# Patient Record
Sex: FEMALE | Race: WHITE | NOT HISPANIC OR LATINO
[De-identification: names, ages, dates, MRNs, and addresses within clinical notes are randomized per-mention and may not be internally consistent; named-entity substitution may affect disease eponyms.]

---

## 2017-05-22 ENCOUNTER — LABORATORY RESULT (OUTPATIENT)
Age: 77
End: 2017-05-22

## 2017-05-22 ENCOUNTER — MEDICATION RENEWAL (OUTPATIENT)
Age: 77
End: 2017-05-22

## 2017-05-22 ENCOUNTER — APPOINTMENT (OUTPATIENT)
Dept: INTERNAL MEDICINE | Facility: CLINIC | Age: 77
End: 2017-05-22

## 2017-05-22 VITALS
OXYGEN SATURATION: 97 % | DIASTOLIC BLOOD PRESSURE: 83 MMHG | WEIGHT: 118 LBS | HEART RATE: 91 BPM | RESPIRATION RATE: 16 BRPM | HEIGHT: 61 IN | BODY MASS INDEX: 22.28 KG/M2 | SYSTOLIC BLOOD PRESSURE: 178 MMHG | TEMPERATURE: 98.3 F

## 2017-05-22 DIAGNOSIS — H35.30 UNSPECIFIED MACULAR DEGENERATION: ICD-10-CM

## 2017-05-22 DIAGNOSIS — Z00.00 ENCOUNTER FOR GENERAL ADULT MEDICAL EXAMINATION W/OUT ABNORMAL FINDINGS: ICD-10-CM

## 2017-05-22 DIAGNOSIS — Z82.49 FAMILY HISTORY OF ISCHEMIC HEART DISEASE AND OTHER DISEASES OF THE CIRCULATORY SYSTEM: ICD-10-CM

## 2017-05-22 RX ORDER — LORAZEPAM 1 MG/1
1 TABLET ORAL
Refills: 0 | Status: COMPLETED | COMMUNITY
End: 2017-05-22

## 2017-05-31 PROBLEM — Z82.49 FAMILY HISTORY OF MYOCARDIAL INFARCTION: Status: ACTIVE | Noted: 2017-05-22

## 2017-05-31 PROBLEM — H35.30 MACULAR DEGENERATION: Status: ACTIVE | Noted: 2017-05-22

## 2017-05-31 PROBLEM — Z00.00 ENCOUNTER FOR PREVENTIVE HEALTH EXAMINATION: Status: ACTIVE | Noted: 2017-05-17

## 2017-05-31 LAB
25(OH)D3 SERPL-MCNC: 21.2 NG/ML
ALBUMIN SERPL ELPH-MCNC: 3.7 G/DL
ALP BLD-CCNC: 69 U/L
ALT SERPL-CCNC: 10 U/L
ANA PAT FLD IF-IMP: ABNORMAL
ANA SER IF-ACNC: ABNORMAL
ANION GAP SERPL CALC-SCNC: 18 MMOL/L
APPEARANCE: CLEAR
AST SERPL-CCNC: 20 U/L
BACTERIA UR CULT: ABNORMAL
BASOPHILS # BLD AUTO: 0.03 K/UL
BASOPHILS NFR BLD AUTO: 0.5 %
BILIRUB SERPL-MCNC: 0.4 MG/DL
BILIRUBIN URINE: NEGATIVE
BLOOD URINE: ABNORMAL
BUN SERPL-MCNC: 28 MG/DL
CALCIUM SERPL-MCNC: 9.1 MG/DL
CARDIOLIPIN AB SER IA-ACNC: POSITIVE
CHLORIDE SERPL-SCNC: 100 MMOL/L
CHOLEST SERPL-MCNC: 179 MG/DL
CHOLEST/HDLC SERPL: 3.4 RATIO
CK BB SERPL ELPH-CCNC: 0 % (ref 0–?)
CK MB CFR SERPL ELPH: 0 %
CK MM SERPL ELPH-CCNC: 100 %
CO2 SERPL-SCNC: 22 MMOL/L
COLOR: YELLOW
CREAT SERPL-MCNC: 1.06 MG/DL
CREAT SPEC-SCNC: 103 MG/DL
CREATINE KINASE,TOTAL,SERUM: 51 U/L
CRP SERPL-MCNC: 1 MG/DL
EOSINOPHIL # BLD AUTO: 0.21 K/UL
EOSINOPHIL NFR BLD AUTO: 3.3 %
ERYTHROCYTE [SEDIMENTATION RATE] IN BLOOD BY WESTERGREN METHOD: 36 MM/HR
FOLATE SERPL-MCNC: 13.5 NG/ML
FRUCTOSAMINE SERPL-MCNC: 238 UMOL/L
GLUCOSE QUALITATIVE U: NORMAL MG/DL
GLUCOSE SERPL-MCNC: 89 MG/DL
HBA1C MFR BLD HPLC: 5.6 %
HCT VFR BLD CALC: 40.6 %
HDLC SERPL-MCNC: 52 MG/DL
HGB BLD-MCNC: 12.7 G/DL
IMM GRANULOCYTES NFR BLD AUTO: 0.2 %
IRON SERPL-MCNC: 21 UG/DL
KETONES URINE: NEGATIVE
LDLC SERPL CALC-MCNC: 105 MG/DL
LEUKOCYTE ESTERASE URINE: ABNORMAL
LYMPHOCYTES # BLD AUTO: 0.88 K/UL
LYMPHOCYTES NFR BLD AUTO: 13.9 %
MACRO TYPE 1: 0 %
MACRO TYPE 2: 0 %
MAN DIFF?: NORMAL
MCHC RBC-ENTMCNC: 29.5 PG
MCHC RBC-ENTMCNC: 31.3 GM/DL
MCV RBC AUTO: 94.2 FL
MICROALBUMIN 24H UR DL<=1MG/L-MCNC: 11.2 MG/DL
MICROALBUMIN/CREAT 24H UR-RTO: 109
MONOCYTES # BLD AUTO: 0.56 K/UL
MONOCYTES NFR BLD AUTO: 8.8 %
NEUTROPHILS # BLD AUTO: 4.64 K/UL
NEUTROPHILS NFR BLD AUTO: 73.3 %
NITRITE URINE: NEGATIVE
NT-PROBNP SERPL-MCNC: 7686 PG/ML
PH URINE: 5.5
PLATELET # BLD AUTO: 210 K/UL
POTASSIUM SERPL-SCNC: 4.1 MMOL/L
PROT SERPL-MCNC: 6.9 G/DL
PROTEIN URINE: 30 MG/DL
RBC # BLD: 4.31 M/UL
RBC # FLD: 15.3 %
RHEUMATOID FACT SER QL: 10 IU/ML
SODIUM SERPL-SCNC: 140 MMOL/L
SPECIFIC GRAVITY URINE: 1.02
TRIGL SERPL-MCNC: 110 MG/DL
TSH SERPL-ACNC: 0.5 UIU/ML
UROBILINOGEN URINE: NORMAL MG/DL
VIT B12 SERPL-MCNC: 412 PG/ML
WBC # FLD AUTO: 6.33 K/UL

## 2017-06-09 ENCOUNTER — APPOINTMENT (OUTPATIENT)
Dept: INTERNAL MEDICINE | Facility: CLINIC | Age: 77
End: 2017-06-09

## 2017-06-09 VITALS
DIASTOLIC BLOOD PRESSURE: 73 MMHG | TEMPERATURE: 98.2 F | SYSTOLIC BLOOD PRESSURE: 162 MMHG | HEIGHT: 61 IN | WEIGHT: 114 LBS | HEART RATE: 72 BPM | OXYGEN SATURATION: 98 % | BODY MASS INDEX: 21.52 KG/M2 | RESPIRATION RATE: 16 BRPM

## 2017-06-09 DIAGNOSIS — Z87.440 PERSONAL HISTORY OF URINARY (TRACT) INFECTIONS: ICD-10-CM

## 2017-06-09 DIAGNOSIS — R60.0 LOCALIZED EDEMA: ICD-10-CM

## 2017-06-09 DIAGNOSIS — F32.9 MAJOR DEPRESSIVE DISORDER, SINGLE EPISODE, UNSPECIFIED: ICD-10-CM

## 2017-06-10 LAB
APPEARANCE: CLEAR
BILIRUBIN URINE: NEGATIVE
BLOOD URINE: NEGATIVE
COLOR: YELLOW
GLUCOSE QUALITATIVE U: NORMAL MG/DL
KETONES URINE: NEGATIVE
LEUKOCYTE ESTERASE URINE: NEGATIVE
NITRITE URINE: NEGATIVE
PH URINE: 5.5
PROTEIN URINE: NEGATIVE MG/DL
SPECIFIC GRAVITY URINE: 1.01
UROBILINOGEN URINE: NORMAL MG/DL

## 2017-06-13 ENCOUNTER — MEDICATION RENEWAL (OUTPATIENT)
Age: 77
End: 2017-06-13

## 2017-06-13 LAB — BACTERIA UR CULT: ABNORMAL

## 2017-06-19 ENCOUNTER — MEDICATION RENEWAL (OUTPATIENT)
Age: 77
End: 2017-06-19

## 2017-06-25 PROBLEM — F32.9 DEPRESSION: Status: ACTIVE | Noted: 2017-05-22

## 2017-06-25 PROBLEM — R60.0 PEDAL EDEMA: Status: ACTIVE | Noted: 2017-05-22

## 2017-06-26 ENCOUNTER — NON-APPOINTMENT (OUTPATIENT)
Age: 77
End: 2017-06-26

## 2017-06-26 ENCOUNTER — APPOINTMENT (OUTPATIENT)
Dept: CARDIOLOGY | Facility: CLINIC | Age: 77
End: 2017-06-26

## 2017-06-26 VITALS — SYSTOLIC BLOOD PRESSURE: 138 MMHG | DIASTOLIC BLOOD PRESSURE: 74 MMHG

## 2017-06-26 VITALS
WEIGHT: 113 LBS | HEART RATE: 66 BPM | RESPIRATION RATE: 12 BRPM | HEIGHT: 61 IN | DIASTOLIC BLOOD PRESSURE: 76 MMHG | OXYGEN SATURATION: 98 % | BODY MASS INDEX: 21.34 KG/M2 | SYSTOLIC BLOOD PRESSURE: 162 MMHG

## 2017-06-26 DIAGNOSIS — R00.2 PALPITATIONS: ICD-10-CM

## 2017-06-26 RX ORDER — CIPROFLOXACIN HYDROCHLORIDE 500 MG/1
500 TABLET, FILM COATED ORAL
Qty: 14 | Refills: 0 | Status: COMPLETED | COMMUNITY
Start: 2017-06-12 | End: 2017-06-26

## 2017-06-26 RX ORDER — AMPICILLIN 500 MG/1
500 CAPSULE ORAL
Qty: 10 | Refills: 0 | Status: COMPLETED | COMMUNITY
Start: 2017-05-31 | End: 2017-06-26

## 2017-06-26 RX ORDER — HYDROCHLOROTHIAZIDE 12.5 MG/1
12.5 CAPSULE ORAL
Qty: 30 | Refills: 0 | Status: COMPLETED | COMMUNITY
Start: 2017-05-22 | End: 2017-06-26

## 2017-06-26 RX ORDER — HYDROCHLOROTHIAZIDE 12.5 MG/1
12.5 TABLET ORAL
Qty: 30 | Refills: 3 | Status: COMPLETED | COMMUNITY
Start: 2017-05-22 | End: 2017-06-26

## 2017-07-17 ENCOUNTER — MEDICATION RENEWAL (OUTPATIENT)
Age: 77
End: 2017-07-17

## 2017-07-17 RX ORDER — VALSARTAN AND HYDROCHLOROTHIAZIDE 320; 12.5 MG/1; MG/1
320-12.5 TABLET, FILM COATED ORAL
Qty: 90 | Refills: 3 | Status: DISCONTINUED | COMMUNITY
Start: 2017-06-26 | End: 2017-07-17

## 2017-07-19 ENCOUNTER — NON-APPOINTMENT (OUTPATIENT)
Age: 77
End: 2017-07-19

## 2017-07-19 ENCOUNTER — APPOINTMENT (OUTPATIENT)
Dept: CARDIOLOGY | Facility: CLINIC | Age: 77
End: 2017-07-19

## 2017-07-19 VITALS — SYSTOLIC BLOOD PRESSURE: 130 MMHG | DIASTOLIC BLOOD PRESSURE: 72 MMHG

## 2017-07-19 VITALS
HEART RATE: 64 BPM | WEIGHT: 110 LBS | RESPIRATION RATE: 12 BRPM | DIASTOLIC BLOOD PRESSURE: 76 MMHG | BODY MASS INDEX: 20.77 KG/M2 | SYSTOLIC BLOOD PRESSURE: 161 MMHG | TEMPERATURE: 97.4 F | OXYGEN SATURATION: 98 % | HEIGHT: 61 IN

## 2017-07-19 RX ORDER — LOSARTAN POTASSIUM 100 MG/1
100 TABLET, FILM COATED ORAL
Qty: 14 | Refills: 0 | Status: DISCONTINUED | COMMUNITY
Start: 2017-02-07

## 2017-07-19 RX ORDER — LOVASTATIN 20 MG/1
20 TABLET ORAL
Qty: 14 | Refills: 0 | Status: DISCONTINUED | COMMUNITY
Start: 2017-02-07

## 2017-07-19 RX ORDER — TRAMADOL HYDROCHLORIDE 50 MG/1
50 TABLET, COATED ORAL
Qty: 56 | Refills: 0 | Status: DISCONTINUED | COMMUNITY
Start: 2017-02-07

## 2017-07-19 RX ORDER — VALSARTAN 320 MG/1
320 TABLET, COATED ORAL DAILY
Qty: 30 | Refills: 3 | Status: DISCONTINUED | COMMUNITY
End: 2017-07-19

## 2017-07-19 RX ORDER — METOPROLOL TARTRATE 25 MG/1
25 TABLET, FILM COATED ORAL
Qty: 28 | Refills: 0 | Status: DISCONTINUED | COMMUNITY
Start: 2017-02-07

## 2017-07-19 RX ORDER — OXYCODONE AND ACETAMINOPHEN 5; 325 MG/1; MG/1
5-325 TABLET ORAL
Qty: 40 | Refills: 0 | Status: DISCONTINUED | COMMUNITY
Start: 2017-03-21

## 2017-07-19 RX ORDER — PRAVASTATIN SODIUM 20 MG/1
20 TABLET ORAL
Qty: 90 | Refills: 0 | Status: DISCONTINUED | COMMUNITY
Start: 2017-02-27

## 2017-07-19 RX ORDER — ESCITALOPRAM OXALATE 5 MG/1
5 TABLET ORAL
Qty: 90 | Refills: 0 | Status: DISCONTINUED | COMMUNITY
Start: 2017-05-22 | End: 2017-07-19

## 2017-07-19 RX ORDER — LORAZEPAM 0.5 MG/1
0.5 TABLET ORAL
Qty: 14 | Refills: 0 | Status: DISCONTINUED | COMMUNITY
Start: 2017-02-07

## 2017-07-19 RX ORDER — PAROXETINE HYDROCHLORIDE 40 MG/1
40 TABLET, FILM COATED ORAL
Qty: 14 | Refills: 0 | Status: DISCONTINUED | COMMUNITY
Start: 2017-02-07

## 2017-07-19 RX ORDER — OSELTAMIVIR PHOSPHATE 30 MG/1
30 CAPSULE ORAL
Qty: 10 | Refills: 0 | Status: DISCONTINUED | COMMUNITY
Start: 2017-02-07

## 2017-08-02 ENCOUNTER — APPOINTMENT (OUTPATIENT)
Dept: CARDIOLOGY | Facility: CLINIC | Age: 77
End: 2017-08-02
Payer: MEDICARE

## 2017-08-02 VITALS
HEIGHT: 61 IN | WEIGHT: 117 LBS | SYSTOLIC BLOOD PRESSURE: 172 MMHG | BODY MASS INDEX: 22.09 KG/M2 | HEART RATE: 61 BPM | OXYGEN SATURATION: 96 % | RESPIRATION RATE: 12 BRPM | TEMPERATURE: 97.6 F | DIASTOLIC BLOOD PRESSURE: 78 MMHG

## 2017-08-02 VITALS — SYSTOLIC BLOOD PRESSURE: 140 MMHG | DIASTOLIC BLOOD PRESSURE: 68 MMHG

## 2017-08-02 DIAGNOSIS — M32.9 SYSTEMIC LUPUS ERYTHEMATOSUS, UNSPECIFIED: ICD-10-CM

## 2017-08-02 DIAGNOSIS — I65.29 OCCLUSION AND STENOSIS OF UNSPECIFIED CAROTID ARTERY: ICD-10-CM

## 2017-08-02 PROCEDURE — 99214 OFFICE O/P EST MOD 30 MIN: CPT

## 2017-08-02 RX ORDER — VALSARTAN AND HYDROCHLOROTHIAZIDE 320; 12.5 MG/1; MG/1
320-12.5 TABLET, FILM COATED ORAL
Qty: 90 | Refills: 3 | Status: COMPLETED | COMMUNITY
Start: 2017-07-19 | End: 2017-08-02

## 2017-08-02 RX ORDER — AMLODIPINE BESYLATE 5 MG/1
5 TABLET ORAL DAILY
Qty: 14 | Refills: 0 | Status: COMPLETED | COMMUNITY
Start: 2017-07-15 | End: 2017-08-02

## 2017-08-25 ENCOUNTER — EMERGENCY (EMERGENCY)
Facility: HOSPITAL | Age: 77
LOS: 1 days | Discharge: HOME | End: 2017-08-25
Attending: EMERGENCY MEDICINE | Admitting: EMERGENCY MEDICINE
Payer: MEDICARE

## 2017-08-25 ENCOUNTER — MEDICATION RENEWAL (OUTPATIENT)
Age: 77
End: 2017-08-25

## 2017-08-25 VITALS
DIASTOLIC BLOOD PRESSURE: 93 MMHG | OXYGEN SATURATION: 99 % | SYSTOLIC BLOOD PRESSURE: 194 MMHG | HEART RATE: 65 BPM | TEMPERATURE: 98 F | RESPIRATION RATE: 16 BRPM

## 2017-08-25 PROCEDURE — 99284 EMERGENCY DEPT VISIT MOD MDM: CPT

## 2017-08-25 RX ORDER — DIPHENHYDRAMINE HCL 50 MG
25 CAPSULE ORAL ONCE
Qty: 0 | Refills: 0 | Status: COMPLETED | OUTPATIENT
Start: 2017-08-25 | End: 2017-08-25

## 2017-08-25 RX ADMIN — Medication 25 MILLIGRAM(S): at 23:01

## 2017-08-25 NOTE — ED PROVIDER NOTE - CARE PLAN
Principal Discharge DX:	Rash  Instructions for follow-up, activity and diet:	1. Take Benadryl, 25 mg every 4-6 hours as needed for itching  2. Apply hydrocortisone cream as directed to rash  3. Follow-up with dermatology in 5-7 days, please see referral sheet and call for appointment  4. Return immediately for any worsening or concerning symptoms

## 2017-08-25 NOTE — ED PROVIDER NOTE - PLAN OF CARE
1. Take Benadryl, 25 mg every 4-6 hours as needed for itching  2. Apply hydrocortisone cream as directed to rash  3. Follow-up with dermatology in 5-7 days, please see referral sheet and call for appointment  4. Return immediately for any worsening or concerning symptoms

## 2017-08-25 NOTE — ED PROVIDER NOTE - OBJECTIVE STATEMENT
77F with pmh HTN, HLD, presents with rash to L upper arm. Pt states that she quit smoking ~6 weeks ago, began using nicotine patch, alternating between L and R shoulder. over past couple of weeks has developed pruritic erythematous rash to L upper arm. no alleviating or exacerbating factors. has not taken any meds. is concerned could potentially be bed bugs b/c friend had them recently, although she has not been to her house. lives with daughter who is asymptomatic. denies any other symptoms including cp, sob, n/v/d, f/c, dizziness, weight loss. no known bug bites, no time spent outdoors or in woods.

## 2017-08-25 NOTE — ED ADULT NURSE NOTE - OBJECTIVE STATEMENT
Pt presented to ED with c/o rash and itching to both arms s/p using a nicotine patch 2 weeks ago. no acute distress noted.

## 2017-08-25 NOTE — ED ADULT TRIAGE NOTE - CHIEF COMPLAINT QUOTE
Pt c/o rash on L arm X2 weeks. States she started using a nicotine patch 5 weeks ago and believes it is related. Denies any other medical complaints at this time.

## 2017-08-25 NOTE — ED PROVIDER NOTE - MEDICAL DECISION MAKING DETAILS
77F with rash to L upper arm x 2 weeks, with no systemic symptoms. potentially insect bites vs contact dermatitis/allergic reaction. will give benadryl, recommend hydrocortisone cream, have f/u with dermatology. - Jensen Patel MD

## 2017-08-25 NOTE — ED PROVIDER NOTE - CARDIAC, MLM
Normal rate, regular rhythm.  Heart sounds S1, S2.  No murmurs, rubs or gallops. 2+ bilat rad pulses.

## 2017-08-25 NOTE — ED PROVIDER NOTE - CONSTITUTIONAL, MLM
normal... Elderly female, awake, alert, oriented to person, place, time/situation and in no apparent distress.

## 2017-08-30 ENCOUNTER — APPOINTMENT (OUTPATIENT)
Dept: INTERNAL MEDICINE | Facility: CLINIC | Age: 77
End: 2017-08-30
Payer: MEDICARE

## 2017-08-30 VITALS
DIASTOLIC BLOOD PRESSURE: 71 MMHG | BODY MASS INDEX: 21.52 KG/M2 | OXYGEN SATURATION: 96 % | TEMPERATURE: 98.1 F | HEIGHT: 61 IN | RESPIRATION RATE: 12 BRPM | SYSTOLIC BLOOD PRESSURE: 154 MMHG | HEART RATE: 66 BPM | WEIGHT: 114 LBS

## 2017-08-30 DIAGNOSIS — M54.9 DORSALGIA, UNSPECIFIED: ICD-10-CM

## 2017-08-30 PROCEDURE — 99214 OFFICE O/P EST MOD 30 MIN: CPT

## 2017-08-31 LAB
ALBUMIN SERPL ELPH-MCNC: 3.5 G/DL
ALP BLD-CCNC: 68 U/L
ALT SERPL-CCNC: 5 U/L
ANION GAP SERPL CALC-SCNC: 14 MMOL/L
AST SERPL-CCNC: 17 U/L
BASOPHILS # BLD AUTO: 0.04 K/UL
BASOPHILS NFR BLD AUTO: 0.7 %
BILIRUB SERPL-MCNC: 0.3 MG/DL
BUN SERPL-MCNC: 27 MG/DL
CALCIUM SERPL-MCNC: 9.1 MG/DL
CHLORIDE SERPL-SCNC: 100 MMOL/L
CO2 SERPL-SCNC: 23 MMOL/L
CREAT SERPL-MCNC: 1.27 MG/DL
EOSINOPHIL # BLD AUTO: 0.29 K/UL
EOSINOPHIL NFR BLD AUTO: 5.4 %
GLUCOSE SERPL-MCNC: 90 MG/DL
HCT VFR BLD CALC: 35.8 %
HGB BLD-MCNC: 11.6 G/DL
IMM GRANULOCYTES NFR BLD AUTO: 0.2 %
LYMPHOCYTES # BLD AUTO: 1 K/UL
LYMPHOCYTES NFR BLD AUTO: 18.6 %
MAN DIFF?: NORMAL
MCHC RBC-ENTMCNC: 29.1 PG
MCHC RBC-ENTMCNC: 32.4 GM/DL
MCV RBC AUTO: 89.9 FL
MONOCYTES # BLD AUTO: 0.48 K/UL
MONOCYTES NFR BLD AUTO: 8.9 %
NEUTROPHILS # BLD AUTO: 3.56 K/UL
NEUTROPHILS NFR BLD AUTO: 66.2 %
PLATELET # BLD AUTO: 239 K/UL
POTASSIUM SERPL-SCNC: 4.5 MMOL/L
PROT SERPL-MCNC: 7.7 G/DL
RBC # BLD: 3.98 M/UL
RBC # FLD: 15.5 %
SODIUM SERPL-SCNC: 137 MMOL/L
TSH SERPL-ACNC: 0.7 UIU/ML
WBC # FLD AUTO: 5.38 K/UL

## 2017-09-01 LAB — RPR SER QL: NORMAL

## 2017-09-07 ENCOUNTER — APPOINTMENT (OUTPATIENT)
Dept: INTERNAL MEDICINE | Facility: CLINIC | Age: 77
End: 2017-09-07

## 2017-09-13 ENCOUNTER — APPOINTMENT (OUTPATIENT)
Dept: INTERNAL MEDICINE | Facility: CLINIC | Age: 77
End: 2017-09-13

## 2017-09-25 ENCOUNTER — EMERGENCY (EMERGENCY)
Facility: HOSPITAL | Age: 77
LOS: 1 days | Discharge: HOME | End: 2017-09-25
Attending: EMERGENCY MEDICINE | Admitting: EMERGENCY MEDICINE
Payer: COMMERCIAL

## 2017-09-25 VITALS
OXYGEN SATURATION: 99 % | TEMPERATURE: 99 F | HEART RATE: 89 BPM | SYSTOLIC BLOOD PRESSURE: 166 MMHG | RESPIRATION RATE: 18 BRPM | DIASTOLIC BLOOD PRESSURE: 77 MMHG

## 2017-09-25 PROCEDURE — 99283 EMERGENCY DEPT VISIT LOW MDM: CPT

## 2017-09-25 PROCEDURE — 99284 EMERGENCY DEPT VISIT MOD MDM: CPT

## 2017-09-25 RX ORDER — HYDROXYZINE HCL 10 MG
1 TABLET ORAL
Qty: 28 | Refills: 0 | OUTPATIENT
Start: 2017-09-25 | End: 2017-10-02

## 2017-09-25 RX ADMIN — Medication 40 MILLIGRAM(S): at 15:03

## 2017-09-25 NOTE — ED PROVIDER NOTE - OBJECTIVE STATEMENT
77 y.o. female coming in with a rash on her left UE and chest for the past couple weeks.  Pt was been following up with a dermatologist , tried steroid creams with no help.  Started Permethrin 2 days ago without any help.   Had a biopsy performed but does not have the results yet.  Rash is very itchy, nothing makes it better or worse.  Dermatologist is concerned it might be mites.  Pt has not noticed any bugs in her house.  No fevers, no chills, no nausea, no vomiting, no wheezing, no difficulty swallowing. 77 y.o. female coming in with a rash on her left UE and chest for the past couple weeks.  Pt was been following up with a dermatologist , tried steroid creams with no help.  Started Permethrin 2 days ago without any help.   Had a biopsy performed but does not have the results yet.  Rash is very itchy, nothing makes it better or worse.  Dermatologist is concerned it might be mites.  Pt has not noticed any bugs in her house.  No fevers, no chills, no nausea, no vomiting, no wheezing, no difficulty swallowing.       Attending note. Patient was seen in fast track room #5. Agree with the above. Patient's complaint of severely pruritic rash over the entire upper half of her body from the trunk to scalp, and upper extremities, and groin. Patient has been using a steroid cream without improvement. Patient states she's unable to sleep due to the itching. Patient had no relief with Benadryl or Allegra.

## 2017-09-25 NOTE — ED PROVIDER NOTE - PHYSICAL EXAMINATION
Attending note. Patient is alert and in no acute distress. Patient has a maculopapular rash on her scalp, neck, thorax, abdomen. Patient has evidence of a skin biopsy on the left shoulder. Patient has confluence of the rash on the lower back.  There is no evidence of cellulitis.

## 2017-09-25 NOTE — ED PROVIDER NOTE - CHPI ED SYMPTOMS NEG
no scaly patches on skin/no decreased eating/drinking/no inflammation/no vomiting/no fever/no bruising/no petechia

## 2017-09-25 NOTE — ED ADULT NURSE NOTE - OBJECTIVE STATEMENT
Possible reaction to something being seen by derm and pt has creams for the upper body red raised areas Pt was bx on the left upper arm and pt c/o of itching.

## 2017-09-25 NOTE — ED PROVIDER NOTE - SKIN, MLM
patchy areas of erythema discreet lesions about 1cm each most prominent on LUE and chest that are not warm, + blanching

## 2017-09-25 NOTE — ED PROVIDER NOTE - MEDICAL DECISION MAKING DETAILS
Attending note-rash over entire body, with no relief with steroid cream. P.o. steroids and Vistaril and patient advised to call her dermatologist for further instructions.

## 2017-09-27 ENCOUNTER — APPOINTMENT (OUTPATIENT)
Dept: INTERNAL MEDICINE | Facility: CLINIC | Age: 77
End: 2017-09-27
Payer: MEDICARE

## 2017-09-27 ENCOUNTER — APPOINTMENT (OUTPATIENT)
Dept: CARDIOLOGY | Facility: CLINIC | Age: 77
End: 2017-09-27

## 2017-09-27 VITALS
HEIGHT: 61 IN | OXYGEN SATURATION: 98 % | SYSTOLIC BLOOD PRESSURE: 161 MMHG | TEMPERATURE: 98 F | BODY MASS INDEX: 20.77 KG/M2 | DIASTOLIC BLOOD PRESSURE: 80 MMHG | WEIGHT: 110 LBS | RESPIRATION RATE: 12 BRPM | HEART RATE: 80 BPM

## 2017-09-27 DIAGNOSIS — R21 RASH AND OTHER NONSPECIFIC SKIN ERUPTION: ICD-10-CM

## 2017-09-27 DIAGNOSIS — B86 SCABIES: ICD-10-CM

## 2017-09-27 PROCEDURE — 99214 OFFICE O/P EST MOD 30 MIN: CPT

## 2017-10-30 ENCOUNTER — APPOINTMENT (OUTPATIENT)
Dept: CARDIOLOGY | Facility: CLINIC | Age: 77
End: 2017-10-30

## 2017-11-09 ENCOUNTER — APPOINTMENT (OUTPATIENT)
Dept: CARDIOLOGY | Facility: CLINIC | Age: 77
End: 2017-11-09

## 2017-11-16 ENCOUNTER — NON-APPOINTMENT (OUTPATIENT)
Age: 77
End: 2017-11-16

## 2017-11-16 ENCOUNTER — APPOINTMENT (OUTPATIENT)
Dept: INTERNAL MEDICINE | Facility: CLINIC | Age: 77
End: 2017-11-16
Payer: MEDICARE

## 2017-11-16 VITALS
HEART RATE: 54 BPM | BODY MASS INDEX: 21.14 KG/M2 | OXYGEN SATURATION: 99 % | DIASTOLIC BLOOD PRESSURE: 73 MMHG | HEIGHT: 61 IN | WEIGHT: 112 LBS | TEMPERATURE: 98.5 F | RESPIRATION RATE: 12 BRPM | SYSTOLIC BLOOD PRESSURE: 150 MMHG

## 2017-11-16 PROCEDURE — 99214 OFFICE O/P EST MOD 30 MIN: CPT

## 2017-11-16 PROCEDURE — 93000 ELECTROCARDIOGRAM COMPLETE: CPT

## 2017-11-16 RX ORDER — METHYLPREDNISOLONE 4 MG/1
4 TABLET ORAL
Qty: 1 | Refills: 0 | Status: DISCONTINUED | COMMUNITY
Start: 2017-08-30 | End: 2017-11-16

## 2017-12-04 LAB
ALBUMIN SERPL ELPH-MCNC: 3.6 G/DL
ALP BLD-CCNC: 70 U/L
ALT SERPL-CCNC: 10 U/L
ANION GAP SERPL CALC-SCNC: 19 MMOL/L
AST SERPL-CCNC: 18 U/L
BASOPHILS # BLD AUTO: 0.05 K/UL
BASOPHILS NFR BLD AUTO: 0.7 %
BILIRUB SERPL-MCNC: 0.2 MG/DL
BUN SERPL-MCNC: 26 MG/DL
CALCIUM SERPL-MCNC: 9.8 MG/DL
CHLORIDE SERPL-SCNC: 99 MMOL/L
CO2 SERPL-SCNC: 22 MMOL/L
CREAT SERPL-MCNC: 1.04 MG/DL
EOSINOPHIL # BLD AUTO: 0.43 K/UL
EOSINOPHIL NFR BLD AUTO: 5.7 %
ERYTHROCYTE [SEDIMENTATION RATE] IN BLOOD BY WESTERGREN METHOD: 38 MM/HR
GLUCOSE SERPL-MCNC: 76 MG/DL
HAV IGM SER QL: NONREACTIVE
HBV CORE IGM SER QL: NONREACTIVE
HBV SURFACE AG SER QL: NONREACTIVE
HCT VFR BLD CALC: 39 %
HCV AB SER QL: NONREACTIVE
HCV S/CO RATIO: 0.09 S/CO
HGB BLD-MCNC: 13.1 G/DL
IMM GRANULOCYTES NFR BLD AUTO: 0.1 %
LYMPHOCYTES # BLD AUTO: 1.14 K/UL
LYMPHOCYTES NFR BLD AUTO: 15.1 %
MAN DIFF?: NORMAL
MCHC RBC-ENTMCNC: 31.6 PG
MCHC RBC-ENTMCNC: 33.6 GM/DL
MCV RBC AUTO: 94 FL
MONOCYTES # BLD AUTO: 0.73 K/UL
MONOCYTES NFR BLD AUTO: 9.6 %
NEUTROPHILS # BLD AUTO: 5.21 K/UL
NEUTROPHILS NFR BLD AUTO: 68.8 %
PLATELET # BLD AUTO: 257 K/UL
POTASSIUM SERPL-SCNC: 4 MMOL/L
PROT SERPL-MCNC: 7.7 G/DL
RBC # BLD: 4.15 M/UL
RBC # FLD: 15.2 %
SODIUM SERPL-SCNC: 140 MMOL/L
T4 FREE SERPL-MCNC: 1.3 NG/DL
TSH SERPL-ACNC: 0.42 UIU/ML
WBC # FLD AUTO: 7.57 K/UL

## 2017-12-28 ENCOUNTER — APPOINTMENT (OUTPATIENT)
Dept: INTERNAL MEDICINE | Facility: CLINIC | Age: 77
End: 2017-12-28
Payer: MEDICARE

## 2017-12-28 VITALS
SYSTOLIC BLOOD PRESSURE: 146 MMHG | WEIGHT: 109 LBS | TEMPERATURE: 97.8 F | BODY MASS INDEX: 20.58 KG/M2 | HEART RATE: 78 BPM | HEIGHT: 61 IN | DIASTOLIC BLOOD PRESSURE: 83 MMHG | OXYGEN SATURATION: 95 % | RESPIRATION RATE: 12 BRPM

## 2017-12-28 DIAGNOSIS — E78.5 HYPERLIPIDEMIA, UNSPECIFIED: ICD-10-CM

## 2017-12-28 DIAGNOSIS — L29.9 PRURITUS, UNSPECIFIED: ICD-10-CM

## 2017-12-28 DIAGNOSIS — L20.9 ATOPIC DERMATITIS, UNSPECIFIED: ICD-10-CM

## 2017-12-28 DIAGNOSIS — F41.0 PANIC DISORDER [EPISODIC PAROXYSMAL ANXIETY]: ICD-10-CM

## 2017-12-28 DIAGNOSIS — I10 ESSENTIAL (PRIMARY) HYPERTENSION: ICD-10-CM

## 2017-12-28 PROCEDURE — 99214 OFFICE O/P EST MOD 30 MIN: CPT

## 2018-01-02 PROBLEM — L20.9 ATOPIC DERMATITIS: Status: ACTIVE | Noted: 2018-01-02

## 2018-01-02 PROBLEM — E78.5 HLD (HYPERLIPIDEMIA): Status: ACTIVE | Noted: 2017-05-22

## 2018-01-02 PROBLEM — F41.0 PANIC DISORDER: Status: ACTIVE | Noted: 2017-05-22

## 2018-01-02 PROBLEM — I10 HTN (HYPERTENSION): Status: ACTIVE | Noted: 2017-05-22

## 2018-01-29 ENCOUNTER — INPATIENT (INPATIENT)
Facility: HOSPITAL | Age: 78
LOS: 2 days | Discharge: HOME HEALTH | End: 2018-02-01
Attending: HOSPITALIST | Admitting: HOSPITALIST
Payer: MEDICARE

## 2018-01-29 VITALS
HEART RATE: 145 BPM | SYSTOLIC BLOOD PRESSURE: 106 MMHG | OXYGEN SATURATION: 95 % | TEMPERATURE: 98 F | RESPIRATION RATE: 20 BRPM | DIASTOLIC BLOOD PRESSURE: 64 MMHG

## 2018-01-29 DIAGNOSIS — Z98.1 ARTHRODESIS STATUS: Chronic | ICD-10-CM

## 2018-01-29 DIAGNOSIS — I48.91 UNSPECIFIED ATRIAL FIBRILLATION: ICD-10-CM

## 2018-01-29 DIAGNOSIS — I10 ESSENTIAL (PRIMARY) HYPERTENSION: ICD-10-CM

## 2018-01-29 DIAGNOSIS — F41.9 ANXIETY DISORDER, UNSPECIFIED: ICD-10-CM

## 2018-01-29 DIAGNOSIS — Z98.49 CATARACT EXTRACTION STATUS, UNSPECIFIED EYE: Chronic | ICD-10-CM

## 2018-01-29 DIAGNOSIS — N39.0 URINARY TRACT INFECTION, SITE NOT SPECIFIED: ICD-10-CM

## 2018-01-29 DIAGNOSIS — Z98.890 OTHER SPECIFIED POSTPROCEDURAL STATES: Chronic | ICD-10-CM

## 2018-01-29 LAB
ALBUMIN SERPL ELPH-MCNC: 3 G/DL — LOW (ref 3.3–5)
ALP SERPL-CCNC: 52 U/L — SIGNIFICANT CHANGE UP (ref 40–120)
ALT FLD-CCNC: < 4 U/L — LOW (ref 4–33)
APPEARANCE UR: SIGNIFICANT CHANGE UP
APTT BLD: 35.4 SEC — SIGNIFICANT CHANGE UP (ref 27.5–37.4)
AST SERPL-CCNC: 14 U/L — SIGNIFICANT CHANGE UP (ref 4–32)
B PERT DNA SPEC QL NAA+PROBE: SIGNIFICANT CHANGE UP
BASE EXCESS BLDV CALC-SCNC: 1.5 MMOL/L — SIGNIFICANT CHANGE UP
BASOPHILS # BLD AUTO: 0.04 K/UL — SIGNIFICANT CHANGE UP (ref 0–0.2)
BASOPHILS NFR BLD AUTO: 0.7 % — SIGNIFICANT CHANGE UP (ref 0–2)
BILIRUB SERPL-MCNC: 0.7 MG/DL — SIGNIFICANT CHANGE UP (ref 0.2–1.2)
BILIRUB UR-MCNC: NEGATIVE — SIGNIFICANT CHANGE UP
BLOOD GAS VENOUS - CREATININE: 1.02 MG/DL — SIGNIFICANT CHANGE UP (ref 0.5–1.3)
BLOOD UR QL VISUAL: NEGATIVE — SIGNIFICANT CHANGE UP
BUN SERPL-MCNC: 24 MG/DL — HIGH (ref 7–23)
C PNEUM DNA SPEC QL NAA+PROBE: NOT DETECTED — SIGNIFICANT CHANGE UP
CALCIUM SERPL-MCNC: 8.3 MG/DL — LOW (ref 8.4–10.5)
CHLORIDE BLDV-SCNC: 100 MMOL/L — SIGNIFICANT CHANGE UP (ref 96–108)
CHLORIDE SERPL-SCNC: 98 MMOL/L — SIGNIFICANT CHANGE UP (ref 98–107)
CK MB BLD-MCNC: 1.35 NG/ML — SIGNIFICANT CHANGE UP (ref 1–4.7)
CK SERPL-CCNC: 29 U/L — SIGNIFICANT CHANGE UP (ref 25–170)
CK SERPL-CCNC: 46 U/L — SIGNIFICANT CHANGE UP (ref 25–170)
CO2 SERPL-SCNC: 23 MMOL/L — SIGNIFICANT CHANGE UP (ref 22–31)
COLOR SPEC: YELLOW — SIGNIFICANT CHANGE UP
CREAT SERPL-MCNC: 1.11 MG/DL — SIGNIFICANT CHANGE UP (ref 0.5–1.3)
EOSINOPHIL # BLD AUTO: 0.05 K/UL — SIGNIFICANT CHANGE UP (ref 0–0.5)
EOSINOPHIL NFR BLD AUTO: 0.9 % — SIGNIFICANT CHANGE UP (ref 0–6)
FLUAV H1 2009 PAND RNA SPEC QL NAA+PROBE: NOT DETECTED — SIGNIFICANT CHANGE UP
FLUAV H1 RNA SPEC QL NAA+PROBE: NOT DETECTED — SIGNIFICANT CHANGE UP
FLUAV H3 RNA SPEC QL NAA+PROBE: NOT DETECTED — SIGNIFICANT CHANGE UP
FLUAV SUBTYP SPEC NAA+PROBE: SIGNIFICANT CHANGE UP
FLUBV RNA SPEC QL NAA+PROBE: NOT DETECTED — SIGNIFICANT CHANGE UP
GAS PNL BLDV: 129 MMOL/L — LOW (ref 136–146)
GLUCOSE BLDV-MCNC: 102 — HIGH (ref 70–99)
GLUCOSE SERPL-MCNC: 102 MG/DL — HIGH (ref 70–99)
GLUCOSE UR-MCNC: NEGATIVE — SIGNIFICANT CHANGE UP
HADV DNA SPEC QL NAA+PROBE: NOT DETECTED — SIGNIFICANT CHANGE UP
HCO3 BLDV-SCNC: 23 MMOL/L — SIGNIFICANT CHANGE UP (ref 20–27)
HCOV 229E RNA SPEC QL NAA+PROBE: NOT DETECTED — SIGNIFICANT CHANGE UP
HCOV HKU1 RNA SPEC QL NAA+PROBE: NOT DETECTED — SIGNIFICANT CHANGE UP
HCOV NL63 RNA SPEC QL NAA+PROBE: NOT DETECTED — SIGNIFICANT CHANGE UP
HCOV OC43 RNA SPEC QL NAA+PROBE: NOT DETECTED — SIGNIFICANT CHANGE UP
HCT VFR BLD CALC: 39.9 % — SIGNIFICANT CHANGE UP (ref 34.5–45)
HCT VFR BLDV CALC: 41.9 % — SIGNIFICANT CHANGE UP (ref 34.5–45)
HGB BLD-MCNC: 13 G/DL — SIGNIFICANT CHANGE UP (ref 11.5–15.5)
HGB BLDV-MCNC: 13.6 G/DL — SIGNIFICANT CHANGE UP (ref 11.5–15.5)
HMPV RNA SPEC QL NAA+PROBE: NOT DETECTED — SIGNIFICANT CHANGE UP
HPIV1 RNA SPEC QL NAA+PROBE: NOT DETECTED — SIGNIFICANT CHANGE UP
HPIV2 RNA SPEC QL NAA+PROBE: NOT DETECTED — SIGNIFICANT CHANGE UP
HPIV3 RNA SPEC QL NAA+PROBE: NOT DETECTED — SIGNIFICANT CHANGE UP
HPIV4 RNA SPEC QL NAA+PROBE: NOT DETECTED — SIGNIFICANT CHANGE UP
IMM GRANULOCYTES # BLD AUTO: 0.02 # — SIGNIFICANT CHANGE UP
IMM GRANULOCYTES NFR BLD AUTO: 0.4 % — SIGNIFICANT CHANGE UP (ref 0–1.5)
INR BLD: 1.11 — SIGNIFICANT CHANGE UP (ref 0.88–1.17)
KETONES UR-MCNC: NEGATIVE — SIGNIFICANT CHANGE UP
LACTATE BLDV-MCNC: 1.6 MMOL/L — SIGNIFICANT CHANGE UP (ref 0.5–2)
LEUKOCYTE ESTERASE UR-ACNC: HIGH
LYMPHOCYTES # BLD AUTO: 0.7 K/UL — LOW (ref 1–3.3)
LYMPHOCYTES # BLD AUTO: 12.9 % — LOW (ref 13–44)
M PNEUMO DNA SPEC QL NAA+PROBE: NOT DETECTED — SIGNIFICANT CHANGE UP
MCHC RBC-ENTMCNC: 28.9 PG — SIGNIFICANT CHANGE UP (ref 27–34)
MCHC RBC-ENTMCNC: 32.6 % — SIGNIFICANT CHANGE UP (ref 32–36)
MCV RBC AUTO: 88.7 FL — SIGNIFICANT CHANGE UP (ref 80–100)
MONOCYTES # BLD AUTO: 0.54 K/UL — SIGNIFICANT CHANGE UP (ref 0–0.9)
MONOCYTES NFR BLD AUTO: 9.9 % — SIGNIFICANT CHANGE UP (ref 2–14)
MUCOUS THREADS # UR AUTO: SIGNIFICANT CHANGE UP
NEUTROPHILS # BLD AUTO: 4.08 K/UL — SIGNIFICANT CHANGE UP (ref 1.8–7.4)
NEUTROPHILS NFR BLD AUTO: 75.2 % — SIGNIFICANT CHANGE UP (ref 43–77)
NITRITE UR-MCNC: NEGATIVE — SIGNIFICANT CHANGE UP
NON-SQ EPI CELLS # UR AUTO: <1 — SIGNIFICANT CHANGE UP
NRBC # FLD: 0 — SIGNIFICANT CHANGE UP
PCO2 BLDV: 51 MMHG — SIGNIFICANT CHANGE UP (ref 41–51)
PH BLDV: 7.34 PH — SIGNIFICANT CHANGE UP (ref 7.32–7.43)
PH UR: 6.5 — SIGNIFICANT CHANGE UP (ref 4.6–8)
PLATELET # BLD AUTO: 178 K/UL — SIGNIFICANT CHANGE UP (ref 150–400)
PMV BLD: 11.2 FL — SIGNIFICANT CHANGE UP (ref 7–13)
PO2 BLDV: < 24 MMHG — LOW (ref 35–40)
POTASSIUM BLDV-SCNC: 5 MMOL/L — HIGH (ref 3.4–4.5)
POTASSIUM SERPL-MCNC: 4.7 MMOL/L — SIGNIFICANT CHANGE UP (ref 3.5–5.3)
POTASSIUM SERPL-SCNC: 4.7 MMOL/L — SIGNIFICANT CHANGE UP (ref 3.5–5.3)
PROT SERPL-MCNC: 6.7 G/DL — SIGNIFICANT CHANGE UP (ref 6–8.3)
PROT UR-MCNC: 30 MG/DL — HIGH
PROTHROM AB SERPL-ACNC: 12.3 SEC — SIGNIFICANT CHANGE UP (ref 9.8–13.1)
RBC # BLD: 4.5 M/UL — SIGNIFICANT CHANGE UP (ref 3.8–5.2)
RBC # FLD: 13.6 % — SIGNIFICANT CHANGE UP (ref 10.3–14.5)
RBC CASTS # UR COMP ASSIST: SIGNIFICANT CHANGE UP (ref 0–?)
RSV RNA SPEC QL NAA+PROBE: NOT DETECTED — SIGNIFICANT CHANGE UP
RV+EV RNA SPEC QL NAA+PROBE: NOT DETECTED — SIGNIFICANT CHANGE UP
SAO2 % BLDV: 15.5 % — LOW (ref 60–85)
SODIUM SERPL-SCNC: 134 MMOL/L — LOW (ref 135–145)
SP GR SPEC: 1.02 — SIGNIFICANT CHANGE UP (ref 1–1.04)
SQUAMOUS # UR AUTO: SIGNIFICANT CHANGE UP
TROPONIN T SERPL-MCNC: < 0.06 NG/ML — SIGNIFICANT CHANGE UP (ref 0–0.06)
TROPONIN T SERPL-MCNC: < 0.06 NG/ML — SIGNIFICANT CHANGE UP (ref 0–0.06)
UROBILINOGEN FLD QL: NORMAL MG/DL — SIGNIFICANT CHANGE UP
WBC # BLD: 5.43 K/UL — SIGNIFICANT CHANGE UP (ref 3.8–10.5)
WBC # FLD AUTO: 5.43 K/UL — SIGNIFICANT CHANGE UP (ref 3.8–10.5)
WBC UR QL: SIGNIFICANT CHANGE UP (ref 0–?)

## 2018-01-29 PROCEDURE — 71046 X-RAY EXAM CHEST 2 VIEWS: CPT | Mod: 26

## 2018-01-29 PROCEDURE — 99223 1ST HOSP IP/OBS HIGH 75: CPT

## 2018-01-29 RX ORDER — SODIUM CHLORIDE 9 MG/ML
1000 INJECTION INTRAMUSCULAR; INTRAVENOUS; SUBCUTANEOUS ONCE
Qty: 0 | Refills: 0 | Status: COMPLETED | OUTPATIENT
Start: 2018-01-29 | End: 2018-01-29

## 2018-01-29 RX ORDER — METOPROLOL TARTRATE 50 MG
25 TABLET ORAL DAILY
Qty: 0 | Refills: 0 | Status: DISCONTINUED | OUTPATIENT
Start: 2018-01-29 | End: 2018-02-01

## 2018-01-29 RX ORDER — ATORVASTATIN CALCIUM 80 MG/1
20 TABLET, FILM COATED ORAL AT BEDTIME
Qty: 0 | Refills: 0 | Status: DISCONTINUED | OUTPATIENT
Start: 2018-01-29 | End: 2018-02-01

## 2018-01-29 RX ORDER — ACETAMINOPHEN 500 MG
1000 TABLET ORAL ONCE
Qty: 0 | Refills: 0 | Status: COMPLETED | OUTPATIENT
Start: 2018-01-29 | End: 2018-01-29

## 2018-01-29 RX ORDER — AMLODIPINE BESYLATE 2.5 MG/1
5 TABLET ORAL DAILY
Qty: 0 | Refills: 0 | Status: DISCONTINUED | OUTPATIENT
Start: 2018-01-29 | End: 2018-02-01

## 2018-01-29 RX ORDER — ENOXAPARIN SODIUM 100 MG/ML
40 INJECTION SUBCUTANEOUS EVERY 24 HOURS
Qty: 0 | Refills: 0 | Status: DISCONTINUED | OUTPATIENT
Start: 2018-01-29 | End: 2018-01-30

## 2018-01-29 RX ORDER — ACETAMINOPHEN 500 MG
650 TABLET ORAL ONCE
Qty: 0 | Refills: 0 | Status: COMPLETED | OUTPATIENT
Start: 2018-01-29 | End: 2018-01-29

## 2018-01-29 RX ORDER — ASPIRIN/CALCIUM CARB/MAGNESIUM 324 MG
81 TABLET ORAL DAILY
Qty: 0 | Refills: 0 | Status: DISCONTINUED | OUTPATIENT
Start: 2018-01-29 | End: 2018-02-01

## 2018-01-29 RX ORDER — CEFTRIAXONE 500 MG/1
1 INJECTION, POWDER, FOR SOLUTION INTRAMUSCULAR; INTRAVENOUS ONCE
Qty: 0 | Refills: 0 | Status: COMPLETED | OUTPATIENT
Start: 2018-01-29 | End: 2018-01-29

## 2018-01-29 RX ORDER — CEFTRIAXONE 500 MG/1
1 INJECTION, POWDER, FOR SOLUTION INTRAMUSCULAR; INTRAVENOUS EVERY 24 HOURS
Qty: 0 | Refills: 0 | Status: DISCONTINUED | OUTPATIENT
Start: 2018-01-30 | End: 2018-01-30

## 2018-01-29 RX ORDER — VALSARTAN 80 MG/1
320 TABLET ORAL DAILY
Qty: 0 | Refills: 0 | Status: DISCONTINUED | OUTPATIENT
Start: 2018-01-29 | End: 2018-02-01

## 2018-01-29 RX ADMIN — SODIUM CHLORIDE 1000 MILLILITER(S): 9 INJECTION INTRAMUSCULAR; INTRAVENOUS; SUBCUTANEOUS at 23:02

## 2018-01-29 RX ADMIN — ATORVASTATIN CALCIUM 20 MILLIGRAM(S): 80 TABLET, FILM COATED ORAL at 22:18

## 2018-01-29 RX ADMIN — Medication 650 MILLIGRAM(S): at 23:18

## 2018-01-29 RX ADMIN — CEFTRIAXONE 100 GRAM(S): 500 INJECTION, POWDER, FOR SOLUTION INTRAMUSCULAR; INTRAVENOUS at 16:20

## 2018-01-29 RX ADMIN — Medication 1 MILLIGRAM(S): at 18:47

## 2018-01-29 RX ADMIN — Medication 81 MILLIGRAM(S): at 18:47

## 2018-01-29 RX ADMIN — Medication 400 MILLIGRAM(S): at 14:57

## 2018-01-29 RX ADMIN — Medication 650 MILLIGRAM(S): at 22:18

## 2018-01-29 RX ADMIN — SODIUM CHLORIDE 1000 MILLILITER(S): 9 INJECTION INTRAMUSCULAR; INTRAVENOUS; SUBCUTANEOUS at 11:46

## 2018-01-29 RX ADMIN — ENOXAPARIN SODIUM 40 MILLIGRAM(S): 100 INJECTION SUBCUTANEOUS at 22:37

## 2018-01-29 NOTE — H&P ADULT - PSH
H/O spinal fusion  c2-6 in 1/17  History of cataract surgery  b/l  History of repair of hip fracture  luisa placed in RLE

## 2018-01-29 NOTE — ED PROVIDER NOTE - PHYSICAL EXAMINATION
*GEN:   comfortable, in no acute distress, AOx3    ///    *EYES:   pupils equally round and reactive to light, extra-occular movements intact    ///    *HEENT:   airway patent, moist mucosal membranes    ///    *CV:   regular rate and rhythm    ///    *RESP:   clear to auscultation bilaterally, non-labored    ///    *ABD:   soft, non-tender    ///    *:   no cva/flank tenderness    ///    *MSK:   no MSK tenderness or limited ROM    ///    *SKIN:   dry, intact    ///    *NEURO:   AOx3, no focal weakness or loss of sensation, gait normal

## 2018-01-29 NOTE — ED ADULT NURSE NOTE - OBJECTIVE STATEMENT
Patient presented to ED with c/o weakness, denies any CP, SOB, no cough, no fever. PMH: afib on Eliquis, HTN. Patient on cardiac monitor, EKG performed.  Patient sent over from pain management for evaluation.  Will continue to monitor patient closely.  Chavo VALENTINE

## 2018-01-29 NOTE — ED PROVIDER NOTE - OBJECTIVE STATEMENT
79 yo F w/ pmh HTN, HLD afib 79 yo F w/ pmh HTN, HLD afib (rate controlled, not on AC), s/p spinal fusion 01/2017 p/w weakness x several days. Denies sob, chest pain, n/v/d/c, dysuria, cough. Reports decr PO last few days. Taking tylenol for chronic unchanged back pain. Pt currently denies any symptoms while laying in room.     PCP: __?  Cards: Nii Prado 77 yo F w/ pmh HTN, HLD afib (rate controlled, not on AC), s/p spinal fusion 01/2017 p/w weakness x several days. Denies sob, chest pain, n/v/d/c, dysuria, cough. Reports decr PO last few days. Taking tylenol for chronic unchanged back pain. Pt currently denies any symptoms while laying in room.     PCP: Rashmi Cobian   Cards: Nii Prado 77 yo F w/ pmh HTN, HLD afib (rate controlled, not on AC), s/p spinal fusion 01/2017 p/w weakness x several days. Denies sob, chest pain, n/v/d/c, dysuria, cough. Reports urinary frequency, foul smelling urine. Reports decr PO last few days. Taking tylenol for chronic unchanged back pain. Pt currently denies any symptoms while laying in room.     PCP: Rashmi Cobian [phone]  Cards: Nii Prado

## 2018-01-29 NOTE — ED PROVIDER NOTE - PROGRESS NOTE DETAILS
Dr. Kashmir Oviedo PGY1: pt still feels very weak, unable to stand on her own vs baseline able to walk w/out difficulty; reporting increased L mid back pain; ordered tylenol; HR90's-100's, likely will need admission after ua resulted Dr. Kashmir Oviedo PGY1: UA+ for WBC and leuk esterase, still too weak to stand; ordered ceftriaxone, will admit for treatment of UTI and inability to ambulate; called pcp's office spoke w/ nurse; will admit to hospitalist, paged hospitalist Dr. Kashmir Oviedo PGY1: d/w hospitalist, agreed to plan and admit on tele, text paged tele pa: Admit: Melanie Ervin [phone]  to Dr. Maicol MUNROE, unable to ambulate, afib rate controlled  Callback: [phone]

## 2018-01-29 NOTE — H&P ADULT - ASSESSMENT
77 yo F c/o generalized weakness, dizziness & SOB x 4 days  EKG- Afib @ 120 Q waves v1-4, TWI I, L, v6,

## 2018-01-29 NOTE — ED PROVIDER NOTE - MEDICAL DECISION MAKING DETAILS
Abdoulaye: feeling weak, no chest pain no sob, no focal weakness, Decreased PO intake. Has hx of afib is intermittently having RVR. No fever 77 yo F w/ afib p/w weakness, r/o focal infection, likely 2/2 decr po intake, pt stable; labs, cxr, reassess    Abdoulaye: feeling weak, no chest pain no sob, no focal weakness, Decreased PO intake. Has hx of afib is intermittently having RVR. No fever

## 2018-01-29 NOTE — H&P ADULT - PMH
Anxiety    HLD (hyperlipidemia)    HTN (hypertension)    MI, old  mild as per pt  PAF (paroxysmal atrial fibrillation)    TIA (transient ischemic attack)  many years ago

## 2018-01-29 NOTE — H&P ADULT - PROBLEM SELECTOR PLAN 1
Admit to Telemetry, serial CE's, EKG's, FLP, continue ASA, BB, F/U MD note. As d/w attending will hold off starting A/C for now, pt's Cardiologist will need to be called in the AM to verify why mariaelenashannenautumn was d/c'd as outpt.

## 2018-01-29 NOTE — ED PROVIDER NOTE - ATTENDING CONTRIBUTION TO CARE
I performed a history and physical exam of the patient and discussed their management with the resident and /or advanced care provider. I reviewed the resident and /or ACP's note and agree with the documented findings and plan of care. My medical decison making and observations are found above.  Lungs clear 0-1 edema. No abdominal pain.

## 2018-01-29 NOTE — H&P ADULT - CARDIOVASCULAR SYMPTOMS
h/o JACKSON after walking 1/2 block sine her surgery 1/17, admits to Rt ankle edema for a long time/dyspnea on exertion/peripheral edema

## 2018-01-29 NOTE — H&P ADULT - ATTENDING COMMENTS
77 y/o F with history of AF, SLE (not on medications,) HTN, and ?TIA p/w generalized weakness.  Pt reports feeling week for 3 days along with foul smelling urine.  No fever, chills, or dysuria.  Presented to ambulatory surgery center today for nerve block for pain related to facet joint arthritis, and was found to be in AF with RVR, and sent to ED.  Pt reports no palpitations or chest pain.      Was previously on Eliquis, but was d/c'd by her prior cardiologist, which she states was because she was told she does not have AF.  Documented history of TIA, but pt states she never had symptoms, and was told that she was found to have evidence of prior "mini stroke" on imaging.      On exam: pt in NAD.  Heart tachy, irregular.  Lungs with rales at both bases.  Abd s/nt/nd normal BS.  LE's with no edema.      Labs reviewed, notable for positive UA  EKG reviewed: AF with RVR and LVH  CXR film reviewed: no effusions or pulm edema    Outpatient notes from Primary (Dr Cobian) and cardiology (Dr Leal) reviewed in [employer]     1) acute cystitis without hematuria  - continue ceftriaxone and f/u culture    2) paroxysmal AF with RVR  - continue home metoprolol  - check echo  - Pt at elevated risk of CVA given CHADS score of 2-4 (unclear if pt had TIA in past)  Will need to discuss a/c with pt's cardiologist

## 2018-01-30 LAB
ALBUMIN SERPL ELPH-MCNC: 2.4 G/DL — LOW (ref 3.3–5)
ALP SERPL-CCNC: 46 U/L — SIGNIFICANT CHANGE UP (ref 40–120)
ALT FLD-CCNC: < 4 U/L — LOW (ref 4–33)
AST SERPL-CCNC: 10 U/L — SIGNIFICANT CHANGE UP (ref 4–32)
BASOPHILS # BLD AUTO: 0.05 K/UL — SIGNIFICANT CHANGE UP (ref 0–0.2)
BASOPHILS NFR BLD AUTO: 1.2 % — SIGNIFICANT CHANGE UP (ref 0–2)
BILIRUB SERPL-MCNC: 0.3 MG/DL — SIGNIFICANT CHANGE UP (ref 0.2–1.2)
BUN SERPL-MCNC: 23 MG/DL — SIGNIFICANT CHANGE UP (ref 7–23)
CALCIUM SERPL-MCNC: 7.6 MG/DL — LOW (ref 8.4–10.5)
CHLORIDE SERPL-SCNC: 102 MMOL/L — SIGNIFICANT CHANGE UP (ref 98–107)
CHOLEST SERPL-MCNC: 136 MG/DL — SIGNIFICANT CHANGE UP (ref 120–199)
CO2 SERPL-SCNC: 22 MMOL/L — SIGNIFICANT CHANGE UP (ref 22–31)
CREAT SERPL-MCNC: 1.01 MG/DL — SIGNIFICANT CHANGE UP (ref 0.5–1.3)
EOSINOPHIL # BLD AUTO: 0.19 K/UL — SIGNIFICANT CHANGE UP (ref 0–0.5)
EOSINOPHIL NFR BLD AUTO: 4.4 % — SIGNIFICANT CHANGE UP (ref 0–6)
GLUCOSE SERPL-MCNC: 93 MG/DL — SIGNIFICANT CHANGE UP (ref 70–99)
HBA1C BLD-MCNC: 5.7 % — HIGH (ref 4–5.6)
HCT VFR BLD CALC: 35.3 % — SIGNIFICANT CHANGE UP (ref 34.5–45)
HDLC SERPL-MCNC: 18 MG/DL — LOW (ref 45–65)
HGB BLD-MCNC: 11.9 G/DL — SIGNIFICANT CHANGE UP (ref 11.5–15.5)
IMM GRANULOCYTES # BLD AUTO: 0.02 # — SIGNIFICANT CHANGE UP
IMM GRANULOCYTES NFR BLD AUTO: 0.5 % — SIGNIFICANT CHANGE UP (ref 0–1.5)
LIPID PNL WITH DIRECT LDL SERPL: 93 MG/DL — SIGNIFICANT CHANGE UP
LYMPHOCYTES # BLD AUTO: 0.79 K/UL — LOW (ref 1–3.3)
LYMPHOCYTES # BLD AUTO: 18.5 % — SIGNIFICANT CHANGE UP (ref 13–44)
MAGNESIUM SERPL-MCNC: 1.8 MG/DL — SIGNIFICANT CHANGE UP (ref 1.6–2.6)
MCHC RBC-ENTMCNC: 29.4 PG — SIGNIFICANT CHANGE UP (ref 27–34)
MCHC RBC-ENTMCNC: 33.7 % — SIGNIFICANT CHANGE UP (ref 32–36)
MCV RBC AUTO: 87.2 FL — SIGNIFICANT CHANGE UP (ref 80–100)
MONOCYTES # BLD AUTO: 0.43 K/UL — SIGNIFICANT CHANGE UP (ref 0–0.9)
MONOCYTES NFR BLD AUTO: 10 % — SIGNIFICANT CHANGE UP (ref 2–14)
NEUTROPHILS # BLD AUTO: 2.8 K/UL — SIGNIFICANT CHANGE UP (ref 1.8–7.4)
NEUTROPHILS NFR BLD AUTO: 65.4 % — SIGNIFICANT CHANGE UP (ref 43–77)
NRBC # FLD: 0 — SIGNIFICANT CHANGE UP
NT-PROBNP SERPL-SCNC: SIGNIFICANT CHANGE UP PG/ML
PHOSPHATE SERPL-MCNC: 3.4 MG/DL — SIGNIFICANT CHANGE UP (ref 2.5–4.5)
PLATELET # BLD AUTO: 165 K/UL — SIGNIFICANT CHANGE UP (ref 150–400)
PMV BLD: 11.2 FL — SIGNIFICANT CHANGE UP (ref 7–13)
POTASSIUM SERPL-MCNC: 3.9 MMOL/L — SIGNIFICANT CHANGE UP (ref 3.5–5.3)
POTASSIUM SERPL-SCNC: 3.9 MMOL/L — SIGNIFICANT CHANGE UP (ref 3.5–5.3)
PROT SERPL-MCNC: 5.4 G/DL — LOW (ref 6–8.3)
RBC # BLD: 4.05 M/UL — SIGNIFICANT CHANGE UP (ref 3.8–5.2)
RBC # FLD: 13.6 % — SIGNIFICANT CHANGE UP (ref 10.3–14.5)
SODIUM SERPL-SCNC: 135 MMOL/L — SIGNIFICANT CHANGE UP (ref 135–145)
SPECIMEN SOURCE: SIGNIFICANT CHANGE UP
TRIGL SERPL-MCNC: 114 MG/DL — SIGNIFICANT CHANGE UP (ref 10–149)
TSH SERPL-MCNC: 0.88 UIU/ML — SIGNIFICANT CHANGE UP (ref 0.27–4.2)
WBC # BLD: 4.28 K/UL — SIGNIFICANT CHANGE UP (ref 3.8–10.5)
WBC # FLD AUTO: 4.28 K/UL — SIGNIFICANT CHANGE UP (ref 3.8–10.5)

## 2018-01-30 PROCEDURE — 99233 SBSQ HOSP IP/OBS HIGH 50: CPT

## 2018-01-30 RX ORDER — APIXABAN 2.5 MG/1
2.5 TABLET, FILM COATED ORAL EVERY 12 HOURS
Qty: 0 | Refills: 0 | Status: DISCONTINUED | OUTPATIENT
Start: 2018-01-30 | End: 2018-01-30

## 2018-01-30 RX ORDER — ACETAMINOPHEN 500 MG
650 TABLET ORAL EVERY 6 HOURS
Qty: 0 | Refills: 0 | Status: DISCONTINUED | OUTPATIENT
Start: 2018-01-30 | End: 2018-02-01

## 2018-01-30 RX ORDER — CEFTRIAXONE 500 MG/1
1000 INJECTION, POWDER, FOR SOLUTION INTRAMUSCULAR; INTRAVENOUS EVERY 24 HOURS
Qty: 0 | Refills: 0 | Status: DISCONTINUED | OUTPATIENT
Start: 2018-01-30 | End: 2018-01-31

## 2018-01-30 RX ORDER — APIXABAN 2.5 MG/1
2.5 TABLET, FILM COATED ORAL EVERY 12 HOURS
Qty: 0 | Refills: 0 | Status: DISCONTINUED | OUTPATIENT
Start: 2018-01-31 | End: 2018-01-31

## 2018-01-30 RX ORDER — FUROSEMIDE 40 MG
20 TABLET ORAL DAILY
Qty: 0 | Refills: 0 | Status: DISCONTINUED | OUTPATIENT
Start: 2018-01-30 | End: 2018-01-31

## 2018-01-30 RX ORDER — OXYCODONE HYDROCHLORIDE 5 MG/1
2.5 TABLET ORAL EVERY 4 HOURS
Qty: 0 | Refills: 0 | Status: DISCONTINUED | OUTPATIENT
Start: 2018-01-30 | End: 2018-02-01

## 2018-01-30 RX ORDER — INFLUENZA VIRUS VACCINE 15; 15; 15; 15 UG/.5ML; UG/.5ML; UG/.5ML; UG/.5ML
0.5 SUSPENSION INTRAMUSCULAR ONCE
Qty: 0 | Refills: 0 | Status: DISCONTINUED | OUTPATIENT
Start: 2018-01-30 | End: 2018-02-01

## 2018-01-30 RX ADMIN — OXYCODONE HYDROCHLORIDE 2.5 MILLIGRAM(S): 5 TABLET ORAL at 12:59

## 2018-01-30 RX ADMIN — ATORVASTATIN CALCIUM 20 MILLIGRAM(S): 80 TABLET, FILM COATED ORAL at 20:56

## 2018-01-30 RX ADMIN — Medication 1 MILLIGRAM(S): at 20:56

## 2018-01-30 RX ADMIN — OXYCODONE HYDROCHLORIDE 2.5 MILLIGRAM(S): 5 TABLET ORAL at 18:30

## 2018-01-30 RX ADMIN — Medication 81 MILLIGRAM(S): at 13:13

## 2018-01-30 RX ADMIN — OXYCODONE HYDROCHLORIDE 2.5 MILLIGRAM(S): 5 TABLET ORAL at 13:30

## 2018-01-30 RX ADMIN — Medication 1 MILLIGRAM(S): at 05:47

## 2018-01-30 RX ADMIN — APIXABAN 2.5 MILLIGRAM(S): 2.5 TABLET, FILM COATED ORAL at 20:56

## 2018-01-30 RX ADMIN — Medication 20 MILLIGRAM(S): at 13:00

## 2018-01-30 RX ADMIN — Medication 25 MILLIGRAM(S): at 13:13

## 2018-01-30 RX ADMIN — OXYCODONE HYDROCHLORIDE 2.5 MILLIGRAM(S): 5 TABLET ORAL at 18:07

## 2018-01-30 RX ADMIN — CEFTRIAXONE 1000 MILLIGRAM(S): 500 INJECTION, POWDER, FOR SOLUTION INTRAMUSCULAR; INTRAVENOUS at 20:56

## 2018-01-30 NOTE — PROGRESS NOTE ADULT - SUBJECTIVE AND OBJECTIVE BOX
CHIEF COMPLAINT: Patient is a 78y old  female who presents with a chief complaint of     SUBJECTIVE / OVERNIGHT EVENTS:    Patient reports severe pain in her left shoulder - non-radiating, no exacerbating factors, "internal." Has only been present for the past several days. Otherwise she feels weak, generalized. No other complaints.    Reports foul smelling urine - tipped her off that she may have a UTI.    Spoke to patient's cardiologist Dr. Nii Prado at [phone]. Per Dr. Prado, only episode of a fib was transient, many years ago. Patient has history of seeing many providers who have treated her with steroids, anxiolytics. Will fax over records. In agreement with anticoagulation.    MEDICATIONS  (STANDING):  amLODIPine   Tablet 5 milliGRAM(s) Oral daily  aspirin enteric coated 81 milliGRAM(s) Oral daily  atorvastatin 20 milliGRAM(s) Oral at bedtime  cefTRIAXone   IVPB 1 Gram(s) IV Intermittent every 24 hours  enoxaparin Injectable 40 milliGRAM(s) SubCutaneous every 24 hours  furosemide   Injectable 20 milliGRAM(s) IV Push daily  influenza   Vaccine 0.5 milliLiter(s) IntraMuscular once  LORazepam     Tablet 1 milliGRAM(s) Oral two times a day  metoprolol succinate ER 25 milliGRAM(s) Oral daily  valsartan 320 milliGRAM(s) Oral daily    MEDICATIONS  (PRN):  acetaminophen   Tablet. 650 milliGRAM(s) Oral every 6 hours PRN Mild Pain (1 - 3)  oxyCODONE    IR 2.5 milliGRAM(s) Oral every 4 hours PRN Severe Pain (7 - 10)      VITALS:  T(F): 98.2 (18 @ 10:00), Max: 98.2 (18 @ 10:00)  HR: 94 (18 @ 10:00) (89 - 120)  BP: 98/58 (18 @ 10:00) (96/59 - 116/70)  RR: 16 (18 @ 10:00) (16 - 17)  SpO2: 99% (18 @ 10:00)  Height (cm): 152.4 (18:40)  Weight (kg): 47.6 (18:40)  BMI (kg/m2): 20.5 (18:40)    CAPILLARY BLOOD GLUCOSE    Output   Height (cm): 152.4 (18:40)  Weight (kg): 47.6 (18:40)  BMI (kg/m2): 20.5 (18:40)  I&O's Summary  T(F): 98.2 (18 @ 10:00), Max: 98.2 (18 @ 10:00)  HR: 94 (18 @ 10:00) (89 - 120)  BP: 98/58 (18 @ 10:00) (96/59 - 116/70)  RR: 16 (18 @ 10:00) (16 - 17)  SpO2: 99% (18 @ 10:00)    PHYSICAL EXAM:  GENERAL: thin woman, appears anxious  HEAD:  Atraumatic, Normocephalic  EYES: EOMI  NECK: Supple, No JVD  CHEST/LUNG: bibasilar crackles  HEART: nl S1/S2  ABDOMEN: soft, nontender, nondistended  EXTREMITIES:  no LE edema  PSYCH: A&Ox3  NEUROLOGY: non-focal  SKIN: No rashes or lesions    LABS:              11.9                 135  | 22   | 23           4.28  >-----------< 165     ------------------------< 93                    35.3                 3.9  | 102  | 1.01                                         Ca 7.6   Mg 1.8   Ph 3.4         TPro  5.4  /  Alb  2.4      TBili  0.3  /  DBili  x         AST  10  /  ALT  < 4            AlkPhos  46      INR: 1.11 ;    PT: 12.3 SEC;    PTT: 35.4 SEC    CARDIAC MARKERS  < 0.06 ng/mL / 29 u/L / x      CARDIAC MARKERS  < 0.06 ng/mL / 46 u/L / 1.35 ng/mL      Urinalysis Basic - ( 2018 14:05 )    Color: YELLOW / Appearance: HAZY / S.017 / pH: 6.5  Gluc: NEGATIVE / Ketone: NEGATIVE  / Bili: NEGATIVE / Urobili: NORMAL mg/dL   Blood: NEGATIVE / Protein: 30 mg/dL / Nitrite: NEGATIVE   Leuk Esterase: LARGE / RBC: 2-5 / WBC 25-50   Sq Epi: OCC / Non Sq Epi: x / Bacteria: x        VB-29 @ 11:26  7.34/51/< 24/23/15.5/1.5        MICROBIOLOGY:        RADIOLOGY & ADDITIONAL TESTS:    Imaging Personally Reviewed:    < from: Xray Chest 2 Views PA/Lat (18 @ 12:08) >  IMPRESSION:  Slight left hemidiaphragm elevation.    Fine bibasilar strand-like opacities compatible with scarring or   atelectatic changes. Clear remaining visualized lungs. No pleural   effusions or pneumothorax.     Aortic calcifications. Cardiac and mediastinal silhouettes otherwise   grossly normal limits.    Trachea midline.    Generalized osteopenia. Mild spinal degenerative changes. Old healed   posterolateral left 7th and 8th rib fracture deformities. Partially   visualized inferior end of cervical anterior fusion hardware at superior   image margin.     < end of copied text >

## 2018-01-30 NOTE — CHART NOTE - NSCHARTNOTEFT_GEN_A_CORE
Called and spoke to pt's Cardiologist Dr. Nii Prado to discuss the reason for discontinuation of Eliquis in the past. As per Dr. Prado: pt was on Eliquis for Afib in the past, converted to NSR and Eliquis was discontinued.
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
## 2018-01-30 NOTE — PATIENT PROFILE ADULT. - ABILITY TO HEAR (WITH HEARING AID OR HEARING APPLIANCE IF NORMALLY USED):
Mildly to Moderately Impaired: difficulty hearing in some environments or speaker may need to increase volume or speak distinctly/as per patient

## 2018-01-31 ENCOUNTER — TRANSCRIPTION ENCOUNTER (OUTPATIENT)
Age: 78
End: 2018-01-31

## 2018-01-31 LAB
-  AMPICILLIN: SIGNIFICANT CHANGE UP
-  CIPROFLOXACIN: SIGNIFICANT CHANGE UP
-  NITROFURANTOIN: SIGNIFICANT CHANGE UP
-  TETRACYCLINE: SIGNIFICANT CHANGE UP
-  VANCOMYCIN: SIGNIFICANT CHANGE UP
BACTERIA UR CULT: SIGNIFICANT CHANGE UP
BUN SERPL-MCNC: 22 MG/DL — SIGNIFICANT CHANGE UP (ref 7–23)
CALCIUM SERPL-MCNC: 8.1 MG/DL — LOW (ref 8.4–10.5)
CHLORIDE SERPL-SCNC: 101 MMOL/L — SIGNIFICANT CHANGE UP (ref 98–107)
CO2 SERPL-SCNC: 23 MMOL/L — SIGNIFICANT CHANGE UP (ref 22–31)
CREAT SERPL-MCNC: 0.98 MG/DL — SIGNIFICANT CHANGE UP (ref 0.5–1.3)
GLUCOSE SERPL-MCNC: 90 MG/DL — SIGNIFICANT CHANGE UP (ref 70–99)
HCT VFR BLD CALC: 34.5 % — SIGNIFICANT CHANGE UP (ref 34.5–45)
HGB BLD-MCNC: 11.6 G/DL — SIGNIFICANT CHANGE UP (ref 11.5–15.5)
MCHC RBC-ENTMCNC: 31.2 PG — SIGNIFICANT CHANGE UP (ref 27–34)
MCHC RBC-ENTMCNC: 33.6 % — SIGNIFICANT CHANGE UP (ref 32–36)
MCV RBC AUTO: 92.7 FL — SIGNIFICANT CHANGE UP (ref 80–100)
METHOD TYPE: SIGNIFICANT CHANGE UP
NRBC # FLD: 0 — SIGNIFICANT CHANGE UP
ORGANISM # SPEC MICROSCOPIC CNT: SIGNIFICANT CHANGE UP
ORGANISM # SPEC MICROSCOPIC CNT: SIGNIFICANT CHANGE UP
PLATELET # BLD AUTO: 171 K/UL — SIGNIFICANT CHANGE UP (ref 150–400)
PMV BLD: 11.3 FL — SIGNIFICANT CHANGE UP (ref 7–13)
POTASSIUM SERPL-MCNC: 4.3 MMOL/L — SIGNIFICANT CHANGE UP (ref 3.5–5.3)
POTASSIUM SERPL-SCNC: 4.3 MMOL/L — SIGNIFICANT CHANGE UP (ref 3.5–5.3)
RBC # BLD: 3.72 M/UL — LOW (ref 3.8–5.2)
RBC # FLD: 15.3 % — HIGH (ref 10.3–14.5)
SODIUM SERPL-SCNC: 134 MMOL/L — LOW (ref 135–145)
WBC # BLD: 3.21 K/UL — LOW (ref 3.8–10.5)
WBC # FLD AUTO: 3.21 K/UL — LOW (ref 3.8–10.5)

## 2018-01-31 PROCEDURE — 99233 SBSQ HOSP IP/OBS HIGH 50: CPT

## 2018-01-31 PROCEDURE — 73030 X-RAY EXAM OF SHOULDER: CPT | Mod: 26,LT

## 2018-01-31 PROCEDURE — 93306 TTE W/DOPPLER COMPLETE: CPT | Mod: 26

## 2018-01-31 RX ORDER — APIXABAN 2.5 MG/1
5 TABLET, FILM COATED ORAL EVERY 12 HOURS
Qty: 0 | Refills: 0 | Status: DISCONTINUED | OUTPATIENT
Start: 2018-01-31 | End: 2018-02-01

## 2018-01-31 RX ORDER — APIXABAN 2.5 MG/1
1 TABLET, FILM COATED ORAL
Qty: 60 | Refills: 0 | OUTPATIENT
Start: 2018-01-31 | End: 2018-03-01

## 2018-01-31 RX ADMIN — APIXABAN 2.5 MILLIGRAM(S): 2.5 TABLET, FILM COATED ORAL at 05:59

## 2018-01-31 RX ADMIN — Medication 1 MILLIGRAM(S): at 13:43

## 2018-01-31 RX ADMIN — OXYCODONE HYDROCHLORIDE 2.5 MILLIGRAM(S): 5 TABLET ORAL at 23:16

## 2018-01-31 RX ADMIN — AMLODIPINE BESYLATE 5 MILLIGRAM(S): 2.5 TABLET ORAL at 05:59

## 2018-01-31 RX ADMIN — APIXABAN 5 MILLIGRAM(S): 2.5 TABLET, FILM COATED ORAL at 18:47

## 2018-01-31 RX ADMIN — Medication 25 MILLIGRAM(S): at 05:59

## 2018-01-31 RX ADMIN — OXYCODONE HYDROCHLORIDE 2.5 MILLIGRAM(S): 5 TABLET ORAL at 18:59

## 2018-01-31 RX ADMIN — OXYCODONE HYDROCHLORIDE 2.5 MILLIGRAM(S): 5 TABLET ORAL at 14:28

## 2018-01-31 RX ADMIN — Medication 20 MILLIGRAM(S): at 05:59

## 2018-01-31 RX ADMIN — OXYCODONE HYDROCHLORIDE 2.5 MILLIGRAM(S): 5 TABLET ORAL at 08:44

## 2018-01-31 RX ADMIN — OXYCODONE HYDROCHLORIDE 2.5 MILLIGRAM(S): 5 TABLET ORAL at 23:31

## 2018-01-31 RX ADMIN — ATORVASTATIN CALCIUM 20 MILLIGRAM(S): 80 TABLET, FILM COATED ORAL at 23:16

## 2018-01-31 RX ADMIN — OXYCODONE HYDROCHLORIDE 2.5 MILLIGRAM(S): 5 TABLET ORAL at 15:20

## 2018-01-31 RX ADMIN — Medication 81 MILLIGRAM(S): at 13:43

## 2018-01-31 RX ADMIN — Medication 1 TABLET(S): at 18:45

## 2018-01-31 RX ADMIN — VALSARTAN 320 MILLIGRAM(S): 80 TABLET ORAL at 05:59

## 2018-01-31 RX ADMIN — OXYCODONE HYDROCHLORIDE 2.5 MILLIGRAM(S): 5 TABLET ORAL at 08:30

## 2018-01-31 RX ADMIN — OXYCODONE HYDROCHLORIDE 2.5 MILLIGRAM(S): 5 TABLET ORAL at 22:41
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
## 2018-01-31 NOTE — DISCHARGE NOTE ADULT - SECONDARY DIAGNOSIS.
Paroxysmal atrial fibrillation with rapid ventricular response HTN (hypertension) HLD (hyperlipidemia)
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
## 2018-01-31 NOTE — DISCHARGE NOTE ADULT - CARE PLAN
Principal Discharge DX:	UTI (urinary tract infection)  Goal:	Complete course of antibiotics as directed.  Assessment and plan of treatment:	Follow up with your primary care physician for further monitoring in 1-2 weeks. Please call to arrange appointment.  Secondary Diagnosis:	Paroxysmal atrial fibrillation with rapid ventricular response  Assessment and plan of treatment:	Follow up with your Cardiologist for further monitoring in 1-2 weeks. Please call to arrange appointment.  Secondary Diagnosis:	HTN (hypertension)  Assessment and plan of treatment:	Follow up with your primary care physician for further monitoring in 1-2 weeks. Please call to arrange appointment.  Secondary Diagnosis:	HLD (hyperlipidemia)  Assessment and plan of treatment:	Follow up with your primary care physician for further monitoring in 1-2 weeks. Please call to arrange appointment. Principal Discharge DX:	UTI (urinary tract infection)  Goal:	Complete course of antibiotics as directed.  Assessment and plan of treatment:	Follow up with your primary care physician, Dr. Cobian for further monitoring in 1-2 weeks. Please call to arrange appointment.  Secondary Diagnosis:	Paroxysmal atrial fibrillation with rapid ventricular response  Goal:	Continue Eliquis for stroke prevention.  Assessment and plan of treatment:	Follow up with your Cardiologist for further monitoring in 1-2 weeks. Please call to arrange appointment.  Secondary Diagnosis:	HTN (hypertension)  Goal:	Ensure compliance with your medications to maintain blood pressure goal 120/80mmhg.  Assessment and plan of treatment:	Follow up with your primary care physician, Dr. Cobian for further monitoring in 1-2 weeks. Please call to arrange appointment. Low salt diet.  Secondary Diagnosis:	HLD (hyperlipidemia)  Goal:	Continue Pravastatin at bedtime. Goal LDL <100  Assessment and plan of treatment:	Follow up with your primary care physician, Dr. Cobian for further monitoring in 1-2 weeks. Please call to arrange appointment. Low cholesterol diet.

## 2018-01-31 NOTE — PHYSICAL THERAPY INITIAL EVALUATION ADULT - ADDITIONAL COMMENTS
As per patient, at baseline she is very limited with functional activities and ambulates only short distances.

## 2018-01-31 NOTE — DISCHARGE NOTE ADULT - HOME CARE AGENCY
[facility]  for Visiting Nurse and Physical Therapy.  Visiting nurse to see patient day after discharge. Nurse will call to set appointment time.

## 2018-01-31 NOTE — DISCHARGE NOTE ADULT - PLAN OF CARE
Complete course of antibiotics as directed. Follow up with your primary care physician for further monitoring in 1-2 weeks. Please call to arrange appointment. Follow up with your Cardiologist for further monitoring in 1-2 weeks. Please call to arrange appointment. Follow up with your primary care physician, Dr. Cobian for further monitoring in 1-2 weeks. Please call to arrange appointment. Continue Eliquis for stroke prevention. Ensure compliance with your medications to maintain blood pressure goal 120/80mmhg. Follow up with your primary care physician, Dr. Cobian for further monitoring in 1-2 weeks. Please call to arrange appointment. Low salt diet. Continue Pravastatin at bedtime. Goal LDL <100 Follow up with your primary care physician, Dr. Cobian for further monitoring in 1-2 weeks. Please call to arrange appointment. Low cholesterol diet.

## 2018-01-31 NOTE — DISCHARGE NOTE ADULT - PATIENT PORTAL LINK FT
“You can access the [facility] Patient Portal, offered by Health system, by registering with the following website: [email or web address]”

## 2018-01-31 NOTE — DISCHARGE NOTE ADULT - CARE PROVIDER_API CALL
PCP- Dr Cobian,   Phone: (   )    -  Fax: (   )    -    Cardiology Dr Nii Prado in [place],   Phone: (   )    -  Fax: (   )    -

## 2018-01-31 NOTE — DISCHARGE NOTE ADULT - MEDICATION SUMMARY - MEDICATIONS TO TAKE
I will START or STAY ON the medications listed below when I get home from the hospital:    aspirin 81 mg oral tablet  -- 1 tab(s) by mouth once a day  -- Indication: For Heart Health    valsartan 320 mg oral tablet  -- 1 tab(s) by mouth once a day  -- Indication: For HTN (hypertension)    apixaban 5 mg oral tablet  -- 1 tab(s) by mouth every 12 hours  -- Indication: For PAF (paroxysmal atrial fibrillation)- blood thinner    LORazepam 1 mg oral tablet  -- 1 tab(s) by mouth 2 times a day  -- Indication: For Anxiety    pravastatin 40 mg oral tablet  -- 1 tab(s) by mouth once a day (at bedtime)  -- Indication: For HLD (hyperlipidemia)    metoprolol succinate 25 mg oral tablet, extended release  -- 1 tab(s) by mouth once a day  -- Indication: For PAF (paroxysmal atrial fibrillation), High blood pressure    amLODIPine 5 mg oral tablet  -- 1 tab(s) by mouth once a day  -- Indication: For HTN (hypertension)    amoxicillin-clavulanate 875 mg-125 mg oral tablet  -- 1 tab(s) by mouth 2 times a day  -- Indication: For UTI (urinary tract infection)

## 2018-01-31 NOTE — DISCHARGE NOTE ADULT - PROVIDER TOKENS
FREE:[LAST:[PCP- Dr Cobian],PHONE:[(   )    -],FAX:[(   )    -]],FREE:[LAST:[Cardiology Dr Nii Prado in [place]],PHONE:[(   )    -],FAX:[(   )    -]]

## 2018-02-01 VITALS
DIASTOLIC BLOOD PRESSURE: 70 MMHG | HEART RATE: 85 BPM | OXYGEN SATURATION: 94 % | RESPIRATION RATE: 18 BRPM | SYSTOLIC BLOOD PRESSURE: 139 MMHG | TEMPERATURE: 98 F

## 2018-02-01 LAB
BUN SERPL-MCNC: 21 MG/DL — SIGNIFICANT CHANGE UP (ref 7–23)
CALCIUM SERPL-MCNC: 7.9 MG/DL — LOW (ref 8.4–10.5)
CHLORIDE SERPL-SCNC: 100 MMOL/L — SIGNIFICANT CHANGE UP (ref 98–107)
CO2 SERPL-SCNC: 24 MMOL/L — SIGNIFICANT CHANGE UP (ref 22–31)
CREAT SERPL-MCNC: 0.93 MG/DL — SIGNIFICANT CHANGE UP (ref 0.5–1.3)
GLUCOSE SERPL-MCNC: 90 MG/DL — SIGNIFICANT CHANGE UP (ref 70–99)
HCT VFR BLD CALC: 33.1 % — LOW (ref 34.5–45)
HGB BLD-MCNC: 10.7 G/DL — LOW (ref 11.5–15.5)
MCHC RBC-ENTMCNC: 29.2 PG — SIGNIFICANT CHANGE UP (ref 27–34)
MCHC RBC-ENTMCNC: 32.3 % — SIGNIFICANT CHANGE UP (ref 32–36)
MCV RBC AUTO: 90.2 FL — SIGNIFICANT CHANGE UP (ref 80–100)
NRBC # FLD: 0 — SIGNIFICANT CHANGE UP
PLATELET # BLD AUTO: 178 K/UL — SIGNIFICANT CHANGE UP (ref 150–400)
PMV BLD: 10.8 FL — SIGNIFICANT CHANGE UP (ref 7–13)
POTASSIUM SERPL-MCNC: 4.2 MMOL/L — SIGNIFICANT CHANGE UP (ref 3.5–5.3)
POTASSIUM SERPL-SCNC: 4.2 MMOL/L — SIGNIFICANT CHANGE UP (ref 3.5–5.3)
RBC # BLD: 3.67 M/UL — LOW (ref 3.8–5.2)
RBC # FLD: 13.7 % — SIGNIFICANT CHANGE UP (ref 10.3–14.5)
SODIUM SERPL-SCNC: 134 MMOL/L — LOW (ref 135–145)
WBC # BLD: 3.64 K/UL — LOW (ref 3.8–10.5)
WBC # FLD AUTO: 3.64 K/UL — LOW (ref 3.8–10.5)

## 2018-02-01 PROCEDURE — 99239 HOSP IP/OBS DSCHRG MGMT >30: CPT

## 2018-02-01 RX ADMIN — Medication 25 MILLIGRAM(S): at 06:22

## 2018-02-01 RX ADMIN — Medication 81 MILLIGRAM(S): at 12:22

## 2018-02-01 RX ADMIN — Medication 1 TABLET(S): at 06:22

## 2018-02-01 RX ADMIN — APIXABAN 5 MILLIGRAM(S): 2.5 TABLET, FILM COATED ORAL at 06:24

## 2018-02-01 RX ADMIN — AMLODIPINE BESYLATE 5 MILLIGRAM(S): 2.5 TABLET ORAL at 06:23

## 2018-02-01 RX ADMIN — VALSARTAN 320 MILLIGRAM(S): 80 TABLET ORAL at 06:23

## 2018-02-01 RX ADMIN — OXYCODONE HYDROCHLORIDE 2.5 MILLIGRAM(S): 5 TABLET ORAL at 11:02

## 2018-02-01 RX ADMIN — OXYCODONE HYDROCHLORIDE 2.5 MILLIGRAM(S): 5 TABLET ORAL at 10:19

## 2018-02-01 NOTE — PROGRESS NOTE ADULT - SUBJECTIVE AND OBJECTIVE BOX
Patient is a 78y old  Female who presents with a chief complaint of     SUBJECTIVE / OVERNIGHT EVENTS: pt without complaints, anticipating d/c, requesting not to be d/cd on lasix 2/2 urinary frequency.    MEDICATIONS  (STANDING):  amLODIPine   Tablet 5 milliGRAM(s) Oral daily  amoxicillin  875 milliGRAM(s)/clavulanate 1 Tablet(s) Oral two times a day  apixaban 5 milliGRAM(s) Oral every 12 hours  aspirin enteric coated 81 milliGRAM(s) Oral daily  atorvastatin 20 milliGRAM(s) Oral at bedtime  influenza   Vaccine 0.5 milliLiter(s) IntraMuscular once  LORazepam     Tablet 1 milliGRAM(s) Oral two times a day  metoprolol succinate ER 25 milliGRAM(s) Oral daily  valsartan 320 milliGRAM(s) Oral daily    MEDICATIONS  (PRN):  acetaminophen   Tablet. 650 milliGRAM(s) Oral every 6 hours PRN Mild Pain (1 - 3)  oxyCODONE    IR 2.5 milliGRAM(s) Oral every 4 hours PRN Severe Pain (7 - 10)      Vital Signs Last 24 Hrs  T(C): 37.2 (02-01-18 @ 06:20), Max: 37.2 (02-01-18 @ 06:20)  T(F): 98.9 (02-01-18 @ 06:20), Max: 98.9 (02-01-18 @ 06:20)  HR: 74 (02-01-18 @ 06:20) (72 - 84)  BP: 114/65 (02-01-18 @ 06:20) (114/65 - 145/73)  BP(mean): --  RR: 18 (02-01-18 @ 06:20) (18 - 20)  SpO2: 95% (02-01-18 @ 06:20) (92% - 98%)  CAPILLARY BLOOD GLUCOSE    Tele: hr in 70    I&O's Summary      PHYSICAL EXAM:  GENERAL: NAD, well-nourished, no orthopnea  HEENT: mmm, no JVD  CHEST/LUNG: mild crackles at bl bases  HEART: RRR, no m/r/g  ABDOMEN: soft, nt, nd  EXTREMITIES:  wwp, no edema  PSYCH: AAOx3  NEUROLOGY: non-focal  SKIN: No rashes or lesions  LABS:                        10.7   3.64  )-----------( 178      ( 01 Feb 2018 07:00 )             33.1     02-01    134<L>  |  100  |  21  ----------------------------<  90  4.2   |  24  |  0.93    Ca    7.9<L>      01 Feb 2018 07:00                RADIOLOGY & ADDITIONAL TESTS:    Imaging Personally Reviewed:    Consultant(s) Notes Reviewed:      Care Discussed with Consultants/Other Providers:
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
## 2018-02-13 ENCOUNTER — INPATIENT (INPATIENT)
Facility: HOSPITAL | Age: 78
LOS: 7 days | Discharge: NURSING, REHAB OR LONG-TERM CARE | End: 2018-02-21
Attending: INTERNAL MEDICINE | Admitting: INTERNAL MEDICINE
Payer: MEDICARE

## 2018-02-13 VITALS
HEART RATE: 113 BPM | OXYGEN SATURATION: 98 % | SYSTOLIC BLOOD PRESSURE: 105 MMHG | RESPIRATION RATE: 16 BRPM | TEMPERATURE: 98 F | DIASTOLIC BLOOD PRESSURE: 54 MMHG

## 2018-02-13 DIAGNOSIS — Z98.1 ARTHRODESIS STATUS: Chronic | ICD-10-CM

## 2018-02-13 DIAGNOSIS — Z98.890 OTHER SPECIFIED POSTPROCEDURAL STATES: Chronic | ICD-10-CM

## 2018-02-13 DIAGNOSIS — R07.9 CHEST PAIN, UNSPECIFIED: ICD-10-CM

## 2018-02-13 DIAGNOSIS — Z98.49 CATARACT EXTRACTION STATUS, UNSPECIFIED EYE: Chronic | ICD-10-CM

## 2018-02-13 LAB
ALBUMIN SERPL ELPH-MCNC: 2.9 G/DL — LOW (ref 3.3–5)
ALP SERPL-CCNC: 56 U/L — SIGNIFICANT CHANGE UP (ref 40–120)
ALT FLD-CCNC: 6 U/L — SIGNIFICANT CHANGE UP (ref 4–33)
AST SERPL-CCNC: 12 U/L — SIGNIFICANT CHANGE UP (ref 4–32)
BASOPHILS # BLD AUTO: 0.04 K/UL — SIGNIFICANT CHANGE UP (ref 0–0.2)
BASOPHILS NFR BLD AUTO: 0.5 % — SIGNIFICANT CHANGE UP (ref 0–2)
BILIRUB SERPL-MCNC: 0.6 MG/DL — SIGNIFICANT CHANGE UP (ref 0.2–1.2)
BUN SERPL-MCNC: 25 MG/DL — HIGH (ref 7–23)
CALCIUM SERPL-MCNC: 8.2 MG/DL — LOW (ref 8.4–10.5)
CHLORIDE SERPL-SCNC: 95 MMOL/L — LOW (ref 98–107)
CK MB BLD-MCNC: 1 NG/ML — SIGNIFICANT CHANGE UP (ref 1–4.7)
CK MB BLD-MCNC: SIGNIFICANT CHANGE UP (ref 0–2.5)
CK SERPL-CCNC: 19 U/L — LOW (ref 25–170)
CO2 SERPL-SCNC: 24 MMOL/L — SIGNIFICANT CHANGE UP (ref 22–31)
CREAT SERPL-MCNC: 0.9 MG/DL — SIGNIFICANT CHANGE UP (ref 0.5–1.3)
EOSINOPHIL # BLD AUTO: 0.2 K/UL — SIGNIFICANT CHANGE UP (ref 0–0.5)
EOSINOPHIL NFR BLD AUTO: 2.4 % — SIGNIFICANT CHANGE UP (ref 0–6)
GLUCOSE SERPL-MCNC: 95 MG/DL — SIGNIFICANT CHANGE UP (ref 70–99)
HCT VFR BLD CALC: 35.6 % — SIGNIFICANT CHANGE UP (ref 34.5–45)
HGB BLD-MCNC: 12.2 G/DL — SIGNIFICANT CHANGE UP (ref 11.5–15.5)
IMM GRANULOCYTES # BLD AUTO: 0.04 # — SIGNIFICANT CHANGE UP
IMM GRANULOCYTES NFR BLD AUTO: 0.5 % — SIGNIFICANT CHANGE UP (ref 0–1.5)
LYMPHOCYTES # BLD AUTO: 0.76 K/UL — LOW (ref 1–3.3)
LYMPHOCYTES # BLD AUTO: 9.1 % — LOW (ref 13–44)
MCHC RBC-ENTMCNC: 30.8 PG — SIGNIFICANT CHANGE UP (ref 27–34)
MCHC RBC-ENTMCNC: 34.3 % — SIGNIFICANT CHANGE UP (ref 32–36)
MCV RBC AUTO: 89.9 FL — SIGNIFICANT CHANGE UP (ref 80–100)
MONOCYTES # BLD AUTO: 0.69 K/UL — SIGNIFICANT CHANGE UP (ref 0–0.9)
MONOCYTES NFR BLD AUTO: 8.3 % — SIGNIFICANT CHANGE UP (ref 2–14)
NEUTROPHILS # BLD AUTO: 6.61 K/UL — SIGNIFICANT CHANGE UP (ref 1.8–7.4)
NEUTROPHILS NFR BLD AUTO: 79.2 % — HIGH (ref 43–77)
NRBC # FLD: 0 — SIGNIFICANT CHANGE UP
PLATELET # BLD AUTO: 200 K/UL — SIGNIFICANT CHANGE UP (ref 150–400)
PMV BLD: 10.4 FL — SIGNIFICANT CHANGE UP (ref 7–13)
POTASSIUM SERPL-MCNC: 4.4 MMOL/L — SIGNIFICANT CHANGE UP (ref 3.5–5.3)
POTASSIUM SERPL-SCNC: 4.4 MMOL/L — SIGNIFICANT CHANGE UP (ref 3.5–5.3)
PROT SERPL-MCNC: 6.6 G/DL — SIGNIFICANT CHANGE UP (ref 6–8.3)
RBC # BLD: 3.96 M/UL — SIGNIFICANT CHANGE UP (ref 3.8–5.2)
RBC # FLD: 13.9 % — SIGNIFICANT CHANGE UP (ref 10.3–14.5)
SODIUM SERPL-SCNC: 131 MMOL/L — LOW (ref 135–145)
TROPONIN T SERPL-MCNC: < 0.06 NG/ML — SIGNIFICANT CHANGE UP (ref 0–0.06)
TSH SERPL-MCNC: 0.72 UIU/ML — SIGNIFICANT CHANGE UP (ref 0.27–4.2)
WBC # BLD: 8.34 K/UL — SIGNIFICANT CHANGE UP (ref 3.8–10.5)
WBC # FLD AUTO: 8.34 K/UL — SIGNIFICANT CHANGE UP (ref 3.8–10.5)

## 2018-02-13 PROCEDURE — 71045 X-RAY EXAM CHEST 1 VIEW: CPT | Mod: 26

## 2018-02-13 RX ORDER — SODIUM CHLORIDE 9 MG/ML
1000 INJECTION INTRAMUSCULAR; INTRAVENOUS; SUBCUTANEOUS ONCE
Qty: 0 | Refills: 0 | Status: COMPLETED | OUTPATIENT
Start: 2018-02-13 | End: 2018-02-13

## 2018-02-13 RX ADMIN — SODIUM CHLORIDE 1000 MILLILITER(S): 9 INJECTION INTRAMUSCULAR; INTRAVENOUS; SUBCUTANEOUS at 20:57

## 2018-02-13 RX ADMIN — SODIUM CHLORIDE 1000 MILLILITER(S): 9 INJECTION INTRAMUSCULAR; INTRAVENOUS; SUBCUTANEOUS at 19:15

## 2018-02-13 NOTE — ED PROVIDER NOTE - ATTENDING CONTRIBUTION TO CARE
Pt was seen and evaluated by me. Pt states over the past 3-4 wks having left sided chest discomfort with SOB. Pt has a history of A-fib and has been taking her medication but states she has not been eating much over the past few days. Pt denies any fever, chills, SOB, nausea, vomiting, or abd pain. Coarse breath sounds. Tachy, irregularly irregular. Abd soft, non-tender.

## 2018-02-13 NOTE — ED PROVIDER NOTE - OBJECTIVE STATEMENT
78F h/o HTN, HLD, MI, Afib (no AC) p/w CP and SOB. 78F h/o HTN, HLD, MI, Afib on Eliquis p/w CP and SOB. The CP is L-sided, radiates the back, constant, onset 3-4 weeks ago, worse with lying on left side and deep inspiration. Pt with decreased PO intake over past few days with watery diarrhea. No fever, cough, or dysuria. No thyroid problems. No hx DVT/PE, calf pain, or hemoptysis.  Meds: metoprolol, amlodipine, valsartan, flexeril, pravastatin  Cardiologist: Dr Nii Prado

## 2018-02-13 NOTE — ED PROVIDER NOTE - MEDICAL DECISION MAKING DETAILS
77 y/o female with history of A-fib presenting with chest discomfort X 3-4 wks with rapid HR, concern for rapid A-fib, ACS. Labs, CXR, EKG, Antiarrhythmics.

## 2018-02-14 DIAGNOSIS — I10 ESSENTIAL (PRIMARY) HYPERTENSION: ICD-10-CM

## 2018-02-14 DIAGNOSIS — E78.4 OTHER HYPERLIPIDEMIA: ICD-10-CM

## 2018-02-14 DIAGNOSIS — G45.9 TRANSIENT CEREBRAL ISCHEMIC ATTACK, UNSPECIFIED: ICD-10-CM

## 2018-02-14 DIAGNOSIS — Z29.9 ENCOUNTER FOR PROPHYLACTIC MEASURES, UNSPECIFIED: ICD-10-CM

## 2018-02-14 DIAGNOSIS — F17.200 NICOTINE DEPENDENCE, UNSPECIFIED, UNCOMPLICATED: ICD-10-CM

## 2018-02-14 DIAGNOSIS — F41.9 ANXIETY DISORDER, UNSPECIFIED: ICD-10-CM

## 2018-02-14 DIAGNOSIS — I48.91 UNSPECIFIED ATRIAL FIBRILLATION: ICD-10-CM

## 2018-02-14 LAB
APPEARANCE UR: SIGNIFICANT CHANGE UP
BACTERIA # UR AUTO: SIGNIFICANT CHANGE UP
BILIRUB UR-MCNC: NEGATIVE — SIGNIFICANT CHANGE UP
BLOOD UR QL VISUAL: NEGATIVE — SIGNIFICANT CHANGE UP
BUN SERPL-MCNC: 23 MG/DL — SIGNIFICANT CHANGE UP (ref 7–23)
CALCIUM SERPL-MCNC: 8 MG/DL — LOW (ref 8.4–10.5)
CHLORIDE SERPL-SCNC: 98 MMOL/L — SIGNIFICANT CHANGE UP (ref 98–107)
CHOLEST SERPL-MCNC: 117 MG/DL — LOW (ref 120–199)
CK SERPL-CCNC: 13 U/L — LOW (ref 25–170)
CO2 SERPL-SCNC: 24 MMOL/L — SIGNIFICANT CHANGE UP (ref 22–31)
COLOR SPEC: YELLOW — SIGNIFICANT CHANGE UP
CREAT SERPL-MCNC: 0.82 MG/DL — SIGNIFICANT CHANGE UP (ref 0.5–1.3)
GLUCOSE SERPL-MCNC: 98 MG/DL — SIGNIFICANT CHANGE UP (ref 70–99)
GLUCOSE UR-MCNC: NEGATIVE — SIGNIFICANT CHANGE UP
HCT VFR BLD CALC: 32.4 % — LOW (ref 34.5–45)
HDLC SERPL-MCNC: 27 MG/DL — LOW (ref 45–65)
HGB BLD-MCNC: 10.9 G/DL — LOW (ref 11.5–15.5)
KETONES UR-MCNC: NEGATIVE — SIGNIFICANT CHANGE UP
LEUKOCYTE ESTERASE UR-ACNC: HIGH
LIPID PNL WITH DIRECT LDL SERPL: 78 MG/DL — SIGNIFICANT CHANGE UP
MAGNESIUM SERPL-MCNC: 2.2 MG/DL — SIGNIFICANT CHANGE UP (ref 1.6–2.6)
MCHC RBC-ENTMCNC: 30 PG — SIGNIFICANT CHANGE UP (ref 27–34)
MCHC RBC-ENTMCNC: 33.6 % — SIGNIFICANT CHANGE UP (ref 32–36)
MCV RBC AUTO: 89.3 FL — SIGNIFICANT CHANGE UP (ref 80–100)
MUCOUS THREADS # UR AUTO: SIGNIFICANT CHANGE UP
NITRITE UR-MCNC: NEGATIVE — SIGNIFICANT CHANGE UP
NRBC # FLD: 0 — SIGNIFICANT CHANGE UP
PH UR: 6 — SIGNIFICANT CHANGE UP (ref 4.6–8)
PHOSPHATE SERPL-MCNC: 2.9 MG/DL — SIGNIFICANT CHANGE UP (ref 2.5–4.5)
PLATELET # BLD AUTO: 196 K/UL — SIGNIFICANT CHANGE UP (ref 150–400)
PMV BLD: 10.3 FL — SIGNIFICANT CHANGE UP (ref 7–13)
POTASSIUM SERPL-MCNC: 4 MMOL/L — SIGNIFICANT CHANGE UP (ref 3.5–5.3)
POTASSIUM SERPL-SCNC: 4 MMOL/L — SIGNIFICANT CHANGE UP (ref 3.5–5.3)
PROT UR-MCNC: 20 MG/DL — SIGNIFICANT CHANGE UP
RBC # BLD: 3.63 M/UL — LOW (ref 3.8–5.2)
RBC # FLD: 14 % — SIGNIFICANT CHANGE UP (ref 10.3–14.5)
RBC CASTS # UR COMP ASSIST: HIGH (ref 0–?)
SODIUM SERPL-SCNC: 134 MMOL/L — LOW (ref 135–145)
SP GR SPEC: 1.02 — SIGNIFICANT CHANGE UP (ref 1–1.04)
SQUAMOUS # UR AUTO: SIGNIFICANT CHANGE UP
TRIGL SERPL-MCNC: 69 MG/DL — SIGNIFICANT CHANGE UP (ref 10–149)
TROPONIN T SERPL-MCNC: < 0.06 NG/ML — SIGNIFICANT CHANGE UP (ref 0–0.06)
UROBILINOGEN FLD QL: NORMAL MG/DL — SIGNIFICANT CHANGE UP
WBC # BLD: 7.16 K/UL — SIGNIFICANT CHANGE UP (ref 3.8–10.5)
WBC # FLD AUTO: 7.16 K/UL — SIGNIFICANT CHANGE UP (ref 3.8–10.5)
WBC UR QL: SIGNIFICANT CHANGE UP (ref 0–?)

## 2018-02-14 RX ORDER — LIDOCAINE 4 G/100G
1 CREAM TOPICAL ONCE
Qty: 0 | Refills: 0 | Status: COMPLETED | OUTPATIENT
Start: 2018-02-14 | End: 2018-02-14

## 2018-02-14 RX ORDER — AMLODIPINE BESYLATE 2.5 MG/1
5 TABLET ORAL DAILY
Qty: 0 | Refills: 0 | Status: DISCONTINUED | OUTPATIENT
Start: 2018-02-14 | End: 2018-02-14

## 2018-02-14 RX ORDER — APIXABAN 2.5 MG/1
5 TABLET, FILM COATED ORAL EVERY 12 HOURS
Qty: 0 | Refills: 0 | Status: DISCONTINUED | OUTPATIENT
Start: 2018-02-14 | End: 2018-02-14

## 2018-02-14 RX ORDER — NICOTINE POLACRILEX 2 MG
1 GUM BUCCAL DAILY
Qty: 0 | Refills: 0 | Status: DISCONTINUED | OUTPATIENT
Start: 2018-02-14 | End: 2018-02-21

## 2018-02-14 RX ORDER — METOPROLOL TARTRATE 50 MG
25 TABLET ORAL ONCE
Qty: 0 | Refills: 0 | Status: COMPLETED | OUTPATIENT
Start: 2018-02-14 | End: 2018-02-14

## 2018-02-14 RX ORDER — ASPIRIN/CALCIUM CARB/MAGNESIUM 324 MG
81 TABLET ORAL DAILY
Qty: 0 | Refills: 0 | Status: DISCONTINUED | OUTPATIENT
Start: 2018-02-14 | End: 2018-02-14

## 2018-02-14 RX ORDER — OXYCODONE HYDROCHLORIDE 5 MG/1
5 TABLET ORAL ONCE
Qty: 0 | Refills: 0 | Status: DISCONTINUED | OUTPATIENT
Start: 2018-02-14 | End: 2018-02-14

## 2018-02-14 RX ORDER — METOPROLOL TARTRATE 50 MG
50 TABLET ORAL
Qty: 0 | Refills: 0 | Status: DISCONTINUED | OUTPATIENT
Start: 2018-02-14 | End: 2018-02-14

## 2018-02-14 RX ORDER — SODIUM CHLORIDE 9 MG/ML
3 INJECTION INTRAMUSCULAR; INTRAVENOUS; SUBCUTANEOUS EVERY 8 HOURS
Qty: 0 | Refills: 0 | Status: DISCONTINUED | OUTPATIENT
Start: 2018-02-14 | End: 2018-02-21

## 2018-02-14 RX ORDER — METOPROLOL TARTRATE 50 MG
12.5 TABLET ORAL
Qty: 0 | Refills: 0 | Status: DISCONTINUED | OUTPATIENT
Start: 2018-02-14 | End: 2018-02-14

## 2018-02-14 RX ORDER — ACETAMINOPHEN 500 MG
650 TABLET ORAL EVERY 6 HOURS
Qty: 0 | Refills: 0 | Status: DISCONTINUED | OUTPATIENT
Start: 2018-02-14 | End: 2018-02-21

## 2018-02-14 RX ORDER — METOPROLOL TARTRATE 50 MG
25 TABLET ORAL
Qty: 0 | Refills: 0 | Status: DISCONTINUED | OUTPATIENT
Start: 2018-02-14 | End: 2018-02-14

## 2018-02-14 RX ORDER — VALSARTAN 80 MG/1
40 TABLET ORAL DAILY
Qty: 0 | Refills: 0 | Status: DISCONTINUED | OUTPATIENT
Start: 2018-02-14 | End: 2018-02-14

## 2018-02-14 RX ORDER — VALSARTAN 80 MG/1
40 TABLET ORAL DAILY
Qty: 0 | Refills: 0 | Status: DISCONTINUED | OUTPATIENT
Start: 2018-02-14 | End: 2018-02-21

## 2018-02-14 RX ORDER — ATORVASTATIN CALCIUM 80 MG/1
10 TABLET, FILM COATED ORAL AT BEDTIME
Qty: 0 | Refills: 0 | Status: DISCONTINUED | OUTPATIENT
Start: 2018-02-14 | End: 2018-02-21

## 2018-02-14 RX ORDER — METOPROLOL TARTRATE 50 MG
50 TABLET ORAL
Qty: 0 | Refills: 0 | Status: DISCONTINUED | OUTPATIENT
Start: 2018-02-14 | End: 2018-02-21

## 2018-02-14 RX ORDER — VALSARTAN 80 MG/1
320 TABLET ORAL DAILY
Qty: 0 | Refills: 0 | Status: DISCONTINUED | OUTPATIENT
Start: 2018-02-14 | End: 2018-02-14

## 2018-02-14 RX ORDER — INFLUENZA VIRUS VACCINE 15; 15; 15; 15 UG/.5ML; UG/.5ML; UG/.5ML; UG/.5ML
0.5 SUSPENSION INTRAMUSCULAR ONCE
Qty: 0 | Refills: 0 | Status: DISCONTINUED | OUTPATIENT
Start: 2018-02-14 | End: 2018-02-21

## 2018-02-14 RX ORDER — APIXABAN 2.5 MG/1
5 TABLET, FILM COATED ORAL EVERY 24 HOURS
Qty: 0 | Refills: 0 | Status: DISCONTINUED | OUTPATIENT
Start: 2018-02-14 | End: 2018-02-21

## 2018-02-14 RX ADMIN — APIXABAN 5 MILLIGRAM(S): 2.5 TABLET, FILM COATED ORAL at 05:38

## 2018-02-14 RX ADMIN — Medication 650 MILLIGRAM(S): at 19:00

## 2018-02-14 RX ADMIN — VALSARTAN 320 MILLIGRAM(S): 80 TABLET ORAL at 05:38

## 2018-02-14 RX ADMIN — LIDOCAINE 1 PATCH: 4 CREAM TOPICAL at 18:32

## 2018-02-14 RX ADMIN — OXYCODONE HYDROCHLORIDE 5 MILLIGRAM(S): 5 TABLET ORAL at 03:52

## 2018-02-14 RX ADMIN — Medication 650 MILLIGRAM(S): at 18:31

## 2018-02-14 RX ADMIN — OXYCODONE HYDROCHLORIDE 5 MILLIGRAM(S): 5 TABLET ORAL at 02:01

## 2018-02-14 RX ADMIN — Medication 1 MILLIGRAM(S): at 13:46

## 2018-02-14 RX ADMIN — Medication 25 MILLIGRAM(S): at 13:46

## 2018-02-14 RX ADMIN — Medication 50 MILLIGRAM(S): at 22:58

## 2018-02-14 RX ADMIN — SODIUM CHLORIDE 3 MILLILITER(S): 9 INJECTION INTRAMUSCULAR; INTRAVENOUS; SUBCUTANEOUS at 13:46

## 2018-02-14 RX ADMIN — AMLODIPINE BESYLATE 5 MILLIGRAM(S): 2.5 TABLET ORAL at 05:38

## 2018-02-14 RX ADMIN — Medication 12.5 MILLIGRAM(S): at 05:39

## 2018-02-14 RX ADMIN — SODIUM CHLORIDE 3 MILLILITER(S): 9 INJECTION INTRAMUSCULAR; INTRAVENOUS; SUBCUTANEOUS at 23:25

## 2018-02-14 RX ADMIN — ATORVASTATIN CALCIUM 10 MILLIGRAM(S): 80 TABLET, FILM COATED ORAL at 23:29

## 2018-02-14 RX ADMIN — Medication 1 MILLIGRAM(S): at 01:45

## 2018-02-14 RX ADMIN — SODIUM CHLORIDE 3 MILLILITER(S): 9 INJECTION INTRAMUSCULAR; INTRAVENOUS; SUBCUTANEOUS at 05:34

## 2018-02-14 NOTE — H&P ADULT - NEGATIVE ENMT SYMPTOMS
no hearing difficulty/no nasal congestion/no tinnitus/no sinus symptoms/no nasal discharge/no vertigo/no ear pain

## 2018-02-14 NOTE — H&P ADULT - NEUROLOGICAL DETAILS
sensation intact/normal strength/alert and oriented x 3/responds to verbal commands/no spontaneous movement

## 2018-02-14 NOTE — H&P ADULT - NEGATIVE NEUROLOGICAL SYMPTOMS
no weakness/no generalized seizures/no tremors/no transient paralysis/no paresthesias/no syncope/no focal seizures/no vertigo

## 2018-02-14 NOTE — CONSULT NOTE ADULT - SUBJECTIVE AND OBJECTIVE BOX
HPI: 78F, ambulates with a walker, history of A fib on Eliquis daily, Nicotine dependance, HTN, Dyslipidemia, Anxiety. Experiencing intermittent, exertional, L sided chest pain, described as a 10/10, sharp sensation that radiates to the L shoulder and under left breast, with positive dyspnea. Episodes last for a few seconds and subsided on their own, Not associated with food. Denies nausea, vomit, chills, diaphoresis, dysuria. Currently chest pain free.  In the ED, found to be in MYRIAM and was given [id number] mg IV x 1      Allergies:  No Known Allergies      PAST MEDICAL & SURGICAL HISTORY:  MI, old: mild as per pt  Anxiety  TIA (transient ischemic attack): many years ago  PAF (paroxysmal atrial fibrillation)  HLD (hyperlipidemia)  HTN (hypertension)  History of cataract surgery: b/l  History of repair of hip fracture: luisa placed in RLE  H/O spinal fusion: c2-6 in 1/17      FAMILY HISTORY:  No pertinent family history in first degree relatives      REVIEW OF SYSTEMS:  CONSTITUTIONAL: No fever, weight loss, or fatigue  EYES: No eye pain, visual disturbances, or discharge  NECK: No pain or stiffness  RESPIRATORY: No cough or wheezing, no shortness of breath  CARDIOVASCULAR: No chest pain, palpitations, dizziness, or leg swelling  GASTROINTESTINAL: No abdominal or epigastric pain. No nausea, vomiting, diarrhea or constipation  GENITOURINARY: No dysuria, urinary frequency or urgency, no hematuria  NEUROLOGICAL: No headaches, memory loss, loss of strength, numbness, or tremors  SKIN: No itching, burning, rashes, or lesions   MUSCULOSKELETAL: No joint pain or swelling; No muscle, back, or extremity pain    Medications:  MEDICATIONS  (STANDING):  apixaban 5 milliGRAM(s) Oral every 24 hours  atorvastatin 10 milliGRAM(s) Oral at bedtime  influenza   Vaccine 0.5 milliLiter(s) IntraMuscular once  metoprolol     tartrate 50 milliGRAM(s) Oral two times a day  nicotine - 21 mG/24Hr(s) Patch 1 patch Transdermal daily  sodium chloride 0.9% lock flush 3 milliLiter(s) IV Push every 8 hours  valsartan 40 milliGRAM(s) Oral daily    MEDICATIONS  (PRN):  LORazepam     Tablet 1 milliGRAM(s) Oral two times a day PRN Anxiety    	    PHYSICAL EXAM:  T(C): 36.8 (02-14-18 @ 05:35), Max: 36.9 (02-13-18 @ 21:27)  HR: 72 (02-14-18 @ 05:35) (51 - 113)  BP: 111/75 (02-14-18 @ 05:35) (86/50 - 112/74)  RR: 18 (02-14-18 @ 05:35) (16 - 18)  SpO2: 100% (02-14-18 @ 05:35) (95% - 100%)  Wt(kg): --  I&O's Summary      Appearance: Normal	  HEENT:   NCAT, PERRL, EOMI	  Lymphatic: No lymphadenopathy  Cardiovascular: Normal S1 S2, RRR  Respiratory: Lungs clear to auscultation BL  Psychiatry: A & O x 3, Mood & affect appropriate  Gastrointestinal:  Soft, Non-tender, + BS  Skin: No rashes, No ecchymoses, No cyanosis	  Neurologic: Non-focal  Extremities: Normal range of motion, No clubbing, cyanosis or edema    	  LABS:	 	    CARDIAC MARKERS:  CARDIAC MARKERS ( 14 Feb 2018 03:50 )  x     / < 0.06 ng/mL / 13 u/L / x     / x      CARDIAC MARKERS ( 13 Feb 2018 19:00 )  x     / < 0.06 ng/mL / 19 u/L / 1.00 ng/mL / x                                    10.9   7.16  )-----------( 196      ( 14 Feb 2018 03:50 )             32.4     02-14    134<L>  |  98  |  23  ----------------------------<  98  4.0   |  24  |  0.82    Ca    8.0<L>      14 Feb 2018 03:50  Phos  2.9     02-14  Mg     2.2     02-14    TPro  6.6  /  Alb  2.9<L>  /  TBili  0.6  /  DBili  x   /  AST  12  /  ALT  6   /  AlkPhos  56  02-13    proBNP:   Lipid Profile:   HgA1c:   TSH: Thyroid Stimulating Hormone, Serum: 0.72 uIU/mL (02-13 @ 19:00)

## 2018-02-14 NOTE — H&P ADULT - HISTORY OF PRESENT ILLNESS
78F, ambulates with a walker, history of A fib on Eliquis daily, Nicotine dependance, HTN, Dyslipidemia, Anxiety. experiencing intermittent, exertional, L sided chest pain, described as a 10/10, sharp sensation that radiates to the L shoulder, with positive dyspnea. Episodes last for a few seconds and subsided on their own, Not associated with food. Denies nausea, vomit, chills, diaphoresis, dysuria. Currently chest pain free.  In the ED, found to be in MYRIAM and was given [id number] mg IV x 1

## 2018-02-14 NOTE — H&P ADULT - ATTENDING COMMENTS
Patient seen and examined, agree with the above assessment and plan by PA.  Pt with known history of PAF, mitral regurgitation presenting w atypical CP in the setting or afib w RVR  Chest Pain has resolved, apparently chronic, described as a pulled muscle across her chest, shoulder and back  HR currently mildly elevated  Will titrate meds accordingly  Cont AC  PT eval  Low suspicion for angina given chronicity, nature and character of her symptoms  Recent ECHO w normal LVEF and mild-moderate MR Patient seen and examined, agree with the above assessment and plan by PA.  Pt with known history of PAF, mitral regurgitation presenting w atypical CP in the setting or afib w RVR  Chest Pain has resolved, apparently chronic, described as a pulled muscle across her chest, shoulder and back  HR currently mildly elevated  Will titrate meds accordingly  Cont AC  PT eval  Low suspicion for angina given chronicity, nature and character of her symptoms  Recent ECHO w normal LVEF and mild-moderate MR  NPO p MN if pt remains in afib will consider ANABELL/DCCV

## 2018-02-14 NOTE — H&P ADULT - GASTROINTESTINAL DETAILS
no rebound tenderness/no rigidity/no distention/bowel sounds normal/no bruit/no guarding/soft/nontender/no masses palpable

## 2018-02-14 NOTE — H&P ADULT - RS GEN PE MLT RESP DETAILS PC
airway patent/breath sounds equal/no rhonchi/no wheezes/respirations non-labored/no intercostal retractions/no chest wall tenderness/no rales/no subcutaneous emphysema

## 2018-02-14 NOTE — H&P ADULT - NSHPLABSRESULTS_GEN_ALL_CORE
CXR preliminary = :  Bibasilar opacities likely represents atelectasis  H &H = 12.2/ 35.6  Na/ Cl = 131/95  BUN/ Creatinine=- 131/ 95  Glucose = 95  CE negative x 1  TSH = 0.72  EKG A FIB RVR @ 131b/ min, LVH, TWI 1, AVL, V6, QT/ QTC= 284/ 422

## 2018-02-14 NOTE — CONSULT NOTE ADULT - ASSESSMENT
79 yo F w/A fib w/RVR, HTN, HLD, anxiety, chest pain:  1. Chest pain - atypical, currently resolved, plan per Cardio, ASA  2. A fib w/RVR - currently rate controlled, c/w AC, Lopressor, titrate to HR  3. HTN - BP at goal  4. HLD - c/w statin  5. DVT prophylaxis 77 yo F w/A fib w/RVR, HTN, HLD, anxiety, chest pain:  1. Chest pain - atypical, currently resolved, plan per Cardio, ASA  2. A fib w/RVR - currently rate controlled, c/w AC, Lopressor, titrate to HR  3. HTN - BP at goal  4. HLD - c/w statin  5. DVT prophylaxis  6. Anemia - likely dilutional, check FOBT  7. Hyponatremia - improved w/IVF

## 2018-02-15 ENCOUNTER — APPOINTMENT (OUTPATIENT)
Dept: INTERNAL MEDICINE | Facility: CLINIC | Age: 78
End: 2018-02-15

## 2018-02-15 LAB
BUN SERPL-MCNC: 21 MG/DL — SIGNIFICANT CHANGE UP (ref 7–23)
CALCIUM SERPL-MCNC: 8.2 MG/DL — LOW (ref 8.4–10.5)
CHLORIDE SERPL-SCNC: 101 MMOL/L — SIGNIFICANT CHANGE UP (ref 98–107)
CO2 SERPL-SCNC: 25 MMOL/L — SIGNIFICANT CHANGE UP (ref 22–31)
CREAT SERPL-MCNC: 0.8 MG/DL — SIGNIFICANT CHANGE UP (ref 0.5–1.3)
GLUCOSE SERPL-MCNC: 85 MG/DL — SIGNIFICANT CHANGE UP (ref 70–99)
HCT VFR BLD CALC: 33.9 % — LOW (ref 34.5–45)
HGB BLD-MCNC: 11.2 G/DL — LOW (ref 11.5–15.5)
MAGNESIUM SERPL-MCNC: 1.8 MG/DL — SIGNIFICANT CHANGE UP (ref 1.6–2.6)
MCHC RBC-ENTMCNC: 29.6 PG — SIGNIFICANT CHANGE UP (ref 27–34)
MCHC RBC-ENTMCNC: 33 % — SIGNIFICANT CHANGE UP (ref 32–36)
MCV RBC AUTO: 89.7 FL — SIGNIFICANT CHANGE UP (ref 80–100)
NRBC # FLD: 0 — SIGNIFICANT CHANGE UP
PLATELET # BLD AUTO: 190 K/UL — SIGNIFICANT CHANGE UP (ref 150–400)
PMV BLD: 10.4 FL — SIGNIFICANT CHANGE UP (ref 7–13)
POTASSIUM SERPL-MCNC: 4 MMOL/L — SIGNIFICANT CHANGE UP (ref 3.5–5.3)
POTASSIUM SERPL-SCNC: 4 MMOL/L — SIGNIFICANT CHANGE UP (ref 3.5–5.3)
RBC # BLD: 3.78 M/UL — LOW (ref 3.8–5.2)
RBC # FLD: 14 % — SIGNIFICANT CHANGE UP (ref 10.3–14.5)
SODIUM SERPL-SCNC: 135 MMOL/L — SIGNIFICANT CHANGE UP (ref 135–145)
WBC # BLD: 5.45 K/UL — SIGNIFICANT CHANGE UP (ref 3.8–10.5)
WBC # FLD AUTO: 5.45 K/UL — SIGNIFICANT CHANGE UP (ref 3.8–10.5)

## 2018-02-15 RX ORDER — LIDOCAINE 4 G/100G
1 CREAM TOPICAL DAILY
Qty: 0 | Refills: 0 | Status: DISCONTINUED | OUTPATIENT
Start: 2018-02-15 | End: 2018-02-21

## 2018-02-15 RX ADMIN — LIDOCAINE 1 PATCH: 4 CREAM TOPICAL at 10:23

## 2018-02-15 RX ADMIN — APIXABAN 5 MILLIGRAM(S): 2.5 TABLET, FILM COATED ORAL at 07:04

## 2018-02-15 RX ADMIN — VALSARTAN 40 MILLIGRAM(S): 80 TABLET ORAL at 07:03

## 2018-02-15 RX ADMIN — Medication 1 MILLIGRAM(S): at 01:46

## 2018-02-15 RX ADMIN — ATORVASTATIN CALCIUM 10 MILLIGRAM(S): 80 TABLET, FILM COATED ORAL at 21:06

## 2018-02-15 RX ADMIN — Medication 650 MILLIGRAM(S): at 23:24

## 2018-02-15 RX ADMIN — Medication 650 MILLIGRAM(S): at 11:12

## 2018-02-15 RX ADMIN — Medication 650 MILLIGRAM(S): at 22:24

## 2018-02-15 RX ADMIN — LIDOCAINE 1 PATCH: 4 CREAM TOPICAL at 07:23

## 2018-02-15 RX ADMIN — Medication 50 MILLIGRAM(S): at 17:35

## 2018-02-15 RX ADMIN — SODIUM CHLORIDE 3 MILLILITER(S): 9 INJECTION INTRAMUSCULAR; INTRAVENOUS; SUBCUTANEOUS at 13:49

## 2018-02-15 RX ADMIN — LIDOCAINE 1 PATCH: 4 CREAM TOPICAL at 22:30

## 2018-02-15 RX ADMIN — Medication 650 MILLIGRAM(S): at 12:00

## 2018-02-15 RX ADMIN — SODIUM CHLORIDE 3 MILLILITER(S): 9 INJECTION INTRAMUSCULAR; INTRAVENOUS; SUBCUTANEOUS at 23:10

## 2018-02-15 RX ADMIN — Medication 1 MILLIGRAM(S): at 23:09

## 2018-02-15 RX ADMIN — Medication 50 MILLIGRAM(S): at 07:02

## 2018-02-15 RX ADMIN — SODIUM CHLORIDE 3 MILLILITER(S): 9 INJECTION INTRAMUSCULAR; INTRAVENOUS; SUBCUTANEOUS at 07:03

## 2018-02-16 LAB
BUN SERPL-MCNC: 23 MG/DL — SIGNIFICANT CHANGE UP (ref 7–23)
CALCIUM SERPL-MCNC: 8 MG/DL — LOW (ref 8.4–10.5)
CHLORIDE SERPL-SCNC: 99 MMOL/L — SIGNIFICANT CHANGE UP (ref 98–107)
CO2 SERPL-SCNC: 24 MMOL/L — SIGNIFICANT CHANGE UP (ref 22–31)
CREAT SERPL-MCNC: 0.87 MG/DL — SIGNIFICANT CHANGE UP (ref 0.5–1.3)
GLUCOSE SERPL-MCNC: 97 MG/DL — SIGNIFICANT CHANGE UP (ref 70–99)
HCT VFR BLD CALC: 31.3 % — LOW (ref 34.5–45)
HGB BLD-MCNC: 10.5 G/DL — LOW (ref 11.5–15.5)
MAGNESIUM SERPL-MCNC: 1.8 MG/DL — SIGNIFICANT CHANGE UP (ref 1.6–2.6)
MCHC RBC-ENTMCNC: 29.8 PG — SIGNIFICANT CHANGE UP (ref 27–34)
MCHC RBC-ENTMCNC: 33.5 % — SIGNIFICANT CHANGE UP (ref 32–36)
MCV RBC AUTO: 88.9 FL — SIGNIFICANT CHANGE UP (ref 80–100)
NRBC # FLD: 0 — SIGNIFICANT CHANGE UP
PLATELET # BLD AUTO: 214 K/UL — SIGNIFICANT CHANGE UP (ref 150–400)
PMV BLD: 10.1 FL — SIGNIFICANT CHANGE UP (ref 7–13)
POTASSIUM SERPL-MCNC: 4 MMOL/L — SIGNIFICANT CHANGE UP (ref 3.5–5.3)
POTASSIUM SERPL-SCNC: 4 MMOL/L — SIGNIFICANT CHANGE UP (ref 3.5–5.3)
RBC # BLD: 3.52 M/UL — LOW (ref 3.8–5.2)
RBC # FLD: 13.6 % — SIGNIFICANT CHANGE UP (ref 10.3–14.5)
SODIUM SERPL-SCNC: 133 MMOL/L — LOW (ref 135–145)
WBC # BLD: 4.62 K/UL — SIGNIFICANT CHANGE UP (ref 3.8–10.5)
WBC # FLD AUTO: 4.62 K/UL — SIGNIFICANT CHANGE UP (ref 3.8–10.5)

## 2018-02-16 RX ORDER — MAGNESIUM OXIDE 400 MG ORAL TABLET 241.3 MG
400 TABLET ORAL ONCE
Qty: 0 | Refills: 0 | Status: COMPLETED | OUTPATIENT
Start: 2018-02-16 | End: 2018-02-16

## 2018-02-16 RX ORDER — OXYCODONE HYDROCHLORIDE 5 MG/1
5 TABLET ORAL ONCE
Qty: 0 | Refills: 0 | Status: DISCONTINUED | OUTPATIENT
Start: 2018-02-16 | End: 2018-02-16

## 2018-02-16 RX ADMIN — MAGNESIUM OXIDE 400 MG ORAL TABLET 400 MILLIGRAM(S): 241.3 TABLET ORAL at 11:13

## 2018-02-16 RX ADMIN — ATORVASTATIN CALCIUM 10 MILLIGRAM(S): 80 TABLET, FILM COATED ORAL at 21:08

## 2018-02-16 RX ADMIN — Medication 650 MILLIGRAM(S): at 18:35

## 2018-02-16 RX ADMIN — Medication 1 MILLIGRAM(S): at 22:17

## 2018-02-16 RX ADMIN — LIDOCAINE 1 PATCH: 4 CREAM TOPICAL at 11:13

## 2018-02-16 RX ADMIN — Medication 50 MILLIGRAM(S): at 17:37

## 2018-02-16 RX ADMIN — SODIUM CHLORIDE 3 MILLILITER(S): 9 INJECTION INTRAMUSCULAR; INTRAVENOUS; SUBCUTANEOUS at 05:49

## 2018-02-16 RX ADMIN — Medication 50 MILLIGRAM(S): at 05:52

## 2018-02-16 RX ADMIN — Medication 650 MILLIGRAM(S): at 17:37

## 2018-02-16 RX ADMIN — OXYCODONE HYDROCHLORIDE 5 MILLIGRAM(S): 5 TABLET ORAL at 23:08

## 2018-02-16 RX ADMIN — SODIUM CHLORIDE 3 MILLILITER(S): 9 INJECTION INTRAMUSCULAR; INTRAVENOUS; SUBCUTANEOUS at 21:07

## 2018-02-16 RX ADMIN — LIDOCAINE 1 PATCH: 4 CREAM TOPICAL at 23:08

## 2018-02-16 RX ADMIN — VALSARTAN 40 MILLIGRAM(S): 80 TABLET ORAL at 05:51

## 2018-02-16 RX ADMIN — OXYCODONE HYDROCHLORIDE 5 MILLIGRAM(S): 5 TABLET ORAL at 22:16

## 2018-02-16 RX ADMIN — SODIUM CHLORIDE 3 MILLILITER(S): 9 INJECTION INTRAMUSCULAR; INTRAVENOUS; SUBCUTANEOUS at 13:34

## 2018-02-16 RX ADMIN — APIXABAN 5 MILLIGRAM(S): 2.5 TABLET, FILM COATED ORAL at 05:51

## 2018-02-16 RX ADMIN — OXYCODONE HYDROCHLORIDE 5 MILLIGRAM(S): 5 TABLET ORAL at 01:00

## 2018-02-16 RX ADMIN — OXYCODONE HYDROCHLORIDE 5 MILLIGRAM(S): 5 TABLET ORAL at 02:00

## 2018-02-17 LAB
BUN SERPL-MCNC: 19 MG/DL — SIGNIFICANT CHANGE UP (ref 7–23)
CALCIUM SERPL-MCNC: 8.4 MG/DL — SIGNIFICANT CHANGE UP (ref 8.4–10.5)
CHLORIDE SERPL-SCNC: 100 MMOL/L — SIGNIFICANT CHANGE UP (ref 98–107)
CO2 SERPL-SCNC: 26 MMOL/L — SIGNIFICANT CHANGE UP (ref 22–31)
CREAT SERPL-MCNC: 0.8 MG/DL — SIGNIFICANT CHANGE UP (ref 0.5–1.3)
GLUCOSE SERPL-MCNC: 86 MG/DL — SIGNIFICANT CHANGE UP (ref 70–99)
HCT VFR BLD CALC: 33.9 % — LOW (ref 34.5–45)
HGB BLD-MCNC: 11.5 G/DL — SIGNIFICANT CHANGE UP (ref 11.5–15.5)
MCHC RBC-ENTMCNC: 29.9 PG — SIGNIFICANT CHANGE UP (ref 27–34)
MCHC RBC-ENTMCNC: 33.9 % — SIGNIFICANT CHANGE UP (ref 32–36)
MCV RBC AUTO: 88.1 FL — SIGNIFICANT CHANGE UP (ref 80–100)
NRBC # FLD: 0 — SIGNIFICANT CHANGE UP
PLATELET # BLD AUTO: 208 K/UL — SIGNIFICANT CHANGE UP (ref 150–400)
PMV BLD: 10.2 FL — SIGNIFICANT CHANGE UP (ref 7–13)
POTASSIUM SERPL-MCNC: 4.5 MMOL/L — SIGNIFICANT CHANGE UP (ref 3.5–5.3)
POTASSIUM SERPL-SCNC: 4.5 MMOL/L — SIGNIFICANT CHANGE UP (ref 3.5–5.3)
RBC # BLD: 3.85 M/UL — SIGNIFICANT CHANGE UP (ref 3.8–5.2)
RBC # FLD: 13.7 % — SIGNIFICANT CHANGE UP (ref 10.3–14.5)
SODIUM SERPL-SCNC: 135 MMOL/L — SIGNIFICANT CHANGE UP (ref 135–145)
WBC # BLD: 3.93 K/UL — SIGNIFICANT CHANGE UP (ref 3.8–10.5)
WBC # FLD AUTO: 3.93 K/UL — SIGNIFICANT CHANGE UP (ref 3.8–10.5)

## 2018-02-17 RX ORDER — SENNA PLUS 8.6 MG/1
2 TABLET ORAL AT BEDTIME
Qty: 0 | Refills: 0 | Status: DISCONTINUED | OUTPATIENT
Start: 2018-02-17 | End: 2018-02-21

## 2018-02-17 RX ORDER — OXYCODONE HYDROCHLORIDE 5 MG/1
5 TABLET ORAL ONCE
Qty: 0 | Refills: 0 | Status: DISCONTINUED | OUTPATIENT
Start: 2018-02-17 | End: 2018-02-17

## 2018-02-17 RX ORDER — DOCUSATE SODIUM 100 MG
100 CAPSULE ORAL THREE TIMES A DAY
Qty: 0 | Refills: 0 | Status: DISCONTINUED | OUTPATIENT
Start: 2018-02-17 | End: 2018-02-21

## 2018-02-17 RX ADMIN — SENNA PLUS 2 TABLET(S): 8.6 TABLET ORAL at 22:27

## 2018-02-17 RX ADMIN — LIDOCAINE 1 PATCH: 4 CREAM TOPICAL at 22:23

## 2018-02-17 RX ADMIN — APIXABAN 5 MILLIGRAM(S): 2.5 TABLET, FILM COATED ORAL at 06:40

## 2018-02-17 RX ADMIN — OXYCODONE HYDROCHLORIDE 5 MILLIGRAM(S): 5 TABLET ORAL at 19:18

## 2018-02-17 RX ADMIN — OXYCODONE HYDROCHLORIDE 5 MILLIGRAM(S): 5 TABLET ORAL at 18:44

## 2018-02-17 RX ADMIN — SODIUM CHLORIDE 3 MILLILITER(S): 9 INJECTION INTRAMUSCULAR; INTRAVENOUS; SUBCUTANEOUS at 06:40

## 2018-02-17 RX ADMIN — ATORVASTATIN CALCIUM 10 MILLIGRAM(S): 80 TABLET, FILM COATED ORAL at 22:23

## 2018-02-17 RX ADMIN — SODIUM CHLORIDE 3 MILLILITER(S): 9 INJECTION INTRAMUSCULAR; INTRAVENOUS; SUBCUTANEOUS at 13:11

## 2018-02-17 RX ADMIN — LIDOCAINE 1 PATCH: 4 CREAM TOPICAL at 11:07

## 2018-02-17 RX ADMIN — Medication 100 MILLIGRAM(S): at 22:27

## 2018-02-17 RX ADMIN — VALSARTAN 40 MILLIGRAM(S): 80 TABLET ORAL at 06:40

## 2018-02-17 RX ADMIN — Medication 1 MILLIGRAM(S): at 18:44

## 2018-02-17 RX ADMIN — Medication 50 MILLIGRAM(S): at 17:30

## 2018-02-17 RX ADMIN — Medication 50 MILLIGRAM(S): at 06:40

## 2018-02-17 RX ADMIN — SODIUM CHLORIDE 3 MILLILITER(S): 9 INJECTION INTRAMUSCULAR; INTRAVENOUS; SUBCUTANEOUS at 22:23

## 2018-02-18 LAB
BUN SERPL-MCNC: 23 MG/DL — SIGNIFICANT CHANGE UP (ref 7–23)
CALCIUM SERPL-MCNC: 8.6 MG/DL — SIGNIFICANT CHANGE UP (ref 8.4–10.5)
CHLORIDE SERPL-SCNC: 100 MMOL/L — SIGNIFICANT CHANGE UP (ref 98–107)
CO2 SERPL-SCNC: 26 MMOL/L — SIGNIFICANT CHANGE UP (ref 22–31)
CREAT SERPL-MCNC: 0.85 MG/DL — SIGNIFICANT CHANGE UP (ref 0.5–1.3)
GLUCOSE SERPL-MCNC: 91 MG/DL — SIGNIFICANT CHANGE UP (ref 70–99)
HCT VFR BLD CALC: 33.7 % — LOW (ref 34.5–45)
HGB BLD-MCNC: 11.1 G/DL — LOW (ref 11.5–15.5)
MCHC RBC-ENTMCNC: 29.1 PG — SIGNIFICANT CHANGE UP (ref 27–34)
MCHC RBC-ENTMCNC: 32.9 % — SIGNIFICANT CHANGE UP (ref 32–36)
MCV RBC AUTO: 88.2 FL — SIGNIFICANT CHANGE UP (ref 80–100)
NRBC # FLD: 0 — SIGNIFICANT CHANGE UP
PLATELET # BLD AUTO: 239 K/UL — SIGNIFICANT CHANGE UP (ref 150–400)
PMV BLD: 10 FL — SIGNIFICANT CHANGE UP (ref 7–13)
POTASSIUM SERPL-MCNC: 4.3 MMOL/L — SIGNIFICANT CHANGE UP (ref 3.5–5.3)
POTASSIUM SERPL-SCNC: 4.3 MMOL/L — SIGNIFICANT CHANGE UP (ref 3.5–5.3)
RBC # BLD: 3.82 M/UL — SIGNIFICANT CHANGE UP (ref 3.8–5.2)
RBC # FLD: 13.8 % — SIGNIFICANT CHANGE UP (ref 10.3–14.5)
SODIUM SERPL-SCNC: 136 MMOL/L — SIGNIFICANT CHANGE UP (ref 135–145)
WBC # BLD: 5.82 K/UL — SIGNIFICANT CHANGE UP (ref 3.8–10.5)
WBC # FLD AUTO: 5.82 K/UL — SIGNIFICANT CHANGE UP (ref 3.8–10.5)

## 2018-02-18 RX ADMIN — LIDOCAINE 1 PATCH: 4 CREAM TOPICAL at 23:18

## 2018-02-18 RX ADMIN — SODIUM CHLORIDE 3 MILLILITER(S): 9 INJECTION INTRAMUSCULAR; INTRAVENOUS; SUBCUTANEOUS at 11:51

## 2018-02-18 RX ADMIN — Medication 1 MILLIGRAM(S): at 16:24

## 2018-02-18 RX ADMIN — Medication 100 MILLIGRAM(S): at 11:51

## 2018-02-18 RX ADMIN — APIXABAN 5 MILLIGRAM(S): 2.5 TABLET, FILM COATED ORAL at 06:12

## 2018-02-18 RX ADMIN — Medication 650 MILLIGRAM(S): at 17:25

## 2018-02-18 RX ADMIN — SODIUM CHLORIDE 3 MILLILITER(S): 9 INJECTION INTRAMUSCULAR; INTRAVENOUS; SUBCUTANEOUS at 22:13

## 2018-02-18 RX ADMIN — Medication 50 MILLIGRAM(S): at 16:57

## 2018-02-18 RX ADMIN — SODIUM CHLORIDE 3 MILLILITER(S): 9 INJECTION INTRAMUSCULAR; INTRAVENOUS; SUBCUTANEOUS at 06:12

## 2018-02-18 RX ADMIN — VALSARTAN 40 MILLIGRAM(S): 80 TABLET ORAL at 06:12

## 2018-02-18 RX ADMIN — Medication 100 MILLIGRAM(S): at 06:12

## 2018-02-18 RX ADMIN — Medication 650 MILLIGRAM(S): at 16:25

## 2018-02-18 RX ADMIN — LIDOCAINE 1 PATCH: 4 CREAM TOPICAL at 12:13

## 2018-02-18 RX ADMIN — Medication 50 MILLIGRAM(S): at 06:12

## 2018-02-18 RX ADMIN — ATORVASTATIN CALCIUM 10 MILLIGRAM(S): 80 TABLET, FILM COATED ORAL at 22:13

## 2018-02-19 LAB
BUN SERPL-MCNC: 24 MG/DL — HIGH (ref 7–23)
CALCIUM SERPL-MCNC: 8.5 MG/DL — SIGNIFICANT CHANGE UP (ref 8.4–10.5)
CHLORIDE SERPL-SCNC: 101 MMOL/L — SIGNIFICANT CHANGE UP (ref 98–107)
CO2 SERPL-SCNC: 25 MMOL/L — SIGNIFICANT CHANGE UP (ref 22–31)
CREAT SERPL-MCNC: 0.91 MG/DL — SIGNIFICANT CHANGE UP (ref 0.5–1.3)
GLUCOSE SERPL-MCNC: 92 MG/DL — SIGNIFICANT CHANGE UP (ref 70–99)
HCT VFR BLD CALC: 31.4 % — LOW (ref 34.5–45)
HGB BLD-MCNC: 10.8 G/DL — LOW (ref 11.5–15.5)
MCHC RBC-ENTMCNC: 30.9 PG — SIGNIFICANT CHANGE UP (ref 27–34)
MCHC RBC-ENTMCNC: 34.4 % — SIGNIFICANT CHANGE UP (ref 32–36)
MCV RBC AUTO: 90 FL — SIGNIFICANT CHANGE UP (ref 80–100)
NRBC # FLD: 0 — SIGNIFICANT CHANGE UP
PLATELET # BLD AUTO: 215 K/UL — SIGNIFICANT CHANGE UP (ref 150–400)
PMV BLD: 10.2 FL — SIGNIFICANT CHANGE UP (ref 7–13)
POTASSIUM SERPL-MCNC: 4.5 MMOL/L — SIGNIFICANT CHANGE UP (ref 3.5–5.3)
POTASSIUM SERPL-SCNC: 4.5 MMOL/L — SIGNIFICANT CHANGE UP (ref 3.5–5.3)
RBC # BLD: 3.49 M/UL — LOW (ref 3.8–5.2)
RBC # FLD: 14.8 % — HIGH (ref 10.3–14.5)
SODIUM SERPL-SCNC: 135 MMOL/L — SIGNIFICANT CHANGE UP (ref 135–145)
WBC # BLD: 4.52 K/UL — SIGNIFICANT CHANGE UP (ref 3.8–10.5)
WBC # FLD AUTO: 4.52 K/UL — SIGNIFICANT CHANGE UP (ref 3.8–10.5)

## 2018-02-19 RX ORDER — OXYCODONE HYDROCHLORIDE 5 MG/1
5 TABLET ORAL ONCE
Qty: 0 | Refills: 0 | Status: DISCONTINUED | OUTPATIENT
Start: 2018-02-19 | End: 2018-02-19

## 2018-02-19 RX ADMIN — SODIUM CHLORIDE 3 MILLILITER(S): 9 INJECTION INTRAMUSCULAR; INTRAVENOUS; SUBCUTANEOUS at 05:19

## 2018-02-19 RX ADMIN — Medication 100 MILLIGRAM(S): at 11:37

## 2018-02-19 RX ADMIN — Medication 650 MILLIGRAM(S): at 00:09

## 2018-02-19 RX ADMIN — Medication 50 MILLIGRAM(S): at 05:18

## 2018-02-19 RX ADMIN — Medication 650 MILLIGRAM(S): at 01:09

## 2018-02-19 RX ADMIN — Medication 1 MILLIGRAM(S): at 17:06

## 2018-02-19 RX ADMIN — SODIUM CHLORIDE 3 MILLILITER(S): 9 INJECTION INTRAMUSCULAR; INTRAVENOUS; SUBCUTANEOUS at 21:56

## 2018-02-19 RX ADMIN — OXYCODONE HYDROCHLORIDE 5 MILLIGRAM(S): 5 TABLET ORAL at 20:40

## 2018-02-19 RX ADMIN — VALSARTAN 40 MILLIGRAM(S): 80 TABLET ORAL at 05:19

## 2018-02-19 RX ADMIN — Medication 100 MILLIGRAM(S): at 21:56

## 2018-02-19 RX ADMIN — OXYCODONE HYDROCHLORIDE 5 MILLIGRAM(S): 5 TABLET ORAL at 20:10

## 2018-02-19 RX ADMIN — SENNA PLUS 2 TABLET(S): 8.6 TABLET ORAL at 21:56

## 2018-02-19 RX ADMIN — Medication 50 MILLIGRAM(S): at 17:06

## 2018-02-19 RX ADMIN — SODIUM CHLORIDE 3 MILLILITER(S): 9 INJECTION INTRAMUSCULAR; INTRAVENOUS; SUBCUTANEOUS at 11:19

## 2018-02-19 RX ADMIN — APIXABAN 5 MILLIGRAM(S): 2.5 TABLET, FILM COATED ORAL at 05:18

## 2018-02-19 RX ADMIN — ATORVASTATIN CALCIUM 10 MILLIGRAM(S): 80 TABLET, FILM COATED ORAL at 21:56

## 2018-02-20 ENCOUNTER — TRANSCRIPTION ENCOUNTER (OUTPATIENT)
Age: 78
End: 2018-02-20

## 2018-02-20 LAB
BUN SERPL-MCNC: 30 MG/DL — HIGH (ref 7–23)
CALCIUM SERPL-MCNC: 8.5 MG/DL — SIGNIFICANT CHANGE UP (ref 8.4–10.5)
CHLORIDE SERPL-SCNC: 101 MMOL/L — SIGNIFICANT CHANGE UP (ref 98–107)
CO2 SERPL-SCNC: 23 MMOL/L — SIGNIFICANT CHANGE UP (ref 22–31)
CREAT SERPL-MCNC: 0.81 MG/DL — SIGNIFICANT CHANGE UP (ref 0.5–1.3)
GLUCOSE SERPL-MCNC: 93 MG/DL — SIGNIFICANT CHANGE UP (ref 70–99)
HCT VFR BLD CALC: 29.9 % — LOW (ref 34.5–45)
HGB BLD-MCNC: 10.6 G/DL — LOW (ref 11.5–15.5)
MCHC RBC-ENTMCNC: 32.4 PG — SIGNIFICANT CHANGE UP (ref 27–34)
MCHC RBC-ENTMCNC: 35.5 % — SIGNIFICANT CHANGE UP (ref 32–36)
MCV RBC AUTO: 91.4 FL — SIGNIFICANT CHANGE UP (ref 80–100)
NRBC # FLD: 0 — SIGNIFICANT CHANGE UP
PLATELET # BLD AUTO: 222 K/UL — SIGNIFICANT CHANGE UP (ref 150–400)
PMV BLD: 10.1 FL — SIGNIFICANT CHANGE UP (ref 7–13)
POTASSIUM SERPL-MCNC: 4.3 MMOL/L — SIGNIFICANT CHANGE UP (ref 3.5–5.3)
POTASSIUM SERPL-SCNC: 4.3 MMOL/L — SIGNIFICANT CHANGE UP (ref 3.5–5.3)
RBC # BLD: 3.27 M/UL — LOW (ref 3.8–5.2)
RBC # FLD: 14.6 % — HIGH (ref 10.3–14.5)
SODIUM SERPL-SCNC: 136 MMOL/L — SIGNIFICANT CHANGE UP (ref 135–145)
WBC # BLD: 5.14 K/UL — SIGNIFICANT CHANGE UP (ref 3.8–10.5)
WBC # FLD AUTO: 5.14 K/UL — SIGNIFICANT CHANGE UP (ref 3.8–10.5)

## 2018-02-20 RX ADMIN — Medication 1 MILLIGRAM(S): at 16:17

## 2018-02-20 RX ADMIN — Medication 100 MILLIGRAM(S): at 21:04

## 2018-02-20 RX ADMIN — Medication 100 MILLIGRAM(S): at 11:26

## 2018-02-20 RX ADMIN — SODIUM CHLORIDE 3 MILLILITER(S): 9 INJECTION INTRAMUSCULAR; INTRAVENOUS; SUBCUTANEOUS at 05:42

## 2018-02-20 RX ADMIN — Medication 50 MILLIGRAM(S): at 17:11

## 2018-02-20 RX ADMIN — SODIUM CHLORIDE 3 MILLILITER(S): 9 INJECTION INTRAMUSCULAR; INTRAVENOUS; SUBCUTANEOUS at 21:05

## 2018-02-20 RX ADMIN — APIXABAN 5 MILLIGRAM(S): 2.5 TABLET, FILM COATED ORAL at 05:43

## 2018-02-20 RX ADMIN — Medication 100 MILLIGRAM(S): at 05:43

## 2018-02-20 RX ADMIN — VALSARTAN 40 MILLIGRAM(S): 80 TABLET ORAL at 05:43

## 2018-02-20 RX ADMIN — LIDOCAINE 1 PATCH: 4 CREAM TOPICAL at 21:05

## 2018-02-20 RX ADMIN — SENNA PLUS 2 TABLET(S): 8.6 TABLET ORAL at 21:04

## 2018-02-20 RX ADMIN — Medication 50 MILLIGRAM(S): at 05:43

## 2018-02-20 RX ADMIN — ATORVASTATIN CALCIUM 10 MILLIGRAM(S): 80 TABLET, FILM COATED ORAL at 21:04

## 2018-02-20 RX ADMIN — SODIUM CHLORIDE 3 MILLILITER(S): 9 INJECTION INTRAMUSCULAR; INTRAVENOUS; SUBCUTANEOUS at 11:26

## 2018-02-20 NOTE — DISCHARGE NOTE ADULT - CARE PLAN
Principal Discharge DX:	Atrial fibrillation with RVR  Goal:	Ensure compliance with Metoprolol for heart rate control and Eliquis (blood thinner) for stroke prevention.  Assessment and plan of treatment:	Follow up with your primary care physician for further monitoring in 1-2 weeks. Please call to arrange appointment.  Secondary Diagnosis:	Essential hypertension  Goal:	Continue your medications as directed to maintain goal /80mmhg. Low salt diet.  Assessment and plan of treatment:	Follow up with your primary care physician for further monitoring in 1-2 weeks. Please call to arrange appointment.  Secondary Diagnosis:	HLD (hyperlipidemia)  Goal:	Continue Lipitor at bedtime. Low cholesterol diet.  Assessment and plan of treatment:	Follow up with your primary care physician for further monitoring in 1-2 weeks. Please call to arrange appointment. Principal Discharge DX:	Atrial fibrillation with RVR  Goal:	Ensure compliance with Metoprolol for heart rate control and Eliquis (blood thinner) for stroke prevention.  Assessment and plan of treatment:	Follow up with your Cardiologist for further monitoring in 1-2 weeks. Please call to arrange appointment.  Secondary Diagnosis:	Essential hypertension  Goal:	Continue your medications as directed to maintain goal /80mmhg. Low salt diet.  Assessment and plan of treatment:	Follow up with your primary care physician for further monitoring in 1-2 weeks. Please call to arrange appointment.  Secondary Diagnosis:	HLD (hyperlipidemia)  Goal:	Continue Lipitor at bedtime. Low cholesterol diet.  Assessment and plan of treatment:	Follow up with your primary care physician for further monitoring in 1-2 weeks. Please call to arrange appointment.

## 2018-02-20 NOTE — PROGRESS NOTE ADULT - ATTENDING COMMENTS
Patient seen and examined, agree with the above assessment and plan by SUMMER Carmona.  CV stable  Cont current medications  Pt cleared for DC today

## 2018-02-20 NOTE — DISCHARGE NOTE ADULT - CARE PROVIDER_API CALL
Baltazar Vieyra (MD), Cardiovascular Disease; Internal Medicine; Interventional Cardiology; Nuclear Cardiology  [address] [facility] [address]  [place] [address]  Phone: [phone]  Fax: [phone] Baltazar Vieyra (MD), Cardiovascular Disease; Internal Medicine; Interventional Cardiology; Nuclear Cardiology  [address] [facility] [address] [address]  [place] [address]  Phone: [phone]  Fax: [phone]

## 2018-02-20 NOTE — DISCHARGE NOTE ADULT - PATIENT PORTAL LINK FT
You can access the [facility] Patient Portal, offered by [facility], by registering with the following website: [email or web address]

## 2018-02-20 NOTE — DISCHARGE NOTE ADULT - PLAN OF CARE
Ensure compliance with Metoprolol for heart rate control and Eliquis (blood thinner) for stroke prevention. Follow up with your primary care physician for further monitoring in 1-2 weeks. Please call to arrange appointment. Continue your medications as directed to maintain goal /80mmhg. Low salt diet. Continue Lipitor at bedtime. Low cholesterol diet. Follow up with your Cardiologist for further monitoring in 1-2 weeks. Please call to arrange appointment.

## 2018-02-20 NOTE — DISCHARGE NOTE ADULT - MEDICATION SUMMARY - MEDICATIONS TO CHANGE
I will SWITCH the dose or number of times a day I take the medications listed below when I get home from the hospital:    valsartan 320 mg oral tablet  -- 1 tab(s) by mouth once a day    metoprolol succinate 25 mg oral tablet, extended release  -- 1 tab(s) by mouth once a day

## 2018-02-20 NOTE — DISCHARGE NOTE ADULT - MEDICATION SUMMARY - MEDICATIONS TO TAKE
I will START or STAY ON the medications listed below when I get home from the hospital:    aspirin 81 mg oral tablet  -- 1 tab(s) by mouth once a day  -- Indication: For Transient cerebral ischemia, unspecified type    valsartan 40 mg oral tablet  -- 1 tab(s) by mouth once a day  -- Indication: For High blood pressure    apixaban 5 mg oral tablet  -- 1 tab(s) by mouth every 12 hours  -- Indication: For Atrial fibrillation with RVR    LORazepam 1 mg oral tablet  -- 1 tab(s) by mouth 2 times a day  -- Indication: For Anxiety    pravastatin 40 mg oral tablet  -- 1 tab(s) by mouth once a day (at bedtime)  -- Indication: For High cholesterol    metoprolol tartrate 50 mg oral tablet  -- 1 tab(s) by mouth 2 times a day  -- Indication: For High blood pressure, Afib (heart rate control)    lidocaine 5% topical film  -- Apply on skin to affected area once a day as needed for back pain  -- Indication: For Pain control    senna oral tablet  -- 2 tab(s) by mouth once a day (at bedtime)  -- Indication: For Constipation    docusate sodium 100 mg oral capsule  -- 1 cap(s) by mouth 3 times a day  -- Indication: For Constipation    nicotine 21 mg/24 hr transdermal film, extended release  -- 1 patch by transdermal patch once a day  -- Indication: For Tobacco cessation

## 2018-02-20 NOTE — DISCHARGE NOTE ADULT - HOSPITAL COURSE
78F, ambulates with a walker, history of Afib on Eliquis, Nicotine dependance, HTN, Dyslipidemia, Anxiety p/w intermittent, exertional, L sided chest pain, described as a 10/10, sharp sensation that radiates to the L shoulder, with positive dyspnea. Episodes last for a few seconds and subsided on their own, Not associated with food.    On admission: Pt was found to be in Afib w/ RVR, given IV Cardizem in ED. CXR: Bibasilar opacities likely atelectasis. H &H: 12.2/35.6. Na/Cl = 131/95  BUN/Creatinine: 131/95. ACS ruled out by negative cardiac enzymes. EKG AFIB RVR @ 131b/min, LVH, TWI 1, AVL, V6, QT/ QTC= 284/422.    Medicine consult was called. Cardiology: Pt with known history of PAF, mitral regurgitation presenting w atypical CP in the setting of Afib w RVR. Chest Pain has resolved, apparently chronic, described as a pulled muscle across her chest, shoulder and back. HR currently mildly elevated. Will titrate meds accordingly. Cont AC. PT eval. Low suspicion for angina given chronicity, nature and character of her symptoms. Recent ECHO w normal LVEF and mild-moderate MR.    Afib rate control improved with Metoprolol 50mg BID. PT recommended *************incomplete 78F, ambulates with a walker, history of Afib on Eliquis, Nicotine dependance, HTN, Dyslipidemia, Anxiety p/w intermittent, exertional, L sided chest pain, described as a 10/10, sharp sensation that radiates to the L shoulder, with positive dyspnea. Episodes last for a few seconds and subsided on their own, Not associated with food.    On admission: Pt was found to be in Afib w/ RVR, given IV Cardizem in ED. CXR: Bibasilar opacities likely atelectasis. H &H: 12.2/35.6. Na/Cl = 131/95  BUN/Creatinine: 131/95. ACS ruled out by negative cardiac enzymes. EKG AFIB RVR @ 131b/min, LVH, TWI 1, AVL, V6, QT/QTC= 284/422.    Medicine consult was called. Cardiology: Pt with known history of PAF, mitral regurgitation presenting w atypical CP in the setting of Afib w RVR. Chest Pain has resolved, apparently chronic, described as a pulled muscle across her chest, shoulder and back. HR currently mildly elevated. Will titrate meds accordingly. Cont AC. PT eval. Low suspicion for angina given chronicity, nature and character of her symptoms. Recent ECHO w normal LVEF and mild-moderate MR.    Afib rate control improved with Metoprolol 50mg BID. PT recommended rehab. Case discussed with Dr. Vieyra, labs/vitals reviewed, Pt medically cleared for discharge to rehab with follow up noted above. 78F, ambulates with a walker with PMHx Afib on Eliquis, Nicotine dependance, HTN, Dyslipidemia, Anxiety p/w intermittent, exertional, L sided chest pain, described as a 10/10, sharp sensation that radiates to the L shoulder, with positive dyspnea. Episodes last for a few seconds and subsided on their own, Not associated with food.    On admission: Pt was found to be in Afib w/ RVR, given IV Cardizem in ED. CXR: Bibasilar opacities likely atelectasis. H &H: 12.2/35.6. Na/Cl = 131/95  BUN/Creatinine: 131/95. ACS ruled out by negative cardiac enzymes. EKG AFIB RVR @ 131b/min, LVH, TWI 1, AVL, V6, QT/QTC= 284/422.    Medicine consult was called. Cardiology: Pt with known history of PAF, mitral regurgitation presenting w atypical CP in the setting of Afib w RVR. Chest Pain has resolved, apparently chronic, described as a pulled muscle across her chest, shoulder and back. HR currently mildly elevated. Will titrate meds accordingly. Cont AC. PT eval. Low suspicion for angina given chronicity, nature and character of her symptoms. Recent ECHO w normal LVEF and mild-moderate MR.    Afib rate control improved with Metoprolol 50mg BID. PT recommended rehab. Case discussed with Dr. Vieyra, labs/vitals reviewed, Pt medically cleared for discharge to rehab with follow up noted above. 78F, ambulates with a walker with PMHx Afib on Eliquis, Nicotine dependance, HTN, Dyslipidemia, Anxiety p/w intermittent, exertional, L sided chest pain, described as a 10/10, sharp sensation that radiates to the L shoulder, with positive dyspnea. Episodes last for a few seconds and subsided on their own, Not associated with food.    On admission: Pt was found to be in Afib w/ RVR, given IV Cardizem in ED. CXR: Bibasilar opacities likely atelectasis. H &H: 12.2/35.6. Na/Cl = 131/95  BUN/Creatinine: 131/95. ACS ruled out by negative cardiac enzymes. EKG AFIB RVR @ 131b/min, LVH, TWI 1, AVL, V6, QT/QTC= 284/422.    Medicine consult was called. Cardiology: Pt with known history of PAF, mitral regurgitation presenting w atypical CP in the setting of Afib w RVR. Chest Pain has resolved, apparently chronic, described as a pulled muscle across her chest, shoulder and back. HR currently mildly elevated. Will titrate meds accordingly. Cont AC. PT eval. Low suspicion for angina given chronicity, nature and character of her symptoms. Recent ECHO w normal LVEF and mild-moderate MR.    Afib rate control improved with Metoprolol 50mg BID. Pt was taking Eliquis 5mg QD at home and she is unclear why (denies h/o bleeding or complication), d/w Dr. Vieyra, will resume therapeutic standard dose 5mg BID for stroke prevention. PT recommended rehab. Case discussed with Dr. Vieyra, labs/vitals reviewed, Pt medically cleared for discharge to rehab with follow up noted above.

## 2018-02-20 NOTE — DISCHARGE NOTE ADULT - OTHER SIGNIFICANT FINDINGS
Echo Jan 2018 EF 62%  1. Mitral annular calcification, otherwise normal mitral valve. Mild-moderate mitral regurgitation.  2. Calcified trileaflet aortic valve with normal opening. Mild aortic regurgitation.  3. Moderately dilated left atrium.  LA volume index = 42cc/m2.  4. Normal left ventricular internal dimensions and wallthicknesses.  5. Normal left ventricular systolic function. No segmental wall motion abnormalities.  6. Normal right ventricular size and function.

## 2018-02-21 ENCOUNTER — MEDICATION RENEWAL (OUTPATIENT)
Age: 78
End: 2018-02-21

## 2018-02-21 VITALS — DIASTOLIC BLOOD PRESSURE: 76 MMHG | HEART RATE: 66 BPM | SYSTOLIC BLOOD PRESSURE: 144 MMHG

## 2018-02-21 LAB
BUN SERPL-MCNC: 25 MG/DL — HIGH (ref 7–23)
CALCIUM SERPL-MCNC: 8.6 MG/DL — SIGNIFICANT CHANGE UP (ref 8.4–10.5)
CHLORIDE SERPL-SCNC: 100 MMOL/L — SIGNIFICANT CHANGE UP (ref 98–107)
CO2 SERPL-SCNC: 24 MMOL/L — SIGNIFICANT CHANGE UP (ref 22–31)
CREAT SERPL-MCNC: 0.71 MG/DL — SIGNIFICANT CHANGE UP (ref 0.5–1.3)
GLUCOSE SERPL-MCNC: 91 MG/DL — SIGNIFICANT CHANGE UP (ref 70–99)
HCT VFR BLD CALC: 30.3 % — LOW (ref 34.5–45)
HGB BLD-MCNC: 10.3 G/DL — LOW (ref 11.5–15.5)
MAGNESIUM SERPL-MCNC: 1.7 MG/DL — SIGNIFICANT CHANGE UP (ref 1.6–2.6)
MCHC RBC-ENTMCNC: 30.7 PG — SIGNIFICANT CHANGE UP (ref 27–34)
MCHC RBC-ENTMCNC: 34 % — SIGNIFICANT CHANGE UP (ref 32–36)
MCV RBC AUTO: 90.4 FL — SIGNIFICANT CHANGE UP (ref 80–100)
NRBC # FLD: 0 — SIGNIFICANT CHANGE UP
PHOSPHATE SERPL-MCNC: 3.4 MG/DL — SIGNIFICANT CHANGE UP (ref 2.5–4.5)
PLATELET # BLD AUTO: 230 K/UL — SIGNIFICANT CHANGE UP (ref 150–400)
PMV BLD: 10.3 FL — SIGNIFICANT CHANGE UP (ref 7–13)
POTASSIUM SERPL-MCNC: 4.2 MMOL/L — SIGNIFICANT CHANGE UP (ref 3.5–5.3)
POTASSIUM SERPL-SCNC: 4.2 MMOL/L — SIGNIFICANT CHANGE UP (ref 3.5–5.3)
RBC # BLD: 3.35 M/UL — LOW (ref 3.8–5.2)
RBC # FLD: 14.5 % — SIGNIFICANT CHANGE UP (ref 10.3–14.5)
SODIUM SERPL-SCNC: 135 MMOL/L — SIGNIFICANT CHANGE UP (ref 135–145)
WBC # BLD: 4.71 K/UL — SIGNIFICANT CHANGE UP (ref 3.8–10.5)
WBC # FLD AUTO: 4.71 K/UL — SIGNIFICANT CHANGE UP (ref 3.8–10.5)

## 2018-02-21 RX ORDER — METOPROLOL TARTRATE 50 MG
3 TABLET ORAL
Qty: 0 | Refills: 0 | COMMUNITY
Start: 2018-02-21

## 2018-02-21 RX ORDER — VALSARTAN 80 MG/1
1 TABLET ORAL
Qty: 0 | Refills: 0 | COMMUNITY
Start: 2018-02-21

## 2018-02-21 RX ORDER — METOPROLOL TARTRATE 50 MG
1 TABLET ORAL
Qty: 0 | Refills: 0 | COMMUNITY
Start: 2018-02-21

## 2018-02-21 RX ORDER — METOPROLOL TARTRATE 50 MG
1 TABLET ORAL
Qty: 0 | Refills: 0 | COMMUNITY

## 2018-02-21 RX ORDER — NICOTINE POLACRILEX 2 MG
1 GUM BUCCAL
Qty: 0 | Refills: 0 | COMMUNITY
Start: 2018-02-21

## 2018-02-21 RX ORDER — APIXABAN 2.5 MG/1
1 TABLET, FILM COATED ORAL
Qty: 0 | Refills: 0 | COMMUNITY
Start: 2018-02-21

## 2018-02-21 RX ORDER — AMLODIPINE BESYLATE 2.5 MG/1
1 TABLET ORAL
Qty: 0 | Refills: 0 | COMMUNITY

## 2018-02-21 RX ORDER — VALSARTAN 80 MG/1
1 TABLET ORAL
Qty: 0 | Refills: 0 | COMMUNITY

## 2018-02-21 RX ORDER — SENNA PLUS 8.6 MG/1
2 TABLET ORAL
Qty: 0 | Refills: 0 | COMMUNITY
Start: 2018-02-21

## 2018-02-21 RX ORDER — APIXABAN 2.5 MG/1
5 TABLET, FILM COATED ORAL EVERY 12 HOURS
Qty: 0 | Refills: 0 | Status: DISCONTINUED | OUTPATIENT
Start: 2018-02-21 | End: 2018-02-21

## 2018-02-21 RX ORDER — OXYCODONE HYDROCHLORIDE 5 MG/1
5 TABLET ORAL ONCE
Qty: 0 | Refills: 0 | Status: DISCONTINUED | OUTPATIENT
Start: 2018-02-21 | End: 2018-02-21

## 2018-02-21 RX ORDER — LIDOCAINE 4 G/100G
1 CREAM TOPICAL
Qty: 0 | Refills: 0 | COMMUNITY
Start: 2018-02-21

## 2018-02-21 RX ORDER — DOCUSATE SODIUM 100 MG
1 CAPSULE ORAL
Qty: 0 | Refills: 0 | COMMUNITY
Start: 2018-02-21

## 2018-02-21 RX ADMIN — APIXABAN 5 MILLIGRAM(S): 2.5 TABLET, FILM COATED ORAL at 05:41

## 2018-02-21 RX ADMIN — VALSARTAN 40 MILLIGRAM(S): 80 TABLET ORAL at 05:40

## 2018-02-21 RX ADMIN — Medication 100 MILLIGRAM(S): at 05:41

## 2018-02-21 RX ADMIN — SODIUM CHLORIDE 3 MILLILITER(S): 9 INJECTION INTRAMUSCULAR; INTRAVENOUS; SUBCUTANEOUS at 05:40

## 2018-02-21 RX ADMIN — OXYCODONE HYDROCHLORIDE 5 MILLIGRAM(S): 5 TABLET ORAL at 03:30

## 2018-02-21 RX ADMIN — OXYCODONE HYDROCHLORIDE 5 MILLIGRAM(S): 5 TABLET ORAL at 02:53

## 2018-02-21 RX ADMIN — Medication 50 MILLIGRAM(S): at 17:47

## 2018-02-21 RX ADMIN — LIDOCAINE 1 PATCH: 4 CREAM TOPICAL at 09:55

## 2018-02-21 RX ADMIN — Medication 50 MILLIGRAM(S): at 05:40

## 2018-02-21 RX ADMIN — SODIUM CHLORIDE 3 MILLILITER(S): 9 INJECTION INTRAMUSCULAR; INTRAVENOUS; SUBCUTANEOUS at 13:01

## 2018-02-21 RX ADMIN — APIXABAN 5 MILLIGRAM(S): 2.5 TABLET, FILM COATED ORAL at 17:47

## 2018-02-21 NOTE — PROGRESS NOTE ADULT - ATTENDING COMMENTS
Patient seen and examined.  Agree with above NP note.  cv stable  await d/c to rehab  cont current tx

## 2018-02-21 NOTE — PROGRESS NOTE ADULT - PROVIDER SPECIALTY LIST ADULT
Cardiology
Internal Medicine

## 2018-02-21 NOTE — PROGRESS NOTE ADULT - ASSESSMENT
77 yo F w/A fib w/RVR, HTN, HLD, anxiety, chest pain:  1. Chest pain - atypical, overall improved, no evidence of ischemia, musculoskeletal pain, ASA, d/c planning per Cardio after PT eval  2. A fib w/RVR - c/w AC, BB  3. HTN - BP at goal  4. HLD - c/w statin  5. DVT prophylaxis  6. Anemia - Hb stable  7. Hyponatremia - resolved
77 yo F w/A fib w/RVR, HTN, HLD, anxiety, chest pain:  1. Chest pain - atypical, currently resolved, no evidence of ischemia, ASA, d/c planning per Cardio  2. A fib w/RVR - c/w AC, BB  3. HTN - BP at goal  4. HLD - c/w statin  5. DVT prophylaxis  6. Anemia - Hb stable  7. Hyponatremia - Na stable
77 yo F w/A fib w/RVR, HTN, HLD, anxiety, chest pain:  1. Chest pain - atypical, overall improved, no evidence of ischemia, musculoskeletal pain, ASA, d/c planning per Cardio after PT eval  2. A fib w/RVR - c/w AC, BB  3. HTN - BP at goal  4. HLD - c/w statin  5. DVT prophylaxis  6. Anemia - Hb stable  7. Hyponatremia - resolved
77 yo F w/A fib w/RVR, HTN, HLD, anxiety, chest pain:  1. Chest pain - atypical, waxing and waning, no evidence of ischemia, musculosceletal pain, ASA, d/c planning per Cardio after PT eval  2. A fib w/RVR - c/w AC, BB  3. HTN - BP at goal  4. HLD - c/w statin  5. DVT prophylaxis  6. Anemia - Hb stable  7. Hyponatremia - Na improved
77 yo F w/A fib w/RVR, HTN, HLD, anxiety, chest pain:  1. Chest pain - atypical, waxing and waning, no evidence of ischemia, musculosceletal pain, ASA, d/c planning per Cardio after PT eval  2. A fib w/RVR - c/w AC, BB  3. HTN - BP at goal  4. HLD - c/w statin  5. DVT prophylaxis  6. Anemia - Hb stable  7. Hyponatremia - Na improved
78 year old female with anxiety, HLD, PAFIB, MI, TIA admitted with Afib with RVR and chest pain     1. Atypical chest pain   in the setting of afib w RVR  chronic, described as a pulled muscle across her chest, shoulder and back  low suspicion for angina given described symptoms and chronic pain   cv stable   no evidence of acute ischemia/ACS  pain control    recent echo revealing normal LVEF and mild-moderate MR    2. Afib with RVR   now currently in NSR   continue with BB   CHADS  4 a/c apixaban     Pending PT note for dc planning ,please premedicate with pain meds prior to evaluation
78 year old female with anxiety, HLD, PAFIB, MI, TIA admitted with Afib with RVR and chest pain     1. Atypical chest pain   in the setting of afib w RVR  chronic, described as a pulled muscle across her chest, shoulder and back  low suspicion for angina given described symptoms and chronic pain   cv stable   no evidence of acute ischemia/ACS  pain control    recent echo revealing normal LVEF and mild-moderate MR    2. Afib with RVR   now currently in NSR   continue with BB   CHADS  4 a/c apixaban     Pending PT note for dc planning ,please premedicate with pain meds prior to evaluation     dvt ppx
78 year old female with anxiety, HLD, PAFIB, MI, TIA admitted with Afib with RVR and chest pain     1. Atypical chest pain   in the setting of afib w RVR  chronic, described as a pulled muscle across her chest, shoulder and back  low suspicion for angina given described symptoms and chronic pain   cv stable   no evidence of acute ischemia/ACS  pain control    recent echo revealing normal LVEF and mild-moderate MR    2. Afib with RVR   now currently in NSR   continue with BB   CHADS  4 a/c apixaban     dc planning  today
78 year old female with anxiety, HLD, PAFIB, MI, TIA admitted with Afib with RVR and chest pain     1. Atypical chest pain   in the setting of afib w RVR  chronic, described as a pulled muscle across her chest, shoulder and back  low suspicion for angina given described symptoms and chronic pain   cv stable   no evidence of acute ischemia/ACS  pain control    recent echo revealing normal LVEF and mild-moderate MR    2. Afib with RVR   now currently in NSR   continue with BB   CHADS  4 a/c apixaban     pending PT c/s dc planning
78 year old female with anxiety, HLD, PAFIB, MI, TIA admitted with Afib with RVR and chest pain     1. Atypical chest pain   in the setting of afib w RVR  resolved   low suspicion for angina given described symptoms and chronic pain   cv stable   no evidence of acute ischemia/ACS  recent echo revealing normal LVEF and mild-moderate MR    2. Afib with RVR   now currently in NSR   continue with BB   CHADS  4 a/c apixaban     Pending PT note for dc planning  will likely need rehab     dvt ppx
78 year old female with anxiety, HLD, PAFIB, MI, TIA admitted with Afib with RVR and chest pain     1. Atypical chest pain   in the setting of afib w RVR  resolved   low suspicion for angina given described symptoms and chronic pain   cv stable   no evidence of acute ischemia/ACS  recent echo revealing normal LVEF and mild-moderate MR    2. Afib with RVR   now currently in NSR   continue with BB   CHADS  4 a/c apixaban     Pending PT note for dc planning  will likely need rehab     dvt ppx   d/w son at bedside
78 year old female with anxiety, HLD, PAFIB, MI, TIA admitted with Afib with RVR and chest pain     1. Atypical chest pain   in the setting of afib w RVR  resolved   low suspicion for angina given described symptoms and chronic pain   cv stable   no evidence of acute ischemia/ACS  recent echo revealing normal LVEF and mild-moderate MR    2. Afib with RVR   remains in  NSR   continue with BB   CHADS  4 a/c apixaban     Pending PT note for dc planning  will likely need rehab     dvt ppx
79 yo F w/A fib w/RVR, HTN, HLD, anxiety, chest pain:  1. Chest pain - atypical, currently resolved, plan per Cardio, ASA  2. A fib w/RVR - currently rate controlled, c/w AC, Lopressor, titrate to HR  3. HTN - BP at goal  4. HLD - c/w statin  5. DVT prophylaxis  6. Anemia - Hb stable  7. Hyponatremia - improved w/IVF
79 yo F w/A fib w/RVR, HTN, HLD, anxiety, chest pain:  1. Chest pain - atypical, overall improved, no evidence of ischemia, musculoskeletal pain, ASA, d/c planning per Cardio after PT eval  2. A fib w/RVR - c/w AC, BB  3. HTN - BP at goal  4. HLD - c/w statin  5. DVT prophylaxis  6. Anemia - Hb stable  7. Hyponatremia - Na improved

## 2018-02-21 NOTE — PROGRESS NOTE ADULT - SUBJECTIVE AND OBJECTIVE BOX
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 per pt  Anxiety  TIA (transient ischemic attack): many years ago  PAF (paroxysmal atrial fibrillation)  HLD (hyperlipidemia)  HTN (hypertension)  History of cataract surgery: b/l  History of repair of hip fracture: luisa placed in RLE  H/O spinal fusion: c2-6 in 1/17      FAMILY HISTORY:  No pertinent family history in first degree relatives      REVIEW OF SYSTEMS:  CONSTITUTIONAL: No fever, weight loss, or fatigue  EYES: No eye pain, visual disturbances, or discharge  NECK: No pain or stiffness  RESPIRATORY: No cough or wheezing, no shortness of breath  CARDIOVASCULAR: transient chest pain, no palpitations, dizziness, or leg swelling  GASTROINTESTINAL: No abdominal or epigastric pain. No nausea, vomiting, diarrhea or constipation  GENITOURINARY: No dysuria, urinary frequency or urgency, no hematuria  NEUROLOGICAL: No headaches, memory loss, loss of strength, numbness, or tremors  SKIN: No itching, burning, rashes, or lesions   MUSCULOSKELETAL: No joint pain or swelling; No muscle, back, or extremity pain      Medications:  MEDICATIONS  (STANDING):  apixaban 5 milliGRAM(s) Oral every 24 hours  atorvastatin 10 milliGRAM(s) Oral at bedtime  docusate sodium 100 milliGRAM(s) Oral three times a day  influenza   Vaccine 0.5 milliLiter(s) IntraMuscular once  metoprolol     tartrate 50 milliGRAM(s) Oral two times a day  nicotine - 21 mG/24Hr(s) Patch 1 patch Transdermal daily  senna 2 Tablet(s) Oral at bedtime  sodium chloride 0.9% lock flush 3 milliLiter(s) IV Push every 8 hours  valsartan 40 milliGRAM(s) Oral daily    MEDICATIONS  (PRN):  acetaminophen   Tablet. 650 milliGRAM(s) Oral every 6 hours PRN Mild, moderate and severe pain  lidocaine   Patch 1 Patch Transdermal daily PRN Back pain  LORazepam     Tablet 1 milliGRAM(s) Oral two times a day PRN Anxiety    	    PHYSICAL EXAM:  T(C): 36.6 (02-21-18 @ 05:38), Max: 36.6 (02-21-18 @ 05:38)  HR: 63 (02-21-18 @ 05:38) (61 - 66)  BP: 129/62 (02-21-18 @ 05:38) (129/62 - 138/61)  RR: 17 (02-21-18 @ 05:38) (17 - 18)  SpO2: 97% (02-21-18 @ 05:38) (97% - 98%)  Wt(kg): --  I&O's Summary    Appearance: Normal	  HEENT:   NCAT, PERRL, EOMI	  Lymphatic: No lymphadenopathy  Cardiovascular: Normal S1 S2, RRR  Respiratory: Lungs clear to auscultation BL  Psychiatry: A & O x 3, Mood & affect appropriate  Gastrointestinal:  Soft, Non-tender, + BS  Skin: No rashes, No ecchymoses, No cyanosis	  Neurologic: Non-focal  Extremities: Normal range of motion, No clubbing, cyanosis or edema    LABS:	 	    CARDIAC MARKERS:                                10.3   4.71  )-----------( 230      ( 21 Feb 2018 07:00 )             30.3     02-21    135  |  100  |  25<H>  ----------------------------<  91  4.2   |  24  |  0.71    Ca    8.6      21 Feb 2018 07:00  Phos  3.4     02-21  Mg     1.7     02-21      proBNP:   Lipid Profile:   HgA1c:   TSH:
CARDIOLOGY FOLLOW UP - Dr. Vieyra    CC no chest pain or sob         PHYSICAL EXAM:  T(C): 36.6 (02-16-18 @ 05:49), Max: 36.7 (02-15-18 @ 17:32)  HR: 69 (02-16-18 @ 05:49) (69 - 75)  BP: 130/76 (02-16-18 @ 05:49) (93/564 - 130/76)  RR: 16 (02-16-18 @ 05:49) (16 - 18)  SpO2: 94% (02-16-18 @ 05:49) (94% - 98%)  Wt(kg): --  I&O's Summary      Appearance: Normal	  Cardiovascular: Normal S1 S2,RRR, No JVD, No murmurs  Respiratory: Lungs clear to auscultation	  Gastrointestinal:  Soft, Non-tender, + BS	  Extremities: Normal range of motion, No clubbing, cyanosis or edema        MEDICATIONS  (STANDING):  apixaban 5 milliGRAM(s) Oral every 24 hours  atorvastatin 10 milliGRAM(s) Oral at bedtime  influenza   Vaccine 0.5 milliLiter(s) IntraMuscular once  metoprolol     tartrate 50 milliGRAM(s) Oral two times a day  nicotine - 21 mG/24Hr(s) Patch 1 patch Transdermal daily  sodium chloride 0.9% lock flush 3 milliLiter(s) IV Push every 8 hours  valsartan 40 milliGRAM(s) Oral daily      TELEMETRY: NSR/ pac 	    ECG:  	  RADIOLOGY:   DIAGNOSTIC TESTING:  [ ] Echocardiogram:  [ ]  Catheterization:  [ ] Stress Test:    OTHER: 	    LABS:	 	                                10.5   4.62  )-----------( 214      ( 16 Feb 2018 06:30 )             31.3     02-16    133<L>  |  99  |  23  ----------------------------<  97  4.0   |  24  |  0.87    Ca    8.0<L>      16 Feb 2018 06:30  Mg     1.8     02-16
CARDIOLOGY FOLLOW UP - Dr. Vieyra    CC no chest pain or sob        PHYSICAL EXAM:  T(C): 36.6 (02-21-18 @ 05:38), Max: 36.6 (02-21-18 @ 05:38)  HR: 63 (02-21-18 @ 05:38) (61 - 66)  BP: 129/62 (02-21-18 @ 05:38) (129/62 - 138/61)  RR: 17 (02-21-18 @ 05:38) (17 - 18)  SpO2: 97% (02-21-18 @ 05:38) (97% - 98%)  Wt(kg): --  I&O's Summary      Appearance: Normal	  Cardiovascular: Normal S1 S2,RRR, + sys murmurs  Respiratory: Lungs clear to auscultation	  Gastrointestinal:  Soft, Non-tender, + BS	  Extremities: Normal range of motion, No clubbing, cyanosis or edema        MEDICATIONS  (STANDING):  apixaban 5 milliGRAM(s) Oral every 24 hours  atorvastatin 10 milliGRAM(s) Oral at bedtime  docusate sodium 100 milliGRAM(s) Oral three times a day  influenza   Vaccine 0.5 milliLiter(s) IntraMuscular once  metoprolol     tartrate 50 milliGRAM(s) Oral two times a day  nicotine - 21 mG/24Hr(s) Patch 1 patch Transdermal daily  senna 2 Tablet(s) Oral at bedtime  sodium chloride 0.9% lock flush 3 milliLiter(s) IV Push every 8 hours  valsartan 40 milliGRAM(s) Oral daily      TELEMETRY: NSR 	    ECG:  	  RADIOLOGY:   DIAGNOSTIC TESTING:  [ ] Echocardiogram:  [ ]  Catheterization:  [ ] Stress Test:    OTHER: 	    LABS:	 	                                10.3   4.71  )-----------( 230      ( 21 Feb 2018 07:00 )             30.3     02-21    135  |  100  |  25<H>  ----------------------------<  91  4.2   |  24  |  0.71    Ca    8.6      21 Feb 2018 07:00  Phos  3.4     02-21  Mg     1.7     02-21
CARDIOLOGY FOLLOW UP - Dr. Vieyra    CC no chest pain or sob        PHYSICAL EXAM:  T(C): 36.8 (02-17-18 @ 06:38), Max: 36.8 (02-17-18 @ 06:38)  HR: 64 (02-17-18 @ 06:38) (64 - 71)  BP: 128/64 (02-17-18 @ 06:38) (128/64 - 148/79)  RR: 18 (02-17-18 @ 06:38) (17 - 18)  SpO2: 100% (02-17-18 @ 06:38) (95% - 100%)  Wt(kg): --  I&O's Summary      Appearance: Normal	  Cardiovascular: Normal S1 S2,RRR, sys murmur    Respiratory: Lungs clear to auscultation	  Gastrointestinal:  Soft, Non-tender, + BS	  Extremities: Normal range of motion, No clubbing, cyanosis or edema        MEDICATIONS  (STANDING):  apixaban 5 milliGRAM(s) Oral every 24 hours  atorvastatin 10 milliGRAM(s) Oral at bedtime  influenza   Vaccine 0.5 milliLiter(s) IntraMuscular once  metoprolol     tartrate 50 milliGRAM(s) Oral two times a day  nicotine - 21 mG/24Hr(s) Patch 1 patch Transdermal daily  sodium chloride 0.9% lock flush 3 milliLiter(s) IV Push every 8 hours  valsartan 40 milliGRAM(s) Oral daily      TELEMETRY: NSR/ PAC 	    ECG:  	  RADIOLOGY:   DIAGNOSTIC TESTING:  [ ] Echocardiogram:  [ ]  Catheterization:  [ ] Stress Test:    OTHER: 	    LABS:	 	                                11.5   3.93  )-----------( 208      ( 17 Feb 2018 06:30 )             33.9     02-17    135  |  100  |  19  ----------------------------<  86  4.5   |  26  |  0.80    Ca    8.4      17 Feb 2018 06:30  Mg     1.8     02-16
CARDIOLOGY FOLLOW UP - Dr. Vieyra    CC no chest pain or sob       PHYSICAL EXAM:  T(C): 36.9 (02-20-18 @ 05:41), Max: 36.9 (02-20-18 @ 05:41)  HR: 66 (02-20-18 @ 05:41) (61 - 66)  BP: 121/67 (02-20-18 @ 05:41) (121/67 - 131/74)  RR: 16 (02-20-18 @ 05:41) (16 - 18)  SpO2: 97% (02-20-18 @ 05:41) (97% - 97%)  Wt(kg): --  I&O's Summary      Appearance: Normal	  Cardiovascular: Normal S1 S2,RRR  Respiratory: Lungs clear to auscultation	  Gastrointestinal:  Soft, Non-tender, + BS	  Extremities: Normal range of motion, No clubbing, cyanosis or edema        MEDICATIONS  (STANDING):  apixaban 5 milliGRAM(s) Oral every 24 hours  atorvastatin 10 milliGRAM(s) Oral at bedtime  docusate sodium 100 milliGRAM(s) Oral three times a day  influenza   Vaccine 0.5 milliLiter(s) IntraMuscular once  metoprolol     tartrate 50 milliGRAM(s) Oral two times a day  nicotine - 21 mG/24Hr(s) Patch 1 patch Transdermal daily  senna 2 Tablet(s) Oral at bedtime  sodium chloride 0.9% lock flush 3 milliLiter(s) IV Push every 8 hours  valsartan 40 milliGRAM(s) Oral daily      TELEMETRY: 	NSR     ECG:  	  RADIOLOGY:   DIAGNOSTIC TESTING:  [ ] Echocardiogram:  [ ]  Catheterization:  [ ] Stress Test:    OTHER: 	    LABS:	 	                                10.6   5.14  )-----------( 222      ( 20 Feb 2018 07:40 )             29.9     02-20    136  |  101  |  30<H>  ----------------------------<  93  4.3   |  23  |  0.81    Ca    8.5      20 Feb 2018 07:40
CARDIOLOGY FOLLOW UP - Dr. Vieyra    CC no chest pain or sob   c.o back pain       PHYSICAL EXAM:  T(C): 36.7 (02-18-18 @ 06:11), Max: 37.1 (02-17-18 @ 14:10)  HR: 73 (02-18-18 @ 06:11) (57 - 73)  BP: 157/71 (02-18-18 @ 06:11) (119/65 - 157/71)  RR: 18 (02-18-18 @ 06:11) (18 - 19)  SpO2: 98% (02-18-18 @ 06:11) (98% - 100%)  Wt(kg): --  I&O's Summary      Appearance: Normal	  Cardiovascular: Normal S1 S2,RRR, sys murmur    Respiratory: Lungs clear to auscultation	  Gastrointestinal:  Soft, Non-tender, + BS	  Extremities: Normal range of motion, No clubbing, cyanosis or edema          MEDICATIONS  (STANDING):  apixaban 5 milliGRAM(s) Oral every 24 hours  atorvastatin 10 milliGRAM(s) Oral at bedtime  docusate sodium 100 milliGRAM(s) Oral three times a day  influenza   Vaccine 0.5 milliLiter(s) IntraMuscular once  metoprolol     tartrate 50 milliGRAM(s) Oral two times a day  nicotine - 21 mG/24Hr(s) Patch 1 patch Transdermal daily  senna 2 Tablet(s) Oral at bedtime  sodium chloride 0.9% lock flush 3 milliLiter(s) IV Push every 8 hours  valsartan 40 milliGRAM(s) Oral daily      TELEMETRY: NSR   brief PAT up to 130s 	    ECG:  	  RADIOLOGY:   DIAGNOSTIC TESTING:  [ ] Echocardiogram:  [ ]  Catheterization:  [ ] Stress Test:    OTHER: 	    LABS:	 	                                11.1   5.82  )-----------( 239      ( 18 Feb 2018 07:15 )             33.7     02-18    136  |  100  |  23  ----------------------------<  91  4.3   |  26  |  0.85    Ca    8.6      18 Feb 2018 07:15
CARDIOLOGY FOLLOW UP - Dr. Vieyra    CC no sob   c.o back pain, mild chest pain       PHYSICAL EXAM:  T(C): 36.7 (02-15-18 @ 06:30), Max: 36.7 (02-14-18 @ 13:44)  HR: 75 (02-15-18 @ 07:00) (75 - 152)  BP: 134/73 (02-15-18 @ 07:00) (90/60 - 134/73)  RR: 17 (02-15-18 @ 06:30) (17 - 18)  SpO2: 95% (02-15-18 @ 06:30) (95% - 99%)  Wt(kg): --  I&O's Summary      Appearance: Normal	  Cardiovascular: Normal S1 S2,RRR sys murmur   Respiratory: Lungs clear to auscultation	  Gastrointestinal:  Soft, Non-tender, + BS	  Extremities: Normal range of motion, No clubbing, cyanosis or edema        MEDICATIONS  (STANDING):  apixaban 5 milliGRAM(s) Oral every 24 hours  atorvastatin 10 milliGRAM(s) Oral at bedtime  influenza   Vaccine 0.5 milliLiter(s) IntraMuscular once  metoprolol     tartrate 50 milliGRAM(s) Oral two times a day  nicotine - 21 mG/24Hr(s) Patch 1 patch Transdermal daily  sodium chloride 0.9% lock flush 3 milliLiter(s) IV Push every 8 hours  valsartan 40 milliGRAM(s) Oral daily      TELEMETRY: NSR  	    ECG:  	  RADIOLOGY:   DIAGNOSTIC TESTING:  [ ] Echocardiogram:  [ ]  Catheterization:  [ ] Stress Test:    OTHER: 	    LABS:	 	                                11.2   5.45  )-----------( 190      ( 15 Feb 2018 06:40 )             33.9     02-15    135  |  101  |  21  ----------------------------<  85  4.0   |  25  |  0.80    Ca    8.2<L>      15 Feb 2018 06:40  Phos  2.9     02-14  Mg     1.8     02-15    TPro  6.6  /  Alb  2.9<L>  /  TBili  0.6  /  DBili  x   /  AST  12  /  ALT  6   /  AlkPhos  56  02-13
CARDIOLOGY FOLLOW UP NOTE - DR. MARTINEZ    Subjective:    no cp, sob    PHYSICAL EXAM:  T(C): 36.4 (02-19-18 @ 05:17), Max: 36.8 (02-18-18 @ 14:15)  HR: 68 (02-19-18 @ 05:17) (59 - 69)  BP: 124/72 (02-19-18 @ 05:17) (111/55 - 124/72)  RR: 18 (02-19-18 @ 05:17) (18 - 19)  SpO2: 98% (02-19-18 @ 05:17) (98% - 99%)  Wt(kg): --  I&O's Summary      Appearance: Normal	  Cardiovascular: Normal S1 S2,RRR, No JVD, No murmurs  Respiratory: Lungs clear to auscultation	  Gastrointestinal:  Soft, Non-tender, + BS	  Extremities: Normal range of motion, No clubbing, cyanosis or edema  Vascular: Peripheral pulses palpable 2+ bilaterally    MEDICATIONS  (STANDING):  apixaban 5 milliGRAM(s) Oral every 24 hours  atorvastatin 10 milliGRAM(s) Oral at bedtime  docusate sodium 100 milliGRAM(s) Oral three times a day  influenza   Vaccine 0.5 milliLiter(s) IntraMuscular once  metoprolol     tartrate 50 milliGRAM(s) Oral two times a day  nicotine - 21 mG/24Hr(s) Patch 1 patch Transdermal daily  senna 2 Tablet(s) Oral at bedtime  sodium chloride 0.9% lock flush 3 milliLiter(s) IV Push every 8 hours  valsartan 40 milliGRAM(s) Oral daily      TELEMETRY: 	    ECG:  	  RADIOLOGY:   DIAGNOSTIC TESTING:  [ ] Echocardiogram:  [ ] Catheterization:  [ ] Stress Test:    OTHER: 	    LABS:	 	    CARDIAC MARKERS:                                10.8   4.52  )-----------( 215      ( 19 Feb 2018 07:06 )             31.4     02-19    135  |  101  |  24<H>  ----------------------------<  92  4.5   |  25  |  0.91    Ca    8.5      19 Feb 2018 07:06      proBNP:     Lipid Profile:   HgA1c:
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 per pt  Anxiety  TIA (transient ischemic attack): many years ago  PAF (paroxysmal atrial fibrillation)  HLD (hyperlipidemia)  HTN (hypertension)  History of cataract surgery: b/l  History of repair of hip fracture: luisa placed in RLE  H/O spinal fusion: c2-6 in 1/17      FAMILY HISTORY:  No pertinent family history in first degree relatives      REVIEW OF SYSTEMS:  CONSTITUTIONAL: No fever, weight loss, or fatigue  EYES: No eye pain, visual disturbances, or discharge  NECK: No pain or stiffness  RESPIRATORY: No cough or wheezing, no shortness of breath  CARDIOVASCULAR: no chest pain, no palpitations, dizziness, or leg swelling  GASTROINTESTINAL: No abdominal or epigastric pain. No nausea, vomiting, diarrhea or constipation  GENITOURINARY: No dysuria, urinary frequency or urgency, no hematuria  NEUROLOGICAL: No headaches, memory loss, loss of strength, numbness, or tremors  SKIN: No itching, burning, rashes, or lesions   MUSCULOSKELETAL: No joint pain or swelling; No muscle, back, or extremity pain      Medications:  MEDICATIONS  (STANDING):  apixaban 5 milliGRAM(s) Oral every 24 hours  atorvastatin 10 milliGRAM(s) Oral at bedtime  influenza   Vaccine 0.5 milliLiter(s) IntraMuscular once  metoprolol     tartrate 50 milliGRAM(s) Oral two times a day  nicotine - 21 mG/24Hr(s) Patch 1 patch Transdermal daily  sodium chloride 0.9% lock flush 3 milliLiter(s) IV Push every 8 hours  valsartan 40 milliGRAM(s) Oral daily    MEDICATIONS  (PRN):  acetaminophen   Tablet. 650 milliGRAM(s) Oral every 6 hours PRN Mild, moderate and severe pain  lidocaine   Patch 1 Patch Transdermal daily PRN Back pain  LORazepam     Tablet 1 milliGRAM(s) Oral two times a day PRN Anxiety    	    PHYSICAL EXAM:  T(C): 36.6 (02-16-18 @ 05:49), Max: 36.7 (02-15-18 @ 17:32)  HR: 69 (02-16-18 @ 05:49) (69 - 75)  BP: 130/76 (02-16-18 @ 05:49) (93/564 - 130/76)  RR: 16 (02-16-18 @ 05:49) (16 - 18)  SpO2: 94% (02-16-18 @ 05:49) (94% - 98%)  Wt(kg): --  I&O's Summary    Appearance: Normal	  HEENT:   NCAT, PERRL, EOMI	  Lymphatic: No lymphadenopathy  Cardiovascular: Normal S1 S2, RRR  Respiratory: Lungs clear to auscultation BL  Psychiatry: A & O x 3, Mood & affect appropriate  Gastrointestinal:  Soft, Non-tender, + BS  Skin: No rashes, No ecchymoses, No cyanosis	  Neurologic: Non-focal  Extremities: Normal range of motion, No clubbing, cyanosis or edema    LABS:	 	    CARDIAC MARKERS:                                10.5   4.62  )-----------( 214      ( 16 Feb 2018 06:30 )             31.3     02-16    133<L>  |  99  |  23  ----------------------------<  97  4.0   |  24  |  0.87    Ca    8.0<L>      16 Feb 2018 06:30  Mg     1.8     02-16      proBNP:   Lipid Profile:   HgA1c:   TSH:
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 per pt  Anxiety  TIA (transient ischemic attack): many years ago  PAF (paroxysmal atrial fibrillation)  HLD (hyperlipidemia)  HTN (hypertension)  History of cataract surgery: b/l  History of repair of hip fracture: luisa placed in RLE  H/O spinal fusion: c2-6 in 1/17      FAMILY HISTORY:  No pertinent family history in first degree relatives      REVIEW OF SYSTEMS:  CONSTITUTIONAL: No fever, weight loss, or fatigue  EYES: No eye pain, visual disturbances, or discharge  NECK: No pain or stiffness  RESPIRATORY: No cough or wheezing, no shortness of breath  CARDIOVASCULAR: transient chest pain, no palpitations, dizziness, or leg swelling  GASTROINTESTINAL: No abdominal or epigastric pain. No nausea, vomiting, diarrhea or constipation  GENITOURINARY: No dysuria, urinary frequency or urgency, no hematuria  NEUROLOGICAL: No headaches, memory loss, loss of strength, numbness, or tremors  SKIN: No itching, burning, rashes, or lesions   MUSCULOSKELETAL: No joint pain or swelling; No muscle, back, or extremity pain      Medications:  MEDICATIONS  (STANDING):  apixaban 5 milliGRAM(s) Oral every 24 hours  atorvastatin 10 milliGRAM(s) Oral at bedtime  docusate sodium 100 milliGRAM(s) Oral three times a day  influenza   Vaccine 0.5 milliLiter(s) IntraMuscular once  metoprolol     tartrate 50 milliGRAM(s) Oral two times a day  nicotine - 21 mG/24Hr(s) Patch 1 patch Transdermal daily  senna 2 Tablet(s) Oral at bedtime  sodium chloride 0.9% lock flush 3 milliLiter(s) IV Push every 8 hours  valsartan 40 milliGRAM(s) Oral daily    MEDICATIONS  (PRN):  acetaminophen   Tablet. 650 milliGRAM(s) Oral every 6 hours PRN Mild, moderate and severe pain  lidocaine   Patch 1 Patch Transdermal daily PRN Back pain  LORazepam     Tablet 1 milliGRAM(s) Oral two times a day PRN Anxiety    	    PHYSICAL EXAM:  T(C): 36.4 (02-18-18 @ 19:39), Max: 36.8 (02-18-18 @ 14:15)  HR: 59 (02-18-18 @ 19:39) (59 - 73)  BP: 111/55 (02-18-18 @ 19:39) (111/55 - 157/71)  RR: 18 (02-18-18 @ 19:39) (18 - 19)  SpO2: 99% (02-18-18 @ 19:39) (98% - 99%)  Wt(kg): --  I&O's Summary    Appearance: Normal	  HEENT:   NCAT, PERRL, EOMI	  Lymphatic: No lymphadenopathy  Cardiovascular: Normal S1 S2, RRR  Respiratory: Lungs clear to auscultation BL  Psychiatry: A & O x 3, Mood & affect appropriate  Gastrointestinal:  Soft, Non-tender, + BS  Skin: No rashes, No ecchymoses, No cyanosis	  Neurologic: Non-focal  Extremities: Normal range of motion, No clubbing, cyanosis or edema    LABS:	 	    CARDIAC MARKERS:                                11.1   5.82  )-----------( 239      ( 18 Feb 2018 07:15 )             33.7     02-18    136  |  100  |  23  ----------------------------<  91  4.3   |  26  |  0.85    Ca    8.6      18 Feb 2018 07:15      proBNP:   Lipid Profile:   HgA1c:   TSH:
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 per pt  Anxiety  TIA (transient ischemic attack): many years ago  PAF (paroxysmal atrial fibrillation)  HLD (hyperlipidemia)  HTN (hypertension)  History of cataract surgery: b/l  History of repair of hip fracture: luisa placed in RLE  H/O spinal fusion: c2-6 in 1/17      FAMILY HISTORY:  No pertinent family history in first degree relatives      REVIEW OF SYSTEMS:  CONSTITUTIONAL: No fever, weight loss, or fatigue  EYES: No eye pain, visual disturbances, or discharge  NECK: No pain or stiffness  RESPIRATORY: No cough or wheezing, no shortness of breath  CARDIOVASCULAR: transient chest pain, no palpitations, dizziness, or leg swelling  GASTROINTESTINAL: No abdominal or epigastric pain. No nausea, vomiting, diarrhea or constipation  GENITOURINARY: No dysuria, urinary frequency or urgency, no hematuria  NEUROLOGICAL: No headaches, memory loss, loss of strength, numbness, or tremors  SKIN: No itching, burning, rashes, or lesions   MUSCULOSKELETAL: No joint pain or swelling; No muscle, back, or extremity pain      Medications:  MEDICATIONS  (STANDING):  apixaban 5 milliGRAM(s) Oral every 24 hours  atorvastatin 10 milliGRAM(s) Oral at bedtime  docusate sodium 100 milliGRAM(s) Oral three times a day  influenza   Vaccine 0.5 milliLiter(s) IntraMuscular once  metoprolol     tartrate 50 milliGRAM(s) Oral two times a day  nicotine - 21 mG/24Hr(s) Patch 1 patch Transdermal daily  senna 2 Tablet(s) Oral at bedtime  sodium chloride 0.9% lock flush 3 milliLiter(s) IV Push every 8 hours  valsartan 40 milliGRAM(s) Oral daily    MEDICATIONS  (PRN):  acetaminophen   Tablet. 650 milliGRAM(s) Oral every 6 hours PRN Mild, moderate and severe pain  lidocaine   Patch 1 Patch Transdermal daily PRN Back pain  LORazepam     Tablet 1 milliGRAM(s) Oral two times a day PRN Anxiety    	    PHYSICAL EXAM:  T(C): 36.4 (02-19-18 @ 05:17), Max: 36.8 (02-18-18 @ 14:15)  HR: 68 (02-19-18 @ 05:17) (59 - 69)  BP: 124/72 (02-19-18 @ 05:17) (111/55 - 124/72)  RR: 18 (02-19-18 @ 05:17) (18 - 19)  SpO2: 98% (02-19-18 @ 05:17) (98% - 99%)  Wt(kg): --  I&O's Summary    Appearance: Normal	  HEENT:   NCAT, PERRL, EOMI	  Lymphatic: No lymphadenopathy  Cardiovascular: Normal S1 S2, RRR  Respiratory: Lungs clear to auscultation BL  Psychiatry: A & O x 3, Mood & affect appropriate  Gastrointestinal:  Soft, Non-tender, + BS  Skin: No rashes, No ecchymoses, No cyanosis	  Neurologic: Non-focal  Extremities: Normal range of motion, No clubbing, cyanosis or edema    LABS:	 	    CARDIAC MARKERS:                                10.8   4.52  )-----------( 215      ( 19 Feb 2018 07:06 )             31.4     02-19    135  |  101  |  24<H>  ----------------------------<  92  4.5   |  25  |  0.91    Ca    8.5      19 Feb 2018 07:06      proBNP:   Lipid Profile:   HgA1c:   TSH:
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 per pt  Anxiety  TIA (transient ischemic attack): many years ago  PAF (paroxysmal atrial fibrillation)  HLD (hyperlipidemia)  HTN (hypertension)  History of cataract surgery: b/l  History of repair of hip fracture: luisa placed in RLE  H/O spinal fusion: c2-6 in 1/17      FAMILY HISTORY:  No pertinent family history in first degree relatives      REVIEW OF SYSTEMS:  CONSTITUTIONAL: No fever, weight loss, or fatigue  EYES: No eye pain, visual disturbances, or discharge  NECK: No pain or stiffness  RESPIRATORY: No cough or wheezing, no shortness of breath  CARDIOVASCULAR: transient chest pain, no palpitations, dizziness, or leg swelling  GASTROINTESTINAL: No abdominal or epigastric pain. No nausea, vomiting, diarrhea or constipation  GENITOURINARY: No dysuria, urinary frequency or urgency, no hematuria  NEUROLOGICAL: No headaches, memory loss, loss of strength, numbness, or tremors  SKIN: No itching, burning, rashes, or lesions   MUSCULOSKELETAL: No joint pain or swelling; No muscle, back, or extremity pain      Medications:  MEDICATIONS  (STANDING):  apixaban 5 milliGRAM(s) Oral every 24 hours  atorvastatin 10 milliGRAM(s) Oral at bedtime  docusate sodium 100 milliGRAM(s) Oral three times a day  influenza   Vaccine 0.5 milliLiter(s) IntraMuscular once  metoprolol     tartrate 50 milliGRAM(s) Oral two times a day  nicotine - 21 mG/24Hr(s) Patch 1 patch Transdermal daily  senna 2 Tablet(s) Oral at bedtime  sodium chloride 0.9% lock flush 3 milliLiter(s) IV Push every 8 hours  valsartan 40 milliGRAM(s) Oral daily    MEDICATIONS  (PRN):  acetaminophen   Tablet. 650 milliGRAM(s) Oral every 6 hours PRN Mild, moderate and severe pain  lidocaine   Patch 1 Patch Transdermal daily PRN Back pain  LORazepam     Tablet 1 milliGRAM(s) Oral two times a day PRN Anxiety    	    PHYSICAL EXAM:  T(C): 36.9 (02-20-18 @ 05:41), Max: 36.9 (02-20-18 @ 05:41)  HR: 66 (02-20-18 @ 05:41) (61 - 66)  BP: 121/67 (02-20-18 @ 05:41) (121/67 - 131/74)  RR: 16 (02-20-18 @ 05:41) (16 - 18)  SpO2: 97% (02-20-18 @ 05:41) (97% - 97%)  Wt(kg): --  I&O's Summary  Appearance: Normal	  HEENT:   NCAT, PERRL, EOMI	  Lymphatic: No lymphadenopathy  Cardiovascular: Normal S1 S2, RRR  Respiratory: Lungs clear to auscultation BL  Psychiatry: A & O x 3, Mood & affect appropriate  Gastrointestinal:  Soft, Non-tender, + BS  Skin: No rashes, No ecchymoses, No cyanosis	  Neurologic: Non-focal  Extremities: Normal range of motion, No clubbing, cyanosis or edema    LABS:	 	    CARDIAC MARKERS:                                10.6   5.14  )-----------( 222      ( 20 Feb 2018 07:40 )             29.9     02-20    136  |  101  |  30<H>  ----------------------------<  93  4.3   |  23  |  0.81    Ca    8.5      20 Feb 2018 07:40      proBNP:   Lipid Profile:   HgA1c:   TSH:
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 per pt  Anxiety  TIA (transient ischemic attack): many years ago  PAF (paroxysmal atrial fibrillation)  HLD (hyperlipidemia)  HTN (hypertension)  History of cataract surgery: b/l  History of repair of hip fracture: luisa placed in RLE  H/O spinal fusion: c2-6 in 1/17      FAMILY HISTORY:  No pertinent family history in first degree relatives      REVIEW OF SYSTEMS:  CONSTITUTIONAL: No fever, weight loss, or fatigue  EYES: No eye pain, visual disturbances, or discharge  NECK: No pain or stiffness  RESPIRATORY: No cough or wheezing, no shortness of breath  CARDIOVASCULAR: transient chest pain, no palpitations, dizziness, or leg swelling  GASTROINTESTINAL: No abdominal or epigastric pain. No nausea, vomiting, diarrhea or constipation  GENITOURINARY: No dysuria, urinary frequency or urgency, no hematuria  NEUROLOGICAL: No headaches, memory loss, loss of strength, numbness, or tremors  SKIN: No itching, burning, rashes, or lesions   MUSCULOSKELETAL: No joint pain or swelling; No muscle, back, or extremity pain      Medications:  MEDICATIONS  (STANDING):  apixaban 5 milliGRAM(s) Oral every 24 hours  atorvastatin 10 milliGRAM(s) Oral at bedtime  influenza   Vaccine 0.5 milliLiter(s) IntraMuscular once  metoprolol     tartrate 50 milliGRAM(s) Oral two times a day  nicotine - 21 mG/24Hr(s) Patch 1 patch Transdermal daily  sodium chloride 0.9% lock flush 3 milliLiter(s) IV Push every 8 hours  valsartan 40 milliGRAM(s) Oral daily    MEDICATIONS  (PRN):  acetaminophen   Tablet. 650 milliGRAM(s) Oral every 6 hours PRN Mild, moderate and severe pain  lidocaine   Patch 1 Patch Transdermal daily PRN Back pain  LORazepam     Tablet 1 milliGRAM(s) Oral two times a day PRN Anxiety    	    PHYSICAL EXAM:  T(C): 36.8 (02-17-18 @ 06:38), Max: 36.8 (02-17-18 @ 06:38)  HR: 64 (02-17-18 @ 06:38) (64 - 71)  BP: 128/64 (02-17-18 @ 06:38) (128/64 - 148/79)  RR: 18 (02-17-18 @ 06:38) (17 - 18)  SpO2: 100% (02-17-18 @ 06:38) (95% - 100%)  Wt(kg): --  I&O's Summary    Appearance: Normal	  HEENT:   NCAT, PERRL, EOMI	  Lymphatic: No lymphadenopathy  Cardiovascular: Normal S1 S2, RRR  Respiratory: Lungs clear to auscultation BL  Psychiatry: A & O x 3, Mood & affect appropriate  Gastrointestinal:  Soft, Non-tender, + BS  Skin: No rashes, No ecchymoses, No cyanosis	  Neurologic: Non-focal  Extremities: Normal range of motion, No clubbing, cyanosis or edema    LABS:	 	    CARDIAC MARKERS:                                11.5   3.93  )-----------( 208      ( 17 Feb 2018 06:30 )             33.9     02-17    135  |  100  |  19  ----------------------------<  86  4.5   |  26  |  0.80    Ca    8.4      17 Feb 2018 06:30  Mg     1.8     02-16      proBNP:   Lipid Profile:   HgA1c:   TSH:
Chief complaint: chest pain in setting of A fib w/RVR    Interval hx: mild chest pain    Allergies:  No Known Allergies      PAST MEDICAL & SURGICAL HISTORY:  MI, old: mild as per pt  Anxiety  TIA (transient ischemic attack): many years ago  PAF (paroxysmal atrial fibrillation)  HLD (hyperlipidemia)  HTN (hypertension)  History of cataract surgery: b/l  History of repair of hip fracture: luisa placed in RLE  H/O spinal fusion: c2-6 in 1/17      FAMILY HISTORY:  No pertinent family history in first degree relatives      REVIEW OF SYSTEMS:  CONSTITUTIONAL: No fever, weight loss, or fatigue  EYES: No eye pain, visual disturbances, or discharge  NECK: No pain or stiffness  RESPIRATORY: No cough or wheezing, no shortness of breath  CARDIOVASCULAR: + chest pain, no palpitations, dizziness, or leg swelling  GASTROINTESTINAL: No abdominal or epigastric pain. No nausea, vomiting, diarrhea or constipation  GENITOURINARY: No dysuria, urinary frequency or urgency, no hematuria  NEUROLOGICAL: No headaches, memory loss, loss of strength, numbness, or tremors  SKIN: No itching, burning, rashes, or lesions   MUSCULOSKELETAL: No joint pain or swelling; No muscle, back, or extremity pain      Medications:  MEDICATIONS  (STANDING):  apixaban 5 milliGRAM(s) Oral every 24 hours  atorvastatin 10 milliGRAM(s) Oral at bedtime  influenza   Vaccine 0.5 milliLiter(s) IntraMuscular once  metoprolol     tartrate 50 milliGRAM(s) Oral two times a day  nicotine - 21 mG/24Hr(s) Patch 1 patch Transdermal daily  sodium chloride 0.9% lock flush 3 milliLiter(s) IV Push every 8 hours  valsartan 40 milliGRAM(s) Oral daily    MEDICATIONS  (PRN):  acetaminophen   Tablet. 650 milliGRAM(s) Oral every 6 hours PRN Mild, moderate and severe pain  lidocaine   Patch 1 Patch Transdermal daily PRN Back pain  LORazepam     Tablet 1 milliGRAM(s) Oral two times a day PRN Anxiety    	    PHYSICAL EXAM:  T(C): 36.7 (02-15-18 @ 06:30), Max: 36.7 (02-14-18 @ 13:44)  HR: 75 (02-15-18 @ 07:00) (75 - 152)  BP: 134/73 (02-15-18 @ 07:00) (90/60 - 134/73)  RR: 17 (02-15-18 @ 06:30) (17 - 18)  SpO2: 95% (02-15-18 @ 06:30) (95% - 99%)  Wt(kg): --  I&O's Summary    Appearance: Normal	  HEENT:   NCAT, PERRL, EOMI	  Lymphatic: No lymphadenopathy  Cardiovascular: Normal S1 S2, RRR  Respiratory: Lungs clear to auscultation BL  Psychiatry: A & O x 3, Mood & affect appropriate  Gastrointestinal:  Soft, Non-tender, + BS  Skin: No rashes, No ecchymoses, No cyanosis	  Neurologic: Non-focal  Extremities: Normal range of motion, No clubbing, cyanosis or edema    LABS:	 	    CARDIAC MARKERS:  CARDIAC MARKERS ( 14 Feb 2018 03:50 )  x     / < 0.06 ng/mL / 13 u/L / x     / x      CARDIAC MARKERS ( 13 Feb 2018 19:00 )  x     / < 0.06 ng/mL / 19 u/L / 1.00 ng/mL / x                                    11.2   5.45  )-----------( 190      ( 15 Feb 2018 06:40 )             33.9     02-15    135  |  101  |  21  ----------------------------<  85  4.0   |  25  |  0.80    Ca    8.2<L>      15 Feb 2018 06:40  Phos  2.9     02-14  Mg     1.8     02-15    TPro  6.6  /  Alb  2.9<L>  /  TBili  0.6  /  DBili  x   /  AST  12  /  ALT  6   /  AlkPhos  56  02-13    proBNP:   Lipid Profile:   HgA1c:   TSH:

## 2018-02-22 ENCOUNTER — RX RENEWAL (OUTPATIENT)
Age: 78
End: 2018-02-22

## 2018-03-29 ENCOUNTER — APPOINTMENT (OUTPATIENT)
Dept: INTERNAL MEDICINE | Facility: CLINIC | Age: 78
End: 2018-03-29

## 2018-08-16 NOTE — DISCHARGE NOTE ADULT - MEDICATION SUMMARY - MEDICATIONS TO CHANGE
Hemostasis: Electrocautery and Aluminum Chloride I will SWITCH the dose or number of times a day I take the medications listed below when I get home from the hospital:  None

## 2018-11-13 PROBLEM — G45.9 TRANSIENT CEREBRAL ISCHEMIC ATTACK, UNSPECIFIED: Chronic | Status: ACTIVE | Noted: 2018-01-29

## 2018-11-13 PROBLEM — I48.0 PAROXYSMAL ATRIAL FIBRILLATION: Chronic | Status: ACTIVE | Noted: 2018-01-29

## 2018-11-13 PROBLEM — F41.9 ANXIETY DISORDER, UNSPECIFIED: Chronic | Status: ACTIVE | Noted: 2018-01-29

## 2018-11-16 ENCOUNTER — APPOINTMENT (OUTPATIENT)
Dept: INTERNAL MEDICINE | Facility: CLINIC | Age: 78
End: 2018-11-16

## 2018-12-01 ENCOUNTER — OUTPATIENT (OUTPATIENT)
Dept: OUTPATIENT SERVICES | Facility: HOSPITAL | Age: 78
LOS: 1 days | End: 2018-12-01
Payer: MEDICARE

## 2018-12-01 DIAGNOSIS — Z98.49 CATARACT EXTRACTION STATUS, UNSPECIFIED EYE: Chronic | ICD-10-CM

## 2018-12-01 DIAGNOSIS — Z98.1 ARTHRODESIS STATUS: Chronic | ICD-10-CM

## 2018-12-01 DIAGNOSIS — Z98.890 OTHER SPECIFIED POSTPROCEDURAL STATES: Chronic | ICD-10-CM

## 2018-12-01 PROCEDURE — G9001: CPT

## 2018-12-13 ENCOUNTER — APPOINTMENT (OUTPATIENT)
Dept: INTERNAL MEDICINE | Facility: CLINIC | Age: 78
End: 2018-12-13
Payer: MEDICARE

## 2018-12-13 VITALS
BODY MASS INDEX: 23.41 KG/M2 | HEART RATE: 106 BPM | TEMPERATURE: 97.8 F | OXYGEN SATURATION: 90 % | RESPIRATION RATE: 12 BRPM | HEIGHT: 61 IN | WEIGHT: 124 LBS | DIASTOLIC BLOOD PRESSURE: 69 MMHG | SYSTOLIC BLOOD PRESSURE: 133 MMHG

## 2018-12-13 DIAGNOSIS — I25.10 ATHEROSCLEROTIC HEART DISEASE OF NATIVE CORONARY ARTERY W/OUT ANGINA PECTORIS: ICD-10-CM

## 2018-12-13 DIAGNOSIS — F17.200 NICOTINE DEPENDENCE, UNSPECIFIED, UNCOMPLICATED: ICD-10-CM

## 2018-12-13 DIAGNOSIS — Z87.891 PERSONAL HISTORY OF NICOTINE DEPENDENCE: ICD-10-CM

## 2018-12-13 DIAGNOSIS — I48.91 UNSPECIFIED ATRIAL FIBRILLATION: ICD-10-CM

## 2018-12-13 DIAGNOSIS — I50.9 HEART FAILURE, UNSPECIFIED: ICD-10-CM

## 2018-12-13 PROCEDURE — 99215 OFFICE O/P EST HI 40 MIN: CPT

## 2018-12-13 RX ORDER — DABIGATRAN ETEXILATE MESYLATE 75 MG/1
75 CAPSULE ORAL
Refills: 0 | Status: ACTIVE | COMMUNITY
Start: 2018-12-13

## 2018-12-13 RX ORDER — APIXABAN 5 MG/1
5 TABLET, FILM COATED ORAL TWICE DAILY
Refills: 0 | Status: DISCONTINUED | COMMUNITY
End: 2018-12-13

## 2018-12-13 RX ORDER — METOPROLOL SUCCINATE 25 MG/1
25 TABLET, EXTENDED RELEASE ORAL DAILY
Qty: 90 | Refills: 0 | Status: DISCONTINUED | COMMUNITY
End: 2018-12-13

## 2018-12-13 RX ORDER — ATORVASTATIN CALCIUM 40 MG/1
40 TABLET, FILM COATED ORAL DAILY
Refills: 0 | Status: ACTIVE | COMMUNITY
Start: 2018-12-13

## 2018-12-13 RX ORDER — GABAPENTIN 100 MG/1
100 CAPSULE ORAL
Qty: 30 | Refills: 0 | Status: DISCONTINUED | COMMUNITY
Start: 2017-08-30 | End: 2018-12-13

## 2018-12-13 RX ORDER — FUROSEMIDE 40 MG/1
40 TABLET ORAL DAILY
Refills: 0 | Status: ACTIVE | COMMUNITY
Start: 2018-12-13

## 2018-12-13 RX ORDER — MECLIZINE HYDROCHLORIDE 25 MG/1
25 TABLET ORAL
Refills: 0 | Status: ACTIVE | COMMUNITY
Start: 2018-12-13

## 2018-12-13 RX ORDER — CLOPIDOGREL 75 MG/1
75 TABLET, FILM COATED ORAL
Refills: 0 | Status: ACTIVE | COMMUNITY
Start: 2018-12-13

## 2018-12-13 RX ORDER — PRAVASTATIN SODIUM 40 MG/1
40 TABLET ORAL
Qty: 90 | Refills: 0 | Status: DISCONTINUED | COMMUNITY
End: 2018-12-13

## 2018-12-13 RX ORDER — DEXAMETHASONE 0.75 MG/.75MG
0.75 TABLET ORAL
Qty: 20 | Refills: 0 | Status: DISCONTINUED | COMMUNITY
Start: 2017-04-25 | End: 2018-12-13

## 2018-12-13 RX ORDER — UBIDECARENONE 60 MG
60 WAFER ORAL
Refills: 0 | Status: DISCONTINUED | COMMUNITY
End: 2018-12-13

## 2018-12-13 RX ORDER — LORAZEPAM 0.5 MG/1
0.5 TABLET ORAL
Qty: 60 | Refills: 0 | Status: ACTIVE | COMMUNITY
Start: 2018-12-13 | End: 1900-01-01

## 2018-12-13 RX ORDER — LORAZEPAM 1 MG/1
1 TABLET ORAL
Qty: 90 | Refills: 0 | Status: DISCONTINUED | COMMUNITY
Start: 2017-05-22 | End: 2018-12-13

## 2018-12-13 RX ORDER — HYDROXYZINE HYDROCHLORIDE 25 MG/1
25 TABLET ORAL
Qty: 30 | Refills: 0 | Status: DISCONTINUED | COMMUNITY
Start: 2017-12-28 | End: 2018-12-13

## 2018-12-13 RX ORDER — AMLODIPINE BESYLATE 5 MG/1
5 TABLET ORAL
Refills: 0 | Status: DISCONTINUED | COMMUNITY
End: 2018-12-13

## 2018-12-13 RX ORDER — VALSARTAN 320 MG/1
320 TABLET, COATED ORAL DAILY
Qty: 90 | Refills: 3 | Status: DISCONTINUED | COMMUNITY
Start: 2017-08-02 | End: 2018-12-13

## 2018-12-13 RX ORDER — BUDESONIDE AND FORMOTEROL FUMARATE DIHYDRATE 80; 4.5 UG/1; UG/1
80-4.5 AEROSOL RESPIRATORY (INHALATION)
Refills: 0 | Status: ACTIVE | COMMUNITY
Start: 2018-12-13

## 2018-12-13 RX ORDER — METOPROLOL TARTRATE 25 MG/1
25 TABLET, FILM COATED ORAL TWICE DAILY
Refills: 0 | Status: ACTIVE | COMMUNITY

## 2018-12-13 NOTE — REVIEW OF SYSTEMS
[Lower Ext Edema] : lower extremity edema [Shortness Of Breath] : shortness of breath [Anxiety] : anxiety [Negative] : Heme/Lymph [Dyspnea on Exertion] : dyspnea on exertion [Wheezing] : no wheezing [Cough] : no cough

## 2018-12-13 NOTE — COUNSELING
[Weight management counseling provided] : Weight management [Healthy eating counseling provided] : healthy eating [Low Salt Diet] : Low salt diet

## 2018-12-13 NOTE — HISTORY OF PRESENT ILLNESS
[de-identified] : 78 year old female  former tobacco use with h/o COPD on home oxygen , CHF, Hypertension / Hyperlipidemia / Anxiety / A.fib / Dermatitis presents for follow up after recent  hospitalization. She presents with her HHA. She was admitted to [facility] in March for SOB and was told it was an atrial fibrillation exacerbation. She was then in rehabs and nursing homes. Last month she went to [facility] for lower extremity edema, and at that time, she had a stent placed ROSEMARIE to  RCA on 11/9. She was also considered for a DCCV for her atrial fibrillation, however a ANABELL found a blood clot in her aorta. She uses home oxygen as needed. Currently she reports feeling better, she denies any chest pain, dizziness or headaches. She has chronic shortness of breath which she attributes to her COPD. She also has lower extremity edema which began in March and has been stable since then. She takes Lasix 40 mg QD .

## 2018-12-13 NOTE — PLAN
[FreeTextEntry1] : \par 1. Congestive Heart Failure/ Leg edema \par Patient advised to increase Lasix to 60mg daily, will f/u creatinine checked today\par BNP checked today\par Discussed with patient importance of low sodium diet, daily weights\par fluid restriction < 1000 ml / day \par F/U with cardiology\par \par 2. CAD s/p stent placement\par Plavix 75mg daily\par Atorvastatin 40mg daily\par F/U with cardiology\par \par 3. Afib\par Pradaxa 75mg BID for anticoagulation\par Metoprolol 75mg BID for rate control\par \par 4. COPD\par Continue home O2 as needed\par Spiriva BID\par F/U with pulmonology\par \par 5. Anxiety\par Rx Ativan 0.5mg BID as needed\par \par 6. Health Maintenance\par Complete blood work checked today\par

## 2018-12-13 NOTE — PHYSICAL EXAM
[No Acute Distress] : no acute distress [Well Nourished] : well nourished [Well Developed] : well developed [Well-Appearing] : well-appearing [Normal Sclera/Conjunctiva] : normal sclera/conjunctiva [PERRL] : pupils equal round and reactive to light [EOMI] : extraocular movements intact [Normal Outer Ear/Nose] : the outer ears and nose were normal in appearance [Normal Oropharynx] : the oropharynx was normal [No JVD] : no jugular venous distention [Supple] : supple [No Lymphadenopathy] : no lymphadenopathy [Thyroid Normal, No Nodules] : the thyroid was normal and there were no nodules present [No Respiratory Distress] : no respiratory distress  [No Accessory Muscle Use] : no accessory muscle use [No Carotid Bruits] : no carotid bruits [No Varicosities] : no varicosities [Soft] : abdomen soft [Non Tender] : non-tender [Non-distended] : non-distended [Normal Bowel Sounds] : normal bowel sounds [Normal Posterior Cervical Nodes] : no posterior cervical lymphadenopathy [Normal Anterior Cervical Nodes] : no anterior cervical lymphadenopathy [No CVA Tenderness] : no CVA  tenderness [No Spinal Tenderness] : no spinal tenderness [No Joint Swelling] : no joint swelling [Grossly Normal Strength/Tone] : grossly normal strength/tone [No Rash] : no rash [No Focal Deficits] : no focal deficits [Normal Affect] : the affect was normal [Normal Insight/Judgement] : insight and judgment were intact [de-identified] : +scattered rhonchi / decrease B.S at bases  [de-identified] :  tachycardic. [de-identified] : +3 lower extremity pitting edema, R > L [de-identified] : wheelchair bound

## 2018-12-18 ENCOUNTER — CLINICAL ADVICE (OUTPATIENT)
Age: 78
End: 2018-12-18

## 2018-12-18 ENCOUNTER — INPATIENT (INPATIENT)
Facility: HOSPITAL | Age: 78
LOS: 4 days | Discharge: HOME HEALTH | DRG: 602 | End: 2018-12-23
Attending: HOSPITALIST | Admitting: HOSPITALIST
Payer: COMMERCIAL

## 2018-12-18 VITALS
DIASTOLIC BLOOD PRESSURE: 75 MMHG | OXYGEN SATURATION: 97 % | SYSTOLIC BLOOD PRESSURE: 111 MMHG | HEART RATE: 80 BPM | TEMPERATURE: 97 F | WEIGHT: 130.07 LBS | RESPIRATION RATE: 20 BRPM

## 2018-12-18 DIAGNOSIS — Z98.1 ARTHRODESIS STATUS: Chronic | ICD-10-CM

## 2018-12-18 DIAGNOSIS — I10 ESSENTIAL (PRIMARY) HYPERTENSION: ICD-10-CM

## 2018-12-18 DIAGNOSIS — Z29.9 ENCOUNTER FOR PROPHYLACTIC MEASURES, UNSPECIFIED: ICD-10-CM

## 2018-12-18 DIAGNOSIS — I25.10 ATHEROSCLEROTIC HEART DISEASE OF NATIVE CORONARY ARTERY WITHOUT ANGINA PECTORIS: ICD-10-CM

## 2018-12-18 DIAGNOSIS — L03.90 CELLULITIS, UNSPECIFIED: ICD-10-CM

## 2018-12-18 DIAGNOSIS — Z98.49 CATARACT EXTRACTION STATUS, UNSPECIFIED EYE: Chronic | ICD-10-CM

## 2018-12-18 DIAGNOSIS — I48.91 UNSPECIFIED ATRIAL FIBRILLATION: ICD-10-CM

## 2018-12-18 DIAGNOSIS — E78.5 HYPERLIPIDEMIA, UNSPECIFIED: ICD-10-CM

## 2018-12-18 DIAGNOSIS — R06.02 SHORTNESS OF BREATH: ICD-10-CM

## 2018-12-18 DIAGNOSIS — Z98.890 OTHER SPECIFIED POSTPROCEDURAL STATES: Chronic | ICD-10-CM

## 2018-12-18 DIAGNOSIS — N17.9 ACUTE KIDNEY FAILURE, UNSPECIFIED: ICD-10-CM

## 2018-12-18 LAB
25(OH)D3 SERPL-MCNC: 45.6 NG/ML
ALBUMIN SERPL ELPH-MCNC: 3.2 G/DL
ALBUMIN SERPL ELPH-MCNC: 3.2 G/DL — LOW (ref 3.3–5)
ALP BLD-CCNC: 130 U/L
ALP SERPL-CCNC: 129 U/L — HIGH (ref 40–120)
ALT FLD-CCNC: 12 U/L — SIGNIFICANT CHANGE UP (ref 10–45)
ALT SERPL-CCNC: 13 U/L
ANION GAP SERPL CALC-SCNC: 12 MMOL/L
ANION GAP SERPL CALC-SCNC: 15 MMOL/L — SIGNIFICANT CHANGE UP (ref 5–17)
APTT BLD: 89.6 SEC — HIGH (ref 27.5–36.3)
AST SERPL-CCNC: 21 U/L
AST SERPL-CCNC: 22 U/L — SIGNIFICANT CHANGE UP (ref 10–40)
BASOPHILS # BLD AUTO: 0.03 K/UL
BASOPHILS # BLD AUTO: 0.1 K/UL — SIGNIFICANT CHANGE UP (ref 0–0.2)
BASOPHILS NFR BLD AUTO: 0.4 %
BASOPHILS NFR BLD AUTO: 0.8 % — SIGNIFICANT CHANGE UP (ref 0–2)
BILIRUB SERPL-MCNC: 0.5 MG/DL
BILIRUB SERPL-MCNC: 0.5 MG/DL — SIGNIFICANT CHANGE UP (ref 0.2–1.2)
BUN SERPL-MCNC: 30 MG/DL
BUN SERPL-MCNC: 31 MG/DL — HIGH (ref 7–23)
CALCIUM SERPL-MCNC: 8.5 MG/DL — SIGNIFICANT CHANGE UP (ref 8.4–10.5)
CALCIUM SERPL-MCNC: 9 MG/DL
CHLORIDE SERPL-SCNC: 99 MMOL/L
CHLORIDE SERPL-SCNC: 99 MMOL/L — SIGNIFICANT CHANGE UP (ref 96–108)
CHOLEST SERPL-MCNC: 104 MG/DL
CHOLEST/HDLC SERPL: 2.7 RATIO
CO2 SERPL-SCNC: 25 MMOL/L — SIGNIFICANT CHANGE UP (ref 22–31)
CO2 SERPL-SCNC: 27 MMOL/L
CREAT SERPL-MCNC: 1.29 MG/DL — SIGNIFICANT CHANGE UP (ref 0.5–1.3)
CREAT SERPL-MCNC: 1.35 MG/DL
EOSINOPHIL # BLD AUTO: 0.1 K/UL
EOSINOPHIL # BLD AUTO: 0.2 K/UL — SIGNIFICANT CHANGE UP (ref 0–0.5)
EOSINOPHIL NFR BLD AUTO: 1.4 %
EOSINOPHIL NFR BLD AUTO: 3 % — SIGNIFICANT CHANGE UP (ref 0–6)
GAS PNL BLDV: SIGNIFICANT CHANGE UP
GLUCOSE SERPL-MCNC: 101 MG/DL — HIGH (ref 70–99)
GLUCOSE SERPL-MCNC: 98 MG/DL
HBA1C MFR BLD HPLC: 5.6 %
HCT VFR BLD CALC: 31.6 % — LOW (ref 34.5–45)
HCT VFR BLD CALC: 32.5 %
HDLC SERPL-MCNC: 38 MG/DL
HGB BLD-MCNC: 10.4 G/DL
HGB BLD-MCNC: 11.3 G/DL — LOW (ref 11.5–15.5)
IMM GRANULOCYTES NFR BLD AUTO: 0.1 %
INR BLD: 1.83 RATIO — HIGH (ref 0.88–1.16)
LDLC SERPL CALC-MCNC: 50 MG/DL
LYMPHOCYTES # BLD AUTO: 0.48 K/UL
LYMPHOCYTES # BLD AUTO: 0.7 K/UL — LOW (ref 1–3.3)
LYMPHOCYTES # BLD AUTO: 10.5 % — LOW (ref 13–44)
LYMPHOCYTES NFR BLD AUTO: 6.8 %
MAN DIFF?: NORMAL
MCHC RBC-ENTMCNC: 29.5 PG
MCHC RBC-ENTMCNC: 31.9 PG — SIGNIFICANT CHANGE UP (ref 27–34)
MCHC RBC-ENTMCNC: 32 GM/DL
MCHC RBC-ENTMCNC: 35.7 GM/DL — SIGNIFICANT CHANGE UP (ref 32–36)
MCV RBC AUTO: 89.4 FL — SIGNIFICANT CHANGE UP (ref 80–100)
MCV RBC AUTO: 92.3 FL
MONOCYTES # BLD AUTO: 0.5 K/UL — SIGNIFICANT CHANGE UP (ref 0–0.9)
MONOCYTES # BLD AUTO: 0.64 K/UL
MONOCYTES NFR BLD AUTO: 7.5 % — SIGNIFICANT CHANGE UP (ref 2–14)
MONOCYTES NFR BLD AUTO: 9 %
NEUTROPHILS # BLD AUTO: 5.5 K/UL — SIGNIFICANT CHANGE UP (ref 1.8–7.4)
NEUTROPHILS # BLD AUTO: 5.82 K/UL
NEUTROPHILS NFR BLD AUTO: 78.3 % — HIGH (ref 43–77)
NEUTROPHILS NFR BLD AUTO: 82.3 %
NT-PROBNP SERPL-MCNC: ABNORMAL PG/ML
PLATELET # BLD AUTO: 208 K/UL — SIGNIFICANT CHANGE UP (ref 150–400)
PLATELET # BLD AUTO: 218 K/UL
POTASSIUM SERPL-MCNC: 3.9 MMOL/L — SIGNIFICANT CHANGE UP (ref 3.5–5.3)
POTASSIUM SERPL-SCNC: 3.2 MMOL/L
POTASSIUM SERPL-SCNC: 3.9 MMOL/L — SIGNIFICANT CHANGE UP (ref 3.5–5.3)
PROT SERPL-MCNC: 7.6 G/DL
PROT SERPL-MCNC: 8 G/DL — SIGNIFICANT CHANGE UP (ref 6–8.3)
PROTHROM AB SERPL-ACNC: 21.4 SEC — HIGH (ref 10–12.9)
RBC # BLD: 3.52 M/UL
RBC # BLD: 3.53 M/UL — LOW (ref 3.8–5.2)
RBC # FLD: 15.8 % — HIGH (ref 10.3–14.5)
RBC # FLD: 18.1 %
SODIUM SERPL-SCNC: 138 MMOL/L
SODIUM SERPL-SCNC: 139 MMOL/L — SIGNIFICANT CHANGE UP (ref 135–145)
TRIGL SERPL-MCNC: 78 MG/DL
TSH SERPL-ACNC: 0.22 UIU/ML
VIT B12 SERPL-MCNC: 503 PG/ML
WBC # BLD: 7 K/UL — SIGNIFICANT CHANGE UP (ref 3.8–10.5)
WBC # FLD AUTO: 7 K/UL — SIGNIFICANT CHANGE UP (ref 3.8–10.5)
WBC # FLD AUTO: 7.08 K/UL

## 2018-12-18 PROCEDURE — 73590 X-RAY EXAM OF LOWER LEG: CPT | Mod: 26,RT

## 2018-12-18 PROCEDURE — 99223 1ST HOSP IP/OBS HIGH 75: CPT

## 2018-12-18 PROCEDURE — 93971 EXTREMITY STUDY: CPT | Mod: 26

## 2018-12-18 PROCEDURE — 99285 EMERGENCY DEPT VISIT HI MDM: CPT

## 2018-12-18 RX ORDER — CLOPIDOGREL BISULFATE 75 MG/1
75 TABLET, FILM COATED ORAL DAILY
Qty: 0 | Refills: 0 | Status: DISCONTINUED | OUTPATIENT
Start: 2018-12-18 | End: 2018-12-23

## 2018-12-18 RX ORDER — METOPROLOL TARTRATE 50 MG
75 TABLET ORAL
Qty: 0 | Refills: 0 | Status: DISCONTINUED | OUTPATIENT
Start: 2018-12-18 | End: 2018-12-21

## 2018-12-18 RX ORDER — DABIGATRAN ETEXILATE MESYLATE 150 MG/1
75 CAPSULE ORAL EVERY 12 HOURS
Qty: 0 | Refills: 0 | Status: DISCONTINUED | OUTPATIENT
Start: 2018-12-18 | End: 2018-12-23

## 2018-12-18 RX ORDER — CEFAZOLIN SODIUM 1 G
1000 VIAL (EA) INJECTION EVERY 8 HOURS
Qty: 0 | Refills: 0 | Status: DISCONTINUED | OUTPATIENT
Start: 2018-12-18 | End: 2018-12-19

## 2018-12-18 RX ORDER — ATORVASTATIN CALCIUM 80 MG/1
40 TABLET, FILM COATED ORAL AT BEDTIME
Qty: 0 | Refills: 0 | Status: DISCONTINUED | OUTPATIENT
Start: 2018-12-18 | End: 2018-12-23

## 2018-12-18 RX ORDER — BUDESONIDE AND FORMOTEROL FUMARATE DIHYDRATE 160; 4.5 UG/1; UG/1
2 AEROSOL RESPIRATORY (INHALATION)
Qty: 0 | Refills: 0 | Status: DISCONTINUED | OUTPATIENT
Start: 2018-12-18 | End: 2018-12-23

## 2018-12-18 RX ADMIN — Medication 600 MILLIGRAM(S): at 18:50

## 2018-12-18 RX ADMIN — Medication 100 MILLIGRAM(S): at 23:58

## 2018-12-18 RX ADMIN — DABIGATRAN ETEXILATE MESYLATE 75 MILLIGRAM(S): 150 CAPSULE ORAL at 23:59

## 2018-12-18 RX ADMIN — Medication 75 MILLIGRAM(S): at 23:59

## 2018-12-18 RX ADMIN — ATORVASTATIN CALCIUM 40 MILLIGRAM(S): 80 TABLET, FILM COATED ORAL at 23:57

## 2018-12-18 RX ADMIN — Medication 100 MILLIGRAM(S): at 17:50

## 2018-12-18 NOTE — ED PROVIDER NOTE - MEDICAL DECISION MAKING DETAILS
78F with worsening LE edema. Likely cellulitis, will also evaluate for underlying DVT. Labs, abx, xrays, admit

## 2018-12-18 NOTE — ED ADULT NURSE NOTE - NSIMPLEMENTINTERV_GEN_ALL_ED
Implemented All Fall Risk Interventions:  [place] to call system. Call bell, personal items and telephone within reach. Instruct patient to call for assistance. Room bathroom lighting operational. Non-slip footwear when patient is off stretcher. Physically safe environment: no spills, clutter or unnecessary equipment. Stretcher in lowest position, wheels locked, appropriate side rails in place. Provide visual cue, wrist band, yellow gown, etc. Monitor gait and stability. Monitor for mental status changes and reorient to person, place, and time. Review medications for side effects contributing to fall risk. Reinforce activity limits and safety measures with patient and family.

## 2018-12-18 NOTE — ED PROVIDER NOTE - ATTENDING CONTRIBUTION TO CARE
78F PMH CHF, CAD w/ stents, Afib on pradaxa, COPD on home O2 presents with leg swelling. pt with chronic leg swelling, r leg redness with no f/c for the past few days, likely cellulitis, us ordered r/o dvt, denies sob, lungs cta b/l, likely admission for iv abx.

## 2018-12-18 NOTE — H&P ADULT - PROBLEM SELECTOR PLAN 3
Patient prior labs with baseline of 0.7 now 1.2 BUN at baseline  Check urine lytes  Monitor I/O Patient prior labs with baseline of 0.7 now 1.2 BUN at baseline  Check urine lytes  Monitor I/O  Renally dose medications

## 2018-12-18 NOTE — ED PROVIDER NOTE - NS ED ROS FT
Constitution: No Fever or chills  Eyes: No visual changes  HEENT: No URI symptoms  Cardio: No Chest pain  Resp: No SOB  GI: No abdominal pain  : No dysuria  MSK: No Back pain  Neuro: No Headache  Skin: +Leg swelling   All other ROS as per HPI  Amilcar Zuniga, PGY-1

## 2018-12-18 NOTE — H&P ADULT - HISTORY OF PRESENT ILLNESS
77 yo woman with PMH of CAD, Afib on Pradaxa, CHF, HLD, COPD on 2L O2 intermittently at home presents from home in setting of increased lower extremity swelling. Patient states that she has chronic bilateral leg swelling. Lasix dose was increased from 40 to 60 mg. Patient states that she noticed the right leg was becoming larger, more red and painful. It has been increasingly difficult for patient to ambulate with walker. Denies trauma, fevers, chills, sweats. Denies history of skin infections. Per patient, states SOB is at baseline. Does endorse SOB at exertion. Endorses dry cough, denies rhinorrhea, sore throat. Denies CP, palpitations.     Family Hx: patient denies related family hx

## 2018-12-18 NOTE — ED PROVIDER NOTE - NS ED MD EM SELECTION
37909 Comprehensive (1) Mild-to-moderate aphasia; some obvious loss of fluency or facility of comprehension, w/o significant limitation on ideas expressed or form of expression. Reduction of speech and/or comprehension, however, makes conversation about provided material difficult or impossible.  For example, in conversation about provided materials, examiner can identify picture or naming card content from patient's response.

## 2018-12-18 NOTE — ED ADULT TRIAGE NOTE - CHIEF COMPLAINT QUOTE
as per EMS "coming from home, sent by PMD for retaining fluid - leg swelling and shortness of breath on exertion"

## 2018-12-18 NOTE — ED ADULT NURSE NOTE - OBJECTIVE STATEMENT
78 y.o female brought in by ambulance for increased fluid retention, sent in by PCP Kim. +4 pitting edema noted in b/l lower extremities, redness noted to both lower extremities. sensation and circulation intact b/l. hx of COPD, quit smoking 10 months ago, hx htn. patient takes lasix daily, 40mg recently increased to 60mg. patient is A&Ox4, states she feels a little short of breath. Patient's pcp called the ambulance, states she would of called since her shortness of breath has been worsening. denies recent travel /sick contacts. denies chest pain, ha, n/v/d, abdominal pain, f/c, urinary symptoms, hematuria. Oxygen placed by ems due to tachypnea when ems arrived, room air saturation of 88% improved to 94% on 3 L NC. lung sounds diminished b/l.   Pt sitting up in stretcher with aide at bedside.     assisting primary rn rubén damon

## 2018-12-18 NOTE — H&P ADULT - PROBLEM SELECTOR PLAN 1
Patient's Right LE with redness, warmth and swelling. Suggestive of cellulitis. Cellulitis appears nonpurulent  S/P clindamycin in ED  Will continue with Cefazolin to reduce risk of c. diff infection

## 2018-12-18 NOTE — H&P ADULT - NSHPPHYSICALEXAM_GEN_ALL_CORE
Vital Signs Last 24 Hrs  T(C): 36.1 (18 Dec 2018 17:00), Max: 36.6 (18 Dec 2018 16:01)  T(F): 97 (18 Dec 2018 17:00), Max: 97.8 (18 Dec 2018 16:01)  HR: 89 (18 Dec 2018 18:00) (77 - 93)  BP: 119/69 (18 Dec 2018 18:00) (111/75 - 119/69)  BP(mean): --  RR: 20 (18 Dec 2018 18:00) (20 - 24)  SpO2: 95% (18 Dec 2018 18:00) (88% - 97%)

## 2018-12-18 NOTE — ED PROVIDER NOTE - OBJECTIVE STATEMENT
78F PMH CHF, CAD w/ stents, Afib on pradaxa, COPD on home O2 presents with leg swelling. Pt reports she has chronic 78F PMH CHF, CAD w/ stents, Afib on pradaxa, COPD on home O2 presents with leg swelling. Pt reports she has chronic BL leg swelling, and was evaluated for a DVT a few weeks ago. Went to see her PMD as her leg swelling was getting worse and was told to resume taking her furosemide. Yesterday evening pt noted her R leg was increasing larger compared to L and also became red and painful. Denies F/c, CP/SOB, n/v/d. Reports feeling at her baseline other than R leg pain. 78F PMH CHF, CAD w/ stents, Afib on pradaxa, COPD on home O2 presents with leg swelling. Pt reports she has chronic BL leg swelling, and was evaluated for a DVT a few weeks ago. Went to see her PMD as her leg swelling was getting worse and was told to increase furosemide dose to 60mg. Yesterday evening pt noted her R leg was increasing larger compared to L and also became red and painful. Denies F/c, CP/SOB, n/v/d. Reports feeling at her baseline other than R leg pain.

## 2018-12-18 NOTE — ED PROVIDER NOTE - PHYSICAL EXAMINATION
General: WDWN  HEENT: Normocephalic and atraumatic, EOMI, Trachea midline.   Cardiac: Normal S1 and S2 w/ RRR. No MRG.  Pulmonary: Rales at the BL bases, breathing comfortably on 2L NC  Abdominal: Soft, NTND  Neurologic: No focal sensory or motor deficits.  Musculoskeletal: No limited ROM.  Vascular: 3+ pitting edema bilaterally R>L  Skin: rubor + calor on R lower leg, TTP   Psychiatric: Appropriate mood and affect. No apparent risk to self or others.  Amilcar Zuniga, PGY-1

## 2018-12-18 NOTE — H&P ADULT - NSHPSOCIALHISTORY_GEN_ALL_CORE
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

## 2018-12-18 NOTE — H&P ADULT - PROBLEM SELECTOR PLAN 2
Patient hypoxic to 88% on RA. Responding well on supplemental O2.  Daily weights  Strict I/O Patient hypoxic to 88% on RA. Responding well on supplemental O2. Not in respiratory distress  CXR pending. F/U review to eval for PNA vs pulm edema  Daily weights  Strict I/O  Will hold Lasix overnight in setting of potential IGOR.  No acute wheezing on exam  Continue with Symbicort (should be 2 puff BID rather than 1 puff)  Nebs prn  Supplemental O2, prn

## 2018-12-18 NOTE — H&P ADULT - ASSESSMENT
77 yo woman with PMH of CAD, Afib on Pradaxa, CHF, HLD, COPD on 2L O2 intermittently at home presents from home in setting of increased lower extremity swelling.

## 2018-12-18 NOTE — H&P ADULT - PROBLEM SELECTOR PLAN 8
DVT ppx: On Pradaxa DVT ppx: On Pradaxa  Medications: Patient did not endorse use of Lorazepam: confirm with outpatient PMD

## 2018-12-18 NOTE — H&P ADULT - ATTENDING COMMENTS
. Patient was previously unknown to me. Patient was assigned to me by hospitalist in charge. My involvement in this case consisted of initial history, physical and management plan. Day hospitalist team to assume care in AM and therafter. Case discussed in detail with overnight medicine NP/PA Patient was previously unknown to me. Patient was assigned to me by hospitalist in charge. My involvement in this case consisted of initial history, physical and management plan. Day hospitalist team to assume care in AM and therafter. Case discussed in detail with overnight medicine NP/PA, Kameesha [id number].

## 2018-12-19 LAB
ANION GAP SERPL CALC-SCNC: 11 MMOL/L — SIGNIFICANT CHANGE UP (ref 5–17)
BASOPHILS # BLD AUTO: 0 K/UL — SIGNIFICANT CHANGE UP (ref 0–0.2)
BASOPHILS NFR BLD AUTO: 0.5 % — SIGNIFICANT CHANGE UP (ref 0–2)
BUN SERPL-MCNC: 32 MG/DL — HIGH (ref 7–23)
CALCIUM SERPL-MCNC: 8.7 MG/DL — SIGNIFICANT CHANGE UP (ref 8.4–10.5)
CHLORIDE SERPL-SCNC: 102 MMOL/L — SIGNIFICANT CHANGE UP (ref 96–108)
CO2 SERPL-SCNC: 26 MMOL/L — SIGNIFICANT CHANGE UP (ref 22–31)
CREAT SERPL-MCNC: 1.54 MG/DL — HIGH (ref 0.5–1.3)
EOSINOPHIL # BLD AUTO: 0.3 K/UL — SIGNIFICANT CHANGE UP (ref 0–0.5)
EOSINOPHIL NFR BLD AUTO: 3.3 % — SIGNIFICANT CHANGE UP (ref 0–6)
GLUCOSE SERPL-MCNC: 101 MG/DL — HIGH (ref 70–99)
HCT VFR BLD CALC: 33.9 % — LOW (ref 34.5–45)
HGB BLD-MCNC: 10.8 G/DL — LOW (ref 11.5–15.5)
LYMPHOCYTES # BLD AUTO: 0.8 K/UL — LOW (ref 1–3.3)
LYMPHOCYTES # BLD AUTO: 10.5 % — LOW (ref 13–44)
MAGNESIUM SERPL-MCNC: 2 MG/DL — SIGNIFICANT CHANGE UP (ref 1.6–2.6)
MCHC RBC-ENTMCNC: 28.7 PG — SIGNIFICANT CHANGE UP (ref 27–34)
MCHC RBC-ENTMCNC: 31.9 GM/DL — LOW (ref 32–36)
MCV RBC AUTO: 89.7 FL — SIGNIFICANT CHANGE UP (ref 80–100)
MONOCYTES # BLD AUTO: 0.8 K/UL — SIGNIFICANT CHANGE UP (ref 0–0.9)
MONOCYTES NFR BLD AUTO: 10.1 % — SIGNIFICANT CHANGE UP (ref 2–14)
NEUTROPHILS # BLD AUTO: 5.8 K/UL — SIGNIFICANT CHANGE UP (ref 1.8–7.4)
NEUTROPHILS NFR BLD AUTO: 75.6 % — SIGNIFICANT CHANGE UP (ref 43–77)
PHOSPHATE SERPL-MCNC: 4.2 MG/DL — SIGNIFICANT CHANGE UP (ref 2.5–4.5)
PLATELET # BLD AUTO: 195 K/UL — SIGNIFICANT CHANGE UP (ref 150–400)
POTASSIUM SERPL-MCNC: 3.5 MMOL/L — SIGNIFICANT CHANGE UP (ref 3.5–5.3)
POTASSIUM SERPL-SCNC: 3.5 MMOL/L — SIGNIFICANT CHANGE UP (ref 3.5–5.3)
RBC # BLD: 3.78 M/UL — LOW (ref 3.8–5.2)
RBC # FLD: 15.3 % — HIGH (ref 10.3–14.5)
SODIUM SERPL-SCNC: 139 MMOL/L — SIGNIFICANT CHANGE UP (ref 135–145)
WBC # BLD: 7.7 K/UL — SIGNIFICANT CHANGE UP (ref 3.8–10.5)
WBC # FLD AUTO: 7.7 K/UL — SIGNIFICANT CHANGE UP (ref 3.8–10.5)

## 2018-12-19 PROCEDURE — 99233 SBSQ HOSP IP/OBS HIGH 50: CPT | Mod: GC

## 2018-12-19 PROCEDURE — 71045 X-RAY EXAM CHEST 1 VIEW: CPT | Mod: 26

## 2018-12-19 RX ORDER — AMPICILLIN SODIUM AND SULBACTAM SODIUM 250; 125 MG/ML; MG/ML
INJECTION, POWDER, FOR SUSPENSION INTRAMUSCULAR; INTRAVENOUS
Qty: 0 | Refills: 0 | Status: DISCONTINUED | OUTPATIENT
Start: 2018-12-19 | End: 2018-12-22

## 2018-12-19 RX ORDER — AMPICILLIN SODIUM AND SULBACTAM SODIUM 250; 125 MG/ML; MG/ML
3 INJECTION, POWDER, FOR SUSPENSION INTRAMUSCULAR; INTRAVENOUS EVERY 12 HOURS
Qty: 0 | Refills: 0 | Status: DISCONTINUED | OUTPATIENT
Start: 2018-12-19 | End: 2018-12-22

## 2018-12-19 RX ORDER — ACETAMINOPHEN 500 MG
650 TABLET ORAL EVERY 6 HOURS
Qty: 0 | Refills: 0 | Status: DISCONTINUED | OUTPATIENT
Start: 2018-12-19 | End: 2018-12-20

## 2018-12-19 RX ORDER — BACITRACIN ZINC 500 UNIT/G
1 OINTMENT IN PACKET (EA) TOPICAL
Qty: 0 | Refills: 0 | Status: DISCONTINUED | OUTPATIENT
Start: 2018-12-19 | End: 2018-12-23

## 2018-12-19 RX ORDER — LACTOBACILLUS ACIDOPHILUS 100MM CELL
1 CAPSULE ORAL DAILY
Qty: 0 | Refills: 0 | Status: DISCONTINUED | OUTPATIENT
Start: 2018-12-19 | End: 2018-12-23

## 2018-12-19 RX ORDER — FUROSEMIDE 40 MG
40 TABLET ORAL DAILY
Qty: 0 | Refills: 0 | Status: DISCONTINUED | OUTPATIENT
Start: 2018-12-19 | End: 2018-12-19

## 2018-12-19 RX ORDER — FUROSEMIDE 40 MG
40 TABLET ORAL EVERY 12 HOURS
Qty: 0 | Refills: 0 | Status: DISCONTINUED | OUTPATIENT
Start: 2018-12-19 | End: 2018-12-21

## 2018-12-19 RX ORDER — AMPICILLIN SODIUM AND SULBACTAM SODIUM 250; 125 MG/ML; MG/ML
3 INJECTION, POWDER, FOR SUSPENSION INTRAMUSCULAR; INTRAVENOUS ONCE
Qty: 0 | Refills: 0 | Status: COMPLETED | OUTPATIENT
Start: 2018-12-19 | End: 2018-12-19

## 2018-12-19 RX ADMIN — BUDESONIDE AND FORMOTEROL FUMARATE DIHYDRATE 2 PUFF(S): 160; 4.5 AEROSOL RESPIRATORY (INHALATION) at 18:08

## 2018-12-19 RX ADMIN — Medication 75 MILLIGRAM(S): at 18:23

## 2018-12-19 RX ADMIN — Medication 40 MILLIGRAM(S): at 18:23

## 2018-12-19 RX ADMIN — Medication 1 APPLICATION(S): at 18:53

## 2018-12-19 RX ADMIN — ATORVASTATIN CALCIUM 40 MILLIGRAM(S): 80 TABLET, FILM COATED ORAL at 22:18

## 2018-12-19 RX ADMIN — DABIGATRAN ETEXILATE MESYLATE 75 MILLIGRAM(S): 150 CAPSULE ORAL at 06:02

## 2018-12-19 RX ADMIN — AMPICILLIN SODIUM AND SULBACTAM SODIUM 200 GRAM(S): 250; 125 INJECTION, POWDER, FOR SUSPENSION INTRAMUSCULAR; INTRAVENOUS at 14:31

## 2018-12-19 RX ADMIN — Medication 1 TABLET(S): at 14:34

## 2018-12-19 RX ADMIN — BUDESONIDE AND FORMOTEROL FUMARATE DIHYDRATE 2 PUFF(S): 160; 4.5 AEROSOL RESPIRATORY (INHALATION) at 07:02

## 2018-12-19 RX ADMIN — DABIGATRAN ETEXILATE MESYLATE 75 MILLIGRAM(S): 150 CAPSULE ORAL at 18:23

## 2018-12-19 RX ADMIN — CLOPIDOGREL BISULFATE 75 MILLIGRAM(S): 75 TABLET, FILM COATED ORAL at 14:32

## 2018-12-19 RX ADMIN — Medication 100 MILLIGRAM(S): at 06:01

## 2018-12-19 RX ADMIN — Medication 75 MILLIGRAM(S): at 06:02

## 2018-12-19 RX ADMIN — AMPICILLIN SODIUM AND SULBACTAM SODIUM 200 GRAM(S): 250; 125 INJECTION, POWDER, FOR SUSPENSION INTRAMUSCULAR; INTRAVENOUS at 22:18

## 2018-12-19 NOTE — PROGRESS NOTE ADULT - PROBLEM SELECTOR PLAN 3
Patient prior labs with baseline of 0.7 now 1.2 BUN at baseline  Check urine lytes  Monitor I/O  Renally dose medications Patient prior labs with baseline of 0.7 now 1.2 BUN at baseline. Possibly secondary to cardiorenal   Penign  Monitor I/O  Renally dose medications Patient prior labs with baseline of 0.7 now 1.2 BUN at baseline. Possibly secondary to cardiorenal   Monitor I/O  Renally dose medications

## 2018-12-19 NOTE — PROGRESS NOTE ADULT - PROBLEM SELECTOR PLAN 1
Patient's Right LE with redness, warmth and swelling. Suggestive of cellulitis. Cellulitis appears nonpurulent  S/P clindamycin in ED  Will continue with Cefazolin to reduce risk of c. diff infection - Patient's Right LE with redness, warmth and swelling. Suggestive of nonpurulent cellulitis  - s/p clindamycin in ED  - started IV Unasyn and wound care with bacitracin  - pain control with Tylenol 650 mg Q6H PRN - Patient's Right LE with redness, warmth and swelling. Suggestive of nonpurulent cellulitis  - s/p clindamycin in ED  - given that cellulitis is below the waist will change Unasyn to add anaerobic coverage as well. wound care with bacitracin  - pain control with Tylenol 650 mg Q6H PRN

## 2018-12-19 NOTE — PROGRESS NOTE ADULT - PROBLEM SELECTOR PLAN 2
Patient hypoxic to 88% on RA. Responding well on supplemental O2. Not in respiratory distress  CXR pending. F/U review to eval for PNA vs pulm edema  Daily weights  Strict I/O  Will hold Lasix overnight in setting of potential IGOR.  No acute wheezing on exam  Continue with Symbicort (should be 2 puff BID rather than 1 puff)  Nebs prn  Supplemental O2, prn Patient hypoxic to 88% on RA. Responding well on supplemental O2. Not in respiratory distress  CXR (12/19)- Mild pulmonary vascular congestion.  Daily weights  Strict I/O  Continue with Symbicort, Nebs PRN  Supplemental O2  - IV lasix 40 mg Q12H

## 2018-12-19 NOTE — PROGRESS NOTE ADULT - SUBJECTIVE AND OBJECTIVE BOX
Dr. Gokul Acevedo  Internal Medicine PGY-1   Pager# [phone]    Patient is a 78y old  Female who presents with a chief complaint of increased leg swelling Right >Left (18 Dec 2018 21:22)      SUBJECTIVE / OVERNIGHT EVENTS: Reports RT leg pain that is 7/10 that is improving. Denies f/c/h/d. Pt is on 4L NC.    MEDICATIONS  (STANDING):  ampicillin/sulbactam  IVPB 3 Gram(s) IV Intermittent once  ampicillin/sulbactam  IVPB 3 Gram(s) IV Intermittent every 12 hours  ampicillin/sulbactam  IVPB      atorvastatin 40 milliGRAM(s) Oral at bedtime  BACItracin   Ointment 1 Application(s) Topical two times a day  buDESOnide  80 MICROgram(s)/formoterol 4.5 MICROgram(s) Inhaler 2 Puff(s) Inhalation two times a day  clopidogrel Tablet 75 milliGRAM(s) Oral daily  dabigatran 75 milliGRAM(s) Oral every 12 hours  furosemide   Injectable 40 milliGRAM(s) IV Push every 12 hours  lactobacillus acidophilus 1 Tablet(s) Oral daily  metoprolol tartrate 75 milliGRAM(s) Oral two times a day    MEDICATIONS  (PRN):  acetaminophen   Tablet .. 650 milliGRAM(s) Oral every 6 hours PRN Mild Pain (1 - 3)      Vital Signs Last 24 Hrs  T(C): 36.6 (19 Dec 2018 12:13), Max: 36.9 (18 Dec 2018 22:45)  T(F): 97.9 (19 Dec 2018 12:13), Max: 98.4 (18 Dec 2018 22:45)  HR: 90 (19 Dec 2018 12:13) (74 - 98)  BP: 107/71 (19 Dec 2018 12:13) (107/71 - 142/92)  BP(mean): --  RR: 18 (19 Dec 2018 12:13) (18 - 24)  SpO2: 94% (19 Dec 2018 12:13) (88% - 98%)  CAPILLARY BLOOD GLUCOSE        I&O's Summary    18 Dec 2018 07:01  -  19 Dec 2018 07:00  --------------------------------------------------------  IN: 100 mL / OUT: 0 mL / NET: 100 mL        PHYSICAL EXAM:  GENERAL: NAD, well-developed  HEAD:  Atraumatic, Normocephalic  EYES: EOMI, PERRLA, conjunctiva and sclera clear  NECK: Supple, No JVD  CHEST/LUNG: Clear to auscultation bilaterally; No wheeze  HEART: Regular rate and rhythm; No murmurs, rubs, or gallops  ABDOMEN: Soft, Nontender, Nondistended; Bowel sounds present  EXTREMITIES:  2+ Peripheral Pulses, No clubbing, cyanosis, or edema  PSYCH: AAOx3  NEUROLOGY: non-focal  SKIN: No rashes or lesions    LABS:                        10.8   7.7   )-----------( 195      ( 19 Dec 2018 06:27 )             33.9     12-19    139  |  102  |  32<H>  ----------------------------<  101<H>  3.5   |  26  |  1.54<H>    Ca    8.7      19 Dec 2018 06:26  Phos  4.2     12-19  Mg     2.0     12-19    TPro  8.0  /  Alb  3.2<L>  /  TBili  0.5  /  DBili  x   /  AST  22  /  ALT  12  /  AlkPhos  129<H>  12-18    PT/INR - ( 18 Dec 2018 18:24 )   PT: 21.4 sec;   INR: 1.83 ratio         PTT - ( 18 Dec 2018 18:24 )  PTT:89.6 sec          RADIOLOGY & ADDITIONAL TESTS:    Imaging Personally Reviewed:    Consultant(s) Notes Reviewed:      Care Discussed with Consultants/Other Providers: Dr. Gokul Acevedo  Internal Medicine PGY-1   Pager# [phone]    Patient is a 78y old  Female who presents with a chief complaint of increased leg swelling Right >Left (18 Dec 2018 21:22)      SUBJECTIVE / OVERNIGHT EVENTS: Reports RT leg pain that is 7/10 that is improving. Denies f/c/h/d. Pt is on 4L NC.    MEDICATIONS  (STANDING):  ampicillin/sulbactam  IVPB 3 Gram(s) IV Intermittent once  ampicillin/sulbactam  IVPB 3 Gram(s) IV Intermittent every 12 hours  ampicillin/sulbactam  IVPB      atorvastatin 40 milliGRAM(s) Oral at bedtime  BACItracin   Ointment 1 Application(s) Topical two times a day  buDESOnide  80 MICROgram(s)/formoterol 4.5 MICROgram(s) Inhaler 2 Puff(s) Inhalation two times a day  clopidogrel Tablet 75 milliGRAM(s) Oral daily  dabigatran 75 milliGRAM(s) Oral every 12 hours  furosemide   Injectable 40 milliGRAM(s) IV Push every 12 hours  lactobacillus acidophilus 1 Tablet(s) Oral daily  metoprolol tartrate 75 milliGRAM(s) Oral two times a day    MEDICATIONS  (PRN):  acetaminophen   Tablet .. 650 milliGRAM(s) Oral every 6 hours PRN Mild Pain (1 - 3)      Vital Signs Last 24 Hrs  T(C): 36.6 (19 Dec 2018 12:13), Max: 36.9 (18 Dec 2018 22:45)  T(F): 97.9 (19 Dec 2018 12:13), Max: 98.4 (18 Dec 2018 22:45)  HR: 90 (19 Dec 2018 12:13) (74 - 98)  BP: 107/71 (19 Dec 2018 12:13) (107/71 - 142/92)  BP(mean): --  RR: 18 (19 Dec 2018 12:13) (18 - 24)  SpO2: 94% (19 Dec 2018 12:13) (88% - 98%)  CAPILLARY BLOOD GLUCOSE        I&O's Summary    18 Dec 2018 07:01  -  19 Dec 2018 07:00  --------------------------------------------------------  IN: 100 mL / OUT: 0 mL / NET: 100 mL        PHYSICAL EXAM:  GENERAL: NAD, well-developed  CHEST/LUNG: Crackles in anterior lung field  HEART: Regular rate and rhythm; No murmurs, rubs, or gallops  ABDOMEN: Soft, Nontender, Nondistended; Bowel sounds present  EXTREMITIES:  2+ Peripheral Pulses. RT leg pain is 7/10 and improving  PSYCH: AAOx3  NEUROLOGY: non-focal  SKIN: No rashes or lesions    LABS:                        10.8   7.7   )-----------( 195      ( 19 Dec 2018 06:27 )             33.9     12-19    139  |  102  |  32<H>  ----------------------------<  101<H>  3.5   |  26  |  1.54<H>    Ca    8.7      19 Dec 2018 06:26  Phos  4.2     12-19  Mg     2.0     12-19    TPro  8.0  /  Alb  3.2<L>  /  TBili  0.5  /  DBili  x   /  AST  22  /  ALT  12  /  AlkPhos  129<H>  12-18    PT/INR - ( 18 Dec 2018 18:24 )   PT: 21.4 sec;   INR: 1.83 ratio         PTT - ( 18 Dec 2018 18:24 )  PTT:89.6 sec          RADIOLOGY & ADDITIONAL TESTS:    Imaging Personally Reviewed:    Consultant(s) Notes Reviewed:      Care Discussed with Consultants/Other Providers: Dr. Gokul Acevedo  Internal Medicine PGY-1   Pager# [phone]    Patient is a 78y old  Female who presents with a chief complaint of increased leg swelling Right >Left (18 Dec 2018 21:22)      SUBJECTIVE / OVERNIGHT EVENTS: Reports RT leg pain that is 7/10 that is improving. Denies f/c/h/d. Pt is on 4L NC.     MEDICATIONS  (STANDING):  ampicillin/sulbactam  IVPB 3 Gram(s) IV Intermittent once  ampicillin/sulbactam  IVPB 3 Gram(s) IV Intermittent every 12 hours  ampicillin/sulbactam  IVPB      atorvastatin 40 milliGRAM(s) Oral at bedtime  BACItracin   Ointment 1 Application(s) Topical two times a day  buDESOnide  80 MICROgram(s)/formoterol 4.5 MICROgram(s) Inhaler 2 Puff(s) Inhalation two times a day  clopidogrel Tablet 75 milliGRAM(s) Oral daily  dabigatran 75 milliGRAM(s) Oral every 12 hours  furosemide   Injectable 40 milliGRAM(s) IV Push every 12 hours  lactobacillus acidophilus 1 Tablet(s) Oral daily  metoprolol tartrate 75 milliGRAM(s) Oral two times a day    MEDICATIONS  (PRN):  acetaminophen   Tablet .. 650 milliGRAM(s) Oral every 6 hours PRN Mild Pain (1 - 3)      Vital Signs Last 24 Hrs  T(C): 36.6 (19 Dec 2018 12:13), Max: 36.9 (18 Dec 2018 22:45)  T(F): 97.9 (19 Dec 2018 12:13), Max: 98.4 (18 Dec 2018 22:45)  HR: 90 (19 Dec 2018 12:13) (74 - 98)  BP: 107/71 (19 Dec 2018 12:13) (107/71 - 142/92)  BP(mean): --  RR: 18 (19 Dec 2018 12:13) (18 - 24)  SpO2: 94% (19 Dec 2018 12:13) (88% - 98%)  CAPILLARY BLOOD GLUCOSE        I&O's Summary    18 Dec 2018 07:01  -  19 Dec 2018 07:00  --------------------------------------------------------  IN: 100 mL / OUT: 0 mL / NET: 100 mL        PHYSICAL EXAM:  GENERAL: NAD, well-developed  CHEST/LUNG: Crackles in anterior lung field  HEART: Regular rate and rhythm; No murmurs, rubs, or gallops  ABDOMEN: Soft, Nontender, Nondistended; Bowel sounds present  EXTREMITIES:  2+ Peripheral Pulses. RT leg pain is 7/10 and improving  PSYCH: AAOx3  NEUROLOGY: non-focal  SKIN: No rashes or lesions    LABS:                        10.8   7.7   )-----------( 195      ( 19 Dec 2018 06:27 )             33.9     12-19    139  |  102  |  32<H>  ----------------------------<  101<H>  3.5   |  26  |  1.54<H>    Ca    8.7      19 Dec 2018 06:26  Phos  4.2     12-19  Mg     2.0     12-19    TPro  8.0  /  Alb  3.2<L>  /  TBili  0.5  /  DBili  x   /  AST  22  /  ALT  12  /  AlkPhos  129<H>  12-18    PT/INR - ( 18 Dec 2018 18:24 )   PT: 21.4 sec;   INR: 1.83 ratio         PTT - ( 18 Dec 2018 18:24 )  PTT:89.6 sec          RADIOLOGY & ADDITIONAL TESTS:    Imaging Personally Reviewed:    Consultant(s) Notes Reviewed:      Care Discussed with Consultants/Other Providers:

## 2018-12-19 NOTE — PROGRESS NOTE ADULT - PROBLEM SELECTOR PLAN 8
DVT ppx: On Pradaxa  Medications: Patient did not endorse use of Lorazepam: confirm with outpatient PMD DVT ppx: On Pradaxa  PT recs pending

## 2018-12-20 DIAGNOSIS — E87.6 HYPOKALEMIA: ICD-10-CM

## 2018-12-20 DIAGNOSIS — Z71.89 OTHER SPECIFIED COUNSELING: ICD-10-CM

## 2018-12-20 LAB
ANION GAP SERPL CALC-SCNC: 11 MMOL/L — SIGNIFICANT CHANGE UP (ref 5–17)
BASOPHILS # BLD AUTO: 0 K/UL — SIGNIFICANT CHANGE UP (ref 0–0.2)
BASOPHILS NFR BLD AUTO: 0.3 % — SIGNIFICANT CHANGE UP (ref 0–2)
BUN SERPL-MCNC: 29 MG/DL — HIGH (ref 7–23)
CALCIUM SERPL-MCNC: 8.7 MG/DL — SIGNIFICANT CHANGE UP (ref 8.4–10.5)
CHLORIDE SERPL-SCNC: 100 MMOL/L — SIGNIFICANT CHANGE UP (ref 96–108)
CO2 SERPL-SCNC: 27 MMOL/L — SIGNIFICANT CHANGE UP (ref 22–31)
CREAT SERPL-MCNC: 1.33 MG/DL — HIGH (ref 0.5–1.3)
EOSINOPHIL # BLD AUTO: 0.3 K/UL — SIGNIFICANT CHANGE UP (ref 0–0.5)
EOSINOPHIL NFR BLD AUTO: 3.7 % — SIGNIFICANT CHANGE UP (ref 0–6)
GLUCOSE SERPL-MCNC: 95 MG/DL — SIGNIFICANT CHANGE UP (ref 70–99)
HCT VFR BLD CALC: 31.3 % — LOW (ref 34.5–45)
HGB BLD-MCNC: 11.3 G/DL — LOW (ref 11.5–15.5)
LYMPHOCYTES # BLD AUTO: 0.6 K/UL — LOW (ref 1–3.3)
LYMPHOCYTES # BLD AUTO: 7.9 % — LOW (ref 13–44)
MCHC RBC-ENTMCNC: 32.2 PG — SIGNIFICANT CHANGE UP (ref 27–34)
MCHC RBC-ENTMCNC: 36 GM/DL — SIGNIFICANT CHANGE UP (ref 32–36)
MCV RBC AUTO: 89.4 FL — SIGNIFICANT CHANGE UP (ref 80–100)
MONOCYTES # BLD AUTO: 0.7 K/UL — SIGNIFICANT CHANGE UP (ref 0–0.9)
MONOCYTES NFR BLD AUTO: 8.5 % — SIGNIFICANT CHANGE UP (ref 2–14)
NEUTROPHILS # BLD AUTO: 6.3 K/UL — SIGNIFICANT CHANGE UP (ref 1.8–7.4)
NEUTROPHILS NFR BLD AUTO: 79.5 % — HIGH (ref 43–77)
PLATELET # BLD AUTO: 194 K/UL — SIGNIFICANT CHANGE UP (ref 150–400)
POTASSIUM SERPL-MCNC: 3.4 MMOL/L — LOW (ref 3.5–5.3)
POTASSIUM SERPL-SCNC: 3.4 MMOL/L — LOW (ref 3.5–5.3)
RBC # BLD: 3.5 M/UL — LOW (ref 3.8–5.2)
RBC # FLD: 15.7 % — HIGH (ref 10.3–14.5)
SODIUM SERPL-SCNC: 138 MMOL/L — SIGNIFICANT CHANGE UP (ref 135–145)
WBC # BLD: 7.9 K/UL — SIGNIFICANT CHANGE UP (ref 3.8–10.5)
WBC # FLD AUTO: 7.9 K/UL — SIGNIFICANT CHANGE UP (ref 3.8–10.5)

## 2018-12-20 PROCEDURE — 99222 1ST HOSP IP/OBS MODERATE 55: CPT

## 2018-12-20 PROCEDURE — 99233 SBSQ HOSP IP/OBS HIGH 50: CPT | Mod: GC

## 2018-12-20 RX ORDER — POTASSIUM CHLORIDE 20 MEQ
20 PACKET (EA) ORAL
Qty: 0 | Refills: 0 | Status: COMPLETED | OUTPATIENT
Start: 2018-12-20 | End: 2018-12-20

## 2018-12-20 RX ORDER — OXYCODONE HYDROCHLORIDE 5 MG/1
5 TABLET ORAL EVERY 4 HOURS
Qty: 0 | Refills: 0 | Status: DISCONTINUED | OUTPATIENT
Start: 2018-12-20 | End: 2018-12-23

## 2018-12-20 RX ORDER — OXYCODONE HYDROCHLORIDE 5 MG/1
5 TABLET ORAL ONCE
Qty: 0 | Refills: 0 | Status: DISCONTINUED | OUTPATIENT
Start: 2018-12-20 | End: 2018-12-20

## 2018-12-20 RX ORDER — ACETAMINOPHEN 500 MG
650 TABLET ORAL EVERY 6 HOURS
Qty: 0 | Refills: 0 | Status: DISCONTINUED | OUTPATIENT
Start: 2018-12-20 | End: 2018-12-23

## 2018-12-20 RX ADMIN — OXYCODONE HYDROCHLORIDE 5 MILLIGRAM(S): 5 TABLET ORAL at 20:55

## 2018-12-20 RX ADMIN — OXYCODONE HYDROCHLORIDE 5 MILLIGRAM(S): 5 TABLET ORAL at 21:30

## 2018-12-20 RX ADMIN — Medication 20 MILLIEQUIVALENT(S): at 20:55

## 2018-12-20 RX ADMIN — Medication 40 MILLIGRAM(S): at 17:24

## 2018-12-20 RX ADMIN — Medication 650 MILLIGRAM(S): at 14:46

## 2018-12-20 RX ADMIN — BUDESONIDE AND FORMOTEROL FUMARATE DIHYDRATE 2 PUFF(S): 160; 4.5 AEROSOL RESPIRATORY (INHALATION) at 17:33

## 2018-12-20 RX ADMIN — Medication 1 APPLICATION(S): at 06:01

## 2018-12-20 RX ADMIN — AMPICILLIN SODIUM AND SULBACTAM SODIUM 200 GRAM(S): 250; 125 INJECTION, POWDER, FOR SUSPENSION INTRAMUSCULAR; INTRAVENOUS at 17:21

## 2018-12-20 RX ADMIN — DABIGATRAN ETEXILATE MESYLATE 75 MILLIGRAM(S): 150 CAPSULE ORAL at 17:48

## 2018-12-20 RX ADMIN — AMPICILLIN SODIUM AND SULBACTAM SODIUM 200 GRAM(S): 250; 125 INJECTION, POWDER, FOR SUSPENSION INTRAMUSCULAR; INTRAVENOUS at 06:00

## 2018-12-20 RX ADMIN — Medication 650 MILLIGRAM(S): at 22:00

## 2018-12-20 RX ADMIN — OXYCODONE HYDROCHLORIDE 5 MILLIGRAM(S): 5 TABLET ORAL at 14:59

## 2018-12-20 RX ADMIN — Medication 650 MILLIGRAM(S): at 20:48

## 2018-12-20 RX ADMIN — Medication 40 MILLIGRAM(S): at 06:03

## 2018-12-20 RX ADMIN — Medication 650 MILLIGRAM(S): at 23:43

## 2018-12-20 RX ADMIN — CLOPIDOGREL BISULFATE 75 MILLIGRAM(S): 75 TABLET, FILM COATED ORAL at 11:26

## 2018-12-20 RX ADMIN — Medication 20 MILLIEQUIVALENT(S): at 17:48

## 2018-12-20 RX ADMIN — ATORVASTATIN CALCIUM 40 MILLIGRAM(S): 80 TABLET, FILM COATED ORAL at 23:43

## 2018-12-20 RX ADMIN — Medication 1 TABLET(S): at 11:26

## 2018-12-20 RX ADMIN — Medication 75 MILLIGRAM(S): at 06:01

## 2018-12-20 RX ADMIN — BUDESONIDE AND FORMOTEROL FUMARATE DIHYDRATE 2 PUFF(S): 160; 4.5 AEROSOL RESPIRATORY (INHALATION) at 05:15

## 2018-12-20 RX ADMIN — DABIGATRAN ETEXILATE MESYLATE 75 MILLIGRAM(S): 150 CAPSULE ORAL at 06:01

## 2018-12-20 RX ADMIN — OXYCODONE HYDROCHLORIDE 5 MILLIGRAM(S): 5 TABLET ORAL at 15:27

## 2018-12-20 RX ADMIN — Medication 650 MILLIGRAM(S): at 15:28

## 2018-12-20 NOTE — PROGRESS NOTE ADULT - PROBLEM SELECTOR PLAN 3
Patient prior labs with baseline of 0.7 now 1.2 BUN at baseline. Possibly secondary to cardiorenal   Monitor I/O  Renally dose medications presumed to be secondary to cardiorenal. Creatinine is improving with Diuresis, c.w lasix as above.  Monitor I/O  Renally dose medications

## 2018-12-20 NOTE — PHYSICAL THERAPY INITIAL EVALUATION ADULT - PERTINENT HX OF CURRENT PROBLEM, REHAB EVAL
79 yo F with PMH of CAD, Afib on Pradaxa, CHF, HLD, COPD on 2L O2 intermittently at home pw increased lower extremity swelling. Patient states that she has chronic bilateral leg swelling. Lasix dose was increased from 40 to 60 mg. Pt reports that she noticed the right leg was becoming larger, more red and painful.

## 2018-12-20 NOTE — PROGRESS NOTE ADULT - PROBLEM SELECTOR PLAN 2
- Patient hypoxic to 88% on RA. Responding well on supplemental O2. Not in respiratory distress  - CXR (12/19)- Mild pulmonary vascular congestion.  - Daily weights  - Strict I/O  - Continue with Symbicort, Nebs PRN  Supplemental O2  - IV lasix 40 mg Q12H pt with acute on chronic respiratory failure presumed to be from acute on chronic heart failure.  - Patient hypoxic to 88% on RA. Responding well on supplemental O2. Not in respiratory distress  - CXR (12/19)- Mild pulmonary vascular congestion.  - Daily weights  - Strict I/O  - Continue with Symbicort, Nebs PRN  Supplemental O2  - Respiratory symptoms improved with diuresis, c/w IV lasix 40 mg Q12H  - check a TTE to eval cardiac function, last TTE was in january which showed Mild-moderate mitral regurgitation, normal EF, Normal LV fxn and thickness.

## 2018-12-20 NOTE — PROGRESS NOTE ADULT - PROBLEM SELECTOR PLAN 1
- Patient's Right LE with redness, warmth and swelling. Suggestive of nonpurulent cellulitis  - s/p clindamycin in ED  - given that cellulitis is below the waist will change Unasyn to add anaerobic coverage as well. wound care with bacitracin  - c/w IV Unasyn (D2)  - pain control with Tylenol 650 mg Q6H PRN - Patient's Right LE with redness, warmth and swelling. Suggestive of nonpurulent cellulitis  - s/p clindamycin in ED  - given that cellulitis is below the waist will change Unasyn to add anaerobic coverage as well. wound care with bacitracin  - c/w IV Unasyn (D2)  - pain control with Tylenol 650 mg Q6H PRN  - f/u MRI RT ankle - Patient's Right LE with redness, warmth and swelling. Suggestive of nonpurulent cellulitis  - s/p clindamycin in ED  - given that cellulitis is below the waist will change Unasyn to add anaerobic coverage as well. wound care with bacitracin  - c/w IV Unasyn (D2)  - pain control with Tylenol 650 mg Q6H PRN  - f/u MRI RT ankle because patient is having significant tenderness to palpation we would like to rule out tendon involvement. there was no evidence of osteo on the foot xray.

## 2018-12-20 NOTE — PHYSICAL THERAPY INITIAL EVALUATION ADULT - ADDITIONAL COMMENTS
Pt lives alone in an apartment building +20 steps to enter with HR. Pt reports she was ambulating short distances with R/W and assist from HHA. Has HHA 6 hours a day. States she required assist with all ADLs Pt lives alone in an apartment building +20 steps to enter with HR. Pt reports she was ambulating short distances with R/W and assist from HHA. Has HHA 6 hours a day. States she required assist with all ADLs, was not ambulating outside of the home often. Pt states she will be staying with her daughter upon D/C who recently had a knee replacement and is unable to assist her.

## 2018-12-20 NOTE — PHYSICAL THERAPY INITIAL EVALUATION ADULT - GAIT DEVIATIONS NOTED, PT EVAL
increased time in double stance/decreased stride length/decreased weight-shifting ability/decreased step length

## 2018-12-20 NOTE — PROGRESS NOTE ADULT - ASSESSMENT
77 yo woman with PMH of CAD, Afib on Pradaxa, CHF, HLD, COPD on 2L O2 intermittently at home presents from home in setting of increased b/l lower extremity swelling and RT LE erythema and tenderness secondary to cellulitis.

## 2018-12-20 NOTE — CONSULT NOTE ADULT - ASSESSMENT
A/P:  79 yo woman with PMH of CAD, Afib on Pradaxa, CHF, HLD, COPD on 2L O2 intermittently at home presents from home in setting of increased lower extremity swelling.    BLE PVD RLE cellulitis w/ achilles wound- medihoney  Consider MRI if concern for ACHILLES involvement d/w medical team  BLE elevation  Abx per Medicine/ ID  Moisturize intact skin w/ SWEEN cream BID  con't Nutrition (as tolerated), Nutrition Consult  con't Offloading   con't Pericare  Care as per medicine will follow w/ you  Upon discharge f/[facility] as outpatient at [facility] [address] [place] [address] [phone]  Seen w/ attng and D/w team  Thank you for this consult  Linh Pimentel PA-C [id number]

## 2018-12-20 NOTE — CONSULT NOTE ADULT - SUBJECTIVE AND OBJECTIVE BOX
Wound SURGERY CONSULT NOTE    HPI:  77 yo woman with PMH of CAD, Afib on Pradaxa, CHF, HLD, COPD on 2L O2 intermittently at home presents from home in setting of increased lower extremity swelling. Patient states that she has chronic bilateral leg swelling. Lasix dose was increased from 40 to 60 mg. Patient states that she noticed the right leg was becoming larger, more red and painful. It has been increasingly difficult for patient to ambulate with walker. Denies trauma, fevers, chills, sweats. Denies history of skin infections. Per patient, states SOB is at baseline. Does endorse SOB at exertion. Endorses dry cough, denies rhinorrhea, sore throat. Denies CP, palpitations.     Wound consult requested to assist w/ management of RLE wound & cellulitis. Pt doesn't remember banging leg or hasn't had toenails clipped in long time- months.  no bug bite.  No drainage, odor, warmth, f/c/s noted. RLE  redness, warmth, pain improved since admission/ abx.  All questions asked and answered to pt's satisfaction.         PAST MEDICAL & SURGICAL HISTORY:  MI, old: mild as per pt  Anxiety  TIA (transient ischemic attack): many years ago  PAF (paroxysmal atrial fibrillation)  HLD (hyperlipidemia)  HTN (hypertension)  s/p cataract surgery: b/l  s/p  repair of hip fracture: luisa placed in RLE  s/p spinal fusion: c2-6 in 1/17      REVIEW OF SYSTEMS  Skin/ MSK: see HPI  All other systems negative    MEDICATIONS  (STANDING):  acetaminophen   Tablet .. 650 milliGRAM(s) Oral every 6 hours  ampicillin/sulbactam  IVPB 3 Gram(s) IV Intermittent every 12 hours  ampicillin/sulbactam  IVPB      atorvastatin 40 milliGRAM(s) Oral at bedtime  BACItracin   Ointment 1 Application(s) Topical two times a day  buDESOnide  80 MICROgram(s)/formoterol 4.5 MICROgram(s) Inhaler 2 Puff(s) Inhalation two times a day  clopidogrel Tablet 75 milliGRAM(s) Oral daily  dabigatran 75 milliGRAM(s) Oral every 12 hours  furosemide   Injectable 40 milliGRAM(s) IV Push every 12 hours  lactobacillus acidophilus 1 Tablet(s) Oral daily  metoprolol tartrate 75 milliGRAM(s) Oral two times a day    MEDICATIONS  (PRN):  oxyCODONE    IR 5 milliGRAM(s) Oral every 4 hours PRN Severe Pain (7 - 10)    No Known Allergies    SOCIAL HISTORY:   ; (+)HHA; Former smoker, Denies smoking, ETOH, drugs    FAMILY HISTORY:  No pertinent family history in first degree relatives      Vital Signs Last 24 Hrs  T(C): 36.2 (20 Dec 2018 14:57), Max: 36.9 (20 Dec 2018 04:30)  T(F): 97.1 (20 Dec 2018 14:57), Max: 98.4 (20 Dec 2018 04:30)  HR: 101 (20 Dec 2018 14:57) (69 - 101)  BP: 96/65 (20 Dec 2018 14:57) (96/65 - 118/69)  BP(mean): --  RR: 16 (20 Dec 2018 14:57) (16 - 18)  SpO2: 94% (20 Dec 2018 14:57) (94% - 98%)    NAD / A&Ox3/  frail  WD/ WN/ Disheveled   [facility] P500 bed    Cardiovascular: RRR     Respiratory: CTA    Gastrointestinal soft NT/ND (+)BS      Neurology  weakened strength & sensation grossly intact    Musculoskeletal/Vascular:  FROMx4  R>LE edema   (+) DP/PT pulses palpable  BLE equally warm  no acute ischemia noted  hemosiderin staining  Rt (posterior) achilles- 1cm round sunken dried eschar w/o drainage  RLE w/ pale blanchable erythema  No odor,  increased warmth, tenderness, induration, fluctuance    Skin:  moist w/ good turgor        LABS:  12-20    138  |  100  |  29<H>  ----------------------------<  95  3.4<L>   |  27  |  1.33<H>    Ca    8.7      20 Dec 2018 06:13  Phos  4.2     12-19  Mg     2.0     12-19    TPro  8.0  /  Alb  3.2<L>  /  TBili  0.5  /  DBili  x   /  AST  22  /  ALT  12  /  AlkPhos  129<H>  12-18                          11.3   7.9   )-----------( 194      ( 20 Dec 2018 06:13 )             31.3     PT/INR - ( 18 Dec 2018 18:24 )   PT: 21.4 sec;   INR: 1.83 ratio    PTT - ( 18 Dec 2018 18:24 )  PTT:89.6 sec      RADIOLOGY & ADDITIONAL STUDIES:      < from: Xray Chest 1 View AP/PA (12.19.18 @ 09:40) >  Findings: The cardiac silhouette is normal in size. There are no focal   consolidations or pleural effusions. There is mild pulmonary vascular   congestion.    Impression: Mild pulmonary vascular congestion.    < from: VA Duplex Lower Ext Vein Scan, Right (12.18.18 @ 19:54) >  FINDINGS:    There is normal compressibility of the right common femoral, femoral and   popliteal veins. No calf vein thrombosis is detected.    The contralateral common femoral vein is patent.    Doppler examination shows normal spontaneous and phasic flow.    Mild subcutaneous edema.    IMPRESSION:     No evidence of right lower extremity deep venous thrombosis.    Mild subcutaneous edema.    < from: Xray Tibia + Fibula 2 Views, Right (12.18.18 @ 17:42) >  FINDINGS:  No acute fracture or dislocation.  Preserved joint spaces.  Vascular calcifications are noted. Soft tissue edema.    IMPRESSION:  No radiographic evidence of advanced osteomyelitis.

## 2018-12-20 NOTE — PROGRESS NOTE ADULT - SUBJECTIVE AND OBJECTIVE BOX
Dr. Gokul Acevedo  Internal Medicine PGY-1   Pager# [phone]    Patient is a 78y old  Female who presents with a chief complaint of increased leg swelling Right >Left (19 Dec 2018 12:51)      SUBJECTIVE / OVERNIGHT EVENTS: Overnight, no acute events and pt was HD stable.     MEDICATIONS  (STANDING):  ampicillin/sulbactam  IVPB 3 Gram(s) IV Intermittent every 12 hours  ampicillin/sulbactam  IVPB      atorvastatin 40 milliGRAM(s) Oral at bedtime  BACItracin   Ointment 1 Application(s) Topical two times a day  buDESOnide  80 MICROgram(s)/formoterol 4.5 MICROgram(s) Inhaler 2 Puff(s) Inhalation two times a day  clopidogrel Tablet 75 milliGRAM(s) Oral daily  dabigatran 75 milliGRAM(s) Oral every 12 hours  furosemide   Injectable 40 milliGRAM(s) IV Push every 12 hours  lactobacillus acidophilus 1 Tablet(s) Oral daily  metoprolol tartrate 75 milliGRAM(s) Oral two times a day    MEDICATIONS  (PRN):  acetaminophen   Tablet .. 650 milliGRAM(s) Oral every 6 hours PRN Mild Pain (1 - 3)      Vital Signs Last 24 Hrs  T(C): 36.9 (20 Dec 2018 04:30), Max: 36.9 (20 Dec 2018 04:30)  T(F): 98.4 (20 Dec 2018 04:30), Max: 98.4 (20 Dec 2018 04:30)  HR: 69 (20 Dec 2018 05:58) (69 - 90)  BP: 117/68 (20 Dec 2018 05:58) (105/73 - 118/69)  BP(mean): --  RR: 18 (20 Dec 2018 04:30) (18 - 18)  SpO2: 97% (20 Dec 2018 05:16) (94% - 98%)  CAPILLARY BLOOD GLUCOSE        I&O's Summary    19 Dec 2018 07:01  -  20 Dec 2018 07:00  --------------------------------------------------------  IN: 200 mL / OUT: 900 mL / NET: -700 mL        PHYSICAL EXAM:  GENERAL: NAD, well-developed  HEAD:  Atraumatic, Normocephalic  EYES: EOMI, PERRLA, conjunctiva and sclera clear  NECK: Supple, No JVD  CHEST/LUNG: Clear to auscultation bilaterally; No wheeze  HEART: Regular rate and rhythm; No murmurs, rubs, or gallops  ABDOMEN: Soft, Nontender, Nondistended; Bowel sounds present  EXTREMITIES:  2+ Peripheral Pulses, No clubbing, cyanosis, or edema  PSYCH: AAOx3  NEUROLOGY: non-focal  SKIN: No rashes or lesions    LABS:                        11.3   7.9   )-----------( 194      ( 20 Dec 2018 06:13 )             31.3     12-20    138  |  100  |  29<H>  ----------------------------<  95  3.4<L>   |  27  |  1.33<H>    Ca    8.7      20 Dec 2018 06:13  Phos  4.2     12-19  Mg     2.0     12-19    TPro  8.0  /  Alb  3.2<L>  /  TBili  0.5  /  DBili  x   /  AST  22  /  ALT  12  /  AlkPhos  129<H>  12-18    PT/INR - ( 18 Dec 2018 18:24 )   PT: 21.4 sec;   INR: 1.83 ratio         PTT - ( 18 Dec 2018 18:24 )  PTT:89.6 sec          RADIOLOGY & ADDITIONAL TESTS:    Imaging Personally Reviewed:    Consultant(s) Notes Reviewed:      Care Discussed with Consultants/Other Providers: Dr. Gokul Acevedo  Internal Medicine PGY-1   Pager# [phone]    Patient is a 78y old  Female who presents with a chief complaint of increased leg swelling Right >Left (19 Dec 2018 12:51)      SUBJECTIVE / OVERNIGHT EVENTS: Overnight, no acute events and pt was HD stable. RT LE pain was 5/10. Denies urinary sx. Denies n/v/f/c/h/d.    MEDICATIONS  (STANDING):  ampicillin/sulbactam  IVPB 3 Gram(s) IV Intermittent every 12 hours  ampicillin/sulbactam  IVPB      atorvastatin 40 milliGRAM(s) Oral at bedtime  BACItracin   Ointment 1 Application(s) Topical two times a day  buDESOnide  80 MICROgram(s)/formoterol 4.5 MICROgram(s) Inhaler 2 Puff(s) Inhalation two times a day  clopidogrel Tablet 75 milliGRAM(s) Oral daily  dabigatran 75 milliGRAM(s) Oral every 12 hours  furosemide   Injectable 40 milliGRAM(s) IV Push every 12 hours  lactobacillus acidophilus 1 Tablet(s) Oral daily  metoprolol tartrate 75 milliGRAM(s) Oral two times a day    MEDICATIONS  (PRN):  acetaminophen   Tablet .. 650 milliGRAM(s) Oral every 6 hours PRN Mild Pain (1 - 3)      Vital Signs Last 24 Hrs  T(C): 36.9 (20 Dec 2018 04:30), Max: 36.9 (20 Dec 2018 04:30)  T(F): 98.4 (20 Dec 2018 04:30), Max: 98.4 (20 Dec 2018 04:30)  HR: 69 (20 Dec 2018 05:58) (69 - 90)  BP: 117/68 (20 Dec 2018 05:58) (105/73 - 118/69)  BP(mean): --  RR: 18 (20 Dec 2018 04:30) (18 - 18)  SpO2: 97% (20 Dec 2018 05:16) (94% - 98%)  CAPILLARY BLOOD GLUCOSE        I&O's Summary    19 Dec 2018 07:01  -  20 Dec 2018 07:00  --------------------------------------------------------  IN: 200 mL / OUT: 900 mL / NET: -700 mL        PHYSICAL EXAM:  GENERAL: NAD, well-developed  CHEST/LUNG: Clear to auscultation bilaterally; No wheeze  HEART: Regular rate and rhythm; No murmurs, rubs, or gallops  ABDOMEN: Soft, Nontender, Nondistended; Bowel sounds present  EXTREMITIES:  2+ Peripheral Pulses. RT LE achilles eschar tender to touch  PSYCH: AAOx3  NEUROLOGY: non-focal  SKIN: No rashes or lesions    LABS:                        11.3   7.9   )-----------( 194      ( 20 Dec 2018 06:13 )             31.3     12-20    138  |  100  |  29<H>  ----------------------------<  95  3.4<L>   |  27  |  1.33<H>    Ca    8.7      20 Dec 2018 06:13  Phos  4.2     12-19  Mg     2.0     12-19    TPro  8.0  /  Alb  3.2<L>  /  TBili  0.5  /  DBili  x   /  AST  22  /  ALT  12  /  AlkPhos  129<H>  12-18    PT/INR - ( 18 Dec 2018 18:24 )   PT: 21.4 sec;   INR: 1.83 ratio         PTT - ( 18 Dec 2018 18:24 )  PTT:89.6 sec          RADIOLOGY & ADDITIONAL TESTS:    Imaging Personally Reviewed:    Consultant(s) Notes Reviewed:      Care Discussed with Consultants/Other Providers:

## 2018-12-20 NOTE — PHYSICAL THERAPY INITIAL EVALUATION ADULT - PRECAUTIONS/LIMITATIONS, REHAB EVAL
It has been increasingly difficult for patient to ambulate with walker. X-ray R Tib/Fib on 12/18: (-) OM. VA Duplex RLE (-) DVT. Pt a/w cellulitis of RLE and SOB. CXR (12/19)- Mild pulmonary vascular congestion./fall precautions

## 2018-12-20 NOTE — PHYSICAL THERAPY INITIAL EVALUATION ADULT - PLANNED THERAPY INTERVENTIONS, PT EVAL
gait training/stair negotiation GOAL: pt will ascend/descend 20 steps (I) with U HR and step to pattern in 4 weeks/strengthening/balance training/bed mobility training/transfer training

## 2018-12-21 LAB
ANION GAP SERPL CALC-SCNC: 11 MMOL/L — SIGNIFICANT CHANGE UP (ref 5–17)
BUN SERPL-MCNC: 36 MG/DL — HIGH (ref 7–23)
CALCIUM SERPL-MCNC: 8.6 MG/DL — SIGNIFICANT CHANGE UP (ref 8.4–10.5)
CHLORIDE SERPL-SCNC: 98 MMOL/L — SIGNIFICANT CHANGE UP (ref 96–108)
CO2 SERPL-SCNC: 27 MMOL/L — SIGNIFICANT CHANGE UP (ref 22–31)
CREAT SERPL-MCNC: 1.53 MG/DL — HIGH (ref 0.5–1.3)
GLUCOSE SERPL-MCNC: 93 MG/DL — SIGNIFICANT CHANGE UP (ref 70–99)
HCT VFR BLD CALC: 31.1 % — LOW (ref 34.5–45)
HGB BLD-MCNC: 10.9 G/DL — LOW (ref 11.5–15.5)
MCHC RBC-ENTMCNC: 31.8 PG — SIGNIFICANT CHANGE UP (ref 27–34)
MCHC RBC-ENTMCNC: 35.2 GM/DL — SIGNIFICANT CHANGE UP (ref 32–36)
MCV RBC AUTO: 90.3 FL — SIGNIFICANT CHANGE UP (ref 80–100)
PLATELET # BLD AUTO: 180 K/UL — SIGNIFICANT CHANGE UP (ref 150–400)
POTASSIUM SERPL-MCNC: 4.4 MMOL/L — SIGNIFICANT CHANGE UP (ref 3.5–5.3)
POTASSIUM SERPL-SCNC: 4.4 MMOL/L — SIGNIFICANT CHANGE UP (ref 3.5–5.3)
RBC # BLD: 3.44 M/UL — LOW (ref 3.8–5.2)
RBC # FLD: 15.8 % — HIGH (ref 10.3–14.5)
SODIUM SERPL-SCNC: 136 MMOL/L — SIGNIFICANT CHANGE UP (ref 135–145)
WBC # BLD: 5.2 K/UL — SIGNIFICANT CHANGE UP (ref 3.8–10.5)
WBC # FLD AUTO: 5.2 K/UL — SIGNIFICANT CHANGE UP (ref 3.8–10.5)

## 2018-12-21 PROCEDURE — 99233 SBSQ HOSP IP/OBS HIGH 50: CPT | Mod: GC

## 2018-12-21 PROCEDURE — 73723 MRI JOINT LWR EXTR W/O&W/DYE: CPT | Mod: 26,RT

## 2018-12-21 PROCEDURE — 93306 TTE W/DOPPLER COMPLETE: CPT | Mod: 26

## 2018-12-21 RX ORDER — METOPROLOL TARTRATE 50 MG
75 TABLET ORAL
Qty: 0 | Refills: 0 | Status: DISCONTINUED | OUTPATIENT
Start: 2018-12-21 | End: 2018-12-23

## 2018-12-21 RX ORDER — TRAMADOL HYDROCHLORIDE 50 MG/1
25 TABLET ORAL EVERY 6 HOURS
Qty: 0 | Refills: 0 | Status: DISCONTINUED | OUTPATIENT
Start: 2018-12-21 | End: 2018-12-23

## 2018-12-21 RX ORDER — FUROSEMIDE 40 MG
40 TABLET ORAL
Qty: 0 | Refills: 0 | Status: DISCONTINUED | OUTPATIENT
Start: 2018-12-21 | End: 2018-12-23

## 2018-12-21 RX ADMIN — TRAMADOL HYDROCHLORIDE 25 MILLIGRAM(S): 50 TABLET ORAL at 13:58

## 2018-12-21 RX ADMIN — Medication 650 MILLIGRAM(S): at 13:25

## 2018-12-21 RX ADMIN — TRAMADOL HYDROCHLORIDE 25 MILLIGRAM(S): 50 TABLET ORAL at 14:58

## 2018-12-21 RX ADMIN — OXYCODONE HYDROCHLORIDE 5 MILLIGRAM(S): 5 TABLET ORAL at 20:06

## 2018-12-21 RX ADMIN — ATORVASTATIN CALCIUM 40 MILLIGRAM(S): 80 TABLET, FILM COATED ORAL at 21:41

## 2018-12-21 RX ADMIN — Medication 1 APPLICATION(S): at 19:59

## 2018-12-21 RX ADMIN — Medication 1 APPLICATION(S): at 06:49

## 2018-12-21 RX ADMIN — DABIGATRAN ETEXILATE MESYLATE 75 MILLIGRAM(S): 150 CAPSULE ORAL at 06:49

## 2018-12-21 RX ADMIN — CLOPIDOGREL BISULFATE 75 MILLIGRAM(S): 75 TABLET, FILM COATED ORAL at 11:45

## 2018-12-21 RX ADMIN — Medication 650 MILLIGRAM(S): at 11:45

## 2018-12-21 RX ADMIN — DABIGATRAN ETEXILATE MESYLATE 75 MILLIGRAM(S): 150 CAPSULE ORAL at 19:59

## 2018-12-21 RX ADMIN — OXYCODONE HYDROCHLORIDE 5 MILLIGRAM(S): 5 TABLET ORAL at 20:30

## 2018-12-21 RX ADMIN — AMPICILLIN SODIUM AND SULBACTAM SODIUM 200 GRAM(S): 250; 125 INJECTION, POWDER, FOR SUSPENSION INTRAMUSCULAR; INTRAVENOUS at 20:06

## 2018-12-21 RX ADMIN — AMPICILLIN SODIUM AND SULBACTAM SODIUM 200 GRAM(S): 250; 125 INJECTION, POWDER, FOR SUSPENSION INTRAMUSCULAR; INTRAVENOUS at 06:48

## 2018-12-21 RX ADMIN — Medication 650 MILLIGRAM(S): at 06:49

## 2018-12-21 RX ADMIN — Medication 75 MILLIGRAM(S): at 19:58

## 2018-12-21 RX ADMIN — Medication 1 TABLET(S): at 11:45

## 2018-12-21 RX ADMIN — Medication 40 MILLIGRAM(S): at 20:07

## 2018-12-21 RX ADMIN — BUDESONIDE AND FORMOTEROL FUMARATE DIHYDRATE 2 PUFF(S): 160; 4.5 AEROSOL RESPIRATORY (INHALATION) at 17:15

## 2018-12-21 RX ADMIN — BUDESONIDE AND FORMOTEROL FUMARATE DIHYDRATE 2 PUFF(S): 160; 4.5 AEROSOL RESPIRATORY (INHALATION) at 06:01

## 2018-12-21 RX ADMIN — Medication 40 MILLIGRAM(S): at 06:50

## 2018-12-21 RX ADMIN — Medication 650 MILLIGRAM(S): at 20:01

## 2018-12-21 NOTE — PROGRESS NOTE ADULT - SUBJECTIVE AND OBJECTIVE BOX
Dr. Gokul Acevedo  Internal Medicine PGY-1   Pager# [phone]    Patient is a 78y old  Female who presents with a chief complaint of increased leg swelling Right >Left (20 Dec 2018 15:44)      SUBJECTIVE / OVERNIGHT EVENTS:    MEDICATIONS  (STANDING):  acetaminophen   Tablet .. 650 milliGRAM(s) Oral every 6 hours  ampicillin/sulbactam  IVPB 3 Gram(s) IV Intermittent every 12 hours  ampicillin/sulbactam  IVPB      atorvastatin 40 milliGRAM(s) Oral at bedtime  BACItracin   Ointment 1 Application(s) Topical two times a day  buDESOnide  80 MICROgram(s)/formoterol 4.5 MICROgram(s) Inhaler 2 Puff(s) Inhalation two times a day  clopidogrel Tablet 75 milliGRAM(s) Oral daily  dabigatran 75 milliGRAM(s) Oral every 12 hours  furosemide   Injectable 40 milliGRAM(s) IV Push every 12 hours  lactobacillus acidophilus 1 Tablet(s) Oral daily  metoprolol tartrate 75 milliGRAM(s) Oral two times a day    MEDICATIONS  (PRN):  oxyCODONE    IR 5 milliGRAM(s) Oral every 4 hours PRN Severe Pain (7 - 10)      Vital Signs Last 24 Hrs  T(C): 35.7 (21 Dec 2018 04:37), Max: 36.3 (20 Dec 2018 21:58)  T(F): 96.3 (21 Dec 2018 04:37), Max: 97.4 (20 Dec 2018 21:58)  HR: 81 (21 Dec 2018 06:02) (81 - 101)  BP: 104/72 (21 Dec 2018 04:37) (96/65 - 105/71)  BP(mean): --  RR: 18 (21 Dec 2018 04:37) (16 - 18)  SpO2: 95% (21 Dec 2018 06:02) (92% - 97%)  CAPILLARY BLOOD GLUCOSE        I&O's Summary    20 Dec 2018 07:01  -  21 Dec 2018 07:00  --------------------------------------------------------  IN: 580 mL / OUT: 700 mL / NET: -120 mL        PHYSICAL EXAM:  GENERAL: NAD, well-developed  HEAD:  Atraumatic, Normocephalic  EYES: EOMI, PERRLA, conjunctiva and sclera clear  NECK: Supple, No JVD  CHEST/LUNG: Clear to auscultation bilaterally; No wheeze  HEART: Regular rate and rhythm; No murmurs, rubs, or gallops  ABDOMEN: Soft, Nontender, Nondistended; Bowel sounds present  EXTREMITIES:  2+ Peripheral Pulses, No clubbing, cyanosis, or edema  PSYCH: AAOx3  NEUROLOGY: non-focal  SKIN: No rashes or lesions    LABS:                        10.9   5.2   )-----------( 180      ( 21 Dec 2018 07:02 )             31.1     12-21    136  |  98  |  36<H>  ----------------------------<  93  4.4   |  27  |  1.53<H>    Ca    8.6      21 Dec 2018 07:02                RADIOLOGY & ADDITIONAL TESTS:    Imaging Personally Reviewed:    Consultant(s) Notes Reviewed:      Care Discussed with Consultants/Other Providers: Dr. Gokul Acevedo  Internal Medicine PGY-1   Pager# [phone]    Patient is a 78y old  Female who presents with a chief complaint of increased leg swelling Right >Left (20 Dec 2018 15:44)      SUBJECTIVE / OVERNIGHT EVENTS: Overnight, no acute events. Pt was HD stable.     MEDICATIONS  (STANDING):  acetaminophen   Tablet .. 650 milliGRAM(s) Oral every 6 hours  ampicillin/sulbactam  IVPB 3 Gram(s) IV Intermittent every 12 hours  ampicillin/sulbactam  IVPB      atorvastatin 40 milliGRAM(s) Oral at bedtime  BACItracin   Ointment 1 Application(s) Topical two times a day  buDESOnide  80 MICROgram(s)/formoterol 4.5 MICROgram(s) Inhaler 2 Puff(s) Inhalation two times a day  clopidogrel Tablet 75 milliGRAM(s) Oral daily  dabigatran 75 milliGRAM(s) Oral every 12 hours  furosemide   Injectable 40 milliGRAM(s) IV Push every 12 hours  lactobacillus acidophilus 1 Tablet(s) Oral daily  metoprolol tartrate 75 milliGRAM(s) Oral two times a day    MEDICATIONS  (PRN):  oxyCODONE    IR 5 milliGRAM(s) Oral every 4 hours PRN Severe Pain (7 - 10)      Vital Signs Last 24 Hrs  T(C): 35.7 (21 Dec 2018 04:37), Max: 36.3 (20 Dec 2018 21:58)  T(F): 96.3 (21 Dec 2018 04:37), Max: 97.4 (20 Dec 2018 21:58)  HR: 81 (21 Dec 2018 06:02) (81 - 101)  BP: 104/72 (21 Dec 2018 04:37) (96/65 - 105/71)  BP(mean): --  RR: 18 (21 Dec 2018 04:37) (16 - 18)  SpO2: 95% (21 Dec 2018 06:02) (92% - 97%)  CAPILLARY BLOOD GLUCOSE        I&O's Summary    20 Dec 2018 07:01  -  21 Dec 2018 07:00  --------------------------------------------------------  IN: 580 mL / OUT: 700 mL / NET: -120 mL        PHYSICAL EXAM:  GENERAL: NAD, well-developed  HEAD:  Atraumatic, Normocephalic  EYES: EOMI, PERRLA, conjunctiva and sclera clear  NECK: Supple, No JVD  CHEST/LUNG: Clear to auscultation bilaterally; No wheeze  HEART: Regular rate and rhythm; No murmurs, rubs, or gallops  ABDOMEN: Soft, Nontender, Nondistended; Bowel sounds present  EXTREMITIES:  2+ Peripheral Pulses, No clubbing, cyanosis, or edema  PSYCH: AAOx3  NEUROLOGY: non-focal  SKIN: No rashes or lesions    LABS:                        10.9   5.2   )-----------( 180      ( 21 Dec 2018 07:02 )             31.1     12-21    136  |  98  |  36<H>  ----------------------------<  93  4.4   |  27  |  1.53<H>    Ca    8.6      21 Dec 2018 07:02                RADIOLOGY & ADDITIONAL TESTS:    Imaging Personally Reviewed:    Consultant(s) Notes Reviewed:      Care Discussed with Consultants/Other Providers: Dr. Gokul Acevedo  Internal Medicine PGY-1   Pager# [phone]    Patient is a 78y old  Female who presents with a chief complaint of increased leg swelling Right >Left (20 Dec 2018 15:44)      SUBJECTIVE / OVERNIGHT EVENTS: Overnight, no acute events. Pt was HD stable. Reports 5/10 RT LE pain.    MEDICATIONS  (STANDING):  acetaminophen   Tablet .. 650 milliGRAM(s) Oral every 6 hours  ampicillin/sulbactam  IVPB 3 Gram(s) IV Intermittent every 12 hours  ampicillin/sulbactam  IVPB      atorvastatin 40 milliGRAM(s) Oral at bedtime  BACItracin   Ointment 1 Application(s) Topical two times a day  buDESOnide  80 MICROgram(s)/formoterol 4.5 MICROgram(s) Inhaler 2 Puff(s) Inhalation two times a day  clopidogrel Tablet 75 milliGRAM(s) Oral daily  dabigatran 75 milliGRAM(s) Oral every 12 hours  furosemide   Injectable 40 milliGRAM(s) IV Push every 12 hours  lactobacillus acidophilus 1 Tablet(s) Oral daily  metoprolol tartrate 75 milliGRAM(s) Oral two times a day    MEDICATIONS  (PRN):  oxyCODONE    IR 5 milliGRAM(s) Oral every 4 hours PRN Severe Pain (7 - 10)      Vital Signs Last 24 Hrs  T(C): 35.7 (21 Dec 2018 04:37), Max: 36.3 (20 Dec 2018 21:58)  T(F): 96.3 (21 Dec 2018 04:37), Max: 97.4 (20 Dec 2018 21:58)  HR: 81 (21 Dec 2018 06:02) (81 - 101)  BP: 104/72 (21 Dec 2018 04:37) (96/65 - 105/71)  BP(mean): --  RR: 18 (21 Dec 2018 04:37) (16 - 18)  SpO2: 95% (21 Dec 2018 06:02) (92% - 97%)  CAPILLARY BLOOD GLUCOSE        I&O's Summary    20 Dec 2018 07:01  -  21 Dec 2018 07:00  --------------------------------------------------------  IN: 580 mL / OUT: 700 mL / NET: -120 mL        PHYSICAL EXAM:  GENERAL: NAD, well-developed  CHEST/LUNG: Clear to auscultation bilaterally; No wheeze  HEART: Regular rate and rhythm; No murmurs, rubs, or gallops  ABDOMEN: Soft, Nontender, Nondistended; Bowel sounds present  EXTREMITIES:  2+ Peripheral Pulses. RT LE tender and erythematous to touch, non-erythematous  PSYCH: AAOx3  NEUROLOGY: non-focal  SKIN: No rashes or lesions    LABS:                        10.9   5.2   )-----------( 180      ( 21 Dec 2018 07:02 )             31.1     12-21    136  |  98  |  36<H>  ----------------------------<  93  4.4   |  27  |  1.53<H>    Ca    8.6      21 Dec 2018 07:02                RADIOLOGY & ADDITIONAL TESTS:    Imaging Personally Reviewed:    Consultant(s) Notes Reviewed:      Care Discussed with Consultants/Other Providers:

## 2018-12-21 NOTE — CDI QUERY NOTE - [id number]_GEN_ALL_CORE_HH
Clinical documentation indicates that this patient has CHF.  Please include more specific documentation, either known or suspected, of the acuity, type and underlying cause of Heart Failure in your Progress Note and/or Discharge Summary.      Specify TYPE:  Systolic (HFrEF)  Diastolic (HFpEF)  Combined Systolic (HFrEF)  & Diastolic (HFpEF)  Right Heart Failure  Left Heart Failure  Biventricular Heart Failure (include the systolic and diastolic also)  Other (specify)      SUPPORTING DOCUMENTATION AND/OR CLINICAL EVIDENCE:    Medications Cardiovascular (MAR):   furosemide   Injectable 40 milliGRAM(s) IV Push every 12 hours  metoprolol tartrate 75 milliGRAM(s) Oral two times a day        1/31/18 ECHOCARDIOGRAM:   PROCEDURE: Transthoracic echocardiogram with 2-D, M-Mode  and complete spectral and color flow Doppler.  INDICATION: Unspecified atrial fibrillation (I48.91),  Unspecified atrial flutter (I48.92), Essential (primary)  hypertension (I10)  ------------------------------------------------------------------------  DIMENSIONS:  Dimensions:     Normal Values:  LA:     4.6 cm    2.0 - 4.0 cm  Ao:     3.2 cm    2.0 - 3.8 cm  SEPTUM: 1.0 cm    0.6 - 1.2 cm  PWT:    0.8 cm    0.6 - 1.1 cm  LVIDd:  4.2 cm    3.0 - 5.6 cm  LVIDs:  2.8 cm    1.8 - 4.0 cm  Derived Variables:  LVMI: 84 g/m2  RWT: 0.38  Fractional short: 33 %  Ejection Fraction (Teicholtz): 62 %  ------------------------------------------------------------------------  OBSERVATIONS:  Mitral Valve: Mitral annular calcification, otherwise  normal mitral valve. Mild-moderate mitral regurgitation.  Aortic Root: Normal aortic root.  Aortic Valve: Calcified trileaflet aortic valve with normal  opening. Mild aortic regurgitation.  Left Atrium: Moderately dilated left atrium.  LA volume  index = 42 cc/m2.  Left Ventricle: Normal left ventricular systolic function.  No segmental wall motion abnormalities. Normal left  ventricular internal dimensions and wall thicknesses.  Right Heart: Mild right atrial enlargement. Normal right  ventricular size and function. Normal tricuspid valve.  Mild tricuspid regurgitation. Normal pulmonic valve.  Minimal pulmonic regurgitation.  Pericardium/PleuraNormal pericardium with no pericardial  effusion.  ------------------------------------------------------------------------  CONCLUSIONS:  1. Mitral annular calcification, otherwise normal mitral  valve. Mild-moderate mitral regurgitation.  2. Calcified trileaflet aortic valve with normal opening.  Mild aortic regurgitation.  3. Moderately dilated left atrium.  LA volume index = 42  cc/m2.  4. Normal left ventricular internal dimensions and wall  thicknesses.  5. Normal left ventricular systolic function. No segmental  wall motion abnormalities.  6. Normal right ventricular size and function.  ------------------------------------------------------------------------  Confirmed on  1/31/2018 - 14:41:20 by Baltazar Orosco M.D.

## 2018-12-21 NOTE — PROGRESS NOTE ADULT - ATTENDING COMMENTS
Acute on Chronic HFpEF - breathing improved with diuresis, todays repeat TTE is showing worsening Right systolic function. c/w lasix 40mg BID Acute on Chronic HFpEF - breathing improved with diuresis, todays repeat TTE is showing worsening Right systolic function. c/w lasix 40mg BID  Cellulitis improving, awaiting MRI of foot.

## 2018-12-21 NOTE — PROGRESS NOTE ADULT - PROBLEM SELECTOR PLAN 2
pt with acute on chronic respiratory failure presumed to be from acute on chronic heart failure.  - Patient hypoxic to 88% on RA. Responding well on supplemental O2. Not in respiratory distress  - CXR (12/19)- Mild pulmonary vascular congestion.  - Daily weights  - Strict I/O  - Continue with Symbicort, Nebs PRN  Supplemental O2  - Respiratory symptoms improved with diuresis, c/w IV lasix 40 mg Q12H  - f/u TTE to eval cardiac function, last TTE was in january which showed Mild-moderate mitral regurgitation, normal EF, Normal LV fxn and thickness.

## 2018-12-21 NOTE — PROGRESS NOTE ADULT - PROBLEM SELECTOR PLAN 3
presumed to be secondary to cardiorenal. Creatinine is improving with Diuresis, c.w lasix as above.  Monitor I/O  Renally dose medications presumed to be secondary to cardiorenal. Creatinine trending up today. will continue to monitor, will transition to PO lasix and monitor for improvement.   Monitor I/O  Renally dose medications

## 2018-12-21 NOTE — PROGRESS NOTE ADULT - PROBLEM SELECTOR PLAN 1
- Patient's Right LE with redness, warmth and swelling. Suggestive of nonpurulent cellulitis  - s/p clindamycin in ED  - given that cellulitis is below the waist will change Unasyn to add anaerobic coverage as well. wound care with bacitracin  - c/w IV Unasyn (D3)  - pain control with Tylenol 650 mg Q6H PRN  - f/u MRI RT ankle because patient is having significant tenderness to palpation we would like to rule out tendon involvement. there was no evidence of osteo on the foot xray. - Patient's Right LE with redness, warmth and swelling. Suggestive of nonpurulent cellulitis  - s/p clindamycin in ED  - given that cellulitis is below the waist will change Unasyn to add anaerobic coverage as well. wound care with bacitracin  - c/w IV Unasyn (D3)  - pain control with Tylenol 650 mg Q6H PRN  - f/u MRI RT ankle because patient is having significant tenderness to palpation we would like to rule out tendon involvement. there was no evidence of osteo on the foot xray.  - f[facility] [phone] - Patient's Right LE with redness, warmth and swelling. Suggestive of nonpurulent cellulitis  - s/p clindamycin in ED  - given that cellulitis is below the waist will change Unasyn to add anaerobic coverage as well. wound care with bacitracin  - c/w IV Unasyn (D3)  - pain control with Tylenol 650 mg Q6H PRN and tramadol 25 mg Q6H PRN  - f/u MRI RT ankle because patient is having significant tenderness to palpation we would like to rule out tendon involvement. there was no evidence of osteo on the foot xray.  - f/u as outpatient at [facility] [phone]

## 2018-12-22 ENCOUNTER — TRANSCRIPTION ENCOUNTER (OUTPATIENT)
Age: 78
End: 2018-12-22

## 2018-12-22 LAB
ANION GAP SERPL CALC-SCNC: 11 MMOL/L — SIGNIFICANT CHANGE UP (ref 5–17)
BUN SERPL-MCNC: 35 MG/DL — HIGH (ref 7–23)
CALCIUM SERPL-MCNC: 9 MG/DL — SIGNIFICANT CHANGE UP (ref 8.4–10.5)
CHLORIDE SERPL-SCNC: 97 MMOL/L — SIGNIFICANT CHANGE UP (ref 96–108)
CO2 SERPL-SCNC: 26 MMOL/L — SIGNIFICANT CHANGE UP (ref 22–31)
CREAT SERPL-MCNC: 1.53 MG/DL — HIGH (ref 0.5–1.3)
GLUCOSE SERPL-MCNC: 92 MG/DL — SIGNIFICANT CHANGE UP (ref 70–99)
HCT VFR BLD CALC: 30.3 % — LOW (ref 34.5–45)
HGB BLD-MCNC: 11.1 G/DL — LOW (ref 11.5–15.5)
MCHC RBC-ENTMCNC: 32.7 PG — SIGNIFICANT CHANGE UP (ref 27–34)
MCHC RBC-ENTMCNC: 36.6 GM/DL — HIGH (ref 32–36)
MCV RBC AUTO: 89.6 FL — SIGNIFICANT CHANGE UP (ref 80–100)
PLATELET # BLD AUTO: 192 K/UL — SIGNIFICANT CHANGE UP (ref 150–400)
POTASSIUM SERPL-MCNC: 4.2 MMOL/L — SIGNIFICANT CHANGE UP (ref 3.5–5.3)
POTASSIUM SERPL-SCNC: 4.2 MMOL/L — SIGNIFICANT CHANGE UP (ref 3.5–5.3)
RBC # BLD: 3.38 M/UL — LOW (ref 3.8–5.2)
RBC # FLD: 15.8 % — HIGH (ref 10.3–14.5)
SODIUM SERPL-SCNC: 134 MMOL/L — LOW (ref 135–145)
WBC # BLD: 5.7 K/UL — SIGNIFICANT CHANGE UP (ref 3.8–10.5)
WBC # FLD AUTO: 5.7 K/UL — SIGNIFICANT CHANGE UP (ref 3.8–10.5)

## 2018-12-22 PROCEDURE — 99233 SBSQ HOSP IP/OBS HIGH 50: CPT | Mod: GC

## 2018-12-22 RX ORDER — ATORVASTATIN CALCIUM 80 MG/1
1 TABLET, FILM COATED ORAL
Qty: 0 | Refills: 0 | COMMUNITY

## 2018-12-22 RX ORDER — CLOPIDOGREL BISULFATE 75 MG/1
1 TABLET, FILM COATED ORAL
Qty: 30 | Refills: 0 | OUTPATIENT
Start: 2018-12-22 | End: 2019-01-20

## 2018-12-22 RX ORDER — BUDESONIDE AND FORMOTEROL FUMARATE DIHYDRATE 160; 4.5 UG/1; UG/1
1 AEROSOL RESPIRATORY (INHALATION)
Qty: 0 | Refills: 0 | COMMUNITY

## 2018-12-22 RX ORDER — DABIGATRAN ETEXILATE MESYLATE 150 MG/1
1 CAPSULE ORAL
Qty: 60 | Refills: 0 | OUTPATIENT
Start: 2018-12-22 | End: 2019-01-20

## 2018-12-22 RX ORDER — CLOPIDOGREL BISULFATE 75 MG/1
1 TABLET, FILM COATED ORAL
Qty: 0 | Refills: 0 | COMMUNITY

## 2018-12-22 RX ORDER — ATORVASTATIN CALCIUM 80 MG/1
1 TABLET, FILM COATED ORAL
Qty: 30 | Refills: 0 | OUTPATIENT
Start: 2018-12-22 | End: 2019-01-20

## 2018-12-22 RX ORDER — BUDESONIDE AND FORMOTEROL FUMARATE DIHYDRATE 160; 4.5 UG/1; UG/1
1 AEROSOL RESPIRATORY (INHALATION)
Qty: 1 | Refills: 0 | OUTPATIENT
Start: 2018-12-22

## 2018-12-22 RX ORDER — FUROSEMIDE 40 MG
1.5 TABLET ORAL
Qty: 0 | Refills: 0 | COMMUNITY

## 2018-12-22 RX ORDER — BACITRACIN ZINC 500 UNIT/G
1 OINTMENT IN PACKET (EA) TOPICAL
Qty: 1 | Refills: 0 | OUTPATIENT
Start: 2018-12-22 | End: 2019-01-20

## 2018-12-22 RX ORDER — TRAMADOL HYDROCHLORIDE 50 MG/1
0.5 TABLET ORAL
Qty: 28 | Refills: 0 | OUTPATIENT
Start: 2018-12-22 | End: 2019-01-04

## 2018-12-22 RX ORDER — METOPROLOL TARTRATE 50 MG
3 TABLET ORAL
Qty: 180 | Refills: 0 | OUTPATIENT
Start: 2018-12-22 | End: 2019-01-20

## 2018-12-22 RX ORDER — DABIGATRAN ETEXILATE MESYLATE 150 MG/1
1 CAPSULE ORAL
Qty: 0 | Refills: 0 | COMMUNITY

## 2018-12-22 RX ORDER — FUROSEMIDE 40 MG
1 TABLET ORAL
Qty: 60 | Refills: 0 | OUTPATIENT
Start: 2018-12-22 | End: 2019-01-20

## 2018-12-22 RX ADMIN — Medication 1 TABLET(S): at 21:01

## 2018-12-22 RX ADMIN — DABIGATRAN ETEXILATE MESYLATE 75 MILLIGRAM(S): 150 CAPSULE ORAL at 08:53

## 2018-12-22 RX ADMIN — BUDESONIDE AND FORMOTEROL FUMARATE DIHYDRATE 2 PUFF(S): 160; 4.5 AEROSOL RESPIRATORY (INHALATION) at 05:58

## 2018-12-22 RX ADMIN — Medication 75 MILLIGRAM(S): at 08:52

## 2018-12-22 RX ADMIN — Medication 40 MILLIGRAM(S): at 21:01

## 2018-12-22 RX ADMIN — Medication 0.5 MILLIGRAM(S): at 02:59

## 2018-12-22 RX ADMIN — DABIGATRAN ETEXILATE MESYLATE 75 MILLIGRAM(S): 150 CAPSULE ORAL at 21:01

## 2018-12-22 RX ADMIN — Medication 1 TABLET(S): at 17:48

## 2018-12-22 RX ADMIN — Medication 650 MILLIGRAM(S): at 08:51

## 2018-12-22 RX ADMIN — Medication 650 MILLIGRAM(S): at 02:03

## 2018-12-22 RX ADMIN — BUDESONIDE AND FORMOTEROL FUMARATE DIHYDRATE 2 PUFF(S): 160; 4.5 AEROSOL RESPIRATORY (INHALATION) at 17:20

## 2018-12-22 RX ADMIN — AMPICILLIN SODIUM AND SULBACTAM SODIUM 200 GRAM(S): 250; 125 INJECTION, POWDER, FOR SUSPENSION INTRAMUSCULAR; INTRAVENOUS at 09:00

## 2018-12-22 RX ADMIN — Medication 75 MILLIGRAM(S): at 21:01

## 2018-12-22 RX ADMIN — Medication 650 MILLIGRAM(S): at 21:00

## 2018-12-22 RX ADMIN — Medication 1 APPLICATION(S): at 17:50

## 2018-12-22 RX ADMIN — Medication 40 MILLIGRAM(S): at 08:59

## 2018-12-22 RX ADMIN — Medication 1 APPLICATION(S): at 05:04

## 2018-12-22 RX ADMIN — ATORVASTATIN CALCIUM 40 MILLIGRAM(S): 80 TABLET, FILM COATED ORAL at 21:01

## 2018-12-22 RX ADMIN — CLOPIDOGREL BISULFATE 75 MILLIGRAM(S): 75 TABLET, FILM COATED ORAL at 17:50

## 2018-12-22 RX ADMIN — Medication 0.25 MILLIGRAM(S): at 08:50

## 2018-12-22 RX ADMIN — Medication 650 MILLIGRAM(S): at 14:49

## 2018-12-22 NOTE — DISCHARGE NOTE ADULT - PROVIDER TOKENS
TOKEN:'[id number]',TOKEN:'[id number]' TOKEN:'[id number]:'[id number]:'[id number]'

## 2018-12-22 NOTE — PROGRESS NOTE ADULT - PROBLEM SELECTOR PLAN 2
pt with acute on chronic respiratory failure presumed to be from acute on chronic heart failure.  - Patient hypoxic to 88% on RA. Responding well on supplemental O2. Not in respiratory distress  - CXR (12/19)- Mild pulmonary vascular congestion.  - Daily weights  - Strict I/O  - Continue with Symbicort, Nebs PRN. Supplemental O2  - Respiratory symptoms improved with diuresis, c/w IV lasix 40 mg Q12H  - Last TTE was in january which showed Mild-moderate mitral regurgitation, normal EF, Normal LV fxn and thickness.  - Repeat TTE (12/21)- Showed EF- 55%, showed severely dilated LA w/ mild concentric left ventricular hypertrophy. pt with acute on chronic respiratory failure presumed to be from acute on chronic heart failure.  - Patient hypoxic to 88% on RA. Responding well on supplemental O2. Not in respiratory distress  - CXR (12/19)- Mild pulmonary vascular congestion.  - Daily weights  - Strict I/O  - Continue with Symbicort, Nebs PRN. Supplemental O2  - Respiratory symptoms improved with diuresis, c/w IV lasix 40 mg Q12H  - Last TTE was in january which showed Mild-moderate mitral regurgitation, normal EF, Normal LV fxn and thickness.  - Repeat TTE (12/21)- Showed EF- 55%, showed severely dilated LA w/ mild concentric left ventricular hypertrophy.  - Cardiology consulted. However, pt wished to go home and will f/u outpatient with her cardiologist.

## 2018-12-22 NOTE — DISCHARGE NOTE ADULT - PATIENT PORTAL LINK FT
You can access the [facility] Patient [facility], offered by [facility], by registering with the following website: [email or web address]

## 2018-12-22 NOTE — PROGRESS NOTE ADULT - PROBLEM SELECTOR PLAN 3
presumed to be secondary to cardiorenal. Creatinine trending up today. will continue to monitor, will transition to PO lasix and monitor for improvement.   Monitor I/O  Renally dose medications

## 2018-12-22 NOTE — PROGRESS NOTE ADULT - PROBLEM SELECTOR PLAN 1
- Patient's Right LE with redness, warmth and swelling. Suggestive of nonpurulent cellulitis. No evidence of osteo on the foot xray.  - s/p clindamycin in ED  - given that cellulitis is below the waist will change Unasyn to add anaerobic coverage as well. wound care with bacitracin  - c/w IV Unasyn (D4)  - pain control with Tylenol 650 mg Q6H PRN and tramadol 25 mg Q6H PRN  - Pending MRI RT ankle read  - f/u as outpatient at [facility] [phone] - Patient's Right LE with redness, warmth and swelling. Suggestive of nonpurulent cellulitis. No evidence of osteo on the foot xray.  - s/p clindamycin in ED  - given that cellulitis is below the waist will change Unasyn to add anaerobic coverage as well. wound care with bacitracin  - c/w IV Unasyn (D4). Switched to Augmentin for 3 days to complete seven days  - pain control with Tylenol 650 mg Q6H PRN and tramadol 25 mg Q6H PRN  - MRI RT ankle- no evidence of abscess or osteomyelitis  - f/u as outpatient at [facility] [phone]

## 2018-12-22 NOTE — DISCHARGE NOTE ADULT - MEDICATION SUMMARY - MEDICATIONS TO CHANGE
I will SWITCH the dose or number of times a day I take the medications listed below when I get home from the hospital:    Lasix 40 mg oral tablet  -- 1.5 tab(s) by mouth once a day I will SWITCH the dose or number of times a day I take the medications listed below when I get home from the hospital:    Lasix 40 mg oral tablet  -- 1.5 tab(s) by mouth once a day    metoprolol tartrate 25 mg oral tablet  -- 3 tab(s) by mouth 2 times a day

## 2018-12-22 NOTE — DISCHARGE NOTE ADULT - SECONDARY DIAGNOSIS.
Shortness of breath IGOR (acute kidney injury) CAD (coronary artery disease) Atrial fibrillation HTN (hypertension) HLD (hyperlipidemia)

## 2018-12-22 NOTE — DISCHARGE NOTE ADULT - CARE PROVIDER_API CALL
Miguelina Leal), Cardiovascular Disease; Interventional Cardiology  [address] [place] [address]  Phone: [phone]  Fax: [phone]    Rashmi Cobian), Cardio [facility]  [address] [place] [address] [address] [place] [address]  Phone: [phone]  Fax: [phone] Miguelina Leal), Cardiovascular Disease; Interventional Cardiology  [address] [place] [address]  Phone: [phone]  Fax: [phone]    Rashmi Cobian), Cardio [facility]  [facility]  [address] [address] [place] [address] [address] [place] [address]  Phone: [phone]  Fax: [phone]    Bj Camilo), Cardiovascular Disease; Nuclear Cardiology  [address] [address] [place] [address]  [address]  [place] [address]  Phone: [phone]  Fax: [phone]

## 2018-12-22 NOTE — PROGRESS NOTE ADULT - ASSESSMENT
77 yo woman with PMH of CAD, Afib on Pradaxa, CHF, HLD, COPD on 2L O2 intermittently at home presents from home in setting of increased b/l lower extremity swelling and RT LE erythema and tenderness secondary to cellulitis. 79 yo woman with PMH of CAD, Afib on Pradaxa, CHF, HLD, COPD on 2L O2 intermittently at home presents from home in setting of increased b/l lower extremity swelling and RT LE erythema and tenderness secondary to cellulitis. Pt refused discharge to rehab and wanted to follow up outpatient with her cardiology. The risks of going home without nurse to manage her wound were explained, but pt was adamant on going home.

## 2018-12-22 NOTE — DISCHARGE NOTE ADULT - MEDICATION SUMMARY - MEDICATIONS TO TAKE
I will START or STAY ON the medications listed below when I get home from the hospital:    traMADol 50 mg oral tablet  -- 0.5 tab(s) by mouth every 6 hours, As needed, Moderate Pain (4 - 6) MDD:mdd- 100 mg  -- Indication: For Cellulitis    Pradaxa 75 mg oral capsule  -- 1 cap(s) by mouth 2 times a day  -- Indication: For Atrial fibrillation    LORazepam 0.5 mg oral tablet  -- Indication: For Prophylactic measure    atorvastatin 40 mg oral tablet  -- 1 tab(s) by mouth once a day  -- Indication: For HLD (hyperlipidemia)    Plavix 75 mg oral tablet  -- 1 tab(s) by mouth once a day  -- Indication: For CAD (coronary artery disease)    metoprolol tartrate 25 mg oral tablet  -- 3 tab(s) by mouth 2 times a day  -- Indication: For HTN (hypertension)    Symbicort 80 mcg-4.5 mcg/inh inhalation aerosol  -- 1 puff(s) inhaled 2 times a day  -- Indication: For Shortness of breath    Baciguent 500 units/g topical ointment  -- 1 application on skin 2 times a day  -- Indication: For Cellulitis    furosemide 40 mg oral tablet  -- 1 tab(s) by mouth 2 times a day  -- Indication: For Shortness of breath    amoxicillin-clavulanate 875 mg-125 mg oral tablet  -- 1 tab(s) by mouth 2 times a day   -- Finish all this medication unless otherwise directed by prescriber.  Take with food or milk.    -- Indication: For Cellulitis I will START or STAY ON the medications listed below when I get home from the hospital:    traMADol 50 mg oral tablet  -- 0.5 tab(s) by mouth every 6 hours, As needed, Moderate Pain (4 - 6) MDD:mdd- 100 mg  -- Indication: For Cellulitis    Pradaxa 75 mg oral capsule  -- 1 cap(s) by mouth 2 times a day  -- Indication: For Atrial fibrillation    LORazepam 0.5 mg oral tablet  -- Indication: For Prophylactic measure    atorvastatin 40 mg oral tablet  -- 1 tab(s) by mouth once a day  -- Indication: For HLD (hyperlipidemia)    Plavix 75 mg oral tablet  -- 1 tab(s) by mouth once a day  -- Indication: For CAD (coronary artery disease)    metoprolol tartrate 100 mg oral tablet  -- 1 tab(s) by mouth 2 times a day  -- Indication: For CHF    Symbicort 80 mcg-4.5 mcg/inh inhalation aerosol  -- 1 puff(s) inhaled 2 times a day  -- Indication: For Shortness of breath    Baciguent 500 units/g topical ointment  -- 1 application on skin 2 times a day  -- Indication: For Cellulitis    Lasix 20 mg oral tablet  -- 3 tab(s) by mouth 2 times a day   -- Avoid prolonged or excessive exposure to direct and/or artificial sunlight while taking this medication.  It is very important that you take or use this exactly as directed.  Do not skip doses or discontinue unless directed by your doctor.  It may be advisable to drink a full glass orange juice or eat a banana daily while taking this medication.    -- Indication: For CHF    amoxicillin-clavulanate 875 mg-125 mg oral tablet  -- 1 tab(s) by mouth 2 times a day   -- Finish all this medication unless otherwise directed by prescriber.  Take with food or milk.    -- Indication: For Cellulitis I will START or STAY ON the medications listed below when I get home from the hospital:    traMADol 50 mg oral tablet  -- 0.5 tab(s) by mouth every 6 hours, As needed, Moderate Pain (4 - 6) MDD:mdd- 100 mg  -- Indication: For Cellulitis    Pradaxa 75 mg oral capsule  -- 1 cap(s) by mouth 2 times a day  -- Indication: For Atrial fibrillation    LORazepam 0.5 mg oral tablet  -- 1 tab(s) by mouth every 6 hours, As Needed  -- Indication: For Anxiety    atorvastatin 40 mg oral tablet  -- 1 tab(s) by mouth once a day  -- Indication: For HLD (hyperlipidemia)    Plavix 75 mg oral tablet  -- 1 tab(s) by mouth once a day  -- Indication: For CAD (coronary artery disease)    metoprolol tartrate 100 mg oral tablet  -- 1 tab(s) by mouth 2 times a day  -- Indication: For CHF    Symbicort 80 mcg-4.5 mcg/inh inhalation aerosol  -- 1 puff(s) inhaled 2 times a day  -- Indication: For Shortness of breath    Baciguent 500 units/g topical ointment  -- 1 application on skin 2 times a day  -- Indication: For Cellulitis    Lasix 20 mg oral tablet  -- 3 tab(s) by mouth 2 times a day   -- Avoid prolonged or excessive exposure to direct and/or artificial sunlight while taking this medication.  It is very important that you take or use this exactly as directed.  Do not skip doses or discontinue unless directed by your doctor.  It may be advisable to drink a full glass orange juice or eat a banana daily while taking this medication.    -- Indication: For CHF    amoxicillin-clavulanate 875 mg-125 mg oral tablet  -- 1 tab(s) by mouth 2 times a day   -- Finish all this medication unless otherwise directed by prescriber.  Take with food or milk.    -- Indication: For Cellulitis

## 2018-12-22 NOTE — DISCHARGE NOTE ADULT - CARE PROVIDERS DIRECT ADDRESSES
,[email or web address]

## 2018-12-22 NOTE — DISCHARGE NOTE ADULT - PLAN OF CARE
Improving You presented with Right lower extremity redness and warmth that was concerning for cellulitis. We did a MRI RT ankle which did not show evidence of osteomyelitis or abscess. We started you on IV Unasyn and switched to Augmentin for three days to complete a seven day course. Please follow up with Dr. Grant within 1-2 weeks to ensure resolution of your symptoms. Also, please call [phone] to scheduled f[facility] [address] [place] [address].    You will not have a nurse for the next 48 - 72 hours to manager the wound. We explained the risks of infections and the likelihood of readmission. However, you refused our recommendation and  insisted on going home. Please continue with wound care. You presented with shortness of breath and needed nasal cannula. Your Chest X-Ray showed mild pulmonary vascular congestion. Please continue using symbicort as needed. Please continue taking lasix 40 mg Q12H. You have a known disease of coronary artery disease. You recently had stent placed at [facility]. Your most recent echo showed EF- 55% with severely dilated Left Atrium, left ventricular hypertrophy and more importantly finding of right ventricular enlargement with decreased right ventricular systolic function, We consulted our cardiologist. However, you suggested that you will follow up outpatient with cardiologist with the understanding that this might be suggestive of RT-sided heart faliure. Please continue taking plavix and statin. It is very important that you follow up outpatient with Dr. Miguelina Leal within 3-5 days of discharge. You have a known history of atrial fibrillation. Please continue taking pradaxa. Please follow up with your cardiologist for further management. Please continue taking metoprolol. Please continue taking statin. You presented with Right lower extremity redness and warmth that was concerning for cellulitis. We did a MRI RT ankle which did not show evidence of osteomyelitis or abscess. We started you on IV Unasyn and switched to Augmentin for three days to complete a seven day course. Please follow up with Dr. Grant within 1-2 weeks to ensure resolution of your symptoms. Also, please call [phone] to scheduled f/u as outpatient at [facility] [address] [place] [address].    You will not have a nurse for the next 48 - 72 hours to manager the wound. We explained the risks of infections and the likelihood of readmission. Please continue with wound care. You presented with shortness of breath and needed nasal cannula. Your Chest X-Ray showed mild pulmonary vascular congestion. Please continue using symbicort as needed. Please continue taking lasix 40 mg Q12H. Continue lasix 60mg twice daily. Please continue taking metoprolol and lasix as prescribed. Follow up with your cardiologist. You presented with shortness of breath and needed nasal cannula. Your Chest X-Ray showed mild pulmonary vascular congestion. Please continue using symbicort as needed. Please continue lasix 60mg twice daily.

## 2018-12-22 NOTE — PROGRESS NOTE ADULT - PROBLEM SELECTOR PLAN 9
DVT ppx: On Pradaxa  PT recs- MARGIE DVT ppx: On Pradaxa  PT recs- MARGIE. However, pt refused MARGIE and elected to go home with the understanding that it will take 48-72 hours for [facility] to provide her with nurse and home PT. She reported to have a aide who will take care of her.

## 2018-12-22 NOTE — DISCHARGE NOTE ADULT - ADDITIONAL INSTRUCTIONS
1) It is very important that you follow up outpatient with Dr. Miguelina Leal (cardiology) within 3-5 days of discharge.   2) Please continue following up with Dr. Mango villaseñor 1-2 weeks. 1) It is very important that you follow up outpatient cardiologist on January 7th 2018.   2) Please continue following up with Dr. Mango villaseñor one week from discharge. 1) It is very important that you follow up outpatient cardiologist on January 7th 2018. They will call your to get early appointment.   2) Please continue following up with Dr. Mango villaseñor one week from discharge. 1) It is very important that you follow up outpatient cardiologist on January 7th 2018. They will call your to get early appointment.   2) Please continue following up with Dr. Cobian within one week from discharge. At that time you will need repeat blood work (BMP) to monitor your kidney function because we increased your water pill.

## 2018-12-22 NOTE — DISCHARGE NOTE ADULT - CARE PLAN
Principal Discharge DX:	Cellulitis  Goal:	Improving  Secondary Diagnosis:	Shortness of breath  Secondary Diagnosis:	IGOR (acute kidney injury)  Secondary Diagnosis:	CAD (coronary artery disease)  Secondary Diagnosis:	Atrial fibrillation  Secondary Diagnosis:	HTN (hypertension)  Secondary Diagnosis:	HLD (hyperlipidemia) Principal Discharge DX:	Cellulitis  Goal:	Improving  Assessment and plan of treatment:	You presented with Right lower extremity redness and warmth that was concerning for cellulitis. We did a MRI RT ankle which did not show evidence of osteomyelitis or abscess. We started you on IV Unasyn and switched to Augmentin for three days to complete a seven day course. Please follow up with Dr. Grant within 1-2 weeks to ensure resolution of your symptoms. Also, please call [phone] to scheduled f/[facility] [address] [place] [address].    You will not have a nurse for the next 48 - 72 hours to manager the wound. We explained the risks of infections and the likelihood of readmission. However, you refused our recommendation and  insisted on going home. Please continue with wound care.  Secondary Diagnosis:	Shortness of breath  Secondary Diagnosis:	IGOR (acute kidney injury)  Secondary Diagnosis:	CAD (coronary artery disease)  Secondary Diagnosis:	Atrial fibrillation  Secondary Diagnosis:	HTN (hypertension)  Secondary Diagnosis:	HLD (hyperlipidemia) Principal Discharge DX:	Cellulitis  Goal:	Improving  Assessment and plan of treatment:	You presented with Right lower extremity redness and warmth that was concerning for cellulitis. We did a MRI RT ankle which did not show evidence of osteomyelitis or abscess. We started you on IV Unasyn and switched to Augmentin for three days to complete a seven day course. Please follow up with Dr. Grant within 1-2 weeks to ensure resolution of your symptoms. Also, please call [phone] to scheduled f/u as outpatient at [facility] [address] [place] [address].    You will not have a nurse for the next 48 - 72 hours to manager the wound. We explained the risks of infections and the likelihood of readmission. However, you refused our recommendation and  insisted on going home. Please continue with wound care.  Secondary Diagnosis:	Shortness of breath  Assessment and plan of treatment:	You presented with shortness of breath and needed nasal cannula. Your Chest X-Ray showed mild pulmonary vascular congestion. Please continue using symbicort as needed. Please continue taking lasix 40 mg Q12H.  Secondary Diagnosis:	CAD (coronary artery disease)  Assessment and plan of treatment:	You have a known disease of coronary artery disease. You recently had stent placed at [facility]. Your most recent echo showed EF- 55% with severely dilated Left Atrium, left ventricular hypertrophy and more importantly finding of right ventricular enlargement with decreased right ventricular systolic function, We consulted our cardiologist. However, you suggested that you will follow up outpatient with cardiologist with the understanding that this might be suggestive of RT-sided heart faliure. Please continue taking plavix and statin. It is very important that you follow up outpatient with Dr. Miguelina Leal within 3-5 days of discharge.  Secondary Diagnosis:	Atrial fibrillation  Assessment and plan of treatment:	You have a known history of atrial fibrillation. Please continue taking pradaxa. Please follow up with your cardiologist for further management.  Secondary Diagnosis:	HTN (hypertension)  Assessment and plan of treatment:	Please continue taking metoprolol.  Secondary Diagnosis:	HLD (hyperlipidemia)  Assessment and plan of treatment:	Please continue taking statin. Principal Discharge DX:	Cellulitis  Goal:	Improving  Assessment and plan of treatment:	You presented with Right lower extremity redness and warmth that was concerning for cellulitis. We did a MRI RT ankle which did not show evidence of osteomyelitis or abscess. We started you on IV Unasyn and switched to Augmentin for three days to complete a seven day course. Please follow up with Dr. Grant within 1-2 weeks to ensure resolution of your symptoms. Also, please call [phone] to scheduled f/u as outpatient at [facility] [address] [place] [address].    You will not have a nurse for the next 48 - 72 hours to manager the wound. We explained the risks of infections and the likelihood of readmission. Please continue with wound care.  Secondary Diagnosis:	Shortness of breath  Assessment and plan of treatment:	You presented with shortness of breath and needed nasal cannula. Your Chest X-Ray showed mild pulmonary vascular congestion. Please continue using symbicort as needed. Please continue taking lasix 40 mg Q12H.  Secondary Diagnosis:	CAD (coronary artery disease)  Assessment and plan of treatment:	You have a known disease of coronary artery disease. You recently had stent placed at [facility]. Your most recent echo showed EF- 55% with severely dilated Left Atrium, left ventricular hypertrophy and more importantly finding of right ventricular enlargement with decreased right ventricular systolic function, We consulted our cardiologist. However, you suggested that you will follow up outpatient with cardiologist with the understanding that this might be suggestive of RT-sided heart faliure. Please continue taking plavix and statin. It is very important that you follow up outpatient with Dr. Miguelina Leal within 3-5 days of discharge.  Secondary Diagnosis:	Atrial fibrillation  Assessment and plan of treatment:	You have a known history of atrial fibrillation. Please continue taking pradaxa. Please follow up with your cardiologist for further management.  Secondary Diagnosis:	HTN (hypertension)  Assessment and plan of treatment:	Please continue taking metoprolol.  Secondary Diagnosis:	HLD (hyperlipidemia)  Assessment and plan of treatment:	Please continue taking statin. Principal Discharge DX:	Cellulitis  Goal:	Improving  Assessment and plan of treatment:	You presented with Right lower extremity redness and warmth that was concerning for cellulitis. We did a MRI RT ankle which did not show evidence of osteomyelitis or abscess. We started you on IV Unasyn and switched to Augmentin for three days to complete a seven day course. Please follow up with Dr. Grant within 1-2 weeks to ensure resolution of your symptoms. Also, please call [phone] to scheduled f/u as outpatient at [facility] [address] [place] [address].    You will not have a nurse for the next 48 - 72 hours to manager the wound. We explained the risks of infections and the likelihood of readmission. Please continue with wound care.  Secondary Diagnosis:	Shortness of breath  Assessment and plan of treatment:	You presented with shortness of breath and needed nasal cannula. Your Chest X-Ray showed mild pulmonary vascular congestion. Please continue using symbicort as needed. Please continue taking lasix 40 mg Q12H. Continue lasix 60mg twice daily.  Secondary Diagnosis:	CAD (coronary artery disease)  Assessment and plan of treatment:	You have a known disease of coronary artery disease. You recently had stent placed at [facility]. Your most recent echo showed EF- 55% with severely dilated Left Atrium, left ventricular hypertrophy and more importantly finding of right ventricular enlargement with decreased right ventricular systolic function, We consulted our cardiologist. However, you suggested that you will follow up outpatient with cardiologist with the understanding that this might be suggestive of RT-sided heart faliure. Please continue taking plavix and statin. It is very important that you follow up outpatient with Dr. Miguelina Leal within 3-5 days of discharge.  Secondary Diagnosis:	Atrial fibrillation  Assessment and plan of treatment:	You have a known history of atrial fibrillation. Please continue taking pradaxa. Please follow up with your cardiologist for further management.  Secondary Diagnosis:	HTN (hypertension)  Assessment and plan of treatment:	Please continue taking metoprolol and lasix as prescribed. Follow up with your cardiologist.  Secondary Diagnosis:	HLD (hyperlipidemia)  Assessment and plan of treatment:	Please continue taking statin. Principal Discharge DX:	Cellulitis  Goal:	Improving  Assessment and plan of treatment:	You presented with Right lower extremity redness and warmth that was concerning for cellulitis. We did a MRI RT ankle which did not show evidence of osteomyelitis or abscess. We started you on IV Unasyn and switched to Augmentin for three days to complete a seven day course. Please follow up with Dr. Grant within 1-2 weeks to ensure resolution of your symptoms. Also, please call [phone] to scheduled f/u as outpatient at [facility] [address] [place] [address].    You will not have a nurse for the next 48 - 72 hours to manager the wound. We explained the risks of infections and the likelihood of readmission. Please continue with wound care.  Secondary Diagnosis:	Shortness of breath  Assessment and plan of treatment:	You presented with shortness of breath and needed nasal cannula. Your Chest X-Ray showed mild pulmonary vascular congestion. Please continue using symbicort as needed. Please continue lasix 60mg twice daily.  Secondary Diagnosis:	CAD (coronary artery disease)  Assessment and plan of treatment:	You have a known disease of coronary artery disease. You recently had stent placed at [facility]. Your most recent echo showed EF- 55% with severely dilated Left Atrium, left ventricular hypertrophy and more importantly finding of right ventricular enlargement with decreased right ventricular systolic function, We consulted our cardiologist. However, you suggested that you will follow up outpatient with cardiologist with the understanding that this might be suggestive of RT-sided heart faliure. Please continue taking plavix and statin. It is very important that you follow up outpatient with Dr. Miguelina Leal within 3-5 days of discharge.  Secondary Diagnosis:	Atrial fibrillation  Assessment and plan of treatment:	You have a known history of atrial fibrillation. Please continue taking pradaxa. Please follow up with your cardiologist for further management.  Secondary Diagnosis:	HTN (hypertension)  Assessment and plan of treatment:	Please continue taking metoprolol and lasix as prescribed. Follow up with your cardiologist.  Secondary Diagnosis:	HLD (hyperlipidemia)  Assessment and plan of treatment:	Please continue taking statin.

## 2018-12-22 NOTE — DISCHARGE NOTE ADULT - NSFTFSERV1RD_GEN_ALL_CORE
Tolerated exercise well, no complaints voiced.  
observation and assessment/teaching and training/wound care and assessment

## 2018-12-22 NOTE — DISCHARGE NOTE ADULT - HOSPITAL COURSE
Patient is a 78 yr old woman with PMH of CAD, Afib on Pradaxa, CHF, HLD, COPD on 2L O2 intermittently at home presents from home in setting of increased lower extremity swelling. Patient states that she has chronic bilateral leg swelling. Lasix dose was increased from 40 to 60 mg. Patient states that she noticed the right leg was becoming larger, more red and painful. It has been increasingly difficult for patient to ambulate with walker. Patient was admitted to the medicine floor for further management of cellulitis. She was started on IV Unasyn and transitioned to Augmentin (will need 3 more days of abx). Her MRI RT ankle did not show evidence of osteomyelitis or abscess. Her pain was controlled with Tylenol and tramadol Her hospital course was complicated by shortness of breath, Her ECHO showed severely dilated left atrium with new onset right ventricular enlargement with decreased right ventricular systolic function. Cardiology was consulted. However, pt was adamant about leaving today and suggested that she will follow up outpatient. She was encouraged to go to [facility] and the risks of going home without nurse and PT were explained. Pt expressed understanding and chose to go home. She has a home aide who will be present. However, her nurse and PT services will take at least 48-72 hours to be reinstated. Patient will complete her course of oral antibiotics and will follow up outpatient with her cardiologist. Patient is a 78 yr old woman with PMH of CAD, Afib on Pradaxa, CHF, HLD, COPD on 2L O2 intermittently at home presents from home in setting of increased lower extremity swelling. Patient states that she has chronic bilateral leg swelling. Lasix dose was increased from 60 mg daily to 60mg BID. Patient states that she noticed the right leg was becoming larger, more red and painful. It has been increasingly difficult for patient to ambulate with walker. Patient was admitted to the medicine floor for further management of cellulitis. She was started on IV Unasyn and transitioned to Augmentin (will need 3 more days of abx). Her MRI RT ankle did not show evidence of osteomyelitis or abscess. Her pain was controlled with Tylenol and tramadol Her hospital course was complicated by shortness of breath, Her ECHO showed severely dilated left atrium with new onset right ventricular enlargement with decreased right ventricular systolic function. Cardiology was consulted. However, pt was adamant about leaving today and suggested that she will follow up outpatient. She was encouraged to go to [facility] and the risks of going home without nurse and PT were explained. Pt expressed understanding and chose to go home. She has a home aide who will be present. However, her nurse and PT services will take at least 48-72 hours to be reinstated. Patient will complete her course of oral antibiotics and will follow up outpatient with her cardiologist.     Regarding her heart failure - I spoke with the patient and discussed the Cardiologist recommendation who suggested that she have further diuresis however, pt states that her breathing is back to baseline and her lower ext edema has resolved  and refused to stay in the hospital for further IV diuresis. She wished to go home and will f/u outpatient with her cardiologist.  Will be discharging patient on an increased dose of lasix,  she was on 60mg daily, will increase to 60mg BID. will also increase metoprolol per cardiology recommendations.

## 2018-12-22 NOTE — PROGRESS NOTE ADULT - SUBJECTIVE AND OBJECTIVE BOX
Dr. Gokul Acevedo  Internal Medicine PGY-1   Pager# [phone]    Patient is a 78y old  Female who presents with a chief complaint of increased leg swelling Right >Left (21 Dec 2018 08:15)      SUBJECTIVE / OVERNIGHT EVENTS: No acute events overnight. Pt was HD stable.     MEDICATIONS  (STANDING):  acetaminophen   Tablet .. 650 milliGRAM(s) Oral every 6 hours  ampicillin/sulbactam  IVPB 3 Gram(s) IV Intermittent every 12 hours  ampicillin/sulbactam  IVPB      atorvastatin 40 milliGRAM(s) Oral at bedtime  BACItracin   Ointment 1 Application(s) Topical two times a day  buDESOnide  80 MICROgram(s)/formoterol 4.5 MICROgram(s) Inhaler 2 Puff(s) Inhalation two times a day  clopidogrel Tablet 75 milliGRAM(s) Oral daily  dabigatran 75 milliGRAM(s) Oral every 12 hours  furosemide    Tablet 40 milliGRAM(s) Oral two times a day  lactobacillus acidophilus 1 Tablet(s) Oral daily  metoprolol tartrate 75 milliGRAM(s) Oral two times a day    MEDICATIONS  (PRN):  oxyCODONE    IR 5 milliGRAM(s) Oral every 4 hours PRN Severe Pain (7 - 10)  traMADol 25 milliGRAM(s) Oral every 6 hours PRN Moderate Pain (4 - 6)      Vital Signs Last 24 Hrs  T(C): 36.3 (22 Dec 2018 02:30), Max: 36.3 (21 Dec 2018 12:44)  T(F): 97.3 (22 Dec 2018 02:30), Max: 97.3 (21 Dec 2018 12:44)  HR: 80 (22 Dec 2018 05:58) (64 - 92)  BP: 108/84 (22 Dec 2018 05:18) (100/62 - 108/84)  BP(mean): --  RR: 18 (22 Dec 2018 05:18) (18 - 18)  SpO2: 95% (22 Dec 2018 05:58) (92% - 96%)  CAPILLARY BLOOD GLUCOSE        I&O's Summary      PHYSICAL EXAM:  GENERAL: NAD, well-developed  CHEST/LUNG: Clear to auscultation bilaterally; No wheeze  HEART: Regular rate and rhythm; No murmurs, rubs, or gallops  ABDOMEN: Soft, Nontender, Nondistended; Bowel sounds present  EXTREMITIES:  2+ Peripheral Pulses, No clubbing, cyanosis, or edema  PSYCH: AAOx3  NEUROLOGY: non-focal  SKIN: No rashes or lesions    LABS:                        11.1   5.7   )-----------( 192      ( 22 Dec 2018 06:48 )             30.3     12-22    134<L>  |  97  |  35<H>  ----------------------------<  92  4.2   |  26  |  1.53<H>    Ca    9.0      22 Dec 2018 06:48                RADIOLOGY & ADDITIONAL TESTS:    Imaging Personally Reviewed:    Consultant(s) Notes Reviewed:      Care Discussed with Consultants/Other Providers: Dr. Gokul Acevedo  Internal Medicine PGY-1   Pager# [phone]    Patient is a 78y old  Female who presents with a chief complaint of increased leg swelling Right >Left (21 Dec 2018 08:15)      SUBJECTIVE / OVERNIGHT EVENTS: No acute events overnight. Pt was HD stable. Reports feeling better. Her pain is 6/10. Denies n/v/f/c/h/d.     MEDICATIONS  (STANDING):  acetaminophen   Tablet .. 650 milliGRAM(s) Oral every 6 hours  ampicillin/sulbactam  IVPB 3 Gram(s) IV Intermittent every 12 hours  ampicillin/sulbactam  IVPB      atorvastatin 40 milliGRAM(s) Oral at bedtime  BACItracin   Ointment 1 Application(s) Topical two times a day  buDESOnide  80 MICROgram(s)/formoterol 4.5 MICROgram(s) Inhaler 2 Puff(s) Inhalation two times a day  clopidogrel Tablet 75 milliGRAM(s) Oral daily  dabigatran 75 milliGRAM(s) Oral every 12 hours  furosemide    Tablet 40 milliGRAM(s) Oral two times a day  lactobacillus acidophilus 1 Tablet(s) Oral daily  metoprolol tartrate 75 milliGRAM(s) Oral two times a day    MEDICATIONS  (PRN):  oxyCODONE    IR 5 milliGRAM(s) Oral every 4 hours PRN Severe Pain (7 - 10)  traMADol 25 milliGRAM(s) Oral every 6 hours PRN Moderate Pain (4 - 6)      Vital Signs Last 24 Hrs  T(C): 36.3 (22 Dec 2018 02:30), Max: 36.3 (21 Dec 2018 12:44)  T(F): 97.3 (22 Dec 2018 02:30), Max: 97.3 (21 Dec 2018 12:44)  HR: 80 (22 Dec 2018 05:58) (64 - 92)  BP: 108/84 (22 Dec 2018 05:18) (100/62 - 108/84)  BP(mean): --  RR: 18 (22 Dec 2018 05:18) (18 - 18)  SpO2: 95% (22 Dec 2018 05:58) (92% - 96%)  CAPILLARY BLOOD GLUCOSE        I&O's Summary      PHYSICAL EXAM:  GENERAL: NAD, well-developed  CHEST/LUNG: Clear to auscultation bilaterally; No wheeze  HEART: Regular rate and rhythm; No murmurs, rubs, or gallops  ABDOMEN: Soft, Nontender, Nondistended; Bowel sounds present  EXTREMITIES:  2+ Peripheral Pulses. RT LE mildly tender, b/l swelling improved  PSYCH: AAOx3  NEUROLOGY: non-focal  SKIN: No rashes or lesions    LABS:                        11.1   5.7   )-----------( 192      ( 22 Dec 2018 06:48 )             30.3     12-22    134<L>  |  97  |  35<H>  ----------------------------<  92  4.2   |  26  |  1.53<H>    Ca    9.0      22 Dec 2018 06:48                RADIOLOGY & ADDITIONAL TESTS:    Imaging Personally Reviewed:    Consultant(s) Notes Reviewed:      Care Discussed with Consultants/Other Providers:

## 2018-12-23 VITALS
SYSTOLIC BLOOD PRESSURE: 97 MMHG | OXYGEN SATURATION: 98 % | RESPIRATION RATE: 18 BRPM | DIASTOLIC BLOOD PRESSURE: 70 MMHG | HEART RATE: 78 BPM | TEMPERATURE: 98 F

## 2018-12-23 LAB
ANION GAP SERPL CALC-SCNC: 11 MMOL/L — SIGNIFICANT CHANGE UP (ref 5–17)
BUN SERPL-MCNC: 40 MG/DL — HIGH (ref 7–23)
CALCIUM SERPL-MCNC: 8.7 MG/DL — SIGNIFICANT CHANGE UP (ref 8.4–10.5)
CHLORIDE SERPL-SCNC: 96 MMOL/L — SIGNIFICANT CHANGE UP (ref 96–108)
CO2 SERPL-SCNC: 27 MMOL/L — SIGNIFICANT CHANGE UP (ref 22–31)
CREAT SERPL-MCNC: 1.51 MG/DL — HIGH (ref 0.5–1.3)
GLUCOSE SERPL-MCNC: 89 MG/DL — SIGNIFICANT CHANGE UP (ref 70–99)
HCT VFR BLD CALC: 32.3 % — LOW (ref 34.5–45)
HGB BLD-MCNC: 11.2 G/DL — LOW (ref 11.5–15.5)
MCHC RBC-ENTMCNC: 31.2 PG — SIGNIFICANT CHANGE UP (ref 27–34)
MCHC RBC-ENTMCNC: 34.8 GM/DL — SIGNIFICANT CHANGE UP (ref 32–36)
MCV RBC AUTO: 89.5 FL — SIGNIFICANT CHANGE UP (ref 80–100)
PLATELET # BLD AUTO: 191 K/UL — SIGNIFICANT CHANGE UP (ref 150–400)
POTASSIUM SERPL-MCNC: 4.4 MMOL/L — SIGNIFICANT CHANGE UP (ref 3.5–5.3)
POTASSIUM SERPL-SCNC: 4.4 MMOL/L — SIGNIFICANT CHANGE UP (ref 3.5–5.3)
RBC # BLD: 3.61 M/UL — LOW (ref 3.8–5.2)
RBC # FLD: 15.4 % — HIGH (ref 10.3–14.5)
SODIUM SERPL-SCNC: 134 MMOL/L — LOW (ref 135–145)
WBC # BLD: 5.2 K/UL — SIGNIFICANT CHANGE UP (ref 3.8–10.5)
WBC # FLD AUTO: 5.2 K/UL — SIGNIFICANT CHANGE UP (ref 3.8–10.5)

## 2018-12-23 PROCEDURE — 80048 BASIC METABOLIC PNL TOTAL CA: CPT

## 2018-12-23 PROCEDURE — 85014 HEMATOCRIT: CPT

## 2018-12-23 PROCEDURE — 99285 EMERGENCY DEPT VISIT HI MDM: CPT | Mod: 25

## 2018-12-23 PROCEDURE — 84132 ASSAY OF SERUM POTASSIUM: CPT

## 2018-12-23 PROCEDURE — 73723 MRI JOINT LWR EXTR W/O&W/DYE: CPT

## 2018-12-23 PROCEDURE — 99222 1ST HOSP IP/OBS MODERATE 55: CPT

## 2018-12-23 PROCEDURE — 96365 THER/PROPH/DIAG IV INF INIT: CPT

## 2018-12-23 PROCEDURE — 85730 THROMBOPLASTIN TIME PARTIAL: CPT

## 2018-12-23 PROCEDURE — 99239 HOSP IP/OBS DSCHRG MGMT >30: CPT

## 2018-12-23 PROCEDURE — 97161 PT EVAL LOW COMPLEX 20 MIN: CPT

## 2018-12-23 PROCEDURE — 84295 ASSAY OF SERUM SODIUM: CPT

## 2018-12-23 PROCEDURE — 73590 X-RAY EXAM OF LOWER LEG: CPT

## 2018-12-23 PROCEDURE — 82947 ASSAY GLUCOSE BLOOD QUANT: CPT

## 2018-12-23 PROCEDURE — 85027 COMPLETE CBC AUTOMATED: CPT

## 2018-12-23 PROCEDURE — 83735 ASSAY OF MAGNESIUM: CPT

## 2018-12-23 PROCEDURE — 71045 X-RAY EXAM CHEST 1 VIEW: CPT

## 2018-12-23 PROCEDURE — 82565 ASSAY OF CREATININE: CPT

## 2018-12-23 PROCEDURE — 82435 ASSAY OF BLOOD CHLORIDE: CPT

## 2018-12-23 PROCEDURE — 94640 AIRWAY INHALATION TREATMENT: CPT

## 2018-12-23 PROCEDURE — A9585: CPT

## 2018-12-23 PROCEDURE — 82330 ASSAY OF CALCIUM: CPT

## 2018-12-23 PROCEDURE — 93306 TTE W/DOPPLER COMPLETE: CPT

## 2018-12-23 PROCEDURE — 82803 BLOOD GASES ANY COMBINATION: CPT

## 2018-12-23 PROCEDURE — 85610 PROTHROMBIN TIME: CPT

## 2018-12-23 PROCEDURE — 93971 EXTREMITY STUDY: CPT

## 2018-12-23 PROCEDURE — 84100 ASSAY OF PHOSPHORUS: CPT

## 2018-12-23 PROCEDURE — 80053 COMPREHEN METABOLIC PANEL: CPT

## 2018-12-23 PROCEDURE — 83605 ASSAY OF LACTIC ACID: CPT

## 2018-12-23 RX ORDER — METOPROLOL TARTRATE 50 MG
1 TABLET ORAL
Qty: 60 | Refills: 0 | OUTPATIENT
Start: 2018-12-23 | End: 2019-01-21

## 2018-12-23 RX ORDER — METOPROLOL TARTRATE 50 MG
100 TABLET ORAL
Qty: 0 | Refills: 0 | Status: DISCONTINUED | OUTPATIENT
Start: 2018-12-23 | End: 2018-12-23

## 2018-12-23 RX ORDER — FUROSEMIDE 40 MG
60 TABLET ORAL
Qty: 0 | Refills: 0 | Status: DISCONTINUED | OUTPATIENT
Start: 2018-12-23 | End: 2018-12-23

## 2018-12-23 RX ORDER — FUROSEMIDE 40 MG
3 TABLET ORAL
Qty: 180 | Refills: 0 | OUTPATIENT
Start: 2018-12-23 | End: 2019-01-21

## 2018-12-23 RX ORDER — BUMETANIDE 0.25 MG/ML
2 INJECTION INTRAMUSCULAR; INTRAVENOUS EVERY 12 HOURS
Qty: 0 | Refills: 0 | Status: DISCONTINUED | OUTPATIENT
Start: 2018-12-23 | End: 2018-12-23

## 2018-12-23 RX ADMIN — BUMETANIDE 2 MILLIGRAM(S): 0.25 INJECTION INTRAMUSCULAR; INTRAVENOUS at 10:26

## 2018-12-23 RX ADMIN — Medication 1 APPLICATION(S): at 18:11

## 2018-12-23 RX ADMIN — Medication 0.5 MILLIGRAM(S): at 09:00

## 2018-12-23 RX ADMIN — Medication 100 MILLIGRAM(S): at 09:00

## 2018-12-23 RX ADMIN — CLOPIDOGREL BISULFATE 75 MILLIGRAM(S): 75 TABLET, FILM COATED ORAL at 10:29

## 2018-12-23 RX ADMIN — DABIGATRAN ETEXILATE MESYLATE 75 MILLIGRAM(S): 150 CAPSULE ORAL at 09:01

## 2018-12-23 RX ADMIN — Medication 650 MILLIGRAM(S): at 09:01

## 2018-12-23 RX ADMIN — Medication 650 MILLIGRAM(S): at 09:31

## 2018-12-23 RX ADMIN — Medication 1 TABLET(S): at 18:09

## 2018-12-23 RX ADMIN — Medication 650 MILLIGRAM(S): at 05:05

## 2018-12-23 RX ADMIN — BUDESONIDE AND FORMOTEROL FUMARATE DIHYDRATE 2 PUFF(S): 160; 4.5 AEROSOL RESPIRATORY (INHALATION) at 17:26

## 2018-12-23 RX ADMIN — Medication 1 TABLET(S): at 10:29

## 2018-12-23 RX ADMIN — BUDESONIDE AND FORMOTEROL FUMARATE DIHYDRATE 2 PUFF(S): 160; 4.5 AEROSOL RESPIRATORY (INHALATION) at 06:59

## 2018-12-23 RX ADMIN — Medication 1 APPLICATION(S): at 05:05

## 2018-12-23 RX ADMIN — Medication 1 TABLET(S): at 05:05

## 2018-12-23 NOTE — PROGRESS NOTE ADULT - ASSESSMENT
79 yo woman with PMH of CAD, Afib on Pradaxa, CHF, HLD, COPD on 2L O2 intermittently at home presents from home in setting of increased b/l lower extremity swelling and RT LE erythema and tenderness secondary to cellulitis. Pt refused discharge to rehab and wanted to follow up outpatient with her cardiology. The risks of going home without nurse to manage her wound were explained, but pt was adamant on going home.

## 2018-12-23 NOTE — PROGRESS NOTE ADULT - PROBLEM SELECTOR PLAN 3
presumed to be secondary to cardiorenal. Creatinine trending up today. will continue to monitor, will transition to PO lasix and monitor for improvement.   Monitor I/O  Renally dose medications presumed to be secondary to cardiorenal. Creatinine stable today.   Monitor I/O  Renally dose medications

## 2018-12-23 NOTE — PROGRESS NOTE ADULT - PROBLEM SELECTOR PLAN 4
Per patient had recent stent placed at [facility]  C/W Plavix  C/W statin
Per patient had recent stent placed at [facility]  C/W Plavix  C/W statin
Per patient had recent stent placed at [facility]  C/W Plavix  C/W statin
Per patient had recent stent placed at [facility]  C/W Plavix  C/W statin
Per patient had recent stent placed at [facility]  C/W Plavix  C/W statin

## 2018-12-23 NOTE — PROGRESS NOTE ADULT - PROBLEM SELECTOR PLAN 9
DVT ppx: On Pradaxa  PT recs- MARGIE. However, pt refused MARGIE and elected to go home with the understanding that it will take 48-72 hours for [facility] to provide her with nurse and home PT. She reported to have a aide who will take care of her. Patient requesting to leave today.

## 2018-12-23 NOTE — PROGRESS NOTE ADULT - ATTENDING COMMENTS
Cellulitis has resolved.  Regarding her heart failure - I spoke with the patient and discussed the Cardiologist recommendation who suggested that she have further diuresis however, pt states that her breathing is back to baseline and her lower ext edema has resolved.  and does not want to stay in the hospital for further IV diuresis. She wished to go home and will f/u outpatient with her cardiologist.  Will be discharging patient on an increased dose of lasix,  she was on 60mg daily, will increase to 60mg BID. will also increase metoprolol per cardiology recommendations.     Discharge time spent: 38 min

## 2018-12-23 NOTE — PROGRESS NOTE ADULT - PROBLEM SELECTOR PLAN 1
- Patient's Right LE with redness, warmth and swelling. Suggestive of nonpurulent cellulitis. No evidence of osteo on the foot xray.  - s/p clindamycin in ED  - given that cellulitis is below the waist will change Unasyn to add anaerobic coverage as well. wound care with bacitracin  - c/w IV Unasyn (D4). Switched to Augmentin for 3 days to complete seven days  - pain control with Tylenol 650 mg Q6H PRN and tramadol 25 mg Q6H PRN  - MRI RT ankle- no evidence of abscess or osteomyelitis  - f/u as outpatient at [facility] [address] [facility] [phone]

## 2018-12-23 NOTE — CONSULT NOTE ADULT - SUBJECTIVE AND OBJECTIVE BOX
Initial Cardiology Attending Consult    CHIEF COMPLAINT: SOB      HISTORY OF PRESENT ILLNESS:  BEVERLEY DEL RIO is a 78y Female patient PMH  CAD s/p recent PCI to RCA 11/9/2018, complicated by Afib w RVR unable to be cardioverted to to thrombus on Pradaxa HTN, SLE prior tobacco use, COPD presenting with increasing LE edema.  As per Ms Del Rio she did not have any cardiac complaints until this november when she presented with dyspnea to OSH and underwent PCI and treatment for her newly diagnosed afib.  During that hospitalization she was also initiated on home O2 for COPD and lasix for edema.  She states she has had Le edema since the hospitalization in 11/2018.  She followed up with her PCP (has not followed up with her cardiologist since PCI) who increased her lasix fro 40mg BID to 60mg BID.     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  Anxiety  TIA (transient ischemic attack): many years ago  PAF (paroxysmal atrial fibrillation)  HLD (hyperlipidemia)  HTN (hypertension)  History of cataract surgery: b/l  History of repair of hip fracture: luisa placed in RLE  H/O spinal fusion: c2-6 in 1/17      FAMILY HISTORY:  No pertinent family history in first degree relatives      SOCIAL HISTORY:    [ ] Non-smoker  [ x] prior Smoker  [ ] Alcohol      REVIEW OF SYSTEMS:  CONSTITUTIONAL: No fever, weight loss, +fatigue  EYES: No eye pain, visual disturbances, or discharge  ENMT:  No difficulty hearing, tinnitus, vertigo; No sinus or throat pain  NECK: No pain or stiffness  RESPIRATORY: +cough, +wheezing, no chills or hemoptysis; +Shortness of Breath  CARDIOVASCULAR: No chest pain, +palpitations, no passing out, dizziness, +leg swelling  GASTROINTESTINAL: No abdominal or epigastric pain. No nausea, vomiting, or hematemesis; No diarrhea or constipation. No melena or hematochezia.  GENITOURINARY: No dysuria, frequency, hematuria, or incontinence  NEUROLOGICAL: No headaches, memory loss, loss of strength, numbness, or tremors  SKIN: No itching, burning, rashes, or lesions   LYMPH Nodes: No enlarged glands  ENDOCRINE: No heat or cold intolerance; No hair loss  MUSCULOSKELETAL: No joint pain or swelling; No muscle, back, or extremity pain  PSYCHIATRIC: No depression, anxiety, mood swings, or difficulty sleeping  HEME/LYMPH: No easy bruising, or bleeding gums  ALLERY AND IMMUNOLOGIC: No hives or eczema	    [ ] All others negative	  [ ] Unable to obtain    PHYSICAL EXAM:  I&O's Summary    22 Dec 2018 07:01  -  23 Dec 2018 01:28  --------------------------------------------------------  IN: 840 mL / OUT: 500 mL / NET: 340 mL    Vital Signs Last 24 Hrs  T(C): 36.7 (22 Dec 2018 21:07), Max: 36.7 (22 Dec 2018 21:07)  T(F): 98 (22 Dec 2018 21:07), Max: 98 (22 Dec 2018 21:07)  HR: 88 (22 Dec 2018 21:07) (72 - 88)  BP: 121/70 (22 Dec 2018 21:07) (102/72 - 121/70)  BP(mean): --  RR: 18 (22 Dec 2018 21:07) (18 - 18)  SpO2: 96% (22 Dec 2018 21:07) (95% - 96%)    Appearance: thin frail F lying in bed 	  HEENT:   Normal oral mucosa, PERRL, EOMI	  Lymphatic: No lymphadenopathy  Cardiovascular: irregular 2/6 murmur , + JVD, + distended neck veins, +2Le edema rt, +1LE edema Lt  Respiratory: reduced br sounds and wheeze  Psychiatry: A & O x 3, Mood & affect appropriate  Gastrointestinal:  + distended, Soft, Non-tender, + BS	  Skin: No rashes, No ecchymoses, No cyanosis	  Neurologic: Non-focal  Extremities: Normal range of motion, No clubbing, cyanosis, +2Le edema rt, +1LE edema Lt  Vascular: Peripheral pulses palpable 2+ bilaterally    MEDICATIONS:  Home Medications:  [id number]  plavix  lipitor 40mg  metoprolol 75mg BID  pradaxa   lasox 40mg bid to 60 mg bid      LORazepam 0.5 mg oral tablet:  (18 Dec 2018 21:44)    MEDICATIONS  (STANDING):  acetaminophen   Tablet .. 650 milliGRAM(s) Oral every 6 hours  amoxicillin  875 milliGRAM(s)/clavulanate 1 Tablet(s) Oral two times a day  atorvastatin 40 milliGRAM(s) Oral at bedtime  BACItracin   Ointment 1 Application(s) Topical two times a day  buDESOnide  80 MICROgram(s)/formoterol 4.5 MICROgram(s) Inhaler 2 Puff(s) Inhalation two times a day  clopidogrel Tablet 75 milliGRAM(s) Oral daily  dabigatran 75 milliGRAM(s) Oral every 12 hours  furosemide    Tablet 40 milliGRAM(s) Oral two times a day  lactobacillus acidophilus 1 Tablet(s) Oral daily  LORazepam     Tablet 0.25 milliGRAM(s) Oral once  metoprolol tartrate 75 milliGRAM(s) Oral two times a day    MEDICATIONS  (PRN):  oxyCODONE    IR 5 milliGRAM(s) Oral every 4 hours PRN Severe Pain (7 - 10)  traMADol 25 milliGRAM(s) Oral every 6 hours PRN Moderate Pain (4 - 6)      LABS:	 	    CBC Full  -  ( 22 Dec 2018 06:48 )  WBC Count : 5.7 K/uL  Hemoglobin : 11.1 g/dL  Hematocrit : 30.3 %  Platelet Count - Automated : 192 K/uL  Mean Cell Volume : 89.6 fl  Mean Cell Hemoglobin : 32.7 pg  Mean Cell Hemoglobin Concentration : 36.6 gm/dL  Auto Neutrophil # : x  Auto Lymphocyte # : x  Auto Monocyte # : x  Auto Eosinophil # : x  Auto Basophil # : x  Auto Neutrophil % : x  Auto Lymphocyte % : x  Auto Monocyte % : x  Auto Eosinophil % : x  Auto Basophil % : x    12-22    134<L>  |  97  |  35<H>  ----------------------------<  92  4.2   |  26  |  1.53<H>  12-21    136  |  98  |  36<H>  ----------------------------<  93  4.4   |  27  |  1.53<H>    Ca    9.0      22 Dec 2018 06:48  Ca    8.6      21 Dec 2018 07:02        proBNP:   Lipid Profile:   HgA1c:   TSH:     TROPONIN:        TELEMETRY: 	none    ECG:  	none in chart  RADIOLOGY:  OTHER: 	    CARDIAC TESTING/STUDIES:    [ ] Echocardiogram: images personally reviewed  EF (Lauren): 55 %  Doppler Peak Velocity (m/sec): AoV=1.1  ------------------------------------------------------------------------  Observations:  Mitral Valve: Mitral annular calcification, with thickened  leaflets. Mild mitral regurgitation.  Aortic Valve/Aorta: Calcified trileaflet aortic valve with  normal opening. Minimal aortic regurgitation.  Aortic Root: 3.4 cm.  Left Atrium: Severely dilated left atrium.  LA volume index  = 57 cc/m2.  Left Ventricle: Normal left ventricular systolic function.  Mild concentric left ventricular hypertrophy.  Right Heart: Right atrial enlargement. Right ventricular  enlargement with decreased right ventricular systolic  function. Normal tricuspid valve. Moderate tricuspid  regurgitation. Normal pulmonic valve. Minimal pulmonic  regurgitation.  Pericardium/Pleura: Normal pericardium with no pericardial  effusion.  Hemodynamic: Estimated right atrial pressure is 8 mm Hg.  Estimated right ventricular systolic pressure equals 52 mm  Hg, assuming right atrial pressure equals 8 mm Hg,  consistent with moderate pulmonary hypertension.  ------------------------------------------------------------------------  Conclusions:  1. Mitral annular calcification, with thickened leaflets.  Mild mitral regurgitation.  2. Severely dilated left atrium.  LA volume index = 57  cc/m2.  3. Mild concentric left ventricular hypertrophy.  4. Normal left ventricular systolic function.  5. Right ventricular enlargement with decreased right  ventricular systolic function.  6. Normal tricuspid valve. Moderate tricuspid  regurgitation.  7. Estimated pulmonary artery systolic pressure equals 52  mm Hg, assuming right atrial pressure equals 8 mm Hg,  consistent with moderate pulmonary pressures.    IVC 3.2cm with less than 50% compression, PA pressures should be 10-15, making PASP 54-59     --  ------------------------------------------------------------------------  CONCLUSIONS:  1. Mitral annular calcification, otherwise normal mitral  valve. Mild-moderate mitral regurgitation.  2. Calcified trileaflet aortic valve with normal opening.  Mild aortic regurgitation.  3. Moderately dilated left atrium.  LA volume index = 42  cc/m2.  4. Normal left ventricular internal dimensions and wall  thicknesses.  5. Normal left ventricular systolic function. No segmental  wall motion abnormalities.  6. Normal right ventricular size and function.  ------------------------------------------------------------------------  Confirmed on  1/31/2018 - 14:41:20 by Baltazar Orosco M.D.  ------------------------------------------------------------------------    [ ]  Catheterization:  [ ] Stress Test:  	  	  ASSESSMENT/PLAN: 	  BEVERLEY DEL RIO is a 78y Female patient PMH  CAD s/p recent PCI to RCA 11/9/2018, complicated by Afib w RVR unable to be cardioverted to to thrombus on Pradaxa HTN, SLE prior tobacco use, COPD presenting with increasing LE edema.  As per Ms Del Rio she did not have any cardiac complaints until this november when she presented with dyspnea to OSH and underwent PCI and treatment for her newly diagnosed afib.  During that hospitalization she was also initiated on home O2 for COPD and lasix for edema.  She states she has had Le edema since the hospitalization in 11/2018.  She followed up with her PCP (has not followed up with her cardiologist since PCI) who increased her lasix fro 40mg BID to 60mg BID.   Her prior echo demonstrated nml LV and RV fx and size, however since her PCI she continues to have symptoms of dyspnea.  ECHO this admission demonstrates significant volume overload with a distended poorly collapsing IVC and a dialted RV.  This is likely due to a combination of her ischemia in the RCA territory, afib with uncontrolled HR (HR up to 130 at the time of ECHO), and under diuresis.    Recommend increasing her diuresis with the aim of returning her to her dry weight.  I suspect the recent need for home oxygen is not completely due to copd but an aspect of chf as well.    chf  CHF, acute decompensated.   discontinue po lasix  -begin diuresis with 2mg IV bumex bid, for net negative 1-2Ldaily  -monitor ins/outs  -check daily standing weights  -check BNP  -increase metoprolol to 100mg BID  -wean off oxygen as tolerated    Afib, HR elevated at time of exam and echo  increase metoprolol to 100mg bid, check HR with ambulation  continue AC with pradaxa  consider tele monitoring vs outpatient holter monitoring    CAD  continue asa 81mg  continue plavix  continue lipitor      IGOR agree with cardiorenal  diuresis as above    please arrange outpatient cardiology followup, she has not seen her cardiologist since before her PCI.    Berhane Gaona MD, PhD  Cardiology Attending  [facility]/  Faculty Practice  [phone]    (Cardiology Nocturnist cell number available 7 pm - 7 am every night; available daytime week days for follow-up only; daytime weekends covered by general cardiology consult service)

## 2018-12-23 NOTE — PROGRESS NOTE ADULT - PROBLEM SELECTOR PROBLEM 3
IGOR (acute kidney injury)

## 2018-12-23 NOTE — PROGRESS NOTE ADULT - PROBLEM SELECTOR PLAN 2
pt with acute on chronic respiratory failure presumed to be from acute on chronic heart failure.  - Patient hypoxic to 88% on RA. Responding well on supplemental O2. Not in respiratory distress  - CXR (12/19)- Mild pulmonary vascular congestion.  - Daily weights  - Strict I/O  - Continue with Symbicort, Nebs PRN. Supplemental O2  - Respiratory symptoms improved with diuresis, c/w Lasix 60mg BID.   - Last TTE was in january which showed Mild-moderate mitral regurgitation, normal EF, Normal LV fxn and thickness.  - Repeat TTE (12/21)- Showed EF- 55%, showed severely dilated LA w/ mild concentric left ventricular hypertrophy.  - Cardiology consulted. However, pt wished to go home and will f/u outpatient with her cardiologist. pt with acute on chronic respiratory failure presumed to be from acute on chronic heart failure.  - Patient hypoxic to 88% on RA. Responding well on supplemental O2. Not in respiratory distress  - CXR (12/19)- Mild pulmonary vascular congestion.  - Daily weights  - Strict I/O  - Continue with Symbicort, Nebs PRN. Supplemental O2  - Respiratory symptoms improved with diuresis, c/w Lasix 60mg BID.   - Last TTE was in january which showed Mild-moderate mitral regurgitation, normal EF, Normal LV fxn and thickness.  - Repeat TTE (12/21)- Showed EF- 55%, showed severely dilated LA w/ mild concentric left ventricular hypertrophy.  - Cardiology consulted who recommended further diuresis however, pt states that her breathing is back to baseline and does not want to stay in the hospital for further IV diuresis. She wished to go home and will f/u outpatient with her cardiologist.

## 2018-12-23 NOTE — PROGRESS NOTE ADULT - REASON FOR ADMISSION
increased leg swelling Right >Left

## 2018-12-23 NOTE — PROVIDER CONTACT NOTE (OTHER) - ASSESSMENT
Pt is resting in bed. no s/s of distress noted. Vitals are stable.
No s/s of distress, consistent with patient's baseline

## 2018-12-23 NOTE — PROGRESS NOTE ADULT - SUBJECTIVE AND OBJECTIVE BOX
Edgard Crabtree  PGY-2 Resident Physician   Pager [phone] or [phone] from 7am to 7pm, after 7pm    Patient is a 78y old  Female who presents with a chief complaint of increased leg swelling Right >Left (23 Dec 2018 01:18)      SUBJECTIVE / OVERNIGHT EVENTS:  No acute overnight events. Patient endorsed feeling improved, wanting to be discharge.     MEDICATIONS  (STANDING):  acetaminophen   Tablet .. 650 milliGRAM(s) Oral every 6 hours  amoxicillin  875 milliGRAM(s)/clavulanate 1 Tablet(s) Oral two times a day  atorvastatin 40 milliGRAM(s) Oral at bedtime  BACItracin   Ointment 1 Application(s) Topical two times a day  buDESOnide  80 MICROgram(s)/formoterol 4.5 MICROgram(s) Inhaler 2 Puff(s) Inhalation two times a day  clopidogrel Tablet 75 milliGRAM(s) Oral daily  dabigatran 75 milliGRAM(s) Oral every 12 hours  furosemide    Tablet 60 milliGRAM(s) Oral two times a day  lactobacillus acidophilus 1 Tablet(s) Oral daily  LORazepam     Tablet 0.25 milliGRAM(s) Oral once  metoprolol tartrate 100 milliGRAM(s) Oral two times a day    MEDICATIONS  (PRN):  oxyCODONE    IR 5 milliGRAM(s) Oral every 4 hours PRN Severe Pain (7 - 10)  traMADol 25 milliGRAM(s) Oral every 6 hours PRN Moderate Pain (4 - 6)    Allergies:  No Known Allergies  Intolerances    Vital Signs Last 24 Hrs  T(C): 36.4 (23 Dec 2018 14:22), Max: 36.7 (22 Dec 2018 21:07)  T(F): 97.5 (23 Dec 2018 14:22), Max: 98 (22 Dec 2018 21:07)  HR: 65 (23 Dec 2018 14:22) (65 - 97)  BP: 110/72 (23 Dec 2018 14:22) (103/70 - 121/70)  RR: 18 (23 Dec 2018 14:22) (18 - 18)  SpO2: 98% (23 Dec 2018 14:22) (96% - 98%)  Daily       I&O's Summary    22 Dec 2018 07:01  -  23 Dec 2018 07:00  --------------------------------------------------------  IN: 840 mL / OUT: 1100 mL / NET: -260 mL    23 Dec 2018 07:01  -  23 Dec 2018 15:21  --------------------------------------------------------  IN: 520 mL / OUT: 0 mL / NET: 520 mL    PHYSICAL EXAM:  GENERAL: NAD, well-developed  HEAD:  Atraumatic, Normocephalic  EYES: EOMI, PERRLA, conjunctiva and sclera clear  NECK: Supple, No JVD  CHEST/LUNG: Clear to auscultation bilaterally; No wheeze  HEART: Regular rate and rhythm; Normal S1 S2, Systolic murmur   ABDOMEN: Soft, Nontender, Nondistended; Bowel sounds present  EXTREMITIES:  2+ Peripheral Pulses, Trace edema bilaterally   PSYCH: AAOx3  NEUROLOGY: non-focal  SKIN: No rashes or lesions    LABS:                        11.2   5.2   )-----------( 191      ( 23 Dec 2018 06:46 )             32.3     Hgb Trend: 11.2<--, 11.1<--, 10.9<--, 11.3<--, 10.8<--  12-23    134<L>  |  96  |  40<H>  ----------------------------<  89  4.4   |  27  |  1.51<H>    Ca    8.7      23 Dec 2018 06:46      Creatinine Trend: 1.51<--, 1.53<--, 1.53<--, 1.33<--, 1.54<--, 1.29<--              RADIOLOGY & ADDITIONAL TESTS:    Imaging Personally Reviewed:    Consultant(s) Notes Reviewed:      Care Discussed with Consultants/Other Providers: Edgard Crabtree  PGY-2 Resident Physician   Pager [phone] or [phone] from 7am to 7pm, after 7pm    Patient is a 78y old  Female who presents with a chief complaint of increased leg swelling Right >Left (23 Dec 2018 01:18)      SUBJECTIVE / OVERNIGHT EVENTS:  No acute overnight events. Patient endorsed feeling improved, wanting to be discharge.     MEDICATIONS  (STANDING):  acetaminophen   Tablet .. 650 milliGRAM(s) Oral every 6 hours  amoxicillin  875 milliGRAM(s)/clavulanate 1 Tablet(s) Oral two times a day  atorvastatin 40 milliGRAM(s) Oral at bedtime  BACItracin   Ointment 1 Application(s) Topical two times a day  buDESOnide  80 MICROgram(s)/formoterol 4.5 MICROgram(s) Inhaler 2 Puff(s) Inhalation two times a day  clopidogrel Tablet 75 milliGRAM(s) Oral daily  dabigatran 75 milliGRAM(s) Oral every 12 hours  furosemide    Tablet 60 milliGRAM(s) Oral two times a day  lactobacillus acidophilus 1 Tablet(s) Oral daily  LORazepam     Tablet 0.25 milliGRAM(s) Oral once  metoprolol tartrate 100 milliGRAM(s) Oral two times a day    MEDICATIONS  (PRN):  oxyCODONE    IR 5 milliGRAM(s) Oral every 4 hours PRN Severe Pain (7 - 10)  traMADol 25 milliGRAM(s) Oral every 6 hours PRN Moderate Pain (4 - 6)    Allergies:  No Known Allergies  Intolerances    Vital Signs Last 24 Hrs  T(C): 36.4 (23 Dec 2018 14:22), Max: 36.7 (22 Dec 2018 21:07)  T(F): 97.5 (23 Dec 2018 14:22), Max: 98 (22 Dec 2018 21:07)  HR: 65 (23 Dec 2018 14:22) (65 - 97)  BP: 110/72 (23 Dec 2018 14:22) (103/70 - 121/70)  RR: 18 (23 Dec 2018 14:22) (18 - 18)  SpO2: 98% (23 Dec 2018 14:22) (96% - 98%)  Daily       I&O's Summary    22 Dec 2018 07:01  -  23 Dec 2018 07:00  --------------------------------------------------------  IN: 840 mL / OUT: 1100 mL / NET: -260 mL    23 Dec 2018 07:01  -  23 Dec 2018 15:21  --------------------------------------------------------  IN: 520 mL / OUT: 0 mL / NET: 520 mL    PHYSICAL EXAM:  GENERAL: NAD, well-developed  HEAD:  Atraumatic, Normocephalic  EYES: EOMI, PERRLA, conjunctiva and sclera clear  NECK: Supple, No JVD  CHEST/LUNG: bibasilar rales; No wheeze  HEART: Regular rate and rhythm; Normal S1 S2, Systolic murmur   ABDOMEN: Soft, Nontender, Nondistended; Bowel sounds present  EXTREMITIES:  2+ Peripheral Pulses, Trace edema bilaterally   PSYCH: AAOx3  NEUROLOGY: non-focal  SKIN: No rashes or lesions    LABS:                        11.2   5.2   )-----------( 191      ( 23 Dec 2018 06:46 )             32.3     Hgb Trend: 11.2<--, 11.1<--, 10.9<--, 11.3<--, 10.8<--  12-23    134<L>  |  96  |  40<H>  ----------------------------<  89  4.4   |  27  |  1.51<H>    Ca    8.7      23 Dec 2018 06:46      Creatinine Trend: 1.51<--, 1.53<--, 1.53<--, 1.33<--, 1.54<--, 1.29<--              RADIOLOGY & ADDITIONAL TESTS:    Imaging Personally Reviewed:    Consultant(s) Notes Reviewed:      Care Discussed with Consultants/Other Providers:

## 2018-12-27 ENCOUNTER — INBOUND DOCUMENT (OUTPATIENT)
Age: 78
End: 2018-12-27

## 2019-01-04 ENCOUNTER — INPATIENT (INPATIENT)
Facility: HOSPITAL | Age: 79
LOS: 9 days | Discharge: HOME | DRG: 871 | End: 2019-01-14
Attending: HOSPITALIST | Admitting: HOSPITALIST
Payer: COMMERCIAL

## 2019-01-04 VITALS
WEIGHT: 119.93 LBS | HEART RATE: 100 BPM | HEIGHT: 61 IN | DIASTOLIC BLOOD PRESSURE: 64 MMHG | SYSTOLIC BLOOD PRESSURE: 116 MMHG | RESPIRATION RATE: 20 BRPM

## 2019-01-04 DIAGNOSIS — Z98.49 CATARACT EXTRACTION STATUS, UNSPECIFIED EYE: Chronic | ICD-10-CM

## 2019-01-04 DIAGNOSIS — Z98.890 OTHER SPECIFIED POSTPROCEDURAL STATES: Chronic | ICD-10-CM

## 2019-01-04 DIAGNOSIS — Z98.1 ARTHRODESIS STATUS: Chronic | ICD-10-CM

## 2019-01-04 LAB
ALBUMIN SERPL ELPH-MCNC: 2.9 G/DL — LOW (ref 3.3–5)
ALP SERPL-CCNC: 105 U/L — SIGNIFICANT CHANGE UP (ref 40–120)
ALT FLD-CCNC: 10 U/L — SIGNIFICANT CHANGE UP (ref 10–45)
ANION GAP SERPL CALC-SCNC: 12 MMOL/L — SIGNIFICANT CHANGE UP (ref 5–17)
APTT BLD: 75.9 SEC — HIGH (ref 27.5–36.3)
AST SERPL-CCNC: 12 U/L — SIGNIFICANT CHANGE UP (ref 10–40)
BASOPHILS # BLD AUTO: 0.1 K/UL — SIGNIFICANT CHANGE UP (ref 0–0.2)
BASOPHILS NFR BLD AUTO: 0.7 % — SIGNIFICANT CHANGE UP (ref 0–2)
BILIRUB SERPL-MCNC: 0.5 MG/DL — SIGNIFICANT CHANGE UP (ref 0.2–1.2)
BUN SERPL-MCNC: 40 MG/DL — HIGH (ref 7–23)
CALCIUM SERPL-MCNC: 8.3 MG/DL — LOW (ref 8.4–10.5)
CHLORIDE SERPL-SCNC: 90 MMOL/L — LOW (ref 96–108)
CO2 SERPL-SCNC: 29 MMOL/L — SIGNIFICANT CHANGE UP (ref 22–31)
CREAT SERPL-MCNC: 1.59 MG/DL — HIGH (ref 0.5–1.3)
EOSINOPHIL # BLD AUTO: 0.2 K/UL — SIGNIFICANT CHANGE UP (ref 0–0.5)
EOSINOPHIL NFR BLD AUTO: 2.9 % — SIGNIFICANT CHANGE UP (ref 0–6)
GAS PNL BLDV: SIGNIFICANT CHANGE UP
GLUCOSE SERPL-MCNC: 88 MG/DL — SIGNIFICANT CHANGE UP (ref 70–99)
HCT VFR BLD CALC: 37.3 % — SIGNIFICANT CHANGE UP (ref 34.5–45)
HGB BLD-MCNC: 12.1 G/DL — SIGNIFICANT CHANGE UP (ref 11.5–15.5)
INR BLD: 1.81 RATIO — HIGH (ref 0.88–1.16)
LIDOCAIN IGE QN: 24 U/L — SIGNIFICANT CHANGE UP (ref 7–60)
LYMPHOCYTES # BLD AUTO: 0.7 K/UL — LOW (ref 1–3.3)
LYMPHOCYTES # BLD AUTO: 8.9 % — LOW (ref 13–44)
MCHC RBC-ENTMCNC: 28 PG — SIGNIFICANT CHANGE UP (ref 27–34)
MCHC RBC-ENTMCNC: 32.3 GM/DL — SIGNIFICANT CHANGE UP (ref 32–36)
MCV RBC AUTO: 86.5 FL — SIGNIFICANT CHANGE UP (ref 80–100)
MONOCYTES # BLD AUTO: 0.8 K/UL — SIGNIFICANT CHANGE UP (ref 0–0.9)
MONOCYTES NFR BLD AUTO: 10.2 % — SIGNIFICANT CHANGE UP (ref 2–14)
NEUTROPHILS # BLD AUTO: 6.2 K/UL — SIGNIFICANT CHANGE UP (ref 1.8–7.4)
NEUTROPHILS NFR BLD AUTO: 77.3 % — HIGH (ref 43–77)
PLATELET # BLD AUTO: 268 K/UL — SIGNIFICANT CHANGE UP (ref 150–400)
POTASSIUM SERPL-MCNC: 2.7 MMOL/L — CRITICAL LOW (ref 3.5–5.3)
POTASSIUM SERPL-SCNC: 2.7 MMOL/L — CRITICAL LOW (ref 3.5–5.3)
PROT SERPL-MCNC: 6.6 G/DL — SIGNIFICANT CHANGE UP (ref 6–8.3)
PROTHROM AB SERPL-ACNC: 21.2 SEC — HIGH (ref 10–12.9)
RBC # BLD: 4.32 M/UL — SIGNIFICANT CHANGE UP (ref 3.8–5.2)
RBC # FLD: 14.7 % — HIGH (ref 10.3–14.5)
SODIUM SERPL-SCNC: 131 MMOL/L — LOW (ref 135–145)
WBC # BLD: 8 K/UL — SIGNIFICANT CHANGE UP (ref 3.8–10.5)
WBC # FLD AUTO: 8 K/UL — SIGNIFICANT CHANGE UP (ref 3.8–10.5)

## 2019-01-04 PROCEDURE — 74177 CT ABD & PELVIS W/CONTRAST: CPT | Mod: 26

## 2019-01-04 PROCEDURE — 93010 ELECTROCARDIOGRAM REPORT: CPT

## 2019-01-04 PROCEDURE — 71045 X-RAY EXAM CHEST 1 VIEW: CPT | Mod: 26

## 2019-01-04 PROCEDURE — 99285 EMERGENCY DEPT VISIT HI MDM: CPT | Mod: 25

## 2019-01-04 RX ORDER — POTASSIUM CHLORIDE 20 MEQ
40 PACKET (EA) ORAL ONCE
Qty: 0 | Refills: 0 | Status: COMPLETED | OUTPATIENT
Start: 2019-01-04 | End: 2019-01-04

## 2019-01-04 RX ORDER — SODIUM CHLORIDE 9 MG/ML
500 INJECTION INTRAMUSCULAR; INTRAVENOUS; SUBCUTANEOUS ONCE
Qty: 0 | Refills: 0 | Status: COMPLETED | OUTPATIENT
Start: 2019-01-04 | End: 2019-01-04

## 2019-01-04 RX ADMIN — Medication 40 MILLIEQUIVALENT(S): at 21:20

## 2019-01-04 RX ADMIN — SODIUM CHLORIDE 1000 MILLILITER(S): 9 INJECTION INTRAMUSCULAR; INTRAVENOUS; SUBCUTANEOUS at 17:51

## 2019-01-04 RX ADMIN — Medication 40 MILLIEQUIVALENT(S): at 19:34

## 2019-01-04 NOTE — ED PROVIDER NOTE - PLAN OF CARE
**ATTENDING ADDENDUM (Dr. Benjamin Gómez): Goals of care include resolution of emergent/urgent symptoms and concerns, and restoration to baseline level of homeostasis.

## 2019-01-04 NOTE — ED ADULT NURSE NOTE - NSIMPLEMENTINTERV_GEN_ALL_ED
Implemented All Fall Risk Interventions:  [place] to call system. Call bell, personal items and telephone within reach. Instruct patient to call for assistance. Room bathroom lighting operational. Non-slip footwear when patient is off stretcher. Physically safe environment: no spills, clutter or unnecessary equipment. Stretcher in lowest position, wheels locked, appropriate side rails in place. Provide visual cue, wrist band, yellow gown, etc. Monitor gait and stability. Monitor for mental status changes and reorient to person, place, and time. Review medications for side effects contributing to fall risk. Reinforce activity limits and safety measures with patient and family.

## 2019-01-04 NOTE — ED PROVIDER NOTE - NS ED ROS FT
Constitutional: no fever, no chills.  Eyes: no visual changes.  ENMT: no sore throat.  CV: no chest pain.  Resp: no cough, no shortness of breath.  GI: no abdominal pain, no nausea, no vomiting, +diarrhea.  : no dysuria, no hematuria.  MSK: no back pain, no neck pain.  Skin: no rashes.  Neuro: no headache, no loss of consciousness, no weakness, no numbness, no tingling.

## 2019-01-04 NOTE — ED ADULT NURSE NOTE - OBJECTIVE STATEMENT
78 year old female patient BIBA c/o diarrhea x 5 days with "foul smelling odor," and LE edema x weeks. Patient denies recent abx use, sick contacts, fevers, chills. Patient last admitted in Dec 18 for cellulitis of RLE and is receiving wound care at home. Patient denies CP, SOB, dizziness or lightheadedness, n/v, urinarys symptos. BP found to be 89/60 upon arrival to [facility] and states "my blood pressure usually runs low." Patient reporting pain to buttocks and states "I have an open wound on my bottom". 78 year old female patient BIBA c/o diarrhea x 5 days with "foul smelling odor," and LE edema x weeks. Patient denies abx use at home, sick contacts, fevers, chills. Patient last admitted in Dec 18 for cellulitis of RLE and is receiving wound care at home. Patient denies CP, SOB, dizziness or lightheadedness, n/v, urinarys symptos. BP found to be 89/60 upon arrival to [facility] and states "my blood pressure usually runs low." Patient reporting pain to buttocks and states "I have an open wound on my bottom". Unable to assess at this time due to space. Patient comfortable with no acute distress noted. Awaiting MD evaluation

## 2019-01-04 NOTE — ED PROVIDER NOTE - RECENT EXPOSURE TO
**ATTENDING ADDENDUM (Dr. Benjamin Gómez): DENIES recent travel. NO obvious sick contacts./none known

## 2019-01-04 NOTE — ED PROVIDER NOTE - MUSCULOSKELETAL [+], MLM
**ATTENDING ADDENDUM (Dr. Benjamin Gómez): history of right lower extremity cellulitis (healing), history of edema in the bilateral lower extremities (2+, symmetrically equal)

## 2019-01-04 NOTE — ED PROVIDER NOTE - ATTENDING CONTRIBUTION TO CARE
**ATTENDING ADDENDUM (Dr. Benjamin Gómez): I attest that I have directly examined this patient and reviewed and formulated the diagnostic and therapeutic management plan in collaboration with the EM resident. Please see MDM note and remainder of EMR for findings from CC, HPI, ROS, and PE. (Naun) **ATTENDING ADDENDUM (Dr. Benjamin Gómez): I attest that I have directly examined this patient and reviewed and formulated the diagnostic and therapeutic management plan in collaboration with the EM resident. Please see MDM note and remainder of EMR for findings from CC, HPI, ROS, and PE. (Naun/Alena)

## 2019-01-04 NOTE — ED PROVIDER NOTE - MEDICAL DECISION MAKING DETAILS
**ATTENDING MEDICAL DECISION MAKING/SYNTHESIS (Dr. Benjamin Gómez): I have reviewed the Chief Complaint, the HPI, the ROS, and have directly performed and confirmed the findings on the Physical Examination. I have reviewed the medical decision making with all providers, as applicable. The PROBLEM REPRESENTATION at this time is: 78-year-old woman with history of edema in the bilateral lower extremities, s/p antibiotics previously and s/p stay at skilled nursing facility/subacute rehabilitation facility, now with six days of diarrhea and abdominal cramps without fevers, chills, nausea, vomiting, hematemesis, melena, hematochezia, or coffee-ground emesis. The MOST LIKELY DIAGNOSIS, and the LIST OF DIFFERENTIAL DIAGNOSES, includes (but is not limited to) the following: acute biliary disease (e.g. cholelithiasis, cholecystitis, or cholangitis), acute appendicitis, other surgical abdominal pathology e.g. abscess, diverticulitis/colitis, serious bacterial infection or sepsis/severe sepsis e.g. urinary tract infection, pyelonephritis, or equivalent, perforation, peritonitis, dehydration, electrolyte-metabolic-endocrine derangements, urologic cause e.g. obstructing ureteral stone, vascular cause e.g. AAA, dissection or equivalent, gastritis, gastroenteritis, musculoskeletal pain. The likelihood of each of these diagnoses has been appropriately considered in the context of this patient's presentation and my evaluation. PLAN: as described in EMR, including diagnostics, therapeutics and consultation as clinically warranted. I will continue to reevaluate the patient, including the results of all testing, and monitor response to therapy throughout the patient's course in the ED. **ATTENDING MEDICAL DECISION MAKING/SYNTHESIS (Dr. Benjamin Gómez): I have reviewed the Chief Complaint, the HPI, the ROS, and have directly performed and confirmed the findings on the Physical Examination. I have reviewed the medical decision making with all providers, as applicable. The PROBLEM REPRESENTATION at this time is: 78-year-old woman with history of edema in the bilateral lower extremities, s/p antibiotics previously and s/p stay at skilled nursing facility/subacute rehabilitation facility, now with six days of diarrhea and abdominal cramps without fevers, chills, nausea, vomiting, hematemesis, melena, hematochezia, or coffee-ground emesis. The MOST LIKELY DIAGNOSIS, and the LIST OF DIFFERENTIAL DIAGNOSES, includes (but is not limited to) the following: diverticulitis/colitis, mesenteric ischemia, ischemic colitis or equivalent, serious bacterial infection or sepsis/severe sepsis e.g. urinary tract infection, pyelonephritis, or equivalent, perforation, peritonitis, dehydration, electrolyte-metabolic-endocrine derangements, acute biliary disease (e.g. cholelithiasis, cholecystitis, or cholangitis), acute appendicitis, other surgical abdominal pathology e.g. abscess, urologic cause e.g. obstructing ureteral stone, vascular cause e.g. AAA, dissection or equivalent, gastritis, gastroenteritis, musculoskeletal pain. The likelihood of each of these diagnoses has been appropriately considered in the context of this patient's presentation and my evaluation. PLAN: as described in EMR, including diagnostics, therapeutics and consultation as clinically warranted. I will continue to reevaluate the patient, including the results of all testing, and monitor response to therapy throughout the patient's course in the ED.

## 2019-01-04 NOTE — ED PROVIDER NOTE - CHPI ED SYMPTOMS NEG
no abdominal distension/no fever/no burning urination/no chills/no nausea/no dysuria/no vomiting/no hematuria

## 2019-01-04 NOTE — ED PROVIDER NOTE - CARDIOVASCULAR [+], MLM
**ATTENDING ADDENDUM (Dr. Benjamin Gómez): history of Hypertension, hyperlipidemia, atrial fibrillation, MI

## 2019-01-04 NOTE — ED PROVIDER NOTE - AGGRAVATING FACTORS
**ATTENDING ADDENDUM (Dr. Benjamin Gómez): NO reported recent antibiotic use. NO movement-associated, pleuritic, reproducible, positional, or exertional component to the symptoms.

## 2019-01-04 NOTE — ED PROVIDER NOTE - GASTROINTESTINAL, MLM
Abdomen soft, non-tender **ATTENDING ADDENDUM (Dr. Benjamin Gómez): NO guarding, rebound, or rigidity. NO pulsatile or non-pulsatile masses. NO hernias. NO obvious hepatosplenomegaly.

## 2019-01-04 NOTE — ED PROVIDER NOTE - PHYSICAL EXAMINATION
Resident:   Gen: elderly, thin, no acute distress  Head: NC, AT  ENT: PERRL, MMM  Neck: supple with full ROM   Chest: bibasilar crackles, no retractions, rate normal, appears to breathe comfortably  Heart: irregularly irregular S1S2,bl LE pitting edema 3+, bilateral pulses in arms and legs  Abd: Soft diffuse tenderness to palpation, no rebound or guarding  Back: No spinal deformity, no CVAT  Ext: Moving all 4 extremities without obvious impairment to ROM, no obvious weakness  Neuro: fluid speech  Psych: No anxiety, depression or pressured speech noted  Skin: no urticaria, no diffuse rash Resident:   Gen: elderly, thin, no acute distress  Head: NC, AT  ENT: PERRL, MMM  Neck: supple with full ROM   Chest: bibasilar crackles, no retractions, rate normal, appears to breathe comfortably  Heart: irregularly irregular S1S2,bl LE pitting edema 3+, bilateral pulses in arms and legs  Abd: Soft diffuse tenderness to palpation, no rebound or guarding  Back: No spinal deformity, no CVAT  Ext: Moving all 4 extremities without obvious impairment to ROM, no obvious weakness  Neuro: fluid speech  Psych: No anxiety, depression or pressured speech noted  Skin: no urticaria, no diffuse rash  **ATTENDING ADDENDUM (Dr. Benjamin Gómez): I have reviewed and substantially contributed to the elements of the PE as documented above. I have directly performed an examination of this patient in conjunction with the other members (EM resident/PA/NP) of the patient care team.

## 2019-01-04 NOTE — ED PROVIDER NOTE - NEURO NEGATIVE STATEMENT, MLM
no loss of consciousness, no gait abnormality, no headache, no sensory deficits, and no weakness. **ATTENDING ADDENDUM (Dr. Benjamin Gómez): history of transient ischemic attack

## 2019-01-04 NOTE — ED ADULT NURSE REASSESSMENT NOTE - NS ED NURSE REASSESS COMMENT FT1
Small dime sized ulcer noted to L heal with minimal drainage noted- bandaged wound. Blanchable redness noted to sacrum with small open area noted. Patient cleaned and repositioned, barrier cream applied.

## 2019-01-04 NOTE — ED PROVIDER NOTE - PROGRESS NOTE DETAILS
**ATTENDING ADDENDUM (Dr. Benjamin Gómez): patient serially evaluated throughout ED course. NO acute deterioration up to this time in the ED. NO evidence of acute peritonitis or perforation. NO nausea or vomiting. Agree with goals/plan of ED care as described in EMR, including diagnostics, therapeutics and consultation as clinically warranted. Awaiting CT. ED diagnostics up to this time acknowledged, reviewed and noted; potassium supplementation ordered. Will continue to observe and monitor closely. AK: Admit for pan colitis. Accepting attending aware of reactive changes of appendix. Mild tender RLQ and LLQ. Will continue to reassess. **ATTENDING ADDENDUM (Dr. Benjamin Gómez): patient serially evaluated throughout ED course. NO acute deterioration up to this time in the ED. NO evidence of acute peritonitis or perforation. NO nausea or vomiting. Agree with goals/plan of ED care as described in EMR, including diagnostics, therapeutics and consultation as clinically warranted. Awaiting CT (suspect diverticulitis, colitis or equivalent; do not suspect small bowel obstruction, large bowel obstruction, mesenteric ischemia, or equivalent). ED diagnostics up to this time acknowledged, reviewed and noted; potassium supplementation ordered. Will continue to observe and monitor closely. AK: Admit for pan colitis. Accepting attending aware of reactive changes of appendix. Mild tender RLQ and LLQ. Will continue to reassess.  **ATTENDING ADDENDUM (Dr. Benjamin Gómez): patient serially evaluated throughout ED course. NO acute deterioration up to this time in the ED. ED diagnostics up to this time acknowledged, reviewed and noted. CT noted. NO evidence of acute appendicitis based on physical examination findings. More likely pan-colitis. Agree with antibiotics as ordered. Agree with inpatient admission for therapeutic intensity. Anticipatory guidance provided by ED team. Will continue to observe and monitor closely until definitive placement is made.

## 2019-01-04 NOTE — ED PROVIDER NOTE - RESPIRATORY, MLM
Breath sounds clear and equal bilaterally. **ATTENDING ADDENDUM (Dr. Benjamin Gómez): NO wheezing, rales, rhonchi, crackles, stridor, drooling, retractions, nasal flaring, or tripoding.

## 2019-01-04 NOTE — ED PROVIDER NOTE - EYES, MLM
**ATTENDING ADDENDUM (Dr. Benjamin Gómez): Extraocular muscle movements intact. Clear corneas bilaterally, pupils equal and round.

## 2019-01-04 NOTE — ED PROVIDER NOTE - SKIN, MLM
Skin normal color for race, warm, dry and intact. **ATTENDING ADDENDUM (Dr. Benjamin Gómez): NO rashes, lesions, vesicles, cellulitis, petechiae, purpurae, track marks or ecchymoses. POSITIVE edema noted in the bilateral lower extremities.

## 2019-01-04 NOTE — ED PROVIDER NOTE - CONDUCTED A DETAILED DISCUSSION WITH PATIENT AND/OR GUARDIAN REGARDING, MDM
need to admit/lab results/**ATTENDING ADDENDUM (Dr. Benjamin Gómez): Anticipatory guidance provided./radiology results

## 2019-01-04 NOTE — ED PROVIDER NOTE - ENMT, MLM
Airway patent, Nasal mucosa clear. Throat has no vesicles, no oropharyngeal exudates and uvula is midline. **ATTENDING ADDENDUM (Dr. Benjamin Gómez): dry mucous membranes, lips, and tongue

## 2019-01-04 NOTE — ED ADULT NURSE NOTE - CHPI ED NUR SYMPTOMS NEG
no abdominal distension/no blood in stool/no fever/no burning urination/no vomiting/no hematuria/no chills/no dysuria/no nausea

## 2019-01-04 NOTE — ED PROVIDER NOTE - CHIEF COMPLAINT
The patient is a 78y Female complaining of The patient is a 78y Female complaining of five days of diarrhea.

## 2019-01-04 NOTE — ED PROVIDER NOTE - CHPI ED SYMPTOMS POS
**ATTENDING ADDENDUM (Dr. Benjamin Gómez): reported some blood (some on tissue, one clot noted)/DIARRHEA

## 2019-01-04 NOTE — ED PROVIDER NOTE - CARE PLAN
Principal Discharge DX:	Colitis Principal Discharge DX:	Colitis  Goal:	**ATTENDING ADDENDUM (Dr. Benjamin Gómez): Goals of care include resolution of emergent/urgent symptoms and concerns, and restoration to baseline level of homeostasis.  Secondary Diagnosis:	Hypokalemia

## 2019-01-04 NOTE — ED PROVIDER NOTE - OBJECTIVE STATEMENT
Resident: 78y F PMH HTN, HLD, CHF, Afib on pradaxa presents with diarrhea x 5 days. Reports watery foul smelling non-bloody diarrhea throughout day and night. Does report occasion blood when wiping, also one clot came out. Otherwise stools are light colored. Denies recent antibiotic use, sick contacts, or recent travel. Denies lightheadedness, dizziness, chest pain, shortness of breath. Resident: 78y F PMH HTN, HLD, CHF, Afib on pradaxa presents with diarrhea x 5 days. Reports watery foul smelling non-bloody diarrhea throughout day and night. Does report occasion blood when wiping, also one clot came out. Otherwise stools are light colored. Denies recent antibiotic use, sick contacts, or recent travel. Denies lightheadedness, dizziness, chest pain, shortness of breath.  **ATTENDING ADDENDUM (Dr. Benjamin Gómez): I attest that I have directly and personally interviewed and examined this patient and elicited a comparable history of present illness and review of systems as documented, along with my EM resident. I attest that I have made significant contributions to the documentation where necessary and as noted in the EMR. Of note, and in addition to the above, the patient reports she is on clopidogrel.

## 2019-01-05 DIAGNOSIS — I50.9 HEART FAILURE, UNSPECIFIED: ICD-10-CM

## 2019-01-05 DIAGNOSIS — K51.00 ULCERATIVE (CHRONIC) PANCOLITIS WITHOUT COMPLICATIONS: ICD-10-CM

## 2019-01-05 DIAGNOSIS — R60.0 LOCALIZED EDEMA: ICD-10-CM

## 2019-01-05 DIAGNOSIS — R19.7 DIARRHEA, UNSPECIFIED: ICD-10-CM

## 2019-01-05 DIAGNOSIS — I10 ESSENTIAL (PRIMARY) HYPERTENSION: ICD-10-CM

## 2019-01-05 DIAGNOSIS — I48.91 UNSPECIFIED ATRIAL FIBRILLATION: ICD-10-CM

## 2019-01-05 DIAGNOSIS — E87.6 HYPOKALEMIA: ICD-10-CM

## 2019-01-05 DIAGNOSIS — E78.5 HYPERLIPIDEMIA, UNSPECIFIED: ICD-10-CM

## 2019-01-05 DIAGNOSIS — K52.9 NONINFECTIVE GASTROENTERITIS AND COLITIS, UNSPECIFIED: ICD-10-CM

## 2019-01-05 DIAGNOSIS — E87.1 HYPO-OSMOLALITY AND HYPONATREMIA: ICD-10-CM

## 2019-01-05 DIAGNOSIS — Z29.9 ENCOUNTER FOR PROPHYLACTIC MEASURES, UNSPECIFIED: ICD-10-CM

## 2019-01-05 LAB
ALBUMIN SERPL ELPH-MCNC: 2.6 G/DL — LOW (ref 3.3–5)
ALP SERPL-CCNC: 90 U/L — SIGNIFICANT CHANGE UP (ref 40–120)
ALT FLD-CCNC: 8 U/L — LOW (ref 10–45)
ANION GAP SERPL CALC-SCNC: 12 MMOL/L — SIGNIFICANT CHANGE UP (ref 5–17)
ANION GAP SERPL CALC-SCNC: 9 MMOL/L — SIGNIFICANT CHANGE UP (ref 5–17)
APTT BLD: 83.2 SEC — HIGH (ref 27.5–36.3)
AST SERPL-CCNC: 12 U/L — SIGNIFICANT CHANGE UP (ref 10–40)
BASOPHILS # BLD AUTO: 0 K/UL — SIGNIFICANT CHANGE UP (ref 0–0.2)
BASOPHILS NFR BLD AUTO: 0.3 % — SIGNIFICANT CHANGE UP (ref 0–2)
BILIRUB SERPL-MCNC: 0.6 MG/DL — SIGNIFICANT CHANGE UP (ref 0.2–1.2)
BUN SERPL-MCNC: 40 MG/DL — HIGH (ref 7–23)
BUN SERPL-MCNC: 44 MG/DL — HIGH (ref 7–23)
CALCIUM SERPL-MCNC: 8.3 MG/DL — LOW (ref 8.4–10.5)
CALCIUM SERPL-MCNC: 8.6 MG/DL — SIGNIFICANT CHANGE UP (ref 8.4–10.5)
CHLORIDE SERPL-SCNC: 92 MMOL/L — LOW (ref 96–108)
CHLORIDE SERPL-SCNC: 94 MMOL/L — LOW (ref 96–108)
CO2 SERPL-SCNC: 25 MMOL/L — SIGNIFICANT CHANGE UP (ref 22–31)
CO2 SERPL-SCNC: 26 MMOL/L — SIGNIFICANT CHANGE UP (ref 22–31)
CREAT SERPL-MCNC: 1.61 MG/DL — HIGH (ref 0.5–1.3)
CREAT SERPL-MCNC: 1.77 MG/DL — HIGH (ref 0.5–1.3)
EOSINOPHIL # BLD AUTO: 0.2 K/UL — SIGNIFICANT CHANGE UP (ref 0–0.5)
EOSINOPHIL NFR BLD AUTO: 2.7 % — SIGNIFICANT CHANGE UP (ref 0–6)
GLUCOSE SERPL-MCNC: 106 MG/DL — HIGH (ref 70–99)
GLUCOSE SERPL-MCNC: 109 MG/DL — HIGH (ref 70–99)
HCT VFR BLD CALC: 33.1 % — LOW (ref 34.5–45)
HGB BLD-MCNC: 10.9 G/DL — LOW (ref 11.5–15.5)
INR BLD: 1.98 RATIO — HIGH (ref 0.88–1.16)
LYMPHOCYTES # BLD AUTO: 0.5 K/UL — LOW (ref 1–3.3)
LYMPHOCYTES # BLD AUTO: 8.4 % — LOW (ref 13–44)
MAGNESIUM SERPL-MCNC: 1.5 MG/DL — LOW (ref 1.6–2.6)
MCHC RBC-ENTMCNC: 28.3 PG — SIGNIFICANT CHANGE UP (ref 27–34)
MCHC RBC-ENTMCNC: 32.9 GM/DL — SIGNIFICANT CHANGE UP (ref 32–36)
MCV RBC AUTO: 86.2 FL — SIGNIFICANT CHANGE UP (ref 80–100)
MONOCYTES # BLD AUTO: 0.8 K/UL — SIGNIFICANT CHANGE UP (ref 0–0.9)
MONOCYTES NFR BLD AUTO: 13.4 % — SIGNIFICANT CHANGE UP (ref 2–14)
NEUTROPHILS # BLD AUTO: 4.7 K/UL — SIGNIFICANT CHANGE UP (ref 1.8–7.4)
NEUTROPHILS NFR BLD AUTO: 75.2 % — SIGNIFICANT CHANGE UP (ref 43–77)
OSMOLALITY SERPL: 292 MOS/KG — SIGNIFICANT CHANGE UP (ref 275–300)
PHOSPHATE SERPL-MCNC: 4 MG/DL — SIGNIFICANT CHANGE UP (ref 2.5–4.5)
PLATELET # BLD AUTO: 228 K/UL — SIGNIFICANT CHANGE UP (ref 150–400)
POTASSIUM SERPL-MCNC: 3.7 MMOL/L — SIGNIFICANT CHANGE UP (ref 3.5–5.3)
POTASSIUM SERPL-MCNC: 4.3 MMOL/L — SIGNIFICANT CHANGE UP (ref 3.5–5.3)
POTASSIUM SERPL-SCNC: 3.7 MMOL/L — SIGNIFICANT CHANGE UP (ref 3.5–5.3)
POTASSIUM SERPL-SCNC: 4.3 MMOL/L — SIGNIFICANT CHANGE UP (ref 3.5–5.3)
PROT SERPL-MCNC: 6.1 G/DL — SIGNIFICANT CHANGE UP (ref 6–8.3)
PROTHROM AB SERPL-ACNC: 23.3 SEC — HIGH (ref 10–12.9)
RBC # BLD: 3.84 M/UL — SIGNIFICANT CHANGE UP (ref 3.8–5.2)
RBC # FLD: 14.4 % — SIGNIFICANT CHANGE UP (ref 10.3–14.5)
SODIUM SERPL-SCNC: 128 MMOL/L — LOW (ref 135–145)
SODIUM SERPL-SCNC: 130 MMOL/L — LOW (ref 135–145)
WBC # BLD: 6.2 K/UL — SIGNIFICANT CHANGE UP (ref 3.8–10.5)
WBC # FLD AUTO: 6.2 K/UL — SIGNIFICANT CHANGE UP (ref 3.8–10.5)

## 2019-01-05 PROCEDURE — 12345: CPT | Mod: NC

## 2019-01-05 PROCEDURE — 99223 1ST HOSP IP/OBS HIGH 75: CPT | Mod: AI,GC

## 2019-01-05 RX ORDER — TRAMADOL HYDROCHLORIDE 50 MG/1
25 TABLET ORAL EVERY 8 HOURS
Qty: 0 | Refills: 0 | Status: DISCONTINUED | OUTPATIENT
Start: 2019-01-05 | End: 2019-01-05

## 2019-01-05 RX ORDER — CLOPIDOGREL BISULFATE 75 MG/1
75 TABLET, FILM COATED ORAL DAILY
Qty: 0 | Refills: 0 | Status: DISCONTINUED | OUTPATIENT
Start: 2019-01-05 | End: 2019-01-14

## 2019-01-05 RX ORDER — CIPROFLOXACIN LACTATE 400MG/40ML
200 VIAL (ML) INTRAVENOUS EVERY 12 HOURS
Qty: 0 | Refills: 0 | Status: DISCONTINUED | OUTPATIENT
Start: 2019-01-05 | End: 2019-01-06

## 2019-01-05 RX ORDER — ACETAMINOPHEN 500 MG
1000 TABLET ORAL ONCE
Qty: 0 | Refills: 0 | Status: COMPLETED | OUTPATIENT
Start: 2019-01-05 | End: 2019-01-05

## 2019-01-05 RX ORDER — VANCOMYCIN HCL 1 G
125 VIAL (EA) INTRAVENOUS EVERY 6 HOURS
Qty: 0 | Refills: 0 | Status: DISCONTINUED | OUTPATIENT
Start: 2019-01-05 | End: 2019-01-14

## 2019-01-05 RX ORDER — DABIGATRAN ETEXILATE MESYLATE 150 MG/1
75 CAPSULE ORAL EVERY 12 HOURS
Qty: 0 | Refills: 0 | Status: DISCONTINUED | OUTPATIENT
Start: 2019-01-05 | End: 2019-01-14

## 2019-01-05 RX ORDER — METRONIDAZOLE 500 MG
500 TABLET ORAL EVERY 8 HOURS
Qty: 0 | Refills: 0 | Status: DISCONTINUED | OUTPATIENT
Start: 2019-01-05 | End: 2019-01-06

## 2019-01-05 RX ORDER — CIPROFLOXACIN LACTATE 400MG/40ML
400 VIAL (ML) INTRAVENOUS ONCE
Qty: 0 | Refills: 0 | Status: COMPLETED | OUTPATIENT
Start: 2019-01-05 | End: 2019-01-05

## 2019-01-05 RX ORDER — MAGNESIUM SULFATE 500 MG/ML
2 VIAL (ML) INJECTION ONCE
Qty: 0 | Refills: 0 | Status: COMPLETED | OUTPATIENT
Start: 2019-01-05 | End: 2019-01-05

## 2019-01-05 RX ORDER — SACCHAROMYCES BOULARDII 250 MG
250 POWDER IN PACKET (EA) ORAL
Qty: 0 | Refills: 0 | Status: DISCONTINUED | OUTPATIENT
Start: 2019-01-05 | End: 2019-01-14

## 2019-01-05 RX ORDER — SODIUM CHLORIDE 9 MG/ML
1000 INJECTION INTRAMUSCULAR; INTRAVENOUS; SUBCUTANEOUS
Qty: 0 | Refills: 0 | Status: DISCONTINUED | OUTPATIENT
Start: 2019-01-05 | End: 2019-01-05

## 2019-01-05 RX ORDER — TRAMADOL HYDROCHLORIDE 50 MG/1
25 TABLET ORAL
Qty: 0 | Refills: 0 | Status: DISCONTINUED | OUTPATIENT
Start: 2019-01-05 | End: 2019-01-09

## 2019-01-05 RX ORDER — TRAMADOL HYDROCHLORIDE 50 MG/1
25 TABLET ORAL ONCE
Qty: 0 | Refills: 0 | Status: DISCONTINUED | OUTPATIENT
Start: 2019-01-05 | End: 2019-01-05

## 2019-01-05 RX ORDER — DABIGATRAN ETEXILATE MESYLATE 150 MG/1
150 CAPSULE ORAL EVERY 12 HOURS
Qty: 0 | Refills: 0 | Status: DISCONTINUED | OUTPATIENT
Start: 2019-01-05 | End: 2019-01-05

## 2019-01-05 RX ORDER — DABIGATRAN ETEXILATE MESYLATE 150 MG/1
75 CAPSULE ORAL ONCE
Qty: 0 | Refills: 0 | Status: COMPLETED | OUTPATIENT
Start: 2019-01-05 | End: 2019-01-05

## 2019-01-05 RX ORDER — HEPARIN SODIUM 5000 [USP'U]/ML
5000 INJECTION INTRAVENOUS; SUBCUTANEOUS EVERY 8 HOURS
Qty: 0 | Refills: 0 | Status: DISCONTINUED | OUTPATIENT
Start: 2019-01-05 | End: 2019-01-05

## 2019-01-05 RX ORDER — TRAMADOL HYDROCHLORIDE 50 MG/1
50 TABLET ORAL EVERY 6 HOURS
Qty: 0 | Refills: 0 | Status: DISCONTINUED | OUTPATIENT
Start: 2019-01-05 | End: 2019-01-05

## 2019-01-05 RX ORDER — METRONIDAZOLE 500 MG
500 TABLET ORAL ONCE
Qty: 0 | Refills: 0 | Status: COMPLETED | OUTPATIENT
Start: 2019-01-05 | End: 2019-01-05

## 2019-01-05 RX ORDER — ATORVASTATIN CALCIUM 80 MG/1
40 TABLET, FILM COATED ORAL AT BEDTIME
Qty: 0 | Refills: 0 | Status: DISCONTINUED | OUTPATIENT
Start: 2019-01-05 | End: 2019-01-14

## 2019-01-05 RX ORDER — KETOROLAC TROMETHAMINE 30 MG/ML
15 SYRINGE (ML) INJECTION ONCE
Qty: 0 | Refills: 0 | Status: DISCONTINUED | OUTPATIENT
Start: 2019-01-05 | End: 2019-01-05

## 2019-01-05 RX ORDER — ACETAMINOPHEN 500 MG
650 TABLET ORAL EVERY 6 HOURS
Qty: 0 | Refills: 0 | Status: DISCONTINUED | OUTPATIENT
Start: 2019-01-05 | End: 2019-01-14

## 2019-01-05 RX ORDER — METOPROLOL TARTRATE 50 MG
25 TABLET ORAL
Qty: 0 | Refills: 0 | Status: DISCONTINUED | OUTPATIENT
Start: 2019-01-05 | End: 2019-01-07

## 2019-01-05 RX ORDER — ONDANSETRON 8 MG/1
4 TABLET, FILM COATED ORAL EVERY 6 HOURS
Qty: 0 | Refills: 0 | Status: DISCONTINUED | OUTPATIENT
Start: 2019-01-05 | End: 2019-01-05

## 2019-01-05 RX ORDER — ONDANSETRON 8 MG/1
4 TABLET, FILM COATED ORAL ONCE
Qty: 0 | Refills: 0 | Status: COMPLETED | OUTPATIENT
Start: 2019-01-05 | End: 2019-01-05

## 2019-01-05 RX ADMIN — Medication 125 MILLIGRAM(S): at 18:30

## 2019-01-05 RX ADMIN — Medication 100 MILLIGRAM(S): at 10:50

## 2019-01-05 RX ADMIN — TRAMADOL HYDROCHLORIDE 25 MILLIGRAM(S): 50 TABLET ORAL at 15:05

## 2019-01-05 RX ADMIN — CLOPIDOGREL BISULFATE 75 MILLIGRAM(S): 75 TABLET, FILM COATED ORAL at 21:06

## 2019-01-05 RX ADMIN — Medication 15 MILLIGRAM(S): at 09:19

## 2019-01-05 RX ADMIN — Medication 100 MILLIGRAM(S): at 21:05

## 2019-01-05 RX ADMIN — Medication 50 GRAM(S): at 23:30

## 2019-01-05 RX ADMIN — TRAMADOL HYDROCHLORIDE 25 MILLIGRAM(S): 50 TABLET ORAL at 06:29

## 2019-01-05 RX ADMIN — Medication 125 MILLIGRAM(S): at 23:43

## 2019-01-05 RX ADMIN — DABIGATRAN ETEXILATE MESYLATE 75 MILLIGRAM(S): 150 CAPSULE ORAL at 18:31

## 2019-01-05 RX ADMIN — TRAMADOL HYDROCHLORIDE 25 MILLIGRAM(S): 50 TABLET ORAL at 05:27

## 2019-01-05 RX ADMIN — SODIUM CHLORIDE 100 MILLILITER(S): 9 INJECTION INTRAMUSCULAR; INTRAVENOUS; SUBCUTANEOUS at 06:29

## 2019-01-05 RX ADMIN — Medication 1000 MILLIGRAM(S): at 08:20

## 2019-01-05 RX ADMIN — TRAMADOL HYDROCHLORIDE 25 MILLIGRAM(S): 50 TABLET ORAL at 22:56

## 2019-01-05 RX ADMIN — Medication 1000 MILLIGRAM(S): at 03:31

## 2019-01-05 RX ADMIN — ATORVASTATIN CALCIUM 40 MILLIGRAM(S): 80 TABLET, FILM COATED ORAL at 21:06

## 2019-01-05 RX ADMIN — Medication 200 MILLIGRAM(S): at 02:12

## 2019-01-05 RX ADMIN — Medication 100 MILLIGRAM(S): at 15:05

## 2019-01-05 RX ADMIN — Medication 125 MILLIGRAM(S): at 12:38

## 2019-01-05 RX ADMIN — Medication 15 MILLIGRAM(S): at 15:50

## 2019-01-05 RX ADMIN — Medication 100 MILLIGRAM(S): at 01:40

## 2019-01-05 RX ADMIN — Medication 15 MILLIGRAM(S): at 09:38

## 2019-01-05 RX ADMIN — DABIGATRAN ETEXILATE MESYLATE 75 MILLIGRAM(S): 150 CAPSULE ORAL at 03:08

## 2019-01-05 RX ADMIN — TRAMADOL HYDROCHLORIDE 25 MILLIGRAM(S): 50 TABLET ORAL at 15:27

## 2019-01-05 RX ADMIN — Medication 250 MILLIGRAM(S): at 18:31

## 2019-01-05 RX ADMIN — Medication 400 MILLIGRAM(S): at 03:11

## 2019-01-05 RX ADMIN — Medication 400 MILLIGRAM(S): at 07:55

## 2019-01-05 RX ADMIN — Medication 400 MILLIGRAM(S): at 23:30

## 2019-01-05 NOTE — H&P ADULT - ASSESSMENT
78 year old woman with PMH HLD, HTN, CHFpEF, Afib on pradaxa presenting with diarrhea for 5 days in the setting of recent abx use, found to have pancolitis 78 year old woman with PMH HLD, HTN, CHFpEF (likely right sided failure), Afib on pradaxa, CKD presenting with diarrhea for 5 days in the setting of recent abx use, a/w pancolitis with relative hypotension SBP 90's, hyponatremia, hypokalemia due to volume depletion from diarrhea.

## 2019-01-05 NOTE — CHART NOTE - NSCHARTNOTEFT_GEN_A_CORE
Pt examined at bedside. This is a 79yo F w/ Afib on pradaxa, COPD on home 02 c/b right-sided HF, p/w abd pain and diarrhea x 5d in the setting of recent antibiotic use for cellulitis. Pt states that she was previously having 3-4 yellow, liquidly BMs per day; however, has not had a BM since initiation of abx yesterday. Currently endorses mild diffuse abd pain but denies N/V/D or dysuria. Feels that her legs are more swollen than usual and endorses inc pain in her RLE. Per staff, Cdiff/GI PCR studies have not been sent 2/2 lack of sample.     Pt's BPs noted to be on lower end (107/73); otherwise, she has been afebrile and satting at 97% on 2L 02. Currently completing IVF. On exam pt is comfortable, AOX4, and not in any distress.   Heart: nlS1 S2 w/ irregular rhythm. HR 90. EKG reviewed: Afib (not in RVR). +hepatojugular reflex   Lungs: CTAB   Abdomen: distended but soft abdomen w/o rigidity or rebound tenderness. There is v mild diffuse abdominal tenderness w/ hyperactive bowel sounds. Presentation is most c/w colitis (as seen on CT abd/pelvis).  LE: 2+ pitting edema 2/3 way up to shins, R>L. 2x2 tender ulcer noted above R heel.      Plan of care as below:  [] will monitor o/n for BM's. if diarrhea -> send GI PCR/Cdiff  [] will continue abx for now. consider d/c'ing if diarrhea has completely resolved tmrw  [] repeat BMP to assess IGOR  [] will hold off on further IVF given increased LE edema. If BPs low (SBP<95), will consider small IVF bolus  [] encouraged PO intake, brought preferred foods to bedside  [] c/w metoprolol and pradaxa for Afib    Enoch Freedman, PGY1  CMA Pt examined at bedside. This is a 77yo F w/ Afib on pradaxa, COPD on home 02 c/b right-sided HF, p/w abd pain and diarrhea x 5d in the setting of recent antibiotic use for cellulitis. Pt states that she was previously having 3-4 yellow, liquidly BMs per day; however, has not had a BM since initiation of abx yesterday. Currently endorses mild diffuse abd pain but denies N/V/D or dysuria. Feels that her legs are more swollen than usual and endorses inc pain in her RLE. Per staff, Cdiff/GI PCR studies have not been sent 2/2 lack of sample.     Pt's BPs noted to be on lower end (107/73); otherwise, she has been afebrile and satting at 97% on 2L 02. Currently completing IVF. On exam pt is comfortable, AOX4, and not in any distress.   Heart: nlS1 S2 w/ irregular rhythm. HR 90. EKG reviewed: Afib (not in RVR). +hepatojugular reflex   Lungs: CTAB   Abdomen: distended but soft abdomen w/o rigidity or rebound tenderness. There is v mild diffuse abdominal tenderness w/ hyperactive bowel sounds. Presentation is most c/w colitis (as seen on CT abd/pelvis).  LE: 2+ pitting edema 2/3 way up to shins, R>L. 2x2 tender ulcer noted above R heel.      Plan of care as below:  [] will monitor o/n for BM's. if diarrhea -> send GI PCR/Cdiff  [] will continue abx for now. consider d/c'ing if diarrhea has completely resolved tmrw  [] repeat BMP to assess IGOR  [] will hold off on further IVF given increased LE edema. If BPs low (SBP<95), will consider small IVF bolus  [] encouraged PO intake, brought preferred foods to bedside  [] c/w metoprolol and pradaxa for Afib  [] f/u w/ wound care for R heel ulcer    Enoch Freedman, PGY1  CMA Pt examined at bedside. This is a 77yo F w/ Afib on pradaxa, COPD on home 02 c/b right-sided HF, p/w abd pain and diarrhea x 5d in the setting of recent antibiotic use for cellulitis. Pt states that she was previously having 3-4 yellow, liquidly BMs per day; however, has not had a BM since initiation of abx yesterday. Currently endorses mild diffuse abd pain but denies N/V/D or dysuria. Feels that her legs are more swollen than usual and endorses inc pain in her RLE. Per staff, Cdiff/GI PCR studies have not been sent 2/2 lack of sample.     Pt's BPs noted to be on lower end (107/73); otherwise, she has been afebrile and satting at 97% on 2L 02. Currently completing IVF. On exam pt is comfortable, AOX4, and not in any distress.   Heart: nlS1 S2 w/ irregular rhythm. HR 90. EKG reviewed: Afib (not in RVR). +hepatojugular reflex   Lungs: CTAB   Abdomen: distended but soft abdomen w/o rigidity or rebound tenderness. There is v mild diffuse abdominal tenderness w/ hyperactive bowel sounds.  LE: 2+ pitting edema 2/3 way up to shins, R>L. 2x2 tender ulcer noted above R heel.        Presentation is most c/w colitis (as seen on CT abd/pelvis) possibly 2/2 C.diff recent antibiotic hx; however, would expect leukocytosis +/- fevers. May also be 2/2 other bacterial etiology. Unlikely autoimmune/inflammatory given age/lack of RF's (no prior CT abd/pelvis for comparison).     Plan of care as below:  [] will monitor o/n for BM's. if diarrhea -> send GI PCR/Cdiff  [] will continue abx for now. consider d/c'ing if diarrhea has completely resolved tmrw  [] repeat BMP to assess IGOR, hyponatremia  [] will hold off on further IVF given increased LE edema. If BPs low (SBP<95), will consider small IVF bolus  [] encouraged PO intake, brought preferred foods to bedside  [] c/w metoprolol and pradaxa for Afib  [] f/u w/ wound care for R heel ulcer    Enoch Freedman, PGY1  CMA Pt examined at bedside. This is a 77yo F w/ Afib on pradaxa, COPD on home 02 c/b right-sided HF, p/w abd pain and diarrhea x 5d in the setting of recent antibiotic use for cellulitis. Pt states that she was previously having 3-4 yellow, liquidly BMs per day; however, has not had a BM since initiation of abx yesterday. Currently endorses mild diffuse abd pain but denies N/V/D or dysuria. Feels that her legs are more swollen than usual and endorses inc pain in her RLE. Per staff, Cdiff/GI PCR studies have not been sent 2/2 lack of sample.     Pt's BPs noted to be on lower end (107/73); otherwise, she has been afebrile and satting at 97% on 2L 02. Currently completing IVF. On exam pt is comfortable, AOX4, and not in any distress.   Heart: nlS1 S2 w/ irregular rhythm. HR 90. EKG reviewed: Afib (not in RVR). +hepatojugular reflex   Lungs: CTAB   Abdomen: distended but soft abdomen w/o rigidity or rebound tenderness. There is v mild diffuse abdominal tenderness w/ hyperactive bowel sounds.  LE: 2+ pitting edema 2/3 way up to shins, R>L. 2x2 tender ulcer noted above R heel.        Presentation is most c/w colitis (as seen on CT abd/pelvis) possibly 2/2 C.diff recent antibiotic hx; however, would expect leukocytosis +/- fevers. May also be 2/2 other bacterial etiology. Unlikely autoimmune/inflammatory given age/lack of RF's (no prior CT abd/pelvis for comparison). Low BPs raise concern for ischemic colitis; however, pt's clinical status does not correlate (mild abd pain/nontoxic appearing/absent leukocytosis).    Plan of care as below:  [] will monitor o/n for BM's. if diarrhea -> send GI PCR/Cdiff  [] will continue abx for now. consider d/c'ing if diarrhea has completely resolved tmrw  [] repeat BMP to assess IGOR, hyponatremia  [] will hold off on further IVF given increased LE edema. If BPs low (SBP<95), will consider small IVF bolus  [] encouraged PO intake, brought preferred foods to bedside  [] c/w metoprolol and pradaxa for Afib  [] f/u w/ wound care for R heel ulcer    Enoch Freedman, PGY1  CMA Pt examined at bedside. This is a 77yo F w/ Afib on pradaxa, COPD on home 02 c/b right-sided HF, p/w abd pain and diarrhea x 5d in the setting of recent antibiotic use for cellulitis. Pt states that she was previously having 3-4 yellow, liquidly BMs per day; however, has not had a BM since initiation of abx yesterday. Currently endorses mild diffuse abd pain but denies N/V/D or dysuria. Feels that her legs are more swollen than usual and endorses inc pain in her RLE. Per staff, Cdiff/GI PCR studies have not been sent 2/2 lack of sample.     Pt's BPs noted to be on lower end (107/73); otherwise, she has been afebrile and satting at 97% on 2L 02. Currently completing IVF. On exam pt is comfortable, AOX4, and not in any distress.   Heart: nlS1 S2 w/ irregular rhythm. HR 90. EKG reviewed: Afib (not in RVR). +hepatojugular reflex   Lungs: CTAB   Abdomen: distended but soft abdomen w/o rigidity or rebound tenderness. There is v mild diffuse abdominal tenderness w/ hyperactive bowel sounds.  LE: 2+ pitting edema 2/3 way up to shins, R>L. 2x2 tender ulcer noted above R heel.        Presentation is most c/w colitis (as seen on CT abd/pelvis) possibly 2/2 C.diff recent antibiotic hx; however, would expect leukocytosis +/- fevers. May also be 2/2 other bacterial etiology. Unlikely autoimmune/inflammatory given age/lack of RF's (no prior CT abd/pelvis for comparison). Low BPs raise concern for ischemic colitis; however, pt's clinical status does not correlate (mild abd pain/nontoxic appearing/absent leukocytosis and BRBPR/hematochezia).    Plan of care as below:  [] will monitor o/n for BM's. if diarrhea -> send GI PCR/Cdiff  [] will continue abx for now. consider d/c'ing if diarrhea has completely resolved tmrw  [] repeat BMP to assess IGOR, hyponatremia  [] will hold off on further IVF given increased LE edema. If BPs low (SBP<95), will consider small IVF bolus  [] encouraged PO intake, brought preferred foods to bedside  [] c/w metoprolol and pradaxa for Afib  [] f/u w/ wound care for R heel ulcer    Enoch Freedman, PGY1  CMA

## 2019-01-05 NOTE — PROGRESS NOTE ADULT - PROBLEM SELECTOR PLAN 5
-suspect R sided CHF possibly due to lung dz/COPD  -holding lasix in the setting of diarrhea and hypotension  -monitor I/Os  -recent TTE with EF >50%, severely dilated LA,  -monitor fluid status; diurese PRN

## 2019-01-05 NOTE — H&P ADULT - PROBLEM SELECTOR PLAN 7
-Currently in RVR in the setting of diarrheal illness and pancolitis  -Holding metoprolol in the setting of hypotension  -c/w pradaxa -Currently in RVR in the setting of diarrheal illness and pancolitis  -c/w small dose of metoprolol, can increase as needed  -c/w pradaxa renally dosed -Currently in RVR in the setting of diarrheal illness and pancolitis  -will lower dose of metoprolol for rate control, and will increase as needed and BP tolerated  -will AC with pradaxa renally dosed

## 2019-01-05 NOTE — H&P ADULT - NSHPSOCIALHISTORY_GEN_ALL_CORE
Social History:    Marital Status:  (   )    (   ) Single    (   )    (  )   Occupation:   Lives with: (  ) alone  (  ) children   (  ) spouse   (  ) parents  (  ) other    Substance Use (street drugs): (  ) never used  (  ) other:  Tobacco Usage:  (   ) never smoked   (   ) former smoker   (   ) current smoker  (     ) pack year  (        ) last cigarette date  Alcohol Usage:  Sexual History: Social History:    Marital Status:  (   )    ( X  ) Single    (   )    (  )   Occupation: Retired  Lives with: ( X ) alone  (  ) children   (  ) spouse   (  ) parents  (  ) other    Substance Use (street drugs): ( X ) never used  (  ) other:  Tobacco Usage:  (   ) never smoked   ( X  ) former smoker   (   ) current smoker  (     ) pack year  (    11 months ago    ) last cigarette date  Alcohol Usage: Denies Social History:    Marital Status:  (   )    ( X  ) Single    (   )    (  )   Occupation: Retired  Lives with: ( X ) alone  (  ) children   (  ) spouse   (  ) parents  (  ) other  with HHA    Substance Use (street drugs): ( X ) never used  (  ) other:  Tobacco Usage:  (   ) never smoked   ( X  ) former smoker, quit 1 yr ago   (   ) current smoker  (     ) pack year  (    11 months ago    ) last cigarette date  Alcohol Usage: Denies

## 2019-01-05 NOTE — H&P ADULT - PROBLEM SELECTOR PLAN 2
-c/w metrodizaole and cipro  -soft diet  -gentle hydration  -Avoid senna/ colace  -f/u GI PCR  -If no improvement, consider GI consult -c/w metrodizaole and cipro and Po vanc, can dc Po vanc if c diff negative  -soft diet  -gentle hydration  -Avoid senna/ colace  -f/u GI PCR  -If no improvement, consider GI consult -will treat with metrodizaole and cipro and Po vanc, as above, will d/c Po vanc if c diff negative  -soft diet  -gentle hydration  -Avoid senna/ colace  -f/u GI PCR  -If no improvement, consider GI consult, currently no signs of toxic lili

## 2019-01-05 NOTE — PROGRESS NOTE ADULT - SUBJECTIVE AND OBJECTIVE BOX
Patient is a 78y old  Female who presents with a chief complaint of Diarrhea (05 Jan 2019 05:49)    SUBJECTIVE / OVERNIGHT EVENTS: Patient seen and examined. No acute overnight events, no subjective complaints.    OBJECTIVE:  Vital Signs Last 24 Hrs  T(F): 97.6 (05 Jan 2019 07:35), Max: 98 (05 Jan 2019 04:24)  HR: 99 (05 Jan 2019 07:35) (71 - 117)  BP: 101/65 (05 Jan 2019 07:35) (89/60 - 116/64)  RR: 17 (05 Jan 2019 07:35) (16 - 20)  SpO2: 98% (05 Jan 2019 07:35) (94% - 100%)    Physical Examination:  GEN: elderly woman, laying in stretcher in NAD  PSYCH: A&Ox3, mood and affect appear appropriate   RESPI: no accessory muscle use, B/L air entry, CTAB   CARDIO: regular rate/rhythm, +LE edema B/L  ABD: soft, NT, ND, +BS  EXT: patient able to move all extremities spontaneously  VASC: peripheral pulses palpated    Labs:                      10.9   6.2   )-----------( 228      ( 05 Jan 2019 06:05 )             33.1     01-05  130<L>  |  92<L>  |  40<H>  ----------------------------<  106<H>  3.7   |  26  |  1.61<H>    Ca    8.3<L>      05 Jan 2019 06:05    TPro  6.1  /  Alb  2.6<L>  /  TBili  0.6  /  DBili  x   /  AST  12  /  ALT  8<L>  /  AlkPhos  90  01-05    PT/INR - ( 05 Jan 2019 06:05 )   PT: 23.3 sec;   INR: 1.98 ratio    PTT - ( 05 Jan 2019 06:05 )  PTT:83.2 sec    Care Discussed with Consultants/Other Providers: NP-Dory    MEDICATIONS  (STANDING):  atorvastatin 40 milliGRAM(s) Oral at bedtime  ciprofloxacin   IVPB 200 milliGRAM(s) IV Intermittent every 12 hours  dabigatran 75 milliGRAM(s) Oral every 12 hours  metoprolol tartrate 25 milliGRAM(s) Oral two times a day  metroNIDAZOLE  IVPB 500 milliGRAM(s) IV Intermittent every 8 hours  saccharomyces boulardii 250 milliGRAM(s) Oral two times a day  sodium chloride 0.9%. 1000 milliLiter(s) (100 mL/Hr) IV Continuous <Continuous>  vancomycin    Solution 125 milliGRAM(s) Oral every 6 hours    MEDICATIONS  (PRN):  acetaminophen   Tablet .. 650 milliGRAM(s) Oral every 6 hours PRN Temp greater or equal to 38C (100.4F), Mild Pain (1 - 3)  traMADol 25 milliGRAM(s) Oral two times a day PRN Moderate Pain (4 - 6)

## 2019-01-05 NOTE — H&P ADULT - PROBLEM SELECTOR PLAN 8
-Holding anti-hypertensives in the setting of hypotension  -For hypotension will give gentle fluids, s/p 500cc bolus in ED

## 2019-01-05 NOTE — H&P ADULT - PROBLEM SELECTOR PLAN 3
-In the setting of diarrhea with decreased PO intake  -Will give gentle fluids  -f/u serum osm and urine osm  -nephrology consult if not improving -In the setting of diarrhea with decreased PO intake  -Will give gentle fluids  -f/u serum osm and urine osm, urine lytes  -nephrology consult if not improving -In the setting of diarrhea with decreased PO intake and Lasix use, suspect hypovolemic hyponatremia  -Will give gentle fluids  -f/u serum osm and urine osm, urine lytes  -nephrology consult if not improving

## 2019-01-05 NOTE — H&P ADULT - HISTORY OF PRESENT ILLNESS
78 year old woman with PMH HLD, HTN, CHFpEF, Afib on pradaxa presenting with diarrhea. Pt mentions recently being discharged from the 78 year old woman with PMH HLD, HTN, CHFpEF, Afib on pradaxa presenting with diarrhea. Pt mentions recently being discharged from the hospital found to have cellulitis and treated with Augmentin. Pt mentions cellulitis improving but pain continuing, improving with tramadol. Pt mentions developing diarrhea for the past 5 days, with multiple episodes in on day, constant, with no recent travel, and no hematochezia. Pt mentions no fevers, chills, sick contacts, N/V, abdominal pain, dysuria, and cough.    In the ED received: pradaxa, Cipro, metro, 500cc bolus of IVF, 1g acetaminophen, tramadol 25mg, KCl 40mEq x2 78 year old woman with PMH HLD, HTN, CHFpEF, Afib on pradaxa presenting with diarrhea. Pt was recently admitted for cellulitis and treated with Augmentin upon discharge which patient completed. Her cellulitis is improving but pain continuing, thus being treated with tramadol. Patient developed watery nonbloody diarrhea for the past 5 days, with multiple episodes in on day, constant.  Denies recent travel, or sick contatct.  Denies hematochezia, fevers, chills, N/V, abdominal pain, dysuria, and cough.  No FHx of autoimmune d/o or IBD  In the ED received: pradaxa, Cipro, metro, 500cc bolus of IVF, 1g acetaminophen, tramadol 25mg, KCl 40mEq x2

## 2019-01-05 NOTE — H&P ADULT - PROBLEM SELECTOR PLAN 5
-Not volume overloaded  -holding lasix in the setting of diarrhea and hypotension  -Monitor I/O  -recent tte with EF >50%, severely dilated LA suspect R sided CHF possibly due to lung dz/COPD  -holding lasix in the setting of diarrhea and hypotension  -Monitor I/O  -recent tte with EF >50%, severely dilated LA,  - IVF for another 1L, and monitor clinically. diurese prn

## 2019-01-05 NOTE — H&P ADULT - NSHPLABSRESULTS_GEN_ALL_CORE
12.1   8.0   )-----------( 268      ( 04 Jan 2019 17:58 )             37.3   01-04    131<L>  |  90<L>  |  40<H>  ----------------------------<  88  2.7<LL>   |  29  |  1.59<H>    Ca    8.3<L>      04 Jan 2019 17:58    TPro  6.6  /  Alb  2.9<L>  /  TBili  0.5  /  DBili  x   /  AST  12  /  ALT  10  /  AlkPhos  105  01-04    Blood Gas Venous - Lactate (01.04.19 @ 17:58)    Blood Gas Venous - Lactate: 1.9 mmoL/L    Blood Gas Profile - Venous (01.04.19 @ 17:58)    pH, Venous: 7.35    pCO2, Venous: 62 mmHg    pO2, Venous: <20 mmHg    HCO3, Venous: 33 mmol/L    Base Excess, Venous: 6.4 mmol/L    Oxygen Saturation, Venous: 16 %    Total CO2, Venous: 35 mmol/L    Blood Gas Source Venous: Venous    Lipase, Serum: 24 U/L (01.04.19 @ 17:58)  Prothrombin Time and INR, Plasma in AM (01.04.19 @ 17:58)    Prothrombin Time, Plasma: 21.2: Effective October 30th, 2018 the reference range for PT has changed. sec    INR: 1.81    < from: CT Abdomen and Pelvis w/ Oral Cont and w/ IV Cont (01.04.19 @ 20:56) >  IMPRESSION:   Pan colitis, most severe in the ascending colon.  Possible reactive inflammation of the appendiceal tip.      < from: Xray Chest 1 View AP/PA (01.04.19 @ 19:03) >  ******PRELIMINARY REPORT******        INTERPRETATION:  Bibasilar atelectasis 12.1   8.0   )-----------( 268      ( 04 Jan 2019 17:58 )             37.3   01-04    131<L>  |  90<L>  |  40<H>  ----------------------------<  88  2.7<LL>   |  29  |  1.59<H>    Ca    8.3<L>      04 Jan 2019 17:58    TPro  6.6  /  Alb  2.9<L>  /  TBili  0.5  /  DBili  x   /  AST  12  /  ALT  10  /  AlkPhos  105  01-04    Blood Gas Venous - Lactate (01.04.19 @ 17:58)    Blood Gas Venous - Lactate: 1.9 mmoL/L    Blood Gas Profile - Venous (01.04.19 @ 17:58)    pH, Venous: 7.35    pCO2, Venous: 62 mmHg    pO2, Venous: <20 mmHg    HCO3, Venous: 33 mmol/L    Base Excess, Venous: 6.4 mmol/L    Oxygen Saturation, Venous: 16 %    Total CO2, Venous: 35 mmol/L    Blood Gas Source Venous: Venous    Lipase, Serum: 24 U/L (01.04.19 @ 17:58)  Prothrombin Time and INR, Plasma in AM (01.04.19 @ 17:58)    Prothrombin Time, Plasma: 21.2: Effective October 30th, 2018 the reference range for PT has changed. sec    INR: 1.81    < from: CT Abdomen and Pelvis w/ Oral Cont and w/ IV Cont (01.04.19 @ 20:56) >  IMPRESSION:   Pan colitis, most severe in the ascending colon.  Possible reactive inflammation of the appendiceal tip.      < from: Xray Chest 1 View AP/PA (01.04.19 @ 19:03) >  ******PRELIMINARY REPORT******        INTERPRETATION:  Bibasilar atelectasis    ECG with afib with RVR and inferolateral ST changes, unchanged from ECG 02/2018 Labs, imaging and EKG tracing personally reviewed    12.1   8.0   )-----------( 268      ( 04 Jan 2019 17:58 )             37.3   01-04    131<L>  |  90<L>  |  40<H>  ----------------------------<  88  2.7<LL>   |  29  |  1.59<H>    Ca    8.3<L>      04 Jan 2019 17:58    TPro  6.6  /  Alb  2.9<L>  /  TBili  0.5  /  DBili  x   /  AST  12  /  ALT  10  /  AlkPhos  105  01-04    Blood Gas Venous - Lactate (01.04.19 @ 17:58)    Blood Gas Venous - Lactate: 1.9 mmoL/L    Blood Gas Profile - Venous (01.04.19 @ 17:58)    pH, Venous: 7.35    pCO2, Venous: 62 mmHg    pO2, Venous: <20 mmHg    HCO3, Venous: 33 mmol/L    Base Excess, Venous: 6.4 mmol/L    Oxygen Saturation, Venous: 16 %    Total CO2, Venous: 35 mmol/L    Blood Gas Source Venous: Venous    Lipase, Serum: 24 U/L (01.04.19 @ 17:58)  Prothrombin Time and INR, Plasma in AM (01.04.19 @ 17:58)    Prothrombin Time, Plasma: 21.2: Effective October 30th, 2018 the reference range for PT has changed. sec    INR: 1.81    < from: CT Abdomen and Pelvis w/ Oral Cont and w/ IV Cont (01.04.19 @ 20:56) >  IMPRESSION:   Pan colitis, most severe in the ascending colon.  Possible reactive inflammation of the appendiceal tip.      < from: Xray Chest 1 View AP/PA (01.04.19 @ 19:03) >  ******PRELIMINARY REPORT******        INTERPRETATION:  Bibasilar atelectasis    ECG with afib with RVR and inferolateral ST changes, unchanged from ECG 02/2018

## 2019-01-05 NOTE — H&P ADULT - NSHPREVIEWOFSYSTEMS_GEN_ALL_CORE
REVIEW OF SYSTEMS:    CONSTITUTIONAL: No weakness, fevers or chills  EYES/ENT: No visual changes;  No vertigo or throat pain   NECK: No pain or stiffness  RESPIRATORY: No cough, wheezing, hemoptysis; No shortness of breath  CARDIOVASCULAR: No chest pain or palpitations  GASTROINTESTINAL: No abdominal or epigastric pain. No nausea, vomiting, or hematemesis; No diarrhea or constipation. No melena or hematochezia.  GENITOURINARY: No dysuria, frequency or hematuria  NEUROLOGICAL: No numbness, No HA  SKIN: No itching, rashes  MSK: no joint pain, no muscle pain REVIEW OF SYSTEMS:    CONSTITUTIONAL: No weakness, fevers or chills  EYES/ENT: No visual changes;  No vertigo or throat pain   NECK: No pain or stiffness  RESPIRATORY: No cough, wheezing, hemoptysis; No shortness of breath  CARDIOVASCULAR: No chest pain or palpitations  GASTROINTESTINAL: No abdominal or epigastric pain. No nausea, vomiting, or hematemesis; +diarrhea. No melena or hematochezia.  GENITOURINARY: No dysuria, frequency or hematuria  NEUROLOGICAL: No numbness, No HA  SKIN: No itching, rashes  MSK: no joint pain, no muscle pain REVIEW OF SYSTEMS:    CONSTITUTIONAL: No fevers or chills  EYES/ENT: No visual changes;  No vertigo or throat pain   NECK: supple  RESPIRATORY: No cough, wheezing, hemoptysis; No shortness of breath  CARDIOVASCULAR: No chest pain or palpitations  GASTROINTESTINAL: No abdominal or epigastric pain. No nausea, vomiting, or hematemesis; +diarrhea. No melena or hematochezia.  GENITOURINARY: No dysuria, frequency or hematuria  NEUROLOGICAL: No numbness, No HA  SKIN: No itching, rashes  MSK: no joint pain, no muscle pain

## 2019-01-05 NOTE — PROGRESS NOTE ADULT - ASSESSMENT
78yoF w/ PMHx significant for HLD, HTN, HFpEF (likely right sided failure), Afib on Pradaxa, CKD presenting with diarrhea for 5 days in the setting of recent Abx use, a/w pancolitis with relative hypotension SBP 90's, hyponatremia, hypokalemia due to volume depletion from diarrhea.

## 2019-01-05 NOTE — H&P ADULT - PROBLEM SELECTOR PLAN 1
-In the setting of recent Abx use found to have pancolitis  -gentle hydration  -r/o C diff and Gi PCR  -c/w cipro and flagyl in the setting of pancolitis  -If no improvement and C diff negative consider anti-diarrheal. -In the setting of recent Abx use found to have pancolitis  -gentle hydration  -will check C diff and Gi PCR  -will treat wtih cipro and flagyl for presumed infectious colitis, and PO Vanco for now with recent Abx use, in the setting of pancolitis while pending C.Diff PCR  -will d/c PO Vanco once C. Diff negative  - will treat with probioticx

## 2019-01-05 NOTE — PROGRESS NOTE ADULT - PROBLEM SELECTOR PLAN 2
-will treat with metrodizaole and cipro and PO vanc, as above, will d/c PO vanc if c diff negative  -soft diet  -gentle hydration  -avoid senna/ colace  -f/u GI PCR  -if no improvement, consider GI consult, currently no signs of toxic megacolon

## 2019-01-05 NOTE — PROGRESS NOTE ADULT - PROBLEM SELECTOR PLAN 1
-in the setting of recent Abx use found to have pancolitis  -gentle hydration  -will check C diff and Gi PCR  -will treat with Cipro and Flagyl for presumed infectious colitis, and PO Vanco for now with recent Abx use, in the setting of pancolitis while pending Cdiff PCR  -will d/c PO Vanco if C. Diff negative  -will treat with probiotics

## 2019-01-05 NOTE — H&P ADULT - PROBLEM SELECTOR PLAN 6
-2/2 to CHF  -continue with leg elevation -likely due to to R sided CHF  -continue with leg elevation

## 2019-01-05 NOTE — H&P ADULT - NSHPPHYSICALEXAM_GEN_ALL_CORE
Vital Signs Last 24 Hrs  T(C): 36.7 (05 Jan 2019 04:24), Max: 36.7 (05 Jan 2019 04:24)  T(F): 98 (05 Jan 2019 04:24), Max: 98 (05 Jan 2019 04:24)  HR: 117 (05 Jan 2019 04:24) (71 - 117)  BP: 92/53 (05 Jan 2019 04:24) (89/60 - 116/64)  BP(mean): 65 (04 Jan 2019 16:39) (65 - 65)  RR: 16 (05 Jan 2019 04:24) (16 - 20)  SpO2: 97% (05 Jan 2019 04:24) (94% - 100%)    PHYSICAL EXAM:  GENERAL: NAD, well-groomed, well-developed  HEAD:  Atraumatic, Normocephalic  EYES: EOMI, PERRLA, conjunctiva and sclera clear  ENMT: No tonsillar erythema, exudates, or enlargement; Moist mucous membranes, Good dentition, No lesions  NECK: Supple, No JVD, Normal thyroid  CHEST/LUNG: Clear to ausculation bilaterally; No rales, rhonchi, wheezing, or rubs  HEART: Regular rate and rhythm; No murmurs, rubs, or gallops  ABDOMEN: Soft, Nontender, Nondistended; Bowel sounds present  EXTREMITIES:  2+ Peripheral Pulses, No clubbing, cyanosis, or edema  LYMPH: No lymphadenopathy noted  SKIN: No rashes or lesions  NERVOUS SYSTEM:  Alert & Oriented X3 Vital Signs Last 24 Hrs  T(C): 36.7 (05 Jan 2019 04:24), Max: 36.7 (05 Jan 2019 04:24)  T(F): 98 (05 Jan 2019 04:24), Max: 98 (05 Jan 2019 04:24)  HR: 117 (05 Jan 2019 04:24) (71 - 117)  BP: 92/53 (05 Jan 2019 04:24) (89/60 - 116/64)  BP(mean): 65 (04 Jan 2019 16:39) (65 - 65)  RR: 16 (05 Jan 2019 04:24) (16 - 20)  SpO2: 97% (05 Jan 2019 04:24) (94% - 100%)    PHYSICAL EXAM:  GENERAL: NAD  HEAD:  Atraumatic, Normocephalic  EYES: EOMI, PERRLA, conjunctiva and sclera clear  ENMT: No tonsillar erythema, exudates, or enlargement; Moist mucous membranes, Good dentition, No lesions  NECK: Supple, No JVD, Normal thyroid  CHEST/LUNG: Clear to ausculation bilaterally; No rales, rhonchi, wheezing, or rubs  HEART: Regular rate and rhythm; No murmurs, rubs, or gallops  ABDOMEN: Soft, tender to the lower quadrants bilaterally, Nondistended; Bowel sounds +  EXTREMITIES:  peripheral Pulses unable to assess in the setting of +3 pitting edema with no clubbing no cyanosis  LYMPH: No lymphadenopathy noted  SKIN: No rashes or lesions  NERVOUS SYSTEM:  Alert & Oriented X3 with no neuro deficit noted Vital Signs Last 24 Hrs  T(C): 36.7 (05 Jan 2019 04:24), Max: 36.7 (05 Jan 2019 04:24)  T(F): 98 (05 Jan 2019 04:24), Max: 98 (05 Jan 2019 04:24)  HR: 117 (05 Jan 2019 04:24) (71 - 117)  BP: 92/53 (05 Jan 2019 04:24) (89/60 - 116/64)  BP(mean): 65 (04 Jan 2019 16:39) (65 - 65)  RR: 16 (05 Jan 2019 04:24) (16 - 20)  SpO2: 97% (05 Jan 2019 04:24) (94% - 100%)    PHYSICAL EXAM:  GENERAL: NAD  HEAD:  Atraumatic, Normocephalic  EYES: EOMI, conjunctiva and sclera clear  ENMT: No tonsillar erythema, exudates, or enlargement; Moist mucous membranes, Good dentition, No lesions  NECK: Supple  CHEST/LUNG: Clear to ausculation bilaterally; No rales, rhonchi, wheezing, or rubs  HEART: Regular rate and rhythm; No murmurs, rubs, or gallops  ABDOMEN: Soft, tender to the lower quadrants bilaterally, Nondistended; Bowel sounds +  EXTREMITIES:  peripheral Pulses unable to assess in the setting of +3 pitting edema with no clubbing no cyanosis  NERVOUS SYSTEM:  Alert & Oriented X3, moves all extremities

## 2019-01-05 NOTE — PROGRESS NOTE ADULT - PROBLEM SELECTOR PLAN 3
-in the setting of diarrhea with decreased PO intake and Lasix use, suspect hypovolemic hyponatremia  -will give gentle fluids  -f/u serum osm and urine osm, urine lytes  -nephrology consult if not improving

## 2019-01-06 DIAGNOSIS — A04.72 ENTEROCOLITIS DUE TO CLOSTRIDIUM DIFFICILE, NOT SPECIFIED AS RECURRENT: ICD-10-CM

## 2019-01-06 LAB
ALBUMIN SERPL ELPH-MCNC: 2.6 G/DL — LOW (ref 3.3–5)
ALP SERPL-CCNC: 92 U/L — SIGNIFICANT CHANGE UP (ref 40–120)
ALT FLD-CCNC: 9 U/L — LOW (ref 10–45)
ANION GAP SERPL CALC-SCNC: 12 MMOL/L — SIGNIFICANT CHANGE UP (ref 5–17)
APTT BLD: 86.7 SEC — HIGH (ref 27.5–36.3)
AST SERPL-CCNC: 9 U/L — LOW (ref 10–40)
BILIRUB SERPL-MCNC: 0.5 MG/DL — SIGNIFICANT CHANGE UP (ref 0.2–1.2)
BUN SERPL-MCNC: 45 MG/DL — HIGH (ref 7–23)
C DIFF GDH STL QL: POSITIVE — SIGNIFICANT CHANGE UP
C DIFF GDH STL QL: SIGNIFICANT CHANGE UP
CALCIUM SERPL-MCNC: 8.4 MG/DL — SIGNIFICANT CHANGE UP (ref 8.4–10.5)
CHLORIDE SERPL-SCNC: 94 MMOL/L — LOW (ref 96–108)
CO2 SERPL-SCNC: 23 MMOL/L — SIGNIFICANT CHANGE UP (ref 22–31)
CREAT SERPL-MCNC: 1.77 MG/DL — HIGH (ref 0.5–1.3)
CULTURE RESULTS: SIGNIFICANT CHANGE UP
GLUCOSE SERPL-MCNC: 85 MG/DL — SIGNIFICANT CHANGE UP (ref 70–99)
HCT VFR BLD CALC: 32.7 % — LOW (ref 34.5–45)
HGB BLD-MCNC: 10.8 G/DL — LOW (ref 11.5–15.5)
INR BLD: 2.33 RATIO — HIGH (ref 0.88–1.16)
MAGNESIUM SERPL-MCNC: 2.1 MG/DL — SIGNIFICANT CHANGE UP (ref 1.6–2.6)
MCHC RBC-ENTMCNC: 28.6 PG — SIGNIFICANT CHANGE UP (ref 27–34)
MCHC RBC-ENTMCNC: 33 GM/DL — SIGNIFICANT CHANGE UP (ref 32–36)
MCV RBC AUTO: 86.6 FL — SIGNIFICANT CHANGE UP (ref 80–100)
PHOSPHATE SERPL-MCNC: 4 MG/DL — SIGNIFICANT CHANGE UP (ref 2.5–4.5)
PLATELET # BLD AUTO: 214 K/UL — SIGNIFICANT CHANGE UP (ref 150–400)
POTASSIUM SERPL-MCNC: 4.4 MMOL/L — SIGNIFICANT CHANGE UP (ref 3.5–5.3)
POTASSIUM SERPL-SCNC: 4.4 MMOL/L — SIGNIFICANT CHANGE UP (ref 3.5–5.3)
PROT SERPL-MCNC: 6.1 G/DL — SIGNIFICANT CHANGE UP (ref 6–8.3)
PROTHROM AB SERPL-ACNC: 27.3 SEC — HIGH (ref 10–12.9)
RBC # BLD: 3.77 M/UL — LOW (ref 3.8–5.2)
RBC # FLD: 14.6 % — HIGH (ref 10.3–14.5)
SODIUM SERPL-SCNC: 129 MMOL/L — LOW (ref 135–145)
SPECIMEN SOURCE: SIGNIFICANT CHANGE UP
WBC # BLD: 6.4 K/UL — SIGNIFICANT CHANGE UP (ref 3.8–10.5)
WBC # FLD AUTO: 6.4 K/UL — SIGNIFICANT CHANGE UP (ref 3.8–10.5)

## 2019-01-06 PROCEDURE — 99233 SBSQ HOSP IP/OBS HIGH 50: CPT | Mod: GC

## 2019-01-06 RX ORDER — ONDANSETRON 8 MG/1
4 TABLET, FILM COATED ORAL ONCE
Qty: 0 | Refills: 0 | Status: COMPLETED | OUTPATIENT
Start: 2019-01-06 | End: 2019-01-06

## 2019-01-06 RX ADMIN — Medication 250 MILLIGRAM(S): at 18:41

## 2019-01-06 RX ADMIN — Medication 100 MILLIGRAM(S): at 04:31

## 2019-01-06 RX ADMIN — Medication 125 MILLIGRAM(S): at 06:05

## 2019-01-06 RX ADMIN — ONDANSETRON 4 MILLIGRAM(S): 8 TABLET, FILM COATED ORAL at 11:09

## 2019-01-06 RX ADMIN — Medication 100 MILLIGRAM(S): at 06:04

## 2019-01-06 RX ADMIN — Medication 0.5 MILLIGRAM(S): at 23:30

## 2019-01-06 RX ADMIN — CLOPIDOGREL BISULFATE 75 MILLIGRAM(S): 75 TABLET, FILM COATED ORAL at 13:30

## 2019-01-06 RX ADMIN — Medication 125 MILLIGRAM(S): at 23:32

## 2019-01-06 RX ADMIN — Medication 125 MILLIGRAM(S): at 13:30

## 2019-01-06 RX ADMIN — DABIGATRAN ETEXILATE MESYLATE 75 MILLIGRAM(S): 150 CAPSULE ORAL at 06:05

## 2019-01-06 RX ADMIN — ATORVASTATIN CALCIUM 40 MILLIGRAM(S): 80 TABLET, FILM COATED ORAL at 22:27

## 2019-01-06 RX ADMIN — Medication 1000 MILLIGRAM(S): at 02:08

## 2019-01-06 RX ADMIN — DABIGATRAN ETEXILATE MESYLATE 75 MILLIGRAM(S): 150 CAPSULE ORAL at 18:41

## 2019-01-06 RX ADMIN — Medication 250 MILLIGRAM(S): at 06:05

## 2019-01-06 RX ADMIN — Medication 125 MILLIGRAM(S): at 18:41

## 2019-01-06 NOTE — PHYSICAL THERAPY INITIAL EVALUATION ADULT - BALANCE TRAINING, PT EVAL
pt will be able to ambulate 150ft with rolling walker & contact guard assist without loss of balance within 4 weeks

## 2019-01-06 NOTE — PHYSICAL THERAPY INITIAL EVALUATION ADULT - STRENGTHENING, PT EVAL
pt will be able to perform all functional mobility with contact guard assist x 1 or less within 4 weeks

## 2019-01-06 NOTE — PROGRESS NOTE ADULT - PROBLEM SELECTOR PLAN 1
- in the setting of recent Abx use found to have pancolitis, 2/2 cdiff  - c/w PO Issac, contact precautions  - d/c cipro/flagyl  -will treat with probiotics

## 2019-01-06 NOTE — PHYSICAL THERAPY INITIAL EVALUATION ADULT - GENERAL OBSERVATIONS, REHAB EVAL
Pt encountered supine in bed + O2 via nasal cannula at 1L/min, IV lock RUE, dressing to R ankle (clean, dry, & intact)

## 2019-01-06 NOTE — PHYSICAL THERAPY INITIAL EVALUATION ADULT - PATIENT/FAMILY AGREES WITH PLAN
to be determined pending completion of functional evaluation; pt in agreement with wound care dressing recommendations

## 2019-01-06 NOTE — PROGRESS NOTE ADULT - ASSESSMENT
78yoF w/ PMHx significant for HLD, HTN, HFpEF (likely right sided failure), Afib on Pradaxa, CKD presenting with diarrhea for 5 days in the setting of recent Abx use, a/w pancolitis found to be positive for cdiff.

## 2019-01-06 NOTE — PHYSICAL THERAPY INITIAL EVALUATION ADULT - DIAGNOSIS, PT EVAL
Decreased strength, decreased endurance, impaired balance, impaired functional mobility, impaired integumentary integrity

## 2019-01-06 NOTE — PHYSICAL THERAPY INITIAL EVALUATION ADULT - PLANNED THERAPY INTERVENTIONS, PT EVAL
gait training/stair negotiation: pt will be able to ascend/descend 20 steps with use of one rail & contact guard assist via step to step pattern within 4 weeks/strengthening/transfer training/balance training/bed mobility training

## 2019-01-06 NOTE — PHYSICAL THERAPY INITIAL EVALUATION ADULT - IMPAIRMENTS FOUND, PT EVAL
bed mobility, transfers, stair negotiation/integumentary integrity/muscle strength/aerobic capacity/endurance/gait, locomotion, and balance

## 2019-01-06 NOTE — PHYSICAL THERAPY INITIAL EVALUATION ADULT - TRANSFER TRAINING, PT EVAL
pt will be able to perform all transfers with use of rolling walker with contact guard assist within 4 weeks

## 2019-01-06 NOTE — PHYSICAL THERAPY INITIAL EVALUATION ADULT - ADDITIONAL COMMENTS
Pt states she lives alone in a 3-family home, 20 entry steps (+ rail), no steps to negotiate once inside. Pt states she typically requires assistance for all functional mobility including [id number] ambulation with a rolling walker & ADL's. Pt states she has a home health aide 6 hours/day x 7 days/week & daughter provides assist after work when she can. Pt also owns wheelchair, commode, & shower chair. Pt had visiting WC RN services "a few times during the week" for RLE cellulitis & endorses medihoney dressing. Pt reports she refused rehab placement last admission & was sent home with [facility]. Pt is right hand dominant. Goal of therapy: manage pain, feel better, & return home.

## 2019-01-06 NOTE — PHYSICAL THERAPY INITIAL EVALUATION ADULT - REFERRING PHYSICIAN, REHAB EVAL
Filippo Stone. PT Evaluate & Treat. PT Wound Care Consult. Ambulate as Tolerated. Ambulate with Assistance.

## 2019-01-06 NOTE — PHYSICAL THERAPY INITIAL EVALUATION ADULT - PERTINENT HX OF CURRENT PROBLEM, REHAB EVAL
Pt is a 79yo F admitted with diarrhea x 5 days. Pt r/o c-diff (pending collection). Imaging/work-up revealed + pan colitis. Pt with recent admission for CHF exacerbation & RLE cellulitis.

## 2019-01-06 NOTE — PROGRESS NOTE ADULT - SUBJECTIVE AND OBJECTIVE BOX
Patient is a 78y old  Female who presents with a chief complaint of Diarrhea (05 Jan 2019 13:34)       INTERVAL HPI/OVERNIGHT EVENTS:        MEDICATIONS  (STANDING):  atorvastatin 40 milliGRAM(s) Oral at bedtime  clopidogrel Tablet 75 milliGRAM(s) Oral daily  dabigatran 75 milliGRAM(s) Oral every 12 hours  metoprolol tartrate 25 milliGRAM(s) Oral two times a day  saccharomyces boulardii 250 milliGRAM(s) Oral two times a day  vancomycin    Solution 125 milliGRAM(s) Oral every 6 hours    MEDICATIONS  (PRN):  acetaminophen   Tablet .. 650 milliGRAM(s) Oral every 6 hours PRN Temp greater or equal to 38C (100.4F), Mild Pain (1 - 3)  traMADol 25 milliGRAM(s) Oral two times a day PRN Moderate Pain (4 - 6)      Allergies    No Known Allergies    Intolerances        REVIEW OF SYSTEMS:  CONSTITUTIONAL: No fever, weight loss, or fatigue  EYES: No eye pain, visual disturbances, or discharge  ENMT:  No difficulty hearing, tinnitus, vertigo; No sinus or throat pain  RESPIRATORY: No cough, wheezing, chills or hemoptysis; No shortness of breath  CARDIOVASCULAR: No chest pain, palpitations, dizziness, or leg swelling  GASTROINTESTINAL: No abdominal or epigastric pain. No nausea, vomiting, or hematemesis; No diarrhea or constipation. No melena or hematochezia.  GENITOURINARY: No dysuria, frequency, hematuria, or incontinence  NEUROLOGICAL: No headaches, loss of strength, numbness, or tremors  SKIN: No itching, burning, rashes, or lesions   MUSCULOSKELETAL: No joint pain or swelling;   PSYCHIATRIC: Denies depression, anxiety  HEME/LYMPH: No easy bruising, or bleeding gums    Vital Signs Last 24 Hrs  T(C): 36.3 (06 Jan 2019 05:01), Max: 36.7 (05 Jan 2019 23:08)  T(F): 97.3 (06 Jan 2019 05:01), Max: 98.1 (05 Jan 2019 23:08)  HR: 98 (06 Jan 2019 09:26) (78 - 108)  BP: 90/60 (06 Jan 2019 09:26) (89/57 - 107/73)  BP(mean): --  RR: 16 (06 Jan 2019 09:26) (16 - 18)  SpO2: 95% (06 Jan 2019 09:26) (95% - 97%)    PHYSICAL EXAM:  GENERAL: NAD, well-groomed, well-developed  HEAD:  Atraumatic, Normocephalic  EYES: EOMI, PERRLA, conjunctiva and sclera clear  ENMT: No tonsillar erythema, exudates, or enlargement; Moist mucous membranes, Good dentition  NECK: Supple, No JVD  NERVOUS SYSTEM: AOX3, motor and sensation grossly intact in b/l UE and b/l LE  PSYCHIATRIC: Appropriate affect and mood  CHEST/LUNG: Clear to auscultation bilaterally; No rales, rhonchi, wheezing, or rubs  HEART: Regular rate and rhythm; No murmurs, rubs, or gallops. No LE edema  ABDOMEN: Soft, Nontender, Nondistended; Bowel sounds present  EXTREMITIES:  2+ Peripheral Pulses, No clubbing, cyanosis  SKIN: No rashes or lesions    LABS:                        10.8   6.4   )-----------( 214      ( 06 Jan 2019 07:05 )             32.7     06 Jan 2019 07:02    129    |  94     |  45     ----------------------------<  85     4.4     |  23     |  1.77     Ca    8.4        06 Jan 2019 07:02  Phos  4.0       06 Jan 2019 07:02  Mg     2.1       06 Jan 2019 07:02    TPro  6.1    /  Alb  2.6    /  TBili  0.5    /  DBili  x      /  AST  9      /  ALT  9      /  AlkPhos  92     06 Jan 2019 07:02    PT/INR - ( 06 Jan 2019 07:05 )   PT: 27.3 sec;   INR: 2.33 ratio         PTT - ( 06 Jan 2019 07:05 )  PTT:86.7 sec  CAPILLARY BLOOD GLUCOSE        BLOOD CULTURE    RADIOLOGY & ADDITIONAL TESTS:    Imaging Personally Reviewed:  [ ] YES     Consultant(s) Notes Reviewed:      Care Discussed with Consultants/Other Providers: Patient is a 78y old  Female who presents with a chief complaint of Diarrhea (05 Jan 2019 13:34)       INTERVAL HPI/OVERNIGHT EVENTS:    Overnight patient had 2 more liquid bowel movements. Reports nausea this a.m. Otherwise no vomiting, tolerating PO, denies fevers, chills, malaise.    MEDICATIONS  (STANDING):  atorvastatin 40 milliGRAM(s) Oral at bedtime  clopidogrel Tablet 75 milliGRAM(s) Oral daily  dabigatran 75 milliGRAM(s) Oral every 12 hours  metoprolol tartrate 25 milliGRAM(s) Oral two times a day  saccharomyces boulardii 250 milliGRAM(s) Oral two times a day  vancomycin    Solution 125 milliGRAM(s) Oral every 6 hours    MEDICATIONS  (PRN):  acetaminophen   Tablet .. 650 milliGRAM(s) Oral every 6 hours PRN Temp greater or equal to 38C (100.4F), Mild Pain (1 - 3)  traMADol 25 milliGRAM(s) Oral two times a day PRN Moderate Pain (4 - 6)      Allergies    No Known Allergies    Intolerances        REVIEW OF SYSTEMS:  CONSTITUTIONAL: No fever, weight loss  EYES: No eye pain, or discharge  ENMT: No sinus or throat pain  RESPIRATORY: No cough, wheezing, chills or hemoptysis; No shortness of breath  CARDIOVASCULAR: No chest pain, palpitations, dizziness, +leg swelling  GASTROINTESTINAL: No abdominal or epigastric pain. +nausea, no vomiting, or hematemesis; +diarrhea . No melena or hematochezia.  GENITOURINARY: No dysuria, frequency  NEUROLOGICAL: No headaches, loss of strength, numbness, or tremors  SKIN: No itching, burning, rashes, or lesions   MUSCULOSKELETAL: No joint pain or swelling;   PSYCHIATRIC: Denies depression, anxiety    Vital Signs Last 24 Hrs  T(C): 36.3 (06 Jan 2019 05:01), Max: 36.7 (05 Jan 2019 23:08)  T(F): 97.3 (06 Jan 2019 05:01), Max: 98.1 (05 Jan 2019 23:08)  HR: 98 (06 Jan 2019 09:26) (78 - 108)  BP: 90/60 (06 Jan 2019 09:26) (89/57 - 107/73)  BP(mean): --  RR: 16 (06 Jan 2019 09:26) (16 - 18)  SpO2: 95% (06 Jan 2019 09:26) (95% - 97%)    PHYSICAL EXAM:  GENERAL: NAD, chronically ill appearing  HEAD:  Atraumatic, Normocephalic  EYES: EOMI, PERRLA  ENMT: No tonsillar erythema, exudates, or enlargement; Moist mucous membranes, Good dentition  NECK: Supple, No JVD  NERVOUS SYSTEM: AOX3, motor and sensation grossly intact in b/l UE and b/l LE  PSYCHIATRIC: Appropriate affect and mood  CHEST/LUNG: Clear to auscultation bilaterally; No rales, rhonchi, wheezing, or rubs  HEART: Regular rate and rhythm; No murmurs, rubs, or gallops. 2+ b/l LE pitting edema to knees  ABDOMEN: Soft, Nontender, Nondistended; Bowel sounds present  EXTREMITIES:  2+ Peripheral Pulses, No clubbing, cyanosis  SKIN: No rashes or lesions    LABS:                        10.8   6.4   )-----------( 214      ( 06 Jan 2019 07:05 )             32.7     06 Jan 2019 07:02    129    |  94     |  45     ----------------------------<  85     4.4     |  23     |  1.77     Ca    8.4        06 Jan 2019 07:02  Phos  4.0       06 Jan 2019 07:02  Mg     2.1       06 Jan 2019 07:02    TPro  6.1    /  Alb  2.6    /  TBili  0.5    /  DBili  x      /  AST  9      /  ALT  9      /  AlkPhos  92     06 Jan 2019 07:02    PT/INR - ( 06 Jan 2019 07:05 )   PT: 27.3 sec;   INR: 2.33 ratio         PTT - ( 06 Jan 2019 07:05 )  PTT:86.7 sec  CAPILLARY BLOOD GLUCOSE        BLOOD CULTURE    RADIOLOGY & ADDITIONAL TESTS:    Imaging Personally Reviewed:  [ ] YES     Consultant(s) Notes Reviewed:      Care Discussed with Consultants/Other Providers:

## 2019-01-06 NOTE — PHYSICAL THERAPY INITIAL EVALUATION ADULT - MODALITIES TREATMENT COMMENTS
Pt is on a [employer] P500 low air loss surface. Pt presents with necrotic wound over posterior aspect of R achilles tendon. Pt with recent admission to [facility] for CHF exacerbation & cellulitis. + RLE edema & blanchable erythema noted. No increased warmth appreciated. + palpable DP/PT pulses B/L. Pt presents with wound over posterior aspect of R achilles which measures [id number] depth. Wound is 100% black eschar. No drainage appreciated. Periwound boggy. + tenderness to palpation per pt report.

## 2019-01-06 NOTE — PROGRESS NOTE ADULT - PROBLEM SELECTOR PLAN 2
-hypovolemic hyponatremia in the setting of diarrhea with decreased PO intake and Lasix use  - Patient with LE edema, sCHF, will hold off further IVF at this time and encourage PO intake, which she is tolerating, holding Lasix

## 2019-01-07 ENCOUNTER — TRANSCRIPTION ENCOUNTER (OUTPATIENT)
Age: 79
End: 2019-01-07

## 2019-01-07 ENCOUNTER — APPOINTMENT (OUTPATIENT)
Dept: CARDIOLOGY | Facility: CLINIC | Age: 79
End: 2019-01-07

## 2019-01-07 DIAGNOSIS — I95.9 HYPOTENSION, UNSPECIFIED: ICD-10-CM

## 2019-01-07 DIAGNOSIS — I25.10 ATHEROSCLEROTIC HEART DISEASE OF NATIVE CORONARY ARTERY WITHOUT ANGINA PECTORIS: ICD-10-CM

## 2019-01-07 DIAGNOSIS — N17.9 ACUTE KIDNEY FAILURE, UNSPECIFIED: ICD-10-CM

## 2019-01-07 DIAGNOSIS — F41.9 ANXIETY DISORDER, UNSPECIFIED: ICD-10-CM

## 2019-01-07 LAB
ANION GAP SERPL CALC-SCNC: 11 MMOL/L — SIGNIFICANT CHANGE UP (ref 5–17)
ANION GAP SERPL CALC-SCNC: 12 MMOL/L — SIGNIFICANT CHANGE UP (ref 5–17)
BUN SERPL-MCNC: 52 MG/DL — HIGH (ref 7–23)
BUN SERPL-MCNC: 52 MG/DL — HIGH (ref 7–23)
CALCIUM SERPL-MCNC: 8.4 MG/DL — SIGNIFICANT CHANGE UP (ref 8.4–10.5)
CALCIUM SERPL-MCNC: 8.6 MG/DL — SIGNIFICANT CHANGE UP (ref 8.4–10.5)
CHLORIDE SERPL-SCNC: 94 MMOL/L — LOW (ref 96–108)
CHLORIDE SERPL-SCNC: 95 MMOL/L — LOW (ref 96–108)
CO2 SERPL-SCNC: 23 MMOL/L — SIGNIFICANT CHANGE UP (ref 22–31)
CO2 SERPL-SCNC: 25 MMOL/L — SIGNIFICANT CHANGE UP (ref 22–31)
CREAT ?TM UR-MCNC: 78 MG/DL — SIGNIFICANT CHANGE UP
CREAT SERPL-MCNC: 2.15 MG/DL — HIGH (ref 0.5–1.3)
CREAT SERPL-MCNC: 2.3 MG/DL — HIGH (ref 0.5–1.3)
GLUCOSE SERPL-MCNC: 129 MG/DL — HIGH (ref 70–99)
GLUCOSE SERPL-MCNC: 85 MG/DL — SIGNIFICANT CHANGE UP (ref 70–99)
HCT VFR BLD CALC: 31.9 % — LOW (ref 34.5–45)
HGB BLD-MCNC: 10.8 G/DL — LOW (ref 11.5–15.5)
MAGNESIUM SERPL-MCNC: 2 MG/DL — SIGNIFICANT CHANGE UP (ref 1.6–2.6)
MAGNESIUM SERPL-MCNC: 2.1 MG/DL — SIGNIFICANT CHANGE UP (ref 1.6–2.6)
MCHC RBC-ENTMCNC: 29.1 PG — SIGNIFICANT CHANGE UP (ref 27–34)
MCHC RBC-ENTMCNC: 33.8 GM/DL — SIGNIFICANT CHANGE UP (ref 32–36)
MCV RBC AUTO: 86.2 FL — SIGNIFICANT CHANGE UP (ref 80–100)
PHOSPHATE SERPL-MCNC: 3.9 MG/DL — SIGNIFICANT CHANGE UP (ref 2.5–4.5)
PHOSPHATE SERPL-MCNC: 4 MG/DL — SIGNIFICANT CHANGE UP (ref 2.5–4.5)
PLATELET # BLD AUTO: 194 K/UL — SIGNIFICANT CHANGE UP (ref 150–400)
POTASSIUM SERPL-MCNC: 4.1 MMOL/L — SIGNIFICANT CHANGE UP (ref 3.5–5.3)
POTASSIUM SERPL-MCNC: 4.3 MMOL/L — SIGNIFICANT CHANGE UP (ref 3.5–5.3)
POTASSIUM SERPL-SCNC: 4.1 MMOL/L — SIGNIFICANT CHANGE UP (ref 3.5–5.3)
POTASSIUM SERPL-SCNC: 4.3 MMOL/L — SIGNIFICANT CHANGE UP (ref 3.5–5.3)
RBC # BLD: 3.71 M/UL — LOW (ref 3.8–5.2)
RBC # FLD: 15 % — HIGH (ref 10.3–14.5)
SODIUM SERPL-SCNC: 130 MMOL/L — LOW (ref 135–145)
SODIUM SERPL-SCNC: 130 MMOL/L — LOW (ref 135–145)
SODIUM UR-SCNC: <20 MMOL/L — SIGNIFICANT CHANGE UP
WBC # BLD: 6.9 K/UL — SIGNIFICANT CHANGE UP (ref 3.8–10.5)
WBC # FLD AUTO: 6.9 K/UL — SIGNIFICANT CHANGE UP (ref 3.8–10.5)

## 2019-01-07 PROCEDURE — 99233 SBSQ HOSP IP/OBS HIGH 50: CPT | Mod: GC

## 2019-01-07 PROCEDURE — 93010 ELECTROCARDIOGRAM REPORT: CPT

## 2019-01-07 RX ORDER — SODIUM CHLORIDE 9 MG/ML
1000 INJECTION, SOLUTION INTRAVENOUS
Qty: 0 | Refills: 0 | Status: DISCONTINUED | OUTPATIENT
Start: 2019-01-07 | End: 2019-01-08

## 2019-01-07 RX ORDER — SODIUM CHLORIDE 9 MG/ML
750 INJECTION, SOLUTION INTRAVENOUS
Qty: 0 | Refills: 0 | Status: DISCONTINUED | OUTPATIENT
Start: 2019-01-07 | End: 2019-01-07

## 2019-01-07 RX ORDER — METOPROLOL TARTRATE 50 MG
25 TABLET ORAL
Qty: 0 | Refills: 0 | Status: DISCONTINUED | OUTPATIENT
Start: 2019-01-07 | End: 2019-01-08

## 2019-01-07 RX ADMIN — Medication 0.5 MILLIGRAM(S): at 23:26

## 2019-01-07 RX ADMIN — DABIGATRAN ETEXILATE MESYLATE 75 MILLIGRAM(S): 150 CAPSULE ORAL at 17:36

## 2019-01-07 RX ADMIN — Medication 125 MILLIGRAM(S): at 23:27

## 2019-01-07 RX ADMIN — SODIUM CHLORIDE 100 MILLILITER(S): 9 INJECTION, SOLUTION INTRAVENOUS at 17:37

## 2019-01-07 RX ADMIN — Medication 250 MILLIGRAM(S): at 17:36

## 2019-01-07 RX ADMIN — SODIUM CHLORIDE 75 MILLILITER(S): 9 INJECTION, SOLUTION INTRAVENOUS at 12:29

## 2019-01-07 RX ADMIN — Medication 125 MILLIGRAM(S): at 17:36

## 2019-01-07 RX ADMIN — Medication 125 MILLIGRAM(S): at 06:16

## 2019-01-07 RX ADMIN — Medication 0.5 MILLIGRAM(S): at 17:36

## 2019-01-07 RX ADMIN — Medication 250 MILLIGRAM(S): at 06:16

## 2019-01-07 RX ADMIN — Medication 25 MILLIGRAM(S): at 06:16

## 2019-01-07 RX ADMIN — SODIUM CHLORIDE 100 MILLILITER(S): 9 INJECTION, SOLUTION INTRAVENOUS at 23:26

## 2019-01-07 RX ADMIN — CLOPIDOGREL BISULFATE 75 MILLIGRAM(S): 75 TABLET, FILM COATED ORAL at 12:29

## 2019-01-07 RX ADMIN — ATORVASTATIN CALCIUM 40 MILLIGRAM(S): 80 TABLET, FILM COATED ORAL at 23:27

## 2019-01-07 RX ADMIN — DABIGATRAN ETEXILATE MESYLATE 75 MILLIGRAM(S): 150 CAPSULE ORAL at 06:16

## 2019-01-07 RX ADMIN — Medication 125 MILLIGRAM(S): at 12:29

## 2019-01-07 RX ADMIN — Medication 0.5 MILLIGRAM(S): at 12:29

## 2019-01-07 NOTE — DISCHARGE NOTE ADULT - CARE PLAN
Principal Discharge DX:	Clostridium difficile colitis  Goal:	treatment  Assessment and plan of treatment:	You presented with watery diarrhea and were found to have a bacterial infection called Clostridium difficile colitis. You were treated with antibiotics. Your symptoms improved slowly over time. Please continue to take antibiotics for the prescribed period. If you experience increased pain, diarrhea or abdominal distension, please return to the hospital.  Secondary Diagnosis:	Acute kidney injury superimposed on CKD  Goal:	treatment  Assessment and plan of treatment:	You were found to have an abnormal creatinine, which is a marker of your creatinine. This is mostly likely because you were dehydrated from diarrhea. You were given fluid and your kidney function improved.  Secondary Diagnosis:	Atrial fibrillation, unspecified type  Assessment and plan of treatment:	You were found to have increased heart rate due to your atrial fibrillation. The dose of your metoprolol has been changed for better control of your atrial fibrillation. Please follow this new regimen. Principal Discharge DX:	Clostridium difficile colitis  Goal:	treatment  Assessment and plan of treatment:	You presented with watery diarrhea and were found to have a bacterial infection called Clostridium difficile colitis. You were treated with antibiotics. Your symptoms improved slowly over time. Please continue to take antibiotics for the prescribed period (4 more days starting 1/15/19). If you experience increased pain, diarrhea or abdominal distension, please return to the hospital.  Secondary Diagnosis:	Acute kidney injury superimposed on CKD  Goal:	treatment  Assessment and plan of treatment:	You were found to have an abnormal creatinine, which is a marker of your creatinine. This is mostly likely because you were dehydrated from diarrhea. You were given fluid and your kidney function improved.  Secondary Diagnosis:	Atrial fibrillation, unspecified type  Assessment and plan of treatment:	You were found to have increased heart rate due to your atrial fibrillation. The dose of your metoprolol has been changed for better control of your atrial fibrillation. Please follow this new regimen of mtoprolol every 6hours.

## 2019-01-07 NOTE — PROGRESS NOTE ADULT - ASSESSMENT
78yoF w/ PMHx significant for HLD, HTN, HFpEF (likely right sided failure), Afib on Pradaxa, CKD presenting with diarrhea for 5 days in the setting of recent Abx use, a/w pancolitis found to be positive for cdiff. 78yoF w/ PMHx significant for HLD, HTN, HFpEF (likely right sided failure), Afib on Pradaxa, CKD 3 presenting with diarrhea for 5 days in the setting of recent Abx use, a/w pancolitis found to be positive for cdiff.

## 2019-01-07 NOTE — PROGRESS NOTE ADULT - PROBLEM SELECTOR PLAN 1
- in the setting of recent abx use; found to have pancolitis, 2/2 c. diff  - c/w PO Issac (day 4), contact precautions  - c/w probiotics

## 2019-01-07 NOTE — DISCHARGE NOTE ADULT - ADDITIONAL INSTRUCTIONS
1) Primary care doctor/Cardiologist (Dr Cobian) - Please make an appointment within 1-2 weeks.   2) Infectious disease (Dr Dolan) - Please make an appointment within 1-2 weeks. 1) Primary care doctor/Cardiologist (Dr Cobian) - Please make an appointment within 1-2 weeks.   2) Infectious disease (Dr Dolan) - Please make an appointment within 1-2 weeks.  3) Please follow up with your cardiologist Dr. Camilo for management of your Chronic Heart Failure.

## 2019-01-07 NOTE — DISCHARGE NOTE ADULT - CARE PROVIDERS DIRECT ADDRESSES
[email or web address]

## 2019-01-07 NOTE — PROGRESS NOTE ADULT - PROBLEM SELECTOR PLAN 8
-DVT: pradaxa  -Diet: soft diet  -dispo: PT pending -c/w lipitor PCI in 11/2018  -c/w plavix and statin

## 2019-01-07 NOTE — DISCHARGE NOTE ADULT - PROVIDER TOKENS
TOKEN:'[id number]',TOKEN:'[id number]' TOKEN:'[id number]',TOKEN:'[id number]',TOKEN:'[id number]'

## 2019-01-07 NOTE — PROGRESS NOTE ADULT - PROBLEM SELECTOR PLAN 2
-hypovolemic hyponatremia in the setting of diarrhea with decreased PO intake and Lasix use  -will restart gentle IVF given pt is relatively dry on exam w/ rel low BPs -Cr worsening (baseline 1.1-1.5), likely 2/2 continued volume depletion from diarrhea  -will restart gentle IVF and monitor BMP in afternoon  -f/u bladder scan to r/o obstructive path -IGOR on CKD 3, likely prerenal from volume depletion from diarrhea and ATN associated hypotension  -will restart gentle IVF  -f/u bladder scan to r/o obstructive path however no hydro seen on recent CT  -monitor bmp, send FeNA  -if further uptrend by mary, consider renal eval -IGOR on CKD 3, likely prerenal from volume depletion from diarrhea, recent lasix use, and ATN associated hypotension,  -creatinine 2.2 (baseline of 1.3-1.5)   -will restart gentle IVF  -f/u bladder scan to r/o obstructive path however no hydro seen on recent CT  -monitor bmp, send FeNA  -if further uptrend by mary, consider renal eval

## 2019-01-07 NOTE — DISCHARGE NOTE ADULT - OTHER SIGNIFICANT FINDINGS
CT Abdomen and Pelvis w/ Oral Cont and w/ IV Cont (01.04.19 @ 20:56)   IMPRESSION:   Pan colitis, most severe in the ascending colon.  Possible reactive inflammation of the appendiceal tip.    Xray Abdomen 1 View PORTABLE -Urgent (01.09.19 @ 17:38)   IMPRESSION:   Nonobstructive bowel gas pattern.  No gaseous dilation of the colon. This radiograph cannot exclude fluid   filled dilated colon.

## 2019-01-07 NOTE — DISCHARGE NOTE ADULT - MEDICATION SUMMARY - MEDICATIONS TO STOP TAKING
I will STOP taking the medications listed below when I get home from the hospital:    amoxicillin-clavulanate 875 mg-125 mg oral tablet  -- 1 tab(s) by mouth 2 times a day   -- Finish all this medication unless otherwise directed by prescriber.  Take with food or milk.    LORazepam 0.5 mg oral tablet  -- 1 tab(s) by mouth every 6 hours, As Needed    LORazepam 0.5 mg oral tablet  -- 1 tab(s) by mouth 2 times a day    LORazepam 0.5 mg oral tablet  -- 1 tab(s) by mouth 2 times a day

## 2019-01-07 NOTE — PROGRESS NOTE ADULT - PROBLEM SELECTOR PLAN 4
Rate controlled on lopressor 25 mg PO BID  -[id number] 4 c/w pradaxa renally dosed -suspect R sided CHF possibly due to lung dz/COPD  -holding lasix in the setting of diarrhea and hypotension  -monitor I/Os  -recent TTE with EF >50%, severely dilated LA,  -monitor fluid status; diurese PRN likely due to diarrhea with poor po intake. Hgb stable  -start IVF  -monitor BP

## 2019-01-07 NOTE — DISCHARGE NOTE ADULT - MEDICATION SUMMARY - MEDICATIONS TO TAKE
I will START or STAY ON the medications listed below when I get home from the hospital:    traMADol 50 mg oral tablet  -- 0.5 tab(s) by mouth every 8 hours, As Needed -Moderate Pain (4 - 6) or severe pain 7-10 - for severe pain MDD:1.5mg  -- Indication: For Leg pain    Pradaxa 75 mg oral capsule  -- 1 cap(s) by mouth 2 times a day  -- Indication: For Atrial fibrillation, unspecified type    Ativan 0.5 mg oral tablet  -- 1 tab(s) by mouth 3 times a day, As Needed -for anxiety MDD:1.5mg  -- Indication: For Anxiety    atorvastatin 40 mg oral tablet  -- 1 tab(s) by mouth once a day  -- Indication: For Hyperlipidemia, unspecified hyperlipidemia type    Plavix 75 mg oral tablet  -- 1 tab(s) by mouth once a day  -- Indication: For CAD (coronary artery disease) S/P stent    metoprolol tartrate 25 mg oral tablet  -- 0.5 tab(s) by mouth every 6 hours   -- It is very important that you take or use this exactly as directed.  Do not skip doses or discontinue unless directed by your doctor.  May cause drowsiness.  Alcohol may intensify this effect.  Use care when operating dangerous machinery.  Some non-prescription drugs may aggravate your condition.  Read all labels carefully.  If a warning appears, check with your doctor before taking.  Take with food or milk.  This drug may impair the ability to drive or operate machinery.  Use care until you become familiar with its effects.    -- Indication: For Atrial fibrillation, unspecified type    Symbicort 80 mcg-4.5 mcg/inh inhalation aerosol  -- 1 puff(s) inhaled 2 times a day  -- Indication: For COPD    Baciguent 500 units/g topical ointment  -- 1 application on skin 2 times a day  -- Indication: For Preventive measure    Lasix 20 mg oral tablet  -- 3 tab(s) by mouth 2 times a day   -- Avoid prolonged or excessive exposure to direct and/or artificial sunlight while taking this medication.  It is very important that you take or use this exactly as directed.  Do not skip doses or discontinue unless directed by your doctor.  It may be advisable to drink a full glass orange juice or eat a banana daily while taking this medication.    -- Indication: For Heart Failure    vancomycin 125 mg oral capsule  -- 1 cap(s) by mouth every 6 hours   -- Finish all this medication unless otherwise directed by prescriber.    -- Indication: For Clostridium difficile colitis

## 2019-01-07 NOTE — PROGRESS NOTE ADULT - PROBLEM SELECTOR PLAN 7
-DVT: pradaxa  -Diet: soft diet  -dispo: PT pending -c/w lipitor -holding anti-hypertensives in the setting of hypotension, diarrhea -holding lasix in the setting of hypotension, diarrhea

## 2019-01-07 NOTE — DISCHARGE NOTE ADULT - PLAN OF CARE
treatment You presented with watery diarrhea and were found to have a bacterial infection called Clostridium difficile colitis. You were treated with antibiotics. Your symptoms improved slowly over time. Please continue to take antibiotics for the prescribed period. If you experience increased pain, diarrhea or abdominal distension, please return to the hospital. You were found to have an abnormal creatinine, which is a marker of your creatinine. This is mostly likely because you were dehydrated from diarrhea. You were given fluid and your kidney function improved. You were found to have increased heart rate due to your atrial fibrillation. The dose of your metoprolol has been changed for better control of your atrial fibrillation. Please follow this new regimen. You presented with watery diarrhea and were found to have a bacterial infection called Clostridium difficile colitis. You were treated with antibiotics. Your symptoms improved slowly over time. Please continue to take antibiotics for the prescribed period (4 more days starting 1/15/19). If you experience increased pain, diarrhea or abdominal distension, please return to the hospital. You were found to have increased heart rate due to your atrial fibrillation. The dose of your metoprolol has been changed for better control of your atrial fibrillation. Please follow this new regimen of mtoprolol every 6hours.

## 2019-01-07 NOTE — PROGRESS NOTE ADULT - PROBLEM SELECTOR PLAN 6
-c/w lipitor -holding anti-hypertensives in the setting of hypotension, diarrhea Rate controlled on lopressor 25 mg PO BID  -[id number] 4 c/w pradaxa renally dosed -c/w lopressor 25 mg but with holding parameters  -[id number] 4 c/w pradaxa renally dosed

## 2019-01-07 NOTE — PROGRESS NOTE ADULT - PROBLEM SELECTOR PLAN 9
-DVT: pradaxa  -Diet: soft diet  -dispo: PT pending ISTOP: [id number]   -c/w ativan 0.5mg bid home dose

## 2019-01-07 NOTE — DISCHARGE NOTE ADULT - PATIENT PORTAL LINK FT
You can access the [email or web address], offered by [facility], by registering with the following website: [email or web address]

## 2019-01-07 NOTE — PROVIDER CONTACT NOTE (OTHER) - ACTION/TREATMENT ORDERED:
Dr Crabtree is coming to assess patient now  EKG ordered.
MD, ordered zofran, and iv tylenol
Md ordered to hold bp meds
Tramadol ordered.

## 2019-01-07 NOTE — DISCHARGE NOTE ADULT - HOSPITAL COURSE
78 F with HLD, HTN, CHFpEF, Afib on pradaxa who p/w watery diarrhea x 5d in the setting of completing recent augmentin course for RLE cellulitis. In the ED, pt found to be afebrile w/ stable VS. Found to have IGOR, presumed 2/2 dehydration. She received pradaxa, Cipro, metro, 500cc bolus of IVF, 1g acetaminophen, tramadol 25mg, KCl 40mEq x2. CT abd/pelvis showed pancolitis. Pt admitted to medicine, found to have severe C. difficile colitis (given hypoalbuminemia, IGOR), so she was continued on PO vancomycin only. Pt's sx's gradually improved. No concern for fulminant colitis as Abd Xray was neg for colonic dilatation. Given slow improvement, she has been instructed to completed a prolongued course of ______ per ID recs.  Of note, pt found to be in Afib w/ RVR w/ hypotension on occasion. Seen by cards, no role for dig given IGOR. Metoprolol was switched to q6h for better rate control, which was achieved. Home ativan was increased to TID given significant anxiety.   Pt seen by PT, who rec'd MARGIE.   On day of discharge patient is medically and hemodynamically stable for discharge to [facility]. 78 F with HLD, HTN, CHFpEF, Afib on pradaxa who p/w watery diarrhea x 5d in the setting of completing recent augmentin course for RLE cellulitis. In the ED, pt found to be afebrile w/ stable VS. Found to have IGOR, presumed 2/2 dehydration. She received pradaxa, Cipro, metro, 500cc bolus of IVF, 1g acetaminophen, tramadol 25mg, KCl 40mEq x2. CT abd/pelvis showed pancolitis. Pt admitted to medicine, found to have severe C. difficile colitis (given hypoalbuminemia, IGOR), so she was continued on PO vancomycin only. Pt's sx's gradually improved. No concern for fulminant colitis as Abd Xray was neg for colonic dilatation. Given slow improvement, she has been instructed to completed a prolongued course of 14 more days per ID recs (she will require 4 more days of PO vancomycin at home).   Of note, pt found to be in Afib w/ RVR w/ hypotension on occasion. Seen by cards, no role for dig given IGOR. Metoprolol was switched to q6h for better rate control, which was achieved. Home ativan was increased to TID given significant anxiety.   Pt seen by PT, who rec'd MARGIE. Pt refused, stating that she would prefer going home w/ PT given that she has a HHA every day and her daughter is staying with her.   On day of discharge patient is medically and hemodynamically stable for discharge to [facility].

## 2019-01-07 NOTE — PROGRESS NOTE ADULT - PROBLEM SELECTOR PROBLEM 6
Hyperlipidemia, unspecified hyperlipidemia type Hypertension, unspecified type Atrial fibrillation, unspecified type

## 2019-01-07 NOTE — PROGRESS NOTE ADULT - PROBLEM SELECTOR PLAN 5
-holding anti-hypertensives in the setting of hypotension, diarrhea Rate controlled on lopressor 25 mg PO BID  -[id number] 4 c/w pradaxa renally dosed -suspect R sided CHF possibly due to lung dz/COPD  -holding lasix in the setting of diarrhea and hypotension  -monitor I/Os  -recent TTE with EF >50%, severely dilated LA,  -monitor fluid status; diurese PRN

## 2019-01-07 NOTE — PROGRESS NOTE ADULT - PROBLEM SELECTOR PLAN 3
-suspect R sided CHF possibly due to lung dz/COPD  -holding lasix in the setting of diarrhea and hypotension  -monitor I/Os  -recent TTE with EF >50%, severely dilated LA,  -monitor fluid status; diurese PRN -hypovolemic hyponatremia in the setting of diarrhea with decreased PO intake and Lasix use  -will restart gentle IVF given pt is relatively dry on exam w/ rel low BPs -hypovolemic hyponatremia in the setting of diarrhea with decreased PO intake and Lasix use  -will restart gentle IVF given pt is relatively dry on exam w/ rel low BPs  -monitor BPs

## 2019-01-07 NOTE — DISCHARGE NOTE ADULT - CARE PROVIDER_API CALL
Rashmi Cobian (), Cardio [place] [facility]  [id number] [place] [address] [address] [place] [address]  Phone: [phone]  Fax: [phone]    Benjamin Dolan), Infectious Disease; Internal Medicine  [address] [place] [address]  Phone: [phone]  Fax: [phone] Rashmi Cobian (), Cardio [place] [place] [facility]  [facility]  [address] [place] [address]  Phone: [phone]  Fax: [phone]    Benjamin Dolan), Infectious Disease; Internal Medicine  [address] [place] [address]  Phone: [phone]  Fax: [phone]    Bj Camilo), Cardiovascular Disease; Nuclear Cardiology  [address]  [address]  [place] [address]  Phone: [phone]  Fax: [phone]

## 2019-01-08 LAB
ANION GAP SERPL CALC-SCNC: 11 MMOL/L — SIGNIFICANT CHANGE UP (ref 5–17)
BUN SERPL-MCNC: 56 MG/DL — HIGH (ref 7–23)
CALCIUM SERPL-MCNC: 8.3 MG/DL — LOW (ref 8.4–10.5)
CHLORIDE SERPL-SCNC: 98 MMOL/L — SIGNIFICANT CHANGE UP (ref 96–108)
CO2 SERPL-SCNC: 22 MMOL/L — SIGNIFICANT CHANGE UP (ref 22–31)
CREAT SERPL-MCNC: 1.99 MG/DL — HIGH (ref 0.5–1.3)
GLUCOSE SERPL-MCNC: 126 MG/DL — HIGH (ref 70–99)
HCT VFR BLD CALC: 30.3 % — LOW (ref 34.5–45)
HGB BLD-MCNC: 10.2 G/DL — LOW (ref 11.5–15.5)
MAGNESIUM SERPL-MCNC: 2.1 MG/DL — SIGNIFICANT CHANGE UP (ref 1.6–2.6)
MCHC RBC-ENTMCNC: 29.3 PG — SIGNIFICANT CHANGE UP (ref 27–34)
MCHC RBC-ENTMCNC: 33.7 GM/DL — SIGNIFICANT CHANGE UP (ref 32–36)
MCV RBC AUTO: 86.9 FL — SIGNIFICANT CHANGE UP (ref 80–100)
PHOSPHATE SERPL-MCNC: 3.6 MG/DL — SIGNIFICANT CHANGE UP (ref 2.5–4.5)
PLATELET # BLD AUTO: 192 K/UL — SIGNIFICANT CHANGE UP (ref 150–400)
POTASSIUM SERPL-MCNC: 4.1 MMOL/L — SIGNIFICANT CHANGE UP (ref 3.5–5.3)
POTASSIUM SERPL-SCNC: 4.1 MMOL/L — SIGNIFICANT CHANGE UP (ref 3.5–5.3)
RBC # BLD: 3.49 M/UL — LOW (ref 3.8–5.2)
RBC # FLD: 14.6 % — HIGH (ref 10.3–14.5)
SODIUM SERPL-SCNC: 131 MMOL/L — LOW (ref 135–145)
UUN UR-MCNC: 532 MG/DL — SIGNIFICANT CHANGE UP
WBC # BLD: 6.4 K/UL — SIGNIFICANT CHANGE UP (ref 3.8–10.5)
WBC # FLD AUTO: 6.4 K/UL — SIGNIFICANT CHANGE UP (ref 3.8–10.5)

## 2019-01-08 PROCEDURE — 99233 SBSQ HOSP IP/OBS HIGH 50: CPT | Mod: GC

## 2019-01-08 PROCEDURE — 99223 1ST HOSP IP/OBS HIGH 75: CPT | Mod: GC

## 2019-01-08 RX ORDER — SODIUM CHLORIDE 9 MG/ML
1000 INJECTION, SOLUTION INTRAVENOUS
Qty: 0 | Refills: 0 | Status: DISCONTINUED | OUTPATIENT
Start: 2019-01-08 | End: 2019-01-08

## 2019-01-08 RX ORDER — METOPROLOL TARTRATE 50 MG
12.5 TABLET ORAL EVERY 6 HOURS
Qty: 0 | Refills: 0 | Status: DISCONTINUED | OUTPATIENT
Start: 2019-01-08 | End: 2019-01-14

## 2019-01-08 RX ORDER — SODIUM CHLORIDE 9 MG/ML
1000 INJECTION, SOLUTION INTRAVENOUS
Qty: 0 | Refills: 0 | Status: DISCONTINUED | OUTPATIENT
Start: 2019-01-08 | End: 2019-01-09

## 2019-01-08 RX ADMIN — Medication 12.5 MILLIGRAM(S): at 23:37

## 2019-01-08 RX ADMIN — Medication 25 MILLIGRAM(S): at 05:10

## 2019-01-08 RX ADMIN — Medication 0.5 MILLIGRAM(S): at 18:04

## 2019-01-08 RX ADMIN — Medication 12.5 MILLIGRAM(S): at 18:04

## 2019-01-08 RX ADMIN — Medication 125 MILLIGRAM(S): at 05:10

## 2019-01-08 RX ADMIN — Medication 0.5 MILLIGRAM(S): at 06:57

## 2019-01-08 RX ADMIN — DABIGATRAN ETEXILATE MESYLATE 75 MILLIGRAM(S): 150 CAPSULE ORAL at 05:09

## 2019-01-08 RX ADMIN — Medication 125 MILLIGRAM(S): at 18:04

## 2019-01-08 RX ADMIN — Medication 250 MILLIGRAM(S): at 05:10

## 2019-01-08 RX ADMIN — Medication 125 MILLIGRAM(S): at 11:22

## 2019-01-08 RX ADMIN — Medication 250 MILLIGRAM(S): at 18:05

## 2019-01-08 RX ADMIN — SODIUM CHLORIDE 75 MILLILITER(S): 9 INJECTION, SOLUTION INTRAVENOUS at 11:25

## 2019-01-08 RX ADMIN — CLOPIDOGREL BISULFATE 75 MILLIGRAM(S): 75 TABLET, FILM COATED ORAL at 11:22

## 2019-01-08 RX ADMIN — ATORVASTATIN CALCIUM 40 MILLIGRAM(S): 80 TABLET, FILM COATED ORAL at 21:47

## 2019-01-08 RX ADMIN — DABIGATRAN ETEXILATE MESYLATE 75 MILLIGRAM(S): 150 CAPSULE ORAL at 18:04

## 2019-01-08 RX ADMIN — Medication 125 MILLIGRAM(S): at 23:37

## 2019-01-08 RX ADMIN — Medication 0.5 MILLIGRAM(S): at 23:36

## 2019-01-08 NOTE — CONSULT NOTE ADULT - SUBJECTIVE AND OBJECTIVE BOX
Patient seen and evaluated at bedside    Chief Complaint:    HPI:  78 year old woman with PMH HLD, HTN, CHFpEF, Afib on pradaxa presenting with diarrhea. Pt was recently admitted for cellulitis and treated with Augmentin upon discharge which patient completed. Her cellulitis is improving but pain continuing, thus being treated with tramadol. Patient developed watery nonbloody diarrhea for the past 5 days, with multiple episodes in on day, constant.  Denies recent travel, or sick contatct.  Denies hematochezia, fevers, chills, N/V, abdominal pain, dysuria, and cough.  No FHx of autoimmune d/o or IBD  In the ED received: pradaxa, Cipro, metro, 500cc bolus of IVF, 1g acetaminophen, tramadol 25mg, KCl 40mEq x2 (05 Jan 2019 05:49)    While admitted, patient diagnosed with C.Diff  Patient's BP has been soft and metoprolol has been held due to this.  Cardiology consulted for soft BPs and elevated HRs to 130s on vital checks. patient not on tele.  On bedside, patient denies any chest pain or SOB (is laying flat during interview). Denies palpitations.      PMHx:   MI, old  Anxiety  TIA (transient ischemic attack)  PAF (paroxysmal atrial fibrillation)  HLD (hyperlipidemia)  HTN (hypertension)      PSHx:   History of cataract surgery  History of repair of hip fracture  H/O spinal fusion      Allergies:  No Known Allergies    Home Meds:    Current Medications:   acetaminophen   Tablet .. 650 milliGRAM(s) Oral every 6 hours PRN  atorvastatin 40 milliGRAM(s) Oral at bedtime  clopidogrel Tablet 75 milliGRAM(s) Oral daily  dabigatran 75 milliGRAM(s) Oral every 12 hours  lactated ringers. 1000 milliLiter(s) IV Continuous <Continuous>  LORazepam     Tablet 0.5 milliGRAM(s) Oral two times a day  metoprolol tartrate 25 milliGRAM(s) Oral two times a day  saccharomyces boulardii 250 milliGRAM(s) Oral two times a day  traMADol 25 milliGRAM(s) Oral two times a day PRN  vancomycin    Solution 125 milliGRAM(s) Oral every 6 hours    FAMILY HISTORY:  No pertinent family history in first degree relatives    Social History:  Smoking History: Former smoker    REVIEW OF SYSTEMS:  CONSTITUTIONAL: No weakness, fevers or chills  EYES/ENT: No visual changes;  No dysphagia  NECK: No pain or stiffness  RESPIRATORY: No cough, wheezing, hemoptysis; No shortness of breath  CARDIOVASCULAR: No chest pain or palpitations; No lower extremity edema  GASTROINTESTINAL: No abdominal or epigastric pain. No nausea, vomiting, or hematemesis; No diarrhea or constipation. No melena or hematochezia.  BACK: No back pain  GENITOURINARY: No dysuria, frequency or hematuria  NEUROLOGICAL: No numbness or weakness  SKIN: No itching, burning, rashes, or lesions   All other review of systems is negative unless indicated above.    Physical Exam:  T(F): 98.1 (01-08), Max: 98.1 (01-08)  HR: 99 (01-08) (85 - 130)  BP: 100/65 (01-08) (85/51 - 105/72)  RR: 18 (01-08)  SpO2: 95% (01-08)  GENERAL: No acute distress, well-developed  HEAD:  Atraumatic, Normocephalic  ENT: EOMI, PERRLA, conjunctiva and sclera clear, Neck supple, Elevated JVD, moist mucosa  CHEST/LUNG: Bibasilar coarse rales; No wheeze, equal breath sounds bilaterally   BACK: No spinal tenderness  HEART: Irregular rate and rhythm; No murmurs, rubs, or gallops  ABDOMEN: Soft, Nontender, Nondistended; Bowel sounds present  EXTREMITIES:  No clubbing, cyanosis. Trace edema  PSYCH: Nl behavior, nl affect  NEUROLOGY: AAOx3, non-focal, cranial nerves intact  SKIN: Normal color, No rashes or lesions  LINES:    Cardiovascular Diagnostic Testing:    ECG: Personally reviewed:  AFIB rates 110s, lateral ST inversions    Echo: Personally reviewed:  Conclusions:  1. Mitral annular calcification, with thickened leaflets.  Mild mitral regurgitation.  2. Severely dilated left atrium.  LA volume index = 57  cc/m2.  3. Mild concentric left ventricular hypertrophy.  4. Normal left ventricular systolic function.  5. Right ventricular enlargement with decreased right  ventricular systolic function.  6. Normal tricuspid valve. Moderate tricuspid  regurgitation.  7. Estimated pulmonary artery systolic pressure equals 52  mm Hg, assuming right atrial pressure equals 8 mm Hg,  consistent with moderate pulmonary pressures.  *** No previous Echo exam.  ------------------------------------------------------------------------  Confirmed on  12/21/2018 - 17:17:07 by Dora Castillo M.D.  ------------------------------------------------------------------------      Imaging:    CXR: Personally reviewed    Labs: Personally reviewed                        10.2   6.4   )-----------( 192      ( 08 Jan 2019 06:58 )             30.3     01-08    131<L>  |  98  |  56<H>  ----------------------------<  126<H>  4.1   |  22  |  1.99<H>    Ca    8.3<L>      08 Jan 2019 06:57  Phos  3.6     01-08  Mg     2.1     01-08 Patient seen and evaluated at bedside    Chief Complaint: diarrhea/weakness    HPI:  78 year old woman with PMH HLD, HTN, CHFpEF, Afib on pradaxa presenting with diarrhea. Pt was recently admitted for cellulitis and treated with Augmentin upon discharge which patient completed. Her cellulitis is improving but pain continuing, thus being treated with tramadol. Patient developed watery nonbloody diarrhea for the past 5 days, with multiple episodes in on day, constant.  Denies recent travel, or sick contatct.  Denies hematochezia, fevers, chills, N/V, abdominal pain, dysuria, and cough.  No FHx of autoimmune d/o or IBD  In the ED received: pradaxa, Cipro, metro, 500cc bolus of IVF, 1g acetaminophen, tramadol 25mg, KCl 40mEq x2 (05 Jan 2019 05:49)    While admitted, patient diagnosed with C.Diff  Patient's BP has been soft and metoprolol has been held due to this.  Cardiology consulted for soft BPs and elevated HRs to 130s on vital checks. patient not on tele.  On bedside, patient denies any chest pain or SOB (is laying flat during interview). Denies palpitations.      PMHx:   MI, old  Anxiety  TIA (transient ischemic attack)  PAF (paroxysmal atrial fibrillation)  HLD (hyperlipidemia)  HTN (hypertension)      PSHx:   History of cataract surgery  History of repair of hip fracture  H/O spinal fusion      Allergies:  No Known Allergies    Home Meds:    Current Medications:   acetaminophen   Tablet .. 650 milliGRAM(s) Oral every 6 hours PRN  atorvastatin 40 milliGRAM(s) Oral at bedtime  clopidogrel Tablet 75 milliGRAM(s) Oral daily  dabigatran 75 milliGRAM(s) Oral every 12 hours  lactated ringers. 1000 milliLiter(s) IV Continuous <Continuous>  LORazepam     Tablet 0.5 milliGRAM(s) Oral two times a day  metoprolol tartrate 25 milliGRAM(s) Oral two times a day  saccharomyces boulardii 250 milliGRAM(s) Oral two times a day  traMADol 25 milliGRAM(s) Oral two times a day PRN  vancomycin    Solution 125 milliGRAM(s) Oral every 6 hours    FAMILY HISTORY:  No pertinent family history in first degree relatives    Social History:  Smoking History: Former smoker    REVIEW OF SYSTEMS:  CONSTITUTIONAL: No weakness, fevers or chills  EYES/ENT: No visual changes;  No dysphagia  NECK: No pain or stiffness  RESPIRATORY: No cough, wheezing, hemoptysis; No shortness of breath  CARDIOVASCULAR: No chest pain or palpitations; No lower extremity edema  GASTROINTESTINAL: No abdominal or epigastric pain. No nausea, vomiting, or hematemesis; No diarrhea or constipation. No melena or hematochezia.  BACK: No back pain  GENITOURINARY: No dysuria, frequency or hematuria  NEUROLOGICAL: No numbness or weakness  SKIN: No itching, burning, rashes, or lesions   All other review of systems is negative unless indicated above.    Physical Exam:  T(F): 98.1 (01-08), Max: 98.1 (01-08)  HR: 99 (01-08) (85 - 130)  BP: 100/65 (01-08) (85/51 - 105/72)  RR: 18 (01-08)  SpO2: 95% (01-08)  GENERAL: No acute distress, well-developed  HEAD:  Atraumatic, Normocephalic  ENT: EOMI, PERRLA, conjunctiva and sclera clear, Neck supple, Elevated JVD, moist mucosa  CHEST/LUNG: Bibasilar coarse rales; No wheeze, equal breath sounds bilaterally   BACK: No spinal tenderness  HEART: Irregular rate and rhythm; No murmurs, rubs, or gallops  ABDOMEN: Soft, Nontender, Nondistended; Bowel sounds present  EXTREMITIES:  No clubbing, cyanosis. Trace edema  PSYCH: Nl behavior, nl affect  NEUROLOGY: AAOx3, non-focal, cranial nerves intact  SKIN: Normal color, No rashes or lesions  LINES:    Cardiovascular Diagnostic Testing:    ECG: Personally reviewed:  AFIB rates 110s, lateral ST inversions    Echo: Personally reviewed:  Conclusions:  1. Mitral annular calcification, with thickened leaflets.  Mild mitral regurgitation.  2. Severely dilated left atrium.  LA volume index = 57  cc/m2.  3. Mild concentric left ventricular hypertrophy.  4. Normal left ventricular systolic function.  5. Right ventricular enlargement with decreased right  ventricular systolic function.  6. Normal tricuspid valve. Moderate tricuspid  regurgitation.  7. Estimated pulmonary artery systolic pressure equals 52  mm Hg, assuming right atrial pressure equals 8 mm Hg,  consistent with moderate pulmonary pressures.  *** No previous Echo exam.  ------------------------------------------------------------------------  Confirmed on  12/21/2018 - 17:17:07 by Dora Castillo M.D.  ------------------------------------------------------------------------      Imaging:    CXR: Personally reviewed    Labs: Personally reviewed                        10.2   6.4   )-----------( 192      ( 08 Jan 2019 06:58 )             30.3     01-08    131<L>  |  98  |  56<H>  ----------------------------<  126<H>  4.1   |  22  |  1.99<H>    Ca    8.3<L>      08 Jan 2019 06:57  Phos  3.6     01-08  Mg     2.1     01-08

## 2019-01-08 NOTE — PROGRESS NOTE ADULT - PROBLEM SELECTOR PLAN 2
-IGOR on CKD 3, likely prerenal from volume depletion from diarrhea, recent lasix use, and ATN associated hypotension,  -creatinine 2.2 (baseline of 1.3-1.5)   -will restart gentle IVF  -f/u bladder scan to r/o obstructive path however no hydro seen on recent CT  -monitor bmp, send FeNA  -if further uptrend by mary, consider renal eval - in the setting of recent abx use; found to have pancolitis, 2/2 c. diff  -diarrhea improving, pt tolerating PO  - c/w PO Vanco (day 5/10), contact precautions  - c/w probiotics - in the setting of recent abx use; found to have pancolitis, 2/2 c. diff  -diarrhea improving, pt tolerating PO  - c/w PO Vanco (day 5/10-14), contact precautions  - c/w probiotics

## 2019-01-08 NOTE — PROGRESS NOTE ADULT - PROBLEM SELECTOR PLAN 4
likely due to diarrhea with poor po intake. Hgb stable  -start IVF  -monitor BP -hypovolemic hyponatremia in the setting of diarrhea with decreased PO intake and Lasix use  -will restart gentle IVF given pt is relatively dry on exam w/ rel low BPs  -monitor BPs

## 2019-01-08 NOTE — PROGRESS NOTE ADULT - PROBLEM SELECTOR PLAN 3
-hypovolemic hyponatremia in the setting of diarrhea with decreased PO intake and Lasix use  -will restart gentle IVF given pt is relatively dry on exam w/ rel low BPs  -monitor BPs -IGOR on CKD 3, likely prerenal from volume depletion from diarrhea, recent lasix use, and ATN associated hypotension.   -FeUrea 30.2%, Gage<20, c/w prerenal etiology  -creatinine improved w/ IVF this AM (baseline of 1.3-1.5)   -will c/w gentle IVF given hx HF  -f/u bladder scan to r/o obstructive path however no hydro seen on recent CT  -monitor bmp, send FeNA  -if further uptrend by mary, consider renal eval -IGOR on CKD 3. prerenal from dehydration from diarrhea, recent lasix use, and ATN associated hypotension.   -FeUrea 30.2%, Gage<20, c/w prerenal etiology, negative bladder scan  -creatinine improved w/ IVF this AM (baseline of 1.3-1.5)   -decrease IVF rate given hx of HF, encourage po intake  -monitor bmp

## 2019-01-08 NOTE — PROGRESS NOTE ADULT - PROBLEM SELECTOR PLAN 10
-DVT: pradaxa  -Diet: soft diet  -dispo: PT pending -DVT: pradaxa  -Diet: soft diet  -dispo: PT pending, consideration of tele -DVT: pradaxa  -Diet: soft diet  -dispo: PT pending, pending medical improvement

## 2019-01-08 NOTE — PROGRESS NOTE ADULT - PROBLEM SELECTOR PLAN 6
-c/w lopressor 25 mg but with holding parameters  -[id number] 4 c/w pradaxa renally dosed -suspect R sided CHF possibly due to lung dz/COPD  -holding lasix in the setting of diarrhea and hypotension  -monitor I/Os  -recent TTE with EF >50%, severely dilated LA,  -monitor fluid status; diurese PRN

## 2019-01-08 NOTE — PROGRESS NOTE ADULT - ASSESSMENT
78yoF w/ PMHx significant for HLD, HTN, HFpEF (likely right sided failure), Afib on Pradaxa, CKD 3 presenting with diarrhea for 5 days in the setting of recent Abx use, a/w pancolitis found to be positive for cdiff. 78yoF w/ PMHx significant for HLD, HTN, HFpEF (likely right sided failure), Afib on Pradaxa, CKD 3 presenting with diarrhea for 5 days in the setting of recent Abx use, a/w pancolitis found to be positive for cdif now with IGOR on CKD and Afib with RVR, hypotension

## 2019-01-08 NOTE — CONSULT NOTE ADULT - ASSESSMENT
78 year old woman with PMH HLD, HTN, HFpEF, Afib on pradaxa, CAD s/p RCA ROSEMARIE at [facility] 11/9/18 (per medicine outpatient note) presenting with diarrhea.  Flowsheet review reveals BPs 80s-100s systolics (primarily in 90s). HRs reaches 130s primarily in late hours of night. patient with IGOR.    #Afib: RVR is usually a symptom of underlying stress (in this case diarrhea and C.Diff)  -Treating underlying infection  -Switch to metoprolol 12.5 q6h for better rate control  -Would not treat rates of 130 with increased     #HFpEF: right sided enlargement  -mildly hypervolemic on exam  - 78 year old woman with PMH HLD, HTN, HFpEF, Afib on pradaxa, CAD s/p RCA ROSEMARIE at [facility] 11/9/18 (per medicine outpatient note) presenting with diarrhea.  Flowsheet review reveals BPs 80s-100s systolics (primarily in 90s). HRs reaches 130s primarily in late hours of night. patient with IGOR.    #Afib: RVR is usually a symptom of underlying stress (in this case diarrhea and C.Diff). Patient asymptomatic  -Treating underlying infection  -Switch to metoprolol 12.5 q6h for better rate control  -Would not treat rates of 130 with increased metoprolol doses, would consider small 250-500 cc boluses  -No need for telemetry at current moment      #HFpEF: right sided enlargement  -mildly hypervolemic on exam  -Other than above (if RVR), would hold on further fluids or maintenance fluids  -Please obtain daily weights. (I/Os also preferred, but understand may be difficult due to diarrhea) 78 year old woman with PMH HLD, HTN, HFpEF, Afib on pradaxa, CAD s/p RCA ROSEMARIE at [facility] 11/9/18 (per medicine outpatient note) presenting with diarrhea.  Flowsheet review reveals BPs 80s-100s systolics (primarily in 90s). HRs reaches 130s primarily in late hours of night. patient with IGOR.    #Afib: RVR is usually a symptom of underlying stress (in this case diarrhea and C.Diff). Patient asymptomatic otherwise. RVR usually occurs in late hours. Notably, BP drops do not seem to correlate with HR increases per vitals. Not clear RVR actually causing hypotension.  -Treating underlying infection  -Switch to metoprolol 12.5 q6h for better rate control (Always dose tartrate q6h while inpatient, better pharmacokinetic rate control over the day - note 130s always occur near midnight)  -Would not treat rates of 130 with increased metoprolol doses, would consider small 250-500 cc boluses  -No need for telemetry at current moment  -Also would treat anxiety that may be occurring at night (patient asking for ativan during visit for anxiety)      #HFpEF w/ RV systolic dysfunction: right sided enlargement, likely combination of COPD and prior RCA-territory MI  -Such patients are often preload dependent and dehydration causes profound hypotension improved with boluses.  -mildly hypervolemic on exam  -Would hold on further fluids or maintenance fluids. Although may give boluses in setting of RVR given able to lay flat currently.  -Please obtain daily weights. (I/Os also preferred, but understand may be difficult due to diarrhea)

## 2019-01-08 NOTE — PROGRESS NOTE ADULT - PROBLEM SELECTOR PLAN 1
- in the setting of recent abx use; found to have pancolitis, 2/2 c. diff  - c/w PO Issac (day 4), contact precautions  - c/w probiotics -w/ intermittent RVR (HR 130s) and a/w hypotension only mildly responsive to IVF  -c/w lopressor 25 mg but with holding parameters  -[id number] 4 c/w pradaxa renally dosed  -will consider starting digoxin and transferring to tele given hypotension - cards consulted, recs appreciated -w/ intermittent RVR (HR 130s) and a/w hypotension only mildly responsive to IVF  -discussed with cards rec switch metoprolol to 12.5mg q6 prn with holding parameters, leaning against dig load given IGOR, no need for tele at this moment, f/u final recs  -[id number] 4 c/w pradaxa renally dosed

## 2019-01-08 NOTE — CONSULT NOTE ADULT - ATTENDING COMMENTS
Patient interviewed and examined. I was physically present for the essential portions of the E/M service provided.  Chart reviewed and note edited where appropriate.  Case discussed with fellow.  Agree w/ Assessment and Plan as outlined. I suspect (and she agrees) that VR acceleration at hs is secondary to anxiety.  PH is likely secondary to COPD(she is O2 dependent) and diastolic dysfxn.    Colin Isaac MD [facility]  Spectra:  [phone]  Office: [phone]

## 2019-01-08 NOTE — PROGRESS NOTE ADULT - SUBJECTIVE AND OBJECTIVE BOX
***************************************************************  Enoch Freedman PGY1  Internal Medicine   Pager: [phone]  [facility] in house pager: [phone]  ***************************************************************    BEVERLEY DEL RIO  78y  MRN: [id number]    Patient is a 78y old  Female who presents with a chief complaint of Diarrhea (07 Jan 2019 22:24)      Subjective: no events ON. Denies fever, CP, SOB, abn pain, N/V, dysuria. Tolerating diet.      MEDICATIONS  (STANDING):  atorvastatin 40 milliGRAM(s) Oral at bedtime  clopidogrel Tablet 75 milliGRAM(s) Oral daily  dabigatran 75 milliGRAM(s) Oral every 12 hours  lactated ringers. 1000 milliLiter(s) (75 mL/Hr) IV Continuous <Continuous>  LORazepam     Tablet 0.5 milliGRAM(s) Oral two times a day  metoprolol tartrate 25 milliGRAM(s) Oral two times a day  saccharomyces boulardii 250 milliGRAM(s) Oral two times a day  vancomycin    Solution 125 milliGRAM(s) Oral every 6 hours    MEDICATIONS  (PRN):  acetaminophen   Tablet .. 650 milliGRAM(s) Oral every 6 hours PRN Temp greater or equal to 38C (100.4F), Mild Pain (1 - 3)  traMADol 25 milliGRAM(s) Oral two times a day PRN Moderate Pain (4 - 6)      Objective:    Vitals: Vital Signs Last 24 Hrs  T(C): 36.6 (01-08-19 @ 05:23), Max: 36.7 (01-07-19 @ 22:35)  T(F): 97.9 (01-08-19 @ 05:23), Max: 98 (01-07-19 @ 22:35)  HR: 109 (01-08-19 @ 05:23) (85 - 130)  BP: 105/72 (01-08-19 @ 05:23) (85/51 - 105/72)  BP(mean): --  RR: 18 (01-08-19 @ 05:23) (18 - 20)  SpO2: 99% (01-08-19 @ 05:23) (94% - 99%)              I&O's Summary    07 Jan 2019 07:01  -  08 Jan 2019 07:00  --------------------------------------------------------  IN: 1450 mL / OUT: 100 mL / NET: 1350 mL        PHYSICAL EXAM:  GENERAL: NAD  HEAD:  Atraumatic, Normocephalic  EYES: EOMI, conjunctiva and sclera clear  CHEST/LUNG: Clear to percussion bilaterally; No rales, rhonchi, wheezing, or rubs  HEART: Regular rate and rhythm; No murmurs, rubs, or gallops  ABDOMEN: Soft, Nontender, Nondistended;   SKIN: No rashes or lesions  NERVOUS SYSTEM:  Alert & Oriented X3, no focal deficit    LABS:                        10.2   6.4   )-----------( 192      ( 08 Jan 2019 06:58 )             30.3                         10.8   6.9   )-----------( 194      ( 07 Jan 2019 07:10 )             31.9                         10.8   6.4   )-----------( 214      ( 06 Jan 2019 07:05 )             32.7     01-08    131<L>  |  98  |  56<H>  ----------------------------<  126<H>  4.1   |  22  |  1.99<H>  01-07    130<L>  |  94<L>  |  52<H>  ----------------------------<  129<H>  4.1   |  25  |  2.30<H>  01-07    130<L>  |  95<L>  |  52<H>  ----------------------------<  85  4.3   |  23  |  2.15<H>    Ca    8.3<L>      08 Jan 2019 06:57  Ca    8.6      07 Jan 2019 15:50  Ca    8.4      07 Jan 2019 07:10  Phos  3.6     01-08  Mg     2.1     01-08    TPro  6.1  /  Alb  2.6<L>  /  TBili  0.5  /  DBili  x   /  AST  9<L>  /  ALT  9<L>  /  AlkPhos  92  01-06                      CAPILLARY BLOOD GLUCOSE        RADIOLOGY & ADDITIONAL TESTS:  Imaging Personally Reviewed:  [x ] YES  [ ] NO  Consultants involved in case:   Consultant(s) Notes Reviewed:  [ x] YES  [ ] NO:   Care Discussed with Consultants/Other Providers [x ] YES  [ ] NO ***************************************************************  Enoch Freedman PGY1  Internal Medicine   Pager: [phone]  [facility] in house pager: [phone]  ***************************************************************    BEVERLEY DEL RIO  78y  MRN: [id number]    Patient is a 78y old  Female who presents with a chief complaint of Diarrhea (2019 22:24)      Subjective: Overnight, pt found to be anxious, hypotensive to 84/62, repeat 90/55 w/ 130. EKG showing Afib w/ RVR. 02 Sat 92% on 2L NC (pt on O2 at home). Pt was given home dose of ativan and HR dec'd to 101, repeat BP 90/61. This AM, pt's BP improved sl to 105/72 w/ .   Pt states that she has been v anxious 2/2 her brother's recent death and not being able to attend his . Denies SOB, palpitations or CP at this time.  Has had 1 loose stool this AM. Remained afebrile O/N, tolerating PO.      MEDICATIONS  (STANDING):  atorvastatin 40 milliGRAM(s) Oral at bedtime  clopidogrel Tablet 75 milliGRAM(s) Oral daily  dabigatran 75 milliGRAM(s) Oral every 12 hours  lactated ringers. 1000 milliLiter(s) (75 mL/Hr) IV Continuous <Continuous>  LORazepam     Tablet 0.5 milliGRAM(s) Oral two times a day  metoprolol tartrate 25 milliGRAM(s) Oral two times a day  saccharomyces boulardii 250 milliGRAM(s) Oral two times a day  vancomycin    Solution 125 milliGRAM(s) Oral every 6 hours    MEDICATIONS  (PRN):  acetaminophen   Tablet .. 650 milliGRAM(s) Oral every 6 hours PRN Temp greater or equal to 38C (100.4F), Mild Pain (1 - 3)  traMADol 25 milliGRAM(s) Oral two times a day PRN Moderate Pain (4 - 6)      Objective:    Vitals: Vital Signs Last 24 Hrs  T(C): 36.6 (19 @ 05:23), Max: 36.7 (19 @ 22:35)  T(F): 97.9 (19 @ 05:23), Max: 98 (19 @ 22:35)  HR: 109 (19 @ 05:23) (85 - 130)  BP: 105/72 (19 @ 05:23) (85/51 - 105/72)  BP(mean): --  RR: 18 (19 @ 05:23) (18 - 20)  SpO2: 99% (19 @ 05:23) (94% - 99%)              I&O's Summary    2019 07:01  -  2019 07:00  --------------------------------------------------------  IN: 1450 mL / OUT: 100 mL / NET: 1350 mL        PHYSICAL EXAM:  GENERAL: NAD, chronically ill appearing  HEAD:  Atraumatic, Normocephalic  NECK: Supple, No JVD  NERVOUS SYSTEM: AOX3,   PSYCHIATRIC: Appropriate affect and mood  CHEST/LUNG: Clear to auscultation bilaterally. On 2L NC  HEART: irregular rate and rhythm; nl S1S2  ABDOMEN: Soft, Nontender, Nondistended; Bowel sounds present  EXTREMITIES:  2+ Peripheral Pulses, No clubbing, cyanosis 2+ b/l LE pitting edema to knees     LABS:                        10.2   6.4   )-----------( 192      ( 2019 06:58 )             30.3                         10.8   6.9   )-----------( 194      ( 2019 07:10 )             31.9                         10.8   6.4   )-----------( 214      ( 2019 07:05 )             32.7     01-08    131<L>  |  98  |  56<H>  ----------------------------<  126<H>  4.1   |  22  |  1.99<H>  -07    130<L>  |  94<L>  |  52<H>  ----------------------------<  129<H>  4.1   |  25  |  2.30<H>  -    130<L>  |  95<L>  |  52<H>  ----------------------------<  85  4.3   |  23  |  2.15<H>    Ca    8.3<L>      2019 06:57  Ca    8.6      2019 15:50  Ca    8.4      2019 07:10  Phos  3.6       Mg     2.1         TPro  6.1  /  Alb  2.6<L>  /  TBili  0.5  /  DBili  x   /  AST  9<L>  /  ALT  9<L>  /  AlkPhos  92                        CAPILLARY BLOOD GLUCOSE        RADIOLOGY & ADDITIONAL TESTS:  Imaging Personally Reviewed:  [x ] YES  [ ] NO  Consultants involved in case:   Consultant(s) Notes Reviewed:  [ x] YES  [ ] NO:   Care Discussed with Consultants/Other Providers [x ] YES  [ ] NO ***************************************************************  Enoch Freedman PGY1  Internal Medicine   Pager: [phone]  [facility] in house pager: [phone]  ***************************************************************    BEVERLEY DEL RIO  78y  MRN: [id number]    Patient is a 78y old  Female who presents with a chief complaint of Diarrhea (2019 22:24)      Subjective: Overnight, pt found to be anxious, hypotensive to 84/62, repeat 90/55 w/ 130. EKG showing Afib w/ RVR. 02 Sat 92% on 2L NC (pt on O2 at home). Pt was given home dose of ativan and HR dec'd to 101, repeat BP 90/61. This AM, pt's BP improved sl to 105/72 w/ .   Pt states that she has been v anxious 2/2 her brother's recent death and not being able to attend his . Denies SOB, palpitations or CP at this time.  Has had about 4-5 loose BM since yesterday. Remained afebrile O/N, no abd pain tolerating PO.      MEDICATIONS  (STANDING):  atorvastatin 40 milliGRAM(s) Oral at bedtime  clopidogrel Tablet 75 milliGRAM(s) Oral daily  dabigatran 75 milliGRAM(s) Oral every 12 hours  lactated ringers. 1000 milliLiter(s) (75 mL/Hr) IV Continuous <Continuous>  LORazepam     Tablet 0.5 milliGRAM(s) Oral two times a day  metoprolol tartrate 25 milliGRAM(s) Oral two times a day  saccharomyces boulardii 250 milliGRAM(s) Oral two times a day  vancomycin    Solution 125 milliGRAM(s) Oral every 6 hours    MEDICATIONS  (PRN):  acetaminophen   Tablet .. 650 milliGRAM(s) Oral every 6 hours PRN Temp greater or equal to 38C (100.4F), Mild Pain (1 - 3)  traMADol 25 milliGRAM(s) Oral two times a day PRN Moderate Pain (4 - 6)      Objective:    Vitals: Vital Signs Last 24 Hrs  T(C): 36.6 (19 @ 05:23), Max: 36.7 (19 @ 22:35)  T(F): 97.9 (19 @ 05:23), Max: 98 (19 @ 22:35)  HR: 109 (19 @ 05:23) (85 - 130)  BP: 105/72 (19 @ 05:23) (85/51 - 105/72)  BP(mean): --  RR: 18 (19 @ 05:23) (18 - 20)  SpO2: 99% (19 @ 05:23) (94% - 99%)              I&O's Summary    2019 07:01  -  2019 07:00  --------------------------------------------------------  IN: 1450 mL / OUT: 100 mL / NET: 1350 mL        PHYSICAL EXAM:  GENERAL: NAD, chronically ill appearing  HEAD:  Atraumatic, Normocephalic  NECK: Supple, No JVD  NERVOUS SYSTEM: AOX3,   PSYCHIATRIC: Appropriate affect and mood  CHEST/LUNG: Clear to auscultation bilaterally. On 2L NC  HEART: irregular rate and rhythm; nl S1S2  ABDOMEN: Soft, Nontender, Nondistended; Bowel sounds present  EXTREMITIES:  2+ Peripheral Pulses, No clubbing, cyanosis 2+ b/l LE pitting edema to knees     LABS:                        10.2   6.4   )-----------( 192      ( 2019 06:58 )             30.3                         10.8   6.9   )-----------( 194      ( 2019 07:10 )             31.9                         10.8   6.4   )-----------( 214      ( 2019 07:05 )             32.7         131<L>  |  98  |  56<H>  ----------------------------<  126<H>  4.1   |  22  |  1.99<H>      130<L>  |  94<L>  |  52<H>  ----------------------------<  129<H>  4.1   |  25  |  2.30<H>      130<L>  |  95<L>  |  52<H>  ----------------------------<  85  4.3   |  23  |  2.15<H>    Ca    8.3<L>      2019 06:57  Ca    8.6      2019 15:50  Ca    8.4      2019 07:10  Phos  3.6       Mg     2.1         TPro  6.1  /  Alb  2.6<L>  /  TBili  0.5  /  DBili  x   /  AST  9<L>  /  ALT  9<L>  /  AlkPhos  92                        CAPILLARY BLOOD GLUCOSE        RADIOLOGY & ADDITIONAL TESTS:  Imaging Personally Reviewed:  [x ] YES  [ ] NO  Consultants involved in case: cards  Consultant(s) Notes Reviewed:  [ x] YES  [ ] NO:   Care Discussed with Consultants/Other Providers [x ] YES  [ ] NO    personally reviewed EKG: afib with RVR ovenright

## 2019-01-08 NOTE — PROGRESS NOTE ADULT - PROBLEM SELECTOR PLAN 5
-suspect R sided CHF possibly due to lung dz/COPD  -holding lasix in the setting of diarrhea and hypotension  -monitor I/Os  -recent TTE with EF >50%, severely dilated LA,  -monitor fluid status; diurese PRN likely due to diarrhea with poor po intake. Hgb stable  -start IVF  -monitor BP likely due to diarrhea with poor po intake and episodes of afib with RVR  -c/w IVF   -control Afib with RVR as above

## 2019-01-09 DIAGNOSIS — L97.409 NON-PRESSURE CHRONIC ULCER OF UNSPECIFIED HEEL AND MIDFOOT WITH UNSPECIFIED SEVERITY: ICD-10-CM

## 2019-01-09 LAB
ANION GAP SERPL CALC-SCNC: 8 MMOL/L — SIGNIFICANT CHANGE UP (ref 5–17)
BUN SERPL-MCNC: 49 MG/DL — HIGH (ref 7–23)
CALCIUM SERPL-MCNC: 8.5 MG/DL — SIGNIFICANT CHANGE UP (ref 8.4–10.5)
CHLORIDE SERPL-SCNC: 100 MMOL/L — SIGNIFICANT CHANGE UP (ref 96–108)
CO2 SERPL-SCNC: 24 MMOL/L — SIGNIFICANT CHANGE UP (ref 22–31)
CREAT SERPL-MCNC: 1.76 MG/DL — HIGH (ref 0.5–1.3)
GLUCOSE SERPL-MCNC: 96 MG/DL — SIGNIFICANT CHANGE UP (ref 70–99)
MAGNESIUM SERPL-MCNC: 2 MG/DL — SIGNIFICANT CHANGE UP (ref 1.6–2.6)
PHOSPHATE SERPL-MCNC: 3.5 MG/DL — SIGNIFICANT CHANGE UP (ref 2.5–4.5)
POTASSIUM SERPL-MCNC: 4.9 MMOL/L — SIGNIFICANT CHANGE UP (ref 3.5–5.3)
POTASSIUM SERPL-SCNC: 4.9 MMOL/L — SIGNIFICANT CHANGE UP (ref 3.5–5.3)
SODIUM SERPL-SCNC: 132 MMOL/L — LOW (ref 135–145)

## 2019-01-09 PROCEDURE — 74018 RADEX ABDOMEN 1 VIEW: CPT | Mod: 26

## 2019-01-09 PROCEDURE — 99232 SBSQ HOSP IP/OBS MODERATE 35: CPT | Mod: GC

## 2019-01-09 PROCEDURE — 99233 SBSQ HOSP IP/OBS HIGH 50: CPT | Mod: GC

## 2019-01-09 RX ORDER — TRAMADOL HYDROCHLORIDE 50 MG/1
25 TABLET ORAL EVERY 6 HOURS
Qty: 0 | Refills: 0 | Status: DISCONTINUED | OUTPATIENT
Start: 2019-01-09 | End: 2019-01-14

## 2019-01-09 RX ADMIN — Medication 250 MILLIGRAM(S): at 06:54

## 2019-01-09 RX ADMIN — ATORVASTATIN CALCIUM 40 MILLIGRAM(S): 80 TABLET, FILM COATED ORAL at 22:54

## 2019-01-09 RX ADMIN — Medication 125 MILLIGRAM(S): at 06:54

## 2019-01-09 RX ADMIN — DABIGATRAN ETEXILATE MESYLATE 75 MILLIGRAM(S): 150 CAPSULE ORAL at 06:53

## 2019-01-09 RX ADMIN — Medication 125 MILLIGRAM(S): at 18:11

## 2019-01-09 RX ADMIN — TRAMADOL HYDROCHLORIDE 25 MILLIGRAM(S): 50 TABLET ORAL at 11:37

## 2019-01-09 RX ADMIN — TRAMADOL HYDROCHLORIDE 25 MILLIGRAM(S): 50 TABLET ORAL at 12:15

## 2019-01-09 RX ADMIN — Medication 250 MILLIGRAM(S): at 18:12

## 2019-01-09 RX ADMIN — Medication 125 MILLIGRAM(S): at 11:38

## 2019-01-09 RX ADMIN — Medication 0.5 MILLIGRAM(S): at 07:04

## 2019-01-09 RX ADMIN — CLOPIDOGREL BISULFATE 75 MILLIGRAM(S): 75 TABLET, FILM COATED ORAL at 11:38

## 2019-01-09 RX ADMIN — Medication 12.5 MILLIGRAM(S): at 06:53

## 2019-01-09 RX ADMIN — Medication 12.5 MILLIGRAM(S): at 18:12

## 2019-01-09 RX ADMIN — DABIGATRAN ETEXILATE MESYLATE 75 MILLIGRAM(S): 150 CAPSULE ORAL at 18:12

## 2019-01-09 RX ADMIN — Medication 0.5 MILLIGRAM(S): at 22:54

## 2019-01-09 RX ADMIN — Medication 0.5 MILLIGRAM(S): at 16:46

## 2019-01-09 NOTE — PROGRESS NOTE ADULT - PROBLEM SELECTOR PLAN 6
-suspect R sided CHF possibly due to lung dz/COPD  -holding lasix in the setting of diarrhea and hypotension  -monitor I/Os  -recent TTE with EF >50%, severely dilated LA,  -monitor fluid status; diurese PRN -suspect R sided CHF possibly due to recent RCA MI requiring stent placement in 11/18 and lung dz/COPD  -holding lasix in the setting of diarrhea and hypotension  -monitor I/Os  -recent TTE with EF >50%, severely dilated LA,  -monitor fluid status; diurese PRN -suspect R sided CHF possibly due to recent RCA MI requiring stent placement in 11/18 and lung dz/COPD  -holding lasix in the setting of diarrhea and hypotension  -c/w IVF boluses for hypotension prn  -monitor I/Os  -recent TTE with EF >50%, severely dilated LA,

## 2019-01-09 NOTE — PROGRESS NOTE ADULT - PROBLEM SELECTOR PLAN 10
-DVT: pradaxa  -Diet: soft diet  -dispo: PT pending, pending medical improvement -DVT: pradaxa  -Diet: soft diet  -dispo: PT rec MARGIE when improved

## 2019-01-09 NOTE — PROGRESS NOTE ADULT - SUBJECTIVE AND OBJECTIVE BOX
Patient seen and examined at bedside.    Overnight Events:   States diarrhea improved, Denied SOB    REVIEW OF SYSTEMS:  CONSTITUTIONAL: No weakness, fevers or chills  EYES/ENT: No visual changes;  No dysphagia  NECK: No pain or stiffness  RESPIRATORY: No cough, wheezing, hemoptysis; No shortness of breath  CARDIOVASCULAR: No chest pain or palpitations; No lower extremity edema  GASTROINTESTINAL: No abdominal or epigastric pain. No nausea, vomiting, or hematemesis; No diarrhea or constipation. No melena or hematochezia.  BACK: No back pain  GENITOURINARY: No dysuria, frequency or hematuria  NEUROLOGICAL: No numbness or weakness  SKIN: No itching, burning, rashes, or lesions   All other review of systems is negative unless indicated above.            Current Meds:  acetaminophen   Tablet .. 650 milliGRAM(s) Oral every 6 hours PRN  atorvastatin 40 milliGRAM(s) Oral at bedtime  clopidogrel Tablet 75 milliGRAM(s) Oral daily  dabigatran 75 milliGRAM(s) Oral every 12 hours  LORazepam     Tablet 0.5 milliGRAM(s) Oral two times a day  metoprolol tartrate 12.5 milliGRAM(s) Oral every 6 hours  saccharomyces boulardii 250 milliGRAM(s) Oral two times a day  traMADol 25 milliGRAM(s) Oral two times a day PRN  vancomycin    Solution 125 milliGRAM(s) Oral every 6 hours      Vitals:  T(F): 97.3 (01-09), Max: 98.4 (01-08)  HR: 99 (01-09) (86 - 99)  BP: 96/66 (01-09) (91/60 - 100/65)  RR: 18 (01-09)  SpO2: 93% (01-09)  I&O's Summary    08 Jan 2019 07:01  -  09 Jan 2019 07:00  --------------------------------------------------------  IN: 1465 mL / OUT: 0 mL / NET: 1465 mL        Physical Exam:  GENERAL: No acute distress, well-developed  HEAD:  Atraumatic, Normocephalic  ENT: EOMI, PERRLA, conjunctiva and sclera clear, Neck supple, Elevated JVD, moist mucosa  CHEST/LUNG: Clear lungs, distant lung sounds; No wheeze, equal breath sounds bilaterally   BACK: No spinal tenderness  HEART: Irregular rate and rhythm; No murmurs, rubs, or gallops  ABDOMEN: Soft, Nontender, Nondistended; Bowel sounds present  EXTREMITIES:  No clubbing, cyanosis. Trace edema  PSYCH: Nl behavior, nl affect  NEUROLOGY: AAOx3, non-focal, cranial nerves intact  SKIN: Normal color, No rashes or lesions                            10.2   6.4   )-----------( 192      ( 08 Jan 2019 06:58 )             30.3     01-09    132<L>  |  100  |  49<H>  ----------------------------<  96  4.9   |  24  |  1.76<H>    Ca    8.5      09 Jan 2019 07:07  Phos  3.5     01-09  Mg     2.0     01-09 Patient seen and examined at bedside.    Overnight Events:   States diarrhea improved, Denied SOB.  Received Lorazepam @ hs  No chest, throat, jaw. arm or upper back pain.  No dyspnea, orthopnea, PND.  No edema. No palpitations, dizziness or syncope.     REVIEW OF SYSTEMS:  CONSTITUTIONAL: No weakness, fevers or chills  EYES/ENT: No visual changes;  No dysphagia  NECK: No pain or stiffness  RESPIRATORY: No cough, wheezing, hemoptysis; No shortness of breath  CARDIOVASCULAR: see HPI  GASTROINTESTINAL: No abdominal or epigastric pain. No nausea, vomiting, or hematemesis; No diarrhea or constipation. No melena or hematochezia.  BACK: No back pain  GENITOURINARY: No dysuria, frequency or hematuria  NEUROLOGICAL: No numbness or weakness  SKIN: No itching, burning, rashes, or lesions   All other review of systems is negative unless indicated above.            Current Meds:  acetaminophen   Tablet .. 650 milliGRAM(s) Oral every 6 hours PRN  atorvastatin 40 milliGRAM(s) Oral at bedtime  clopidogrel Tablet 75 milliGRAM(s) Oral daily  dabigatran 75 milliGRAM(s) Oral every 12 hours  LORazepam     Tablet 0.5 milliGRAM(s) Oral two times a day  metoprolol tartrate 12.5 milliGRAM(s) Oral every 6 hours  saccharomyces boulardii 250 milliGRAM(s) Oral two times a day  traMADol 25 milliGRAM(s) Oral two times a day PRN  vancomycin    Solution 125 milliGRAM(s) Oral every 6 hours      Vitals:  T(F): 97.3 (01-09), Max: 98.4 (01-08)  HR: 99 (01-09) (86 - 99)  BP: 96/66 (01-09) (91/60 - 100/65)  RR: 18 (01-09)  SpO2: 93% (01-09)  I&O's Summary    08 Jan 2019 07:01  -  09 Jan 2019 07:00  --------------------------------------------------------  IN: 1465 mL / OUT: 0 mL / NET: 1465 mL        Physical Exam:  GENERAL: Drail, pale. No acute distress, well-developed  HEAD:  Atraumatic, Normocephalic  ENT: EOMI, PERRLA, conjunctiva and sclera clear, Neck supple, Elevated JVD, moist mucosa  CHEST/LUNG: Clear lungs, distant lung sounds; No wheeze, equal breath sounds bilaterally   BACK: No spinal tenderness  HEART: Irregular rate and rhythm; No murmurs, rubs, or gallops  ABDOMEN: Soft, Nontender, Nondistended; Bowel sounds present  EXTREMITIES:  No clubbing, cyanosis. Trace edema  PSYCH: Nl behavior, nl affect  NEUROLOGY: AAOx3, non-focal, cranial nerves intact  SKIN: Normal color, No rashes or lesions                            10.2   6.4   )-----------( 192      ( 08 Jan 2019 06:58 )             30.3     01-09    132<L>  |  100  |  49<H>  ----------------------------<  96  4.9   |  24  |  1.76<H>    Ca    8.5      09 Jan 2019 07:07  Phos  3.5     01-09  Mg     2.0     01-09

## 2019-01-09 NOTE — PROGRESS NOTE ADULT - PROBLEM SELECTOR PLAN 1
-w/ intermittent RVR (HR 130s) and a/w hypotension only mildly responsive to IVF  -discussed with cards rec switch metoprolol to 12.5mg q6 prn with holding parameters, leaning against dig load given IGOR, no need for tele at this moment, f/u final recs  -[id number] 4 c/w pradaxa renally dosed -w/ intermittent RVR (HR 130s) and a/w hypotension only mildly responsive to IVF  -HRs better controlled w/ metoprolol 12.5mg q6h w/ holding romaine  -leaning against dig load given IGOR, no need for tele at this moment  -[id number] 4 c/w pradaxa renally dosed -Afib with RVR improving  -HRs better controlled w/ metoprolol 12.5mg q6h w/ holding romaine  -cards leaning against dig load given IGOR, no need for tele at this moment  -[id number] 4 c/w pradaxa renally dosed

## 2019-01-09 NOTE — PROGRESS NOTE ADULT - PROBLEM SELECTOR PLAN 3
-IGOR on CKD 3. prerenal from dehydration from diarrhea, recent lasix use, and ATN associated hypotension.   -FeUrea 30.2%, aGge<20, c/w prerenal etiology, negative bladder scan  -creatinine improved w/ IVF this AM (baseline of 1.3-1.5)   -decrease IVF rate given hx of HF, encourage po intake  -monitor bmp -IGOR on CKD 3. prerenal from dehydration from diarrhea, recent lasix use, and ATN associated hypotension.   -FeUrea 30.2%, Gage<20, c/w prerenal etiology, negative bladder scan  -creatinine improved w/ IVF this AM (baseline of 1.3-1.5)   -will hold maintenance IVF, small boluses as needed for HoTN  -monitor bmp -IGOR on CKD 3. prerenal from dehydration from diarrhea, recent lasix use, and ATN associated hypotension, FeUrea 30.2%, Gage<20, c/w prerenal etiology, negative bladder scan  -creatinine improving with IVF  -will hold maintenance IVF, small boluses as needed for hypotension  -monitor bmp

## 2019-01-09 NOTE — PROGRESS NOTE ADULT - PROBLEM SELECTOR PLAN 5
likely due to diarrhea with poor po intake and episodes of afib with RVR  -c/w IVF   -control Afib with RVR as above BP stable in 90s, likely due to diarrhea with poor po intake and episodes of afib with RVR  -control Afib with RVR as above  -IVF boluses as needed

## 2019-01-09 NOTE — CDI QUERY NOTE - [id number]_GEN_ALL_CORE_HH
ED Provider Note 04-Jan-2019   Medical Decision Making Details: **ATTENDING MEDICAL DECISION MAKING/SYNTHESIS (Dr. Benjamin Gómez): I have reviewed the Chief Complaint, the HPI, the ROS, and have directly performed and confirmed the findings on the Physical Examination. I have reviewed the medical decision making with all providers, as applicable. The PROBLEM REPRESENTATION at this time is: 78-year-old woman with history of edema in the bilateral lower extremities, s/p antibiotics previously and s/p stay at skilled nursing facility/subacute rehabilitation facility, now with six days of diarrhea and abdominal cramps without fevers, chills, nausea, vomiting, hematemesis, melena, hematochezia, or coffee-ground emesis. The MOST LIKELY DIAGNOSIS, and the LIST OF DIFFERENTIAL DIAGNOSES, includes (but is not limited to) the following: diverticulitis/colitis, mesenteric ischemia, ischemic colitis or equivalent, serious bacterial infection or sepsis/severe sepsis e.g. urinary tract infection, pyelonephritis, or equivalent, perforation, peritonitis, dehydration, electrolyte-metabolic-endocrine derangements, acute biliary disease (e.g. cholelithiasis, cholecystitis, or cholangitis), acute appendicitis, other surgical abdominal pathology e.g. abscess, urologic cause e.g. obstructing ureteral stone, vascular cause e.g. AAA, dissection or equivalent, gastritis, gastroenteritis, musculoskeletal pain. The likelihood of each of these diagnoses has been appropriately considered in the context of this patient's presentation and my evaluation. PLAN: as described in EMR, including diagnostics, therapeutics and consultation as clinically warranted. I will continue to reevaluate the patient, including the results of all testing, and monitor response to therapy throughout the patient's course in the ED.    Please clarify the status of sepsis for this patient. She is currently being treated for C. Diff colitis with Vancomycin PO. 1/4/19  ED vitals ,  BP 89/60  Cr level 1.59    NS 500ml IV bolus x1 given. ED provider list of includes sepsis/ severe sepsis. Please document the status as;         Sepsis ruled out       Sepsis resolved/ resolving         Thanks ED Provider Note 04-Jan-2019   Medical Decision Making Details: **ATTENDING MEDICAL DECISION MAKING/SYNTHESIS (Dr. Benjamin Gómez): I have reviewed the Chief Complaint, the HPI, the ROS, and have directly performed and confirmed the findings on the Physical Examination. I have reviewed the medical decision making with all providers, as applicable. The PROBLEM REPRESENTATION at this time is: 78-year-old woman with history of edema in the bilateral lower extremities, s/p antibiotics previously and s/p stay at skilled nursing facility/subacute rehabilitation facility, now with six days of diarrhea and abdominal cramps without fevers, chills, nausea, vomiting, hematemesis, melena, hematochezia, or coffee-ground emesis. The MOST LIKELY DIAGNOSIS, and the LIST OF DIFFERENTIAL DIAGNOSES, includes (but is not limited to) the following: diverticulitis/colitis, mesenteric ischemia, ischemic colitis or equivalent, serious bacterial infection or sepsis/severe sepsis e.g. urinary tract infection, pyelonephritis, or equivalent, perforation, peritonitis, dehydration, electrolyte-metabolic-endocrine derangements, acute biliary disease (e.g. cholelithiasis, cholecystitis, or cholangitis), acute appendicitis, other surgical abdominal pathology e.g. abscess, urologic cause e.g. obstructing ureteral stone, vascular cause e.g. AAA, dissection or equivalent, gastritis, gastroenteritis, musculoskeletal pain. The likelihood of each of these diagnoses has been appropriately considered in the context of this patient's presentation and my evaluation. PLAN: as described in EMR, including diagnostics, therapeutics and consultation as clinically warranted. I will continue to reevaluate the patient, including the results of all testing, and monitor response to therapy throughout the patient's course in the ED.    Please clarify the status of sepsis for this patient. She is currently being treated for C. Diff colitis with Vancomycin PO. 1/4/19  ED vitals ,  BP 89/60  Cr level 1.59    NS 500ml IV bolus x1 given. ED provider DDx list of includes sepsis/ severe sepsis. Please document the status as;         Sepsis ruled out       Sepsis resolved/ resolving              Thanks

## 2019-01-09 NOTE — PROGRESS NOTE ADULT - SUBJECTIVE AND OBJECTIVE BOX
***************************************************************  Enoch Freedman PGY1  Internal Medicine   Pager: [phone]  [facility] in house pager: [phone]  ***************************************************************    BEVERLEY DEL RIO  78y  MRN: [id number]    Patient is a 78y old  Female who presents with a chief complaint of Diarrhea (09 Jan 2019 10:49)      Subjective: no events ON. Denies fever, CP, SOB, abn pain, N/V, dysuria. Tolerating diet.      MEDICATIONS  (STANDING):  atorvastatin 40 milliGRAM(s) Oral at bedtime  clopidogrel Tablet 75 milliGRAM(s) Oral daily  dabigatran 75 milliGRAM(s) Oral every 12 hours  LORazepam     Tablet 0.5 milliGRAM(s) Oral three times a day  metoprolol tartrate 12.5 milliGRAM(s) Oral every 6 hours  saccharomyces boulardii 250 milliGRAM(s) Oral two times a day  vancomycin    Solution 125 milliGRAM(s) Oral every 6 hours    MEDICATIONS  (PRN):  acetaminophen   Tablet .. 650 milliGRAM(s) Oral every 6 hours PRN Temp greater or equal to 38C (100.4F), Mild Pain (1 - 3)  traMADol 25 milliGRAM(s) Oral two times a day PRN Moderate Pain (4 - 6)      Objective:    Vitals: Vital Signs Last 24 Hrs  T(C): 36.3 (01-09-19 @ 06:32), Max: 36.9 (01-08-19 @ 21:57)  T(F): 97.3 (01-09-19 @ 06:32), Max: 98.4 (01-08-19 @ 21:57)  HR: 99 (01-09-19 @ 06:32) (86 - 99)  BP: 96/66 (01-09-19 @ 06:32) (91/60 - 100/65)  BP(mean): --  RR: 18 (01-09-19 @ 06:32) (18 - 18)  SpO2: 93% (01-09-19 @ 06:32) (93% - 95%)              I&O's Summary    08 Jan 2019 07:01  -  09 Jan 2019 07:00  --------------------------------------------------------  IN: 1465 mL / OUT: 0 mL / NET: 1465 mL        PHYSICAL EXAM:  GENERAL: NAD  HEAD:  Atraumatic, Normocephalic  EYES: EOMI, conjunctiva and sclera clear  CHEST/LUNG: Clear to percussion bilaterally; No rales, rhonchi, wheezing, or rubs  HEART: Regular rate and rhythm; No murmurs, rubs, or gallops  ABDOMEN: Soft, Nontender, Nondistended;   SKIN: No rashes or lesions  NERVOUS SYSTEM:  Alert & Oriented X3, no focal deficit    LABS:                        10.2   6.4   )-----------( 192      ( 08 Jan 2019 06:58 )             30.3                         10.8   6.9   )-----------( 194      ( 07 Jan 2019 07:10 )             31.9     01-09    132<L>  |  100  |  49<H>  ----------------------------<  96  4.9   |  24  |  1.76<H>  01-08    131<L>  |  98  |  56<H>  ----------------------------<  126<H>  4.1   |  22  |  1.99<H>  01-07    130<L>  |  94<L>  |  52<H>  ----------------------------<  129<H>  4.1   |  25  |  2.30<H>    Ca    8.5      09 Jan 2019 07:07  Ca    8.3<L>      08 Jan 2019 06:57  Ca    8.6      07 Jan 2019 15:50  Phos  3.5     01-09  Mg     2.0     01-09                        CAPILLARY BLOOD GLUCOSE        RADIOLOGY & ADDITIONAL TESTS:  Imaging Personally Reviewed:  [x ] YES  [ ] NO  Consultants involved in case:   Consultant(s) Notes Reviewed:  [ x] YES  [ ] NO:   Care Discussed with Consultants/Other Providers [x ] YES  [ ] NO ***************************************************************  Enoch Freedman PGY1  Internal Medicine   Pager: [phone]  [facility] in house pager: [phone]  ***************************************************************    BEVERLEY DEL RIO  78y  MRN: [id number]    Patient is a 78y old  Female who presents with a chief complaint of Diarrhea (09 Jan 2019 10:49)      Subjective: pt had several more episodes of diarrhea o/n. HRs well controlled o/n. Continues to be anxious, particularly at night (pt w/ bloody L lip 2/2 skin picking). This AM, denies fever, CP, SOB, abn pain, N/V, dysuria. Tolerating diet.      MEDICATIONS  (STANDING):  atorvastatin 40 milliGRAM(s) Oral at bedtime  clopidogrel Tablet 75 milliGRAM(s) Oral daily  dabigatran 75 milliGRAM(s) Oral every 12 hours  LORazepam     Tablet 0.5 milliGRAM(s) Oral three times a day  metoprolol tartrate 12.5 milliGRAM(s) Oral every 6 hours  saccharomyces boulardii 250 milliGRAM(s) Oral two times a day  vancomycin    Solution 125 milliGRAM(s) Oral every 6 hours    MEDICATIONS  (PRN):  acetaminophen   Tablet .. 650 milliGRAM(s) Oral every 6 hours PRN Temp greater or equal to 38C (100.4F), Mild Pain (1 - 3)  traMADol 25 milliGRAM(s) Oral two times a day PRN Moderate Pain (4 - 6)      Objective:    Vitals: Vital Signs Last 24 Hrs  T(C): 36.3 (01-09-19 @ 06:32), Max: 36.9 (01-08-19 @ 21:57)  T(F): 97.3 (01-09-19 @ 06:32), Max: 98.4 (01-08-19 @ 21:57)  HR: 99 (01-09-19 @ 06:32) (86 - 99)  BP: 96/66 (01-09-19 @ 06:32) (91/60 - 100/65)  BP(mean): --  RR: 18 (01-09-19 @ 06:32) (18 - 18)  SpO2: 93% (01-09-19 @ 06:32) (93% - 95%)              I&O's Summary    08 Jan 2019 07:01  -  09 Jan 2019 07:00  --------------------------------------------------------  IN: 1465 mL / OUT: 0 mL / NET: 1465 mL        PHYSICAL EXAM:  GENERAL: NAD, chronically ill appearing  HEAD:  Atraumatic, Normocephalic  NECK: Supple, No JVD  NERVOUS SYSTEM: AOX3,   PSYCHIATRIC: Appropriate affect and mood  CHEST/LUNG: Clear to auscultation bilaterally. On 2L NC  HEART: irregular rate and rhythm; nl S1S2  ABDOMEN: Soft, Nontender, Nondistended; Bowel sounds present  EXTREMITIES:  2+ Peripheral Pulses, No clubbing, cyanosis 2+ b/l LE pitting edema to knees   LABS:                        10.2   6.4   )-----------( 192      ( 08 Jan 2019 06:58 )             30.3                         10.8   6.9   )-----------( 194      ( 07 Jan 2019 07:10 )             31.9     01-09    132<L>  |  100  |  49<H>  ----------------------------<  96  4.9   |  24  |  1.76<H>  01-08    131<L>  |  98  |  56<H>  ----------------------------<  126<H>  4.1   |  22  |  1.99<H>  01-07    130<L>  |  94<L>  |  52<H>  ----------------------------<  129<H>  4.1   |  25  |  2.30<H>    Ca    8.5      09 Jan 2019 07:07  Ca    8.3<L>      08 Jan 2019 06:57  Ca    8.6      07 Jan 2019 15:50  Phos  3.5     01-09  Mg     2.0     01-09                        CAPILLARY BLOOD GLUCOSE        RADIOLOGY & ADDITIONAL TESTS:  Imaging Personally Reviewed:  [x ] YES  [ ] NO  Consultants involved in case:   Consultant(s) Notes Reviewed:  [ x] YES  [ ] NO:   Care Discussed with Consultants/Other Providers [x ] YES  [ ] NO ***************************************************************  Enoch Freedman PGY1  Internal Medicine   Pager: [phone]  [facility] in house pager: [phone]  ***************************************************************    BEVERLEY DEL RIO  78y  MRN: [id number]    Patient is a 78y old  Female who presents with a chief complaint of Diarrhea (09 Jan 2019 10:49)      Subjective: Had 5-7 BMS since yesterday. HRs well controlled o/n. Continues to be anxious, particularly at night (pt w/ bloody L lip 2/2 skin picking). This AM, denies fever, CP, SOB, abn pain, N/V, dysuria. Tolerating diet.      MEDICATIONS  (STANDING):  atorvastatin 40 milliGRAM(s) Oral at bedtime  clopidogrel Tablet 75 milliGRAM(s) Oral daily  dabigatran 75 milliGRAM(s) Oral every 12 hours  LORazepam     Tablet 0.5 milliGRAM(s) Oral three times a day  metoprolol tartrate 12.5 milliGRAM(s) Oral every 6 hours  saccharomyces boulardii 250 milliGRAM(s) Oral two times a day  vancomycin    Solution 125 milliGRAM(s) Oral every 6 hours    MEDICATIONS  (PRN):  acetaminophen   Tablet .. 650 milliGRAM(s) Oral every 6 hours PRN Temp greater or equal to 38C (100.4F), Mild Pain (1 - 3)  traMADol 25 milliGRAM(s) Oral two times a day PRN Moderate Pain (4 - 6)      Objective:    Vitals: Vital Signs Last 24 Hrs  T(C): 36.3 (01-09-19 @ 06:32), Max: 36.9 (01-08-19 @ 21:57)  T(F): 97.3 (01-09-19 @ 06:32), Max: 98.4 (01-08-19 @ 21:57)  HR: 99 (01-09-19 @ 06:32) (86 - 99)  BP: 96/66 (01-09-19 @ 06:32) (91/60 - 100/65)  BP(mean): --  RR: 18 (01-09-19 @ 06:32) (18 - 18)  SpO2: 93% (01-09-19 @ 06:32) (93% - 95%)              I&O's Summary    08 Jan 2019 07:01  -  09 Jan 2019 07:00  --------------------------------------------------------  IN: 1465 mL / OUT: 0 mL / NET: 1465 mL        PHYSICAL EXAM:  GENERAL: NAD, chronically ill appearing very anxious  HEAD:  Atraumatic, Normocephalic  NECK: Supple, No JVD  NERVOUS SYSTEM: AOX3,   PSYCHIATRIC: Appropriate affect and mood  CHEST/LUNG: Clear to auscultation bilaterally. On 2L NC  HEART: irregular rate and rhythm; nl S1S2  ABDOMEN: Soft, Nontender, mild distention; Bowel sounds present  EXTREMITIES:  2+ Peripheral Pulses, No clubbing, cyanosis 2+ b/l LE pitting edema to knees   LABS:                        10.2   6.4   )-----------( 192      ( 08 Jan 2019 06:58 )             30.3                         10.8   6.9   )-----------( 194      ( 07 Jan 2019 07:10 )             31.9     01-09    132<L>  |  100  |  49<H>  ----------------------------<  96  4.9   |  24  |  1.76<H>  01-08    131<L>  |  98  |  56<H>  ----------------------------<  126<H>  4.1   |  22  |  1.99<H>  01-07    130<L>  |  94<L>  |  52<H>  ----------------------------<  129<H>  4.1   |  25  |  2.30<H>    Ca    8.5      09 Jan 2019 07:07  Ca    8.3<L>      08 Jan 2019 06:57  Ca    8.6      07 Jan 2019 15:50  Phos  3.5     01-09  Mg     2.0     01-09                        CAPILLARY BLOOD GLUCOSE        RADIOLOGY & ADDITIONAL TESTS:  Imaging Personally Reviewed:  [x ] YES  [ ] NO  Consultants involved in case:   Consultant(s) Notes Reviewed:  [ x] YES  [ ] NO:   Care Discussed with Consultants/Other Providers [x ] YES  [ ] NO

## 2019-01-09 NOTE — PROGRESS NOTE ADULT - PROBLEM SELECTOR PLAN 8
PCI in 11/2018  -c/w plavix and statin ISTOP: [id number]   -increased to ativan 0.5mg TID given severe anxiety

## 2019-01-09 NOTE — CONSULT NOTE ADULT - SUBJECTIVE AND OBJECTIVE BOX
pt seen at bedside in NAD. left heel dressing c/d/i. Zfloats in place  pedal pulses nonpalpable TG WNL. CFT 3 sec x 10  epicritic sensation intact  left posterior achilles ulceration noted granular no pus no erythema no edema mild tenderness with palpation, no signs of acute infection at this time  applied aquacel with alleyvn  recommend continue local care  recommend zfloats as well to offload and prevent further breakdown  thank you for consult  will follow 1-2x a week

## 2019-01-09 NOTE — PROGRESS NOTE ADULT - ASSESSMENT
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     - in the setting of recent abx use; found to have pancolitis, 2/2 c. diff  -diarrhea improving, pt tolerating PO  - c/w PO Vanco (day 5/10-14), contact precautions  - c/w probiotics 78yoF w/ PMHx significant for HLD, HTN, HFpEF (likely right sided failure), Afib on Pradaxa, CKD 3 presenting with diarrhea for 5 days in the setting of recent Abx use, a/w pancolitis found to be positive for severe cdif now with IGOR on CKD, Afib with RVR/hypotension, improving.

## 2019-01-09 NOTE — PROGRESS NOTE ADULT - PROBLEM SELECTOR PLAN 2
- in the setting of recent abx use; found to have pancolitis, 2/2 c. diff  -diarrhea improving, pt tolerating PO  - c/w PO Vanco (day 5/10-14), contact precautions  - c/w probiotics - in the setting of recent abx use; found to have pancolitis, 2/2 severe c. diff infection (given low albumin, renal dysfxn)  -diarrhea continues, pt tolerating PO  - c/w PO Vanco (day 6/10-14), contact precautions  - c/w probiotics - in the setting of recent abx use; found to have pancolitis, 2/2 severe c. diff infection (given low albumin, IGOR)   -diarrhea continues, pt tolerating PO but increased distention, afebrile, no leukocytosis  -will get ABX  r/o colonic distention  -if no improvement by mary consider GI consult  -c/w PO Vanco (day 6/10-14), contact precautions  -c/w probiotics

## 2019-01-09 NOTE — PROGRESS NOTE ADULT - ASSESSMENT
78 year old woman with PMH HLD, HTN, HFpEF, Afib on pradaxa, CAD s/p RCA ROSEMARIE at [facility] 11/9/18 (per medicine outpatient note) presenting with diarrhea.  Flowsheet review reveals BPs 80s-100s systolics (primarily in 90s). HRs reaches 130s primarily in late hours of night. patient with IGOR.    #Afib: RVR is usually a symptom of underlying stress (in this case diarrhea and C.Diff). Patient asymptomatic otherwise. RVR usually occurs in late hours. Notably, BP drops do not seem to correlate with HR increases per vitals. Not clear RVR actually causing hypotension.  -Treating underlying infection  -Cont metoprolol 12.5 q6h for better rate control (Always dose tartrate q6h while inpatient, better pharmacokinetic rate control over the day - note 130s always occur near midnight)  -Would not treat rates of 130 with increased metoprolol doses, would consider small 250-500 cc boluses  -No need for telemetry at current moment  -Also would treat anxiety that may be occurring at night (patient asking for ativan during visit for anxiety)  -Cont Pradaxa (hold ASA given also on plavix)      #HFpEF w/ RV systolic dysfunction: right sided enlargement, likely combination of COPD and prior RCA-territory MI  -Such patients are often preload dependent and dehydration causes profound hypotension improved with boluses.  -mildly hypervolemic on exam  -Would hold on further fluids or maintenance fluids. Although may give boluses in setting of RVR given able to lay flat currently.  -Please obtain daily weights. (I/Os also preferred, but understand may be difficult due to diarrhea)    Over last 24 hours, rates better controlled, sBPs are averaging at 100s, maintains euvolemia. Seems to be improving with treatment of infection    Please do not hesitate to call cardiology for any further concerns.    -GIOVANNI Matos  Cardiology  [phone]

## 2019-01-09 NOTE — PROGRESS NOTE ADULT - PROBLEM SELECTOR PLAN 9
ISTOP: [id number]   -c/w ativan 0.5mg bid home dose ISTOP: [id number]   -increased to ativan 0.5mg TID given tachycardia assoc w/ anxiety b/l heel ulcers, apprecaited podiatry and wound care recs for dressing changes and offloading boots  -c/w tylenol and tramadol prn pain

## 2019-01-09 NOTE — PROGRESS NOTE ADULT - PROBLEM SELECTOR PLAN 4
-hypovolemic hyponatremia in the setting of diarrhea with decreased PO intake and Lasix use  -will restart gentle IVF given pt is relatively dry on exam w/ rel low BPs  -monitor BPs -hypovolemic hyponatremia in the setting of diarrhea with decreased PO intake and Lasix use  -improving  -will hold maintenance IVF, small boluses as needed for HoTN  -encourage Po  -monitor BPs -hypovolemic hyponatremia in the setting of diarrhea with decreased PO intake and Lasix use  -improving, monitor vmp  -encourage Po,

## 2019-01-10 LAB
ALBUMIN SERPL ELPH-MCNC: 2.6 G/DL — LOW (ref 3.3–5)
ALP SERPL-CCNC: 114 U/L — SIGNIFICANT CHANGE UP (ref 40–120)
ALT FLD-CCNC: 11 U/L — SIGNIFICANT CHANGE UP (ref 10–45)
ANION GAP SERPL CALC-SCNC: 12 MMOL/L — SIGNIFICANT CHANGE UP (ref 5–17)
AST SERPL-CCNC: 13 U/L — SIGNIFICANT CHANGE UP (ref 10–40)
BILIRUB SERPL-MCNC: 0.2 MG/DL — SIGNIFICANT CHANGE UP (ref 0.2–1.2)
BUN SERPL-MCNC: 46 MG/DL — HIGH (ref 7–23)
CALCIUM SERPL-MCNC: 8.3 MG/DL — LOW (ref 8.4–10.5)
CHLORIDE SERPL-SCNC: 97 MMOL/L — SIGNIFICANT CHANGE UP (ref 96–108)
CO2 SERPL-SCNC: 24 MMOL/L — SIGNIFICANT CHANGE UP (ref 22–31)
CREAT SERPL-MCNC: 1.66 MG/DL — HIGH (ref 0.5–1.3)
GLUCOSE SERPL-MCNC: 96 MG/DL — SIGNIFICANT CHANGE UP (ref 70–99)
HCT VFR BLD CALC: 28.2 % — LOW (ref 34.5–45)
HGB BLD-MCNC: 9.6 G/DL — LOW (ref 11.5–15.5)
MAGNESIUM SERPL-MCNC: 2 MG/DL — SIGNIFICANT CHANGE UP (ref 1.6–2.6)
MCHC RBC-ENTMCNC: 29.7 PG — SIGNIFICANT CHANGE UP (ref 27–34)
MCHC RBC-ENTMCNC: 34 GM/DL — SIGNIFICANT CHANGE UP (ref 32–36)
MCV RBC AUTO: 87.5 FL — SIGNIFICANT CHANGE UP (ref 80–100)
PHOSPHATE SERPL-MCNC: 4.2 MG/DL — SIGNIFICANT CHANGE UP (ref 2.5–4.5)
PLATELET # BLD AUTO: 201 K/UL — SIGNIFICANT CHANGE UP (ref 150–400)
POTASSIUM SERPL-MCNC: 4.7 MMOL/L — SIGNIFICANT CHANGE UP (ref 3.5–5.3)
POTASSIUM SERPL-SCNC: 4.7 MMOL/L — SIGNIFICANT CHANGE UP (ref 3.5–5.3)
PROT SERPL-MCNC: 5.9 G/DL — LOW (ref 6–8.3)
RBC # BLD: 3.23 M/UL — LOW (ref 3.8–5.2)
RBC # FLD: 15.1 % — HIGH (ref 10.3–14.5)
SODIUM SERPL-SCNC: 133 MMOL/L — LOW (ref 135–145)
WBC # BLD: 5.9 K/UL — SIGNIFICANT CHANGE UP (ref 3.8–10.5)
WBC # FLD AUTO: 5.9 K/UL — SIGNIFICANT CHANGE UP (ref 3.8–10.5)

## 2019-01-10 PROCEDURE — 99233 SBSQ HOSP IP/OBS HIGH 50: CPT | Mod: GC

## 2019-01-10 RX ADMIN — Medication 125 MILLIGRAM(S): at 00:23

## 2019-01-10 RX ADMIN — Medication 250 MILLIGRAM(S): at 06:12

## 2019-01-10 RX ADMIN — DABIGATRAN ETEXILATE MESYLATE 75 MILLIGRAM(S): 150 CAPSULE ORAL at 06:12

## 2019-01-10 RX ADMIN — DABIGATRAN ETEXILATE MESYLATE 75 MILLIGRAM(S): 150 CAPSULE ORAL at 17:50

## 2019-01-10 RX ADMIN — Medication 0.5 MILLIGRAM(S): at 13:43

## 2019-01-10 RX ADMIN — Medication 12.5 MILLIGRAM(S): at 23:23

## 2019-01-10 RX ADMIN — Medication 12.5 MILLIGRAM(S): at 17:50

## 2019-01-10 RX ADMIN — Medication 125 MILLIGRAM(S): at 23:23

## 2019-01-10 RX ADMIN — Medication 0.5 MILLIGRAM(S): at 22:03

## 2019-01-10 RX ADMIN — Medication 125 MILLIGRAM(S): at 06:12

## 2019-01-10 RX ADMIN — Medication 12.5 MILLIGRAM(S): at 12:37

## 2019-01-10 RX ADMIN — ATORVASTATIN CALCIUM 40 MILLIGRAM(S): 80 TABLET, FILM COATED ORAL at 22:03

## 2019-01-10 RX ADMIN — CLOPIDOGREL BISULFATE 75 MILLIGRAM(S): 75 TABLET, FILM COATED ORAL at 12:36

## 2019-01-10 RX ADMIN — Medication 0.5 MILLIGRAM(S): at 06:12

## 2019-01-10 RX ADMIN — Medication 12.5 MILLIGRAM(S): at 00:23

## 2019-01-10 RX ADMIN — Medication 12.5 MILLIGRAM(S): at 06:12

## 2019-01-10 RX ADMIN — Medication 125 MILLIGRAM(S): at 12:37

## 2019-01-10 RX ADMIN — Medication 125 MILLIGRAM(S): at 17:50

## 2019-01-10 RX ADMIN — Medication 250 MILLIGRAM(S): at 17:50

## 2019-01-10 NOTE — PROGRESS NOTE ADULT - PROBLEM SELECTOR PLAN 9
b/l heel ulcers, apprecaited podiatry and wound care recs for dressing changes and offloading boots  -c/w tylenol and tramadol prn pain

## 2019-01-10 NOTE — PROGRESS NOTE ADULT - PROBLEM SELECTOR PLAN 5
BP stable in 90s, likely due to diarrhea with poor po intake and episodes of afib with RVR  -control Afib with RVR as above  -IVF boluses as needed

## 2019-01-10 NOTE — PROGRESS NOTE ADULT - PROBLEM SELECTOR PLAN 10
-DVT: pradaxa  -Diet: soft diet  -dispo: PT rec MARGIE when improved -DVT: pradaxa  -Diet: soft diet  -dispo: continue to have loose BMS, ID consult, eventual MARGIE

## 2019-01-10 NOTE — PROGRESS NOTE ADULT - PROBLEM SELECTOR PLAN 6
-suspect R sided CHF possibly due to recent RCA MI requiring stent placement in 11/18 and lung dz/COPD  -holding lasix in the setting of diarrhea and hypotension  -c/w IVF boluses for hypotension prn  -monitor I/Os  -recent TTE with EF >50%, severely dilated LA,

## 2019-01-10 NOTE — PROGRESS NOTE ADULT - ASSESSMENT
78yoF w/ PMHx significant for HLD, HTN, HFpEF (likely right sided failure), Afib on Pradaxa, CKD 3 presenting with diarrhea for 5 days in the setting of recent Abx use, a/w pancolitis found to be positive for severe cdif now with IGOR on CKD, Afib with RVR/hypotension, improving.

## 2019-01-10 NOTE — PROGRESS NOTE ADULT - PROBLEM SELECTOR PLAN 1
-Afib with RVR improving  -HRs better controlled w/ metoprolol 12.5mg q6h w/ holding romaine  -cards leaning against dig load given IGOR, no need for tele at this moment  -[id number] 4 c/w pradaxa renally dosed - in the setting of recent abx use; found to have pancolitis, 2/2 severe c. diff infection (given low albumin, IGOR)   -6-7 loose BM in 24 hours,  will consult ID for further assistance as on Day 6 of po vanc  -Xray ABD - neg for colonic distention  -c/w contact precautions  -c/w probiotics

## 2019-01-10 NOTE — PROGRESS NOTE ADULT - PROBLEM SELECTOR PLAN 8
ISTOP: [id number]   -increased to ativan 0.5mg TID given severe anxiety ISTOP: [id number]   -c/w ativan 0.5mg TID given severe anxiety

## 2019-01-10 NOTE — PROGRESS NOTE ADULT - SUBJECTIVE AND OBJECTIVE BOX
***************************************************************  Enoch Freedman PGY1  Internal Medicine   Pager: [phone]  [facility] in house pager: [phone]  ***************************************************************    BEVERLEY DEL RIO  78y  MRN: [id number]    Patient is a 78y old  Female who presents with a chief complaint of Diarrhea (09 Jan 2019 12:12)      Subjective: no events ON. Denies fever, CP, SOB, abn pain, N/V, dysuria. Tolerating diet.      MEDICATIONS  (STANDING):  atorvastatin 40 milliGRAM(s) Oral at bedtime  clopidogrel Tablet 75 milliGRAM(s) Oral daily  dabigatran 75 milliGRAM(s) Oral every 12 hours  LORazepam     Tablet 0.5 milliGRAM(s) Oral three times a day  metoprolol tartrate 12.5 milliGRAM(s) Oral every 6 hours  saccharomyces boulardii 250 milliGRAM(s) Oral two times a day  vancomycin    Solution 125 milliGRAM(s) Oral every 6 hours    MEDICATIONS  (PRN):  acetaminophen   Tablet .. 650 milliGRAM(s) Oral every 6 hours PRN Temp greater or equal to 38C (100.4F), Mild Pain (1 - 3)  traMADol 25 milliGRAM(s) Oral every 6 hours PRN Moderate Pain (4 - 6) or severe pain 7-10      Objective:    Vitals: Vital Signs Last 24 Hrs  T(C): 36.4 (01-10-19 @ 09:33), Max: 36.7 (01-09-19 @ 13:30)  T(F): 97.5 (01-10-19 @ 09:33), Max: 98 (01-09-19 @ 13:30)  HR: 92 (01-10-19 @ 09:33) (86 - 94)  BP: 106/74 (01-10-19 @ 09:33) (96/64 - 107/89)  BP(mean): --  RR: 20 (01-10-19 @ 09:33) (18 - 20)  SpO2: 96% (01-10-19 @ 09:33) (95% - 97%)              I&O's Summary    09 Jan 2019 07:01  -  10 Nino 2019 07:00  --------------------------------------------------------  IN: 480 mL / OUT: 0 mL / NET: 480 mL    10 Nino 2019 07:01  -  10 Nino 2019 11:01  --------------------------------------------------------  IN: 240 mL / OUT: 0 mL / NET: 240 mL        PHYSICAL EXAM:  GENERAL: NAD  HEAD:  Atraumatic, Normocephalic  EYES: EOMI, conjunctiva and sclera clear  CHEST/LUNG: Clear to percussion bilaterally; No rales, rhonchi, wheezing, or rubs  HEART: Regular rate and rhythm; No murmurs, rubs, or gallops  ABDOMEN: Soft, Nontender, Nondistended;   SKIN: No rashes or lesions  NERVOUS SYSTEM:  Alert & Oriented X3, no focal deficit    LABS:                        9.6    5.9   )-----------( 201      ( 10 Nino 2019 06:48 )             28.2                         10.2   6.4   )-----------( 192      ( 08 Jan 2019 06:58 )             30.3     01-10    133<L>  |  97  |  46<H>  ----------------------------<  96  4.7   |  24  |  1.66<H>  01-09    132<L>  |  100  |  49<H>  ----------------------------<  96  4.9   |  24  |  1.76<H>  01-08    131<L>  |  98  |  56<H>  ----------------------------<  126<H>  4.1   |  22  |  1.99<H>    Ca    8.3<L>      10 Nino 2019 06:45  Ca    8.5      09 Jan 2019 07:07  Ca    8.3<L>      08 Jan 2019 06:57  Phos  4.2     01-10  Mg     2.0     01-10    TPro  5.9<L>  /  Alb  2.6<L>  /  TBili  0.2  /  DBili  x   /  AST  13  /  ALT  11  /  AlkPhos  114  01-10                      CAPILLARY BLOOD GLUCOSE        RADIOLOGY & ADDITIONAL TESTS:  Imaging Personally Reviewed:  [x ] YES  [ ] NO  Consultants involved in case:   Consultant(s) Notes Reviewed:  [ x] YES  [ ] NO:   Care Discussed with Consultants/Other Providers [x ] YES  [ ] NO ***************************************************************  Enoch Freedman PGY1  Internal Medicine   Pager: [phone]  [facility] in house pager: [phone]  ***************************************************************    BEVERLEY DEL RIO  78y  MRN: [id number]    Patient is a 78y old  Female who presents with a chief complaint of Diarrhea (09 Jan 2019 12:12)      Subjective: 3-4 loose BMs last night. 3 loose BMs this AM. Otherwise, denies fever, CP, SOB, abn pain, N/V, dysuria. Tolerating diet.  Would like to go home soon.     MEDICATIONS  (STANDING):  atorvastatin 40 milliGRAM(s) Oral at bedtime  clopidogrel Tablet 75 milliGRAM(s) Oral daily  dabigatran 75 milliGRAM(s) Oral every 12 hours  LORazepam     Tablet 0.5 milliGRAM(s) Oral three times a day  metoprolol tartrate 12.5 milliGRAM(s) Oral every 6 hours  saccharomyces boulardii 250 milliGRAM(s) Oral two times a day  vancomycin    Solution 125 milliGRAM(s) Oral every 6 hours    MEDICATIONS  (PRN):  acetaminophen   Tablet .. 650 milliGRAM(s) Oral every 6 hours PRN Temp greater or equal to 38C (100.4F), Mild Pain (1 - 3)  traMADol 25 milliGRAM(s) Oral every 6 hours PRN Moderate Pain (4 - 6) or severe pain 7-10      Objective:    Vitals: Vital Signs Last 24 Hrs  T(C): 36.4 (01-10-19 @ 09:33), Max: 36.7 (01-09-19 @ 13:30)  T(F): 97.5 (01-10-19 @ 09:33), Max: 98 (01-09-19 @ 13:30)  HR: 92 (01-10-19 @ 09:33) (86 - 94)  BP: 106/74 (01-10-19 @ 09:33) (96/64 - 107/89)  BP(mean): --  RR: 20 (01-10-19 @ 09:33) (18 - 20)  SpO2: 96% (01-10-19 @ 09:33) (95% - 97%)              I&O's Summary    09 Jan 2019 07:01  -  10 Nino 2019 07:00  --------------------------------------------------------  IN: 480 mL / OUT: 0 mL / NET: 480 mL    10 Nino 2019 07:01  -  10 Nino 2019 11:01  --------------------------------------------------------  IN: 240 mL / OUT: 0 mL / NET: 240 mL        PHYSICAL EXAM:  GENERAL: NAD, chronically ill appearing very anxious  HEAD:  Atraumatic, Normocephalic  NECK: Supple, No JVD  NERVOUS SYSTEM: AOX3,   PSYCHIATRIC: Appropriate affect and mood  CHEST/LUNG: Clear to auscultation bilaterally. On 2L NC  HEART: irregular rate and rhythm; nl S1S2  ABDOMEN: Soft, Nontender, mild distention; Bowel sounds present  EXTREMITIES:  2+ Peripheral Pulses, No clubbing, cyanosis 2+ b/l LE pitting edema to knees     LABS:                        9.6    5.9   )-----------( 201      ( 10 Nino 2019 06:48 )             28.2                         10.2   6.4   )-----------( 192      ( 08 Jan 2019 06:58 )             30.3     01-10    133<L>  |  97  |  46<H>  ----------------------------<  96  4.7   |  24  |  1.66<H>  01-09    132<L>  |  100  |  49<H>  ----------------------------<  96  4.9   |  24  |  1.76<H>  01-08    131<L>  |  98  |  56<H>  ----------------------------<  126<H>  4.1   |  22  |  1.99<H>    Ca    8.3<L>      10 Nino 2019 06:45  Ca    8.5      09 Jan 2019 07:07  Ca    8.3<L>      08 Jan 2019 06:57  Phos  4.2     01-10  Mg     2.0     01-10    TPro  5.9<L>  /  Alb  2.6<L>  /  TBili  0.2  /  DBili  x   /  AST  13  /  ALT  11  /  AlkPhos  114  01-10                      CAPILLARY BLOOD GLUCOSE        RADIOLOGY & ADDITIONAL TESTS:  Imaging Personally Reviewed:  [x ] YES  [ ] NO  Consultants involved in case:   Consultant(s) Notes Reviewed:  [ x] YES  [ ] NO:   Care Discussed with Consultants/Other Providers [x ] YES  [ ] NO ***************************************************************  Enoch Freedman PGY1  Internal Medicine   Pager: [phone]  [facility] in house pager: [phone]  ***************************************************************    BEVERLEY DEL RIO  78y  MRN: [id number]    Patient is a 78y old  Female who presents with a chief complaint of Diarrhea (09 Jan 2019 12:12)      Subjective: Had 3-4 loose BMs since yesterday morning. Had 3 loose BMs this AM. Otherwise, denies fever, CP, SOB, abn pain, N/V, dysuria. Tolerating diet.  Would like to go home soon.     MEDICATIONS  (STANDING):  atorvastatin 40 milliGRAM(s) Oral at bedtime  clopidogrel Tablet 75 milliGRAM(s) Oral daily  dabigatran 75 milliGRAM(s) Oral every 12 hours  LORazepam     Tablet 0.5 milliGRAM(s) Oral three times a day  metoprolol tartrate 12.5 milliGRAM(s) Oral every 6 hours  saccharomyces boulardii 250 milliGRAM(s) Oral two times a day  vancomycin    Solution 125 milliGRAM(s) Oral every 6 hours    MEDICATIONS  (PRN):  acetaminophen   Tablet .. 650 milliGRAM(s) Oral every 6 hours PRN Temp greater or equal to 38C (100.4F), Mild Pain (1 - 3)  traMADol 25 milliGRAM(s) Oral every 6 hours PRN Moderate Pain (4 - 6) or severe pain 7-10      Objective:    Vitals: Vital Signs Last 24 Hrs  T(C): 36.4 (01-10-19 @ 09:33), Max: 36.7 (01-09-19 @ 13:30)  T(F): 97.5 (01-10-19 @ 09:33), Max: 98 (01-09-19 @ 13:30)  HR: 92 (01-10-19 @ 09:33) (86 - 94)  BP: 106/74 (01-10-19 @ 09:33) (96/64 - 107/89)  BP(mean): --  RR: 20 (01-10-19 @ 09:33) (18 - 20)  SpO2: 96% (01-10-19 @ 09:33) (95% - 97%)              I&O's Summary    09 Jan 2019 07:01  -  10 Nino 2019 07:00  --------------------------------------------------------  IN: 480 mL / OUT: 0 mL / NET: 480 mL    10 Nino 2019 07:01  -  10 Nino 2019 11:01  --------------------------------------------------------  IN: 240 mL / OUT: 0 mL / NET: 240 mL        PHYSICAL EXAM:  GENERAL: NAD, chronically ill appearings  HEAD:  Atraumatic, Normocephalic  NECK: Supple, No JVD  NERVOUS SYSTEM: AOX3,   PSYCHIATRIC: Appropriate affect and mood  CHEST/LUNG: Clear to auscultation bilaterally. On 2L NC  HEART: irregular rate and rhythm; nl S1S2  ABDOMEN: Soft, Nontender, mild distention; Bowel sounds present  EXTREMITIES:  2+ Peripheral Pulses, No clubbing, cyanosis 2+ b/l LE pitting edema to knees     LABS:                        9.6    5.9   )-----------( 201      ( 10 Nino 2019 06:48 )             28.2                         10.2   6.4   )-----------( 192      ( 08 Jan 2019 06:58 )             30.3     01-10    133<L>  |  97  |  46<H>  ----------------------------<  96  4.7   |  24  |  1.66<H>  01-09    132<L>  |  100  |  49<H>  ----------------------------<  96  4.9   |  24  |  1.76<H>  01-08    131<L>  |  98  |  56<H>  ----------------------------<  126<H>  4.1   |  22  |  1.99<H>    Ca    8.3<L>      10 Nino 2019 06:45  Ca    8.5      09 Jan 2019 07:07  Ca    8.3<L>      08 Jan 2019 06:57  Phos  4.2     01-10  Mg     2.0     01-10    TPro  5.9<L>  /  Alb  2.6<L>  /  TBili  0.2  /  DBili  x   /  AST  13  /  ALT  11  /  AlkPhos  114  01-10                      CAPILLARY BLOOD GLUCOSE        RADIOLOGY & ADDITIONAL TESTS:  Imaging Personally Reviewed:  [x ] YES  [ ] NO  ABX: IMPRESSION:   Nonobstructive bowel gas pattern.  No gaseous dilation of the colon. This radiograph cannot exclude fluid   filled dilated colon.    Consultants involved in case:   Consultant(s) Notes Reviewed:  [ x] YES  [ ] NO:   Care Discussed with Consultants/Other Providers [x ] YES  [ ] NO

## 2019-01-10 NOTE — PROGRESS NOTE ADULT - PROBLEM SELECTOR PLAN 4
-hypovolemic hyponatremia in the setting of diarrhea with decreased PO intake and Lasix use  -improving, monitor vmp  -encourage Po, -hypovolemic hyponatremia in the setting of diarrhea with decreased PO intake and Lasix use  -improving, monitor bmp  -encourage Po

## 2019-01-10 NOTE — PROGRESS NOTE ADULT - PROBLEM SELECTOR PLAN 3
-IGOR on CKD 3. prerenal from dehydration from diarrhea, recent lasix use, and ATN associated hypotension, FeUrea 30.2%, Gage<20, c/w prerenal etiology, negative bladder scan  -creatinine improving with IVF  -will hold maintenance IVF, small boluses as needed for hypotension  -monitor bmp -IGOR on CKD 3. prerenal from dehydration from diarrhea, recent lasix use, and ATN associated hypotension, FeUrea 30.2%, Gage<20, c/w prerenal etiology, negative bladder scan  -creatinine improving while off IVF  -small boluses as needed for hypotension per cards  -monitor bmp

## 2019-01-10 NOTE — PROGRESS NOTE ADULT - PROBLEM SELECTOR PLAN 2
- in the setting of recent abx use; found to have pancolitis, 2/2 severe c. diff infection (given low albumin, IGOR)   -diarrhea continues, pt tolerating PO but increased distention, afebrile, no leukocytosis  -will get ABX  r/o colonic distention  -if no improvement by mary consider GI consult  -c/w PO Vanco (day 6/10-14), contact precautions  -c/w probiotics - in the setting of recent abx use; found to have pancolitis, 2/2 severe c. diff infection (given low albumin, IGOR)   -diarrhea continues, pt tolerating PO  -Xray ABD - neg for colonic distention  -c/w PO Vanco (day 7/14), contact precautions  -c/w probiotics -Afib with RVR now resolved  -HRs better controlled w/ metoprolol 12.5mg q6h w/ holding romaine  -[id number] 4 c/w pradaxa renally dosed  -appreciate cards eval

## 2019-01-11 LAB
ANION GAP SERPL CALC-SCNC: 10 MMOL/L — SIGNIFICANT CHANGE UP (ref 5–17)
BUN SERPL-MCNC: 43 MG/DL — HIGH (ref 7–23)
CALCIUM SERPL-MCNC: 8.5 MG/DL — SIGNIFICANT CHANGE UP (ref 8.4–10.5)
CHLORIDE SERPL-SCNC: 98 MMOL/L — SIGNIFICANT CHANGE UP (ref 96–108)
CO2 SERPL-SCNC: 25 MMOL/L — SIGNIFICANT CHANGE UP (ref 22–31)
CREAT SERPL-MCNC: 1.75 MG/DL — HIGH (ref 0.5–1.3)
GLUCOSE SERPL-MCNC: 104 MG/DL — HIGH (ref 70–99)
HCT VFR BLD CALC: 28.4 % — LOW (ref 34.5–45)
HGB BLD-MCNC: 9.4 G/DL — LOW (ref 11.5–15.5)
MAGNESIUM SERPL-MCNC: 2.2 MG/DL — SIGNIFICANT CHANGE UP (ref 1.6–2.6)
MCHC RBC-ENTMCNC: 28.9 PG — SIGNIFICANT CHANGE UP (ref 27–34)
MCHC RBC-ENTMCNC: 33.1 GM/DL — SIGNIFICANT CHANGE UP (ref 32–36)
MCV RBC AUTO: 87.5 FL — SIGNIFICANT CHANGE UP (ref 80–100)
PHOSPHATE SERPL-MCNC: 3.8 MG/DL — SIGNIFICANT CHANGE UP (ref 2.5–4.5)
PLATELET # BLD AUTO: 188 K/UL — SIGNIFICANT CHANGE UP (ref 150–400)
POTASSIUM SERPL-MCNC: 5.2 MMOL/L — SIGNIFICANT CHANGE UP (ref 3.5–5.3)
POTASSIUM SERPL-SCNC: 5.2 MMOL/L — SIGNIFICANT CHANGE UP (ref 3.5–5.3)
RBC # BLD: 3.25 M/UL — LOW (ref 3.8–5.2)
RBC # FLD: 14.8 % — HIGH (ref 10.3–14.5)
SODIUM SERPL-SCNC: 133 MMOL/L — LOW (ref 135–145)
WBC # BLD: 7.1 K/UL — SIGNIFICANT CHANGE UP (ref 3.8–10.5)
WBC # FLD AUTO: 7.1 K/UL — SIGNIFICANT CHANGE UP (ref 3.8–10.5)

## 2019-01-11 PROCEDURE — 99233 SBSQ HOSP IP/OBS HIGH 50: CPT | Mod: GC

## 2019-01-11 PROCEDURE — 99222 1ST HOSP IP/OBS MODERATE 55: CPT

## 2019-01-11 RX ADMIN — Medication 0.5 MILLIGRAM(S): at 14:22

## 2019-01-11 RX ADMIN — Medication 125 MILLIGRAM(S): at 12:46

## 2019-01-11 RX ADMIN — CLOPIDOGREL BISULFATE 75 MILLIGRAM(S): 75 TABLET, FILM COATED ORAL at 12:45

## 2019-01-11 RX ADMIN — TRAMADOL HYDROCHLORIDE 25 MILLIGRAM(S): 50 TABLET ORAL at 21:25

## 2019-01-11 RX ADMIN — Medication 650 MILLIGRAM(S): at 10:32

## 2019-01-11 RX ADMIN — TRAMADOL HYDROCHLORIDE 25 MILLIGRAM(S): 50 TABLET ORAL at 11:02

## 2019-01-11 RX ADMIN — Medication 0.25 MILLIGRAM(S): at 01:35

## 2019-01-11 RX ADMIN — DABIGATRAN ETEXILATE MESYLATE 75 MILLIGRAM(S): 150 CAPSULE ORAL at 17:41

## 2019-01-11 RX ADMIN — Medication 250 MILLIGRAM(S): at 06:23

## 2019-01-11 RX ADMIN — Medication 125 MILLIGRAM(S): at 17:41

## 2019-01-11 RX ADMIN — Medication 250 MILLIGRAM(S): at 17:42

## 2019-01-11 RX ADMIN — ATORVASTATIN CALCIUM 40 MILLIGRAM(S): 80 TABLET, FILM COATED ORAL at 21:27

## 2019-01-11 RX ADMIN — Medication 12.5 MILLIGRAM(S): at 06:23

## 2019-01-11 RX ADMIN — Medication 650 MILLIGRAM(S): at 21:57

## 2019-01-11 RX ADMIN — Medication 650 MILLIGRAM(S): at 21:27

## 2019-01-11 RX ADMIN — Medication 12.5 MILLIGRAM(S): at 17:42

## 2019-01-11 RX ADMIN — Medication 0.5 MILLIGRAM(S): at 21:25

## 2019-01-11 RX ADMIN — TRAMADOL HYDROCHLORIDE 25 MILLIGRAM(S): 50 TABLET ORAL at 10:31

## 2019-01-11 RX ADMIN — Medication 0.5 MILLIGRAM(S): at 06:24

## 2019-01-11 RX ADMIN — DABIGATRAN ETEXILATE MESYLATE 75 MILLIGRAM(S): 150 CAPSULE ORAL at 06:23

## 2019-01-11 RX ADMIN — Medication 12.5 MILLIGRAM(S): at 12:45

## 2019-01-11 RX ADMIN — Medication 125 MILLIGRAM(S): at 06:23

## 2019-01-11 NOTE — PROGRESS NOTE ADULT - PROBLEM SELECTOR PLAN 3
-IGOR on CKD 3. prerenal from dehydration from diarrhea, recent lasix use, and ATN associated hypotension, FeUrea 30.2%, Gage<20, c/w prerenal etiology, negative bladder scan  -creatinine improving while off IVF  -small boluses as needed for hypotension per cards  -monitor bmp -IGOR on CKD 3. prerenal from dehydration from diarrhea, recent lasix use, and ATN associated hypotension, FeUrea 30.2%, Gage<20, c/w prerenal etiology, negative bladder scan  -creatinine improving while off IVF. small bump today, will conitnue to monitor off fluids  -small boluses as needed for hypotension per cards  -monitor bmp -Afib with RVR now resolved  -HRs better controlled w/ metoprolol 12.5mg q6h w/ holding romaine  -[id number] 4 c/w pradaxa renally dosed  -appreciate cards eval

## 2019-01-11 NOTE — PROGRESS NOTE ADULT - PROBLEM SELECTOR PLAN 2
-Afib with RVR now resolved  -HRs better controlled w/ metoprolol 12.5mg q6h w/ holding romaine  -[id number] 4 c/w pradaxa renally dosed  -appreciate cards eval -IGOR on CKD 3. prerenal from dehydration from diarrhea, recent lasix use, and ATN associated hypotension, FeUrea 30.2%, Gage<20, c/w prerenal etiology,  -creatinine was improving while off IVF. small bump today, will continue to monitor off fluids and encourage po intake  -small boluses as needed for hypotension per cards  -monitor bmp

## 2019-01-11 NOTE — PROGRESS NOTE ADULT - PROBLEM SELECTOR PLAN 8
ISTOP: [id number]   -c/w ativan 0.5mg TID given severe anxiety ISTOP: [id number]   -c/w ativan 0.5mg TID given severe anxiety  -consider QID if pt is persistently anxious

## 2019-01-11 NOTE — CONSULT NOTE ADULT - ASSESSMENT
78 year old woman with PMH HLD, HTN, CHFpEF, Afib on pradaxa presenting with diarrhea.  Recent Augmentin course  Positive diarrhea, abd pain, no leukocytosis, no fever  Positive C diff  CT with pancolitis; recent AXR no megacolon  Slowly resolving episode--? improved overnight  Overall, persistent C diff, diarrhea, pancolitis  - Vanco 125mg po q 6  - Monitor stool frequency/quality  - Given persistence of symptoms, anticipate will give prolonged non-tapering course (likely 7-10 days beyond discharge)  - Low threshold to repeat AXR if change in abd exam  - Discussed with medicine team  - Over weekend, please call [phone] with questions or change in status.     Benjamin Dolan MD  Pager [phone]  After 5pm and on weekends call [phone]

## 2019-01-11 NOTE — PROGRESS NOTE ADULT - SUBJECTIVE AND OBJECTIVE BOX
***************************************************************  Enoch Freedman PGY1  Internal Medicine   Pager: [phone]  [facility] in house pager: [phone]  ***************************************************************    BEVERLEY DEL RIO  78y  MRN: [id number]    Patient is a 78y old  Female who presents with a chief complaint of Diarrhea (10 Nino 2019 11:01)      Subjective: ON pt was observed trying to take extra 0.5mg lorazepam tablets from a pill bottle in her bag; RN explained to pt the risks of taking outside meds while in hospital; pt was understanding and agreed to stop. Was given extra 0.25mg dose at 1:30am. Pt had 2 BMs overnight, the first watery/yellow, the second more formed.  Pt denies fever, CP, SOB, abn pain, N/V, dysuria. Tolerating diet.     MEDICATIONS  (STANDING):  atorvastatin 40 milliGRAM(s) Oral at bedtime  clopidogrel Tablet 75 milliGRAM(s) Oral daily  dabigatran 75 milliGRAM(s) Oral every 12 hours  LORazepam     Tablet 0.5 milliGRAM(s) Oral three times a day  metoprolol tartrate 12.5 milliGRAM(s) Oral every 6 hours  saccharomyces boulardii 250 milliGRAM(s) Oral two times a day  vancomycin    Solution 125 milliGRAM(s) Oral every 6 hours    MEDICATIONS  (PRN):  acetaminophen   Tablet .. 650 milliGRAM(s) Oral every 6 hours PRN Temp greater or equal to 38C (100.4F), Mild Pain (1 - 3)  traMADol 25 milliGRAM(s) Oral every 6 hours PRN Moderate Pain (4 - 6) or severe pain 7-10      Objective:    Vitals: Vital Signs Last 24 Hrs  T(C): 36.6 (01-11-19 @ 04:55), Max: 36.8 (01-10-19 @ 12:38)  T(F): 97.9 (01-11-19 @ 04:55), Max: 98.3 (01-10-19 @ 16:43)  HR: 74 (01-11-19 @ 04:55) (60 - 97)  BP: 117/79 (01-11-19 @ 04:55) (96/64 - 117/79)  BP(mean): --  RR: 18 (01-11-19 @ 04:55) (18 - 20)  SpO2: 96% (01-11-19 @ 04:55) (94% - 97%)              I&O's Summary    10 Nino 2019 07:01  -  11 Jan 2019 07:00  --------------------------------------------------------  IN: 1080 mL / OUT: 0 mL / NET: 1080 mL        PHYSICAL EXAM:  GENERAL: NAD, chronically ill appearings  HEAD:  Atraumatic, Normocephalic  NECK: Supple, No JVD  NERVOUS SYSTEM: AOX3,   PSYCHIATRIC: Appropriate affect and mood  CHEST/LUNG: Clear to auscultation bilaterally. On 2L NC  HEART: irregular rate and rhythm; nl S1S2  ABDOMEN: Soft, Nontender, mild distention; Bowel sounds present  EXTREMITIES:  2+ Peripheral Pulses, No clubbing, cyanosis 2+ b/l LE pitting edema to knees   LABS:                        9.4    7.1   )-----------( 188      ( 11 Jan 2019 06:30 )             28.4                         9.6    5.9   )-----------( 201      ( 10 Nino 2019 06:48 )             28.2     01-11    133<L>  |  98  |  43<H>  ----------------------------<  104<H>  5.2   |  25  |  1.75<H>  01-10    133<L>  |  97  |  46<H>  ----------------------------<  96  4.7   |  24  |  1.66<H>  01-09    132<L>  |  100  |  49<H>  ----------------------------<  96  4.9   |  24  |  1.76<H>    Ca    8.5      11 Jan 2019 06:37  Ca    8.3<L>      10 Nino 2019 06:45  Ca    8.5      09 Jan 2019 07:07  Phos  3.8     01-11  Mg     2.2     01-11    TPro  5.9<L>  /  Alb  2.6<L>  /  TBili  0.2  /  DBili  x   /  AST  13  /  ALT  11  /  AlkPhos  114  01-10                      CAPILLARY BLOOD GLUCOSE        RADIOLOGY & ADDITIONAL TESTS:  Imaging Personally Reviewed:  [x ] YES  [ ] NO  Consultants involved in case:   Consultant(s) Notes Reviewed:  [ x] YES  [ ] NO:   Care Discussed with Consultants/Other Providers [x ] YES  [ ] NO ***************************************************************  Enoch Freedman PGY1  Internal Medicine   Pager: [phone]  [facility] in house pager: [phone]  ***************************************************************    BEVERLEY DEL RIO  78y  MRN: [id number]    Patient is a 78y old  Female who presents with a chief complaint of Diarrhea (10 Nino 2019 11:01)      Subjective: ON pt was observed trying to take extra 0.5mg lorazepam tablets from a pill bottle in her bag; RN explained to pt the risks of taking outside meds while in hospital; pt was understanding and agreed to stop. Was given extra 0.25mg dose at 1:30am.     Pt had 2 BMs overnight, the first watery/yellow, the second more formed, 1 BM yesterday afternoon.   Pt denies fever, CP, SOB, abn pain, N/V, dysuria. Tolerating diet.     MEDICATIONS  (STANDING):  atorvastatin 40 milliGRAM(s) Oral at bedtime  clopidogrel Tablet 75 milliGRAM(s) Oral daily  dabigatran 75 milliGRAM(s) Oral every 12 hours  LORazepam     Tablet 0.5 milliGRAM(s) Oral three times a day  metoprolol tartrate 12.5 milliGRAM(s) Oral every 6 hours  saccharomyces boulardii 250 milliGRAM(s) Oral two times a day  vancomycin    Solution 125 milliGRAM(s) Oral every 6 hours    MEDICATIONS  (PRN):  acetaminophen   Tablet .. 650 milliGRAM(s) Oral every 6 hours PRN Temp greater or equal to 38C (100.4F), Mild Pain (1 - 3)  traMADol 25 milliGRAM(s) Oral every 6 hours PRN Moderate Pain (4 - 6) or severe pain 7-10      Objective:    Vitals: Vital Signs Last 24 Hrs  T(C): 36.6 (01-11-19 @ 04:55), Max: 36.8 (01-10-19 @ 12:38)  T(F): 97.9 (01-11-19 @ 04:55), Max: 98.3 (01-10-19 @ 16:43)  HR: 74 (01-11-19 @ 04:55) (60 - 97)  BP: 117/79 (01-11-19 @ 04:55) (96/64 - 117/79)  BP(mean): --  RR: 18 (01-11-19 @ 04:55) (18 - 20)  SpO2: 96% (01-11-19 @ 04:55) (94% - 97%)              I&O's Summary    10 Nino 2019 07:01  -  11 Jan 2019 07:00  --------------------------------------------------------  IN: 1080 mL / OUT: 0 mL / NET: 1080 mL        PHYSICAL EXAM:  GENERAL: NAD, chronically ill appearing  HEAD:  Atraumatic, Normocephalic  NECK: Supple, No JVD  NERVOUS SYSTEM: AOX3,   PSYCHIATRIC: Appropriate affect and mood  CHEST/LUNG: Clear to auscultation bilaterally. On 2L NC  HEART: irregular rate and rhythm; nl S1S2  ABDOMEN: Soft, Nontender, mild distention; Bowel sounds present  EXTREMITIES:  2+ Peripheral Pulses, No clubbing, cyanosis 2+ b/l LE pitting edema to knees   LABS:                        9.4    7.1   )-----------( 188      ( 11 Jan 2019 06:30 )             28.4                         9.6    5.9   )-----------( 201      ( 10 Nino 2019 06:48 )             28.2     01-11    133<L>  |  98  |  43<H>  ----------------------------<  104<H>  5.2   |  25  |  1.75<H>  01-10    133<L>  |  97  |  46<H>  ----------------------------<  96  4.7   |  24  |  1.66<H>  01-09    132<L>  |  100  |  49<H>  ----------------------------<  96  4.9   |  24  |  1.76<H>    Ca    8.5      11 Jan 2019 06:37  Ca    8.3<L>      10 Nino 2019 06:45  Ca    8.5      09 Jan 2019 07:07  Phos  3.8     01-11  Mg     2.2     01-11    TPro  5.9<L>  /  Alb  2.6<L>  /  TBili  0.2  /  DBili  x   /  AST  13  /  ALT  11  /  AlkPhos  114  01-10                      CAPILLARY BLOOD GLUCOSE        RADIOLOGY & ADDITIONAL TESTS:  Imaging Personally Reviewed:  [x ] YES  [ ] NO  Consultants involved in case:   Consultant(s) Notes Reviewed:  [ x] YES  [ ] NO:   Care Discussed with Consultants/Other Providers [x ] YES  [ ] NO

## 2019-01-11 NOTE — PROGRESS NOTE ADULT - PROBLEM SELECTOR PLAN 4
-hypovolemic hyponatremia in the setting of diarrhea with decreased PO intake and Lasix use  -improving, monitor bmp  -encourage Po

## 2019-01-11 NOTE — PROGRESS NOTE ADULT - PROBLEM SELECTOR PLAN 1
- in the setting of recent abx use; found to have pancolitis, 2/2 severe c. diff infection (given low albumin, IGOR)   -6-7 loose BM in 24 hours,  will consult ID for further assistance as on Day 6 of po vanc  -Xray ABD - neg for colonic distention  -c/w contact precautions  -c/w probiotics - in the setting of recent abx use; found to have pancolitis, 2/2 severe c. diff infection (given low albumin, IGOR)   -6-7 loose BM in 24 hours,  will consult ID for further assistance as on Day 7 of po vanc  -per id, given persistence of symptoms, anticipate will give prolonged non-tapering course (likely 7-10 days beyond discharge)  -Xray ABD - neg for colonic distention  -c/w contact precautions  -c/w probiotics - in the setting of recent abx use; found to have pancolitis, 2/2 severe c. diff infection (given low albumin, IGOR)   -Had 3 BMs in past 24 hours which is an improvement,  - per ID vanc and likely will require extended course vanc course  -Xray ABD - neg for colonic distention/megacolon  -consider repeat xray if worsening exam/status  -monitor BMs  -c/w contact precautions  -c/w probiotics

## 2019-01-11 NOTE — PROVIDER CONTACT NOTE (MEDICATION) - SITUATION
Pt found to have 2 small white pills taken from  her bag.  Search made with pt approval and participation  and manager witness show 1 bottle labeled lorazepam 0.5mg  containing 35pills.

## 2019-01-11 NOTE — PROGRESS NOTE ADULT - PROBLEM SELECTOR PLAN 9
b/l heel ulcers, apprecaited podiatry and wound care recs for dressing changes and offloading boots  -c/w tylenol and tramadol prn pain b/l heel ulcers, appreciated podiatry and wound care recs for dressing changes and offloading boots  -c/w tylenol and tramadol prn pain

## 2019-01-11 NOTE — CONSULT NOTE ADULT - SUBJECTIVE AND OBJECTIVE BOX
"HPI: 78 year old woman with PMH HLD, HTN, CHFpEF, Afib on pradaxa presenting with diarrhea. Pt was recently admitted for cellulitis and treated with Augmentin upon discharge which patient completed. Her cellulitis is improving but pain continuing, thus being treated with tramadol. Patient developed watery nonbloody diarrhea for the past 5 days, with multiple episodes in on day, constant.  Denies recent travel, or sick contatct.  Denies hematochezia, fevers, chills, N/V, abdominal pain, dysuria, and cough.  No FHx of autoimmune d/o or IBD  In the ED received: pradaxa, Cipro, metro, 500cc bolus of IVF, 1g acetaminophen, tramadol 25mg, KCl 40mEq x2 (05 Jan 2019 05:49)"    Above reviewed. Saw/spoke to patient. Patient with recent course augmentin then developed episode watery diarrhea. Per team has had minimal improvement, having 6-7 episodes per day. Today, patient/RN note that patient has had improvement with 1 solid episode and 1 watery episode overnight. No abd pain. No fevers, no chills. No N/V. No other focal complaints. ID called for further eval.    PAST MEDICAL & SURGICAL HISTORY:  MI, old: mild as per pt  Anxiety  TIA (transient ischemic attack): many years ago  PAF (paroxysmal atrial fibrillation)  HLD (hyperlipidemia)  HTN (hypertension)  History of cataract surgery: b/l  History of repair of hip fracture: luisa placed in RLE  H/O spinal fusion: c2-6 in 1/17    Allergies    No Known Allergies    ANTIMICROBIALS:  vancomycin    Solution 125 every 6 hours    OTHER MEDS:  acetaminophen   Tablet .. 650 milliGRAM(s) Oral every 6 hours PRN  atorvastatin 40 milliGRAM(s) Oral at bedtime  clopidogrel Tablet 75 milliGRAM(s) Oral daily  dabigatran 75 milliGRAM(s) Oral every 12 hours  LORazepam     Tablet 0.5 milliGRAM(s) Oral three times a day  metoprolol tartrate 12.5 milliGRAM(s) Oral every 6 hours  saccharomyces boulardii 250 milliGRAM(s) Oral two times a day  traMADol 25 milliGRAM(s) Oral every 6 hours PRN    SOCIAL HISTORY: No tobacco, no alcohol, no illicit drugs    FAMILY HISTORY:  No pertinent family history in first degree relatives relating to chief complaint    Drug Dosing Weight  Height (cm): 154.94 (04 Jan 2019 16:15)  Weight (kg): 54.4 (04 Jan 2019 16:15)  BMI (kg/m2): 22.7 (04 Jan 2019 16:15)  BSA (m2): 1.52 (04 Jan 2019 16:15)    PE:    Vital Signs Last 24 Hrs  T(C): 36.6 (11 Jan 2019 04:55), Max: 36.8 (10 Nino 2019 12:38)  T(F): 97.9 (11 Jan 2019 04:55), Max: 98.3 (10 Nino 2019 16:43)  HR: 74 (11 Jan 2019 04:55) (60 - 97)  BP: 117/79 (11 Jan 2019 04:55) (96/64 - 117/79)  RR: 18 (11 Jan 2019 04:55) (18 - 20)  SpO2: 96% (11 Jan 2019 04:55) (94% - 97%)    Gen: AOx3, NAD, non-toxic, pleasant, fatigued, lying in bed  CV: S1+S2 normal, nontachycardic  Resp: Clear bilat, no resp distress, no crackles/wheezes  Abd: Soft, nontender, +BS, nondistended  Ext: No LE edema, no wounds  : No Pollard, no suprapubic tenderness  IV/Skin: No thrombophlebitis, no rash  Msk: No low back pain, no arthralgias, no joint swelling  Neuro: No sensory deficits, no motor deficits    LABS:                        9.4    7.1   )-----------( 188      ( 11 Jan 2019 06:30 )             28.4     01-11    133<L>  |  98  |  43<H>  ----------------------------<  104<H>  5.2   |  25  |  1.75<H>    Ca    8.5      11 Jan 2019 06:37  Phos  3.8     01-11  Mg     2.2     01-11    TPro  5.9<L>  /  Alb  2.6<L>  /  TBili  0.2  /  DBili  x   /  AST  13  /  ALT  11  /  AlkPhos  114  01-10    MICROBIOLOGY:    .Stool Feces  01-06-19   GI PCR Results: NOT detected    Clostridium difficile [facility] Toxins A&amp;B, EIA:   Positive (01-06-19 @ 09:07)    (otherwise reviewed)    RADIOLOGY:    1/9 AXR:    FINDINGS:  Nonobstructive bowel gas pattern. No gaseous dilation of the colon.  Trace pleural effusions and bibasilar atelectasis.  Vascular calcifications.  The visualized osseous structures demonstrate no acute pathology.    IMPRESSION:   Nonobstructive bowel gas pattern.  No gaseous dilation of the colon. This radiograph cannot exclude fluid   filled dilated colon.    1/4 CT:    IMPRESSION:   Pan colitis, most severe in the ascending colon.  Possible reactive inflammation of the appendiceal tip.

## 2019-01-11 NOTE — PROVIDER CONTACT NOTE (MEDICATION) - ACTION/TREATMENT ORDERED:
1 time dose lorazepam was ordered  and given for pt anxiety. safety reasons explained to pt . Paper was signed,  pt own meds sent to pharmacy.  Signed Copy given to pt to request meds at discharge

## 2019-01-11 NOTE — PROGRESS NOTE ADULT - PROBLEM SELECTOR PLAN 10
-DVT: pradaxa  -Diet: soft diet  -dispo: continue to have loose BMS, ID consult, eventual MARGIE -DVT: pradaxa  -Diet: soft diet  -dispo: eventual MARGIE once IGOR improves and formed stool

## 2019-01-11 NOTE — PROGRESS NOTE ADULT - PROBLEM SELECTOR PLAN 5
BP stable in 90s, likely due to diarrhea with poor po intake and episodes of afib with RVR  -control Afib with RVR as above  -IVF boluses as needed BP stable in 90-100s, asymptomatic multifactorial due to dehydration and RVR.   -control Afib with RVR as above  -IVF boluses as needed

## 2019-01-12 LAB
ANION GAP SERPL CALC-SCNC: 10 MMOL/L — SIGNIFICANT CHANGE UP (ref 5–17)
ANION GAP SERPL CALC-SCNC: 7 MMOL/L — SIGNIFICANT CHANGE UP (ref 5–17)
BUN SERPL-MCNC: 39 MG/DL — HIGH (ref 7–23)
BUN SERPL-MCNC: 40 MG/DL — HIGH (ref 7–23)
CALCIUM SERPL-MCNC: 8.5 MG/DL — SIGNIFICANT CHANGE UP (ref 8.4–10.5)
CALCIUM SERPL-MCNC: 8.6 MG/DL — SIGNIFICANT CHANGE UP (ref 8.4–10.5)
CHLORIDE SERPL-SCNC: 97 MMOL/L — SIGNIFICANT CHANGE UP (ref 96–108)
CHLORIDE SERPL-SCNC: 99 MMOL/L — SIGNIFICANT CHANGE UP (ref 96–108)
CO2 SERPL-SCNC: 26 MMOL/L — SIGNIFICANT CHANGE UP (ref 22–31)
CO2 SERPL-SCNC: 26 MMOL/L — SIGNIFICANT CHANGE UP (ref 22–31)
CREAT SERPL-MCNC: 1.57 MG/DL — HIGH (ref 0.5–1.3)
CREAT SERPL-MCNC: 1.58 MG/DL — HIGH (ref 0.5–1.3)
GLUCOSE SERPL-MCNC: 113 MG/DL — HIGH (ref 70–99)
GLUCOSE SERPL-MCNC: 99 MG/DL — SIGNIFICANT CHANGE UP (ref 70–99)
HCT VFR BLD CALC: 27.4 % — LOW (ref 34.5–45)
HGB BLD-MCNC: 9.4 G/DL — LOW (ref 11.5–15.5)
MAGNESIUM SERPL-MCNC: 2.1 MG/DL — SIGNIFICANT CHANGE UP (ref 1.6–2.6)
MCHC RBC-ENTMCNC: 30.6 PG — SIGNIFICANT CHANGE UP (ref 27–34)
MCHC RBC-ENTMCNC: 34.4 GM/DL — SIGNIFICANT CHANGE UP (ref 32–36)
MCV RBC AUTO: 88.8 FL — SIGNIFICANT CHANGE UP (ref 80–100)
PHOSPHATE SERPL-MCNC: 4.4 MG/DL — SIGNIFICANT CHANGE UP (ref 2.5–4.5)
PLATELET # BLD AUTO: 190 K/UL — SIGNIFICANT CHANGE UP (ref 150–400)
POTASSIUM SERPL-MCNC: 5.1 MMOL/L — SIGNIFICANT CHANGE UP (ref 3.5–5.3)
POTASSIUM SERPL-MCNC: 5.4 MMOL/L — HIGH (ref 3.5–5.3)
POTASSIUM SERPL-SCNC: 5.1 MMOL/L — SIGNIFICANT CHANGE UP (ref 3.5–5.3)
POTASSIUM SERPL-SCNC: 5.4 MMOL/L — HIGH (ref 3.5–5.3)
RBC # BLD: 3.09 M/UL — LOW (ref 3.8–5.2)
RBC # FLD: 15.3 % — HIGH (ref 10.3–14.5)
SODIUM SERPL-SCNC: 132 MMOL/L — LOW (ref 135–145)
SODIUM SERPL-SCNC: 133 MMOL/L — LOW (ref 135–145)
WBC # BLD: 6.1 K/UL — SIGNIFICANT CHANGE UP (ref 3.8–10.5)
WBC # FLD AUTO: 6.1 K/UL — SIGNIFICANT CHANGE UP (ref 3.8–10.5)

## 2019-01-12 PROCEDURE — 99233 SBSQ HOSP IP/OBS HIGH 50: CPT | Mod: GC

## 2019-01-12 RX ORDER — ALBUTEROL 90 UG/1
2.5 AEROSOL, METERED ORAL ONCE
Qty: 0 | Refills: 0 | Status: COMPLETED | OUTPATIENT
Start: 2019-01-12 | End: 2019-01-12

## 2019-01-12 RX ORDER — FUROSEMIDE 40 MG
40 TABLET ORAL ONCE
Qty: 0 | Refills: 0 | Status: COMPLETED | OUTPATIENT
Start: 2019-01-12 | End: 2019-01-12

## 2019-01-12 RX ADMIN — Medication 0.5 MILLIGRAM(S): at 06:12

## 2019-01-12 RX ADMIN — Medication 12.5 MILLIGRAM(S): at 00:59

## 2019-01-12 RX ADMIN — Medication 650 MILLIGRAM(S): at 20:33

## 2019-01-12 RX ADMIN — Medication 125 MILLIGRAM(S): at 12:33

## 2019-01-12 RX ADMIN — TRAMADOL HYDROCHLORIDE 25 MILLIGRAM(S): 50 TABLET ORAL at 20:33

## 2019-01-12 RX ADMIN — CLOPIDOGREL BISULFATE 75 MILLIGRAM(S): 75 TABLET, FILM COATED ORAL at 12:32

## 2019-01-12 RX ADMIN — ALBUTEROL 2.5 MILLIGRAM(S): 90 AEROSOL, METERED ORAL at 12:56

## 2019-01-12 RX ADMIN — DABIGATRAN ETEXILATE MESYLATE 75 MILLIGRAM(S): 150 CAPSULE ORAL at 18:10

## 2019-01-12 RX ADMIN — Medication 250 MILLIGRAM(S): at 06:11

## 2019-01-12 RX ADMIN — Medication 125 MILLIGRAM(S): at 00:59

## 2019-01-12 RX ADMIN — Medication 0.5 MILLIGRAM(S): at 22:01

## 2019-01-12 RX ADMIN — DABIGATRAN ETEXILATE MESYLATE 75 MILLIGRAM(S): 150 CAPSULE ORAL at 06:11

## 2019-01-12 RX ADMIN — Medication 125 MILLIGRAM(S): at 18:10

## 2019-01-12 RX ADMIN — Medication 650 MILLIGRAM(S): at 21:05

## 2019-01-12 RX ADMIN — TRAMADOL HYDROCHLORIDE 25 MILLIGRAM(S): 50 TABLET ORAL at 12:44

## 2019-01-12 RX ADMIN — Medication 650 MILLIGRAM(S): at 06:12

## 2019-01-12 RX ADMIN — Medication 12.5 MILLIGRAM(S): at 12:32

## 2019-01-12 RX ADMIN — Medication 250 MILLIGRAM(S): at 18:10

## 2019-01-12 RX ADMIN — Medication 40 MILLIGRAM(S): at 12:32

## 2019-01-12 RX ADMIN — Medication 0.5 MILLIGRAM(S): at 14:32

## 2019-01-12 RX ADMIN — TRAMADOL HYDROCHLORIDE 25 MILLIGRAM(S): 50 TABLET ORAL at 06:12

## 2019-01-12 RX ADMIN — Medication 12.5 MILLIGRAM(S): at 06:12

## 2019-01-12 RX ADMIN — ATORVASTATIN CALCIUM 40 MILLIGRAM(S): 80 TABLET, FILM COATED ORAL at 22:01

## 2019-01-12 RX ADMIN — TRAMADOL HYDROCHLORIDE 25 MILLIGRAM(S): 50 TABLET ORAL at 13:31

## 2019-01-12 RX ADMIN — Medication 12.5 MILLIGRAM(S): at 18:10

## 2019-01-12 RX ADMIN — Medication 125 MILLIGRAM(S): at 06:11

## 2019-01-12 NOTE — PROGRESS NOTE ADULT - PROBLEM SELECTOR PLAN 1
- in the setting of recent abx use; found to have pancolitis, 2/2 severe c. diff infection (given low albumin, IGOR)   -Had 1 BMs in past 24 hours which is an improvement,  - per ID vanc and likely will require extended course vanc course  -Xray ABD - neg for colonic distention/megacolon  -consider repeat xray if worsening exam/status  -c/w contact precautions, probiotics

## 2019-01-12 NOTE — PROGRESS NOTE ADULT - PROBLEM SELECTOR PLAN 8
ISTOP: [id number]   -c/w ativan 0.5mg TID given severe anxiety  -consider QID if pt is persistently anxious

## 2019-01-12 NOTE — PROGRESS NOTE ADULT - PROBLEM SELECTOR PLAN 5
BP stable in 90-100s, asymptomatic multifactorial due to dehydration and RVR.   -control Afib with RVR as above  -IVF boluses as needed

## 2019-01-12 NOTE — PROGRESS NOTE ADULT - SUBJECTIVE AND OBJECTIVE BOX
Patient is a 78y old  Female who presents with a chief complaint of Diarrhea (11 Jan 2019 11:55)      SUBJECTIVE / OVERNIGHT EVENTS:    Patient seen and examined at bedside. No acute events overnight.    Review of systems: No chest pain, no shortness of breath, no abdominal pain, no nausea, no vomiting, no diarrhea, no fevers, and no chills overnight.     MEDICATIONS  (STANDING):  atorvastatin 40 milliGRAM(s) Oral at bedtime  clopidogrel Tablet 75 milliGRAM(s) Oral daily  dabigatran 75 milliGRAM(s) Oral every 12 hours  LORazepam     Tablet 0.5 milliGRAM(s) Oral three times a day  metoprolol tartrate 12.5 milliGRAM(s) Oral every 6 hours  saccharomyces boulardii 250 milliGRAM(s) Oral two times a day  vancomycin    Solution 125 milliGRAM(s) Oral every 6 hours    MEDICATIONS  (PRN):  acetaminophen   Tablet .. 650 milliGRAM(s) Oral every 6 hours PRN Temp greater or equal to 38C (100.4F), Mild Pain (1 - 3)  traMADol 25 milliGRAM(s) Oral every 6 hours PRN Moderate Pain (4 - 6) or severe pain 7-10      T(F): 97.5 (01-11 @ 22:01), Max: 98.3 (01-11 @ 17:48)  HR: 93 (01-12 @ 05:21) (80 - 126)  BP: 100/70 (01-12 @ 05:21) (100/70 - 109/74)  RR: 18 (01-12 @ 05:21) (18 - 18)  SpO2: 99% (01-12 @ 05:21) (95% - 99%)  CAPILLARY BLOOD GLUCOSE        I&O's Summary    11 Jan 2019 07:01  -  12 Jan 2019 07:00  --------------------------------------------------------  IN: 720 mL / OUT: 0 mL / NET: 720 mL        PHYSICAL EXAM:  GEN: Age appropriate, resting comfortably in bed, no acute distress, non toxic appearing, speaking in complete sentences.   HEENT: Conjunctiva and sclera normal  PULM: Lungs CTAB, no wheezes, rales, rhonchi  CV: RRR, S1S2, no MRG  Abdominal: Soft, nontender to palpation, non-distended, normoactive bowel sounds  Extremities: No edema or cyanosis  NEURO: AAOx3  Skin: No rashes, lesions      LABS:  Labs personally reviewed.                        9.4    6.1   )-----------( 190      ( 12 Jan 2019 07:10 )             27.4     Hgb Trend: 9.4<--, 9.4<--, 9.6<--, 10.2<--, 10.8<--  01-12    132<L>  |  99  |  39<H>  ----------------------------<  99  5.4<H>   |  26  |  1.57<H>    Ca    8.5      12 Jan 2019 07:09  Phos  4.4     01-12  Mg     2.1     01-12      Creatinine Trend: 1.57<--, 1.75<--, 1.66<--, 1.76<--, 1.99<--, 2.30<--          [facility]  PGY-2 Pager: [phone]  [employer]-[phone]  Night Float: Patient is a 78y old  Female who presents with a chief complaint of Diarrhea (11 Jan 2019 11:55)      SUBJECTIVE / OVERNIGHT EVENTS:    Patient seen and examined at bedside. No acute events overnight.    Review of systems: No chest pain, no shortness of breath, no abdominal pain, no nausea, no vomiting, no diarrhea, no fevers, and no chills overnight.     MEDICATIONS  (STANDING):  atorvastatin 40 milliGRAM(s) Oral at bedtime  clopidogrel Tablet 75 milliGRAM(s) Oral daily  dabigatran 75 milliGRAM(s) Oral every 12 hours  LORazepam     Tablet 0.5 milliGRAM(s) Oral three times a day  metoprolol tartrate 12.5 milliGRAM(s) Oral every 6 hours  saccharomyces boulardii 250 milliGRAM(s) Oral two times a day  vancomycin    Solution 125 milliGRAM(s) Oral every 6 hours    MEDICATIONS  (PRN):  acetaminophen   Tablet .. 650 milliGRAM(s) Oral every 6 hours PRN Temp greater or equal to 38C (100.4F), Mild Pain (1 - 3)  traMADol 25 milliGRAM(s) Oral every 6 hours PRN Moderate Pain (4 - 6) or severe pain 7-10      T(F): 97.5 (01-11 @ 22:01), Max: 98.3 (01-11 @ 17:48)  HR: 93 (01-12 @ 05:21) (80 - 126)  BP: 100/70 (01-12 @ 05:21) (100/70 - 109/74)  RR: 18 (01-12 @ 05:21) (18 - 18)  SpO2: 99% (01-12 @ 05:21) (95% - 99%)  CAPILLARY BLOOD GLUCOSE        I&O's Summary    11 Jan 2019 07:01  -  12 Jan 2019 07:00  --------------------------------------------------------  IN: 720 mL / OUT: 0 mL / NET: 720 mL        PHYSICAL EXAM:  GEN: Age appropriate, resting comfortably in bed, no acute distress, non toxic appearing, speaking in complete sentences.   HEENT: Conjunctiva and sclera normal  PULM: b/l basilar rales, no wheezes, rales, rhonchi  CV: RRR, S1S2, no MRG  Abdominal: Soft, nontender to palpation, non-distended, normoactive bowel sounds  Extremities: No edema or cyanosis  NEURO: AAOx3  Skin: No rashes, lesions      LABS:  Labs personally reviewed.                        9.4    6.1   )-----------( 190      ( 12 Jan 2019 07:10 )             27.4     Hgb Trend: 9.4<--, 9.4<--, 9.6<--, 10.2<--, 10.8<--  01-12    132<L>  |  99  |  39<H>  ----------------------------<  99  5.4<H>   |  26  |  1.57<H>    Ca    8.5      12 Jan 2019 07:09  Phos  4.4     01-12  Mg     2.1     01-12      Creatinine Trend: 1.57<--, 1.75<--, 1.66<--, 1.76<--, 1.99<--, 2.30<--          [facility]  PGY-2 Pager: [phone]  [place]-[phone]  Night Float:

## 2019-01-12 NOTE — PROGRESS NOTE ADULT - PROBLEM SELECTOR PLAN 9
b/l heel ulcers, appreciated podiatry and wound care recs for dressing changes and offloading boots  -c/w tylenol and tramadol prn pain

## 2019-01-12 NOTE — PROGRESS NOTE ADULT - PROBLEM SELECTOR PLAN 2
-IGOR on CKD 3. prerenal from dehydration from diarrhea, recent lasix use, and ATN associated hypotension, FeUrea 30.2%, Gage<20, c/w prerenal etiology,  -will continue to monitor off fluids and encourage po intake  -small boluses as needed for hypotension per cards  -monitor bmp

## 2019-01-12 NOTE — PROGRESS NOTE ADULT - PROBLEM SELECTOR PLAN 3
-Afib with RVR now resolved  -HRs better controlled w/ metoprolol 12.5mg q6h w/ holding romaine  -[id number] 4 c/w pradaxa renally dosed  -appreciate cards eval

## 2019-01-13 LAB
ANION GAP SERPL CALC-SCNC: 8 MMOL/L — SIGNIFICANT CHANGE UP (ref 5–17)
BUN SERPL-MCNC: 41 MG/DL — HIGH (ref 7–23)
CALCIUM SERPL-MCNC: 8.5 MG/DL — SIGNIFICANT CHANGE UP (ref 8.4–10.5)
CHLORIDE SERPL-SCNC: 99 MMOL/L — SIGNIFICANT CHANGE UP (ref 96–108)
CO2 SERPL-SCNC: 25 MMOL/L — SIGNIFICANT CHANGE UP (ref 22–31)
CREAT SERPL-MCNC: 1.57 MG/DL — HIGH (ref 0.5–1.3)
GLUCOSE SERPL-MCNC: 104 MG/DL — HIGH (ref 70–99)
HCT VFR BLD CALC: 26.8 % — LOW (ref 34.5–45)
HGB BLD-MCNC: 9.1 G/DL — LOW (ref 11.5–15.5)
MAGNESIUM SERPL-MCNC: 2.2 MG/DL — SIGNIFICANT CHANGE UP (ref 1.6–2.6)
MCHC RBC-ENTMCNC: 30.1 PG — SIGNIFICANT CHANGE UP (ref 27–34)
MCHC RBC-ENTMCNC: 33.8 GM/DL — SIGNIFICANT CHANGE UP (ref 32–36)
MCV RBC AUTO: 89.2 FL — SIGNIFICANT CHANGE UP (ref 80–100)
PHOSPHATE SERPL-MCNC: 4.7 MG/DL — HIGH (ref 2.5–4.5)
PLATELET # BLD AUTO: 205 K/UL — SIGNIFICANT CHANGE UP (ref 150–400)
POTASSIUM SERPL-MCNC: 5.2 MMOL/L — SIGNIFICANT CHANGE UP (ref 3.5–5.3)
POTASSIUM SERPL-SCNC: 5.2 MMOL/L — SIGNIFICANT CHANGE UP (ref 3.5–5.3)
RBC # BLD: 3.01 M/UL — LOW (ref 3.8–5.2)
RBC # FLD: 15.4 % — HIGH (ref 10.3–14.5)
SODIUM SERPL-SCNC: 132 MMOL/L — LOW (ref 135–145)
WBC # BLD: 7.5 K/UL — SIGNIFICANT CHANGE UP (ref 3.8–10.5)
WBC # FLD AUTO: 7.5 K/UL — SIGNIFICANT CHANGE UP (ref 3.8–10.5)

## 2019-01-13 PROCEDURE — 99233 SBSQ HOSP IP/OBS HIGH 50: CPT | Mod: GC

## 2019-01-13 RX ORDER — FUROSEMIDE 40 MG
60 TABLET ORAL
Qty: 0 | Refills: 0 | Status: DISCONTINUED | OUTPATIENT
Start: 2019-01-13 | End: 2019-01-14

## 2019-01-13 RX ADMIN — TRAMADOL HYDROCHLORIDE 25 MILLIGRAM(S): 50 TABLET ORAL at 22:38

## 2019-01-13 RX ADMIN — Medication 250 MILLIGRAM(S): at 17:36

## 2019-01-13 RX ADMIN — Medication 0.5 MILLIGRAM(S): at 05:37

## 2019-01-13 RX ADMIN — Medication 60 MILLIGRAM(S): at 17:38

## 2019-01-13 RX ADMIN — Medication 0.5 MILLIGRAM(S): at 13:26

## 2019-01-13 RX ADMIN — TRAMADOL HYDROCHLORIDE 25 MILLIGRAM(S): 50 TABLET ORAL at 03:00

## 2019-01-13 RX ADMIN — ATORVASTATIN CALCIUM 40 MILLIGRAM(S): 80 TABLET, FILM COATED ORAL at 21:42

## 2019-01-13 RX ADMIN — Medication 650 MILLIGRAM(S): at 02:32

## 2019-01-13 RX ADMIN — Medication 650 MILLIGRAM(S): at 03:00

## 2019-01-13 RX ADMIN — Medication 12.5 MILLIGRAM(S): at 17:36

## 2019-01-13 RX ADMIN — DABIGATRAN ETEXILATE MESYLATE 75 MILLIGRAM(S): 150 CAPSULE ORAL at 17:36

## 2019-01-13 RX ADMIN — TRAMADOL HYDROCHLORIDE 25 MILLIGRAM(S): 50 TABLET ORAL at 02:32

## 2019-01-13 RX ADMIN — DABIGATRAN ETEXILATE MESYLATE 75 MILLIGRAM(S): 150 CAPSULE ORAL at 05:36

## 2019-01-13 RX ADMIN — Medication 125 MILLIGRAM(S): at 11:23

## 2019-01-13 RX ADMIN — CLOPIDOGREL BISULFATE 75 MILLIGRAM(S): 75 TABLET, FILM COATED ORAL at 11:22

## 2019-01-13 RX ADMIN — Medication 650 MILLIGRAM(S): at 22:38

## 2019-01-13 RX ADMIN — Medication 650 MILLIGRAM(S): at 21:49

## 2019-01-13 RX ADMIN — Medication 125 MILLIGRAM(S): at 05:36

## 2019-01-13 RX ADMIN — Medication 125 MILLIGRAM(S): at 17:36

## 2019-01-13 RX ADMIN — Medication 0.5 MILLIGRAM(S): at 21:42

## 2019-01-13 RX ADMIN — TRAMADOL HYDROCHLORIDE 25 MILLIGRAM(S): 50 TABLET ORAL at 21:48

## 2019-01-13 RX ADMIN — Medication 125 MILLIGRAM(S): at 00:30

## 2019-01-13 RX ADMIN — Medication 250 MILLIGRAM(S): at 05:37

## 2019-01-13 NOTE — PROGRESS NOTE ADULT - PROBLEM SELECTOR PLAN 6
-suspect R sided CHF possibly due to recent RCA MI requiring stent placement in 11/18 and lung dz/COPD  -holding lasix in the setting of diarrhea and hypotension  -c/w IVF boluses for hypotension prn  -monitor I/Os  -recent TTE with EF >50%, severely dilated LA, -suspect R sided CHF possibly due to recent RCA MI requiring stent placement in 11/18 and lung dz/COPD  -restarted home lasix  -c/w IVF boluses for hypotension prn  -monitor I/Os  -recent TTE with EF >50%, severely dilated LA,

## 2019-01-13 NOTE — PROGRESS NOTE ADULT - PROBLEM SELECTOR PLAN 10
-DVT: pradaxa  -Diet: soft diet  -dispo: eventual MARGIE once IGOR improves and formed stool -DVT: pradaxa  -Diet: soft diet  -dispo: home with home PT and HHA  likely tmrw or tuesday

## 2019-01-13 NOTE — PROGRESS NOTE ADULT - PROBLEM SELECTOR PLAN 8
ISTOP: [id number]   -c/w ativan 0.5mg TID given severe anxiety  -consider QID if pt is persistently anxious

## 2019-01-13 NOTE — PROGRESS NOTE ADULT - SUBJECTIVE AND OBJECTIVE BOX
***************************************************************  Enoch Freedman PGY1  Internal Medicine   Pager: [phone]  [facility] in house pager: [phone]  ***************************************************************    BEVERLEY DEL RIO  78y  MRN: [id number]    Patient is a 78y old  Female who presents with a chief complaint of Diarrhea (12 Jan 2019 11:11)    Subjective: no events ON, no BMs on overnight shift (last yesterday afternoon). This AM, pt states she feels comfortable. Denies fever, CP, SOB, abn pain, N/V, dysuria. Tolerating diet.      MEDICATIONS  (STANDING):  atorvastatin 40 milliGRAM(s) Oral at bedtime  clopidogrel Tablet 75 milliGRAM(s) Oral daily  dabigatran 75 milliGRAM(s) Oral every 12 hours  LORazepam     Tablet 0.5 milliGRAM(s) Oral three times a day  metoprolol tartrate 12.5 milliGRAM(s) Oral every 6 hours  saccharomyces boulardii 250 milliGRAM(s) Oral two times a day  vancomycin    Solution 125 milliGRAM(s) Oral every 6 hours    MEDICATIONS  (PRN):  acetaminophen   Tablet .. 650 milliGRAM(s) Oral every 6 hours PRN Temp greater or equal to 38C (100.4F), Mild Pain (1 - 3)  traMADol 25 milliGRAM(s) Oral every 6 hours PRN Moderate Pain (4 - 6) or severe pain 7-10      Objective:    Vitals: Vital Signs Last 24 Hrs  T(C): 36.3 (01-13-19 @ 05:35), Max: 36.6 (01-12-19 @ 12:30)  T(F): 97.4 (01-13-19 @ 05:35), Max: 97.8 (01-12-19 @ 12:30)  HR: 82 (01-13-19 @ 05:35) (82 - 102)  BP: 92/56 (01-13-19 @ 05:35) (92/56 - 111/74)  BP(mean): --  RR: 18 (01-13-19 @ 05:35) (18 - 18)  SpO2: 96% (01-13-19 @ 05:35) (96% - 99%)              I&O's Summary      PHYSICAL EXAM:  GEN: Age appropriate, resting comfortably in bed, no acute distress, non toxic appearing, speaking in complete sentences.   HEENT: Conjunctiva and sclera normal  PULM: b/l basilar rales, no wheezes, rales, rhonchi  CV: RRR, S1S2, no MRG  Abdominal: Soft, nontender to palpation, non-distended, normoactive bowel sounds  Extremities: No edema or cyanosis  NEURO: AAOx3  Skin: No rashes, lesions    LABS:                        9.1    7.5   )-----------( 205      ( 13 Jan 2019 07:04 )             26.8                         9.4    6.1   )-----------( 190      ( 12 Jan 2019 07:10 )             27.4                         9.4    7.1   )-----------( 188      ( 11 Jan 2019 06:30 )             28.4     01-13    132<L>  |  99  |  41<H>  ----------------------------<  104<H>  5.2   |  25  |  1.57<H>  01-12    133<L>  |  97  |  40<H>  ----------------------------<  113<H>  5.1   |  26  |  1.58<H>  01-12    132<L>  |  99  |  39<H>  ----------------------------<  99  5.4<H>   |  26  |  1.57<H>    Ca    8.5      13 Jan 2019 07:04  Ca    8.6      12 Jan 2019 15:56  Ca    8.5      12 Jan 2019 07:09  Phos  4.7     01-13  Mg     2.2     01-13                        CAPILLARY BLOOD GLUCOSE        RADIOLOGY & ADDITIONAL TESTS:  Imaging Personally Reviewed:  [x ] YES  [ ] NO  Consultants involved in case:   Consultant(s) Notes Reviewed:  [ x] YES  [ ] NO:   Care Discussed with Consultants/Other Providers [x ] YES  [ ] NO

## 2019-01-13 NOTE — PROGRESS NOTE ADULT - PROBLEM SELECTOR PLAN 3
-Afib with RVR now resolved  -HRs better controlled w/ metoprolol 12.5mg q6h w/ holding romaine  -[id number] 4 c/w pradaxa renally dosed  -appreciate cards eval

## 2019-01-13 NOTE — PROGRESS NOTE ADULT - PROBLEM SELECTOR PLAN 2
-IGOR on CKD 3. prerenal from dehydration from diarrhea, recent lasix use, and ATN associated hypotension, FeUrea 30.2%, Gage<20, c/w prerenal etiology  -will continue to monitor off fluids and encourage po intake  -currently at baseline  -small boluses as needed for hypotension per cards  -monitor bmp

## 2019-01-13 NOTE — PROGRESS NOTE ADULT - PROBLEM SELECTOR PLAN 1
- in the setting of recent abx use; found to have pancolitis, 2/2 severe c. diff infection (given low albumin, IGOR)   -no diarrhea overnight, improvement. will continue to monitor  - per ID vanc and likely will require extended course vanc course  -Xray ABD - neg for colonic distention/megacolon  -consider repeat xray if worsening exam/status  -c/w contact precautions, probiotics - in the setting of recent abx use; found to have pancolitis, 2/2 severe c. diff infection (given low albumin, IGOR)   -no diarrhea overnight, improvement. will continue to monitor  - per ID vanc and likely will require extended course vanc course (day 9/14 today)  -Xray ABD - neg for colonic distention/megacolon  -consider repeat xray if worsening exam/status  -c/w contact precautions, probiotics

## 2019-01-14 VITALS
RESPIRATION RATE: 18 BRPM | SYSTOLIC BLOOD PRESSURE: 124 MMHG | TEMPERATURE: 98 F | OXYGEN SATURATION: 96 % | DIASTOLIC BLOOD PRESSURE: 79 MMHG | HEART RATE: 82 BPM

## 2019-01-14 LAB
ANION GAP SERPL CALC-SCNC: 9 MMOL/L — SIGNIFICANT CHANGE UP (ref 5–17)
BUN SERPL-MCNC: 41 MG/DL — HIGH (ref 7–23)
CALCIUM SERPL-MCNC: 8.9 MG/DL — SIGNIFICANT CHANGE UP (ref 8.4–10.5)
CHLORIDE SERPL-SCNC: 98 MMOL/L — SIGNIFICANT CHANGE UP (ref 96–108)
CO2 SERPL-SCNC: 25 MMOL/L — SIGNIFICANT CHANGE UP (ref 22–31)
CREAT SERPL-MCNC: 1.53 MG/DL — HIGH (ref 0.5–1.3)
GLUCOSE SERPL-MCNC: 91 MG/DL — SIGNIFICANT CHANGE UP (ref 70–99)
MAGNESIUM SERPL-MCNC: 2 MG/DL — SIGNIFICANT CHANGE UP (ref 1.6–2.6)
PHOSPHATE SERPL-MCNC: 5.1 MG/DL — HIGH (ref 2.5–4.5)
POTASSIUM SERPL-MCNC: 5 MMOL/L — SIGNIFICANT CHANGE UP (ref 3.5–5.3)
POTASSIUM SERPL-SCNC: 5 MMOL/L — SIGNIFICANT CHANGE UP (ref 3.5–5.3)
SODIUM SERPL-SCNC: 132 MMOL/L — LOW (ref 135–145)

## 2019-01-14 PROCEDURE — 87449 NOS EACH ORGANISM AG IA: CPT

## 2019-01-14 PROCEDURE — 94640 AIRWAY INHALATION TREATMENT: CPT

## 2019-01-14 PROCEDURE — 80053 COMPREHEN METABOLIC PANEL: CPT

## 2019-01-14 PROCEDURE — 83690 ASSAY OF LIPASE: CPT

## 2019-01-14 PROCEDURE — 83930 ASSAY OF BLOOD OSMOLALITY: CPT

## 2019-01-14 PROCEDURE — 84132 ASSAY OF SERUM POTASSIUM: CPT

## 2019-01-14 PROCEDURE — 97110 THERAPEUTIC EXERCISES: CPT

## 2019-01-14 PROCEDURE — 82565 ASSAY OF CREATININE: CPT

## 2019-01-14 PROCEDURE — 85014 HEMATOCRIT: CPT

## 2019-01-14 PROCEDURE — 82803 BLOOD GASES ANY COMBINATION: CPT

## 2019-01-14 PROCEDURE — 99285 EMERGENCY DEPT VISIT HI MDM: CPT

## 2019-01-14 PROCEDURE — 85730 THROMBOPLASTIN TIME PARTIAL: CPT

## 2019-01-14 PROCEDURE — 74018 RADEX ABDOMEN 1 VIEW: CPT

## 2019-01-14 PROCEDURE — 83735 ASSAY OF MAGNESIUM: CPT

## 2019-01-14 PROCEDURE — 97163 PT EVAL HIGH COMPLEX 45 MIN: CPT

## 2019-01-14 PROCEDURE — 87324 CLOSTRIDIUM AG IA: CPT

## 2019-01-14 PROCEDURE — 84295 ASSAY OF SERUM SODIUM: CPT

## 2019-01-14 PROCEDURE — 84300 ASSAY OF URINE SODIUM: CPT

## 2019-01-14 PROCEDURE — 97602 WOUND(S) CARE NON-SELECTIVE: CPT

## 2019-01-14 PROCEDURE — 87507 IADNA-DNA/RNA PROBE TQ 12-25: CPT

## 2019-01-14 PROCEDURE — 74177 CT ABD & PELVIS W/CONTRAST: CPT

## 2019-01-14 PROCEDURE — 93005 ELECTROCARDIOGRAM TRACING: CPT

## 2019-01-14 PROCEDURE — 85610 PROTHROMBIN TIME: CPT

## 2019-01-14 PROCEDURE — 71045 X-RAY EXAM CHEST 1 VIEW: CPT

## 2019-01-14 PROCEDURE — 97530 THERAPEUTIC ACTIVITIES: CPT

## 2019-01-14 PROCEDURE — 99232 SBSQ HOSP IP/OBS MODERATE 35: CPT

## 2019-01-14 PROCEDURE — 99239 HOSP IP/OBS DSCHRG MGMT >30: CPT

## 2019-01-14 PROCEDURE — 82435 ASSAY OF BLOOD CHLORIDE: CPT

## 2019-01-14 PROCEDURE — 82330 ASSAY OF CALCIUM: CPT

## 2019-01-14 PROCEDURE — 85027 COMPLETE CBC AUTOMATED: CPT

## 2019-01-14 PROCEDURE — 84100 ASSAY OF PHOSPHORUS: CPT

## 2019-01-14 PROCEDURE — 82570 ASSAY OF URINE CREATININE: CPT

## 2019-01-14 PROCEDURE — 82947 ASSAY GLUCOSE BLOOD QUANT: CPT

## 2019-01-14 PROCEDURE — 84540 ASSAY OF URINE/UREA-N: CPT

## 2019-01-14 PROCEDURE — 80048 BASIC METABOLIC PNL TOTAL CA: CPT

## 2019-01-14 PROCEDURE — 83605 ASSAY OF LACTIC ACID: CPT

## 2019-01-14 RX ORDER — TRAMADOL HYDROCHLORIDE 50 MG/1
0.5 TABLET ORAL
Qty: 7.5 | Refills: 0 | OUTPATIENT
Start: 2019-01-14 | End: 2019-01-18

## 2019-01-14 RX ORDER — METOPROLOL TARTRATE 50 MG
0.5 TABLET ORAL
Qty: 60 | Refills: 1 | OUTPATIENT
Start: 2019-01-14 | End: 2019-03-14

## 2019-01-14 RX ORDER — METOPROLOL TARTRATE 50 MG
1 TABLET ORAL
Qty: 120 | Refills: 1 | OUTPATIENT
Start: 2019-01-14 | End: 2019-03-14

## 2019-01-14 RX ADMIN — Medication 0.5 MILLIGRAM(S): at 13:13

## 2019-01-14 RX ADMIN — DABIGATRAN ETEXILATE MESYLATE 75 MILLIGRAM(S): 150 CAPSULE ORAL at 05:09

## 2019-01-14 RX ADMIN — Medication 60 MILLIGRAM(S): at 05:09

## 2019-01-14 RX ADMIN — Medication 125 MILLIGRAM(S): at 00:06

## 2019-01-14 RX ADMIN — Medication 250 MILLIGRAM(S): at 17:13

## 2019-01-14 RX ADMIN — Medication 125 MILLIGRAM(S): at 05:09

## 2019-01-14 RX ADMIN — Medication 0.5 MILLIGRAM(S): at 05:09

## 2019-01-14 RX ADMIN — DABIGATRAN ETEXILATE MESYLATE 75 MILLIGRAM(S): 150 CAPSULE ORAL at 17:13

## 2019-01-14 RX ADMIN — Medication 12.5 MILLIGRAM(S): at 13:13

## 2019-01-14 RX ADMIN — TRAMADOL HYDROCHLORIDE 25 MILLIGRAM(S): 50 TABLET ORAL at 06:00

## 2019-01-14 RX ADMIN — Medication 12.5 MILLIGRAM(S): at 05:09

## 2019-01-14 RX ADMIN — Medication 650 MILLIGRAM(S): at 06:29

## 2019-01-14 RX ADMIN — Medication 650 MILLIGRAM(S): at 06:00

## 2019-01-14 RX ADMIN — TRAMADOL HYDROCHLORIDE 25 MILLIGRAM(S): 50 TABLET ORAL at 06:30

## 2019-01-14 RX ADMIN — Medication 12.5 MILLIGRAM(S): at 17:13

## 2019-01-14 RX ADMIN — Medication 60 MILLIGRAM(S): at 17:13

## 2019-01-14 RX ADMIN — Medication 125 MILLIGRAM(S): at 17:13

## 2019-01-14 RX ADMIN — Medication 250 MILLIGRAM(S): at 05:09

## 2019-01-14 RX ADMIN — CLOPIDOGREL BISULFATE 75 MILLIGRAM(S): 75 TABLET, FILM COATED ORAL at 13:13

## 2019-01-14 RX ADMIN — Medication 125 MILLIGRAM(S): at 13:13

## 2019-01-14 RX ADMIN — Medication 12.5 MILLIGRAM(S): at 00:06

## 2019-01-14 NOTE — PROGRESS NOTE ADULT - PROVIDER SPECIALTY LIST ADULT
Cardiology
Hospitalist
Internal Medicine
Infectious Disease
Internal Medicine

## 2019-01-14 NOTE — PROGRESS NOTE ADULT - SUBJECTIVE AND OBJECTIVE BOX
***************************************************************  Enoch Freedman PGY1  Internal Medicine   Pager: [phone]  [facility] in house pager: [phone]  ***************************************************************    BEVERLEY DEL RIO  78y  MRN: [id number]    Patient is a 78y old  Female who presents with a chief complaint of Diarrhea (13 Jan 2019 10:25)    Subjective: 1 soft BM overnight. This AM, pt denies fever, CP, SOB, abn pain, N/V, dysuria. Tolerating diet.  Looking forward to going home soon.    MEDICATIONS  (STANDING):  atorvastatin 40 milliGRAM(s) Oral at bedtime  clopidogrel Tablet 75 milliGRAM(s) Oral daily  dabigatran 75 milliGRAM(s) Oral every 12 hours  furosemide    Tablet 60 milliGRAM(s) Oral two times a day  LORazepam     Tablet 0.5 milliGRAM(s) Oral three times a day  metoprolol tartrate 12.5 milliGRAM(s) Oral every 6 hours  saccharomyces boulardii 250 milliGRAM(s) Oral two times a day  vancomycin    Solution 125 milliGRAM(s) Oral every 6 hours    MEDICATIONS  (PRN):  acetaminophen   Tablet .. 650 milliGRAM(s) Oral every 6 hours PRN Temp greater or equal to 38C (100.4F), Mild Pain (1 - 3)  traMADol 25 milliGRAM(s) Oral every 6 hours PRN Moderate Pain (4 - 6) or severe pain 7-10      Objective:    Vitals: Vital Signs Last 24 Hrs  T(C): 36.3 (01-14-19 @ 04:29), Max: 36.4 (01-13-19 @ 11:19)  T(F): 97.4 (01-14-19 @ 04:29), Max: 97.6 (01-13-19 @ 11:19)  HR: 87 (01-14-19 @ 04:29) (87 - 101)  BP: 98/66 (01-14-19 @ 04:29) (92/67 - 110/72)  BP(mean): --  RR: 18 (01-14-19 @ 04:29) (18 - 18)  SpO2: 96% (01-14-19 @ 04:29) (92% - 97%)              I&O's Summary      PHYSICAL EXAM:  GENERAL: NAD, chronically ill appearing  HEAD:  Atraumatic, Normocephalic  NECK: Supple, No JVD  NERVOUS SYSTEM: AOX3  PSYCHIATRIC: Appropriate affect and mood  CHEST/LUNG: Clear to auscultation bilaterally. On 2L NC  HEART: irregular rate and rhythm; nl S1S2  ABDOMEN: Soft, Nontender, mild distention; Bowel sounds present, no rebound tenderness  EXTREMITIES:  2+ Peripheral Pulses, No clubbing, cyanosis 2+ b/l LE pitting edema to knees     LABS:                        9.1    7.5   )-----------( 205      ( 13 Jan 2019 07:04 )             26.8                         9.4    6.1   )-----------( 190      ( 12 Jan 2019 07:10 )             27.4     01-14    132<L>  |  98  |  41<H>  ----------------------------<  91  5.0   |  25  |  1.53<H>  01-13    132<L>  |  99  |  41<H>  ----------------------------<  104<H>  5.2   |  25  |  1.57<H>  01-12    133<L>  |  97  |  40<H>  ----------------------------<  113<H>  5.1   |  26  |  1.58<H>    Ca    8.9      14 Jan 2019 07:15  Ca    8.5      13 Jan 2019 07:04  Ca    8.6      12 Jan 2019 15:56  Phos  5.1     01-14  Mg     2.0     01-14                        CAPILLARY BLOOD GLUCOSE        RADIOLOGY & ADDITIONAL TESTS:  Imaging Personally Reviewed:  [x ] YES  [ ] NO  Consultants involved in case:   Consultant(s) Notes Reviewed:  [ x] YES  [ ] NO:   Care Discussed with Consultants/Other Providers [x ] YES  [ ] NO

## 2019-01-14 NOTE — PROGRESS NOTE ADULT - PROBLEM SELECTOR PLAN 6
-suspect R sided CHF possibly due to recent RCA MI requiring stent placement in 11/18 and lung dz/COPD  -restarted home lasix  -c/w IVF boluses for hypotension prn  -monitor I/Os  -recent TTE with EF >50%, severely dilated LA,

## 2019-01-14 NOTE — CHART NOTE - NSCHARTNOTEFT_GEN_A_CORE
Ok to d/c contact precautions given pt is now having formed BM's and has been on PO vanc x 10d.  Enoch Freedman, cma  PGY1

## 2019-01-14 NOTE — PROGRESS NOTE ADULT - ASSESSMENT
78 year old woman with PMH HLD, HTN, CHFpEF, Afib on pradaxa presenting with diarrhea.  Recent Augmentin course  Positive diarrhea, abd pain, no leukocytosis, no fever  Positive C diff  CT with pancolitis; recent AXR no megacolon  Slowly resolving episode c diff, but presently no further diarrhea, no signs sepsis  Treat for 2 week course considering slow resolution  Overall, persistent C diff, diarrhea, pancolitis  - Vanco 125mg po q 6 through 1/18/19  - Monitor stool frequency/quality  - Will sign off. Please call with further questions or change in status.    Benjamin Dolan MD  Pager [phone]  After 5pm and on weekends call [phone]

## 2019-01-14 NOTE — PROGRESS NOTE ADULT - SUBJECTIVE AND OBJECTIVE BOX
CC: F/U C diff    Saw/spoke to patient. No fevers, no chills. Overall well. No further diarrhea. Doing well.    Allergies  No Known Allergies    ANTIMICROBIALS:  vancomycin    Solution 125 every 6 hours    PE:    Vital Signs Last 24 Hrs  T(C): 36.3 (14 Jan 2019 14:52), Max: 36.4 (13 Jan 2019 21:50)  T(F): 97.4 (14 Jan 2019 14:52), Max: 97.5 (13 Jan 2019 21:50)  HR: 89 (14 Jan 2019 14:52) (87 - 101)  BP: 109/74 (14 Jan 2019 14:52) (96/65 - 109/74)  RR: 18 (14 Jan 2019 14:52) (18 - 18)  SpO2: 100% (14 Jan 2019 14:52) (92% - 100%)    Gen: AOx3, NAD, non-toxic, pleasant  CV: S1+S2 normal, nontachycardic  Resp: Clear bilat, no resp distress, no crackles/wheezes  Abd: Soft, nontender, +BS  Ext: No LE edema, no wounds    LABS:                        9.1    7.5   )-----------( 205      ( 13 Jan 2019 07:04 )             26.8     01-14    132<L>  |  98  |  41<H>  ----------------------------<  91  5.0   |  25  |  1.53<H>    Ca    8.9      14 Jan 2019 07:15  Phos  5.1     01-14  Mg     2.0     01-14    MICROBIOLOGY:    .Stool Feces  01-06-19   GI PCR Results: NOT detected    RADIOLOGY:    1/9 AXR:    FINDINGS:  Nonobstructive bowel gas pattern. No gaseous dilation of the colon.  Trace pleural effusions and bibasilar atelectasis.  Vascular calcifications.  The visualized osseous structures demonstrate no acute pathology.    IMPRESSION:   Nonobstructive bowel gas pattern.  No gaseous dilation of the colon. This radiograph cannot exclude fluid   filled dilated colon.

## 2019-01-14 NOTE — PROGRESS NOTE ADULT - ATTENDING COMMENTS
Visit Information Date & Time Provider Department Dept. Phone Encounter #  
 9/11/2017  9:00 AM Ruddy Stovall, [address] [id number] Follow-up Instructions Return in about 2 months (around 11/11/2017) for routine f/u. Your Appointments 11/3/2017  3:00 PM  
ROUTINE CARE with Ruddy Stovall MD  
[address]) Appt Note: 3 month follow up and pap; 3 month follow up and pap [place] [address] [facility] [id number]  
[phone]  
  
   
 [place] [address] Upcoming Health Maintenance Date Due  
 FOOT EXAM Q1 3/4/1968 Pneumococcal 19-64 Medium Risk (1 of 1 - PPSV23) 3/4/1977 DTaP/Tdap/Td series (1 - Tdap) 3/4/1979 PAP AKA CERVICAL CYTOLOGY 3/4/1979 EYE EXAM RETINAL OR DILATED Q1 3/31/2016 MICROALBUMIN Q1 1/8/2017 BREAST CANCER SCRN MAMMOGRAM 10/13/2017 HEMOGLOBIN A1C Q6M 11/12/2017 LIPID PANEL Q1 5/12/2018 COLONOSCOPY 6/14/2020 Allergies as of 9/11/2017  Review Complete On: 9/11/2017 By: Ruddy Stovall MD  
  
 Severity Noted Reaction Type Reactions Influenza Virus Vaccine, Specific High 05/08/2013   Side Effect Other (comments) States body got very hot Tramadol  05/20/2014    Seizures Aspirin Low 06/15/2010   Side Effect Other (comments) Pt reports having a h/o gastric ulcers. Pt was on IBU when she was dx'd. Current Immunizations  Reviewed on 5/9/2013 Name Date Influenza Vaccine Split  Deferred (Patient Refused) Not reviewed this visit You Were Diagnosed With   
  
 Codes Comments Chronic midline low back pain with bilateral sciatica    -  Primary ICD-10-CM: M54.41, M54.42, G89.29 ICD-9-CM: 724.2, 724.3, 338.29 Osteoarthritis of left knee, unspecified osteoarthritis type     ICD-10-CM: M17.12 
ICD-9-CM: 715.96 Chronic prescription opiate use     ICD-10-CM: A52.233
ICD-9-CM: V58.69 Vitals BP Pulse Temp Resp Height(growth percentile) Weight(growth percentile) 148/74 (BP 1 Location: Left arm, BP Patient Position: Sitting) 63 97.2 °F (36.2 °C) (Oral) 20 5' 7\" (1.702 m) 263 lb (119.3 kg) SpO2 BMI OB Status Smoking Status 98% 41.19 kg/m2 Hysterectomy Former Smoker Vitals History BMI and BSA Data Body Mass Index Body Surface Area  
 41.19 kg/m 2 2.37 m 2 Preferred Pharmacy Pharmacy Name Phone [address] [phone] Your Updated Medication List  
  
   
This list is accurate as of: 9/11/17 10:07 AM.  Always use your most recent med list. amLODIPine 10 mg tablet Commonly known as:  [place] TAKE 1 TABLET BY MOUTH DAILY  
  
 atorvastatin 20 mg tablet Commonly known as:  LIPITOR  
TAKE 1 TABLET BY MOUTH DAILY FOR CHOLESTEROL  
  
 diclofenac 1 % Gel Commonly known as:  VOLTAREN Apply  to affected area four (4) times daily. * escitalopram oxalate 10 mg tablet Commonly known as:  Arjun Cornell TAKE 1 TABLET BY MOUTH EVERY DAY * LEXAPRO 10 mg tablet Generic drug:  escitalopram oxalate TAKE 1 TABLET BY MOUTH EVERY DAY  
  
 FLONASE 50 mcg/actuation nasal spray Generic drug:  fluticasone 2 Sprays by Both Nostrils route daily. * glimepiride 4 mg tablet Commonly known as:  AMARYL  
TAKE 1 TABLET BY MOUTH TWICE DAILY * glimepiride 4 mg tablet Commonly known as:  AMARYL  
TAKE 1 TABLET BY MOUTH TWICE DAILY * glucose blood VI test strips strip Commonly known as:  ACCU-CHEK MEENU PLUS TEST STRP Check sugars 3 times a day Dx E11.65  
  
 * ACCU-CHEK MEENU PLUS TEST STRP strip Generic drug:  glucose blood VI test strips CHECK SUGAR THREE TIMES DAILY HYDROcodone-acetaminophen 7.5-325 mg per tablet Commonly known as:  Ankit Strickland  

Take 1 Tab by mouth every six (6) hours as needed for Pain. Max Daily Amount: 4 Tabs. 1 tab every 6 hours as needed for pain. Max of 4 pills per day  Indications: Pain Start taking on:  9/25/2017  
  
 ibuprofen 800 mg tablet Commonly known as:  MOTRIN  
TAKE 1 TABLET BY MOUTH EVERY 6 HOURS AS NEEDED FOR PAIN WITH FOOD  
  
 metFORMIN 1,000 mg tablet Commonly known as:  GLUCOPHAGE  
TAKE 1 TABLET BY MOUTH TWICE DAILY WITH MEALS  
  
 metoprolol succinate 100 mg tablet Commonly known as:  TOPROL-XL  
TAKE 1 TABLET BY MOUTH DAILY FOR HIGH BLOOD PRESSURE  
  
 valsartan-hydroCHLOROthiazide 160-12.5 mg per tablet Commonly known as:  DIOVAN-HCT Take 1 Tab by mouth daily. zolpidem 5 mg tablet Commonly known as:  AMBIEN Take 1 Tab by mouth nightly as needed for Sleep. Max Daily Amount: 5 mg.  
  
 * Notice: This list has 6 medication(s) that are the same as other medications prescribed for you. Read the directions carefully, and ask your doctor or other care provider to review them with you. Prescriptions Printed Refills HYDROcodone-acetaminophen (NORCO) 7.5-325 mg per tablet 0 Starting on: 9/25/2017 Sig: Take 1 Tab by mouth every six (6) hours as needed for Pain. Max Daily Amount: 4 Tabs. 1 tab every 6 hours as needed for pain. Max of 4 pills per day  Indications: Pain Class: Print Route: Oral  
  
Prescriptions Sent to Pharmacy Refills  
 diclofenac (VOLTAREN) 1 % gel 3 Sig: Apply  to affected area four (4) times daily. Class: Normal  
 Pharmacy: [facility] [address] Ph #: [phone] Route: Topical  
  
Follow-up Instructions Return in about 2 months (around 11/11/2017) for routine f/u. To-Do List   
 09/11/2017 Imaging:  XR SPINE LUMB MIN 4 V Introducing [facility]! Dear Frida Monsalve:
Thank you for requesting a [facility] account. Our records indicate that you already have an active [facility] account. You can access your account anytime at [email or web address]. [email or web address] Did you know that you can access your hospital and ER discharge instructions at any time in [facility]? You can also review all of your test results from your hospital stay or ER visit. Additional Information If you have questions, please visit the Frequently Asked Questions section of the [facility] website at [email or web address] Remember, [facility] is NOT to be used for urgent needs. For medical emergencies, dial 911. Now available from your iPhone and Android! Please provide this summary of care documentation to your next provider. Your primary care clinician is listed as Gaudencio Ribeiro. If you have any questions after today's visit, please call [phone].
Patient interviewed and examined. I was physically present for the essential portions of the E/M service provided.  Chart reviewed and note edited where appropriate.  Case discussed with fellow.  Agree w/ Assessment and Plan as outlined.    Colin Isaac MD [facility]  Spectra:  [id number]  Office: [phone] MD
Diarrhea has resolved  Stable for discharge today  Discharge time spent: 36 min
in addition patient is hyperkalemic to 5.4 --> will give a dose of lasix and repeat a BMP later today.  diarrhea is improving.

## 2019-01-14 NOTE — PROGRESS NOTE ADULT - PROBLEM SELECTOR PLAN 8
ISTOP: [id number]   -c/w ativan 0.5mg TID given severe anxiety  -consider QID if pt is persistently anxious

## 2019-01-14 NOTE — PROGRESS NOTE ADULT - PROBLEM SELECTOR PLAN 3
-Afib with RVR now resolved  -HRs better controlled w/ metoprolol 12.5mg q6h w/ holding romaine  -[id number] 4 c/w pradaxa renally dosed  -appreciate cards eval

## 2019-01-14 NOTE — PROGRESS NOTE ADULT - PROBLEM SELECTOR PLAN 1
- in the setting of recent abx use; found to have pancolitis, 2/2 severe c. diff infection (given low albumin, IGOR)   -no diarrhea overnight, improvement. will continue to monitor  - per ID vanc and likely will require extended course vanc course (day 10/14 today)  -Xray ABD - neg for colonic distention/megacolon  -consider repeat xray if worsening exam/status  -c/w contact precautions, probiotics

## 2019-01-15 RX ORDER — VANCOMYCIN HCL 1 G
1 VIAL (EA) INTRAVENOUS
Qty: 0 | Refills: 0 | COMMUNITY
Start: 2019-01-15 | End: 2019-01-18

## 2019-01-15 RX ORDER — VANCOMYCIN HCL 1 G
1 VIAL (EA) INTRAVENOUS
Qty: 16 | Refills: 0 | OUTPATIENT
Start: 2019-01-15 | End: 2019-01-18

## 2019-01-17 ENCOUNTER — INPATIENT (INPATIENT)
Facility: HOSPITAL | Age: 79
LOS: 28 days | Discharge: NURSING, REHAB OR LONG-TERM CARE | End: 2019-02-15
Attending: INTERNAL MEDICINE | Admitting: INTERNAL MEDICINE
Payer: MEDICARE

## 2019-01-17 VITALS
RESPIRATION RATE: 20 BRPM | HEART RATE: 86 BPM | OXYGEN SATURATION: 92 % | SYSTOLIC BLOOD PRESSURE: 85 MMHG | DIASTOLIC BLOOD PRESSURE: 47 MMHG

## 2019-01-17 DIAGNOSIS — Z98.1 ARTHRODESIS STATUS: Chronic | ICD-10-CM

## 2019-01-17 DIAGNOSIS — K92.2 GASTROINTESTINAL HEMORRHAGE, UNSPECIFIED: ICD-10-CM

## 2019-01-17 DIAGNOSIS — I10 ESSENTIAL (PRIMARY) HYPERTENSION: ICD-10-CM

## 2019-01-17 DIAGNOSIS — I48.0 PAROXYSMAL ATRIAL FIBRILLATION: ICD-10-CM

## 2019-01-17 DIAGNOSIS — Z98.890 OTHER SPECIFIED POSTPROCEDURAL STATES: Chronic | ICD-10-CM

## 2019-01-17 DIAGNOSIS — I50.43 ACUTE ON CHRONIC COMBINED SYSTOLIC (CONGESTIVE) AND DIASTOLIC (CONGESTIVE) HEART FAILURE: ICD-10-CM

## 2019-01-17 DIAGNOSIS — I12.9 HYPERTENSIVE CHRONIC KIDNEY DISEASE WITH STAGE 1 THROUGH STAGE 4 CHRONIC KIDNEY DISEASE, OR UNSPECIFIED CHRONIC KIDNEY DISEASE: ICD-10-CM

## 2019-01-17 DIAGNOSIS — D64.9 ANEMIA, UNSPECIFIED: ICD-10-CM

## 2019-01-17 DIAGNOSIS — R60.0 LOCALIZED EDEMA: ICD-10-CM

## 2019-01-17 DIAGNOSIS — D50.0 IRON DEFICIENCY ANEMIA SECONDARY TO BLOOD LOSS (CHRONIC): ICD-10-CM

## 2019-01-17 DIAGNOSIS — N18.3 CHRONIC KIDNEY DISEASE, STAGE 3 (MODERATE): ICD-10-CM

## 2019-01-17 DIAGNOSIS — Z90.710 ACQUIRED ABSENCE OF BOTH CERVIX AND UTERUS: Chronic | ICD-10-CM

## 2019-01-17 DIAGNOSIS — L97.412 NON-PRESSURE CHRONIC ULCER OF RIGHT HEEL AND MIDFOOT WITH FAT LAYER EXPOSED: ICD-10-CM

## 2019-01-17 DIAGNOSIS — Z29.9 ENCOUNTER FOR PROPHYLACTIC MEASURES, UNSPECIFIED: ICD-10-CM

## 2019-01-17 DIAGNOSIS — Z98.49 CATARACT EXTRACTION STATUS, UNSPECIFIED EYE: Chronic | ICD-10-CM

## 2019-01-17 DIAGNOSIS — I50.23 ACUTE ON CHRONIC SYSTOLIC (CONGESTIVE) HEART FAILURE: ICD-10-CM

## 2019-01-17 DIAGNOSIS — N18.4 CHRONIC KIDNEY DISEASE, STAGE 4 (SEVERE): ICD-10-CM

## 2019-01-17 DIAGNOSIS — E78.5 HYPERLIPIDEMIA, UNSPECIFIED: ICD-10-CM

## 2019-01-17 DIAGNOSIS — J44.9 CHRONIC OBSTRUCTIVE PULMONARY DISEASE, UNSPECIFIED: ICD-10-CM

## 2019-01-17 DIAGNOSIS — I48.2 CHRONIC ATRIAL FIBRILLATION: ICD-10-CM

## 2019-01-17 LAB
ALBUMIN SERPL ELPH-MCNC: 2.9 G/DL — LOW (ref 3.3–5)
ALP SERPL-CCNC: 115 U/L — SIGNIFICANT CHANGE UP (ref 40–120)
ALT FLD-CCNC: 10 U/L — SIGNIFICANT CHANGE UP (ref 4–33)
ANION GAP SERPL CALC-SCNC: 12 MMO/L — SIGNIFICANT CHANGE UP (ref 7–14)
APTT BLD: 121 SEC — CRITICAL HIGH (ref 27.5–36.3)
AST SERPL-CCNC: 15 U/L — SIGNIFICANT CHANGE UP (ref 4–32)
B PERT DNA SPEC QL NAA+PROBE: NOT DETECTED — SIGNIFICANT CHANGE UP
BASE EXCESS BLDV CALC-SCNC: 4.7 MMOL/L — SIGNIFICANT CHANGE UP
BASOPHILS # BLD AUTO: 0.05 K/UL — SIGNIFICANT CHANGE UP (ref 0–0.2)
BASOPHILS # BLD AUTO: 0.05 K/UL — SIGNIFICANT CHANGE UP (ref 0–0.2)
BASOPHILS NFR BLD AUTO: 0.6 % — SIGNIFICANT CHANGE UP (ref 0–2)
BASOPHILS NFR BLD AUTO: 0.7 % — SIGNIFICANT CHANGE UP (ref 0–2)
BILIRUB SERPL-MCNC: 0.6 MG/DL — SIGNIFICANT CHANGE UP (ref 0.2–1.2)
BLD GP AB SCN SERPL QL: NEGATIVE — SIGNIFICANT CHANGE UP
BLOOD GAS VENOUS - CREATININE: 1.49 MG/DL — HIGH (ref 0.5–1.3)
BUN SERPL-MCNC: 36 MG/DL — HIGH (ref 7–23)
C PNEUM DNA SPEC QL NAA+PROBE: NOT DETECTED — SIGNIFICANT CHANGE UP
CALCIUM SERPL-MCNC: 8.6 MG/DL — SIGNIFICANT CHANGE UP (ref 8.4–10.5)
CHLORIDE BLDV-SCNC: 100 MMOL/L — SIGNIFICANT CHANGE UP (ref 96–108)
CHLORIDE SERPL-SCNC: 96 MMOL/L — LOW (ref 98–107)
CO2 SERPL-SCNC: 26 MMOL/L — SIGNIFICANT CHANGE UP (ref 22–31)
CREAT SERPL-MCNC: 1.68 MG/DL — HIGH (ref 0.5–1.3)
EOSINOPHIL # BLD AUTO: 0.11 K/UL — SIGNIFICANT CHANGE UP (ref 0–0.5)
EOSINOPHIL # BLD AUTO: 0.17 K/UL — SIGNIFICANT CHANGE UP (ref 0–0.5)
EOSINOPHIL NFR BLD AUTO: 1.4 % — SIGNIFICANT CHANGE UP (ref 0–6)
EOSINOPHIL NFR BLD AUTO: 2.3 % — SIGNIFICANT CHANGE UP (ref 0–6)
FLUAV H1 2009 PAND RNA SPEC QL NAA+PROBE: NOT DETECTED — SIGNIFICANT CHANGE UP
FLUAV H1 RNA SPEC QL NAA+PROBE: NOT DETECTED — SIGNIFICANT CHANGE UP
FLUAV H3 RNA SPEC QL NAA+PROBE: NOT DETECTED — SIGNIFICANT CHANGE UP
FLUAV SUBTYP SPEC NAA+PROBE: NOT DETECTED — SIGNIFICANT CHANGE UP
FLUBV RNA SPEC QL NAA+PROBE: NOT DETECTED — SIGNIFICANT CHANGE UP
GAS PNL BLDV: 130 MMOL/L — LOW (ref 136–146)
GLUCOSE BLDV-MCNC: 88 — SIGNIFICANT CHANGE UP (ref 70–99)
GLUCOSE SERPL-MCNC: 92 MG/DL — SIGNIFICANT CHANGE UP (ref 70–99)
HADV DNA SPEC QL NAA+PROBE: NOT DETECTED — SIGNIFICANT CHANGE UP
HCO3 BLDV-SCNC: 28 MMOL/L — HIGH (ref 20–27)
HCOV PNL SPEC NAA+PROBE: SIGNIFICANT CHANGE UP
HCT VFR BLD CALC: 24.7 % — LOW (ref 34.5–45)
HCT VFR BLD CALC: 25.6 % — LOW (ref 34.5–45)
HCT VFR BLD CALC: 28.3 % — LOW (ref 34.5–45)
HCT VFR BLDV CALC: 25 % — LOW (ref 34.5–45)
HGB BLD-MCNC: 8 G/DL — LOW (ref 11.5–15.5)
HGB BLD-MCNC: 8 G/DL — LOW (ref 11.5–15.5)
HGB BLD-MCNC: 9.3 G/DL — LOW (ref 11.5–15.5)
HGB BLDV-MCNC: 8 G/DL — LOW (ref 11.5–15.5)
HMPV RNA SPEC QL NAA+PROBE: NOT DETECTED — SIGNIFICANT CHANGE UP
HPIV1 RNA SPEC QL NAA+PROBE: NOT DETECTED — SIGNIFICANT CHANGE UP
HPIV2 RNA SPEC QL NAA+PROBE: NOT DETECTED — SIGNIFICANT CHANGE UP
HPIV3 RNA SPEC QL NAA+PROBE: NOT DETECTED — SIGNIFICANT CHANGE UP
HPIV4 RNA SPEC QL NAA+PROBE: NOT DETECTED — SIGNIFICANT CHANGE UP
IMM GRANULOCYTES NFR BLD AUTO: 0.5 % — SIGNIFICANT CHANGE UP (ref 0–1.5)
IMM GRANULOCYTES NFR BLD AUTO: 0.5 % — SIGNIFICANT CHANGE UP (ref 0–1.5)
INR BLD: 3.08 — HIGH (ref 0.88–1.17)
LACTATE BLDV-MCNC: 1.2 MMOL/L — SIGNIFICANT CHANGE UP (ref 0.5–2)
LYMPHOCYTES # BLD AUTO: 0.61 K/UL — LOW (ref 1–3.3)
LYMPHOCYTES # BLD AUTO: 0.78 K/UL — LOW (ref 1–3.3)
LYMPHOCYTES # BLD AUTO: 10 % — LOW (ref 13–44)
LYMPHOCYTES # BLD AUTO: 8.1 % — LOW (ref 13–44)
MCHC RBC-ENTMCNC: 27.3 PG — SIGNIFICANT CHANGE UP (ref 27–34)
MCHC RBC-ENTMCNC: 29.5 PG — SIGNIFICANT CHANGE UP (ref 27–34)
MCHC RBC-ENTMCNC: 30.1 PG — SIGNIFICANT CHANGE UP (ref 27–34)
MCHC RBC-ENTMCNC: 31.3 % — LOW (ref 32–36)
MCHC RBC-ENTMCNC: 32.4 % — SIGNIFICANT CHANGE UP (ref 32–36)
MCHC RBC-ENTMCNC: 32.9 % — SIGNIFICANT CHANGE UP (ref 32–36)
MCV RBC AUTO: 87.4 FL — SIGNIFICANT CHANGE UP (ref 80–100)
MCV RBC AUTO: 91.1 FL — SIGNIFICANT CHANGE UP (ref 80–100)
MCV RBC AUTO: 91.6 FL — SIGNIFICANT CHANGE UP (ref 80–100)
MONOCYTES # BLD AUTO: 0.6 K/UL — SIGNIFICANT CHANGE UP (ref 0–0.9)
MONOCYTES # BLD AUTO: 0.67 K/UL — SIGNIFICANT CHANGE UP (ref 0–0.9)
MONOCYTES NFR BLD AUTO: 7.7 % — SIGNIFICANT CHANGE UP (ref 2–14)
MONOCYTES NFR BLD AUTO: 8.9 % — SIGNIFICANT CHANGE UP (ref 2–14)
NEUTROPHILS # BLD AUTO: 5.99 K/UL — SIGNIFICANT CHANGE UP (ref 1.8–7.4)
NEUTROPHILS # BLD AUTO: 6.24 K/UL — SIGNIFICANT CHANGE UP (ref 1.8–7.4)
NEUTROPHILS NFR BLD AUTO: 79.5 % — HIGH (ref 43–77)
NEUTROPHILS NFR BLD AUTO: 79.8 % — HIGH (ref 43–77)
NRBC # FLD: 0 K/UL — LOW (ref 25–125)
NT-PROBNP SERPL-SCNC: SIGNIFICANT CHANGE UP PG/ML
OB PNL STL: POSITIVE — SIGNIFICANT CHANGE UP
PCO2 BLDV: 49 MMHG — SIGNIFICANT CHANGE UP (ref 41–51)
PH BLDV: 7.4 PH — SIGNIFICANT CHANGE UP (ref 7.32–7.43)
PLATELET # BLD AUTO: 239 K/UL — SIGNIFICANT CHANGE UP (ref 150–400)
PLATELET # BLD AUTO: 241 K/UL — SIGNIFICANT CHANGE UP (ref 150–400)
PLATELET # BLD AUTO: 249 K/UL — SIGNIFICANT CHANGE UP (ref 150–400)
PMV BLD: 10.3 FL — SIGNIFICANT CHANGE UP (ref 7–13)
PO2 BLDV: 53 MMHG — HIGH (ref 35–40)
POTASSIUM BLDV-SCNC: 3.9 MMOL/L — SIGNIFICANT CHANGE UP (ref 3.4–4.5)
POTASSIUM SERPL-MCNC: 4.1 MMOL/L — SIGNIFICANT CHANGE UP (ref 3.5–5.3)
POTASSIUM SERPL-SCNC: 4.1 MMOL/L — SIGNIFICANT CHANGE UP (ref 3.5–5.3)
PROT SERPL-MCNC: 6.4 G/DL — SIGNIFICANT CHANGE UP (ref 6–8.3)
PROTHROM AB SERPL-ACNC: 36.3 SEC — HIGH (ref 9.8–13.1)
RBC # BLD: 2.71 M/UL — LOW (ref 3.8–5.2)
RBC # BLD: 2.93 M/UL — LOW (ref 3.8–5.2)
RBC # BLD: 3.09 M/UL — LOW (ref 3.8–5.2)
RBC # FLD: 17.2 % — HIGH (ref 10.3–14.5)
RBC # FLD: 19.6 % — HIGH (ref 10.3–14.5)
RBC # FLD: 19.6 % — HIGH (ref 10.3–14.5)
RH IG SCN BLD-IMP: POSITIVE — SIGNIFICANT CHANGE UP
RH IG SCN BLD-IMP: POSITIVE — SIGNIFICANT CHANGE UP
RSV RNA SPEC QL NAA+PROBE: NOT DETECTED — SIGNIFICANT CHANGE UP
RV+EV RNA SPEC QL NAA+PROBE: NOT DETECTED — SIGNIFICANT CHANGE UP
SAO2 % BLDV: 82.1 % — SIGNIFICANT CHANGE UP (ref 60–85)
SODIUM SERPL-SCNC: 134 MMOL/L — LOW (ref 135–145)
TROPONIN T, HIGH SENSITIVITY: 39 NG/L — SIGNIFICANT CHANGE UP (ref ?–14)
TROPONIN T, HIGH SENSITIVITY: 40 NG/L — SIGNIFICANT CHANGE UP (ref ?–14)
WBC # BLD: 7.53 K/UL — SIGNIFICANT CHANGE UP (ref 3.8–10.5)
WBC # BLD: 7.82 K/UL — SIGNIFICANT CHANGE UP (ref 3.8–10.5)
WBC # BLD: 8.24 K/UL — SIGNIFICANT CHANGE UP (ref 3.8–10.5)
WBC # FLD AUTO: 7.53 K/UL — SIGNIFICANT CHANGE UP (ref 3.8–10.5)
WBC # FLD AUTO: 7.82 K/UL — SIGNIFICANT CHANGE UP (ref 3.8–10.5)
WBC # FLD AUTO: 8.24 K/UL — SIGNIFICANT CHANGE UP (ref 3.8–10.5)

## 2019-01-17 PROCEDURE — 93306 TTE W/DOPPLER COMPLETE: CPT | Mod: 26

## 2019-01-17 PROCEDURE — 71045 X-RAY EXAM CHEST 1 VIEW: CPT | Mod: 26

## 2019-01-17 RX ORDER — TRAMADOL HYDROCHLORIDE 50 MG/1
50 TABLET ORAL EVERY 8 HOURS
Qty: 0 | Refills: 0 | Status: DISCONTINUED | OUTPATIENT
Start: 2019-01-17 | End: 2019-01-24

## 2019-01-17 RX ORDER — ACETAMINOPHEN 500 MG
650 TABLET ORAL EVERY 6 HOURS
Qty: 0 | Refills: 0 | Status: DISCONTINUED | OUTPATIENT
Start: 2019-01-17 | End: 2019-01-19

## 2019-01-17 RX ORDER — TRAMADOL HYDROCHLORIDE 50 MG/1
25 TABLET ORAL ONCE
Qty: 0 | Refills: 0 | Status: DISCONTINUED | OUTPATIENT
Start: 2019-01-17 | End: 2019-01-17

## 2019-01-17 RX ORDER — PANTOPRAZOLE SODIUM 20 MG/1
40 TABLET, DELAYED RELEASE ORAL
Qty: 0 | Refills: 0 | Status: DISCONTINUED | OUTPATIENT
Start: 2019-01-17 | End: 2019-02-15

## 2019-01-17 RX ORDER — METOPROLOL TARTRATE 50 MG
12.5 TABLET ORAL EVERY 12 HOURS
Qty: 0 | Refills: 0 | Status: DISCONTINUED | OUTPATIENT
Start: 2019-01-17 | End: 2019-01-25

## 2019-01-17 RX ORDER — ASPIRIN/CALCIUM CARB/MAGNESIUM 324 MG
1 TABLET ORAL
Qty: 0 | Refills: 0 | COMMUNITY

## 2019-01-17 RX ORDER — FUROSEMIDE 40 MG
40 TABLET ORAL EVERY 12 HOURS
Qty: 0 | Refills: 0 | Status: DISCONTINUED | OUTPATIENT
Start: 2019-01-17 | End: 2019-01-18

## 2019-01-17 RX ORDER — ACETAMINOPHEN 500 MG
325 TABLET ORAL ONCE
Qty: 0 | Refills: 0 | Status: COMPLETED | OUTPATIENT
Start: 2019-01-17 | End: 2019-01-17

## 2019-01-17 RX ORDER — BUDESONIDE AND FORMOTEROL FUMARATE DIHYDRATE 160; 4.5 UG/1; UG/1
2 AEROSOL RESPIRATORY (INHALATION)
Qty: 0 | Refills: 0 | Status: DISCONTINUED | OUTPATIENT
Start: 2019-01-17 | End: 2019-02-05

## 2019-01-17 RX ORDER — ACETAMINOPHEN 500 MG
650 TABLET ORAL ONCE
Qty: 0 | Refills: 0 | Status: COMPLETED | OUTPATIENT
Start: 2019-01-17 | End: 2019-01-17

## 2019-01-17 RX ORDER — SENNA PLUS 8.6 MG/1
2 TABLET ORAL AT BEDTIME
Qty: 0 | Refills: 0 | Status: DISCONTINUED | OUTPATIENT
Start: 2019-01-17 | End: 2019-01-26

## 2019-01-17 RX ORDER — VANCOMYCIN HCL 1 G
125 VIAL (EA) INTRAVENOUS EVERY 6 HOURS
Qty: 0 | Refills: 0 | Status: DISCONTINUED | OUTPATIENT
Start: 2019-01-17 | End: 2019-01-18

## 2019-01-17 RX ORDER — CLOPIDOGREL BISULFATE 75 MG/1
75 TABLET, FILM COATED ORAL DAILY
Qty: 0 | Refills: 0 | Status: DISCONTINUED | OUTPATIENT
Start: 2019-01-17 | End: 2019-02-15

## 2019-01-17 RX ORDER — TRAMADOL HYDROCHLORIDE 50 MG/1
25 TABLET ORAL EVERY 8 HOURS
Qty: 0 | Refills: 0 | Status: DISCONTINUED | OUTPATIENT
Start: 2019-01-17 | End: 2019-01-17

## 2019-01-17 RX ORDER — VANCOMYCIN HCL 1 G
125 VIAL (EA) INTRAVENOUS EVERY 6 HOURS
Qty: 0 | Refills: 0 | Status: DISCONTINUED | OUTPATIENT
Start: 2019-01-17 | End: 2019-01-17

## 2019-01-17 RX ORDER — PANTOPRAZOLE SODIUM 20 MG/1
80 TABLET, DELAYED RELEASE ORAL ONCE
Qty: 0 | Refills: 0 | Status: COMPLETED | OUTPATIENT
Start: 2019-01-17 | End: 2019-01-17

## 2019-01-17 RX ORDER — MORPHINE SULFATE 50 MG/1
1 CAPSULE, EXTENDED RELEASE ORAL ONCE
Qty: 0 | Refills: 0 | Status: DISCONTINUED | OUTPATIENT
Start: 2019-01-17 | End: 2019-01-17

## 2019-01-17 RX ORDER — ATORVASTATIN CALCIUM 80 MG/1
40 TABLET, FILM COATED ORAL AT BEDTIME
Qty: 0 | Refills: 0 | Status: DISCONTINUED | OUTPATIENT
Start: 2019-01-17 | End: 2019-02-15

## 2019-01-17 RX ADMIN — Medication 12.5 MILLIGRAM(S): at 16:55

## 2019-01-17 RX ADMIN — Medication 650 MILLIGRAM(S): at 11:00

## 2019-01-17 RX ADMIN — TRAMADOL HYDROCHLORIDE 25 MILLIGRAM(S): 50 TABLET ORAL at 15:50

## 2019-01-17 RX ADMIN — MORPHINE SULFATE 1 MILLIGRAM(S): 50 CAPSULE, EXTENDED RELEASE ORAL at 23:24

## 2019-01-17 RX ADMIN — TRAMADOL HYDROCHLORIDE 50 MILLIGRAM(S): 50 TABLET ORAL at 20:58

## 2019-01-17 RX ADMIN — PANTOPRAZOLE SODIUM 80 MILLIGRAM(S): 20 TABLET, DELAYED RELEASE ORAL at 11:01

## 2019-01-17 RX ADMIN — PANTOPRAZOLE SODIUM 40 MILLIGRAM(S): 20 TABLET, DELAYED RELEASE ORAL at 16:55

## 2019-01-17 RX ADMIN — TRAMADOL HYDROCHLORIDE 25 MILLIGRAM(S): 50 TABLET ORAL at 16:54

## 2019-01-17 RX ADMIN — BUDESONIDE AND FORMOTEROL FUMARATE DIHYDRATE 2 PUFF(S): 160; 4.5 AEROSOL RESPIRATORY (INHALATION) at 23:23

## 2019-01-17 RX ADMIN — ATORVASTATIN CALCIUM 40 MILLIGRAM(S): 80 TABLET, FILM COATED ORAL at 21:37

## 2019-01-17 RX ADMIN — MORPHINE SULFATE 1 MILLIGRAM(S): 50 CAPSULE, EXTENDED RELEASE ORAL at 23:39

## 2019-01-17 RX ADMIN — Medication 650 MILLIGRAM(S): at 12:03

## 2019-01-17 RX ADMIN — TRAMADOL HYDROCHLORIDE 50 MILLIGRAM(S): 50 TABLET ORAL at 21:28

## 2019-01-17 RX ADMIN — Medication 125 MILLIGRAM(S): at 23:24

## 2019-01-17 RX ADMIN — Medication 325 MILLIGRAM(S): at 12:20

## 2019-01-17 RX ADMIN — Medication 650 MILLIGRAM(S): at 20:58

## 2019-01-17 RX ADMIN — Medication 650 MILLIGRAM(S): at 21:28

## 2019-01-17 NOTE — H&P ADULT - PROBLEM SELECTOR PLAN 5
O2 100 on nasal cannula. O2 100 on nasal cannula.  Continue Symbicort as prescribed. Monitor electrolytes  Trend BUN/Cr  consider renal consult

## 2019-01-17 NOTE — H&P ADULT - PROBLEM SELECTOR PLAN 6
IVF, monitor electrolytes. IVF, monitor electrolytes, monitor BUN/Cr. Pt currently not in exacerbation  Continue Symbicort as prescribed.

## 2019-01-17 NOTE — H&P ADULT - LYMPHATIC
anterior cervical L/supraclavicular L/posterior cervical L/anterior cervical R/supraclavicular R/posterior cervical R

## 2019-01-17 NOTE — CONSULT NOTE ADULT - SUBJECTIVE AND OBJECTIVE BOX
Chief Complaint:  Patient is a 78y old  Female who presents with a chief complaint of right foot pressure ulcer (17 Jan 2019 13:53)      HPI:  The patient is a 79 y/o female, with a PMHx of COPD (on 2-3L O2 prn), paroxsymal a-fib (on pradaxa), CAD (s/p stent 11/2018 on plavix), CHF(EF 55%), HTN, HLD, who preseted to the [facility] ED with right foot pressure ulcer pain x several weeks associated with SOB x a few minutes followed by chest pain x several hours. Of note, the patient reports chronic cough, b/l +3 LE edema, JACKSON with walking 1/2 block, orthopnea x1 pillow, occasional PND.    In addition the patient notes that for the last few weeks she has been having dark stools melena, and 10 lbs weight loss over the past 6 months with no change in appetite which is why GI was consulted.  The patients FOBT was positive and hemoglobin was noted to be 9 form 10 a few days ago and a baseline of high 10's - 11.      Allergies:  No Known Allergies      Home Medications:    Hospital Medications:  acetaminophen   Tablet .. 650 milliGRAM(s) Oral every 6 hours PRN  atorvastatin 40 milliGRAM(s) Oral at bedtime  buDESOnide  80 MICROgram(s)/formoterol 4.5 MICROgram(s) Inhaler 2 Puff(s) Inhalation two times a day  clopidogrel Tablet 75 milliGRAM(s) Oral daily  furosemide   Injectable 40 milliGRAM(s) IV Push every 12 hours  metoprolol tartrate 12.5 milliGRAM(s) Oral every 12 hours  pantoprazole  Injectable 40 milliGRAM(s) IV Push two times a day  senna 2 Tablet(s) Oral at bedtime  traMADol 25 milliGRAM(s) Oral every 8 hours PRN  vancomycin    Solution 125 milliGRAM(s) Oral every 6 hours      PMHX/PSHX:  CAD (coronary artery disease)  CHF (congestive heart failure)  COPD (chronic obstructive pulmonary disease)  MI, old  Anxiety  TIA (transient ischemic attack)  PAF (paroxysmal atrial fibrillation)  HLD (hyperlipidemia)  HTN (hypertension)  H/O total hysterectomy  History of cataract surgery  History of repair of hip fracture  H/O spinal fusion      Family history:  No pertinent family history in first degree relatives      Social History:     ROS:     General:  No wt loss, fevers, chills, night sweats, fatigue,   Eyes:  Good vision, no reported pain  ENT:  No sore throat, pain, runny nose, dysphagia  CV:  No pain, palpitations, hypo/hypertension  Resp:  No dyspnea, cough, tachypnea, wheezing  GI:  See HPI  :  No pain, bleeding, incontinence, nocturia  Muscle:  No pain, weakness  Neuro:  No weakness, tingling, memory problems  Psych:  No fatigue, insomnia, mood problems, depression  Endocrine:  No polyuria, polydipsia, cold/heat intolerance  Heme:  No petechiae, ecchymosis, easy bruisability  Skin:  No rash, edema      PHYSICAL EXAM:     GENERAL:  Appears stated age, well-groomed, well-nourished, NAD  CHEST:  Full & symmetric excursion  HEART:  Regular rhythm, no abdominal bruit, no edema  ABDOMEN:  Soft, non-tender, non-distended, normoactive bowel sounds,  no masses , no hepatosplenomegaly  EXTREMITIES:  no cyanosis,clubbing or edema  SKIN:  No rash/erythema/ecchymoses/petechiae/wounds/abscess/warm/dry  NEURO:  Alert, oriented    Vital Signs:  Vital Signs Last 24 Hrs  T(C): 35.5 (17 Jan 2019 13:03), Max: 36 (17 Jan 2019 10:45)  T(F): 95.9 (17 Jan 2019 13:03), Max: 96.8 (17 Jan 2019 10:45)  HR: 112 (17 Jan 2019 13:03) (86 - 123)  BP: 105/58 (17 Jan 2019 13:03) (85/47 - 105/58)  BP(mean): --  RR: 16 (17 Jan 2019 12:03) (16 - 28)  SpO2: 100% (17 Jan 2019 12:03) (92% - 100%)  Daily Height in cm: 157.48 (17 Jan 2019 12:50)    Daily     LABS:                        8.0    7.82  )-----------( 241      ( 17 Jan 2019 09:20 )             25.6     01-17    134<L>  |  96<L>  |  36<H>  ----------------------------<  92  4.1   |  26  |  1.68<H>    Ca    8.6      17 Jan 2019 06:10    TPro  6.4  /  Alb  2.9<L>  /  TBili  0.6  /  DBili  x   /  AST  15  /  ALT  10  /  AlkPhos  115  01-17    LIVER FUNCTIONS - ( 17 Jan 2019 06:10 )  Alb: 2.9 g/dL / Pro: 6.4 g/dL / ALK PHOS: 115 u/L / ALT: 10 u/L / AST: 15 u/L / GGT: x           PT/INR - ( 17 Jan 2019 06:10 )   PT: 36.3 SEC;   INR: 3.08          PTT - ( 17 Jan 2019 06:10 )  PTT:121.0 SEC        Imaging: Chief Complaint:  Patient is a 78y old  Female who presents with a chief complaint of right foot pressure ulcer (17 Jan 2019 13:53)      HPI:  The patient is a 77 y/o female, with a PMHx of COPD (on 2-3L O2 prn), paroxsymal a-fib (on pradaxa), CAD (s/p stent 11/2018 on plavix), CHF(EF 55%), HTN, HLD, who preseted to the [facility] ED with right foot pressure ulcer pain x several weeks associated with SOB x a few minutes followed by chest pain x several hours. Of note, the patient reports chronic cough, b/l +3 LE edema, JACKSON with walking 1/2 block, orthopnea x1 pillow, occasional PND.    In addition the patient notes that for the last few weeks she has been having dark stools melena, and 10 lbs weight loss over the past 6 months with no change in appetite which is why GI was consulted.  The patients FOBT was positive and hemoglobin was noted to be 9 form 10 a few days ago and a baseline of high 10's - 11.  No EGD/colonoscopys in our system.  BNP noted to be >13,000    Allergies:  No Known Allergies      Home Medications:    Hospital Medications:  acetaminophen   Tablet .. 650 milliGRAM(s) Oral every 6 hours PRN  atorvastatin 40 milliGRAM(s) Oral at bedtime  buDESOnide  80 MICROgram(s)/formoterol 4.5 MICROgram(s) Inhaler 2 Puff(s) Inhalation two times a day  clopidogrel Tablet 75 milliGRAM(s) Oral daily  furosemide   Injectable 40 milliGRAM(s) IV Push every 12 hours  metoprolol tartrate 12.5 milliGRAM(s) Oral every 12 hours  pantoprazole  Injectable 40 milliGRAM(s) IV Push two times a day  senna 2 Tablet(s) Oral at bedtime  traMADol 25 milliGRAM(s) Oral every 8 hours PRN  vancomycin    Solution 125 milliGRAM(s) Oral every 6 hours      PMHX/PSHX:  CAD (coronary artery disease)  CHF (congestive heart failure)  COPD (chronic obstructive pulmonary disease)  MI, old  Anxiety  TIA (transient ischemic attack)  PAF (paroxysmal atrial fibrillation)  HLD (hyperlipidemia)  HTN (hypertension)  H/O total hysterectomy  History of cataract surgery  History of repair of hip fracture  H/O spinal fusion      Family history:  No pertinent family history in first degree relatives      Social History:     ROS:     General:  No wt loss, fevers, chills, night sweats, fatigue,   Eyes:  Good vision, no reported pain  ENT:  No sore throat, pain, runny nose, dysphagia  CV:  No pain, palpitations, hypo/hypertension  Resp:  No dyspnea, cough, tachypnea, wheezing  GI:  See HPI  :  No pain, bleeding, incontinence, nocturia  Muscle:  No pain, weakness  Neuro:  No weakness, tingling, memory problems  Psych:  No fatigue, insomnia, mood problems, depression  Endocrine:  No polyuria, polydipsia, cold/heat intolerance  Heme:  No petechiae, ecchymosis, easy bruisability  Skin:  No rash, edema      PHYSICAL EXAM:     GENERAL:  Appears stated age, well-groomed, well-nourished, NAD  CHEST:  Full & symmetric excursion  HEART:  Regular rhythm, no abdominal bruit, no edema  ABDOMEN:  Soft, non-tender, non-distended, normoactive bowel sounds,  no masses , no hepatosplenomegaly  EXTREMITIES:  no cyanosis,clubbing or edema  SKIN:  No rash/erythema/ecchymoses/petechiae/wounds/abscess/warm/dry  NEURO:  Alert, oriented    Vital Signs:  Vital Signs Last 24 Hrs  T(C): 35.5 (17 Jan 2019 13:03), Max: 36 (17 Jan 2019 10:45)  T(F): 95.9 (17 Jan 2019 13:03), Max: 96.8 (17 Jan 2019 10:45)  HR: 112 (17 Jan 2019 13:03) (86 - 123)  BP: 105/58 (17 Jan 2019 13:03) (85/47 - 105/58)  BP(mean): --  RR: 16 (17 Jan 2019 12:03) (16 - 28)  SpO2: 100% (17 Jan 2019 12:03) (92% - 100%)  Daily Height in cm: 157.48 (17 Jan 2019 12:50)    Daily     LABS:                        8.0    7.82  )-----------( 241      ( 17 Jan 2019 09:20 )             25.6     01-17    134<L>  |  96<L>  |  36<H>  ----------------------------<  92  4.1   |  26  |  1.68<H>    Ca    8.6      17 Jan 2019 06:10    TPro  6.4  /  Alb  2.9<L>  /  TBili  0.6  /  DBili  x   /  AST  15  /  ALT  10  /  AlkPhos  115  01-17    LIVER FUNCTIONS - ( 17 Jan 2019 06:10 )  Alb: 2.9 g/dL / Pro: 6.4 g/dL / ALK PHOS: 115 u/L / ALT: 10 u/L / AST: 15 u/L / GGT: x           PT/INR - ( 17 Jan 2019 06:10 )   PT: 36.3 SEC;   INR: 3.08          PTT - ( 17 Jan 2019 06:10 )  PTT:121.0 SEC        Imaging: Chief Complaint:  Patient is a 78y old  Female who presents with a chief complaint of right foot pressure ulcer (17 Jan 2019 13:53)      HPI:  The patient is a 79 y/o female, with a PMHx of COPD (on 2-3L O2 prn), paroxsymal a-fib (on pradaxa), CAD (s/p stent 11/2018 on plavix), CHF(EF 55%), HTN, HLD, who preseted to the [facility] ED with right foot pressure ulcer pain x several weeks associated with SOB x a few minutes followed by chest pain x several hours. Of note, the patient reports chronic cough, b/l +3 LE edema, JACKSON with walking 1/2 block, orthopnea x1 pillow, occasional PND.    In addition the patient notes that for the last few weeks she has been having dark stools melena, and 10 lbs weight loss over the past 6 months with no change in appetite which is why GI was consulted.  The patients FOBT was positive and hemoglobin was noted to be 9 form 10 a few days ago and a baseline of high 10's - 11.  No EGD/colonoscopys in our system.  BNP noted to be >13,000. Patient also ntoed to have supratheraputic INR and elevated PTT.    Allergies:  No Known Allergies      Home Medications:    Hospital Medications:  acetaminophen   Tablet .. 650 milliGRAM(s) Oral every 6 hours PRN  atorvastatin 40 milliGRAM(s) Oral at bedtime  buDESOnide  80 MICROgram(s)/formoterol 4.5 MICROgram(s) Inhaler 2 Puff(s) Inhalation two times a day  clopidogrel Tablet 75 milliGRAM(s) Oral daily  furosemide   Injectable 40 milliGRAM(s) IV Push every 12 hours  metoprolol tartrate 12.5 milliGRAM(s) Oral every 12 hours  pantoprazole  Injectable 40 milliGRAM(s) IV Push two times a day  senna 2 Tablet(s) Oral at bedtime  traMADol 25 milliGRAM(s) Oral every 8 hours PRN  vancomycin    Solution 125 milliGRAM(s) Oral every 6 hours      PMHX/PSHX:  CAD (coronary artery disease)  CHF (congestive heart failure)  COPD (chronic obstructive pulmonary disease)  MI, old  Anxiety  TIA (transient ischemic attack)  PAF (paroxysmal atrial fibrillation)  HLD (hyperlipidemia)  HTN (hypertension)  H/O total hysterectomy  History of cataract surgery  History of repair of hip fracture  H/O spinal fusion      Family history:  No pertinent family history in first degree relatives      Social History:     ROS:     General:  No wt loss, fevers, chills, night sweats, fatigue,   Eyes:  Good vision, no reported pain  ENT:  No sore throat, pain, runny nose, dysphagia  CV:  No pain, palpitations, hypo/hypertension  Resp:  No dyspnea, cough, tachypnea, wheezing  GI:  See HPI  :  No pain, bleeding, incontinence, nocturia  Muscle:  No pain, weakness  Neuro:  No weakness, tingling, memory problems  Psych:  No fatigue, insomnia, mood problems, depression  Endocrine:  No polyuria, polydipsia, cold/heat intolerance  Heme:  No petechiae, ecchymosis, easy bruisability  Skin:  No rash, edema      PHYSICAL EXAM:     GENERAL:  Appears stated age, well-groomed, well-nourished, NAD  CHEST:  Full & symmetric excursion  HEART:  Regular rhythm, no abdominal bruit, no edema  ABDOMEN:  Soft, non-tender, non-distended, normoactive bowel sounds,  no masses , no hepatosplenomegaly  EXTREMITIES:  no cyanosis,clubbing or edema  SKIN:  No rash/erythema/ecchymoses/petechiae/wounds/abscess/warm/dry  NEURO:  Alert, oriented    Vital Signs:  Vital Signs Last 24 Hrs  T(C): 35.5 (17 Jan 2019 13:03), Max: 36 (17 Jan 2019 10:45)  T(F): 95.9 (17 Jan 2019 13:03), Max: 96.8 (17 Jan 2019 10:45)  HR: 112 (17 Jan 2019 13:03) (86 - 123)  BP: 105/58 (17 Jan 2019 13:03) (85/47 - 105/58)  BP(mean): --  RR: 16 (17 Jan 2019 12:03) (16 - 28)  SpO2: 100% (17 Jan 2019 12:03) (92% - 100%)  Daily Height in cm: 157.48 (17 Jan 2019 12:50)    Daily     LABS:                        8.0    7.82  )-----------( 241      ( 17 Jan 2019 09:20 )             25.6     01-17    134<L>  |  96<L>  |  36<H>  ----------------------------<  92  4.1   |  26  |  1.68<H>    Ca    8.6      17 Jan 2019 06:10    TPro  6.4  /  Alb  2.9<L>  /  TBili  0.6  /  DBili  x   /  AST  15  /  ALT  10  /  AlkPhos  115  01-17    LIVER FUNCTIONS - ( 17 Jan 2019 06:10 )  Alb: 2.9 g/dL / Pro: 6.4 g/dL / ALK PHOS: 115 u/L / ALT: 10 u/L / AST: 15 u/L / GGT: x           PT/INR - ( 17 Jan 2019 06:10 )   PT: 36.3 SEC;   INR: 3.08          PTT - ( 17 Jan 2019 06:10 )  PTT:121.0 SEC        Imaging: Chief Complaint:  Patient is a 78y old  Female who presents with a chief complaint of right foot pressure ulcer (17 Jan 2019 13:53)      HPI:  The patient is a 79 y/o female, with a PMHx of COPD (on 2-3L O2 prn), paroxsymal a-fib (on pradaxa), CAD (s/p stent 11/2018 on plavix), CHF(EF 55%), HTN, HLD, who preseted to the [facility] ED with right foot pressure ulcer pain x several weeks associated with SOB x a few minutes followed by chest pain x several hours. Of note, the patient reports chronic cough, b/l +3 LE edema, JACKSON with walking 1/2 block, orthopnea x1 pillow, occasional PND.    In addition the patient notes that for the last few weeks she had been having dark stools but this has not recurred in over ten days.  She notes that over the last ten days she has been have one brown BM a day without any signs of black or blood.  She has never had a colonoscopy or endoscopy  and does not one in the future even with a question of GIB.     Since being here the patients FOBT was positive and hemoglobin was noted to be 9 form 10 a few days ago and a baseline of high 10's - 11.  so GI was called. BNP noted to be >13,000. Patient also noted to have supratheraputic INR and elevated PTT.    Allergies:  No Known Allergies      Home Medications:    Hospital Medications:  acetaminophen   Tablet .. 650 milliGRAM(s) Oral every 6 hours PRN  atorvastatin 40 milliGRAM(s) Oral at bedtime  buDESOnide  80 MICROgram(s)/formoterol 4.5 MICROgram(s) Inhaler 2 Puff(s) Inhalation two times a day  clopidogrel Tablet 75 milliGRAM(s) Oral daily  furosemide   Injectable 40 milliGRAM(s) IV Push every 12 hours  metoprolol tartrate 12.5 milliGRAM(s) Oral every 12 hours  pantoprazole  Injectable 40 milliGRAM(s) IV Push two times a day  senna 2 Tablet(s) Oral at bedtime  traMADol 25 milliGRAM(s) Oral every 8 hours PRN  vancomycin    Solution 125 milliGRAM(s) Oral every 6 hours      PMHX/PSHX:  CAD (coronary artery disease)  CHF (congestive heart failure)  COPD (chronic obstructive pulmonary disease)  MI, old  Anxiety  TIA (transient ischemic attack)  PAF (paroxysmal atrial fibrillation)  HLD (hyperlipidemia)  HTN (hypertension)  H/O total hysterectomy  History of cataract surgery  History of repair of hip fracture  H/O spinal fusion      Family history:  No pertinent family history in first degree relatives      Social History:     ROS:     General:  No wt loss, fevers, chills, night sweats, fatigue,   Eyes:  Good vision, no reported pain  ENT:  No sore throat, pain, runny nose, dysphagia  CV:  No pain, palpitations, hypo/hypertension  Resp:  No dyspnea, cough, tachypnea, wheezing  GI:  See HPI  :  No pain, bleeding, incontinence, nocturia  Muscle:  No pain, weakness  Neuro:  No weakness, tingling, memory problems  Psych:  No fatigue, insomnia, mood problems, depression  Endocrine:  No polyuria, polydipsia, cold/heat intolerance  Heme:  No petechiae, ecchymosis, easy bruisability  Skin:  No rash, edema      PHYSICAL EXAM:     GENERAL:  Appears stated age, well-groomed, well-nourished, NAD  CHEST:  Full & symmetric excursion  HEART:  Regular rhythm, no abdominal bruit, no edema  ABDOMEN:  Soft, non-tender, non-distended, normoactive bowel sounds,  no masses , no hepatosplenomegaly  EXTREMITIES:  no cyanosis,clubbing or edema  SKIN:  No rash/erythema/ecchymoses/petechiae/wounds/abscess/warm/dry  NEURO:  Alert, oriented    Vital Signs:  Vital Signs Last 24 Hrs  T(C): 35.5 (17 Jan 2019 13:03), Max: 36 (17 Jan 2019 10:45)  T(F): 95.9 (17 Jan 2019 13:03), Max: 96.8 (17 Jan 2019 10:45)  HR: 112 (17 Jan 2019 13:03) (86 - 123)  BP: 105/58 (17 Jan 2019 13:03) (85/47 - 105/58)  BP(mean): --  RR: 16 (17 Jan 2019 12:03) (16 - 28)  SpO2: 100% (17 Jan 2019 12:03) (92% - 100%)  Daily Height in cm: 157.48 (17 Jan 2019 12:50)    Daily     LABS:                        8.0    7.82  )-----------( 241      ( 17 Jan 2019 09:20 )             25.6     01-17    134<L>  |  96<L>  |  36<H>  ----------------------------<  92  4.1   |  26  |  1.68<H>    Ca    8.6      17 Jan 2019 06:10    TPro  6.4  /  Alb  2.9<L>  /  TBili  0.6  /  DBili  x   /  AST  15  /  ALT  10  /  AlkPhos  115  01-17    LIVER FUNCTIONS - ( 17 Jan 2019 06:10 )  Alb: 2.9 g/dL / Pro: 6.4 g/dL / ALK PHOS: 115 u/L / ALT: 10 u/L / AST: 15 u/L / GGT: x           PT/INR - ( 17 Jan 2019 06:10 )   PT: 36.3 SEC;   INR: 3.08          PTT - ( 17 Jan 2019 06:10 )  PTT:121.0 SEC        Imaging:

## 2019-01-17 NOTE — CONSULT NOTE ADULT - ASSESSMENT
77 y/o female, with a PmHx of COPD (on 2-3L O2 prn), paroxsymal a-fib (on pradaxa), CAD (s/p stent 11/2018), DCHF, HTN, HLD, and anxiety, presenting with acute/chronic diastolic chf, chest pain r/o acs, GIB, non healing pressure ulcer.     1. Atypical chest pain   in the setting of acute CHF exacerbation, afib w/ RVR   cv stable, no evidence of acute ischemia/ACS   Echo from 2/2018 revealing normal LVEF and mild-moderate MR  check echo to re-evaluate LV function, valvular disease   continue statin, plavix, bb     2. Acute/chronic diastolic chf  + fluid overloaded on exam   CXR with mild interstitial pulmonary edema, proBNP elevated   continue lasix 40mg IVP BID, bb   strict i/os   pending echo to re-evaluate LV function, valvular disease     3. AFIB, hx   currently rate controlled   continue bb   CHADS 3 - continue to hold pradaxa in setting of GIB, anemia , GI eval pending   s/p 1uprbc, continue to trend cbc, transfuse prn     4. CAD s/p PCI (11/2018)  cv stable, no evidence of acute ischemia   continue bb, statin, plavix    5. GIB   +GUIAC , +anemia s/p 1uprbc,   continue to trend cbc, transfuse pr  GI eval pending     6 non healing foot ulcer  on po vanco   med f/u  ID eval pending     7. IGOR/CKD   renal f/u     dvt ppx 77 y/o female, with a PmHx of COPD (on 2-3L O2 prn), paroxsymal a-fib (on pradaxa), CAD (s/p stent 11/2018), DCHF, HTN, HLD, and anxiety, presenting with acute/chronic diastolic chf, chest pain r/o acs, GIB, non healing pressure ulcer.     1. Atypical chest pain   in the setting of acute CHF exacerbation, afib w/ RVR   cv stable, no evidence of acute ischemia/ACS   Echo from 2/2018 revealing normal LVEF and mild-moderate MR  check echo to re-evaluate LV function, valvular disease   continue statin, bb, plavix     2. Acute/chronic diastolic chf  + fluid overloaded on exam   CXR with mild interstitial pulmonary edema, proBNP elevated   continue lasix 40mg IVP BID, low dose bb   strict i/os   pending echo to re-evaluate LV function, valvular disease     3. AFIB, hx   currently rate controlled   continue bb   CHADS 3 - continue to hold pradaxa in setting of GIB, anemia , GI eval pending   s/p 1uprbc, continue to trend cbc, transfuse prn     4. CAD s/p PCI (11/2018)  cv stable, no evidence of acute ischemia   continue bb, statin, continue plavix given recent PCI      5. GIB   +GUIAC , +anemia s/p 1uprbc,   continue to trend cbc, transfuse pr  GI eval pending , Pradaxa on hold     6. non healing foot ulcer  on po vanco   med f/u  ID eval pending     7. IGOR/CKD   renal f/u     dvt ppx

## 2019-01-17 NOTE — H&P ADULT - NSHPSOCIALHISTORY_GEN_ALL_CORE
Marital status: single, lives alone, has home health aid    Tobacco use: quit 2/2018, smoked 1P/day    Alcohol: neg    Drug use: neg    Flu vaccine: 2018         Pneumo vaccine: unknown

## 2019-01-17 NOTE — H&P ADULT - HISTORY OF PRESENT ILLNESS
77 y/o female, with a PmHx of COPD (on 2-3L O2 prn), paroxsymal a-fib (on pradaxa), HTN, HLD, and anxiety, presenting to the [facility] ED with right foot pressure ulcer x several weeks. The patient has a dime-sized ulcer with yellowish discharge on her right calcaneous that she acquired from a shoe weeks ago. She reports that she woke up last night around 4am with excruciating, unrelenting right root pain with SOB x a few minutes followed by chest pain x several hours. The patient reports that the ulcer pain began to gradually worsen since her last hospital visit, where she was d/c with vancomycin. The foot pain is sharp, 10/10, and radiates up to her calf. The chest pain began gradually and and was described as a midsternal, 8/10, tightness with no radiation and accompanied by palpitations. The patient took two puffs of her Symbicort for the SOB with no relief, but it resolved spontaneously within a few minutes. 77 y/o female, with a PmHx of COPD (on 2-3L O2 prn), paroxsymal a-fib (on pradaxa), HTN, HLD, and anxiety, presenting to the [facility] ED with right foot pressure ulcer x several weeks. The patient has a dime-sized ulcer with scant serosanguinous drainage on her right calcaneous that she acquired from a shoe weeks ago. She reports that she woke up last night around 4am with excruciating, unrelenting right root pain with SOB x a few minutes followed by chest pain x several hours. The patient reports that the ulcer pain began to gradually worsen since her last hospital visit, where she was d/c with vancomycin. The foot pain is sharp, 10/10, and radiates up to her calf. The patient took two puffs of her Symbicort for the SOB with no relief, but it resolved spontaneously within a few minutes. The chest pain began gradually and was described as a midsternal, 8/10, tightness with no radiation and accompanied by palpitations. Of note, the patient reports chronic cough, JACKSON with walking 1/2 block, orthopnea x1 pillow, occasional PND, melena x weeks, easy bruising/bleeding, and 10 lbs weight loss over the past 6 months with no change in appetite. The patient denies fever, chills, SOB at rest, diaphoresis, dizziness, claudication, wheezing, changes in vision and hearing, abdominal pain, change in bowel and urinary habits, dysuria, hematuria. 79 y/o female, with a PmHx of COPD (on 2-3L O2 prn), paroxsymal a-fib (on pradaxa), CAD (s/p stent 11/2018), CHF, HTN, HLD, and anxiety, presenting to the [facility] ED with right foot pressure ulcer pain x several weeks. The patient has a dime-sized ulcer with scant serosanguinous drainage on her right calcaneous that she acquired from a shoe weeks ago. She reports that she woke up last night around 4am with excruciating, unrelenting right root pain with SOB x a few minutes followed by chest pain x several hours. The patient reports that the ulcer pain began to gradually worsen since her last hospital visit, where she was d/c with vancomycin. The foot pain is sharp, 10/10, and radiates up to her calf. The patient took two puffs of her Symbicort for the SOB with no relief, but it resolved spontaneously within a few minutes. The chest pain began gradually and was described as a midsternal, 8/10, tightness with no radiation and accompanied by palpitations. Of note, the patient reports chronic cough, b/l +3 LE edema, JACKSON with walking 1/2 block, orthopnea x1 pillow, occasional PND, melena x weeks, easy bruising/bleeding, and 10 lbs weight loss over the past 6 months with no change in appetite. The patient denies fever, chills, SOB at rest, diaphoresis, dizziness, claudication, wheezing, changes in vision and hearing, abdominal pain, change in bowel and urinary habits, dysuria, hematuria. 79 y/o female, with a PmHx of COPD (on 2-3L O2 prn), paroxsymal a-fib (on pradaxa), CAD (s/p stent 11/2018), CHF, HTN, HLD, and anxiety, presenting to the [facility] ED with right foot pressure ulcer pain x several weeks. The patient has a dime-sized ulcer with scant serosanguinous drainage on her right calcaneous that she acquired from a shoe weeks ago. She reports that she woke up last night around 4am with excruciating, unrelenting right root pain with SOB x a few minutes followed by chest pain x several hours. The patient reports that the ulcer pain began to gradually worsen since her last hospital visit, where she was d/c with vancomycin. The foot pain is sharp, 10/10, and radiates up to her calf. The patient took two puffs of her Symbicort for the SOB with no relief, but it resolved spontaneously within a few minutes. The chest pain began gradually and was described as a midsternal, 8/10, tightness with no radiation and accompanied by palpitations. Of note, the patient reports chronic cough, b/l LE edema, JACKSON with walking 1/2 block, orthopnea x1 pillow, occasional PND, melena x weeks, easy bruising/bleeding, and 10 lbs weight loss over the past 6 months with no change in appetite. The patient denies fever, chills, SOB at rest, diaphoresis, dizziness, claudication, wheezing, changes in vision and hearing, abdominal pain, change in bowel and urinary habits, dysuria, hematuria.

## 2019-01-17 NOTE — CONSULT NOTE ADULT - SUBJECTIVE AND OBJECTIVE BOX
[facility] NEPHROLOGY- CONSULTATION NOTE    Patient is a 78y Female whom presented to the hospital with CP, SOB and non healing foot ulcer.  Nephrology consulted for elevated Cr. Of note patient recently hospitalized and experienced IGOR with max Cr 2.30.  Baseline appears to be 1.4-1.6 per chart review.  Patient on Lasix 60mg once daily as an outpatient     PAST MEDICAL & SURGICAL HISTORY:  CKD3  MI, old: mild as per pt  Anxiety  TIA (transient ischemic attack): many years ago  PAF (paroxysmal atrial fibrillation)  HLD (hyperlipidemia)  HTN (hypertension)  History of cataract surgery: b/l  History of repair of hip fracture: luisa placed in RLE  H/O spinal fusion: c2-6 in 1/17    No Known Allergies    Home Medications Reviewed  Hospital Medications:   MEDICATIONS  (STANDING):  furosemide   Injectable 40 milliGRAM(s) IV Push every 12 hours  pantoprazole  Injectable 40 milliGRAM(s) IV Push two times a day    SOCIAL HISTORY:  Denies ETOh,Smoking,   FAMILY HISTORY:  No pertinent family history in first degree relatives    REVIEW OF SYSTEMS:  CONSTITUTIONAL: No weakness, fevers or chills  EYES/ENT: No visual changes;  No vertigo or throat pain   NECK: No pain or stiffness  RESPIRATORY: No cough, wheezing, hemoptysis; No shortness of breath  CARDIOVASCULAR: No chest pain or palpitations.  GASTROINTESTINAL: No abdominal or epigastric pain. No nausea, vomiting, or hematemesis; No diarrhea or constipation. No melena or hematochezia.  GENITOURINARY: No dysuria, frequency, foamy urine, urinary urgency, incontinence or hematuria  NEUROLOGICAL: No numbness or weakness  SKIN: No itching, burning, rashes, or lesions   VASCULAR: No bilateral lower extremity edema.   All other review of systems is negative unless indicated above.    VITALS:  T(F): 95.9 (01-17-19 @ 13:03), Max: 96.8 (01-17-19 @ 10:45)  HR: 112 (01-17-19 @ 13:03)  BP: 105/58 (01-17-19 @ 13:03)  RR: 16 (01-17-19 @ 12:03)  SpO2: 100% (01-17-19 @ 12:03)  Wt(kg): --      PHYSICAL EXAM:  GENERAL: NAD, appears chronically ill.   HEAD:  Atraumatic, Normocephalic EOMI   LUNGS Clear and dec B/l   HEART: irregular rate and rhythm  ABDOMEN: Soft, Nontender, mild distention; Bowel sounds present, no rebound tenderness  EXTREMITIES:  2+ B/L LE edema , No clubbing, cyanosis.   PSYCHIATRIC: Appropriate affect and mood      LABS:  01-17    134<L>  |  96<L>  |  36<H>  ----------------------------<  92  4.1   |  26  |  1.68<H>    Ca    8.6      17 Jan 2019 06:10    TPro  6.4  /  Alb  2.9<L>  /  TBili  0.6  /  DBili      /  AST  15  /  ALT  10  /  AlkPhos  115  01-17    Creatinine Trend: 1.68 <--, 1.53 <--, 1.57 <--, 1.58 <--, 1.57 <--, 1.75 <--                        8.0    7.82  )-----------( 241      ( 17 Jan 2019 09:20 )             25.6       Rapid Respiratory Viral Panel (01.17.19 @ 09:50)    Adenovirus (RapRVP): Not Detected    Influenza A (RapRVP): Not Detected    Influenza AH1 2009 (RapRVP): Not Detected    Influenza AH1 (RapRVP): Not Detected    Influenza AH3 (RapRVP): Not Detected    Influenza B (RapRVP): Not Detected    Parainfluenza 1 (RapRVP): Not Detected    Parainfluenza 2 (RapRVP): Not Detected    Parainfluenza 3 (RapRVP): Not Detected    Parainfluenza 4 (RapRVP): Not Detected    Resp Syncytial Virus (RapRVP): Not Detected    Chlamydia pneumoniae (RapRVP): Not Detected    Mycoplasma pneumoniae (RapRVP): Not Detected    Entero/Rhinovirus (RapRVP): Not Detected    hMPV (RapRVP): Not Detected    Coronavirus (229E,HKU1,NL63,OC43): Not Detected This Respiratory Panel uses polymerase chain reaction (PCR)  to detect for adenovirus; coronavirus (HKU1, NL63, 229E,  OC43); human metapneumovirus (hMPV); human  enterovirus/rhinovirus (Entero/RV); influenza A; influenza  A/H1: influenza A/H3; influenza A/H1-2009; influenza B;  parainfluenza viruses 1,2,3,4; respiratory syncytial virus;  Mycoplasma pneumoniae; and Chlamydophila pneumoniae.      Urine Studies:      RADIOLOGY & ADDITIONAL STUDIES:    < from: Xray Chest 1 View- PORTABLE-Urgent (01.17.19 @ 06:48) >  EXAM:  XR CHEST PORTABLE URGENT 1V        PROCEDURE DATE:  Jan 17 2019         INTERPRETATION:  CLINICAL INDICATION: Chest pain    TECHNIQUE: Frontal view of the chest dated 1/17/2019    COMPARISON: X-Ray of the chest dated 1/4/2019    FINDINGS:  Increased fine reticular opacities likely pulmonary edema. No focal   consolidations. No pleural effusion or pneumothorax. Cardiac silhouette   is not easily evaluated on this view but appears unchanged. Degenerative   disease of the thoracic spine.    IMPRESSION:   Mild interstitial edema.                LAKE THOMAS M.D., RADIOLOGY RESIDENT  This document has been electronically signed.  NAE AHN M.D., ATTENDING RADIOLOGIST  This document has been electronically signed. Jan 17 2019  6:55AM        < end of copied text >  < from: 12 Lead ECG (01.17.19 @ 06:11) >  Ventricular Rate 102 BPM    Atrial Rate 326 BPM    QRS Duration 88 ms    Q-T Interval 384 ms    QTC Calculation(Bezet) 500 ms    R Axis 44 degrees    T Axis 175 degrees    Diagnosis Line Atrial fibrillation with rapid ventricular response  ST & T wave abnormality, consider lateral ischemia  Abnormal ECG    < end of copied text > [facility] NEPHROLOGY- CONSULTATION NOTE    Patient is a 78y Female whom presented to the hospital with CP, SOB and non healing foot ulcer.  Nephrology consulted for elevated Cr. Of note patient recently hospitalized and experienced IGOR with max Cr 2.30.  Baseline appears to be 1.4-1.6 per chart review.  Patient on Lasix 60mg once daily as an outpatient     PAST MEDICAL & SURGICAL HISTORY:  CKD3  MI, old: mild as per pt  Anxiety  TIA (transient ischemic attack): many years ago  PAF (paroxysmal atrial fibrillation)  HLD (hyperlipidemia)  HTN (hypertension)  History of cataract surgery: b/l  History of repair of hip fracture: luisa placed in RLE  H/O spinal fusion: c2-6 in 1/17    No Known Allergies    Home Medications Reviewed  Hospital Medications:   MEDICATIONS  (STANDING):  furosemide   Injectable 40 milliGRAM(s) IV Push every 12 hours  pantoprazole  Injectable 40 milliGRAM(s) IV Push two times a day    SOCIAL HISTORY:  Denies ETOh,Smoking,   FAMILY HISTORY:  No pertinent family history in first degree relatives    REVIEW OF SYSTEMS:  CONSTITUTIONAL: + weakness. No  fevers or chills  EYES/ENT: No visual changes;  No vertigo or throat pain   NECK: No pain or stiffness  RESPIRATORY: No cough, wheezing, hemoptysis; No shortness of breath at present   CARDIOVASCULAR: No chest pain or palpitations.  GASTROINTESTINAL: No abdominal or epigastric pain. No nausea, vomiting, or hematemesis; No diarrhea or constipation. No melena or hematochezia.  GENITOURINARY: No dysuria, frequency, foamy urine, urinary urgency, incontinence or hematuria  NEUROLOGICAL: No numbness or weakness  SKIN: No itching, burning, rashes, or lesions   VASCULAR: + lower extremity edema. + right foot pain   All other review of systems is negative unless indicated above.    VITALS:  T(F): 95.9 (01-17-19 @ 13:03), Max: 96.8 (01-17-19 @ 10:45)  HR: 112 (01-17-19 @ 13:03)  BP: 105/58 (01-17-19 @ 13:03)  RR: 16 (01-17-19 @ 12:03)  SpO2: 100% (01-17-19 @ 12:03)  Wt(kg): --      PHYSICAL EXAM:  GENERAL: NAD, appears chronically ill.   HEAD:  Atraumatic, Normocephalic EOMI   LUNGS Clear and dec B/l   HEART: irregular rate and rhythm  ABDOMEN: Soft, Nontender, mild distention; Bowel sounds present, no rebound tenderness  EXTREMITIES: 1- 2+ B/L LE edema , No clubbing, cyanosis. Right foot with dressing c/d/i   PSYCHIATRIC: Appropriate affect and mood      LABS:  01-17    134<L>  |  96<L>  |  36<H>  ----------------------------<  92  4.1   |  26  |  1.68<H>    Ca    8.6      17 Jan 2019 06:10    TPro  6.4  /  Alb  2.9<L>  /  TBili  0.6  /  DBili      /  AST  15  /  ALT  10  /  AlkPhos  115  01-17    Creatinine Trend: 1.68 <--, 1.53 <--, 1.57 <--, 1.58 <--, 1.57 <--, 1.75 <--                        8.0    7.82  )-----------( 241      ( 17 Jan 2019 09:20 )             25.6     Occult Blood, Feces (01.17.19 @ 08:45)    Occult Blood: POSITIVE: ** Results**    Positive Control = Positive  Negative Control = Negative      Rapid Respiratory Viral Panel (01.17.19 @ 09:50)    Adenovirus (RapRVP): Not Detected    Influenza A (RapRVP): Not Detected    Influenza AH1 2009 (RapRVP): Not Detected    Influenza AH1 (RapRVP): Not Detected    Influenza AH3 (RapRVP): Not Detected    Influenza B (RapRVP): Not Detected    Parainfluenza 1 (RapRVP): Not Detected    Parainfluenza 2 (RapRVP): Not Detected    Parainfluenza 3 (RapRVP): Not Detected    Parainfluenza 4 (RapRVP): Not Detected    Resp Syncytial Virus (RapRVP): Not Detected    Chlamydia pneumoniae (RapRVP): Not Detected    Mycoplasma pneumoniae (RapRVP): Not Detected    Entero/Rhinovirus (RapRVP): Not Detected    hMPV (RapRVP): Not Detected    Coronavirus (229E,HKU1,NL63,OC43): Not Detected This Respiratory Panel uses polymerase chain reaction (PCR)  to detect for adenovirus; coronavirus (HKU1, NL63, 229E,  OC43); human metapneumovirus (hMPV); human  enterovirus/rhinovirus (Entero/RV); influenza A; influenza  A/H1: influenza A/H3; influenza A/H1-2009; influenza B;  parainfluenza viruses 1,2,3,4; respiratory syncytial virus;  Mycoplasma pneumoniae; and Chlamydophila pneumoniae.      Urine Studies:      RADIOLOGY & ADDITIONAL STUDIES:    < from: Xray Chest 1 View- PORTABLE-Urgent (01.17.19 @ 06:48) >  EXAM:  XR CHEST PORTABLE URGENT 1V        PROCEDURE DATE:  Jan 17 2019         INTERPRETATION:  CLINICAL INDICATION: Chest pain    TECHNIQUE: Frontal view of the chest dated 1/17/2019    COMPARISON: X-Ray of the chest dated 1/4/2019    FINDINGS:  Increased fine reticular opacities likely pulmonary edema. No focal   consolidations. No pleural effusion or pneumothorax. Cardiac silhouette   is not easily evaluated on this view but appears unchanged. Degenerative   disease of the thoracic spine.    IMPRESSION:   Mild interstitial edema.                LAKE THOMAS M.D., RADIOLOGY RESIDENT  This document has been electronically signed.  NAE AHN M.D., ATTENDING RADIOLOGIST  This document has been electronically signed. Jan 17 2019  6:55AM        < end of copied text >  < from: 12 Lead ECG (01.17.19 @ 06:11) >  Ventricular Rate 102 BPM    Atrial Rate 326 BPM    QRS Duration 88 ms    Q-T Interval 384 ms    QTC Calculation(Bezet) 500 ms    R Axis 44 degrees    T Axis 175 degrees    Diagnosis Line Atrial fibrillation with rapid ventricular response  ST & T wave abnormality, consider lateral ischemia  Abnormal ECG    < end of copied text >

## 2019-01-17 NOTE — H&P ADULT - PROBLEM SELECTOR PLAN 4
Continue vancomycin as prescribed.   Wound care nurse consult.  Consider ID consult. CHADS2 score=5  Holding PRADAXA for now due to Acute GI bleed. Waiting for GI and Cardiac clearance.  Meanwhile c/w metoprolol.

## 2019-01-17 NOTE — ADVANCED PRACTICE NURSE CONSULT - RECOMMEDATIONS
Recommend follow up care at [facility] ([phone], [address] [place] [address]).     Recommend nutrition consult.    Right posterior lower leg: Cleanse with SAF-clens, rinse with NS, pat dry. Apply Liquid barrier film to periwound skin. Apply Collagenase (nickel thickness application), cover with foam with border. Change daily.    B/L heels: Apply Liquid barrier film to periwound skin and injury. Cover with ABD (Combine) pad and secure with Kerlix. Change daily.     Continue low air loss bed therapy, continue heel elevation, continue to turn & reposition q2h, continue measures to decrease friction/shear/pressure.    Please call wound care service line is further assistance is needed ([phone]).

## 2019-01-17 NOTE — H&P ADULT - NEGATIVE MUSCULOSKELETAL SYMPTOMS
fall precautions no muscle cramps/no muscle weakness/no arthritis/no myalgia/no neck pain/"arthritis all over"

## 2019-01-17 NOTE — CONSULT NOTE ADULT - ASSESSMENT
Patient is a 78y Female whom presented to the hospital with CP, SOB and non healing foot ulcer.  Nephrology consulted for elevated Cr. Of note patient recently hospitalized and experienced IGOR with max Cr 2.30.  Baseline appears to be 1.4-1.6 per chart review.  Patient on Lasix 60mg once daily as an outpatient

## 2019-01-17 NOTE — H&P ADULT - PROBLEM SELECTOR PLAN 3
Start furosemide. Continue vancomycin as prescribed.   Wound care nurse consult.  Consider ID consult.

## 2019-01-17 NOTE — H&P ADULT - NEGATIVE OPHTHALMOLOGIC SYMPTOMS
no blurred vision L/no pain R/no irritation L/no irritation R/no loss of vision L/no loss of vision R/no lacrimation L/no lacrimation R/no discharge R/no pain L/no diplopia/no photophobia/no discharge L/no blurred vision R

## 2019-01-17 NOTE — H&P ADULT - PROBLEM SELECTOR PLAN 7
Monitor BP, patient has been hypotensive in double digits, give fluids.  Consider holding metoprolol until patient's BP improves.  Adjust meds as needed. Monitor BP daily  DASH diet  Continue bp meds  Adjust meds as needed.

## 2019-01-17 NOTE — H&P ADULT - PROBLEM SELECTOR PLAN 1
Admitted to tele r/o ACS  Serial EKGs, monitor Maria G.  Continue diltiazem. tele monitor  cardiac enzymes x 2 to R/O ACS.  serial EKGs  2G Sodium 1800 ADA diet with 1500 cc Fluid restriction  Strict I&Os plus Daily weights.  Lasix 40mg IV Q12   cardio consult

## 2019-01-17 NOTE — ED PROVIDER NOTE - MEDICAL DECISION MAKING DETAILS
pt with CAD with recent stent with CP, non ischemic ekg, recent hospitalization + RLE pain possible PO will CTPA, r/o ACS.

## 2019-01-17 NOTE — ED PROVIDER NOTE - CARE PLAN
Principal Discharge DX:	Chest pain Principal Discharge DX:	Paroxysmal atrial fibrillation  Assessment and plan of treatment:	Plan for admission at this time for PAF and anemia.  Secondary Diagnosis:	Anemia

## 2019-01-17 NOTE — H&P ADULT - NEUROLOGICAL DETAILS
sensation intact/normal strength/no spontaneous movement/cranial nerves intact/alert and oriented x 3/responds to verbal commands

## 2019-01-17 NOTE — H&P ADULT - CARDIOVASCULAR SYMPTOMS
chest pain/palpitations/dyspnea on exertion/peripheral edema/claudication/paroxysmal nocturnal dyspnea/orthopnea

## 2019-01-17 NOTE — H&P ADULT - ASSESSMENT
79 y/o female, with a PmHx of COPD (on 2-3L O2 prn), paroxsymal a-fib (on pradaxa), HTN, HLD, and anxiety, presenting to the [facility] ED with right foot pressure ulcer pain, SOB, and CP, found to have Afib @102 bpm, H/H 8/24.7, mild interstitial pulmonary edema, proBNP 1374, guiac positive, BUN/Cr 36/1.68, admitted to telemetry for nonhealing pressure ulcer, Afib, CHF exacerbation, r/o ACS.

## 2019-01-17 NOTE — ED ADULT NURSE NOTE - OBJECTIVE STATEMENT
alert oriented c/o 7/10 non radiating mid chest Pain    afib on scope  no c/o dizziness appears SOB at rest sat 99%on 3 LPM laying in semi fowlers position   also c/o severe right leg pain  has paulette LE 2-3 + edema R>L  right lower leg is reddened seen by Dr Carlson

## 2019-01-17 NOTE — H&P ADULT - NEGATIVE NEUROLOGICAL SYMPTOMS
no vertigo/no loss of sensation/no transient paralysis/no weakness/no generalized seizures/no loss of consciousness/no paresthesias/no syncope/no focal seizures/no tremors/no headache

## 2019-01-17 NOTE — ADVANCED PRACTICE NURSE CONSULT - ASSESSMENT
A&Ox3, continent of urine and stool. Skin warm, dry with increased moisture in intertriginous folds, adequate skin turgor, scattered areas of ecchymosis on bilateral upper extremities. Blanchable erythema on bilateral heels.    Right heel Stage 1 Pressure Injury measures [id number]. 100% nonblanchable erythema. No induration, no increased warmth, no erythema. No drainage. Goal of care: monitor for changes in tissue type.    Left heel Stage 1 Pressure Injury measures [id number]. 100% nonblanchable erythema. No induration, no increased warmth, no erythema. No drainage. Goal of care: monitor for changes in tissue type.    Right posterior lower leg wound from shoe as per history, seen by Podiatry services on previous admission and had VNS once a day for dressing change at home: measures 1cmx1.5cmx0.5cm. Wound base tissue type 40% slough, yellow, moist firmly attached to wound base, 60% flat, pale pink, moist tissue. Scant serous drainage, no odor. Periwound skin no induration, no increased warmth, no erythema. Goal of care: enzymatic debridement of necrotic tissue, monitor for changes in tissue type.

## 2019-01-17 NOTE — CONSULT NOTE ADULT - ASSESSMENT
Impression:  1) Anemia - with reported melanotic stools and drop in H/H however     Recommendations:   - would send Iron studies, B12, folate, retic count   - monitor H/H   - transfuse as needed Impression:  1) Anemia - with reported melanotic stools and drop in H/H however patient is HD stable and has an elevated pro--BNP and noted to be fluid overloaded and in acute on chronic heart failure per cardiology NP note.  DDx for GIB includes PUD, esophagitis gastritis, angiectasias malignancy.    Recommendations:   - would send Iron studies, B12, folate, retic count   - monitor H/H   - transfuse as needed   - IV PPI BID   - would optimize patient from a cardiac standpoint before proceeding to endoscopy at this time    Danay Loera, PGY-4  Gastroenterology Fellow  Pager x [phone] or [phone]  (After 5 pm or on weekends please page GI on call) Impression:  1) Anemia - with reported melanotic stools and drop in H/H however patient is HD stable and has an elevated pro--BNP and noted to be fluid overloaded and in acute on chronic heart failure per cardiology NP note.  DDx for GIB includes PUD, esophagitis gastritis, angiectasias malignancy.  Patient is not optimized for endoscopic intervention at this time, in addition the patient is refusing endoscopic intervention at this time.    Recommendations:   - would send Iron studies, B12, folate, retic count   - monitor H/H   - transfuse as needed   - IV PPI BID   - no plans for endoscopic intervention    Danay Loera, PGY-4  Gastroenterology Fellow  Pager x [phone] or [phone]  (After 5 pm or on weekends please page GI on call)

## 2019-01-17 NOTE — H&P ADULT - PROBLEM SELECTOR PLAN 2
2 units packed RBCs.  Continue plavix due to recent stent. Consider holding pradaxa due to GI bleed.   CT to determine source of GI bleed. Started on Protonix 40mg Q12h  GI consult  HOLDING PRADAXA for now until cardiac and GI clearance  Transfusing PRBC x1 unit  CBC Q6h

## 2019-01-17 NOTE — H&P ADULT - PROBLEM SELECTOR PROBLEM 4
Ulcer of heel, right, with fat layer exposed Chronic atrial fibrillation with rapid ventricular response

## 2019-01-17 NOTE — PROGRESS NOTE ADULT - SUBJECTIVE AND OBJECTIVE BOX
vss  ncat  s1s2  cta  soft nt nd  ct with colitis-  positive c.diff  guiac positive likely this.   full consult to follow.

## 2019-01-17 NOTE — ED ADULT NURSE NOTE - CHIEF COMPLAINT QUOTE
Pt. with hx of a fib. brought in by EMS c/o chest pain, SOB, dizziness, and right leg pain. 20 gauge IV inserted in right forearm by EMS with Lactated Ringers infusing. 162 mg of aspirin given by EMS prior to arrival. Pt. is hypotensive at present, pt. is awake and alert at present. Unable to obtain temperature in triage. Charge RN made aware. EKG in progress.    Addendum: Pt. placed on 4L O2 via nasal cannula, O2 saturation 100%. Awaiting room assignment.

## 2019-01-17 NOTE — ED PROVIDER NOTE - ATTENDING CONTRIBUTION TO CARE
MD Daugherty:  I performed a face to face bedside interview with patient regarding history of present illness, review of symptoms and past medical history. I completed an independent physical exam(documented below).  I have discussed patient's plan of care with resident.   I agree with note as stated above, having amended the EMR as needed to reflect my findings. I have discussed the assessment and plan of care.  This includes during the time I functioned as the attending physician for this patient.  PE:  Gen: Alert, NAD  Head: NC, AT,  EOMI, normal lids/conjunctiva  ENT:  normal hearing, patent oropharynx without erythema/exudate  Neck: +supple, no tenderness/meningismus/JVD, +Trachea midline  Chest: no chest wall tenderness, equal chest rise  Pulm: Bilateral BS, normal resp effort, crackles in posterior bases  CV: tachy, no M/R/G, +dist pulses  Abd: +BS, soft, NT/ND  Rectal: deferred  Mskel: no edema/erythema/cyanosis, RLE stage 3 decub on ankle  Skin: decub as described above  Neuro: AAOx3  MDM:  79yo F w/ pmh of htn, hld, chf, copd, afib on pradaxa, CAD s/p stent placement 1 month ago at OSH, c/o retrosternal chest pain/pressure that awoke her from sleep a few hours ago, associated w/ sob, non-radiating, no diaphoresis, non-exertional. On ROS, reports pain at site of RLE foot ulcer. Ddx includes ACS 2/2 to stent occlusion vs copd exacerbation vs PE. Labs, imaging, admit.

## 2019-01-17 NOTE — H&P ADULT - PROBLEM SELECTOR PLAN 9
Continue plavix, consider holding pradaxa due to GI bleed. Pradaxa on hold for GI bleeds  Intermittent Pneumatic Compression Device for now

## 2019-01-17 NOTE — H&P ADULT - GASTROINTESTINAL DETAILS
no bruit/soft/no distention/no guarding/no rigidity/nontender/no masses palpable/normal/bowel sounds normal

## 2019-01-17 NOTE — ED PROVIDER NOTE - PROGRESS NOTE DETAILS
Hospitalist paged for admission. Patient with afib,  on pradaxa, came in with low BP, but historically BP has been in the 110's/70's, over the last month H/H has been dropping, guaiac performed, brown stool, but guaiac positive. , BP 90/56, cxr shows mild failure, pt is in no distress, plan is to give blood consult cardiology/ccu/micu. Joe JYOTI Note: pt persistently hypotensive, Hct drop noted since last admission, on pradaxa, rapid afib. IVC minimally collapsible. Pt likely hypotensive/tachy 2/2 blood loss however will require close obs and slow transfusion/resuscitation given e/o fluid overload. CCU consulted for further assistance in working up ACS and to determine if we should reverse pradaxa despite stent placement 6wks ago and afib. PA Solomon- Patient seen and evaluated by CCU, BP improved at bedside, declined CCU admission. States will talk to her attending but recommends admission to tele. Spoke with Dr. Vieyra regarding patient, states patient can be admitted to his service. GI consulted. CHENCHO Solomon- Spoke with GI, aware of case and state they will evaluate patient. Consent for blood transfusion obtained, risk/benefits discussed. Pt agreeable to blood transfusion. No complaints noted.

## 2019-01-17 NOTE — ADVANCED PRACTICE NURSE CONSULT - REASON FOR CONSULT
Patient seen on skin care rounds after wound care referral received for assessment of skin impairment and recommendations of topical management of right ankle wound. Chart reviewed: Serum albumin 2.9gdL, WBC 7.82, Hgb/Hct 8.0/25.6, BMI 22.9kg/m2, patient interviewed: reports having dressing changed once a day at home. Patient H/O  COPD (on 2-3L O2 prn), paroxsymal a-fib (on pradaxa), CAD (s/p stent 11/2018), CHF, HTN, HLD, and anxiety, presenting to the [facility] ED with right foot pressure ulcer pain x several weeks. The patient has a dime-sized ulcer with scant serosanguinous drainage on her right calcaneous that she acquired from a shoe weeks ago. She reports that she woke up last night around 4am with excruciating, unrelenting right root pain with SOB x a few minutes followed by chest pain x several hours. The patient reports that the ulcer pain began to gradually worsen since her last hospital visit, where she was d/c with vancomycin. Admitted with paroxsymal atrial fibrillation and GI bleed.

## 2019-01-17 NOTE — H&P ADULT - PMH
Anxiety    CAD (coronary artery disease)  s/p stent 2018  CHF (congestive heart failure)    COPD (chronic obstructive pulmonary disease)  on 2-3L O2 at home prn  HLD (hyperlipidemia)    HTN (hypertension)    PAF (paroxysmal atrial fibrillation)    TIA (transient ischemic attack)  many years ago

## 2019-01-17 NOTE — ED PROVIDER NOTE - OBJECTIVE STATEMENT
78F with HLD, HTN, CHF, COPD Afib on pradaxa who p/w chest pain and shortness of breath. Sternal. Non radiating. Awoke pt from sleep. Associated with worsening SOB.  Pt also complains of chronic RLE pain at sight of right leg ulcer. No hx of PE or DVT. Mild dry cough, as baseline. No dysuria, frequency, or urgency.

## 2019-01-17 NOTE — H&P ADULT - NSHPLABSRESULTS_GEN_ALL_CORE
8.0    7.82  )-----------( 241      ( 17 Jan 2019 09:20 )             25.6     01-17    134<L>  |  96<L>  |  36<H>  ----------------------------<  92  4.1   |  26  |  1.68<H>    Ca    8.6      17 Jan 2019 06:10    TPro  6.4  /  Alb  2.9<L>  /  TBili  0.6  /  DBili  x   /  AST  15  /  ALT  10  /  AlkPhos  115  01-17      EKG: Natasha @ 102bpm TWI I, AVL, V5-V6.

## 2019-01-17 NOTE — H&P ADULT - MUSCULOSKELETAL
details… detailed exam ROM intact/normal/no joint erythema/no joint warmth/normal strength/no joint swelling

## 2019-01-17 NOTE — H&P ADULT - PSH
H/O spinal fusion  c2-6 in 2017  H/O total hysterectomy  2014  History of cataract surgery  b/l 2015  History of repair of hip fracture  luisa placed in RLE 2015

## 2019-01-17 NOTE — ED ADULT TRIAGE NOTE - CHIEF COMPLAINT QUOTE
Pt. brought in by EMS c/o chest pain, SOB, and right leg pain. 20 gauge IV inserted in right forearm by EMS with Lactated Ringers infusing. 162 mg of aspirin given by EMS prior to arrival. Pt. is hypotensive at present, pt. is awake and alert at present. Charge RN made aware. EKG in progress. Pt. with hx of a fib. brought in by EMS c/o chest pain, SOB, dizziness, and right leg pain. 20 gauge IV inserted in right forearm by EMS with Lactated Ringers infusing. 162 mg of aspirin given by EMS prior to arrival. Pt. is hypotensive at present, pt. is awake and alert at present. Charge RN made aware. EKG in progress.    Addendum: Pt. placed on 4L O2 via nasal cannula, O2 saturation 100%. Awaiting room assignment. Pt. with hx of a fib. brought in by EMS c/o chest pain, SOB, dizziness, and right leg pain. 20 gauge IV inserted in right forearm by EMS with Lactated Ringers infusing. 162 mg of aspirin given by EMS prior to arrival. Pt. is hypotensive at present, pt. is awake and alert at present. Unable to obtain temperature in triage. Charge RN made aware. EKG in progress.    Addendum: Pt. placed on 4L O2 via nasal cannula, O2 saturation 100%. Awaiting room assignment.

## 2019-01-17 NOTE — H&P ADULT - RS GEN PE MLT RESP DETAILS PC
no chest wall tenderness/no rhonchi/good air movement/respirations non-labored/no wheezes/airway patent/rales/breath sounds equal/no intercostal retractions

## 2019-01-18 DIAGNOSIS — N17.9 ACUTE KIDNEY FAILURE, UNSPECIFIED: ICD-10-CM

## 2019-01-18 PROBLEM — I25.10 ATHEROSCLEROTIC HEART DISEASE OF NATIVE CORONARY ARTERY WITHOUT ANGINA PECTORIS: Chronic | Status: ACTIVE | Noted: 2019-01-17

## 2019-01-18 PROBLEM — I25.2 OLD MYOCARDIAL INFARCTION: Chronic | Status: INACTIVE | Noted: 2018-01-29 | Resolved: 2019-01-17

## 2019-01-18 LAB
ANION GAP SERPL CALC-SCNC: 13 MMO/L — SIGNIFICANT CHANGE UP (ref 7–14)
APPEARANCE UR: SIGNIFICANT CHANGE UP
BACTERIA # UR AUTO: SIGNIFICANT CHANGE UP
BILIRUB UR-MCNC: NEGATIVE — SIGNIFICANT CHANGE UP
BLOOD UR QL VISUAL: HIGH
BUN SERPL-MCNC: 39 MG/DL — HIGH (ref 7–23)
CALCIUM SERPL-MCNC: 8.6 MG/DL — SIGNIFICANT CHANGE UP (ref 8.4–10.5)
CHLORIDE SERPL-SCNC: 96 MMOL/L — LOW (ref 98–107)
CO2 SERPL-SCNC: 26 MMOL/L — SIGNIFICANT CHANGE UP (ref 22–31)
COLOR SPEC: YELLOW — SIGNIFICANT CHANGE UP
CREAT ?TM UR-MCNC: 65.5 MG/DL — SIGNIFICANT CHANGE UP
CREAT SERPL-MCNC: 2.18 MG/DL — HIGH (ref 0.5–1.3)
GLUCOSE SERPL-MCNC: 87 MG/DL — SIGNIFICANT CHANGE UP (ref 70–99)
GLUCOSE UR-MCNC: NEGATIVE — SIGNIFICANT CHANGE UP
HCT VFR BLD CALC: 30.7 % — LOW (ref 34.5–45)
HGB BLD-MCNC: 9.7 G/DL — LOW (ref 11.5–15.5)
HYALINE CASTS # UR AUTO: SIGNIFICANT CHANGE UP
KETONES UR-MCNC: NEGATIVE — SIGNIFICANT CHANGE UP
LEUKOCYTE ESTERASE UR-ACNC: SIGNIFICANT CHANGE UP
MCHC RBC-ENTMCNC: 28.7 PG — SIGNIFICANT CHANGE UP (ref 27–34)
MCHC RBC-ENTMCNC: 31.6 % — LOW (ref 32–36)
MCV RBC AUTO: 90.8 FL — SIGNIFICANT CHANGE UP (ref 80–100)
NITRITE UR-MCNC: POSITIVE — HIGH
NRBC # FLD: 0 K/UL — LOW (ref 25–125)
PH UR: 6 — SIGNIFICANT CHANGE UP (ref 5–8)
PLATELET # BLD AUTO: 247 K/UL — SIGNIFICANT CHANGE UP (ref 150–400)
PMV BLD: 10.4 FL — SIGNIFICANT CHANGE UP (ref 7–13)
POTASSIUM SERPL-MCNC: 4.6 MMOL/L — SIGNIFICANT CHANGE UP (ref 3.5–5.3)
POTASSIUM SERPL-SCNC: 4.6 MMOL/L — SIGNIFICANT CHANGE UP (ref 3.5–5.3)
PROT UR-MCNC: 20 — SIGNIFICANT CHANGE UP
PROT UR-MCNC: 35.8 MG/DL — SIGNIFICANT CHANGE UP
RBC # BLD: 3.38 M/UL — LOW (ref 3.8–5.2)
RBC # FLD: 17.9 % — HIGH (ref 10.3–14.5)
RBC CASTS # UR COMP ASSIST: HIGH (ref 0–?)
SODIUM SERPL-SCNC: 135 MMOL/L — SIGNIFICANT CHANGE UP (ref 135–145)
SP GR SPEC: 1.01 — SIGNIFICANT CHANGE UP (ref 1–1.04)
SQUAMOUS # UR AUTO: SIGNIFICANT CHANGE UP
UROBILINOGEN FLD QL: NORMAL — SIGNIFICANT CHANGE UP
UUN UR-MCNC: 360.6 MG/DL — SIGNIFICANT CHANGE UP
WBC # BLD: 8.28 K/UL — SIGNIFICANT CHANGE UP (ref 3.8–10.5)
WBC # FLD AUTO: 8.28 K/UL — SIGNIFICANT CHANGE UP (ref 3.8–10.5)
WBC UR QL: >50 — HIGH (ref 0–?)

## 2019-01-18 PROCEDURE — 99222 1ST HOSP IP/OBS MODERATE 55: CPT | Mod: GC

## 2019-01-18 RX ORDER — ACETAMINOPHEN 500 MG
1000 TABLET ORAL ONCE
Qty: 0 | Refills: 0 | Status: COMPLETED | OUTPATIENT
Start: 2019-01-18 | End: 2019-01-18

## 2019-01-18 RX ORDER — DIGOXIN 250 MCG
0.25 TABLET ORAL EVERY 6 HOURS
Qty: 0 | Refills: 0 | Status: COMPLETED | OUTPATIENT
Start: 2019-01-18 | End: 2019-01-18

## 2019-01-18 RX ORDER — FUROSEMIDE 40 MG
40 TABLET ORAL DAILY
Qty: 0 | Refills: 0 | Status: DISCONTINUED | OUTPATIENT
Start: 2019-01-18 | End: 2019-01-21

## 2019-01-18 RX ADMIN — TRAMADOL HYDROCHLORIDE 50 MILLIGRAM(S): 50 TABLET ORAL at 06:05

## 2019-01-18 RX ADMIN — Medication 1 MILLIGRAM(S): at 11:19

## 2019-01-18 RX ADMIN — BUDESONIDE AND FORMOTEROL FUMARATE DIHYDRATE 2 PUFF(S): 160; 4.5 AEROSOL RESPIRATORY (INHALATION) at 09:52

## 2019-01-18 RX ADMIN — Medication 125 MILLIGRAM(S): at 11:19

## 2019-01-18 RX ADMIN — TRAMADOL HYDROCHLORIDE 50 MILLIGRAM(S): 50 TABLET ORAL at 06:35

## 2019-01-18 RX ADMIN — Medication 125 MILLIGRAM(S): at 06:05

## 2019-01-18 RX ADMIN — Medication 0.25 MILLIGRAM(S): at 18:04

## 2019-01-18 RX ADMIN — Medication 1000 MILLIGRAM(S): at 00:45

## 2019-01-18 RX ADMIN — PANTOPRAZOLE SODIUM 40 MILLIGRAM(S): 20 TABLET, DELAYED RELEASE ORAL at 06:04

## 2019-01-18 RX ADMIN — Medication 0.25 MILLIGRAM(S): at 11:18

## 2019-01-18 RX ADMIN — Medication 12.5 MILLIGRAM(S): at 08:21

## 2019-01-18 RX ADMIN — TRAMADOL HYDROCHLORIDE 50 MILLIGRAM(S): 50 TABLET ORAL at 18:45

## 2019-01-18 RX ADMIN — ATORVASTATIN CALCIUM 40 MILLIGRAM(S): 80 TABLET, FILM COATED ORAL at 21:04

## 2019-01-18 RX ADMIN — Medication 400 MILLIGRAM(S): at 00:30

## 2019-01-18 RX ADMIN — TRAMADOL HYDROCHLORIDE 50 MILLIGRAM(S): 50 TABLET ORAL at 17:58

## 2019-01-18 RX ADMIN — BUDESONIDE AND FORMOTEROL FUMARATE DIHYDRATE 2 PUFF(S): 160; 4.5 AEROSOL RESPIRATORY (INHALATION) at 21:05

## 2019-01-18 RX ADMIN — CLOPIDOGREL BISULFATE 75 MILLIGRAM(S): 75 TABLET, FILM COATED ORAL at 11:21

## 2019-01-18 RX ADMIN — Medication 12.5 MILLIGRAM(S): at 17:58

## 2019-01-18 RX ADMIN — PANTOPRAZOLE SODIUM 40 MILLIGRAM(S): 20 TABLET, DELAYED RELEASE ORAL at 17:59

## 2019-01-18 NOTE — PROGRESS NOTE ADULT - ASSESSMENT
78y Female with history of CHF, COPD presents with SOB found to have guaiac positive anemia and afib with RVR. Nephrology consulted for elevated Scr.

## 2019-01-18 NOTE — PROGRESS NOTE ADULT - SUBJECTIVE AND OBJECTIVE BOX
[facility] NEPHROLOGY- PROGRESS NOTE    78y Female with history of CHF, COPD presents with SOB found to have guaiac positive anemia and afib with RVR. Nephrology consulted for elevated Scr.    REVIEW OF SYSTEMS:  Gen: no changes in weight  Cards: no chest pain  Resp: + dyspnea  GI: no nausea or vomiting or diarrhea  Vascular: + LE edema    No Known Allergies      Hospital Medications: Medications reviewed    VITALS:  T(F): 97.4 (01-18-19 @ 06:01), Max: 97.6 (01-17-19 @ 23:18)  HR: 118 (01-18-19 @ 11:17)  BP: 97/61 (01-18-19 @ 11:17)  RR: 18 (01-18-19 @ 11:17)  SpO2: 99% (01-18-19 @ 11:17)  Wt(kg): --  Height (cm): 157.48 (01-17 @ 14:53), 154.94 (01-04 @ 16:15)  Weight (kg): 56.7 (01-17 @ 14:53), 54.4 (01-04 @ 16:15)  BMI (kg/m2): 22.9 (01-17 @ 14:53), 22.7 (01-04 @ 16:15)  BSA (m2): 1.57 (01-17 @ 14:53), 1.52 (01-04 @ 16:15)    01-17 @ 07:01  -  01-18 @ 07:00  --------------------------------------------------------  IN: 200 mL / OUT: 0 mL / NET: 200 mL    01-18 @ 07:01  -  01-18 @ 13:09  --------------------------------------------------------  IN: 240 mL / OUT: 0 mL / NET: 240 mL        PHYSICAL EXAM:    Gen: NAD, calm  Cards: Irregularly irregular, + tachycardia, +S1/S2, no M/G/R  Resp: CTA B/L  GI: soft, NT/ND, NABS  Vascular: RLE > LLE pitting edema    LABS:  01-18    135  |  96<L>  |  39<H>  ----------------------------<  87  4.6   |  26  |  2.18<H>    Ca    8.6      18 Jan 2019 07:00    TPro  6.4  /  Alb  2.9<L>  /  TBili  0.6  /  DBili      /  AST  15  /  ALT  10  /  AlkPhos  115  01-17    Creatinine Trend: 2.18 <--, 1.68 <--, 1.53 <--, 1.57 <--, 1.58 <--, 1.57 <--                        9.7    8.28  )-----------( 247      ( 18 Jan 2019 07:00 )             30.7     Urine Studies:        RADIOLOGY & ADDITIONAL STUDIES:

## 2019-01-18 NOTE — PROGRESS NOTE ADULT - PROBLEM SELECTOR PLAN 1
Likely hemodynamically mediated in setting of afib, ADHF, anemia and IV diuretic use. Check UA, urine urea and urine creatinine. Check bladder scan r/o urinary retention. Agree with decreasing IV lasix to daily. Avoid nephrotoxins.

## 2019-01-18 NOTE — PROGRESS NOTE ADULT - SUBJECTIVE AND OBJECTIVE BOX
CC: no acute events    TELEMETRY: 's    PHYSICAL EXAM:    T(C): 36.3 (01-18-19 @ 06:01), Max: 36.4 (01-17-19 @ 23:18)  HR: 118 (01-18-19 @ 11:17) (80 - 131)  BP: 97/61 (01-18-19 @ 11:17) (92/62 - 123/90)  RR: 18 (01-18-19 @ 11:17) (18 - 18)  SpO2: 99% (01-18-19 @ 11:17) (97% - 100%)  Wt(kg): --  I&O's Summary    17 Jan 2019 07:01  -  18 Jan 2019 07:00  --------------------------------------------------------  IN: 200 mL / OUT: 0 mL / NET: 200 mL    18 Jan 2019 07:01  -  18 Jan 2019 12:34  --------------------------------------------------------  IN: 240 mL / OUT: 0 mL / NET: 240 mL        Appearance: Normal	  Cardiovascular: Normal S1 S2,RRR, No JVD, No murmurs  Respiratory: Lungs clear to auscultation	  Gastrointestinal:  Soft, Non-tender, + BS	  Extremities: Normal range of motion, No clubbing, cyanosis or edema  Vascular: Peripheral pulses palpable 2+ bilaterally     LABS:	 	                          9.7    8.28  )-----------( 247      ( 18 Jan 2019 07:00 )             30.7     01-18    135  |  96<L>  |  39<H>  ----------------------------<  87  4.6   |  26  |  2.18<H>    Ca    8.6      18 Jan 2019 07:00    TPro  6.4  /  Alb  2.9<L>  /  TBili  0.6  /  DBili  x   /  AST  15  /  ALT  10  /  AlkPhos  115  01-17      PT/INR - ( 17 Jan 2019 06:10 )   PT: 36.3 SEC;   INR: 3.08          PTT - ( 17 Jan 2019 06:10 )  PTT:121.0 SEC    CARDIAC MARKERS:      MEDICATIONS  (STANDING):  atorvastatin 40 milliGRAM(s) Oral at bedtime  buDESOnide  80 MICROgram(s)/formoterol 4.5 MICROgram(s) Inhaler 2 Puff(s) Inhalation two times a day  clopidogrel Tablet 75 milliGRAM(s) Oral daily  digoxin  Injectable 0.25 milliGRAM(s) IV Push every 6 hours  furosemide   Injectable 40 milliGRAM(s) IV Push every 12 hours  metoprolol tartrate 12.5 milliGRAM(s) Oral every 12 hours  pantoprazole  Injectable 40 milliGRAM(s) IV Push two times a day  senna 2 Tablet(s) Oral at bedtime  vancomycin    Solution 125 milliGRAM(s) Oral every 6 hours    MEDICATIONS  (PRN):  acetaminophen   Tablet .. 650 milliGRAM(s) Oral every 6 hours PRN Mild Pain (1 - 3)  LORazepam     Tablet 1 milliGRAM(s) Oral two times a day PRN Anxiety  traMADol 50 milliGRAM(s) Oral every 8 hours PRN Severe Pain (7 - 10)

## 2019-01-18 NOTE — PROGRESS NOTE ADULT - SUBJECTIVE AND OBJECTIVE BOX
BEVERLEY DEL RIO:[id number],   78yFemale followed for:  No Known Allergies    PAST MEDICAL & SURGICAL HISTORY:  CAD (coronary artery disease): s/p stent 2018  CHF (congestive heart failure)  COPD (chronic obstructive pulmonary disease): on 2-3L O2 at home prn  Anxiety  TIA (transient ischemic attack): many years ago  PAF (paroxysmal atrial fibrillation)  HLD (hyperlipidemia)  HTN (hypertension)  H/O total hysterectomy: 2014  History of cataract surgery: b/l 2015  History of repair of hip fracture: luisa placed in RLE 2015  H/O spinal fusion: c2-6 in 2017    FAMILY HISTORY:  No pertinent family history in first degree relatives    MEDICATIONS  (STANDING):  atorvastatin 40 milliGRAM(s) Oral at bedtime  buDESOnide  80 MICROgram(s)/formoterol 4.5 MICROgram(s) Inhaler 2 Puff(s) Inhalation two times a day  clopidogrel Tablet 75 milliGRAM(s) Oral daily  digoxin  Injectable 0.25 milliGRAM(s) IV Push every 6 hours  furosemide   Injectable 40 milliGRAM(s) IV Push every 12 hours  metoprolol tartrate 12.5 milliGRAM(s) Oral every 12 hours  pantoprazole  Injectable 40 milliGRAM(s) IV Push two times a day  senna 2 Tablet(s) Oral at bedtime  vancomycin    Solution 125 milliGRAM(s) Oral every 6 hours    MEDICATIONS  (PRN):  acetaminophen   Tablet .. 650 milliGRAM(s) Oral every 6 hours PRN Mild Pain (1 - 3)  traMADol 50 milliGRAM(s) Oral every 8 hours PRN Severe Pain (7 - 10)      Vital Signs Last 24 Hrs  T(C): 36.3 (18 Jan 2019 06:01), Max: 36.4 (17 Jan 2019 23:18)  T(F): 97.4 (18 Jan 2019 06:01), Max: 97.6 (17 Jan 2019 23:18)  HR: 122 (18 Jan 2019 08:21) (80 - 131)  BP: 111/70 (18 Jan 2019 08:21) (92/62 - 123/90)  BP(mean): --  RR: 18 (18 Jan 2019 08:13) (16 - 18)  SpO2: 98% (18 Jan 2019 08:13) (97% - 100%)  nc/at  s1s2  cta  soft, nt, nd no guarding or rebound  no c/c/e    CBC Full  -  ( 18 Jan 2019 07:00 )  WBC Count : 8.28 K/uL  Hemoglobin : 9.7 g/dL  Hematocrit : 30.7 %  Platelet Count - Automated : 247 K/uL  Mean Cell Volume : 90.8 fL  Mean Cell Hemoglobin : 28.7 pg  Mean Cell Hemoglobin Concentration : 31.6 %  Auto Neutrophil # : x  Auto Lymphocyte # : x  Auto Monocyte # : x  Auto Eosinophil # : x  Auto Basophil # : x  Auto Neutrophil % : x  Auto Lymphocyte % : x  Auto Monocyte % : x  Auto Eosinophil % : x  Auto Basophil % : x    01-18    135  |  96<L>  |  39<H>  ----------------------------<  87  4.6   |  26  |  2.18<H>    Ca    8.6      18 Jan 2019 07:00    TPro  6.4  /  Alb  2.9<L>  /  TBili  0.6  /  DBili  x   /  AST  15  /  ALT  10  /  AlkPhos  115  01-17    PT/INR - ( 17 Jan 2019 06:10 )   PT: 36.3 SEC;   INR: 3.08          PTT - ( 17 Jan 2019 06:10 )  PTT:121.0 SEC

## 2019-01-18 NOTE — PROGRESS NOTE ADULT - ASSESSMENT
77 y/o female, with a PmHx of COPD (on 2-3L O2 prn), paroxsymal a-fib (on pradaxa), CAD (s/p stent 11/2018), DCHF, HTN, HLD, and anxiety, presenting with acute/chronic diastolic chf, chest pain r/o acs, GIB, non healing pressure ulcer.     1. Atypical chest pain   in the setting of acute CHF exacerbation, afib w/ RVR   cv stable, no evidence of acute ischemia/ACS   Echo from 2/2018 revealing normal LVEF and mild-moderate MR  check echo to re-evaluate LV function, valvular disease   continue statin, bb, plavix     2. Acute/chronic diastolic chf  + fluid overloaded on exam   CXR with mild interstitial pulmonary edema, proBNP elevated   continue lasix 40mg IV daily low dose bb   strict i/os   pending echo to re-evaluate LV function, valvular disease     3. AFIB, hx   currently rate elevated   increase bb   CHADS 3 - continue to hold pradaxa in setting of GIB, anemia , GI eval pending   s/p 1uprbc, continue to trend cbc, transfuse prn     4. CAD s/p PCI (11/2018)  cv stable, no evidence of acute ischemia   continue bb, statin, continue plavix given recent PCI      5. GIB   +GUIAC , +anemia s/p 1uprbc,   continue to trend cbc, transfuse pr  GI eval pending , Pradaxa on hold     6. non healing foot ulcer  on po vanco   med f/u  ID eval pending     7. IGOR/CKD   renal f/u     dvt ppx 79 y/o female, with a PmHx of COPD (on 2-3L O2 prn), paroxsymal a-fib (on pradaxa), CAD (s/p stent 11/2018), DCHF, HTN, HLD, and anxiety, presenting with acute/chronic diastolic chf, chest pain r/o acs, GIB, non healing pressure ulcer.     1. Atypical chest pain   in the setting of acute CHF exacerbation, afib w/ RVR   cv stable, no evidence of acute ischemia/ACS   Echo from 2/2018 revealing normal LVEF and mild-moderate MR  check echo to re-evaluate LV function, valvular disease   continue statin, bb, plavix     2. Acute/chronic diastolic chf  + fluid overloaded on exam   CXR with mild interstitial pulmonary edema, proBNP elevated   continue lasix 40mg IV daily low dose bb   strict i/os   pending echo to re-evaluate LV function, valvular disease     3. AFIB, hx   currently rate elevated   start digoxin load  CHADS 3 - continue to hold pradaxa in setting of GIB, anemia , GI eval pending   s/p 1uprbc, continue to trend cbc, transfuse prn     4. CAD s/p PCI (11/2018)  cv stable, no evidence of acute ischemia   continue bb, statin, continue plavix given recent PCI      5. GIB   +GUIAC , +anemia s/p 1uprbc,   continue to trend cbc, transfuse pr  GI eval pending , Pradaxa on hold     6. non healing foot ulcer  on po vanco   med f/u  ID eval pending     7. IGOR/CKD   renal f/u     dvt ppx

## 2019-01-18 NOTE — CONSULT NOTE ADULT - SUBJECTIVE AND OBJECTIVE BOX
HPI:    78 year old female PMH COPD (on 2-3L O2 prn), paroxsymal a-fib (on pradaxa), CAD (s/p stent 11/2018), CHF, HTN, HLD, and anxiety who presented to the [facility] ED on 1/17/19 with right foot pressure ulcer pain x several weeks. Of note, recent admission 1/5/19 - 1/14/19 for diarrhea after a recent augmentin course for cellulitis. Patient was diagnosed with C. diff infection and placed on PO Vancomycin for a 2 week course (End Date: 1/18/19). The patient has a dime-sized ulcer with scant serosanguinous drainage on her right calcaneous that she acquired from a shoe weeks ago. She reports that she woke on the night prior to presentation around 4am with excruciating, unrelenting right root pain. She also notes SOB x a few minutes followed by chest pain x several hours. The patient reports that the ulcer pain began to gradually worsen since her last hospital visit, where she was d/c with vancomycin. The foot pain is sharp, 10/10, and radiates up to her calf. The patient took two puffs of her Symbicort for the SOB with no relief, but it resolved spontaneously within a few minutes. The chest pain began gradually and was described as a midsternal, 8/10, tightness with no radiation and accompanied by palpitations. Of note, the patient reports chronic cough, b/l LE edema, JACKSON with walking 1/2 block, orthopnea x1 pillow, occasional PND, melena x weeks, easy bruising/bleeding, and 10 lbs weight loss over the past 6 months with no change in appetite. The patient denies fever, chills, SOB at rest, diaphoresis, dizziness, claudication, wheezing, changes in vision and hearing, abdominal pain, change in bowel and urinary habits, dysuria, hematuria. (17 Jan 2019 12:50)      PAST MEDICAL & SURGICAL HISTORY:  CAD (coronary artery disease): s/p stent 2018  CHF (congestive heart failure)  COPD (chronic obstructive pulmonary disease): on 2-3L O2 at home prn  Anxiety  TIA (transient ischemic attack): many years ago  PAF (paroxysmal atrial fibrillation)  HLD (hyperlipidemia)  HTN (hypertension)  H/O total hysterectomy: 2014  History of cataract surgery: b/l 2015  History of repair of hip fracture: luisa placed in RLE 2015  H/O spinal fusion: c2-6 in 2017      Allergies  No Known Allergies        ANTIMICROBIALS:  vancomycin    Solution 125 every 6 hours      OTHER MEDS: MEDICATIONS  (STANDING):  acetaminophen   Tablet .. 650 every 6 hours PRN  atorvastatin 40 at bedtime  buDESOnide  80 MICROgram(s)/formoterol 4.5 MICROgram(s) Inhaler 2 two times a day  clopidogrel Tablet 75 daily  digoxin  Injectable 0.25 every 6 hours  furosemide   Injectable 40 daily  LORazepam     Tablet 1 two times a day PRN  metoprolol tartrate 12.5 every 12 hours  pantoprazole  Injectable 40 two times a day  senna 2 at bedtime  traMADol 50 every 8 hours PRN      SOCIAL HISTORY:  [ ] etoh [ ] tobacco [ ] former smoker [ ] IVDU    FAMILY HISTORY:  No pertinent family history in first degree relatives      REVIEW OF SYSTEMS  [  ] ROS unobtainable because:    [  ] All other systems negative except as noted below:	    Constitutional:  [ ] fever [ ] chills  [ ] weight loss  [ ] weakness  Skin:  [ ] rash [ ] phlebitis	  Eyes: [ ] icterus [ ] pain  [ ] discharge	  ENMT: [ ] sore throat  [ ] thrush [ ] ulcers [ ] exudates  Respiratory: [ ] dyspnea [ ] hemoptysis [ ] cough [ ] sputum	  Cardiovascular:  [ ] chest pain [ ] palpitations [ ] edema	  Gastrointestinal:  [ ] nausea [ ] vomiting [ ] diarrhea [ ] constipation [ ] pain	  Genitourinary:  [ ] dysuria [ ] frequency [ ] hematuria [ ] discharge [ ] flank pain  [ ] incontinence  Musculoskeletal:  [ ] myalgias [ ] arthralgias [ ] arthritis  [ ] back pain  Neurological:  [ ] headache [ ] seizures  [ ] confusion/altered mental status  Psychiatric:  [ ] anxiety [ ] depression	  Hematology/Lymphatics:  [ ] lymphadenopathy  Endocrine:  [ ] adrenal [ ] thyroid  Allergic/Immunologic:	 [ ] transplant [ ] seasonal    Vital Signs Last 24 Hrs  T(F): 97.4 (01-18-19 @ 06:01), Max: 98.3 (01-11-19 @ 17:48)    Vital Signs Last 24 Hrs  HR: 118 (01-18-19 @ 11:17) (80 - 131)  BP: 97/61 (01-18-19 @ 11:17) (92/62 - 123/90)  RR: 18 (01-18-19 @ 11:17)  SpO2: 99% (01-18-19 @ 11:17) (97% - 100%)  Wt(kg): --    PHYSICAL EXAM:  General: non-toxic  HEAD/EYES: anicteric, PERRL  ENT:  supple  Cardiovascular:   S1, S2  Respiratory:  clear bilaterally  GI:  soft, non-tender, normal bowel sounds  :  no CVA tenderness   Musculoskeletal:  no synovitis  Neurologic:  grossly non-focal  Skin:  no rash  Lymph: no lymphadenopathy  Psychiatric:  appropriate affect  Vascular:  no phlebitis                                9.7    8.28  )-----------( 247      ( 18 Jan 2019 07:00 )             30.7       01-18    135  |  96<L>  |  39<H>  ----------------------------<  87  4.6   |  26  |  2.18<H>    Ca    8.6      18 Jan 2019 07:00    TPro  6.4  /  Alb  2.9<L>  /  TBili  0.6  /  DBili  x   /  AST  15  /  ALT  10  /  AlkPhos  115  01-17          MICROBIOLOGY:  .Stool Feces  01-06-19   GI PCR Results: NOT detected  *******Please Note:*******  GI panel PCR evaluates for:  Campylobacter, Plesiomonas shigelloides, Salmonella,  Vibrio, Yersinia enterocolitica, Enteroaggregative  Escherichia coli (EAEC), Enteropathogenic E.coli (EPEC),  Enterotoxigenic E. coli (ETEC) lt/st, Shiga-like  toxin-producing E. coli (STEC) stx1/stx2,  Shigella/ Enteroinvasive E. coli (EIEC), Cryptosporidium,  Cyclospora cayetanensis, Entamoeba histolytica,  Giardia lamblia, Adenovirus F 40/41, Astrovirus,  Norovirus GI/GII, Rotavirus A, Sapovirus  --  --              v            RADIOLOGY: HPI:    78 year old female PMH COPD (on 2-3L O2 prn), paroxsymal a-fib (on pradaxa), CAD (s/p stent 11/2018), CHF, HTN, HLD, and anxiety who presented to the [facility] ED on 1/17/19 with right foot pressure ulcer pain x several weeks. Of note, recent admission 1/5/19 - 1/14/19 for diarrhea after a recent augmentin course for cellulitis. Patient was diagnosed with C. diff infection and placed on PO Vancomycin for a 2 week course (End Date: 1/18/19). The patient has a dime-sized ulcer with scant serosanguinous drainage on her right calcaneous that she acquired from a shoe weeks ago. She reports that she woke on the night prior to presentation around 4am with excruciating, unrelenting right root pain. She also notes SOB x a few minutes followed by chest pain x several hours. The patient reports that the ulcer pain began to gradually worsen since her last hospital visit.. The foot pain is sharp, 10/10, and radiates up to her calf. The patient took two puffs of her Symbicort for the SOB with no relief, but it resolved spontaneously within a few minutes. The chest pain began gradually and was described as a midsternal, 8/10, tightness with no radiation and accompanied by palpitations. She notes chronic cough but no change in the character of the cough. No sore throat or rhinorrhea. No current N/V/D or abdominal pain.     In the ED hypothermic to 95.9, tachycardic to > 120 and hypotensive to SBP in the 80's. WBC on presentation of 7.53 with 79.5% PMN. RVP negative. CXR with mild interstitial edema. Patient underwent diuresis with lasix with improvement in her breathing.       PAST MEDICAL & SURGICAL HISTORY:  CAD (coronary artery disease): s/p stent 2018  CHF (congestive heart failure)  COPD (chronic obstructive pulmonary disease): on 2-3L O2 at home prn  Anxiety  TIA (transient ischemic attack): many years ago  PAF (paroxysmal atrial fibrillation)  HLD (hyperlipidemia)  HTN (hypertension)  H/O total hysterectomy: 2014  History of cataract surgery: b/l 2015  History of repair of hip fracture: luisa placed in RLE 2015  H/O spinal fusion: c2-6 in 2017      Allergies  No Known Allergies        ANTIMICROBIALS:  vancomycin    Solution 125 every 6 hours      OTHER MEDS: MEDICATIONS  (STANDING):  acetaminophen   Tablet .. 650 every 6 hours PRN  atorvastatin 40 at bedtime  buDESOnide  80 MICROgram(s)/formoterol 4.5 MICROgram(s) Inhaler 2 two times a day  clopidogrel Tablet 75 daily  digoxin  Injectable 0.25 every 6 hours  furosemide   Injectable 40 daily  LORazepam     Tablet 1 two times a day PRN  metoprolol tartrate 12.5 every 12 hours  pantoprazole  Injectable 40 two times a day  senna 2 at bedtime  traMADol 50 every 8 hours PRN      SOCIAL HISTORY:  Past smoker. No EtOH. Lives with daughter.     FAMILY HISTORY:  No pertinent family history in first degree relatives      REVIEW OF SYSTEMS  [  ] ROS unobtainable because:    [ x ] All other systems negative except as noted below:	    Constitutional:  [ ] fever [ ] chills  [ ] weight loss  [ ] weakness  Skin:  [ ] rash [ ] phlebitis	  Eyes: [ ] icterus [ ] pain  [ ] discharge	  ENMT: [ ] sore throat  [ ] thrush [ ] ulcers [ ] exudates  Respiratory: [ x] dyspnea [ ] hemoptysis [ ] cough [ ] sputum	  Cardiovascular:  [x ] chest pain [ ] palpitations [ ] edema	  Gastrointestinal:  [ ] nausea [ ] vomiting [ ] diarrhea [ ] constipation [ ] pain	  Genitourinary:  [ ] dysuria [ ] frequency [ ] hematuria [ ] discharge [ ] flank pain  [ ] incontinence  Musculoskeletal:  [ ] myalgias [ ] arthralgias [ ] arthritis  [ ] back pain +R Heel ulcer.  Neurological:  [ ] headache [ ] seizures  [ ] confusion/altered mental status  Psychiatric:  [ ] anxiety [ ] depression	  Hematology/Lymphatics:  [ ] lymphadenopathy  Endocrine:  [ ] adrenal [ ] thyroid  Allergic/Immunologic:	 [ ] transplant [ ] seasonal    Vital Signs Last 24 Hrs  T(F): 97.4 (01-18-19 @ 06:01), Max: 98.3 (01-11-19 @ 17:48)    Vital Signs Last 24 Hrs  HR: 118 (01-18-19 @ 11:17) (80 - 131)  BP: 97/61 (01-18-19 @ 11:17) (92/62 - 123/90)  RR: 18 (01-18-19 @ 11:17)  SpO2: 99% (01-18-19 @ 11:17) (97% - 100%)  Wt(kg): --    PHYSICAL EXAMINATION:  General: Alert and Awake, NAD  HEENT: PERRL, EOMI  Neck: Supple, No LISETTE  Cardiac: RRR, No M/R/G  Resp: CTAB, No Wh/Rh/Ra  Abdomen: NBS, NT/ND, No HSM, No rigidity or guarding  MSK: R Heel stage II-III ulcer with no drainage but is tender to palpation. RLE is more erythematous than the left but without warmth to palpation - nontender (aside from area immediately around the ulcer). Trace bilateral LE edema.   : No barney  Skin: No rashes or lesions. Skin is warm and dry to the touch.   Neuro: Alert and Awake. CN 2-12 Grossly intact. Moves all four extremities spontaneously.  Psych: Calm, Pleasant, Cooperative                          9.7    8.28  )-----------( 247      ( 18 Jan 2019 07:00 )             30.7       01-18    135  |  96<L>  |  39<H>  ----------------------------<  87  4.6   |  26  |  2.18<H>    Ca    8.6      18 Jan 2019 07:00    TPro  6.4  /  Alb  2.9<L>  /  TBili  0.6  /  DBili  x   /  AST  15  /  ALT  10  /  AlkPhos  115  01-17          MICROBIOLOGY:    .Stool Feces  01-06-19   GI PCR Results: NOT detected  *******Please Note:*******  GI panel PCR evaluates for:  Campylobacter, Plesiomonas shigelloides, Salmonella,  Vibrio, Yersinia enterocolitica, Enteroaggregative  Escherichia coli (EAEC), Enteropathogenic E.coli (EPEC),  Enterotoxigenic E. coli (ETEC) lt/st, Shiga-like  toxin-producing E. coli (STEC) stx1/stx2,  Shigella/ Enteroinvasive E. coli (EIEC), Cryptosporidium,  Cyclospora cayetanensis, Entamoeba histolytica,  Giardia lamblia, Adenovirus F 40/41, Astrovirus,  Norovirus GI/GII, Rotavirus A, Sapovirus  --  --    RADIOLOGY:    EXAM:  XR CHEST PORTABLE URGENT 1V    PROCEDURE DATE:  Jan 17 2019   Mild interstitial edema. HPI:    78 year old female PMH COPD (on 2-3L O2 prn), paroxsymal a-fib (on pradaxa), CAD (s/p stent 11/2018), CHF, HTN, HLD, and anxiety who presented to the [facility] ED on 1/17/19 with right foot pressure ulcer pain x several weeks. Of note, recent admission 1/5/19 - 1/14/19 for diarrhea after a recent augmentin course for cellulitis. Patient was diagnosed with C. diff infection and placed on PO Vancomycin for a 2 week course (End Date: 1/18/19). The patient has a dime-sized ulcer with scant serosanguinous drainage on her right calcaneous that she acquired from a shoe weeks ago. She reports that she woke on the night prior to presentation around 4am with excruciating, unrelenting right root pain. She also notes SOB x a few minutes followed by chest pain x several hours. The patient reports that the ulcer pain began to gradually worsen since her last hospital visit.. The foot pain is sharp, 10/10, and radiates up to her calf. The patient took two puffs of her Symbicort for the SOB with no relief, but it resolved spontaneously within a few minutes. The chest pain began gradually and was described as a midsternal, 8/10, tightness with no radiation and accompanied by palpitations. She notes chronic cough but no change in the character of the cough. No sore throat or rhinorrhea. No current N/V/D or abdominal pain.     In the ED hypothermic to 95.9, tachycardic to > 120 and hypotensive to SBP in the 80's. WBC on presentation of 7.53 with 79.5% PMN. RVP negative. CXR with mild interstitial edema. Patient underwent diuresis with lasix with improvement in her breathing.       PAST MEDICAL & SURGICAL HISTORY:  CAD (coronary artery disease): s/p stent 2018  CHF (congestive heart failure)  COPD (chronic obstructive pulmonary disease): on 2-3L O2 at home prn  Anxiety  TIA (transient ischemic attack): many years ago  PAF (paroxysmal atrial fibrillation)  HLD (hyperlipidemia)  HTN (hypertension)  H/O total hysterectomy: 2014  History of cataract surgery: b/l 2015  History of repair of hip fracture: luisa placed in RLE 2015  H/O spinal fusion: c2-6 in 2017      Allergies  No Known Allergies        ANTIMICROBIALS:  vancomycin    Solution 125 every 6 hours      OTHER MEDS: MEDICATIONS  (STANDING):  acetaminophen   Tablet .. 650 every 6 hours PRN  atorvastatin 40 at bedtime  buDESOnide  80 MICROgram(s)/formoterol 4.5 MICROgram(s) Inhaler 2 two times a day  clopidogrel Tablet 75 daily  digoxin  Injectable 0.25 every 6 hours  furosemide   Injectable 40 daily  LORazepam     Tablet 1 two times a day PRN  metoprolol tartrate 12.5 every 12 hours  pantoprazole  Injectable 40 two times a day  senna 2 at bedtime  traMADol 50 every 8 hours PRN      SOCIAL HISTORY:  Past smoker. No EtOH. Lives with daughter.     FAMILY HISTORY:  DM in mother      REVIEW OF SYSTEMS  [  ] ROS unobtainable because:    [ x ] All other systems negative except as noted below:	    Constitutional:  [ ] fever [ ] chills  [ ] weight loss  [ ] weakness  Skin:  [ ] rash [ ] phlebitis	  Eyes: [ ] icterus [ ] pain  [ ] discharge	  ENMT: [ ] sore throat  [ ] thrush [ ] ulcers [ ] exudates  Respiratory: [ x] dyspnea [ ] hemoptysis [ ] cough [ ] sputum	  Cardiovascular:  [x ] chest pain [ ] palpitations [ ] edema	  Gastrointestinal:  [ ] nausea [ ] vomiting [ ] diarrhea [ ] constipation [ ] pain	  Genitourinary:  [ ] dysuria [ ] frequency [ ] hematuria [ ] discharge [ ] flank pain  [ ] incontinence  Musculoskeletal:  [ ] myalgias [ ] arthralgias [ ] arthritis  [ ] back pain +R Heel ulcer.  Neurological:  [ ] headache [ ] seizures  [ ] confusion/altered mental status  Psychiatric:  [ ] anxiety [ ] depression	  Hematology/Lymphatics:  [ ] lymphadenopathy  Endocrine:  [ ] adrenal [ ] thyroid  Allergic/Immunologic:	 [ ] transplant [ ] seasonal    Vital Signs Last 24 Hrs  T(F): 97.4 (01-18-19 @ 06:01), Max: 98.3 (01-11-19 @ 17:48)    Vital Signs Last 24 Hrs  HR: 118 (01-18-19 @ 11:17) (80 - 131)  BP: 97/61 (01-18-19 @ 11:17) (92/62 - 123/90)  RR: 18 (01-18-19 @ 11:17)  SpO2: 99% (01-18-19 @ 11:17) (97% - 100%)  Wt(kg): --    PHYSICAL EXAMINATION:  General: Alert and Awake, NAD  HEENT: PERRL, EOMI  Neck: Supple, No LISETTE  Cardiac: RRR, No M/R/G  Resp: CTAB, No Wh/Rh/Ra  Abdomen: NBS, NT/ND, No HSM, No rigidity or guarding  MSK: R Heel stage II-III ulcer with no drainage but is tender to palpation. RLE is more erythematous than the left but without warmth to palpation - nontender (aside from area immediately around the ulcer). Trace bilateral LE edema.   : No barney  Skin: No rashes or lesions. Skin is warm and dry to the touch.   Neuro: Alert and Awake. CN 2-12 Grossly intact. Moves all four extremities spontaneously.  Psych: Calm, Pleasant, Cooperative                          9.7    8.28  )-----------( 247      ( 18 Jan 2019 07:00 )             30.7       01-18    135  |  96<L>  |  39<H>  ----------------------------<  87  4.6   |  26  |  2.18<H>    Ca    8.6      18 Jan 2019 07:00    TPro  6.4  /  Alb  2.9<L>  /  TBili  0.6  /  DBili  x   /  AST  15  /  ALT  10  /  AlkPhos  115  01-17          MICROBIOLOGY:    .Stool Feces  01-06-19   GI PCR Results: NOT detected  *******Please Note:*******  GI panel PCR evaluates for:  Campylobacter, Plesiomonas shigelloides, Salmonella,  Vibrio, Yersinia enterocolitica, Enteroaggregative  Escherichia coli (EAEC), Enteropathogenic E.coli (EPEC),  Enterotoxigenic E. coli (ETEC) lt/st, Shiga-like  toxin-producing E. coli (STEC) stx1/stx2,  Shigella/ Enteroinvasive E. coli (EIEC), Cryptosporidium,  Cyclospora cayetanensis, Entamoeba histolytica,  Giardia lamblia, Adenovirus F 40/41, Astrovirus,  Norovirus GI/GII, Rotavirus A, Sapovirus  --  --    RADIOLOGY:    EXAM:  XR CHEST PORTABLE URGENT 1V    PROCEDURE DATE:  Jan 17 2019   Mild interstitial edema.

## 2019-01-18 NOTE — CONSULT NOTE ADULT - ASSESSMENT
78 year old female PMH COPD (on 2-3L O2 prn), paroxsymal a-fib (on pradaxa), CAD (s/p stent 11/2018), CHF, HTN, HLD, and anxiety who presented to the [facility] ED on 1/17/19 with right foot pressure ulcer pain x several weeks. Of note, recent admission 1/5/19 - 1/14/19 for diarrhea after a recent augmentin course for cellulitis. Patient was diagnosed with C. diff infection and placed on PO Vancomycin for a 2 week course (End Date: 1/18/19). On current presentation In the ED hypothermic to 95.9, tachycardic to > 120 and hypotensive to SBP in the 80's. WBC on presentation of 7.53 with 79.5% PMN. RVP negative. CXR with mild interstitial edema. Patient underwent diuresis with lasix with improvement in her breathing.     Overall, R Heel ulcer without evidence of superinfection or cellulitis. Also with prior C diff infection which is approaching the end of treatment with PO Vancomycin. Would finish PO Vancomycin after today. Would otherwise monitor off of antibiotics    --Continue PO Vancomycin (to complete after today)  --Recommend Podiatry consult   --Recommend monitoring off of antibiotics  --Management of A-Fib with RVR and CHF exacerbation per Cardiology team 78 year old female PMH COPD (on 2-3L O2 prn), paroxsymal a-fib (on pradaxa), CAD (s/p stent 11/2018), CHF, HTN, HLD, and anxiety who presented to the [facility] ED on 1/17/19 with right foot pressure ulcer pain x several weeks. Of note, recent admission 1/5/19 - 1/14/19 for diarrhea after a recent augmentin course for cellulitis. Patient was diagnosed with C. diff infection and placed on PO Vancomycin for a 2 week course (End Date: 1/18/19). On current presentation In the ED hypothermic to 95.9, tachycardic to > 120 and hypotensive to SBP in the 80's. WBC on presentation of 7.53 with 79.5% PMN. RVP negative. CXR with mild interstitial edema. Patient underwent diuresis with lasix with improvement in her breathing.     Overall, R Heel ulcer without evidence of superinfection or cellulitis. Also with prior C diff infection which is approaching the end of treatment with PO Vancomycin. Would finish PO Vancomycin after today. Would otherwise monitor off of antibiotics    --Continue PO Vancomycin (to complete after today)  --Consider ankle Xray  --Recommend Podiatry consult   --Recommend monitoring off of antibiotics  --Management of A-Fib with RVR and CHF exacerbation per Cardiology team

## 2019-01-19 LAB
ANION GAP SERPL CALC-SCNC: 13 MMO/L — SIGNIFICANT CHANGE UP (ref 7–14)
BASOPHILS # BLD AUTO: 0.07 K/UL — SIGNIFICANT CHANGE UP (ref 0–0.2)
BASOPHILS NFR BLD AUTO: 0.7 % — SIGNIFICANT CHANGE UP (ref 0–2)
BUN SERPL-MCNC: 38 MG/DL — HIGH (ref 7–23)
CALCIUM SERPL-MCNC: 8.9 MG/DL — SIGNIFICANT CHANGE UP (ref 8.4–10.5)
CHLORIDE SERPL-SCNC: 94 MMOL/L — LOW (ref 98–107)
CO2 SERPL-SCNC: 28 MMOL/L — SIGNIFICANT CHANGE UP (ref 22–31)
CREAT SERPL-MCNC: 2.01 MG/DL — HIGH (ref 0.5–1.3)
EOSINOPHIL # BLD AUTO: 0.2 K/UL — SIGNIFICANT CHANGE UP (ref 0–0.5)
EOSINOPHIL NFR BLD AUTO: 2 % — SIGNIFICANT CHANGE UP (ref 0–6)
GLUCOSE SERPL-MCNC: 72 MG/DL — SIGNIFICANT CHANGE UP (ref 70–99)
HCT VFR BLD CALC: 32.1 % — LOW (ref 34.5–45)
HGB BLD-MCNC: 10.2 G/DL — LOW (ref 11.5–15.5)
IMM GRANULOCYTES NFR BLD AUTO: 0.4 % — SIGNIFICANT CHANGE UP (ref 0–1.5)
LYMPHOCYTES # BLD AUTO: 0.57 K/UL — LOW (ref 1–3.3)
LYMPHOCYTES # BLD AUTO: 5.7 % — LOW (ref 13–44)
MAGNESIUM SERPL-MCNC: 2 MG/DL — SIGNIFICANT CHANGE UP (ref 1.6–2.6)
MCHC RBC-ENTMCNC: 29.3 PG — SIGNIFICANT CHANGE UP (ref 27–34)
MCHC RBC-ENTMCNC: 31.8 % — LOW (ref 32–36)
MCV RBC AUTO: 92.2 FL — SIGNIFICANT CHANGE UP (ref 80–100)
MONOCYTES # BLD AUTO: 0.54 K/UL — SIGNIFICANT CHANGE UP (ref 0–0.9)
MONOCYTES NFR BLD AUTO: 5.4 % — SIGNIFICANT CHANGE UP (ref 2–14)
NEUTROPHILS # BLD AUTO: 8.55 K/UL — HIGH (ref 1.8–7.4)
NEUTROPHILS NFR BLD AUTO: 85.8 % — HIGH (ref 43–77)
NRBC # FLD: 0 K/UL — LOW (ref 25–125)
NT-PROBNP SERPL-SCNC: SIGNIFICANT CHANGE UP PG/ML
PLATELET # BLD AUTO: 266 K/UL — SIGNIFICANT CHANGE UP (ref 150–400)
PMV BLD: 10.5 FL — SIGNIFICANT CHANGE UP (ref 7–13)
POTASSIUM SERPL-MCNC: 4.3 MMOL/L — SIGNIFICANT CHANGE UP (ref 3.5–5.3)
POTASSIUM SERPL-SCNC: 4.3 MMOL/L — SIGNIFICANT CHANGE UP (ref 3.5–5.3)
RBC # BLD: 3.48 M/UL — LOW (ref 3.8–5.2)
RBC # FLD: 17.8 % — HIGH (ref 10.3–14.5)
SODIUM SERPL-SCNC: 135 MMOL/L — SIGNIFICANT CHANGE UP (ref 135–145)
WBC # BLD: 9.97 K/UL — SIGNIFICANT CHANGE UP (ref 3.8–10.5)
WBC # FLD AUTO: 9.97 K/UL — SIGNIFICANT CHANGE UP (ref 3.8–10.5)

## 2019-01-19 RX ORDER — ACETAMINOPHEN 500 MG
1000 TABLET ORAL ONCE
Qty: 0 | Refills: 0 | Status: COMPLETED | OUTPATIENT
Start: 2019-01-19 | End: 2019-01-19

## 2019-01-19 RX ORDER — DIGOXIN 250 MCG
0.12 TABLET ORAL DAILY
Qty: 0 | Refills: 0 | Status: DISCONTINUED | OUTPATIENT
Start: 2019-01-19 | End: 2019-01-22

## 2019-01-19 RX ORDER — DIGOXIN 250 MCG
0.25 TABLET ORAL DAILY
Qty: 0 | Refills: 0 | Status: DISCONTINUED | OUTPATIENT
Start: 2019-01-19 | End: 2019-01-19

## 2019-01-19 RX ADMIN — CLOPIDOGREL BISULFATE 75 MILLIGRAM(S): 75 TABLET, FILM COATED ORAL at 14:00

## 2019-01-19 RX ADMIN — TRAMADOL HYDROCHLORIDE 50 MILLIGRAM(S): 50 TABLET ORAL at 07:09

## 2019-01-19 RX ADMIN — Medication 40 MILLIGRAM(S): at 05:04

## 2019-01-19 RX ADMIN — PANTOPRAZOLE SODIUM 40 MILLIGRAM(S): 20 TABLET, DELAYED RELEASE ORAL at 05:05

## 2019-01-19 RX ADMIN — TRAMADOL HYDROCHLORIDE 50 MILLIGRAM(S): 50 TABLET ORAL at 06:09

## 2019-01-19 RX ADMIN — BUDESONIDE AND FORMOTEROL FUMARATE DIHYDRATE 2 PUFF(S): 160; 4.5 AEROSOL RESPIRATORY (INHALATION) at 21:01

## 2019-01-19 RX ADMIN — Medication 12.5 MILLIGRAM(S): at 05:04

## 2019-01-19 RX ADMIN — Medication 650 MILLIGRAM(S): at 20:53

## 2019-01-19 RX ADMIN — BUDESONIDE AND FORMOTEROL FUMARATE DIHYDRATE 2 PUFF(S): 160; 4.5 AEROSOL RESPIRATORY (INHALATION) at 14:01

## 2019-01-19 RX ADMIN — Medication 650 MILLIGRAM(S): at 15:30

## 2019-01-19 RX ADMIN — Medication 0.12 MILLIGRAM(S): at 18:11

## 2019-01-19 RX ADMIN — Medication 400 MILLIGRAM(S): at 23:30

## 2019-01-19 RX ADMIN — Medication 1000 MILLIGRAM(S): at 23:45

## 2019-01-19 RX ADMIN — ATORVASTATIN CALCIUM 40 MILLIGRAM(S): 80 TABLET, FILM COATED ORAL at 21:01

## 2019-01-19 RX ADMIN — Medication 650 MILLIGRAM(S): at 15:05

## 2019-01-19 RX ADMIN — Medication 650 MILLIGRAM(S): at 20:08

## 2019-01-19 RX ADMIN — TRAMADOL HYDROCHLORIDE 50 MILLIGRAM(S): 50 TABLET ORAL at 20:09

## 2019-01-19 RX ADMIN — TRAMADOL HYDROCHLORIDE 50 MILLIGRAM(S): 50 TABLET ORAL at 15:30

## 2019-01-19 RX ADMIN — TRAMADOL HYDROCHLORIDE 50 MILLIGRAM(S): 50 TABLET ORAL at 15:05

## 2019-01-19 RX ADMIN — PANTOPRAZOLE SODIUM 40 MILLIGRAM(S): 20 TABLET, DELAYED RELEASE ORAL at 18:06

## 2019-01-19 RX ADMIN — TRAMADOL HYDROCHLORIDE 50 MILLIGRAM(S): 50 TABLET ORAL at 20:53

## 2019-01-19 RX ADMIN — Medication 1 MILLIGRAM(S): at 18:06

## 2019-01-19 RX ADMIN — Medication 1 MILLIGRAM(S): at 06:08

## 2019-01-19 NOTE — DIETITIAN INITIAL EVALUATION ADULT. - PROBLEM SELECTOR PLAN 1
tele monitor  cardiac enzymes x 2 to R/O ACS.  serial EKGs  2G Sodium 1800 ADA diet with 1500 cc Fluid restriction  Strict I&Os plus Daily weights.  Lasix 40mg IV Q12   cardio consult

## 2019-01-19 NOTE — DIETITIAN INITIAL EVALUATION ADULT. - PHYSICAL APPEARANCE
Nutrition focused physical exam conducted - Subcutaneous fat loss: [moderate] Orbital fat pads region, [moderate]Triceps region.  Muscle wasting: [moderate]Temples region, [moderate]Clavicle region, [moderate]Shoulder region, [moderate]Scapula region.

## 2019-01-19 NOTE — DIETITIAN INITIAL EVALUATION ADULT. - PROBLEM SELECTOR PLAN 2
Started on Protonix 40mg Q12h  GI consult  HOLDING PRADAXA for now until cardiac and GI clearance  Transfusing PRBC x1 unit  CBC Q6h

## 2019-01-19 NOTE — DIETITIAN INITIAL EVALUATION ADULT. - PERTINENT LABORATORY DATA
01-19 Na135 mmol/L Glu 72 mg/dL K+ 4.3 mmol/L Cr  2.01 mg/dL<H> BUN 38 mg/dL<H> 01-14 Phos 5.1 mg/dL<H> 01-17 Alb 2.9 g/dL<L>

## 2019-01-19 NOTE — PROGRESS NOTE ADULT - SUBJECTIVE AND OBJECTIVE BOX
Patient is a 78y old  Female who presents with a chief complaint of right foot pressure ulcer pain (2019 13:41)      INTERVAL HPI/OVERNIGHT EVENTS:  T(C): 36.9 (19 @ 13:58), Max: 36.9 (19 @ 13:58)  HR: 60 (19 @ 18:01) (60 - 82)  BP: 103/64 (19 @ 18:01) (91/61 - 104/71)  RR: 20 (19 @ 13:58) (18 - 20)  SpO2: 97% (19 @ 13:58) (97% - 99%)  Wt(kg): --  I&O's Summary    2019 07:  -  2019 07:00  --------------------------------------------------------  IN: 590 mL / OUT: 200 mL / NET: 390 mL        LABS:                        10.2   9.97  )-----------( 266      ( 2019 06:30 )             32.1         135  |  94<L>  |  38<H>  ----------------------------<  72  4.3   |  28  |  2.01<H>    Ca    8.9      2019 06:30  Mg     2.0             Urinalysis Basic - ( 2019 19:40 )    Color: YELLOW / Appearance: Lt TURBID / S.015 / pH: 6.0  Gluc: NEGATIVE / Ketone: NEGATIVE  / Bili: NEGATIVE / Urobili: NORMAL   Blood: MODERATE / Protein: 20 / Nitrite: POSITIVE   Leuk Esterase: LARGE / RBC: 11-25 / WBC >50   Sq Epi: FEW / Non Sq Epi: x / Bacteria: FEW      CAPILLARY BLOOD GLUCOSE            Urinalysis Basic - ( 2019 19:40 )    Color: YELLOW / Appearance: Lt TURBID / S.015 / pH: 6.0  Gluc: NEGATIVE / Ketone: NEGATIVE  / Bili: NEGATIVE / Urobili: NORMAL   Blood: MODERATE / Protein: 20 / Nitrite: POSITIVE   Leuk Esterase: LARGE / RBC: 11-25 / WBC >50   Sq Epi: FEW / Non Sq Epi: x / Bacteria: FEW        MEDICATIONS  (STANDING):  atorvastatin 40 milliGRAM(s) Oral at bedtime  buDESOnide  80 MICROgram(s)/formoterol 4.5 MICROgram(s) Inhaler 2 Puff(s) Inhalation two times a day  clopidogrel Tablet 75 milliGRAM(s) Oral daily  digoxin     Tablet 0.125 milliGRAM(s) Oral daily  furosemide   Injectable 40 milliGRAM(s) IV Push daily  metoprolol tartrate 12.5 milliGRAM(s) Oral every 12 hours  pantoprazole  Injectable 40 milliGRAM(s) IV Push two times a day  senna 2 Tablet(s) Oral at bedtime    MEDICATIONS  (PRN):  acetaminophen   Tablet .. 650 milliGRAM(s) Oral every 6 hours PRN Mild Pain (1 - 3)  LORazepam     Tablet 1 milliGRAM(s) Oral two times a day PRN Anxiety  traMADol 50 milliGRAM(s) Oral every 8 hours PRN Severe Pain (7 - 10)          PHYSICAL EXAM:  GENERAL: NAD, well-groomed, well-developed  HEAD:  Atraumatic, Normocephalic  CHEST/LUNG: Clear to percussion bilaterally; No rales, rhonchi, wheezing, or rubs  HEART: Regular rate and rhythm; No murmurs, rubs, or gallops  ABDOMEN: Soft, Nontender, Nondistended; Bowel sounds present  EXTREMITIES:  2+ Peripheral Pulses, No clubbing, cyanosis, or edema  LYMPH: No lymphadenopathy noted  SKIN: No rashes or lesions    Care Discussed with Consultants/Other Providers [ ] YES  [ ] NO

## 2019-01-19 NOTE — CHART NOTE - NSCHARTNOTEFT_GEN_A_CORE
Digoxin dose adjusted as per pharmacy to reflect appropriate CrCl. Will sign out to night team to monitor for appropriate response.

## 2019-01-19 NOTE — CHART NOTE - NSCHARTNOTEFT_GEN_A_CORE
Pt c/o persistent right heal pain at site of wound without relief with Tramadol, PO Tylenol, IV Ofiemev and Morphine.  ID rec on 1/18 to get Xray and Podiatry consult, Xray ordered, Podiatry as per Dr Boyce in AM

## 2019-01-19 NOTE — DIETITIAN INITIAL EVALUATION ADULT. - ENERGY NEEDS
Ht: 62 inches Wt: 136 pounds BMI: 24.8 kg/m2 IBW: 110 pounds  (+/-10%) %%    Edema: + 3 edema right/left: foot, ankle, leg.  Skin: wound- right posterior lower leg + Pressure injury right/ left heel - stage 1

## 2019-01-19 NOTE — DIETITIAN INITIAL EVALUATION ADULT. - ETIOLOGY
patient meets criteria for severe malnutrition in context of chronic illness patient meets criteria for severe malnutrition in context of chronic illness + increase demand for nutrient needs for wound healing

## 2019-01-19 NOTE — DIETITIAN INITIAL EVALUATION ADULT. - PROBLEM SELECTOR PLAN 4
CHADS2 score=5  Holding PRADAXA for now due to Acute GI bleed. Waiting for GI and Cardiac clearance.  Meanwhile c/w metoprolol.

## 2019-01-19 NOTE — PROGRESS NOTE ADULT - SUBJECTIVE AND OBJECTIVE BOX
[facility] NEPHROLOGY- PROGRESS NOTE    78y Female with history of CHF, COPD presents with SOB found to have guaiac positive anemia and afib with RVR. Nephrology consulted for elevated Scr.    REVIEW OF SYSTEMS:  Gen: no changes in weight  Cards: no chest pain  Resp: + dyspnea  GI: no nausea or vomiting or diarrhea  Vascular: + LE edema    No Known Allergies      Hospital Medications: Medications reviewed    VITALS:  T(F): 97.8 (19 @ 04:40), Max: 98 (19 @ 20:40)  HR: 70 (19 @ 04:40)  BP: 104/71 (19 @ 04:40)  RR: 18 (19 @ 04:40)  SpO2: 97% (19 @ 04:40)  Wt(kg): --     @ 07:01  -   @ 07:00  --------------------------------------------------------  IN: 590 mL / OUT: 200 mL / NET: 390 mL      PHYSICAL EXAM:    Gen: NAD, calm  Cards: Irregularly irregular, +S1/S2, no M/G/R  Resp: CTA B/L  GI: soft, NT/ND, NABS  Vascular: RLE > LLE pitting edema    LABS:      135  |  94<L>  |  38<H>  ----------------------------<  72  4.3   |  28  |  2.01<H>    Ca    8.9      2019 06:30  Mg     2.0           Creatinine Trend: 2.01 <--, 2.18 <--, 1.68 <--, 1.53 <--, 1.57 <--, 1.58 <--                        10.2   9.97  )-----------( 266      ( 2019 06:30 )             32.1     Urine Studies:  Urinalysis Basic - ( 2019 19:40 )    Color: YELLOW / Appearance: Lt TURBID / S.015 / pH: 6.0  Gluc: NEGATIVE / Ketone: NEGATIVE  / Bili: NEGATIVE / Urobili: NORMAL   Blood: MODERATE / Protein: 20 / Nitrite: POSITIVE   Leuk Esterase: LARGE / RBC: 11-25 / WBC >50   Sq Epi: FEW / Non Sq Epi:  / Bacteria: FEW      Creatinine, Random Urine: 65.50 mg/dL ( @ 19:40)  Protein, Random Urine: 35.8 mg/dL ( @ 19:40)            RADIOLOGY & ADDITIONAL STUDIES:

## 2019-01-19 NOTE — DIETITIAN INITIAL EVALUATION ADULT. - PERTINENT MEDS FT
MEDICATIONS  (STANDING):  atorvastatin 40 milliGRAM(s) Oral at bedtime  buDESOnide  80 MICROgram(s)/formoterol 4.5 MICROgram(s) Inhaler 2 Puff(s) Inhalation two times a day  clopidogrel Tablet 75 milliGRAM(s) Oral daily  furosemide   Injectable 40 milliGRAM(s) IV Push daily  metoprolol tartrate 12.5 milliGRAM(s) Oral every 12 hours  pantoprazole  Injectable 40 milliGRAM(s) IV Push two times a day  senna 2 Tablet(s) Oral at bedtime    MEDICATIONS  (PRN):  acetaminophen   Tablet .. 650 milliGRAM(s) Oral every 6 hours PRN Mild Pain (1 - 3)  LORazepam     Tablet 1 milliGRAM(s) Oral two times a day PRN Anxiety  traMADol 50 milliGRAM(s) Oral every 8 hours PRN Severe Pain (7 - 10)

## 2019-01-19 NOTE — DIETITIAN INITIAL EVALUATION ADULT. - NS AS NUTRI INTERV MEDICAL AND FOOD SUPPLEMENTS
Recommend Ensure Enlive 240mls 2x daily (700kcal, 40g protein). to help with caloric and protein intake

## 2019-01-19 NOTE — PROGRESS NOTE ADULT - SUBJECTIVE AND OBJECTIVE BOX
CARDIOLOGY FOLLOW UP NOTE - DR. MARTINEZ    Subjective:    feels better  no cp, sob    PHYSICAL EXAM:  T(C): 36.6 (01-19-19 @ 04:40), Max: 36.7 (01-18-19 @ 20:40)  HR: 70 (01-19-19 @ 04:40) (70 - 110)  BP: 104/71 (01-19-19 @ 04:40) (91/61 - 115/75)  RR: 20 (01-19-19 @ 12:25) (18 - 20)  SpO2: 97% (01-19-19 @ 12:25) (97% - 99%)  Wt(kg): --  I&O's Summary    18 Jan 2019 07:01  -  19 Jan 2019 07:00  --------------------------------------------------------  IN: 590 mL / OUT: 200 mL / NET: 390 mL        Appearance: Normal	  Cardiovascular: Normal S1 S2, iurreg  Respiratory: Lungs clear to auscultation	  Gastrointestinal:  Soft, Non-tender, + BS	  Extremities: Normal range of motion, No clubbing, cyanosis or edema      MEDICATIONS  (STANDING):  atorvastatin 40 milliGRAM(s) Oral at bedtime  buDESOnide  80 MICROgram(s)/formoterol 4.5 MICROgram(s) Inhaler 2 Puff(s) Inhalation two times a day  clopidogrel Tablet 75 milliGRAM(s) Oral daily  furosemide   Injectable 40 milliGRAM(s) IV Push daily  metoprolol tartrate 12.5 milliGRAM(s) Oral every 12 hours  pantoprazole  Injectable 40 milliGRAM(s) IV Push two times a day  senna 2 Tablet(s) Oral at bedtime      TELEMETRY: 	    ECG:  	  RADIOLOGY:   DIAGNOSTIC TESTING:  [ ] Echocardiogram:  [ ] Catheterization:  [ ] Stress Test:    OTHER: 	    LABS:	 	    CARDIAC MARKERS:                                10.2   9.97  )-----------( 266      ( 19 Jan 2019 06:30 )             32.1     01-19    135  |  94<L>  |  38<H>  ----------------------------<  72  4.3   |  28  |  2.01<H>    Ca    8.9      19 Jan 2019 06:30  Mg     2.0     01-19      proBNP: Serum Pro-Brain Natriuretic Peptide: 40298 pg/mL (01-19 @ 06:30)      Lipid Profile:   HgA1c:

## 2019-01-19 NOTE — PROGRESS NOTE ADULT - PROBLEM SELECTOR PLAN 1
Likely hemodynamically mediated in setting of afib, ADHF, anemia and IV diuretic use. Renal function improving. FeUrea 29%; pre-renal IGOR. Check bladder scan r/o urinary retention. Continue with decreased dose of IV lasix (daily). Avoid nephrotoxins.

## 2019-01-19 NOTE — DIETITIAN INITIAL EVALUATION ADULT. - SIGNS/SYMPTOMS
moderate muscle and fat wasting + + 3 edema noted above. moderate muscle and fat wasting + + 3 edema noted above + pressure injuries noted above.

## 2019-01-19 NOTE — PROGRESS NOTE ADULT - SUBJECTIVE AND OBJECTIVE BOX
BEVERLEY DEL RIO:[id number],   78yFemale followed for:  No Known Allergies    PAST MEDICAL & SURGICAL HISTORY:  CAD (coronary artery disease): s/p stent 2018  CHF (congestive heart failure)  COPD (chronic obstructive pulmonary disease): on 2-3L O2 at home prn  Anxiety  TIA (transient ischemic attack): many years ago  PAF (paroxysmal atrial fibrillation)  HLD (hyperlipidemia)  HTN (hypertension)  H/O total hysterectomy: 2014  History of cataract surgery: b/l 2015  History of repair of hip fracture: luisa placed in RLE 2015  H/O spinal fusion: c2-6 in 2017    FAMILY HISTORY:  No pertinent family history in first degree relatives    MEDICATIONS  (STANDING):  atorvastatin 40 milliGRAM(s) Oral at bedtime  buDESOnide  80 MICROgram(s)/formoterol 4.5 MICROgram(s) Inhaler 2 Puff(s) Inhalation two times a day  clopidogrel Tablet 75 milliGRAM(s) Oral daily  furosemide   Injectable 40 milliGRAM(s) IV Push daily  metoprolol tartrate 12.5 milliGRAM(s) Oral every 12 hours  pantoprazole  Injectable 40 milliGRAM(s) IV Push two times a day  senna 2 Tablet(s) Oral at bedtime    MEDICATIONS  (PRN):  acetaminophen   Tablet .. 650 milliGRAM(s) Oral every 6 hours PRN Mild Pain (1 - 3)  LORazepam     Tablet 1 milliGRAM(s) Oral two times a day PRN Anxiety  traMADol 50 milliGRAM(s) Oral every 8 hours PRN Severe Pain (7 - 10)      Vital Signs Last 24 Hrs  T(C): 36.6 (19 Jan 2019 04:40), Max: 36.7 (18 Jan 2019 20:40)  T(F): 97.8 (19 Jan 2019 04:40), Max: 98 (18 Jan 2019 20:40)  HR: 70 (19 Jan 2019 04:40) (70 - 110)  BP: 104/71 (19 Jan 2019 04:40) (91/61 - 115/75)  BP(mean): --  RR: 20 (19 Jan 2019 12:25) (18 - 20)  SpO2: 97% (19 Jan 2019 12:25) (97% - 99%)  nc/at  s1s2  cta  soft, nt, nd no guarding or rebound  no c/c/e    CBC Full  -  ( 19 Jan 2019 06:30 )  WBC Count : 9.97 K/uL  Hemoglobin : 10.2 g/dL  Hematocrit : 32.1 %  Platelet Count - Automated : 266 K/uL  Mean Cell Volume : 92.2 fL  Mean Cell Hemoglobin : 29.3 pg  Mean Cell Hemoglobin Concentration : 31.8 %  Auto Neutrophil # : 8.55 K/uL  Auto Lymphocyte # : 0.57 K/uL  Auto Monocyte # : 0.54 K/uL  Auto Eosinophil # : 0.20 K/uL  Auto Basophil # : 0.07 K/uL  Auto Neutrophil % : 85.8 %  Auto Lymphocyte % : 5.7 %  Auto Monocyte % : 5.4 %  Auto Eosinophil % : 2.0 %  Auto Basophil % : 0.7 %    01-19    135  |  94<L>  |  38<H>  ----------------------------<  72  4.3   |  28  |  2.01<H>    Ca    8.9      19 Jan 2019 06:30  Mg     2.0     01-19

## 2019-01-19 NOTE — PROGRESS NOTE ADULT - ASSESSMENT
Problem/Plan - 1:  ·  Problem: Acute on chronic systolic and diastolic heart failure, NYHA class 3.  Plan: tele monitor  cardiac enzymes x 2 to R/O ACS.  serial EKGs  2G Sodium 1800 ADA diet with 1500 cc Fluid restriction  Strict I&Os plus Daily weights.  Lasix 40mg IV Q12   cardio consult.     Problem/Plan - 2:  ·  Problem: GI bleed.  Plan: Started on Protonix 40mg Q12h  GI consult  HOLDING PRADAXA for now until cardiac and GI clearance  sp transfusion  GI fu    Problem/Plan - 3:  ·  Problem: Ulcer of heel, right, with fat layer exposed.  Plan: Continue vancomycin as prescribed.   Wound care nurse consult.  ID fu     Problem/Plan - 4:·  Problem: Chronic atrial fibrillation with rapid ventricular response.  Plan: CHADS2 score=5  Holding PRADAXA for now due to Acute GI bleed. Waiting for GI and Cardiac clearance.  Meanwhile c/w metoprolol.     Problem/Plan - 5:  ·  Problem: CKD (chronic kidney disease) stage 4, GFR 15-29 ml/min.  Plan: Monitor electrolytes  Trend BUN/Cr  renal consult    Problem/Plan - 6:  Problem: COPD (chronic obstructive pulmonary disease). Plan: Pt currently not in exacerbation  Continue Symbicort as prescribed.    Problem/Plan - 7:  ·  Problem: HTN (hypertension).  Plan: Monitor BP daily  DASH diet  Continue bp meds  Adjust meds as needed.     Problem/Plan - 8:  ·  Problem: HLD (hyperlipidemia).  Plan: FLP, continue atorvastatin as prescribed.

## 2019-01-19 NOTE — DIETITIAN INITIAL EVALUATION ADULT. - OTHER INFO
Patient seen for nutrition consult for Registered Dietitian. Per chart: Patient is a 78 year old female with history of HTN, HLD, CHF, COPD, Afib, r heel ulcer, Patient reported she is eating well in house- consuming about 100% of meals. Patient denies any nausea/vomiting/diarrhea/constipation or difficulty chewing and swallowing. Patient reports no food allergies or intolerances. Reported usual weight 122 pounds. Current weight 136 pounds. Patient noted with + 3 edema Right/left- foot, ankle, leg. Weight change possible due to fluid shifts.

## 2019-01-19 NOTE — PROGRESS NOTE ADULT - ASSESSMENT
79 y/o female, with a PmHx of COPD (on 2-3L O2 prn), paroxsymal a-fib (on pradaxa), CAD (s/p stent 11/2018), DCHF, HTN, HLD, and anxiety, presenting with acute/chronic diastolic chf, chest pain r/o acs, GIB, non healing pressure ulcer.     1. Atypical chest pain, resolved  in the setting of acute CHF exacerbation, afib w/ RVR   cv stable, no evidence of acute ischemia/ACS   Echo with nl lv fxn  continue statin, bb, plavix     2. Acute/chronic diastolic chf  clinically improved  continue lasix 40mg IV daily  cont bb  echo with nl lv fxn    3. AFIB, hx   s/p dig load  rate better controlled  cont dig, bb  CHADS 3 - continue to hold pradaxa in setting of GIB, anemia  gi f/u   s/p 1uprbc, continue to trend cbc, transfuse prn     4. CAD s/p PCI (11/2018)  cv stable, no evidence of acute ischemia   continue bb, statin, continue plavix given recent PCI      5. GIB   +GUIAC , +anemia s/p 1uprbc,   continue to trend cbc, transfuse pr  GI f/u Pradaxa on hold     6. non healing foot ulcer  on po vanco   med f/u  ID f/u     7. IGOR/CKD   renal f/u     dvt ppx

## 2019-01-19 NOTE — DIETITIAN INITIAL EVALUATION ADULT. - ORAL INTAKE PTA
Patient reported good appetite. Usual food recall: hot cereal ( cream of wheat and oatmeal), orange juice, cranberry juice, cold cuts and soups. No micronutrient supplementation PTA.

## 2019-01-20 LAB
ANION GAP SERPL CALC-SCNC: 14 MMO/L — SIGNIFICANT CHANGE UP (ref 7–14)
APTT BLD: 76.9 SEC — HIGH (ref 27.5–36.3)
BASOPHILS # BLD AUTO: 0.04 K/UL — SIGNIFICANT CHANGE UP (ref 0–0.2)
BASOPHILS NFR BLD AUTO: 0.5 % — SIGNIFICANT CHANGE UP (ref 0–2)
BUN SERPL-MCNC: 36 MG/DL — HIGH (ref 7–23)
CALCIUM SERPL-MCNC: 8.3 MG/DL — LOW (ref 8.4–10.5)
CHLORIDE SERPL-SCNC: 93 MMOL/L — LOW (ref 98–107)
CO2 SERPL-SCNC: 26 MMOL/L — SIGNIFICANT CHANGE UP (ref 22–31)
CREAT SERPL-MCNC: 1.87 MG/DL — HIGH (ref 0.5–1.3)
EOSINOPHIL # BLD AUTO: 0.16 K/UL — SIGNIFICANT CHANGE UP (ref 0–0.5)
EOSINOPHIL NFR BLD AUTO: 2.1 % — SIGNIFICANT CHANGE UP (ref 0–6)
GLUCOSE SERPL-MCNC: 103 MG/DL — HIGH (ref 70–99)
HCT VFR BLD CALC: 28.8 % — LOW (ref 34.5–45)
HGB BLD-MCNC: 9 G/DL — LOW (ref 11.5–15.5)
IMM GRANULOCYTES NFR BLD AUTO: 0.4 % — SIGNIFICANT CHANGE UP (ref 0–1.5)
LYMPHOCYTES # BLD AUTO: 0.62 K/UL — LOW (ref 1–3.3)
LYMPHOCYTES # BLD AUTO: 8.2 % — LOW (ref 13–44)
MAGNESIUM SERPL-MCNC: 1.8 MG/DL — SIGNIFICANT CHANGE UP (ref 1.6–2.6)
MCHC RBC-ENTMCNC: 28.4 PG — SIGNIFICANT CHANGE UP (ref 27–34)
MCHC RBC-ENTMCNC: 31.3 % — LOW (ref 32–36)
MCV RBC AUTO: 90.9 FL — SIGNIFICANT CHANGE UP (ref 80–100)
MONOCYTES # BLD AUTO: 0.55 K/UL — SIGNIFICANT CHANGE UP (ref 0–0.9)
MONOCYTES NFR BLD AUTO: 7.3 % — SIGNIFICANT CHANGE UP (ref 2–14)
NEUTROPHILS # BLD AUTO: 6.13 K/UL — SIGNIFICANT CHANGE UP (ref 1.8–7.4)
NEUTROPHILS NFR BLD AUTO: 81.5 % — HIGH (ref 43–77)
NRBC # FLD: 0 K/UL — LOW (ref 25–125)
PLATELET # BLD AUTO: 203 K/UL — SIGNIFICANT CHANGE UP (ref 150–400)
PMV BLD: 9.7 FL — SIGNIFICANT CHANGE UP (ref 7–13)
POTASSIUM SERPL-MCNC: 4.3 MMOL/L — SIGNIFICANT CHANGE UP (ref 3.5–5.3)
POTASSIUM SERPL-SCNC: 4.3 MMOL/L — SIGNIFICANT CHANGE UP (ref 3.5–5.3)
RBC # BLD: 3.17 M/UL — LOW (ref 3.8–5.2)
RBC # FLD: 16.9 % — HIGH (ref 10.3–14.5)
SODIUM SERPL-SCNC: 133 MMOL/L — LOW (ref 135–145)
WBC # BLD: 7.53 K/UL — SIGNIFICANT CHANGE UP (ref 3.8–10.5)
WBC # FLD AUTO: 7.53 K/UL — SIGNIFICANT CHANGE UP (ref 3.8–10.5)

## 2019-01-20 PROCEDURE — 73610 X-RAY EXAM OF ANKLE: CPT | Mod: 26,RT

## 2019-01-20 RX ORDER — HEPARIN SODIUM 5000 [USP'U]/ML
4500 INJECTION INTRAVENOUS; SUBCUTANEOUS EVERY 6 HOURS
Qty: 0 | Refills: 0 | Status: DISCONTINUED | OUTPATIENT
Start: 2019-01-20 | End: 2019-01-22

## 2019-01-20 RX ORDER — HEPARIN SODIUM 5000 [USP'U]/ML
2000 INJECTION INTRAVENOUS; SUBCUTANEOUS EVERY 6 HOURS
Qty: 0 | Refills: 0 | Status: DISCONTINUED | OUTPATIENT
Start: 2019-01-20 | End: 2019-01-22

## 2019-01-20 RX ORDER — HEPARIN SODIUM 5000 [USP'U]/ML
INJECTION INTRAVENOUS; SUBCUTANEOUS
Qty: 25000 | Refills: 0 | Status: DISCONTINUED | OUTPATIENT
Start: 2019-01-20 | End: 2019-01-22

## 2019-01-20 RX ORDER — ACETAMINOPHEN 500 MG
650 TABLET ORAL EVERY 6 HOURS
Qty: 0 | Refills: 0 | Status: DISCONTINUED | OUTPATIENT
Start: 2019-01-20 | End: 2019-01-26

## 2019-01-20 RX ADMIN — TRAMADOL HYDROCHLORIDE 50 MILLIGRAM(S): 50 TABLET ORAL at 12:24

## 2019-01-20 RX ADMIN — BUDESONIDE AND FORMOTEROL FUMARATE DIHYDRATE 2 PUFF(S): 160; 4.5 AEROSOL RESPIRATORY (INHALATION) at 21:56

## 2019-01-20 RX ADMIN — Medication 650 MILLIGRAM(S): at 15:00

## 2019-01-20 RX ADMIN — Medication 40 MILLIGRAM(S): at 05:08

## 2019-01-20 RX ADMIN — Medication 650 MILLIGRAM(S): at 14:34

## 2019-01-20 RX ADMIN — ATORVASTATIN CALCIUM 40 MILLIGRAM(S): 80 TABLET, FILM COATED ORAL at 21:56

## 2019-01-20 RX ADMIN — Medication 1 MILLIGRAM(S): at 19:09

## 2019-01-20 RX ADMIN — TRAMADOL HYDROCHLORIDE 50 MILLIGRAM(S): 50 TABLET ORAL at 20:51

## 2019-01-20 RX ADMIN — HEPARIN SODIUM 1100 UNIT(S)/HR: 5000 INJECTION INTRAVENOUS; SUBCUTANEOUS at 18:39

## 2019-01-20 RX ADMIN — Medication 650 MILLIGRAM(S): at 22:55

## 2019-01-20 RX ADMIN — PANTOPRAZOLE SODIUM 40 MILLIGRAM(S): 20 TABLET, DELAYED RELEASE ORAL at 05:08

## 2019-01-20 RX ADMIN — Medication 650 MILLIGRAM(S): at 21:55

## 2019-01-20 RX ADMIN — TRAMADOL HYDROCHLORIDE 50 MILLIGRAM(S): 50 TABLET ORAL at 21:51

## 2019-01-20 RX ADMIN — Medication 12.5 MILLIGRAM(S): at 17:55

## 2019-01-20 RX ADMIN — Medication 12.5 MILLIGRAM(S): at 05:08

## 2019-01-20 RX ADMIN — BUDESONIDE AND FORMOTEROL FUMARATE DIHYDRATE 2 PUFF(S): 160; 4.5 AEROSOL RESPIRATORY (INHALATION) at 12:24

## 2019-01-20 RX ADMIN — Medication 0.12 MILLIGRAM(S): at 05:08

## 2019-01-20 RX ADMIN — PANTOPRAZOLE SODIUM 40 MILLIGRAM(S): 20 TABLET, DELAYED RELEASE ORAL at 17:56

## 2019-01-20 RX ADMIN — TRAMADOL HYDROCHLORIDE 50 MILLIGRAM(S): 50 TABLET ORAL at 13:00

## 2019-01-20 RX ADMIN — CLOPIDOGREL BISULFATE 75 MILLIGRAM(S): 75 TABLET, FILM COATED ORAL at 12:24

## 2019-01-20 NOTE — PROGRESS NOTE ADULT - ASSESSMENT
Problem/Plan - 1:  ·  Problem: Acute on chronic systolic and diastolic heart failure, NYHA class 3.  Plan:telemonitor   2G Sodium 1800 ADA diet with 1500 cc Fluid restriction  Strict I&Os plus Daily weights.  diuresis as per cards  cardio consult appreciated    Problem/Plan - 2:  ·  Problem: GI bleed.  Plan: Started on Protonix 40mg Q12h  GI consult appreciated  restart AC     Problem/Plan - 3:  ·  Problem: Ulcer of heel, right, with fat layer exposed.  Plan: Continue vancomycin as prescribed.   Wound care nurse consult.  ID fu     Problem/Plan - 4:·  Problem: Chronic atrial fibrillation with rapid ventricular response.  Plan: start heparin drip and monitor     Problem/Plan - 5:  ·  Problem: CKD (chronic kidney disease) stage 4, GFR 15-29 ml/min.  Plan: Monitor electrolytes  Trend BUN/Cr  renal consult appreciated    Problem/Plan - 6:  Problem: COPD (chronic obstructive pulmonary disease). Plan: Pt currently not in exacerbation  Continue Symbicort as prescribed.    Problem/Plan - 7:  ·  Problem: HTN (hypertension).  Plan: Monitor BP daily  DASH diet  Continue bp meds  Adjust meds as needed.     Problem/Plan - 8:  ·  Problem: HLD (hyperlipidemia).  Plan: FLP, continue atorvastatin as prescribed.

## 2019-01-20 NOTE — PROGRESS NOTE ADULT - ASSESSMENT
77 y/o female, with a PmHx of COPD (on 2-3L O2 prn), paroxsymal a-fib (on pradaxa), CAD (s/p stent 11/2018), DCHF, HTN, HLD, and anxiety, presenting with acute/chronic diastolic chf, chest pain r/o acs, GIB, non healing pressure ulcer.     1. Atypical chest pain, resolved  in the setting of acute CHF exacerbation, afib w/ RVR   cv stable, no evidence of acute ischemia/ACS   Echo with nl lv fxn  continue statin, bb, plavix     2. Acute/chronic diastolic chf  clinically improved  continue lasix 40mg IV daily, change to po rambo   cont bb  echo with nl lv fxn    3. AFIB, hx   s/p dig load  rate better controlled  cont dig, bb, will inc metoprolol to 25 bid if needed  CHADS 3 - continue to hold pradaxa in setting of GIB, anemia  gi f/u   s/p 1uprbc, continue to trend cbc, transfuse prn     4. CAD s/p PCI (11/2018)  cv stable, no evidence of acute ischemia   continue bb, statin, continue plavix given recent PCI      5. GIB   +GUIAC , +anemia s/p 1uprbc,   continue to trend cbc, transfuse pr  GI f/u Pradaxa on hold     6. non healing foot ulcer  on po vanco   med f/u  ID f/u     7. IGOR/CKD   renal f/u     dvt ppx

## 2019-01-20 NOTE — PROGRESS NOTE ADULT - PROBLEM SELECTOR PLAN 1
Likely hemodynamically mediated in setting of afib, ADHF, anemia and IV diuretic use. Renal function improving. FeUrea 29%; pre-renal IGOR.  Continue with decreased dose of IV lasix (daily), agree with changing to PO tomorrow. Avoid nephrotoxins.  Asymptomatic UTI- no need to tx.

## 2019-01-20 NOTE — PROGRESS NOTE ADULT - SUBJECTIVE AND OBJECTIVE BOX
CARDIOLOGY FOLLOW UP NOTE - DR. MARTINEZ    Subjective:    no chest pain, sob  no palps    PHYSICAL EXAM:  T(C): 36.4 (01-20-19 @ 05:06), Max: 36.9 (01-19-19 @ 13:58)  HR: 101 (01-20-19 @ 05:06) (55 - 101)  BP: 111/68 (01-20-19 @ 05:06) (100/46 - 111/68)  RR: 18 (01-20-19 @ 05:06) (18 - 20)  SpO2: 96% (01-20-19 @ 05:06) (93% - 97%)  Wt(kg): --  I&O's Summary    19 Jan 2019 07:01  -  20 Jan 2019 07:00  --------------------------------------------------------  IN: 200 mL / OUT: 0 mL / NET: 200 mL        Appearance: Normal	  Cardiovascular: Normal S1 S2, irreg  Respiratory: Lungs clear to auscultation	  Gastrointestinal:  Soft, Non-tender, + BS	  Extremities: Normal range of motion, No clubbing, cyanosis or edema      MEDICATIONS  (STANDING):  atorvastatin 40 milliGRAM(s) Oral at bedtime  buDESOnide  80 MICROgram(s)/formoterol 4.5 MICROgram(s) Inhaler 2 Puff(s) Inhalation two times a day  clopidogrel Tablet 75 milliGRAM(s) Oral daily  digoxin     Tablet 0.125 milliGRAM(s) Oral daily  furosemide   Injectable 40 milliGRAM(s) IV Push daily  metoprolol tartrate 12.5 milliGRAM(s) Oral every 12 hours  pantoprazole  Injectable 40 milliGRAM(s) IV Push two times a day  senna 2 Tablet(s) Oral at bedtime      TELEMETRY: 	    ECG:  	  RADIOLOGY:   DIAGNOSTIC TESTING:  [ ] Echocardiogram:  [ ] Catheterization:  [ ] Stress Test:    OTHER: 	    LABS:	 	    CARDIAC MARKERS:                                9.0    7.53  )-----------( 203      ( 20 Jan 2019 05:50 )             28.8     01-20    133<L>  |  93<L>  |  36<H>  ----------------------------<  103<H>  4.3   |  26  |  1.87<H>    Ca    8.3<L>      20 Jan 2019 05:50  Mg     1.8     01-20      proBNP:     Lipid Profile:   HgA1c:

## 2019-01-20 NOTE — PROGRESS NOTE ADULT - SUBJECTIVE AND OBJECTIVE BOX
[facility] NEPHROLOGY- PROGRESS NOTE    78y Female with history of CHF, COPD presents with SOB found to have guaiac positive anemia and afib with RVR. Nephrology consulted for elevated Scr.    REVIEW OF SYSTEMS:  Gen: no changes in weight, no dysuria/ urgency  Cards: no chest pain  Resp: no dyspnea  GI: no nausea or vomiting or diarrhea  Vascular: + LE edema +rt foot pain    No Known Allergies      Hospital Medications: Medications reviewed    VITALS:  T(F): 97.6 (19 @ 05:06), Max: 97.6 (19 @ 20:40)  HR: 101 (19 @ 05:06)  BP: 111/68 (19 @ 05:06)  RR: 18 (19 @ 05:06)  SpO2: 96% (19 @ 05:06)  Wt(kg): --     @ 07:01  -   @ 07:00  --------------------------------------------------------  IN: 200 mL / OUT: 0 mL / NET: 200 mL      PHYSICAL EXAM:    Gen: NAD, calm  Cards: Irregularly irregular, +S1/S2, no M/G/R  Resp: CTA B/L  GI: soft, NT/ND, NABS  Vascular: RLE > LLE trace pitting edema    LABS:      133<L>  |  93<L>  |  36<H>  ----------------------------<  103<H>  4.3   |  26  |  1.87<H>    Ca    8.3<L>      2019 05:50  Mg     1.8           Creatinine Trend: 1.87 <--, 2.01 <--, 2.18 <--, 1.68 <--, 1.53 <--                        9.0    7.53  )-----------( 203      ( 2019 05:50 )             28.8     Urine Studies:  Urinalysis Basic - ( 2019 19:40 )    Color: YELLOW / Appearance: Lt TURBID / S.015 / pH: 6.0  Gluc: NEGATIVE / Ketone: NEGATIVE  / Bili: NEGATIVE / Urobili: NORMAL   Blood: MODERATE / Protein: 20 / Nitrite: POSITIVE   Leuk Esterase: LARGE / RBC: 11-25 / WBC >50   Sq Epi: FEW / Non Sq Epi:  / Bacteria: FEW      Creatinine, Random Urine: 65.50 mg/dL ( @ 19:40)  Protein, Random Urine: 35.8 mg/dL ( @ 19:40)

## 2019-01-20 NOTE — PROGRESS NOTE ADULT - SUBJECTIVE AND OBJECTIVE BOX
BEVERLEY DEL RIO:[id number],   78yFemale followed for:  No Known Allergies    PAST MEDICAL & SURGICAL HISTORY:  CAD (coronary artery disease): s/p stent 2018  CHF (congestive heart failure)  COPD (chronic obstructive pulmonary disease): on 2-3L O2 at home prn  Anxiety  TIA (transient ischemic attack): many years ago  PAF (paroxysmal atrial fibrillation)  HLD (hyperlipidemia)  HTN (hypertension)  H/O total hysterectomy: 2014  History of cataract surgery: b/l 2015  History of repair of hip fracture: luisa placed in RLE 2015  H/O spinal fusion: c2-6 in 2017    FAMILY HISTORY:  No pertinent family history in first degree relatives    MEDICATIONS  (STANDING):  atorvastatin 40 milliGRAM(s) Oral at bedtime  buDESOnide  80 MICROgram(s)/formoterol 4.5 MICROgram(s) Inhaler 2 Puff(s) Inhalation two times a day  clopidogrel Tablet 75 milliGRAM(s) Oral daily  digoxin     Tablet 0.125 milliGRAM(s) Oral daily  furosemide   Injectable 40 milliGRAM(s) IV Push daily  metoprolol tartrate 12.5 milliGRAM(s) Oral every 12 hours  pantoprazole  Injectable 40 milliGRAM(s) IV Push two times a day  senna 2 Tablet(s) Oral at bedtime    MEDICATIONS  (PRN):  LORazepam     Tablet 1 milliGRAM(s) Oral two times a day PRN Anxiety  traMADol 50 milliGRAM(s) Oral every 8 hours PRN Severe Pain (7 - 10)      Vital Signs Last 24 Hrs  T(C): 36.4 (20 Jan 2019 05:06), Max: 36.9 (19 Jan 2019 13:58)  T(F): 97.6 (20 Jan 2019 05:06), Max: 98.5 (19 Jan 2019 13:58)  HR: 101 (20 Jan 2019 05:06) (55 - 101)  BP: 111/68 (20 Jan 2019 05:06) (100/46 - 111/68)  BP(mean): 82 (20 Jan 2019 05:06) (82 - 82)  RR: 18 (20 Jan 2019 05:06) (18 - 20)  SpO2: 96% (20 Jan 2019 05:06) (93% - 97%)  nc/at  s1s2  cta  soft, nt, nd no guarding or rebound  no c/c/e    CBC Full  -  ( 20 Jan 2019 05:50 )  WBC Count : 7.53 K/uL  Hemoglobin : 9.0 g/dL  Hematocrit : 28.8 %  Platelet Count - Automated : 203 K/uL  Mean Cell Volume : 90.9 fL  Mean Cell Hemoglobin : 28.4 pg  Mean Cell Hemoglobin Concentration : 31.3 %  Auto Neutrophil # : 6.13 K/uL  Auto Lymphocyte # : 0.62 K/uL  Auto Monocyte # : 0.55 K/uL  Auto Eosinophil # : 0.16 K/uL  Auto Basophil # : 0.04 K/uL  Auto Neutrophil % : 81.5 %  Auto Lymphocyte % : 8.2 %  Auto Monocyte % : 7.3 %  Auto Eosinophil % : 2.1 %  Auto Basophil % : 0.5 %    01-20    133<L>  |  93<L>  |  36<H>  ----------------------------<  103<H>  4.3   |  26  |  1.87<H>    Ca    8.3<L>      20 Jan 2019 05:50  Mg     1.8     01-20

## 2019-01-20 NOTE — PROGRESS NOTE ADULT - SUBJECTIVE AND OBJECTIVE BOX
Patient is a 78y old  Female who presents with a chief complaint of right foot pressure ulcer pain (2019 14:09)      INTERVAL HPI/OVERNIGHT EVENTS:  T(C): 36.7 (19 @ 14:24), Max: 36.7 (19 @ 14:24)  HR: 62 (19 @ 16:15) (55 - 101)  BP: 111/68 (19 @ 16:15) (103/64 - 111/68)  RR: 18 (19 @ 14:24) (18 - 20)  SpO2: 95% (19 @ 14:24) (93% - 96%)  Wt(kg): --  I&O's Summary    2019 07:01  -  2019 07:00  --------------------------------------------------------  IN: 200 mL / OUT: 0 mL / NET: 200 mL        LABS:                        9.0    7.53  )-----------( 203      ( 2019 05:50 )             28.8         133<L>  |  93<L>  |  36<H>  ----------------------------<  103<H>  4.3   |  26  |  1.87<H>    Ca    8.3<L>      2019 05:50  Mg     1.8             Urinalysis Basic - ( 2019 19:40 )    Color: YELLOW / Appearance: Lt TURBID / S.015 / pH: 6.0  Gluc: NEGATIVE / Ketone: NEGATIVE  / Bili: NEGATIVE / Urobili: NORMAL   Blood: MODERATE / Protein: 20 / Nitrite: POSITIVE   Leuk Esterase: LARGE / RBC: 11-25 / WBC >50   Sq Epi: FEW / Non Sq Epi: x / Bacteria: FEW      CAPILLARY BLOOD GLUCOSE            Urinalysis Basic - ( 2019 19:40 )    Color: YELLOW / Appearance: Lt TURBID / S.015 / pH: 6.0  Gluc: NEGATIVE / Ketone: NEGATIVE  / Bili: NEGATIVE / Urobili: NORMAL   Blood: MODERATE / Protein: 20 / Nitrite: POSITIVE   Leuk Esterase: LARGE / RBC: 11-25 / WBC >50   Sq Epi: FEW / Non Sq Epi: x / Bacteria: FEW        MEDICATIONS  (STANDING):  atorvastatin 40 milliGRAM(s) Oral at bedtime  buDESOnide  80 MICROgram(s)/formoterol 4.5 MICROgram(s) Inhaler 2 Puff(s) Inhalation two times a day  clopidogrel Tablet 75 milliGRAM(s) Oral daily  digoxin     Tablet 0.125 milliGRAM(s) Oral daily  furosemide   Injectable 40 milliGRAM(s) IV Push daily  heparin  Infusion.  Unit(s)/Hr (11 mL/Hr) IV Continuous <Continuous>  metoprolol tartrate 12.5 milliGRAM(s) Oral every 12 hours  pantoprazole  Injectable 40 milliGRAM(s) IV Push two times a day  senna 2 Tablet(s) Oral at bedtime    MEDICATIONS  (PRN):  acetaminophen   Tablet .. 650 milliGRAM(s) Oral every 6 hours PRN Mild Pain (1 - 3), Moderate Pain (4 - 6), Severe Pain (7 - 10)  heparin  Injectable 4500 Unit(s) IV Push every 6 hours PRN For aPTT less than 40  heparin  Injectable 2000 Unit(s) IV Push every 6 hours PRN For aPTT between 40 - 57  LORazepam     Tablet 1 milliGRAM(s) Oral two times a day PRN Anxiety  traMADol 50 milliGRAM(s) Oral every 8 hours PRN Severe Pain (7 - 10)          PHYSICAL EXAM:  GENERAL: NAD, well-groomed, well-developed  HEAD:  Atraumatic, Normocephalic  CHEST/LUNG: Clear to percussion bilaterally; No rales, rhonchi, wheezing, or rubs  HEART: Regular rate and rhythm; No murmurs, rubs, or gallops  ABDOMEN: Soft, Nontender, Nondistended; Bowel sounds present  EXTREMITIES:  2+ Peripheral Pulses, No clubbing, cyanosis, or edema  LYMPH: No lymphadenopathy noted  SKIN: No rashes or lesions    Care Discussed with Consultants/Other Providers [ ] YES  [ ] NO

## 2019-01-20 NOTE — PROGRESS NOTE ADULT - ASSESSMENT
consider trial of heparin? no contraindication to a/c.  I think guiac is from colitis/ c.diff/ ? hemorhoidal.  no diarrhea now.  will follow with you.  endosocpic evaluation risk> beneift with age and comorbidities.  would give trial of a/c while in house so hgb and bleeding potentionally can be monitored

## 2019-01-21 LAB
ANION GAP SERPL CALC-SCNC: 10 MMO/L — SIGNIFICANT CHANGE UP (ref 7–14)
APTT BLD: 133.9 SEC — CRITICAL HIGH (ref 27.5–36.3)
APTT BLD: > 200 SEC — CRITICAL HIGH (ref 27.5–36.3)
APTT BLD: > 200 SEC — CRITICAL HIGH (ref 27.5–36.3)
BASOPHILS # BLD AUTO: 0.04 K/UL — SIGNIFICANT CHANGE UP (ref 0–0.2)
BASOPHILS NFR BLD AUTO: 0.4 % — SIGNIFICANT CHANGE UP (ref 0–2)
BUN SERPL-MCNC: 32 MG/DL — HIGH (ref 7–23)
CALCIUM SERPL-MCNC: 8.2 MG/DL — LOW (ref 8.4–10.5)
CHLORIDE SERPL-SCNC: 94 MMOL/L — LOW (ref 98–107)
CO2 SERPL-SCNC: 29 MMOL/L — SIGNIFICANT CHANGE UP (ref 22–31)
CREAT SERPL-MCNC: 1.76 MG/DL — HIGH (ref 0.5–1.3)
EOSINOPHIL # BLD AUTO: 0.14 K/UL — SIGNIFICANT CHANGE UP (ref 0–0.5)
EOSINOPHIL NFR BLD AUTO: 1.6 % — SIGNIFICANT CHANGE UP (ref 0–6)
GLUCOSE SERPL-MCNC: 108 MG/DL — HIGH (ref 70–99)
HCT VFR BLD CALC: 28.8 % — LOW (ref 34.5–45)
HCT VFR BLD CALC: 31.4 % — LOW (ref 34.5–45)
HGB BLD-MCNC: 9 G/DL — LOW (ref 11.5–15.5)
HGB BLD-MCNC: 9.9 G/DL — LOW (ref 11.5–15.5)
IMM GRANULOCYTES NFR BLD AUTO: 0.3 % — SIGNIFICANT CHANGE UP (ref 0–1.5)
LYMPHOCYTES # BLD AUTO: 0.46 K/UL — LOW (ref 1–3.3)
LYMPHOCYTES # BLD AUTO: 5.1 % — LOW (ref 13–44)
MAGNESIUM SERPL-MCNC: 1.8 MG/DL — SIGNIFICANT CHANGE UP (ref 1.6–2.6)
MCHC RBC-ENTMCNC: 28 PG — SIGNIFICANT CHANGE UP (ref 27–34)
MCHC RBC-ENTMCNC: 28.9 PG — SIGNIFICANT CHANGE UP (ref 27–34)
MCHC RBC-ENTMCNC: 31.3 % — LOW (ref 32–36)
MCHC RBC-ENTMCNC: 31.5 % — LOW (ref 32–36)
MCV RBC AUTO: 89.4 FL — SIGNIFICANT CHANGE UP (ref 80–100)
MCV RBC AUTO: 91.5 FL — SIGNIFICANT CHANGE UP (ref 80–100)
MONOCYTES # BLD AUTO: 0.55 K/UL — SIGNIFICANT CHANGE UP (ref 0–0.9)
MONOCYTES NFR BLD AUTO: 6.1 % — SIGNIFICANT CHANGE UP (ref 2–14)
NEUTROPHILS # BLD AUTO: 7.73 K/UL — HIGH (ref 1.8–7.4)
NEUTROPHILS NFR BLD AUTO: 86.5 % — HIGH (ref 43–77)
NRBC # FLD: 0 K/UL — LOW (ref 25–125)
NRBC # FLD: 0 K/UL — LOW (ref 25–125)
PLATELET # BLD AUTO: 210 K/UL — SIGNIFICANT CHANGE UP (ref 150–400)
PLATELET # BLD AUTO: 221 K/UL — SIGNIFICANT CHANGE UP (ref 150–400)
PMV BLD: 9.6 FL — SIGNIFICANT CHANGE UP (ref 7–13)
PMV BLD: 9.9 FL — SIGNIFICANT CHANGE UP (ref 7–13)
POTASSIUM SERPL-MCNC: 3.8 MMOL/L — SIGNIFICANT CHANGE UP (ref 3.5–5.3)
POTASSIUM SERPL-SCNC: 3.8 MMOL/L — SIGNIFICANT CHANGE UP (ref 3.5–5.3)
RBC # BLD: 3.22 M/UL — LOW (ref 3.8–5.2)
RBC # BLD: 3.43 M/UL — LOW (ref 3.8–5.2)
RBC # FLD: 16.7 % — HIGH (ref 10.3–14.5)
RBC # FLD: 16.9 % — HIGH (ref 10.3–14.5)
SODIUM SERPL-SCNC: 133 MMOL/L — LOW (ref 135–145)
WBC # BLD: 8.95 K/UL — SIGNIFICANT CHANGE UP (ref 3.8–10.5)
WBC # BLD: 9.8 K/UL — SIGNIFICANT CHANGE UP (ref 3.8–10.5)
WBC # FLD AUTO: 8.95 K/UL — SIGNIFICANT CHANGE UP (ref 3.8–10.5)
WBC # FLD AUTO: 9.8 K/UL — SIGNIFICANT CHANGE UP (ref 3.8–10.5)

## 2019-01-21 RX ORDER — THIAMINE MONONITRATE (VIT B1) 100 MG
100 TABLET ORAL DAILY
Qty: 0 | Refills: 0 | Status: DISCONTINUED | OUTPATIENT
Start: 2019-01-21 | End: 2019-02-15

## 2019-01-21 RX ORDER — FUROSEMIDE 40 MG
40 TABLET ORAL DAILY
Qty: 0 | Refills: 0 | Status: DISCONTINUED | OUTPATIENT
Start: 2019-01-21 | End: 2019-01-31

## 2019-01-21 RX ADMIN — HEPARIN SODIUM 700 UNIT(S)/HR: 5000 INJECTION INTRAVENOUS; SUBCUTANEOUS at 18:38

## 2019-01-21 RX ADMIN — PANTOPRAZOLE SODIUM 40 MILLIGRAM(S): 20 TABLET, DELAYED RELEASE ORAL at 06:11

## 2019-01-21 RX ADMIN — ATORVASTATIN CALCIUM 40 MILLIGRAM(S): 80 TABLET, FILM COATED ORAL at 21:01

## 2019-01-21 RX ADMIN — HEPARIN SODIUM 900 UNIT(S)/HR: 5000 INJECTION INTRAVENOUS; SUBCUTANEOUS at 03:04

## 2019-01-21 RX ADMIN — Medication 650 MILLIGRAM(S): at 21:01

## 2019-01-21 RX ADMIN — Medication 650 MILLIGRAM(S): at 22:02

## 2019-01-21 RX ADMIN — BUDESONIDE AND FORMOTEROL FUMARATE DIHYDRATE 2 PUFF(S): 160; 4.5 AEROSOL RESPIRATORY (INHALATION) at 10:09

## 2019-01-21 RX ADMIN — TRAMADOL HYDROCHLORIDE 50 MILLIGRAM(S): 50 TABLET ORAL at 06:11

## 2019-01-21 RX ADMIN — TRAMADOL HYDROCHLORIDE 50 MILLIGRAM(S): 50 TABLET ORAL at 06:57

## 2019-01-21 RX ADMIN — Medication 1 MILLIGRAM(S): at 10:09

## 2019-01-21 RX ADMIN — Medication 1 MILLIGRAM(S): at 21:01

## 2019-01-21 RX ADMIN — PANTOPRAZOLE SODIUM 40 MILLIGRAM(S): 20 TABLET, DELAYED RELEASE ORAL at 17:23

## 2019-01-21 RX ADMIN — Medication 12.5 MILLIGRAM(S): at 06:11

## 2019-01-21 RX ADMIN — Medication 40 MILLIGRAM(S): at 06:12

## 2019-01-21 RX ADMIN — SENNA PLUS 2 TABLET(S): 8.6 TABLET ORAL at 21:01

## 2019-01-21 RX ADMIN — Medication 650 MILLIGRAM(S): at 07:12

## 2019-01-21 RX ADMIN — HEPARIN SODIUM 0 UNIT(S)/HR: 5000 INJECTION INTRAVENOUS; SUBCUTANEOUS at 11:20

## 2019-01-21 RX ADMIN — CLOPIDOGREL BISULFATE 75 MILLIGRAM(S): 75 TABLET, FILM COATED ORAL at 11:23

## 2019-01-21 RX ADMIN — HEPARIN SODIUM 0 UNIT(S)/HR: 5000 INJECTION INTRAVENOUS; SUBCUTANEOUS at 02:00

## 2019-01-21 RX ADMIN — Medication 12.5 MILLIGRAM(S): at 17:23

## 2019-01-21 RX ADMIN — Medication 650 MILLIGRAM(S): at 08:00

## 2019-01-21 RX ADMIN — BUDESONIDE AND FORMOTEROL FUMARATE DIHYDRATE 2 PUFF(S): 160; 4.5 AEROSOL RESPIRATORY (INHALATION) at 21:02

## 2019-01-21 RX ADMIN — Medication 0.12 MILLIGRAM(S): at 06:11

## 2019-01-21 RX ADMIN — TRAMADOL HYDROCHLORIDE 50 MILLIGRAM(S): 50 TABLET ORAL at 23:40

## 2019-01-21 NOTE — CONSULT NOTE ADULT - ASSESSMENT
78 year old female with right heel ulceration (stable); RLE pain with non-palp pulses  -Cont dressing right foot ulceration with santyl and DSD  -STONEY/PVR ordered - non palp pedal pulses b/l. WIll follow up vasc studies.   -No local signs of infection - would recommend monitoring off antibiotics  -Z floats at all times  -Will follow

## 2019-01-21 NOTE — CONSULT NOTE ADULT - SUBJECTIVE AND OBJECTIVE BOX
[facility]        Patient is a 78y old  Female who presents with a chief complaint of right foot pressure ulcer pain (2019 15:36)      HPI:  77 y/o female, with a PmHx of COPD (on 2-3L O2 prn), paroxsymal a-fib (on pradaxa), CAD (s/p stent 2018), CHF, HTN, HLD, and anxiety, presenting to the [facility] ED with right foot pressure ulcer pain x several weeks. The patient has a dime-sized ulcer with scant serosanguinous drainage on her right calcaneous that she acquired from a shoe weeks ago. She reports that she woke up last night around 4am with excruciating, unrelenting right root pain with SOB x a few minutes followed by chest pain x several hours. The patient reports that the ulcer pain began to gradually worsen since her last hospital visit, where she was d/c with vancomycin. The foot pain is sharp, 10/10, and radiates up to her calf. The patient took two puffs of her Symbicort for the SOB with no relief, but it resolved spontaneously within a few minutes. The chest pain began gradually and was described as a midsternal, 8/10, tightness with no radiation and accompanied by palpitations. Of note, the patient reports chronic cough, b/l LE edema, JACKSON with walking 1/2 block, orthopnea x1 pillow, occasional PND, melena x weeks, easy bruising/bleeding, and 10 lbs weight loss over the past 6 months with no change in appetite. The patient denies fever, chills, SOB at rest, diaphoresis, dizziness, claudication, wheezing, changes in vision and hearing, abdominal pain, change in bowel and urinary habits, dysuria, hematuria. (2019 12:50)           *****PAST MEDICAL / Surgical  HISTORY:  PAST MEDICAL & SURGICAL HISTORY:  CAD (coronary artery disease): s/p stent   CHF (congestive heart failure)  COPD (chronic obstructive pulmonary disease): on 2-3L O2 at home prn  Anxiety  TIA (transient ischemic attack): many years ago  PAF (paroxysmal atrial fibrillation)  HLD (hyperlipidemia)  HTN (hypertension)  H/O total hysterectomy:   History of cataract surgery: b/l   History of repair of hip fracture: luisa placed in RLE   H/O spinal fusion: c2-6 in 2017           *****FAMILY HISTORY:  FAMILY HISTORY:  No pertinent family history in first degree relatives           *****SOCIAL HISTORY:  Alcohol: None  Smoking: None  son does bills. lives alone in an apt.   [facility]        *****ALLERGIES:   Allergies    No Known Allergies    Intolerances             *****MEDICATIONS: current medication reviewed and documented.   MEDICATIONS  (STANDING):  atorvastatin 40 milliGRAM(s) Oral at bedtime  buDESOnide  80 MICROgram(s)/formoterol 4.5 MICROgram(s) Inhaler 2 Puff(s) Inhalation two times a day  clopidogrel Tablet 75 milliGRAM(s) Oral daily  digoxin     Tablet 0.125 milliGRAM(s) Oral daily  furosemide    Tablet 40 milliGRAM(s) Oral daily  heparin  Infusion.  Unit(s)/Hr (11 mL/Hr) IV Continuous <Continuous>  metoprolol tartrate 12.5 milliGRAM(s) Oral every 12 hours  pantoprazole  Injectable 40 milliGRAM(s) IV Push two times a day  senna 2 Tablet(s) Oral at bedtime    MEDICATIONS  (PRN):  acetaminophen   Tablet .. 650 milliGRAM(s) Oral every 6 hours PRN Mild Pain (1 - 3), Moderate Pain (4 - 6), Severe Pain (7 - 10)  heparin  Injectable 4500 Unit(s) IV Push every 6 hours PRN For aPTT less than 40  heparin  Injectable 2000 Unit(s) IV Push every 6 hours PRN For aPTT between 40 - 57  LORazepam     Tablet 1 milliGRAM(s) Oral two times a day PRN Anxiety  traMADol 50 milliGRAM(s) Oral every 8 hours PRN Severe Pain (7 - 10)           *****REVIEW OF SYSTEM:  GEN: no fever, no chills, no pain  RESP: no SOB, no cough, no sputum  CVS: no chest pain, no palpitations, no edema  GI: no abdominal pain, no nausea, no vomiting, no constipation, no diarrhea  : no dysurea, no frequency, no hematurea  Neuro: no headache, no dizziness  PSYCH: no anxiety, no depression  Derm : no itching, no rash         *****VITAL SIGNS:  T(C): 36.8 (19 @ 05:34), Max: 36.8 (19 @ 05:34)  HR: 79 (19 @ 05:34) (62 - 79)  BP: 102/53 (19 @ 05:34) (102/53 - 118/69)  RR: 18 (19 @ 05:34) (18 - 18)  SpO2: 100% (19 @ 05:34) (100% - 100%)  Wt(kg): --           *****PHYSICAL EXAM:   Alert oriented x 2  Attention comprehension are fair. Able to name, repeat, read without any difficulty.   Able to follow 3 step commands.     EOMI fundi not visualized,  VFF to confrontration  No facial asymmetry   Tongue is midline   Palate elevates symmetrically   Moving all 4 ext symmetrically no pronator drift   Reflexes are symmetric throughout   sensation is grossly symmetric  Gait : not assessed.  B/L down going toes               *****LAB AND IMAGIN.0    8.95  )-----------( 210      ( 2019 10:20 )             28.8                   133<L>  |  94<L>  |  32<H>  ----------------------------<  108<H>  3.8   |  29  |  1.76<H>    Ca    8.2<L>      2019 10:20  Mg     1.8           PTT - ( 2019 10:20 )  PTT:> 200.0 SEC            Albumin, Serum: 2.9 g/dL (19 @ 06:10)                    [All pertinent recent Imaging reports reviewed]         *****A S S E S S M E N T   A N D   P L A N :        77 y/o female, with a PmHx of COPD (on 2-3L O2 prn), paroxsymal a-fib (on pradaxa), CAD (s/p stent 2018), CHF, HTN, HLD, and anxiety, presenting to the [facility] ED with right foot pressure ulcer pain x several weeks. The patient has a dime-sized ulcer with scant serosanguinous drainage on her right calcaneous that she acquired from a shoe weeks ago. She reports that she woke up last night around 4am with excruciating, unrelenting right root pain with SOB x a few minutes followed by chest pain x several hours. The patient reports that the ulcer pain began to gradually worsen since her last hospital visit, where she was d/c with vancomycin. The foot pain is sharp, 10/10, and radiates up to her calf. The patient took two puffs of her Symbicort for the SOB with no relief, but it resolved spontaneously within a few minutes. The chest pain began gradually and was described as a midsternal, 8/10, tightness with no radiation and accompanied by palpitations. Of note, the patient reports chronic cough, b/l LE edema, JACKSON with walking 1/2 block, orthopnea x1 pillow, occasional PND, melena x weeks, easy bruising/bleeding, and 10 lbs weight loss over the past 6 months with no change in appetite. The patient denies fever, chills, SOB at rest, diaphoresis, dizziness, claudication, wheezing, changes in vision and hearing, abdominal pain, change in bowel and urinary habits, dysuria, hematuria.   Problem/Recommendations 1: toxic metabolic encephalopathy due to underlying infectious process/pain/ poor nutritional intake, worsening underlying dementia.   check b12/folate/tsh/  thiamine 100 daily.         Problem/Recommendations 2: hyponatremia mild  of unclear etiology   defer to primary for w/u   possible volume deficit.         ___________________________  Will follow with you.  Thank you,  Kary Mercado MD  Diplomate of the American Board of Neurology and Psychiatry.  Diplomate of the American Board of Vascular Neurology.   [facility] ([facility]), [facility]   Ph: [phone]    Differential diagnosis and plan of care discussed with patient after the evaluation.   Advanced care planning options discussed.   Pain assessed and judicious use of narcotics when appropriate was discussed.  Importance of Fall prevention discussed.  Counseling on Smoking and Alcohol cessation was offered when appropriate.  Counseling on Diet, exercise, and medication compliance was done.     62 minutes spent on the total encounter;  more than 50 % of the visit was spent on counseling  and or coordinating care by the attending physician.    Thank you for allowing me to participate in the care of this odin patient. Please do not hesitate to call me if you have any questions.     This and subsequent notes were partially created using voice recognition software and will  inherently be subject to errors including those of syntax and sound alike substitutions which may escape proofreading. In such instances original meaning may be extrapolated by contextual derivation.

## 2019-01-21 NOTE — PROGRESS NOTE ADULT - SUBJECTIVE AND OBJECTIVE BOX
BEVERLEY DEL RIO:[id number],   78yFemale followed for:  No Known Allergies    PAST MEDICAL & SURGICAL HISTORY:  CAD (coronary artery disease): s/p stent 2018  CHF (congestive heart failure)  COPD (chronic obstructive pulmonary disease): on 2-3L O2 at home prn  Anxiety  TIA (transient ischemic attack): many years ago  PAF (paroxysmal atrial fibrillation)  HLD (hyperlipidemia)  HTN (hypertension)  H/O total hysterectomy: 2014  History of cataract surgery: b/l 2015  History of repair of hip fracture: luisa placed in RLE 2015  H/O spinal fusion: c2-6 in 2017    FAMILY HISTORY:  No pertinent family history in first degree relatives    MEDICATIONS  (STANDING):  atorvastatin 40 milliGRAM(s) Oral at bedtime  buDESOnide  80 MICROgram(s)/formoterol 4.5 MICROgram(s) Inhaler 2 Puff(s) Inhalation two times a day  clopidogrel Tablet 75 milliGRAM(s) Oral daily  digoxin     Tablet 0.125 milliGRAM(s) Oral daily  furosemide    Tablet 40 milliGRAM(s) Oral daily  heparin  Infusion.  Unit(s)/Hr (11 mL/Hr) IV Continuous <Continuous>  metoprolol tartrate 12.5 milliGRAM(s) Oral every 12 hours  pantoprazole  Injectable 40 milliGRAM(s) IV Push two times a day  senna 2 Tablet(s) Oral at bedtime    MEDICATIONS  (PRN):  acetaminophen   Tablet .. 650 milliGRAM(s) Oral every 6 hours PRN Mild Pain (1 - 3), Moderate Pain (4 - 6), Severe Pain (7 - 10)  heparin  Injectable 4500 Unit(s) IV Push every 6 hours PRN For aPTT less than 40  heparin  Injectable 2000 Unit(s) IV Push every 6 hours PRN For aPTT between 40 - 57  LORazepam     Tablet 1 milliGRAM(s) Oral two times a day PRN Anxiety  traMADol 50 milliGRAM(s) Oral every 8 hours PRN Severe Pain (7 - 10)      Vital Signs Last 24 Hrs  T(C): 36.8 (21 Jan 2019 05:34), Max: 36.8 (21 Jan 2019 05:34)  T(F): 98.3 (21 Jan 2019 05:34), Max: 98.3 (21 Jan 2019 05:34)  HR: 79 (21 Jan 2019 05:34) (56 - 79)  BP: 102/53 (21 Jan 2019 05:34) (102/53 - 118/69)  BP(mean): --  RR: 18 (21 Jan 2019 05:34) (18 - 18)  SpO2: 100% (21 Jan 2019 05:34) (95% - 100%)  nc/at  s1s2  cta  soft, nt, nd no guarding or rebound  no c/c/e    CBC Full  -  ( 21 Jan 2019 00:40 )  WBC Count : 9.80 K/uL  Hemoglobin : 9.9 g/dL  Hematocrit : 31.4 %  Platelet Count - Automated : 221 K/uL  Mean Cell Volume : 91.5 fL  Mean Cell Hemoglobin : 28.9 pg  Mean Cell Hemoglobin Concentration : 31.5 %  Auto Neutrophil # : x  Auto Lymphocyte # : x  Auto Monocyte # : x  Auto Eosinophil # : x  Auto Basophil # : x  Auto Neutrophil % : x  Auto Lymphocyte % : x  Auto Monocyte % : x  Auto Eosinophil % : x  Auto Basophil % : x    01-20    133<L>  |  93<L>  |  36<H>  ----------------------------<  103<H>  4.3   |  26  |  1.87<H>    Ca    8.3<L>      20 Jan 2019 05:50  Mg     1.8     01-20      PTT - ( 21 Jan 2019 00:40 )  PTT:> 200.0 SEC

## 2019-01-21 NOTE — CONSULT NOTE ADULT - SUBJECTIVE AND OBJECTIVE BOX
HPI:  77 y/o female, with a PmHx of COPD (on 2-3L O2 prn), paroxsymal a-fib (on pradaxa), CAD (s/p stent 11/2018), CHF, HTN, HLD, and anxiety, presenting to the [facility] ED with right foot pressure ulcer pain x several weeks. The patient has a dime-sized ulcer with scant serosanguinous drainage on her right calcaneous that she acquired from a shoe weeks ago. She reports that she woke up last night around 4am with excruciating, unrelenting right root pain with SOB x a few minutes followed by chest pain x several hours. The patient reports that the ulcer pain began to gradually worsen since her last hospital visit, where she was d/c with vancomycin. The foot pain is sharp, 10/10, and radiates up to her calf. The patient took two puffs of her Symbicort for the SOB with no relief, but it resolved spontaneously within a few minutes. The chest pain began gradually and was described as a midsternal, 8/10, tightness with no radiation and accompanied by palpitations. Of note, the patient reports chronic cough, b/l LE edema, JACKSON with walking 1/2 block, orthopnea x1 pillow, occasional PND, melena x weeks, easy bruising/bleeding, and 10 lbs weight loss over the past 6 months with no change in appetite. The patient denies fever, chills, SOB at rest, diaphoresis, dizziness, claudication, wheezing, changes in vision and hearing, abdominal pain, change in bowel and urinary habits, dysuria, hematuria. (17 Jan 2019 12:50)    Patient is a 78y old  Female who presents with a chief complaint of right foot pressure ulcer pain (21 Jan 2019 09:09)    Allergies    No Known Allergies    Intolerances      Vital Signs Last 24 Hrs  T(C): 36.8 (21 Jan 2019 05:34), Max: 36.8 (21 Jan 2019 05:34)  T(F): 98.3 (21 Jan 2019 05:34), Max: 98.3 (21 Jan 2019 05:34)  HR: 79 (21 Jan 2019 05:34) (56 - 79)  BP: 102/53 (21 Jan 2019 05:34) (102/53 - 118/69)  BP(mean): --  RR: 18 (21 Jan 2019 05:34) (18 - 18)  SpO2: 100% (21 Jan 2019 05:34) (95% - 100%)                        9.9    9.80  )-----------( 221      ( 21 Jan 2019 00:40 )             31.4     01-20    133<L>  |  93<L>  |  36<H>  ----------------------------<  103<H>  4.3   |  26  |  1.87<H>    Ca    8.3<L>      20 Jan 2019 05:50  Mg     1.8     01-20      CAPILLARY BLOOD GLUCOSE        MEDICATIONS  (STANDING):  atorvastatin 40 milliGRAM(s) Oral at bedtime  buDESOnide  80 MICROgram(s)/formoterol 4.5 MICROgram(s) Inhaler 2 Puff(s) Inhalation two times a day  clopidogrel Tablet 75 milliGRAM(s) Oral daily  digoxin     Tablet 0.125 milliGRAM(s) Oral daily  furosemide    Tablet 40 milliGRAM(s) Oral daily  heparin  Infusion.  Unit(s)/Hr (11 mL/Hr) IV Continuous <Continuous>  metoprolol tartrate 12.5 milliGRAM(s) Oral every 12 hours  pantoprazole  Injectable 40 milliGRAM(s) IV Push two times a day  senna 2 Tablet(s) Oral at bedtime    MEDICATIONS  (PRN):  acetaminophen   Tablet .. 650 milliGRAM(s) Oral every 6 hours PRN Mild Pain (1 - 3), Moderate Pain (4 - 6), Severe Pain (7 - 10)  heparin  Injectable 4500 Unit(s) IV Push every 6 hours PRN For aPTT less than 40  heparin  Injectable 2000 Unit(s) IV Push every 6 hours PRN For aPTT between 40 - 57  LORazepam     Tablet 1 milliGRAM(s) Oral two times a day PRN Anxiety  traMADol 50 milliGRAM(s) Oral every 8 hours PRN Severe Pain (7 - 10)    PAST MEDICAL & SURGICAL HISTORY:  CAD (coronary artery disease): s/p stent 2018  CHF (congestive heart failure)  COPD (chronic obstructive pulmonary disease): on 2-3L O2 at home prn  Anxiety  TIA (transient ischemic attack): many years ago  PAF (paroxysmal atrial fibrillation)  HLD (hyperlipidemia)  HTN (hypertension)  H/O total hysterectomy: 2014  History of cataract surgery: b/l 2015  History of repair of hip fracture: luisa placed in RLE 2015  H/O spinal fusion: c2-6 in 2017        MOISES:  Posterior heel ulceration, partial thickness granular base.    No local signs of infection. No probe to bone.  No drainage or malodor.  ++pain on exam  Pedal pulses non palp bilateral lower extremities  Sensation intact to b/l feet  No gross deformities

## 2019-01-21 NOTE — PROGRESS NOTE ADULT - PROBLEM SELECTOR PLAN 1
Likely hemodynamically mediated in setting of afib, ADHF, anemia and IV diuretic use. Renal function improving. FeUrea 29%; pre-renal IGOR.  Agree with changing Lasix to 40mg PO daily. Avoid nephrotoxins.  Asymptomatic UTI- no need to tx.

## 2019-01-21 NOTE — PROGRESS NOTE ADULT - SUBJECTIVE AND OBJECTIVE BOX
CARDIOLOGY FOLLOW UP - Dr. Vieyra    CC no cp or sob        PHYSICAL EXAM:  T(C): 36.8 (01-21-19 @ 05:34), Max: 36.8 (01-21-19 @ 05:34)  HR: 79 (01-21-19 @ 05:34) (56 - 79)  BP: 102/53 (01-21-19 @ 05:34) (102/53 - 118/69)  RR: 18 (01-21-19 @ 05:34) (18 - 18)  SpO2: 100% (01-21-19 @ 05:34) (95% - 100%)  Wt(kg): --  I&O's Summary      Appearance: Normal	  Cardiovascular: Normal S1 S2,RRR, No JVD, No murmurs  Respiratory: Lungs clear to auscultation	  Gastrointestinal:  Soft, Non-tender, + BS	  Extremities: Normal range of motion, No clubbing, cyanosis or edema        MEDICATIONS  (STANDING):  atorvastatin 40 milliGRAM(s) Oral at bedtime  buDESOnide  80 MICROgram(s)/formoterol 4.5 MICROgram(s) Inhaler 2 Puff(s) Inhalation two times a day  clopidogrel Tablet 75 milliGRAM(s) Oral daily  digoxin     Tablet 0.125 milliGRAM(s) Oral daily  furosemide    Tablet 40 milliGRAM(s) Oral daily  heparin  Infusion.  Unit(s)/Hr (11 mL/Hr) IV Continuous <Continuous>  metoprolol tartrate 12.5 milliGRAM(s) Oral every 12 hours  pantoprazole  Injectable 40 milliGRAM(s) IV Push two times a day  senna 2 Tablet(s) Oral at bedtime      TELEMETRY: Afib HR 50- 60s   < 2 sec pauses noted  	    ECG:  	  RADIOLOGY:   DIAGNOSTIC TESTING:  [ ] Echocardiogram:  [ ]  Catheterization:  [ ] Stress Test:    OTHER: 	    LABS:	 	                                9.0    8.95  )-----------( 210      ( 21 Jan 2019 10:20 )             28.8     01-21    133<L>  |  94<L>  |  32<H>  ----------------------------<  108<H>  3.8   |  29  |  1.76<H>    Ca    8.2<L>      21 Jan 2019 10:20  Mg     1.8     01-21      PTT - ( 21 Jan 2019 10:20 )  PTT:> 200.0 SEC

## 2019-01-21 NOTE — PROGRESS NOTE ADULT - ASSESSMENT
Problem/Plan - 1:  ·  Problem: Acute on chronic systolic and diastolic heart failure, NYHA class 3.  Plan:telemonitor   2G Sodium 1800 ADA diet with 1500 cc Fluid restriction  Strict I&Os plus Daily weights.  diuresis as per cards  cardio consult appreciated    Problem/Plan - 2:  ·  Problem: GI bleed.  Plan: Started on Protonix 40mg Q12h  GI consult appreciated  restart AC with hep drip  if cbc stable , initiate NOVC in am    Problem/Plan - 3:  ·  Problem: Ulcer of heel, right, with fat layer exposed.  Plan: Continue vancomycin as prescribed.   Wound care nurse consult.  ID fu     Problem/Plan - 4:·  Problem: Chronic atrial fibrillation with rapid ventricular response.  Plan: start heparin drip and monitor     Problem/Plan - 5:  ·  Problem: CKD (chronic kidney disease) stage 4, GFR 15-29 ml/min.  Plan: Monitor electrolytes  Trend BUN/Cr  renal consult appreciated    Problem/Plan - 6:  Problem: COPD (chronic obstructive pulmonary disease). Plan: Pt currently not in exacerbation  Continue Symbicort as prescribed.    Problem/Plan - 7:  ·  Problem: HTN (hypertension).  Plan: Monitor BP daily

## 2019-01-21 NOTE — PROGRESS NOTE ADULT - SUBJECTIVE AND OBJECTIVE BOX
Patient is a 78y old  Female who presents with a chief complaint of right foot pressure ulcer pain (21 Jan 2019 13:58)      INTERVAL HPI/OVERNIGHT EVENTS:  T(C): 36.8 (01-21-19 @ 05:34), Max: 36.8 (01-21-19 @ 05:34)  HR: 79 (01-21-19 @ 05:34) (62 - 79)  BP: 102/53 (01-21-19 @ 05:34) (102/53 - 118/69)  RR: 18 (01-21-19 @ 05:34) (18 - 18)  SpO2: 100% (01-21-19 @ 05:34) (100% - 100%)  Wt(kg): --  I&O's Summary      LABS:                        9.0    8.95  )-----------( 210      ( 21 Jan 2019 10:20 )             28.8     01-21    133<L>  |  94<L>  |  32<H>  ----------------------------<  108<H>  3.8   |  29  |  1.76<H>    Ca    8.2<L>      21 Jan 2019 10:20  Mg     1.8     01-21      PTT - ( 21 Jan 2019 10:20 )  PTT:> 200.0 SEC    CAPILLARY BLOOD GLUCOSE                MEDICATIONS  (STANDING):  atorvastatin 40 milliGRAM(s) Oral at bedtime  buDESOnide  80 MICROgram(s)/formoterol 4.5 MICROgram(s) Inhaler 2 Puff(s) Inhalation two times a day  clopidogrel Tablet 75 milliGRAM(s) Oral daily  digoxin     Tablet 0.125 milliGRAM(s) Oral daily  furosemide    Tablet 40 milliGRAM(s) Oral daily  heparin  Infusion.  Unit(s)/Hr (11 mL/Hr) IV Continuous <Continuous>  metoprolol tartrate 12.5 milliGRAM(s) Oral every 12 hours  pantoprazole  Injectable 40 milliGRAM(s) IV Push two times a day  senna 2 Tablet(s) Oral at bedtime    MEDICATIONS  (PRN):  acetaminophen   Tablet .. 650 milliGRAM(s) Oral every 6 hours PRN Mild Pain (1 - 3), Moderate Pain (4 - 6), Severe Pain (7 - 10)  heparin  Injectable 4500 Unit(s) IV Push every 6 hours PRN For aPTT less than 40  heparin  Injectable 2000 Unit(s) IV Push every 6 hours PRN For aPTT between 40 - 57  LORazepam     Tablet 1 milliGRAM(s) Oral two times a day PRN Anxiety  traMADol 50 milliGRAM(s) Oral every 8 hours PRN Severe Pain (7 - 10)          PHYSICAL EXAM:  GENERAL: NAD, well-groomed, well-developed  HEAD:  Atraumatic, Normocephalic  CHEST/LUNG: Clear to percussion bilaterally; No rales, rhonchi, wheezing, or rubs  HEART: Regular rate and rhythm; No murmurs, rubs, or gallops  ABDOMEN: Soft, Nontender, Nondistended; Bowel sounds present  EXTREMITIES:  2+ Peripheral Pulses, No clubbing, cyanosis, or edema  LYMPH: No lymphadenopathy noted  SKIN: No rashes or lesions    Care Discussed with Consultants/Other Providers [ ] YES  [ ] NO

## 2019-01-21 NOTE — PROGRESS NOTE ADULT - SUBJECTIVE AND OBJECTIVE BOX
[facility] NEPHROLOGY- PROGRESS NOTE    78y Female with history of CHF, COPD presents with SOB found to have guaiac positive anemia and afib with RVR. Nephrology consulted for elevated Scr.    REVIEW OF SYSTEMS:  Gen: no changes in weight, no dysuria/ urgency  Cards: no chest pain  Resp: no dyspnea  GI: no nausea or vomiting or diarrhea  Vascular: + LE edema +rt foot pain    No Known Allergies      Hospital Medications: Medications reviewed    VITALS:  T(F): 98.3 (19 @ 05:34), Max: 98.3 (19 @ 05:34)  HR: 79 (19 @ 05:34)  BP: 102/53 (19 @ 05:34)  RR: 18 (19 @ 05:34)  SpO2: 100% (19 @ 05:34)  Wt(kg): --    PHYSICAL EXAM:    Gen: NAD, calm  Cards: Irregularly irregular, +S1/S2, no M/G/R  Resp: CTA B/L  GI: soft, NT/ND, NABS  Vascular: RLE > LLE trace pitting edema    LABS:      133<L>  |  94<L>  |  32<H>  ----------------------------<  108<H>  3.8   |  29  |  1.76<H>    Ca    8.2<L>      2019 10:20  Mg     1.8           Creatinine Trend: 1.76 <--, 1.87 <--, 2.01 <--, 2.18 <--, 1.68 <--                        9.0    8.95  )-----------( 210      ( 2019 10:20 )             28.8     Urine Studies:  Urinalysis Basic - ( 2019 19:40 )    Color: YELLOW / Appearance: Lt TURBID / S.015 / pH: 6.0  Gluc: NEGATIVE / Ketone: NEGATIVE  / Bili: NEGATIVE / Urobili: NORMAL   Blood: MODERATE / Protein: 20 / Nitrite: POSITIVE   Leuk Esterase: LARGE / RBC: 11-25 / WBC >50   Sq Epi: FEW / Non Sq Epi:  / Bacteria: FEW      Creatinine, Random Urine: 65.50 mg/dL ( @ 19:40)  Protein, Random Urine: 35.8 mg/dL ( @ 19:40)

## 2019-01-21 NOTE — PROGRESS NOTE ADULT - ASSESSMENT
79 y/o female, with a PmHx of COPD (on 2-3L O2 prn), paroxsymal a-fib (on pradaxa), CAD (s/p stent 11/2018), DCHF, HTN, HLD, and anxiety, presenting with acute/chronic diastolic chf, chest pain r/o acs, GIB, non healing pressure ulcer.     1. Atypical chest pain, resolved  in the setting of acute CHF exacerbation, afib w/ RVR   cv stable, no evidence of acute ischemia/ACS   Echo with nl lv fxn  continue statin, bb, plavix     2. Acute/chronic diastolic chf  clinically improved  continue lasix 40mg po daily   cont bb  echo with nl lv fxn    3. AFIB, hx   s/p dig load, rate controlled   cont dig, bb  s/p 1 uinit prbc, heme stable, trial a/c ok per GI   CHADS 3 - on hep gtt, start low dose Eliquis if no OR plans from pod/ vascular  standpoint     4. CAD s/p PCI (11/2018)  cv stable, no evidence of acute ischemia   continue bb, statin, continue plavix given recent PCI      5. GIB   +GUIAC , +anemia s/p 1uprbc,   continue to trend cbc  GI f/u  trial a/c ok per GI     6. non healing foot ulcer  on po vanco   med f/u  ID f/u , pod f/u, pending vascular studies      7. IGOR/CKD   renal f/u     dvt ppx

## 2019-01-22 LAB
ANION GAP SERPL CALC-SCNC: 12 MMO/L — SIGNIFICANT CHANGE UP (ref 7–14)
APTT BLD: 70.7 SEC — HIGH (ref 27.5–36.3)
APTT BLD: > 200 SEC — CRITICAL HIGH (ref 27.5–36.3)
BASOPHILS # BLD AUTO: 0.05 K/UL — SIGNIFICANT CHANGE UP (ref 0–0.2)
BASOPHILS NFR BLD AUTO: 1 % — SIGNIFICANT CHANGE UP (ref 0–2)
BUN SERPL-MCNC: 29 MG/DL — HIGH (ref 7–23)
CALCIUM SERPL-MCNC: 8.3 MG/DL — LOW (ref 8.4–10.5)
CHLORIDE SERPL-SCNC: 94 MMOL/L — LOW (ref 98–107)
CO2 SERPL-SCNC: 29 MMOL/L — SIGNIFICANT CHANGE UP (ref 22–31)
CREAT SERPL-MCNC: 1.51 MG/DL — HIGH (ref 0.5–1.3)
EOSINOPHIL # BLD AUTO: 0.17 K/UL — SIGNIFICANT CHANGE UP (ref 0–0.5)
EOSINOPHIL NFR BLD AUTO: 3.4 % — SIGNIFICANT CHANGE UP (ref 0–6)
FOLATE SERPL-MCNC: 19.2 NG/ML — SIGNIFICANT CHANGE UP (ref 4.7–20)
GLUCOSE SERPL-MCNC: 81 MG/DL — SIGNIFICANT CHANGE UP (ref 70–99)
HCT VFR BLD CALC: 29.5 % — LOW (ref 34.5–45)
HCT VFR BLD CALC: 29.5 % — LOW (ref 34.5–45)
HGB BLD-MCNC: 9.2 G/DL — LOW (ref 11.5–15.5)
HGB BLD-MCNC: 9.2 G/DL — LOW (ref 11.5–15.5)
IMM GRANULOCYTES NFR BLD AUTO: 0.4 % — SIGNIFICANT CHANGE UP (ref 0–1.5)
LYMPHOCYTES # BLD AUTO: 0.46 K/UL — LOW (ref 1–3.3)
LYMPHOCYTES # BLD AUTO: 9.3 % — LOW (ref 13–44)
MAGNESIUM SERPL-MCNC: 1.8 MG/DL — SIGNIFICANT CHANGE UP (ref 1.6–2.6)
MCHC RBC-ENTMCNC: 28.4 PG — SIGNIFICANT CHANGE UP (ref 27–34)
MCHC RBC-ENTMCNC: 28.4 PG — SIGNIFICANT CHANGE UP (ref 27–34)
MCHC RBC-ENTMCNC: 31.2 % — LOW (ref 32–36)
MCHC RBC-ENTMCNC: 31.2 % — LOW (ref 32–36)
MCV RBC AUTO: 91 FL — SIGNIFICANT CHANGE UP (ref 80–100)
MCV RBC AUTO: 91 FL — SIGNIFICANT CHANGE UP (ref 80–100)
MONOCYTES # BLD AUTO: 0.65 K/UL — SIGNIFICANT CHANGE UP (ref 0–0.9)
MONOCYTES NFR BLD AUTO: 13.1 % — SIGNIFICANT CHANGE UP (ref 2–14)
NEUTROPHILS # BLD AUTO: 3.61 K/UL — SIGNIFICANT CHANGE UP (ref 1.8–7.4)
NEUTROPHILS NFR BLD AUTO: 72.8 % — SIGNIFICANT CHANGE UP (ref 43–77)
NRBC # FLD: 0 K/UL — LOW (ref 25–125)
NRBC # FLD: 0 K/UL — LOW (ref 25–125)
PLATELET # BLD AUTO: 210 K/UL — SIGNIFICANT CHANGE UP (ref 150–400)
PLATELET # BLD AUTO: 210 K/UL — SIGNIFICANT CHANGE UP (ref 150–400)
PMV BLD: 10.2 FL — SIGNIFICANT CHANGE UP (ref 7–13)
PMV BLD: 10.2 FL — SIGNIFICANT CHANGE UP (ref 7–13)
POTASSIUM SERPL-MCNC: 4 MMOL/L — SIGNIFICANT CHANGE UP (ref 3.5–5.3)
POTASSIUM SERPL-SCNC: 4 MMOL/L — SIGNIFICANT CHANGE UP (ref 3.5–5.3)
RBC # BLD: 3.24 M/UL — LOW (ref 3.8–5.2)
RBC # BLD: 3.24 M/UL — LOW (ref 3.8–5.2)
RBC # FLD: 16.7 % — HIGH (ref 10.3–14.5)
RBC # FLD: 16.7 % — HIGH (ref 10.3–14.5)
SODIUM SERPL-SCNC: 135 MMOL/L — SIGNIFICANT CHANGE UP (ref 135–145)
TSH SERPL-MCNC: 0.6 UIU/ML — SIGNIFICANT CHANGE UP (ref 0.27–4.2)
VIT B12 SERPL-MCNC: 783 PG/ML — SIGNIFICANT CHANGE UP (ref 200–900)
WBC # BLD: 4.96 K/UL — SIGNIFICANT CHANGE UP (ref 3.8–10.5)
WBC # BLD: 4.96 K/UL — SIGNIFICANT CHANGE UP (ref 3.8–10.5)
WBC # FLD AUTO: 4.96 K/UL — SIGNIFICANT CHANGE UP (ref 3.8–10.5)
WBC # FLD AUTO: 4.96 K/UL — SIGNIFICANT CHANGE UP (ref 3.8–10.5)

## 2019-01-22 PROCEDURE — 93923 UPR/LXTR ART STDY 3+ LVLS: CPT | Mod: 26

## 2019-01-22 RX ORDER — HEPARIN SODIUM 5000 [USP'U]/ML
2000 INJECTION INTRAVENOUS; SUBCUTANEOUS EVERY 6 HOURS
Qty: 0 | Refills: 0 | Status: DISCONTINUED | OUTPATIENT
Start: 2019-01-22 | End: 2019-01-22

## 2019-01-22 RX ORDER — HEPARIN SODIUM 5000 [USP'U]/ML
500 INJECTION INTRAVENOUS; SUBCUTANEOUS
Qty: 25000 | Refills: 0 | Status: DISCONTINUED | OUTPATIENT
Start: 2019-01-22 | End: 2019-01-22

## 2019-01-22 RX ORDER — DABIGATRAN ETEXILATE MESYLATE 150 MG/1
75 CAPSULE ORAL EVERY 12 HOURS
Qty: 0 | Refills: 0 | Status: DISCONTINUED | OUTPATIENT
Start: 2019-01-22 | End: 2019-02-04

## 2019-01-22 RX ORDER — HEPARIN SODIUM 5000 [USP'U]/ML
4500 INJECTION INTRAVENOUS; SUBCUTANEOUS EVERY 6 HOURS
Qty: 0 | Refills: 0 | Status: DISCONTINUED | OUTPATIENT
Start: 2019-01-22 | End: 2019-01-22

## 2019-01-22 RX ORDER — APIXABAN 2.5 MG/1
2.5 TABLET, FILM COATED ORAL EVERY 12 HOURS
Qty: 0 | Refills: 0 | Status: DISCONTINUED | OUTPATIENT
Start: 2019-01-22 | End: 2019-01-22

## 2019-01-22 RX ADMIN — TRAMADOL HYDROCHLORIDE 50 MILLIGRAM(S): 50 TABLET ORAL at 00:40

## 2019-01-22 RX ADMIN — ATORVASTATIN CALCIUM 40 MILLIGRAM(S): 80 TABLET, FILM COATED ORAL at 21:23

## 2019-01-22 RX ADMIN — DABIGATRAN ETEXILATE MESYLATE 75 MILLIGRAM(S): 150 CAPSULE ORAL at 18:07

## 2019-01-22 RX ADMIN — Medication 12.5 MILLIGRAM(S): at 17:41

## 2019-01-22 RX ADMIN — CLOPIDOGREL BISULFATE 75 MILLIGRAM(S): 75 TABLET, FILM COATED ORAL at 12:41

## 2019-01-22 RX ADMIN — Medication 40 MILLIGRAM(S): at 05:37

## 2019-01-22 RX ADMIN — TRAMADOL HYDROCHLORIDE 50 MILLIGRAM(S): 50 TABLET ORAL at 13:35

## 2019-01-22 RX ADMIN — HEPARIN SODIUM 700 UNIT(S)/HR: 5000 INJECTION INTRAVENOUS; SUBCUTANEOUS at 01:12

## 2019-01-22 RX ADMIN — BUDESONIDE AND FORMOTEROL FUMARATE DIHYDRATE 2 PUFF(S): 160; 4.5 AEROSOL RESPIRATORY (INHALATION) at 21:23

## 2019-01-22 RX ADMIN — Medication 12.5 MILLIGRAM(S): at 05:37

## 2019-01-22 RX ADMIN — Medication 650 MILLIGRAM(S): at 04:34

## 2019-01-22 RX ADMIN — Medication 650 MILLIGRAM(S): at 05:20

## 2019-01-22 RX ADMIN — PANTOPRAZOLE SODIUM 40 MILLIGRAM(S): 20 TABLET, DELAYED RELEASE ORAL at 17:42

## 2019-01-22 RX ADMIN — Medication 1 MILLIGRAM(S): at 05:01

## 2019-01-22 RX ADMIN — Medication 0.12 MILLIGRAM(S): at 05:38

## 2019-01-22 RX ADMIN — Medication 100 MILLIGRAM(S): at 12:41

## 2019-01-22 RX ADMIN — Medication 650 MILLIGRAM(S): at 17:41

## 2019-01-22 RX ADMIN — TRAMADOL HYDROCHLORIDE 50 MILLIGRAM(S): 50 TABLET ORAL at 14:30

## 2019-01-22 RX ADMIN — BUDESONIDE AND FORMOTEROL FUMARATE DIHYDRATE 2 PUFF(S): 160; 4.5 AEROSOL RESPIRATORY (INHALATION) at 12:41

## 2019-01-22 RX ADMIN — PANTOPRAZOLE SODIUM 40 MILLIGRAM(S): 20 TABLET, DELAYED RELEASE ORAL at 05:38

## 2019-01-22 NOTE — PROGRESS NOTE ADULT - SUBJECTIVE AND OBJECTIVE BOX
BEVERLEY DEL RIO:[id number],   78yFemale followed for:  No Known Allergies    PAST MEDICAL & SURGICAL HISTORY:  CAD (coronary artery disease): s/p stent 2018  CHF (congestive heart failure)  COPD (chronic obstructive pulmonary disease): on 2-3L O2 at home prn  Anxiety  TIA (transient ischemic attack): many years ago  PAF (paroxysmal atrial fibrillation)  HLD (hyperlipidemia)  HTN (hypertension)  H/O total hysterectomy: 2014  History of cataract surgery: b/l 2015  History of repair of hip fracture: luisa placed in RLE 2015  H/O spinal fusion: c2-6 in 2017    FAMILY HISTORY:  No pertinent family history in first degree relatives    MEDICATIONS  (STANDING):  apixaban 2.5 milliGRAM(s) Oral every 12 hours  atorvastatin 40 milliGRAM(s) Oral at bedtime  buDESOnide  80 MICROgram(s)/formoterol 4.5 MICROgram(s) Inhaler 2 Puff(s) Inhalation two times a day  clopidogrel Tablet 75 milliGRAM(s) Oral daily  digoxin     Tablet 0.125 milliGRAM(s) Oral daily  furosemide    Tablet 40 milliGRAM(s) Oral daily  metoprolol tartrate 12.5 milliGRAM(s) Oral every 12 hours  pantoprazole  Injectable 40 milliGRAM(s) IV Push two times a day  senna 2 Tablet(s) Oral at bedtime  thiamine 100 milliGRAM(s) Oral daily    MEDICATIONS  (PRN):  acetaminophen   Tablet .. 650 milliGRAM(s) Oral every 6 hours PRN Mild Pain (1 - 3), Moderate Pain (4 - 6), Severe Pain (7 - 10)  LORazepam     Tablet 1 milliGRAM(s) Oral two times a day PRN Anxiety  traMADol 50 milliGRAM(s) Oral every 8 hours PRN Severe Pain (7 - 10)      Vital Signs Last 24 Hrs  T(C): 35.6 (22 Jan 2019 05:30), Max: 37 (21 Jan 2019 23:26)  T(F): 96 (22 Jan 2019 05:30), Max: 98.6 (21 Jan 2019 23:26)  HR: 93 (22 Jan 2019 05:30) (77 - 93)  BP: 115/77 (22 Jan 2019 05:30) (103/65 - 115/77)  BP(mean): --  RR: 18 (22 Jan 2019 05:30) (18 - 18)  SpO2: 96% (22 Jan 2019 05:30) (96% - 98%)  nc/at  s1s2  cta  soft, nt, nd no guarding or rebound  no c/c/e    CBC Full  -  ( 22 Jan 2019 07:00 )  WBC Count : 4.96 K/uL  Hemoglobin : 9.2 g/dL  Hematocrit : 29.5 %  Platelet Count - Automated : 210 K/uL  Mean Cell Volume : 91.0 fL  Mean Cell Hemoglobin : 28.4 pg  Mean Cell Hemoglobin Concentration : 31.2 %  Auto Neutrophil # : 3.61 K/uL  Auto Lymphocyte # : 0.46 K/uL  Auto Monocyte # : 0.65 K/uL  Auto Eosinophil # : 0.17 K/uL  Auto Basophil # : 0.05 K/uL  Auto Neutrophil % : 72.8 %  Auto Lymphocyte % : 9.3 %  Auto Monocyte % : 13.1 %  Auto Eosinophil % : 3.4 %  Auto Basophil % : 1.0 %    01-22    135  |  94<L>  |  29<H>  ----------------------------<  81  4.0   |  29  |  1.51<H>    Ca    8.3<L>      22 Jan 2019 07:00  Mg     1.8     01-22      PTT - ( 22 Jan 2019 00:23 )  PTT:70.7 SEC

## 2019-01-22 NOTE — PROGRESS NOTE ADULT - PROBLEM SELECTOR PLAN 1
Likely hemodynamically mediated in setting of afib, ADHF, anemia and IV diuretic use. Renal function improving towards baseline. Avoid nephrotoxins.    Patient with pyuria and microscopic hematuria but asymptomatic for UTI for which can hold off on abx.

## 2019-01-22 NOTE — CHART NOTE - NSCHARTNOTEFT_GEN_A_CORE
Initially, heparin drip was to be discontinued and patient started on low dose Eliquis, as per cardiology recommendations. Spoke to patient at bedside who adamantly refused Eliquis, stating she preferred to be on Pradaxa. Given patient was admitted with GI bleed, it was explained higher risk of bleed with Pradaxa. Patient demonstrated full understanding and risks of being on Pradaxa but remained adamant about staying on Pradaxa, despite benefits of Eliquis. GI and Cardiology informed, and were okay with this trial of anticoagulation. Additionally, clinical pharmacist came to speak with the patient, further explained the above.   CDW Dr Boyce

## 2019-01-22 NOTE — PROVIDER CONTACT NOTE (CRITICAL VALUE NOTIFICATION) - ACTION/TREATMENT ORDERED:
Awaiting intervention from Jone Shah. Will monitor
follow heparin nomogram as ordered.
heparin nomogram said to stop heparin drip for 60 minutes and restart at 7ml /hr
heparin nomogram said to stop heparin drip for 60 minutes and restart at 7ml /hr

## 2019-01-22 NOTE — PROGRESS NOTE ADULT - ASSESSMENT
Problem/Plan - 1:  ·  Problem: Acute on chronic systolic and diastolic heart failure, NYHA class 3.  Plan:telemonitor   2G Sodium 1800 ADA diet with 1500 cc Fluid restriction  Strict I&Os plus Daily weights.  diuresis as per cards  cardio consult appreciated    Problem/Plan - 2:  ·  Problem: GI bleed.  Plan: GI consult appreciated  start xarelto and follow CBC    Problem/Plan - 3:  ·  Problem: Ulcer of heel, right, with fat layer exposed.  Plan: Continue vancomycin as prescribed.   Wound care nurse consult.  ID fu     Problem/Plan - 4:·  Problem: Chronic atrial fibrillation with rapid ventricular response.  Plan: cw NOVC    Problem/Plan - 5:  ·  Problem: CKD (chronic kidney disease) stage 4, GFR 15-29 ml/min.  Plan: Monitor electrolytes  Trend BUN/Cr  renal consult appreciated

## 2019-01-22 NOTE — PROGRESS NOTE ADULT - ASSESSMENT
78 year old female with right heel ulceration (stable); RLE pain with non-palp pulses  -Cont dressing right foot ulceration with santyl and DSD  -STONEY/PVR pending - non palp pedal pulses b/l. WIll follow up vasc studies.   -No local signs of infection - would recommend monitoring off antibiotics  -Z floats at all times  -Will follow

## 2019-01-22 NOTE — PROGRESS NOTE ADULT - SUBJECTIVE AND OBJECTIVE BOX
[facility] NEPHROLOGY- PROGRESS NOTE    78y Female with history of CHF, COPD presents with SOB found to have guaiac positive anemia and afib with RVR. Nephrology consulted for elevated Scr.    REVIEW OF SYSTEMS:  Gen: no changes in weight, no dysuria/ urgency  Cards: no chest pain  Resp: no dyspnea  GI: no nausea or vomiting or diarrhea  Vascular: no LE edema +R foot pain    No Known Allergies      Hospital Medications: Medications reviewed      VITALS:  T(F): 96 (19 @ 05:30), Max: 98.6 (19 @ 23:26)  HR: 93 (19 @ 05:30)  BP: 115/77 (19 @ 05:30)  RR: 18 (19 @ 05:30)  SpO2: 96% (19 @ 05:30)  Wt(kg): --     @ 07:01  -   @ 07:00  --------------------------------------------------------  IN: 324 mL / OUT: 0 mL / NET: 324 mL        PHYSICAL EXAM:    Gen: NAD, calm  Cards: Irregularly irregular, +S1/S2, no M/G/R  Resp: CTA B/L  GI: soft, NT/ND, NABS  Vascular: no LE edema B/L      LABS:      135  |  94<L>  |  29<H>  ----------------------------<  81  4.0   |  29  |  1.51<H>    Ca    8.3<L>      2019 07:00  Mg     1.8           Creatinine Trend: 1.51 <--, 1.76 <--, 1.87 <--, 2.01 <--, 2.18 <--, 1.68 <--                        9.2    4.96  )-----------( 210      ( 2019 07:00 )             29.5     Urine Studies:  Urinalysis Basic - ( 2019 19:40 )    Color: YELLOW / Appearance: Lt TURBID / S.015 / pH: 6.0  Gluc: NEGATIVE / Ketone: NEGATIVE  / Bili: NEGATIVE / Urobili: NORMAL   Blood: MODERATE / Protein: 20 / Nitrite: POSITIVE   Leuk Esterase: LARGE / RBC: 11-25 / WBC >50   Sq Epi: FEW / Non Sq Epi:  / Bacteria: FEW      Creatinine, Random Urine: 65.50 mg/dL ( @ 19:40)  Protein, Random Urine: 35.8 mg/dL ( @ 19:40) [facility] NEPHROLOGY- PROGRESS NOTE    78y Female with history of CHF, COPD presents with SOB found to have guaiac positive anemia and afib with RVR. Nephrology consulted for elevated Scr.    REVIEW OF SYSTEMS:  Gen: no changes in weight, no dysuria/ urgency  Cards: no chest pain  Resp: no dyspnea  GI: no nausea or vomiting or diarrhea  Vascular: no LE edema +R foot pain    No Known Allergies      Hospital Medications: Medications reviewed      VITALS:  T(F): 96 (19 @ 05:30), Max: 98.6 (19 @ 23:26)  HR: 93 (19 @ 05:30)  BP: 115/77 (19 @ 05:30)  RR: 18 (19 @ 05:30)  SpO2: 96% (19 @ 05:30)  Wt(kg): --     @ 07:01  -   @ 07:00  --------------------------------------------------------  IN: 324 mL / OUT: 0 mL / NET: 324 mL        PHYSICAL EXAM:    Gen: NAD, calm  Cards: Irregularly irregular, +S1/S2, no M/G/R  Resp: course BS, bibasilar rales  GI: soft, NT/ND, NABS  Vascular: no LE edema B/L      LABS:      135  |  94<L>  |  29<H>  ----------------------------<  81  4.0   |  29  |  1.51<H>    Ca    8.3<L>      2019 07:00  Mg     1.8           Creatinine Trend: 1.51 <--, 1.76 <--, 1.87 <--, 2.01 <--, 2.18 <--, 1.68 <--                        9.2    4.96  )-----------( 210      ( 2019 07:00 )             29.5     Urine Studies:  Urinalysis Basic - ( 2019 19:40 )    Color: YELLOW / Appearance: Lt TURBID / S.015 / pH: 6.0  Gluc: NEGATIVE / Ketone: NEGATIVE  / Bili: NEGATIVE / Urobili: NORMAL   Blood: MODERATE / Protein: 20 / Nitrite: POSITIVE   Leuk Esterase: LARGE / RBC: 11-25 / WBC >50   Sq Epi: FEW / Non Sq Epi:  / Bacteria: FEW      Creatinine, Random Urine: 65.50 mg/dL ( @ 19:40)  Protein, Random Urine: 35.8 mg/dL ( @ 19:40)

## 2019-01-22 NOTE — PROGRESS NOTE ADULT - SUBJECTIVE AND OBJECTIVE BOX
Patient is a 78y old  Female who presents with a chief complaint of right foot pressure ulcer pain (22 Jan 2019 12:45)      INTERVAL HPI/OVERNIGHT EVENTS:  T(C): 36.8 (01-22-19 @ 13:35), Max: 37 (01-21-19 @ 23:26)  HR: 79 (01-22-19 @ 17:40) (72 - 93)  BP: 126/53 (01-22-19 @ 17:40) (103/65 - 126/53)  RR: 17 (01-22-19 @ 13:35) (17 - 18)  SpO2: 98% (01-22-19 @ 13:35) (96% - 98%)  Wt(kg): --  I&O's Summary    21 Jan 2019 07:01  -  22 Jan 2019 07:00  --------------------------------------------------------  IN: 324 mL / OUT: 0 mL / NET: 324 mL        LABS:                        9.2    4.96  )-----------( 210      ( 22 Jan 2019 07:00 )             29.5     01-22    135  |  94<L>  |  29<H>  ----------------------------<  81  4.0   |  29  |  1.51<H>    Ca    8.3<L>      22 Jan 2019 07:00  Mg     1.8     01-22      PTT - ( 22 Jan 2019 06:54 )  PTT:> 200.0 SEC    CAPILLARY BLOOD GLUCOSE                MEDICATIONS  (STANDING):  atorvastatin 40 milliGRAM(s) Oral at bedtime  buDESOnide  80 MICROgram(s)/formoterol 4.5 MICROgram(s) Inhaler 2 Puff(s) Inhalation two times a day  clopidogrel Tablet 75 milliGRAM(s) Oral daily  dabigatran 75 milliGRAM(s) Oral every 12 hours  furosemide    Tablet 40 milliGRAM(s) Oral daily  metoprolol tartrate 12.5 milliGRAM(s) Oral every 12 hours  pantoprazole  Injectable 40 milliGRAM(s) IV Push two times a day  senna 2 Tablet(s) Oral at bedtime  thiamine 100 milliGRAM(s) Oral daily    MEDICATIONS  (PRN):  acetaminophen   Tablet .. 650 milliGRAM(s) Oral every 6 hours PRN Mild Pain (1 - 3), Moderate Pain (4 - 6), Severe Pain (7 - 10)  LORazepam     Tablet 1 milliGRAM(s) Oral two times a day PRN Anxiety  traMADol 50 milliGRAM(s) Oral every 8 hours PRN Severe Pain (7 - 10)          PHYSICAL EXAM:  GENERAL: NAD, well-groomed, well-developed  HEAD:  Atraumatic, Normocephalic  CHEST/LUNG: Clear to percussion bilaterally; No rales, rhonchi, wheezing, or rubs  HEART: Regular rate and rhythm; No murmurs, rubs, or gallops  ABDOMEN: Soft, Nontender, Nondistended; Bowel sounds present  EXTREMITIES:  2+ Peripheral Pulses, No clubbing, cyanosis, or edema  LYMPH: No lymphadenopathy noted  SKIN: No rashes or lesions    Care Discussed with Consultants/Other Providers [ ] YES  [ ] NO

## 2019-01-22 NOTE — PROGRESS NOTE ADULT - SUBJECTIVE AND OBJECTIVE BOX
CARDIOLOGY FOLLOW UP - Dr. Vieyra    CC no cp or sob       PHYSICAL EXAM:  T(C): 35.6 (01-22-19 @ 05:30), Max: 37 (01-21-19 @ 23:26)  HR: 93 (01-22-19 @ 05:30) (77 - 93)  BP: 115/77 (01-22-19 @ 05:30) (103/65 - 115/77)  RR: 18 (01-22-19 @ 05:30) (18 - 18)  SpO2: 96% (01-22-19 @ 05:30) (96% - 98%)  Wt(kg): --  I&O's Summary    21 Jan 2019 07:01  -  22 Jan 2019 07:00  --------------------------------------------------------  IN: 324 mL / OUT: 0 mL / NET: 324 mL        Appearance: Normal	  Cardiovascular: Normal S1 S2,RRR, No JVD, No murmurs  Respiratory: Lungs clear to auscultation	  Gastrointestinal:  Soft, Non-tender, + BS	  Extremities: Normal range of motion, No clubbing, cyanosis or edema        MEDICATIONS  (STANDING):  atorvastatin 40 milliGRAM(s) Oral at bedtime  buDESOnide  80 MICROgram(s)/formoterol 4.5 MICROgram(s) Inhaler 2 Puff(s) Inhalation two times a day  clopidogrel Tablet 75 milliGRAM(s) Oral daily  dabigatran 75 milliGRAM(s) Oral every 12 hours  digoxin     Tablet 0.125 milliGRAM(s) Oral daily  furosemide    Tablet 40 milliGRAM(s) Oral daily  metoprolol tartrate 12.5 milliGRAM(s) Oral every 12 hours  pantoprazole  Injectable 40 milliGRAM(s) IV Push two times a day  senna 2 Tablet(s) Oral at bedtime  thiamine 100 milliGRAM(s) Oral daily      TELEMETRY: Afib HR as low as 40s with 2-2.5 sec pauses   high as 70s 	    ECG:  	  RADIOLOGY:   DIAGNOSTIC TESTING:  [ ] Echocardiogram:  [ ]  Catheterization:  [ ] Stress Test:    OTHER: 	    LABS:	 	                                9.2    4.96  )-----------( 210      ( 22 Jan 2019 07:00 )             29.5     01-22    135  |  94<L>  |  29<H>  ----------------------------<  81  4.0   |  29  |  1.51<H>    Ca    8.3<L>      22 Jan 2019 07:00  Mg     1.8     01-22      PTT - ( 22 Jan 2019 06:54 )  PTT:> 200.0 SEC

## 2019-01-22 NOTE — PROGRESS NOTE ADULT - SUBJECTIVE AND OBJECTIVE BOX
HPI:  77 y/o female, with a PmHx of COPD (on 2-3L O2 prn), paroxsymal a-fib (on pradaxa), CAD (s/p stent 11/2018), CHF, HTN, HLD, and anxiety, presenting to the [facility] ED with right foot pressure ulcer pain x several weeks. The patient has a dime-sized ulcer with scant serosanguinous drainage on her right calcaneous that she acquired from a shoe weeks ago. She reports that she woke up last night around 4am with excruciating, unrelenting right root pain with SOB x a few minutes followed by chest pain x several hours. The patient reports that the ulcer pain began to gradually worsen since her last hospital visit, where she was d/c with vancomycin. The foot pain is sharp, 10/10, and radiates up to her calf. The patient took two puffs of her Symbicort for the SOB with no relief, but it resolved spontaneously within a few minutes. The chest pain began gradually and was described as a midsternal, 8/10, tightness with no radiation and accompanied by palpitations. Of note, the patient reports chronic cough, b/l LE edema, JACKSON with walking 1/2 block, orthopnea x1 pillow, occasional PND, melena x weeks, easy bruising/bleeding, and 10 lbs weight loss over the past 6 months with no change in appetite. The patient denies fever, chills, SOB at rest, diaphoresis, dizziness, claudication, wheezing, changes in vision and hearing, abdominal pain, change in bowel and urinary habits, dysuria, hematuria. (17 Jan 2019 12:50)    Patient is a 78y old  Female who presents with a chief complaint of right foot pressure ulcer pain (21 Jan 2019 09:09)    Allergies    No Known Allergies    Intolerances      Vital Signs Last 24 Hrs  T(C): 36.8 (21 Jan 2019 05:34), Max: 36.8 (21 Jan 2019 05:34)  T(F): 98.3 (21 Jan 2019 05:34), Max: 98.3 (21 Jan 2019 05:34)  HR: 79 (21 Jan 2019 05:34) (56 - 79)  BP: 102/53 (21 Jan 2019 05:34) (102/53 - 118/69)  BP(mean): --  RR: 18 (21 Jan 2019 05:34) (18 - 18)  SpO2: 100% (21 Jan 2019 05:34) (95% - 100%)                        9.9    9.80  )-----------( 221      ( 21 Jan 2019 00:40 )             31.4     01-20    133<L>  |  93<L>  |  36<H>  ----------------------------<  103<H>  4.3   |  26  |  1.87<H>    Ca    8.3<L>      20 Jan 2019 05:50  Mg     1.8     01-20      CAPILLARY BLOOD GLUCOSE        MEDICATIONS  (STANDING):  atorvastatin 40 milliGRAM(s) Oral at bedtime  buDESOnide  80 MICROgram(s)/formoterol 4.5 MICROgram(s) Inhaler 2 Puff(s) Inhalation two times a day  clopidogrel Tablet 75 milliGRAM(s) Oral daily  digoxin     Tablet 0.125 milliGRAM(s) Oral daily  furosemide    Tablet 40 milliGRAM(s) Oral daily  heparin  Infusion.  Unit(s)/Hr (11 mL/Hr) IV Continuous <Continuous>  metoprolol tartrate 12.5 milliGRAM(s) Oral every 12 hours  pantoprazole  Injectable 40 milliGRAM(s) IV Push two times a day  senna 2 Tablet(s) Oral at bedtime    MEDICATIONS  (PRN):  acetaminophen   Tablet .. 650 milliGRAM(s) Oral every 6 hours PRN Mild Pain (1 - 3), Moderate Pain (4 - 6), Severe Pain (7 - 10)  heparin  Injectable 4500 Unit(s) IV Push every 6 hours PRN For aPTT less than 40  heparin  Injectable 2000 Unit(s) IV Push every 6 hours PRN For aPTT between 40 - 57  LORazepam     Tablet 1 milliGRAM(s) Oral two times a day PRN Anxiety  traMADol 50 milliGRAM(s) Oral every 8 hours PRN Severe Pain (7 - 10)    PAST MEDICAL & SURGICAL HISTORY:  CAD (coronary artery disease): s/p stent 2018  CHF (congestive heart failure)  COPD (chronic obstructive pulmonary disease): on 2-3L O2 at home prn  Anxiety  TIA (transient ischemic attack): many years ago  PAF (paroxysmal atrial fibrillation)  HLD (hyperlipidemia)  HTN (hypertension)  H/O total hysterectomy: 2014  History of cataract surgery: b/l 2015  History of repair of hip fracture: luisa placed in RLE 2015  H/O spinal fusion: c2-6 in 2017        MOISES:  Posterior heel ulceration, partial thickness granular base.    No local signs of infection. No probe to bone.  No drainage or malodor.  ++pain on exam  Pedal pulses non palp bilateral lower extremities  Sensation intact to b/l feet  No gross deformities

## 2019-01-22 NOTE — PROGRESS NOTE ADULT - ASSESSMENT
79 y/o female, with a PmHx of COPD (on 2-3L O2 prn), paroxsymal a-fib (on pradaxa), CAD (s/p stent 11/2018), DCHF, HTN, HLD, and anxiety, presenting with acute/chronic diastolic chf, chest pain r/o acs, GIB, non healing pressure ulcer.     1. Atypical chest pain, resolved  in the setting of acute CHF exacerbation, afib w/ RVR   cv stable, no evidence of acute ischemia/ACS   Echo with nl lv fxn  continue statin, bb, plavix     2. Acute/chronic diastolic chf  clinically improved  continue lasix 40mg po daily   cont bb  echo with nl lv fxn    3. AFIB, hx   s/p dig load, on dig daily, bb  pause and bradycardia noted on tele, asymptomatic, hold dig tomorrow am and change dig to 0.125 mg q other day   s/p 1 uinit prbc, heme stable, trial a/c ok per GI   CHADS 3 - pradaxa restarted per primary team    4. CAD s/p PCI (11/2018)  cv stable, no evidence of acute ischemia   continue bb, statin, continue plavix given recent PCI      5. GIB   +GUIAC , +anemia s/p 1uprbc,   continue to trend cbc  GI f/u  trial a/c ok per GI     6. non healing foot ulcer  med f/u  ID f/u , pod f/u, pending vascular studies      7. IGOR/CKD   renal f/u     dvt ppx 79 y/o female, with a PmHx of COPD (on 2-3L O2 prn), paroxsymal a-fib (on pradaxa), CAD (s/p stent 11/2018), DCHF, HTN, HLD, and anxiety, presenting with acute/chronic diastolic chf, chest pain r/o acs, GIB, non healing pressure ulcer.     1. Atypical chest pain, resolved  in the setting of acute CHF exacerbation, afib w/ RVR   cv stable, no evidence of acute ischemia/ACS   Echo with nl lv fxn  continue statin, bb, plavix     2. Acute/chronic diastolic chf  clinically improved  continue lasix 40mg po daily   cont bb  echo with nl lv fxn    3. AFIB, hx   s/p dig load, on dig daily, bb  pause and bradycardia noted on tele, asymptomatic, hold dig   s/p 1 uinit prbc, heme stable, trial a/c ok per GI   CHADS 3 - pradaxa restarted per primary team    4. CAD s/p PCI (11/2018)  cv stable, no evidence of acute ischemia   continue bb, statin, continue plavix given recent PCI      5. GIB   +GUIAC , +anemia s/p 1uprbc,   continue to trend cbc  GI f/u  trial a/c ok per GI     6. non healing foot ulcer  med f/u  ID f/u , pod f/u, pending vascular studies      7. IGOR/CKD   renal f/u     dvt ppx

## 2019-01-23 LAB
ANION GAP SERPL CALC-SCNC: 12 MMO/L — SIGNIFICANT CHANGE UP (ref 7–14)
BASOPHILS # BLD AUTO: 0.04 K/UL — SIGNIFICANT CHANGE UP (ref 0–0.2)
BASOPHILS NFR BLD AUTO: 1 % — SIGNIFICANT CHANGE UP (ref 0–2)
BUN SERPL-MCNC: 26 MG/DL — HIGH (ref 7–23)
CALCIUM SERPL-MCNC: 8.7 MG/DL — SIGNIFICANT CHANGE UP (ref 8.4–10.5)
CHLORIDE SERPL-SCNC: 93 MMOL/L — LOW (ref 98–107)
CO2 SERPL-SCNC: 30 MMOL/L — SIGNIFICANT CHANGE UP (ref 22–31)
CREAT SERPL-MCNC: 1.44 MG/DL — HIGH (ref 0.5–1.3)
EOSINOPHIL # BLD AUTO: 0.23 K/UL — SIGNIFICANT CHANGE UP (ref 0–0.5)
EOSINOPHIL NFR BLD AUTO: 5.8 % — SIGNIFICANT CHANGE UP (ref 0–6)
GLUCOSE SERPL-MCNC: 79 MG/DL — SIGNIFICANT CHANGE UP (ref 70–99)
HCT VFR BLD CALC: 32.7 % — LOW (ref 34.5–45)
HCT VFR BLD CALC: 32.7 % — LOW (ref 34.5–45)
HGB BLD-MCNC: 10.1 G/DL — LOW (ref 11.5–15.5)
HGB BLD-MCNC: 10.1 G/DL — LOW (ref 11.5–15.5)
IMM GRANULOCYTES NFR BLD AUTO: 0.5 % — SIGNIFICANT CHANGE UP (ref 0–1.5)
LYMPHOCYTES # BLD AUTO: 0.5 K/UL — LOW (ref 1–3.3)
LYMPHOCYTES # BLD AUTO: 12.6 % — LOW (ref 13–44)
MAGNESIUM SERPL-MCNC: 1.8 MG/DL — SIGNIFICANT CHANGE UP (ref 1.6–2.6)
MCHC RBC-ENTMCNC: 28.1 PG — SIGNIFICANT CHANGE UP (ref 27–34)
MCHC RBC-ENTMCNC: 28.1 PG — SIGNIFICANT CHANGE UP (ref 27–34)
MCHC RBC-ENTMCNC: 30.9 % — LOW (ref 32–36)
MCHC RBC-ENTMCNC: 30.9 % — LOW (ref 32–36)
MCV RBC AUTO: 91.1 FL — SIGNIFICANT CHANGE UP (ref 80–100)
MCV RBC AUTO: 91.1 FL — SIGNIFICANT CHANGE UP (ref 80–100)
MONOCYTES # BLD AUTO: 0.56 K/UL — SIGNIFICANT CHANGE UP (ref 0–0.9)
MONOCYTES NFR BLD AUTO: 14.1 % — HIGH (ref 2–14)
NEUTROPHILS # BLD AUTO: 2.62 K/UL — SIGNIFICANT CHANGE UP (ref 1.8–7.4)
NEUTROPHILS NFR BLD AUTO: 66 % — SIGNIFICANT CHANGE UP (ref 43–77)
NRBC # FLD: 0 K/UL — LOW (ref 25–125)
NRBC # FLD: 0 K/UL — LOW (ref 25–125)
PLATELET # BLD AUTO: 216 K/UL — SIGNIFICANT CHANGE UP (ref 150–400)
PLATELET # BLD AUTO: 216 K/UL — SIGNIFICANT CHANGE UP (ref 150–400)
PMV BLD: 10.4 FL — SIGNIFICANT CHANGE UP (ref 7–13)
PMV BLD: 10.4 FL — SIGNIFICANT CHANGE UP (ref 7–13)
POTASSIUM SERPL-MCNC: 4.1 MMOL/L — SIGNIFICANT CHANGE UP (ref 3.5–5.3)
POTASSIUM SERPL-SCNC: 4.1 MMOL/L — SIGNIFICANT CHANGE UP (ref 3.5–5.3)
RBC # BLD: 3.59 M/UL — LOW (ref 3.8–5.2)
RBC # BLD: 3.59 M/UL — LOW (ref 3.8–5.2)
RBC # FLD: 16.9 % — HIGH (ref 10.3–14.5)
RBC # FLD: 16.9 % — HIGH (ref 10.3–14.5)
SODIUM SERPL-SCNC: 135 MMOL/L — SIGNIFICANT CHANGE UP (ref 135–145)
WBC # BLD: 3.97 K/UL — SIGNIFICANT CHANGE UP (ref 3.8–10.5)
WBC # BLD: 3.97 K/UL — SIGNIFICANT CHANGE UP (ref 3.8–10.5)
WBC # FLD AUTO: 3.97 K/UL — SIGNIFICANT CHANGE UP (ref 3.8–10.5)
WBC # FLD AUTO: 3.97 K/UL — SIGNIFICANT CHANGE UP (ref 3.8–10.5)

## 2019-01-23 RX ADMIN — TRAMADOL HYDROCHLORIDE 50 MILLIGRAM(S): 50 TABLET ORAL at 16:30

## 2019-01-23 RX ADMIN — PANTOPRAZOLE SODIUM 40 MILLIGRAM(S): 20 TABLET, DELAYED RELEASE ORAL at 17:12

## 2019-01-23 RX ADMIN — BUDESONIDE AND FORMOTEROL FUMARATE DIHYDRATE 2 PUFF(S): 160; 4.5 AEROSOL RESPIRATORY (INHALATION) at 10:38

## 2019-01-23 RX ADMIN — BUDESONIDE AND FORMOTEROL FUMARATE DIHYDRATE 2 PUFF(S): 160; 4.5 AEROSOL RESPIRATORY (INHALATION) at 21:09

## 2019-01-23 RX ADMIN — CLOPIDOGREL BISULFATE 75 MILLIGRAM(S): 75 TABLET, FILM COATED ORAL at 12:00

## 2019-01-23 RX ADMIN — Medication 100 MILLIGRAM(S): at 12:00

## 2019-01-23 RX ADMIN — Medication 40 MILLIGRAM(S): at 05:14

## 2019-01-23 RX ADMIN — DABIGATRAN ETEXILATE MESYLATE 75 MILLIGRAM(S): 150 CAPSULE ORAL at 05:14

## 2019-01-23 RX ADMIN — ATORVASTATIN CALCIUM 40 MILLIGRAM(S): 80 TABLET, FILM COATED ORAL at 21:09

## 2019-01-23 RX ADMIN — PANTOPRAZOLE SODIUM 40 MILLIGRAM(S): 20 TABLET, DELAYED RELEASE ORAL at 05:14

## 2019-01-23 RX ADMIN — DABIGATRAN ETEXILATE MESYLATE 75 MILLIGRAM(S): 150 CAPSULE ORAL at 17:12

## 2019-01-23 RX ADMIN — Medication 12.5 MILLIGRAM(S): at 17:12

## 2019-01-23 RX ADMIN — TRAMADOL HYDROCHLORIDE 50 MILLIGRAM(S): 50 TABLET ORAL at 15:50

## 2019-01-23 RX ADMIN — Medication 1 MILLIGRAM(S): at 10:45

## 2019-01-23 RX ADMIN — Medication 12.5 MILLIGRAM(S): at 05:14

## 2019-01-23 NOTE — PROGRESS NOTE ADULT - SUBJECTIVE AND OBJECTIVE BOX
CARDIOLOGY FOLLOW UP - Dr. Vieyra    CC no cp or sob       PHYSICAL EXAM:  T(C): 36.9 (01-23-19 @ 04:41), Max: 37.1 (01-22-19 @ 20:41)  HR: 72 (01-23-19 @ 06:50) (52 - 79)  BP: 136/78 (01-23-19 @ 06:50) (111/59 - 141/85)  RR: 17 (01-23-19 @ 04:41) (17 - 17)  SpO2: 97% (01-23-19 @ 04:41) (97% - 98%)  Wt(kg): --  I&O's Summary    22 Jan 2019 07:01  -  23 Jan 2019 07:00  --------------------------------------------------------  IN: 400 mL / OUT: 0 mL / NET: 400 mL        Appearance: Normal	  Cardiovascular: Normal S1 S2, irregular   Respiratory: Lungs clear to auscultation	  Gastrointestinal:  Soft, Non-tender, + BS	  Extremities: Normal range of motion, No clubbing, cyanosis or edema        MEDICATIONS  (STANDING):  atorvastatin 40 milliGRAM(s) Oral at bedtime  buDESOnide  80 MICROgram(s)/formoterol 4.5 MICROgram(s) Inhaler 2 Puff(s) Inhalation two times a day  clopidogrel Tablet 75 milliGRAM(s) Oral daily  dabigatran 75 milliGRAM(s) Oral every 12 hours  furosemide    Tablet 40 milliGRAM(s) Oral daily  metoprolol tartrate 12.5 milliGRAM(s) Oral every 12 hours  pantoprazole  Injectable 40 milliGRAM(s) IV Push two times a day  senna 2 Tablet(s) Oral at bedtime  thiamine 100 milliGRAM(s) Oral daily      TELEMETRY: afib HR 50-60s  pauses up to 2 sec noted 	    ECG:  	  RADIOLOGY:   DIAGNOSTIC TESTING:  [ ] Echocardiogram:  [ ]  Catheterization:  [ ] Stress Test:    OTHER: 	  < from: [facility] Duplex Physiol Ext Low 3+ Level, BI. (01.22.19 @ 09:19) >  Summary/Impressions:  1. Right STONEY is moderately decreased (0.60). Right  metatarsal and digit waveforms are flat. Findings suggest  the presence of distal infrapopliteal arterial disease in  the right lower extremity, including severe small vessel  disease in the right foot.  2. Left STONEY is normal (1.05).  Left TBI is moderately  decreased (0.41), and the left toe pressure is 57 mmHg.  Findings suggest the presence of small vessel arterial  disease in the left foot.  ------------------------------------------------------------------------    < end of copied text >    LABS:	 	                                10.1   3.97  )-----------( 216      ( 23 Jan 2019 06:50 )             32.7     01-23    135  |  93<L>  |  26<H>  ----------------------------<  79  4.1   |  30  |  1.44<H>    Ca    8.7      23 Jan 2019 06:50  Mg     1.8     01-23      PTT - ( 22 Jan 2019 06:54 )  PTT:> 200.0 SEC

## 2019-01-23 NOTE — PROGRESS NOTE ADULT - SUBJECTIVE AND OBJECTIVE BOX
[facility]        - Patient seen and examined.  - Today, patient is without complaints.         *****MEDICATIONS: Current medication reviewed and documented.    MEDICATIONS  (STANDING):  atorvastatin 40 milliGRAM(s) Oral at bedtime  buDESOnide  80 MICROgram(s)/formoterol 4.5 MICROgram(s) Inhaler 2 Puff(s) Inhalation two times a day  clopidogrel Tablet 75 milliGRAM(s) Oral daily  dabigatran 75 milliGRAM(s) Oral every 12 hours  furosemide    Tablet 40 milliGRAM(s) Oral daily  metoprolol tartrate 12.5 milliGRAM(s) Oral every 12 hours  pantoprazole  Injectable 40 milliGRAM(s) IV Push two times a day  senna 2 Tablet(s) Oral at bedtime  thiamine 100 milliGRAM(s) Oral daily    MEDICATIONS  (PRN):  acetaminophen   Tablet .. 650 milliGRAM(s) Oral every 6 hours PRN Mild Pain (1 - 3), Moderate Pain (4 - 6), Severe Pain (7 - 10)  LORazepam     Tablet 1 milliGRAM(s) Oral two times a day PRN Anxiety  traMADol 50 milliGRAM(s) Oral every 8 hours PRN Severe Pain (7 - 10)           ***** REVIEW OF SYSTEM:  GEN: no fever, no chills, no pain  RESP: no SOB, no cough, no sputum  CVS: no chest pain, no palpitations, no edema  GI: no abdominal pain, no nausea, no vomiting, no constipation, no diarrhea  : no dysurea, no frequency  NEURO: no headache, no diziness  PSYCH: no depression, not anxious  Derm : no itching, no rash         ***** VITAL SIGNS:  T(F): 98.5 (01-23-19 @ 04:41), Max: 98.7 (01-22-19 @ 20:41)  HR: 72 (01-23-19 @ 06:50) (52 - 79)  BP: 136/78 (01-23-19 @ 06:50) (111/59 - 141/85)  RR: 17 (01-23-19 @ 04:41) (17 - 17)  SpO2: 97% (01-23-19 @ 04:41) (97% - 98%)  Wt(kg): --  ,   I&O's Summary    22 Jan 2019 07:01  -  23 Jan 2019 07:00  --------------------------------------------------------  IN: 400 mL / OUT: 0 mL / NET: 400 mL             *****PHYSICAL EXAM: Alert oriented x 2  Attention comprehension are fair. Able to name, repeat, read without any difficulty.   Able to follow 3 step commands.     EOMI fundi not visualized,  VFF to confrontration  No facial asymmetry   Tongue is midline   Palate elevates symmetrically   Moving all 4 ext symmetrically no pronator drift   Reflexes are symmetric throughout   sensation is grossly symmetric  Gait : not assessed.  B/L down going toes              *****LAB AND IMAGING:                        10.1   3.97  )-----------( 216      ( 23 Jan 2019 06:50 )             32.7               01-23    135  |  93<L>  |  26<H>  ----------------------------<  79  4.1   |  30  |  1.44<H>    Ca    8.7      23 Jan 2019 06:50  Mg     1.8     01-23      PTT - ( 22 Jan 2019 06:54 )  PTT:> 200.0 SEC                     [All pertinent recent Imaging/Reports reviewed]           *****A S S E S S M E N T   A N D   P L A N :    79 y/o female, with a PmHx of COPD (on 2-3L O2 prn), paroxsymal a-fib (on pradaxa), CAD (s/p stent 11/2018), CHF, HTN, HLD, and anxiety, presenting to the [facility] ED with right foot pressure ulcer pain x several weeks. The patient has a dime-sized ulcer with scant serosanguinous drainage on her right calcaneous that she acquired from a shoe weeks ago. She reports that she woke up last night around 4am with excruciating, unrelenting right root pain with SOB x a few minutes followed by chest pain x several hours. The patient reports that the ulcer pain began to gradually worsen since her last hospital visit, where she was d/c with vancomycin. The foot pain is sharp, 10/10, and radiates up to her calf. The patient took two puffs of her Symbicort for the SOB with no relief, but it resolved spontaneously within a few minutes. The chest pain began gradually and was described as a midsternal, 8/10, tightness with no radiation and accompanied by palpitations. Of note, the patient reports chronic cough, b/l LE edema, JACKSON with walking 1/2 block, orthopnea x1 pillow, occasional PND, melena x weeks, easy bruising/bleeding, and 10 lbs weight loss over the past 6 months with no change in appetite. The patient denies fever, chills, SOB at rest, diaphoresis, dizziness, claudication, wheezing, changes in vision and hearing, abdominal pain, change in bowel and urinary habits, dysuria, hematuria.   Problem/Recommendations 1: toxic metabolic encephalopathy due to underlying infectious process/pain/ poor nutritional intake, worsening underlying dementia.   check b12/folate/tsh/  thiamine 100 daily.         Problem/Recommendations 2: hyponatremia mild  of unclear etiology   defer to primary for w/u   possible volume deficit.          Thank you for allowing me to participate in the care of this patient. Please do not hesitate to call me if you have any  questions.        ________________  Kary Mercado MD  [facility] ([facility]  [phone]      30 minutes spent on total encounter; more than 50 % of the visit was  spent counseling about plan of care, compliance to diet/exercise and medication regimen and or  coordinating care by the attending physician.   At the present time, [facility] does not  provide outpatient followup, best to call the your insurance to find a participating provider.  This was explained to you at the time of the visit. Alternatively, if your insurance allows it, you can follow up with a neurologist  Dr. Anish Camacho([place]) [phone] or Dr. Michael Nissenbaum [phone]

## 2019-01-23 NOTE — PROGRESS NOTE ADULT - SUBJECTIVE AND OBJECTIVE BOX
[facility] NEPHROLOGY- PROGRESS NOTE    78y Female with history of CHF, COPD presents with SOB found to have guaiac positive anemia and afib with RVR. Nephrology consulted for elevated Scr.    REVIEW OF SYSTEMS:  Gen: no changes in weight, no dysuria/urgency  Cards: no chest pain  Resp: no dyspnea  GI: no nausea or vomiting or diarrhea  Vascular: no LE edema +R foot pain    No Known Allergies      Hospital Medications: Medications reviewed      VITALS:  T(F): 98.5 (19 @ 04:41), Max: 98.7 (19 @ 20:41)  HR: 72 (19 @ 06:50)  BP: 136/78 (19 @ 06:50)  RR: 17 (19 @ 04:41)  SpO2: 97% (19 @ 04:41)  Wt(kg): --     @ 07:01  -   @ 07:00  --------------------------------------------------------  IN: 400 mL / OUT: 0 mL / NET: 400 mL        PHYSICAL EXAM:    Gen: NAD, calm  Cards: Irregularly irregular, +S1/S2, no M/G/R  Resp: course BS, bibasilar rales  GI: soft, NT/ND, NABS  Vascular: no LE edema B/L      LABS:      135  |  93<L>  |  26<H>  ----------------------------<  79  4.1   |  30  |  1.44<H>    Ca    8.7      2019 06:50  Mg     1.8           Creatinine Trend: 1.44 <--, 1.51 <--, 1.76 <--, 1.87 <--, 2.01 <--, 2.18 <--, 1.68 <--                        10.1   3.97  )-----------( 216      ( 2019 06:50 )             32.7     Urine Studies:  Urinalysis Basic - ( 2019 19:40 )    Color: YELLOW / Appearance: Lt TURBID / S.015 / pH: 6.0  Gluc: NEGATIVE / Ketone: NEGATIVE  / Bili: NEGATIVE / Urobili: NORMAL   Blood: MODERATE / Protein: 20 / Nitrite: POSITIVE   Leuk Esterase: LARGE / RBC: 11-25 / WBC >50   Sq Epi: FEW / Non Sq Epi:  / Bacteria: FEW      Creatinine, Random Urine: 65.50 mg/dL ( @ 19:40)  Protein, Random Urine: 35.8 mg/dL ( @ 19:40)

## 2019-01-23 NOTE — PROGRESS NOTE ADULT - ASSESSMENT
79 y/o female, with a PmHx of COPD (on 2-3L O2 prn), paroxsymal a-fib (on pradaxa), CAD (s/p stent 11/2018), DCHF, HTN, HLD, and anxiety, presenting with acute/chronic diastolic chf, chest pain r/o acs, GIB, non healing pressure ulcer.     1. Atypical chest pain, resolved  in the setting of acute CHF exacerbation, afib w/ RVR   cv stable, no evidence of acute ischemia/ACS   Echo with nl lv fxn  continue statin, bb, plavix     2. Acute/chronic diastolic chf  euvolemic   continue lasix 40mg po daily, cont bb  echo with nl lv fxn    3. AFIB, hx   rate controlled, contiue with  bb  pauses and bradycardia improving, remain off dig   heme stable, trial a/c ok per GI   CHADS 3 - pradaxa restarted per primary team    4. CAD s/p PCI (11/2018)  cv stable, no evidence of acute ischemia   continue bb, statin, continue plavix given recent PCI      5. GIB   +GUIAC , +anemia s/p 1uprbc,   continue to trend cbc  GI f/u      6. non healing foot ulcer  med f/u  ID f/u ,   vascular studies noted, pod f/u    7. IGOR/CKD   renal f/u     dvt ppx

## 2019-01-23 NOTE — PROGRESS NOTE ADULT - ASSESSMENT
Problem/Plan - 1:  ·  Problem: Acute on chronic systolic and diastolic heart failure, NYHA class 3.  Plan:telemonitor   2G Sodium 1800 ADA diet with 1500 cc Fluid restriction  Strict I&Os plus Daily weights.  diuresis as per cards  cardio consult appreciated    Problem/Plan - 2:  ·  Problem: GI bleed.  Plan: GI consult appreciated  start xarelto and follow CBC    Problem/Plan - 3:  ·  Problem: Ulcer of heel, right, with fat layer exposed.  Plan: Continue vancomycin as prescribed.   Wound care nurse consult.  ID fu     Problem/Plan - 4:·  Problem: Chronic atrial fibrillation with rapid ventricular response.  Plan: cw NOVC    Problem/Plan - 5:  ·  Problem: CKD (chronic kidney disease) stage 4, GFR 15-29 ml/min.  Plan: Monitor electrolytes  Trend BUN/Cr  renal consult appreciated    dc planning Problem/Plan - 1:  ·  Problem: Acute on chronic systolic and diastolic heart failure, NYHA class 3.  Plan:telemonitor   2G Sodium 1800 ADA diet with 1500 cc Fluid restriction  Strict I&Os plus Daily weights.  diuresis as per cards  cardio consult appreciated    Problem/Plan - 2:  ·  Problem: GI bleed.  Plan: GI consult appreciated  start xarelto and follow CBC    Problem/Plan - 3:  ·  Problem: Ulcer of heel, right, with fat layer exposed.  Plan: ID fu     Problem/Plan - 4:·  Problem: Chronic atrial fibrillation with rapid ventricular response.  Plan: cw NOVC    Problem/Plan - 5:  ·  Problem: CKD (chronic kidney disease) stage 4, GFR 15-29 ml/min.  Plan: Monitor electrolytes  Trend BUN/Cr  renal consult appreciated    dc planning

## 2019-01-23 NOTE — PROGRESS NOTE ADULT - SUBJECTIVE AND OBJECTIVE BOX
Patient is a 79y old  Female who presents with a chief complaint of right foot pressure ulcer pain (23 Jan 2019 12:12)      INTERVAL HPI/OVERNIGHT EVENTS:  T(C): 36.5 (01-23-19 @ 15:02), Max: 37.1 (01-22-19 @ 20:41)  HR: 74 (01-23-19 @ 17:10) (52 - 74)  BP: 129/62 (01-23-19 @ 17:10) (115/59 - 141/85)  RR: 18 (01-23-19 @ 15:02) (17 - 18)  SpO2: 97% (01-23-19 @ 15:02) (97% - 97%)  Wt(kg): --  I&O's Summary    22 Jan 2019 07:01  -  23 Jan 2019 07:00  --------------------------------------------------------  IN: 400 mL / OUT: 0 mL / NET: 400 mL    23 Jan 2019 07:01  -  23 Jan 2019 19:45  --------------------------------------------------------  IN: 860 mL / OUT: 0 mL / NET: 860 mL        LABS:                        10.1   3.97  )-----------( 216      ( 23 Jan 2019 06:50 )             32.7     01-23    135  |  93<L>  |  26<H>  ----------------------------<  79  4.1   |  30  |  1.44<H>    Ca    8.7      23 Jan 2019 06:50  Mg     1.8     01-23      PTT - ( 22 Jan 2019 06:54 )  PTT:> 200.0 SEC    CAPILLARY BLOOD GLUCOSE                MEDICATIONS  (STANDING):  atorvastatin 40 milliGRAM(s) Oral at bedtime  buDESOnide  80 MICROgram(s)/formoterol 4.5 MICROgram(s) Inhaler 2 Puff(s) Inhalation two times a day  clopidogrel Tablet 75 milliGRAM(s) Oral daily  dabigatran 75 milliGRAM(s) Oral every 12 hours  furosemide    Tablet 40 milliGRAM(s) Oral daily  metoprolol tartrate 12.5 milliGRAM(s) Oral every 12 hours  pantoprazole  Injectable 40 milliGRAM(s) IV Push two times a day  senna 2 Tablet(s) Oral at bedtime  thiamine 100 milliGRAM(s) Oral daily    MEDICATIONS  (PRN):  acetaminophen   Tablet .. 650 milliGRAM(s) Oral every 6 hours PRN Mild Pain (1 - 3), Moderate Pain (4 - 6), Severe Pain (7 - 10)  LORazepam     Tablet 1 milliGRAM(s) Oral two times a day PRN Anxiety  traMADol 50 milliGRAM(s) Oral every 8 hours PRN Severe Pain (7 - 10)          PHYSICAL EXAM:  GENERAL: NAD, well-groomed, well-developed  HEAD:  Atraumatic, Normocephalic  CHEST/LUNG: Clear to percussion bilaterally; No rales, rhonchi, wheezing, or rubs  HEART: Regular rate and rhythm; No murmurs, rubs, or gallops  ABDOMEN: Soft, Nontender, Nondistended; Bowel sounds present  EXTREMITIES:  2+ Peripheral Pulses, No clubbing, cyanosis, or edema  LYMPH: No lymphadenopathy noted  SKIN: No rashes or lesions    Care Discussed with Consultants/Other Providers [ ] YES  [ ] NO

## 2019-01-24 ENCOUNTER — APPOINTMENT (OUTPATIENT)
Dept: INTERNAL MEDICINE | Facility: CLINIC | Age: 79
End: 2019-01-24

## 2019-01-24 LAB
ANION GAP SERPL CALC-SCNC: 12 MMO/L — SIGNIFICANT CHANGE UP (ref 7–14)
BASOPHILS # BLD AUTO: 0.05 K/UL — SIGNIFICANT CHANGE UP (ref 0–0.2)
BASOPHILS NFR BLD AUTO: 0.8 % — SIGNIFICANT CHANGE UP (ref 0–2)
BUN SERPL-MCNC: 23 MG/DL — SIGNIFICANT CHANGE UP (ref 7–23)
CALCIUM SERPL-MCNC: 8.8 MG/DL — SIGNIFICANT CHANGE UP (ref 8.4–10.5)
CHLORIDE SERPL-SCNC: 92 MMOL/L — LOW (ref 98–107)
CO2 SERPL-SCNC: 30 MMOL/L — SIGNIFICANT CHANGE UP (ref 22–31)
CREAT SERPL-MCNC: 1.34 MG/DL — HIGH (ref 0.5–1.3)
EOSINOPHIL # BLD AUTO: 0.24 K/UL — SIGNIFICANT CHANGE UP (ref 0–0.5)
EOSINOPHIL NFR BLD AUTO: 3.9 % — SIGNIFICANT CHANGE UP (ref 0–6)
GLUCOSE SERPL-MCNC: 78 MG/DL — SIGNIFICANT CHANGE UP (ref 70–99)
HCT VFR BLD CALC: 34.1 % — LOW (ref 34.5–45)
HCT VFR BLD CALC: 34.1 % — LOW (ref 34.5–45)
HGB BLD-MCNC: 10.4 G/DL — LOW (ref 11.5–15.5)
HGB BLD-MCNC: 10.4 G/DL — LOW (ref 11.5–15.5)
IMM GRANULOCYTES NFR BLD AUTO: 0.5 % — SIGNIFICANT CHANGE UP (ref 0–1.5)
LYMPHOCYTES # BLD AUTO: 0.7 K/UL — LOW (ref 1–3.3)
LYMPHOCYTES # BLD AUTO: 11.5 % — LOW (ref 13–44)
MAGNESIUM SERPL-MCNC: 1.9 MG/DL — SIGNIFICANT CHANGE UP (ref 1.6–2.6)
MCHC RBC-ENTMCNC: 27.7 PG — SIGNIFICANT CHANGE UP (ref 27–34)
MCHC RBC-ENTMCNC: 27.7 PG — SIGNIFICANT CHANGE UP (ref 27–34)
MCHC RBC-ENTMCNC: 30.5 % — LOW (ref 32–36)
MCHC RBC-ENTMCNC: 30.5 % — LOW (ref 32–36)
MCV RBC AUTO: 90.7 FL — SIGNIFICANT CHANGE UP (ref 80–100)
MCV RBC AUTO: 90.7 FL — SIGNIFICANT CHANGE UP (ref 80–100)
MONOCYTES # BLD AUTO: 0.65 K/UL — SIGNIFICANT CHANGE UP (ref 0–0.9)
MONOCYTES NFR BLD AUTO: 10.7 % — SIGNIFICANT CHANGE UP (ref 2–14)
NEUTROPHILS # BLD AUTO: 4.41 K/UL — SIGNIFICANT CHANGE UP (ref 1.8–7.4)
NEUTROPHILS NFR BLD AUTO: 72.6 % — SIGNIFICANT CHANGE UP (ref 43–77)
NRBC # FLD: 0 K/UL — LOW (ref 25–125)
NRBC # FLD: 0 K/UL — LOW (ref 25–125)
PLATELET # BLD AUTO: 218 K/UL — SIGNIFICANT CHANGE UP (ref 150–400)
PLATELET # BLD AUTO: 218 K/UL — SIGNIFICANT CHANGE UP (ref 150–400)
PMV BLD: 10.4 FL — SIGNIFICANT CHANGE UP (ref 7–13)
PMV BLD: 10.4 FL — SIGNIFICANT CHANGE UP (ref 7–13)
POTASSIUM SERPL-MCNC: 3.8 MMOL/L — SIGNIFICANT CHANGE UP (ref 3.5–5.3)
POTASSIUM SERPL-SCNC: 3.8 MMOL/L — SIGNIFICANT CHANGE UP (ref 3.5–5.3)
RBC # BLD: 3.76 M/UL — LOW (ref 3.8–5.2)
RBC # BLD: 3.76 M/UL — LOW (ref 3.8–5.2)
RBC # FLD: 16.9 % — HIGH (ref 10.3–14.5)
RBC # FLD: 16.9 % — HIGH (ref 10.3–14.5)
SODIUM SERPL-SCNC: 134 MMOL/L — LOW (ref 135–145)
WBC # BLD: 6.08 K/UL — SIGNIFICANT CHANGE UP (ref 3.8–10.5)
WBC # BLD: 6.08 K/UL — SIGNIFICANT CHANGE UP (ref 3.8–10.5)
WBC # FLD AUTO: 6.08 K/UL — SIGNIFICANT CHANGE UP (ref 3.8–10.5)
WBC # FLD AUTO: 6.08 K/UL — SIGNIFICANT CHANGE UP (ref 3.8–10.5)

## 2019-01-24 PROCEDURE — 71250 CT THORAX DX C-: CPT | Mod: 26

## 2019-01-24 PROCEDURE — 71045 X-RAY EXAM CHEST 1 VIEW: CPT | Mod: 26

## 2019-01-24 RX ORDER — FUROSEMIDE 40 MG
40 TABLET ORAL ONCE
Qty: 0 | Refills: 0 | Status: COMPLETED | OUTPATIENT
Start: 2019-01-24 | End: 2019-01-24

## 2019-01-24 RX ADMIN — TRAMADOL HYDROCHLORIDE 50 MILLIGRAM(S): 50 TABLET ORAL at 05:47

## 2019-01-24 RX ADMIN — TRAMADOL HYDROCHLORIDE 50 MILLIGRAM(S): 50 TABLET ORAL at 06:41

## 2019-01-24 RX ADMIN — BUDESONIDE AND FORMOTEROL FUMARATE DIHYDRATE 2 PUFF(S): 160; 4.5 AEROSOL RESPIRATORY (INHALATION) at 09:18

## 2019-01-24 RX ADMIN — PANTOPRAZOLE SODIUM 40 MILLIGRAM(S): 20 TABLET, DELAYED RELEASE ORAL at 18:01

## 2019-01-24 RX ADMIN — ATORVASTATIN CALCIUM 40 MILLIGRAM(S): 80 TABLET, FILM COATED ORAL at 21:41

## 2019-01-24 RX ADMIN — PANTOPRAZOLE SODIUM 40 MILLIGRAM(S): 20 TABLET, DELAYED RELEASE ORAL at 05:16

## 2019-01-24 RX ADMIN — DABIGATRAN ETEXILATE MESYLATE 75 MILLIGRAM(S): 150 CAPSULE ORAL at 18:01

## 2019-01-24 RX ADMIN — Medication 100 MILLIGRAM(S): at 12:07

## 2019-01-24 RX ADMIN — Medication 40 MILLIGRAM(S): at 05:15

## 2019-01-24 RX ADMIN — Medication 1 MILLIGRAM(S): at 12:07

## 2019-01-24 RX ADMIN — CLOPIDOGREL BISULFATE 75 MILLIGRAM(S): 75 TABLET, FILM COATED ORAL at 12:08

## 2019-01-24 RX ADMIN — DABIGATRAN ETEXILATE MESYLATE 75 MILLIGRAM(S): 150 CAPSULE ORAL at 05:15

## 2019-01-24 RX ADMIN — BUDESONIDE AND FORMOTEROL FUMARATE DIHYDRATE 2 PUFF(S): 160; 4.5 AEROSOL RESPIRATORY (INHALATION) at 21:40

## 2019-01-24 RX ADMIN — Medication 1 MILLIGRAM(S): at 00:22

## 2019-01-24 RX ADMIN — Medication 40 MILLIGRAM(S): at 09:18

## 2019-01-24 RX ADMIN — Medication 12.5 MILLIGRAM(S): at 18:01

## 2019-01-24 RX ADMIN — Medication 12.5 MILLIGRAM(S): at 05:16

## 2019-01-24 NOTE — PROGRESS NOTE ADULT - SUBJECTIVE AND OBJECTIVE BOX
[facility] NEPHROLOGY- PROGRESS NOTE    78y Female with history of CHF, COPD presents with SOB found to have guaiac positive anemia and afib with RVR. Nephrology consulted for elevated Scr.    REVIEW OF SYSTEMS:  Gen: no changes in weight, no dysuria/urgency  Cards: no chest pain  Resp: no dyspnea  GI: no nausea or vomiting or diarrhea  Vascular: no LE edema +R foot pain    No Known Allergies      Hospital Medications: Medications reviewed      VITALS:  T(F): 98 (19 @ 05:08), Max: 98.1 (19 @ 21:08)  HR: 75 (19 @ 05:08)  BP: 125/82 (19 @ 05:08)  RR: 18 (19 @ 05:08)  SpO2: 97% (19 @ 05:08)  Wt(kg): --     @ 07:01  -   @ 07:00  --------------------------------------------------------  IN: 1460 mL / OUT: 0 mL / NET: 1460 mL        PHYSICAL EXAM:    Gen: NAD, calm  Cards: Irregularly irregular, +S1/S2, no M/G/R  Resp: course BS, bibasilar rales  GI: soft, NT/ND, NABS  Vascular: no LE edema B/L      LABS:      134<L>  |  92<L>  |  23  ----------------------------<  78  3.8   |  30  |  1.34<H>    Ca    8.8      2019 06:20  Mg     1.9           Creatinine Trend: 1.34 <--, 1.44 <--, 1.51 <--, 1.76 <--, 1.87 <--, 2.01 <--, 2.18 <--                        10.1   3.97  )-----------( 216      ( 2019 06:50 )             32.7     Urine Studies:  Urinalysis Basic - ( 2019 19:40 )    Color: YELLOW / Appearance: Lt TURBID / S.015 / pH: 6.0  Gluc: NEGATIVE / Ketone: NEGATIVE  / Bili: NEGATIVE / Urobili: NORMAL   Blood: MODERATE / Protein: 20 / Nitrite: POSITIVE   Leuk Esterase: LARGE / RBC: 11-25 / WBC >50   Sq Epi: FEW / Non Sq Epi:  / Bacteria: FEW      Creatinine, Random Urine: 65.50 mg/dL ( @ 19:40)  Protein, Random Urine: 35.8 mg/dL ( @ 19:40)

## 2019-01-24 NOTE — CONSULT NOTE ADULT - SUBJECTIVE AND OBJECTIVE BOX
Patient is a 79y old  Female who presents with a chief complaint of right foot pressure ulcer pain (24 Jan 2019 12:47)      HPI:  79 y/o female, with a PmHx of COPD (on 2-3L O2 prn), paroxsymal a-fib (on pradaxa), CAD (s/p stent 11/2018), CHF, HTN, HLD, and anxiety, presenting to the [facility] ED with right foot pressure ulcer pain x several weeks. The patient has a dime-sized ulcer with scant serosanguinous drainage on her right calcaneous that she acquired from a shoe weeks ago. She reports that she woke up last night around 4am with excruciating, unrelenting right root pain with SOB x a few minutes followed by chest pain x several hours. The patient reports that the ulcer pain began to gradually worsen since her last hospital visit, where she was d/c with vancomycin. The foot pain is sharp, 10/10, and radiates up to her calf. The patient took two puffs of her Symbicort for the SOB with no relief, but it resolved spontaneously within a few minutes. The chest pain began gradually and was described as a midsternal, 8/10, tightness with no radiation and accompanied by palpitations. Of note, the patient reports chronic cough, b/l LE edema, JACKSON with walking 1/2 block, orthopnea x1 pillow, occasional PND, melena x weeks, easy bruising/bleeding, and 10 lbs weight loss over the past 6 months with no change in appetite. The patient denies fever, chills, SOB at rest, diaphoresis, dizziness, claudication, wheezing, changes in vision and hearing, abdominal pain, change in bowel and urinary habits, dysuria, hematuria. (17 Jan 2019 12:50)  she says she has copd and used to smoke a lot: She does not walk much at home and feels pretty JACKSON: She does have o2 at home:       ?FOLLOWING PRESENT  [ x] Hx of PE/DVT, [y ] Hx COPD, x[ ] Hx of Asthma, [ ?] Hx of Hospitalization, [x  Hx of BiPAP/CPAP use, [ x] Hx of TEE    Allergies    No Known Allergies    Intolerances        PAST MEDICAL & SURGICAL HISTORY:  CAD (coronary artery disease): s/p stent 2018  CHF (congestive heart failure)  COPD (chronic obstructive pulmonary disease): on 2-3L O2 at home prn  Anxiety  TIA (transient ischemic attack): many years ago  PAF (paroxysmal atrial fibrillation)  HLD (hyperlipidemia)  HTN (hypertension)  H/O total hysterectomy: 2014  History of cataract surgery: b/l 2015  History of repair of hip fracture: luisa placed in RLE 2015  H/O spinal fusion: c2-6 in 2017      FAMILY HISTORY:  No pertinent family history in first degree relatives      Social History: [ 440 pk years ] TOBACCO                  [ x ] ETOH                                 [  x] IVDA/DRUGS    REVIEW OF SYSTEMS      General:	x    Skin/Breast:x  	  Ophthalmologic:x  	  ENMT:	x    Respiratory and Thorax: jackson  	  Cardiovascular:	x    Gastrointestinal:	x    Genitourinary:	x    Musculoskeletal:	 rt foot pain    Neurological:	x    Psychiatric:	x    Hematology/Lymphatics:	x    Endocrine:	x    Allergic/Immunologic:	x    MEDICATIONS  (STANDING):  atorvastatin 40 milliGRAM(s) Oral at bedtime  buDESOnide  80 MICROgram(s)/formoterol 4.5 MICROgram(s) Inhaler 2 Puff(s) Inhalation two times a day  clopidogrel Tablet 75 milliGRAM(s) Oral daily  dabigatran 75 milliGRAM(s) Oral every 12 hours  furosemide    Tablet 40 milliGRAM(s) Oral daily  metoprolol tartrate 12.5 milliGRAM(s) Oral every 12 hours  pantoprazole  Injectable 40 milliGRAM(s) IV Push two times a day  senna 2 Tablet(s) Oral at bedtime  thiamine 100 milliGRAM(s) Oral daily    MEDICATIONS  (PRN):  acetaminophen   Tablet .. 650 milliGRAM(s) Oral every 6 hours PRN Mild Pain (1 - 3), Moderate Pain (4 - 6), Severe Pain (7 - 10)  LORazepam     Tablet 1 milliGRAM(s) Oral two times a day PRN Anxiety  traMADol 50 milliGRAM(s) Oral every 8 hours PRN Severe Pain (7 - 10)       Vital Signs Last 24 Hrs  T(C): 36.3 (24 Jan 2019 12:06), Max: 36.7 (23 Jan 2019 21:08)  T(F): 97.3 (24 Jan 2019 12:06), Max: 98.1 (23 Jan 2019 21:08)  HR: 75 (24 Jan 2019 12:06) (74 - 99)  BP: 106/58 (24 Jan 2019 12:06) (106/58 - 129/62)  BP(mean): --  RR: 18 (24 Jan 2019 12:06) (18 - 18)  SpO2: 97% (24 Jan 2019 12:06) (93% - 100%)        I&O's Summary    23 Jan 2019 07:01  -  24 Jan 2019 07:00  --------------------------------------------------------  IN: 1460 mL / OUT: 0 mL / NET: 1460 mL    24 Jan 2019 07:01  -  24 Jan 2019 13:41  --------------------------------------------------------  IN: 200 mL / OUT: 0 mL / NET: 200 mL        Physical Exam:   GENERAL: NAD, well-groomed, well-developed  HEENT: COLEEN/   Atraumatic, Normocephalic  ENMT: No tonsillar erythema, exudates, or enlargement; Moist mucous membranes, Good dentition, No lesions  NECK: Supple, No JVD, Normal thyroid  CHEST/LUNG: Mostly clear  CVS: Regular rate and rhythm; No murmurs, rubs, or gallops  GI: : Soft, Nontender, Nondistended; Bowel sounds present  NERVOUS SYSTEM:  Alert & Oriented X3,  EXTREMITIES: ++ edema  LYMPH: No lymphadenopathy noted  SKIN: No rashes or lesions  ENDOCRINOLOGY: No Thyromegaly  PSYCH: Appropriate    Labs:                              10.4   6.08  )-----------( 218      ( 24 Jan 2019 06:20 )             34.1                         10.1   3.97  )-----------( 216      ( 23 Jan 2019 06:50 )             32.7                         9.2    4.96  )-----------( 210      ( 22 Jan 2019 07:00 )             29.5                         9.0    8.95  )-----------( 210      ( 21 Jan 2019 10:20 )             28.8                         9.9    9.80  )-----------( 221      ( 21 Jan 2019 00:40 )             31.4     01-24    134<L>  |  92<L>  |  23  ----------------------------<  78  3.8   |  30  |  1.34<H>  01-23    135  |  93<L>  |  26<H>  ----------------------------<  79  4.1   |  30  |  1.44<H>  01-22    135  |  94<L>  |  29<H>  ----------------------------<  81  4.0   |  29  |  1.51<H>  01-21    133<L>  |  94<L>  |  32<H>  ----------------------------<  108<H>  3.8   |  29  |  1.76<H>    Ca    8.8      24 Jan 2019 06:20  Ca    8.7      23 Jan 2019 06:50  Mg     1.9     01-24  Mg     1.8     01-23      CAPILLARY BLOOD GLUCOSE      < from: Xray Chest 1 View- PORTABLE-Urgent (01.24.19 @ 09:21) >              IMPRESSION:  No lung volumes.    Continued elevation of left hemidiaphragm.    Findings suggesting slightly worsening interstitial edema. Infection such   as bronchopneumonia is not excluded in the appropriate clinical context,   however.    Peripheral triangular opacity lower right chest, possibly loculated   pleural fluid. Trace left pleural effusion.                  JACKELYN SLOAN M.D., ATTENDING RADIOLOGIST  This document has been electronically signed. Jan 24 2019  9:41AM    < end of copied text >  < from: Xray Chest 1 View- PORTABLE-Urgent (01.24.19 @ 09:21) >              IMPRESSION:  No lung volumes.    Continued elevation of left hemidiaphragm.    Findings suggesting slightly worsening interstitial edema. Infection such   as bronchopneumonia is not excluded in the appropriate clinical context,   however.    Peripheral triangular opacity lower right chest, possibly loculated   pleural fluid. Trace left pleural effusion.                  JACKELYN SLOAN M.D., ATTENDING RADIOLOGIST  This document has been electronically signed. Jan 24 2019  9:41AM    < end of copied text >          D DImer      Studies  Chest X-RAY  CT SCAN Chest   CT Abdomen  Venous Dopplers: LE:   Others

## 2019-01-24 NOTE — CONSULT NOTE ADULT - PROBLEM SELECTOR PROBLEM 2
Benign hypertension with chronic kidney disease, stage III
Acute on chronic systolic and diastolic heart failure, NYHA class 3

## 2019-01-24 NOTE — PROGRESS NOTE ADULT - ASSESSMENT
77 y/o female, with a PmHx of COPD (on 2-3L O2 prn), paroxsymal a-fib (on pradaxa), CAD (s/p stent 11/2018), DCHF, HTN, HLD, and anxiety, presenting with acute/chronic diastolic chf, chest pain r/o acs, GIB, non healing pressure ulcer.     1. Atypical chest pain, resolved  in the setting of acute CHF exacerbation, afib w/ RVR   cv stable, no evidence of acute ischemia/ACS   Echo with nl lv fxn  continue statin, bb, plavix     2. Acute/chronic diastolic chf  events noted, episode of desat, pt denies of any cp or SOB  chest xray revealed slightly worsening intersitial edema, ? infection , opacity to Right lower chest, possibly loculated pleural fluid   s/p IVP lasix 40 mg X 1   would check CT chest non contrast to better eval pleural effusion, opacity   continue with lasix 40mg po daily for until CT chest is reviewed, cont bb  echo with nl lv fxn    3. AFIB, hx   rate controlled, continue with  bb  bradycardia resolved, Pauses improving, remain off dig   heme stable, trial a/c ok per GI   CHADS 3 - continue with pradaxa     4. CAD s/p PCI (11/2018)  cv stable, no evidence of acute ischemia   continue bb, statin, continue plavix given recent PCI      5. GIB   +GUIAC , +anemia s/p 1uprbc,   continue to trend cbc  GI f/u      6. non healing foot ulcer  med f/u  ID f/u ,   vascular studies noted, pod f/u    7. IGOR/CKD   renal f/u     dvt ppx

## 2019-01-24 NOTE — PROGRESS NOTE ADULT - SUBJECTIVE AND OBJECTIVE BOX
Patient is a 79y old  Female who presents with a chief complaint of right foot pressure ulcer pain (24 Jan 2019 13:41)      INTERVAL HPI/OVERNIGHT EVENTS:  T(C): 36.3 (01-24-19 @ 12:06), Max: 36.7 (01-23-19 @ 21:08)  HR: 86 (01-24-19 @ 18:00) (74 - 99)  BP: 155/85 (01-24-19 @ 18:00) (106/58 - 155/85)  RR: 16 (01-24-19 @ 18:00) (16 - 18)  SpO2: 100% (01-24-19 @ 18:00) (93% - 100%)  Wt(kg): --  I&O's Summary    23 Jan 2019 07:01  -  24 Jan 2019 07:00  --------------------------------------------------------  IN: 1460 mL / OUT: 0 mL / NET: 1460 mL    24 Jan 2019 07:01  -  24 Jan 2019 19:47  --------------------------------------------------------  IN: 780 mL / OUT: 0 mL / NET: 780 mL        LABS:                        10.4   6.08  )-----------( 218      ( 24 Jan 2019 06:20 )             34.1     01-24    134<L>  |  92<L>  |  23  ----------------------------<  78  3.8   |  30  |  1.34<H>    Ca    8.8      24 Jan 2019 06:20  Mg     1.9     01-24          CAPILLARY BLOOD GLUCOSE                MEDICATIONS  (STANDING):  atorvastatin 40 milliGRAM(s) Oral at bedtime  buDESOnide  80 MICROgram(s)/formoterol 4.5 MICROgram(s) Inhaler 2 Puff(s) Inhalation two times a day  clopidogrel Tablet 75 milliGRAM(s) Oral daily  dabigatran 75 milliGRAM(s) Oral every 12 hours  furosemide    Tablet 40 milliGRAM(s) Oral daily  metoprolol tartrate 12.5 milliGRAM(s) Oral every 12 hours  pantoprazole  Injectable 40 milliGRAM(s) IV Push two times a day  senna 2 Tablet(s) Oral at bedtime  thiamine 100 milliGRAM(s) Oral daily    MEDICATIONS  (PRN):  acetaminophen   Tablet .. 650 milliGRAM(s) Oral every 6 hours PRN Mild Pain (1 - 3), Moderate Pain (4 - 6), Severe Pain (7 - 10)  LORazepam     Tablet 1 milliGRAM(s) Oral two times a day PRN Anxiety  traMADol 50 milliGRAM(s) Oral every 8 hours PRN Severe Pain (7 - 10)          PHYSICAL EXAM:  GENERAL: NAD, well-groomed, well-developed  HEAD:  Atraumatic, Normocephalic  CHEST/LUNG: Clear to percussion bilaterally; No rales, rhonchi, wheezing, or rubs  HEART: Regular rate and rhythm; No murmurs, rubs, or gallops  ABDOMEN: Soft, Nontender, Nondistended; Bowel sounds present  EXTREMITIES:  2+ Peripheral Pulses, No clubbing, cyanosis, or edema  LYMPH: No lymphadenopathy noted  SKIN: No rashes or lesions    Care Discussed with Consultants/Other Providers [ ] YES  [ ] NO

## 2019-01-24 NOTE — PROGRESS NOTE ADULT - SUBJECTIVE AND OBJECTIVE BOX
BEVERLEY DEL RIO:[id number],   79yFemale followed for:  No Known Allergies    PAST MEDICAL & SURGICAL HISTORY:  CAD (coronary artery disease): s/p stent 2018  CHF (congestive heart failure)  COPD (chronic obstructive pulmonary disease): on 2-3L O2 at home prn  Anxiety  TIA (transient ischemic attack): many years ago  PAF (paroxysmal atrial fibrillation)  HLD (hyperlipidemia)  HTN (hypertension)  H/O total hysterectomy: 2014  History of cataract surgery: b/l 2015  History of repair of hip fracture: luias placed in RLE 2015  H/O spinal fusion: c2-6 in 2017    FAMILY HISTORY:  No pertinent family history in first degree relatives    MEDICATIONS  (STANDING):  atorvastatin 40 milliGRAM(s) Oral at bedtime  buDESOnide  80 MICROgram(s)/formoterol 4.5 MICROgram(s) Inhaler 2 Puff(s) Inhalation two times a day  clopidogrel Tablet 75 milliGRAM(s) Oral daily  dabigatran 75 milliGRAM(s) Oral every 12 hours  furosemide    Tablet 40 milliGRAM(s) Oral daily  metoprolol tartrate 12.5 milliGRAM(s) Oral every 12 hours  pantoprazole  Injectable 40 milliGRAM(s) IV Push two times a day  senna 2 Tablet(s) Oral at bedtime  thiamine 100 milliGRAM(s) Oral daily    MEDICATIONS  (PRN):  acetaminophen   Tablet .. 650 milliGRAM(s) Oral every 6 hours PRN Mild Pain (1 - 3), Moderate Pain (4 - 6), Severe Pain (7 - 10)  LORazepam     Tablet 1 milliGRAM(s) Oral two times a day PRN Anxiety  traMADol 50 milliGRAM(s) Oral every 8 hours PRN Severe Pain (7 - 10)      Vital Signs Last 24 Hrs  T(C): 36.7 (24 Jan 2019 05:08), Max: 36.7 (23 Jan 2019 21:08)  T(F): 98 (24 Jan 2019 05:08), Max: 98.1 (23 Jan 2019 21:08)  HR: 99 (24 Jan 2019 09:15) (74 - 99)  BP: 117/73 (24 Jan 2019 09:15) (107/56 - 129/62)  BP(mean): --  RR: 18 (24 Jan 2019 09:15) (18 - 18)  SpO2: 93% (24 Jan 2019 09:15) (93% - 100%)  nc/at  s1s2  cta  soft, nt, nd no guarding or rebound  no c/c/e    CBC Full  -  ( 24 Jan 2019 06:20 )  WBC Count : 6.08 K/uL  Hemoglobin : 10.4 g/dL  Hematocrit : 34.1 %  Platelet Count - Automated : 218 K/uL  Mean Cell Volume : 90.7 fL  Mean Cell Hemoglobin : 27.7 pg  Mean Cell Hemoglobin Concentration : 30.5 %  Auto Neutrophil # : 4.41 K/uL  Auto Lymphocyte # : 0.70 K/uL  Auto Monocyte # : 0.65 K/uL  Auto Eosinophil # : 0.24 K/uL  Auto Basophil # : 0.05 K/uL  Auto Neutrophil % : 72.6 %  Auto Lymphocyte % : 11.5 %  Auto Monocyte % : 10.7 %  Auto Eosinophil % : 3.9 %  Auto Basophil % : 0.8 %    01-24    134<L>  |  92<L>  |  23  ----------------------------<  78  3.8   |  30  |  1.34<H>    Ca    8.8      24 Jan 2019 06:20  Mg     1.9     01-24

## 2019-01-24 NOTE — PROGRESS NOTE ADULT - ASSESSMENT
Problem/Plan - 1:  ·  Problem: Acute on chronic systolic and diastolic heart failure, NYHA class 3.  Plan:telemonitor   2G Sodium 1800 ADA diet with 1500 cc Fluid restriction  Strict I&Os plus Daily weights.  diuresis as per cards  cardio consult appreciated    Problem/Plan - 2:  ·  Problem: GI bleed.  Plan: GI consult appreciated  start xarelto and follow CBC    Problem/Plan - 3:  ·  Problem: Ulcer of heel, right, with fat layer exposed.  Plan: ID fu     Problem/Plan - 4:  Problem: Chronic atrial fibrillation with rapid ventricular response.  Plan: cw NOVC    Problem/Plan - 5:  ·  Problem: CKD (chronic kidney disease) stage 4, GFR 15-29 ml/min.  Plan: Monitor electrolytes  Trend BUN/Cr  renal consult appreciated

## 2019-01-24 NOTE — PROGRESS NOTE ADULT - SUBJECTIVE AND OBJECTIVE BOX
CARDIOLOGY FOLLOW UP - Dr. Vieyra    CC no cp or sob   events noted     PHYSICAL EXAM:  T(C): 36.7 (01-24-19 @ 05:08), Max: 36.7 (01-23-19 @ 21:08)  HR: 99 (01-24-19 @ 09:15) (74 - 99)  BP: 117/73 (01-24-19 @ 09:15) (107/56 - 129/62)  RR: 18 (01-24-19 @ 09:15) (18 - 18)  SpO2: 93% (01-24-19 @ 09:15) (93% - 100%)  Wt(kg): --  I&O's Summary    23 Jan 2019 07:01  -  24 Jan 2019 07:00  --------------------------------------------------------  IN: 1460 mL / OUT: 0 mL / NET: 1460 mL        Appearance: Normal	  Cardiovascular: Normal S1 S2, irregular   Respiratory: diminished   Gastrointestinal:  Soft, Non-tender, + BS	  Extremities: Normal range of motion, No clubbing, cyanosis or edema        MEDICATIONS  (STANDING):  atorvastatin 40 milliGRAM(s) Oral at bedtime  buDESOnide  80 MICROgram(s)/formoterol 4.5 MICROgram(s) Inhaler 2 Puff(s) Inhalation two times a day  clopidogrel Tablet 75 milliGRAM(s) Oral daily  dabigatran 75 milliGRAM(s) Oral every 12 hours  furosemide    Tablet 40 milliGRAM(s) Oral daily  metoprolol tartrate 12.5 milliGRAM(s) Oral every 12 hours  pantoprazole  Injectable 40 milliGRAM(s) IV Push two times a day  senna 2 Tablet(s) Oral at bedtime  thiamine 100 milliGRAM(s) Oral daily      TELEMETRY: Afib HR 70- 80   pause up to 2 sec 	    ECG:  	  RADIOLOGY:   DIAGNOSTIC TESTING:  [ ] Echocardiogram:  [ ]  Catheterization:  [ ] Stress Test:    OTHER: 	  < from: Xray Chest 1 View- PORTABLE-Urgent (01.24.19 @ 09:21) >    IMPRESSION:  No lung volumes.    Continued elevation of left hemidiaphragm.    Findings suggesting slightly worsening interstitial edema. Infection such   as bronchopneumonia is not excluded in the appropriate clinical context,   however.    Peripheral triangular opacity lower right chest, possibly loculated   pleural fluid. Trace left pleural effusion.    < end of copied text >    LABS:	 	                                10.4   6.08  )-----------( 218      ( 24 Jan 2019 06:20 )             34.1     01-24    134<L>  |  92<L>  |  23  ----------------------------<  78  3.8   |  30  |  1.34<H>    Ca    8.8      24 Jan 2019 06:20  Mg     1.9     01-24

## 2019-01-24 NOTE — PROGRESS NOTE ADULT - SUBJECTIVE AND OBJECTIVE BOX
HPI:  77 y/o female, with a PmHx of COPD (on 2-3L O2 prn), paroxsymal a-fib (on pradaxa), CAD (s/p stent 11/2018), CHF, HTN, HLD, and anxiety, presenting to the [facility] ED with right foot pressure ulcer pain x several weeks. The patient has a dime-sized ulcer with scant serosanguinous drainage on her right calcaneous that she acquired from a shoe weeks ago. She reports that she woke up last night around 4am with excruciating, unrelenting right root pain with SOB x a few minutes followed by chest pain x several hours. The patient reports that the ulcer pain began to gradually worsen since her last hospital visit, where she was d/c with vancomycin. The foot pain is sharp, 10/10, and radiates up to her calf. The patient took two puffs of her Symbicort for the SOB with no relief, but it resolved spontaneously within a few minutes. The chest pain began gradually and was described as a midsternal, 8/10, tightness with no radiation and accompanied by palpitations. Of note, the patient reports chronic cough, b/l LE edema, JACKSON with walking 1/2 block, orthopnea x1 pillow, occasional PND, melena x weeks, easy bruising/bleeding, and 10 lbs weight loss over the past 6 months with no change in appetite. The patient denies fever, chills, SOB at rest, diaphoresis, dizziness, claudication, wheezing, changes in vision and hearing, abdominal pain, change in bowel and urinary habits, dysuria, hematuria. (17 Jan 2019 12:50)    Patient is a 78y old  Female who presents with a chief complaint of right foot pressure ulcer pain (21 Jan 2019 09:09)    Allergies    No Known Allergies    Intolerances      Vital Signs Last 24 Hrs  T(C): 36.8 (21 Jan 2019 05:34), Max: 36.8 (21 Jan 2019 05:34)  T(F): 98.3 (21 Jan 2019 05:34), Max: 98.3 (21 Jan 2019 05:34)  HR: 79 (21 Jan 2019 05:34) (56 - 79)  BP: 102/53 (21 Jan 2019 05:34) (102/53 - 118/69)  BP(mean): --  RR: 18 (21 Jan 2019 05:34) (18 - 18)  SpO2: 100% (21 Jan 2019 05:34) (95% - 100%)                        9.9    9.80  )-----------( 221      ( 21 Jan 2019 00:40 )             31.4     01-20    133<L>  |  93<L>  |  36<H>  ----------------------------<  103<H>  4.3   |  26  |  1.87<H>    Ca    8.3<L>      20 Jan 2019 05:50  Mg     1.8     01-20      CAPILLARY BLOOD GLUCOSE        MEDICATIONS  (STANDING):  atorvastatin 40 milliGRAM(s) Oral at bedtime  buDESOnide  80 MICROgram(s)/formoterol 4.5 MICROgram(s) Inhaler 2 Puff(s) Inhalation two times a day  clopidogrel Tablet 75 milliGRAM(s) Oral daily  digoxin     Tablet 0.125 milliGRAM(s) Oral daily  furosemide    Tablet 40 milliGRAM(s) Oral daily  heparin  Infusion.  Unit(s)/Hr (11 mL/Hr) IV Continuous <Continuous>  metoprolol tartrate 12.5 milliGRAM(s) Oral every 12 hours  pantoprazole  Injectable 40 milliGRAM(s) IV Push two times a day  senna 2 Tablet(s) Oral at bedtime    MEDICATIONS  (PRN):  acetaminophen   Tablet .. 650 milliGRAM(s) Oral every 6 hours PRN Mild Pain (1 - 3), Moderate Pain (4 - 6), Severe Pain (7 - 10)  heparin  Injectable 4500 Unit(s) IV Push every 6 hours PRN For aPTT less than 40  heparin  Injectable 2000 Unit(s) IV Push every 6 hours PRN For aPTT between 40 - 57  LORazepam     Tablet 1 milliGRAM(s) Oral two times a day PRN Anxiety  traMADol 50 milliGRAM(s) Oral every 8 hours PRN Severe Pain (7 - 10)    PAST MEDICAL & SURGICAL HISTORY:  CAD (coronary artery disease): s/p stent 2018  CHF (congestive heart failure)  COPD (chronic obstructive pulmonary disease): on 2-3L O2 at home prn  Anxiety  TIA (transient ischemic attack): many years ago  PAF (paroxysmal atrial fibrillation)  HLD (hyperlipidemia)  HTN (hypertension)  H/O total hysterectomy: 2014  History of cataract surgery: b/l 2015  History of repair of hip fracture: luisa placed in RLE 2015  H/O spinal fusion: c2-6 in 2017        MOISES:  Posterior heel ulceration, partial thickness granular base.    No local signs of infection. No probe to bone.  No drainage or malodor.  ++pain on exam  Pedal pulses non palp bilateral lower extremities  Sensation intact to b/l feet  No gross deformities

## 2019-01-24 NOTE — PROGRESS NOTE ADULT - ASSESSMENT
79 yo female PMHx HTN, HLD, CHF, COPD (O2 prn), Afib on pradaxa and R heel ulcer p/w dyspnea at rest, orthopnea, LE edema, anemic and pain at pressure ulcer site admitted to tele for CHF exacerbation , r/o ACS and GI Bleed.  Pt  noted this morning  with desaturation, Likely secondary to acute decompensated heart failure:  will order Stat CXR  Lasix 40 mg IV Stat  Continuous Pulse Ox  Monitor Pt Closely  Titrate O2 to keep Oxygen >90  will Discuss above with attending

## 2019-01-24 NOTE — CONSULT NOTE ADULT - PROBLEM SELECTOR RECOMMENDATION 3
on paradxa: continue current gi managment. no sign of overt bleed
Agree with lasix 40 mg IV twice daily given volume overload in setting of ADHF and afib with RVR. Monitor UO.

## 2019-01-24 NOTE — PROGRESS NOTE ADULT - SUBJECTIVE AND OBJECTIVE BOX
[facility]        - Patient seen and examined.  - Today, patient is without complaints.         *****MEDICATIONS: Current medication reviewed and documented.    MEDICATIONS  (STANDING):  atorvastatin 40 milliGRAM(s) Oral at bedtime  buDESOnide  80 MICROgram(s)/formoterol 4.5 MICROgram(s) Inhaler 2 Puff(s) Inhalation two times a day  clopidogrel Tablet 75 milliGRAM(s) Oral daily  dabigatran 75 milliGRAM(s) Oral every 12 hours  furosemide    Tablet 40 milliGRAM(s) Oral daily  metoprolol tartrate 12.5 milliGRAM(s) Oral every 12 hours  pantoprazole  Injectable 40 milliGRAM(s) IV Push two times a day  senna 2 Tablet(s) Oral at bedtime  thiamine 100 milliGRAM(s) Oral daily    MEDICATIONS  (PRN):  acetaminophen   Tablet .. 650 milliGRAM(s) Oral every 6 hours PRN Mild Pain (1 - 3), Moderate Pain (4 - 6), Severe Pain (7 - 10)  LORazepam     Tablet 1 milliGRAM(s) Oral two times a day PRN Anxiety  traMADol 50 milliGRAM(s) Oral every 8 hours PRN Severe Pain (7 - 10)           ***** REVIEW OF SYSTEM:  GEN: no fever, no chills, no pain  RESP: no SOB, no cough, no sputum  CVS: no chest pain, no palpitations, no edema  GI: no abdominal pain, no nausea, no vomiting, no constipation, no diarrhea  : no dysurea, no frequency  NEURO: no headache, no diziness  PSYCH: no depression, not anxious  Derm : no itching, no rash         ***** VITAL SIGNS:  T(F): 98.9 (01-24-19 @ 21:35), Max: 98.9 (01-24-19 @ 21:35)  HR: 62 (01-24-19 @ 21:35) (62 - 99)  BP: 132/67 (01-24-19 @ 21:35) (106/58 - 155/85)  RR: 16 (01-24-19 @ 21:35) (16 - 18)  SpO2: 100% (01-24-19 @ 21:35) (93% - 100%)  Wt(kg): --  ,   I&O's Summary    23 Jan 2019 07:01  -  24 Jan 2019 07:00  --------------------------------------------------------  IN: 1460 mL / OUT: 0 mL / NET: 1460 mL    24 Jan 2019 07:01  -  24 Jan 2019 22:08  --------------------------------------------------------  IN: 780 mL / OUT: 0 mL / NET: 780 mL             *****PHYSICAL EXAM:    Alert oriented x 2  Attention comprehension are fair. Able to name, repeat, read without any difficulty.   Able to follow 3 step commands.     EOMI fundi not visualized,  VFF to confrontration  No facial asymmetry   Tongue is midline   Palate elevates symmetrically   Moving all 4 ext symmetrically no pronator drift   Reflexes are symmetric throughout   sensation is grossly symmetric  Gait : not assessed.  B/L down going toes            *****LAB AND IMAGING:                        10.4   6.08  )-----------( 218      ( 24 Jan 2019 06:20 )             34.1               01-24    134<L>  |  92<L>  |  23  ----------------------------<  78  3.8   |  30  |  1.34<H>    Ca    8.8      24 Jan 2019 06:20  Mg     1.9     01-24           Vitamin B12, Serum: 783 pg/mL (01.22.19 @ 07:00)    1Folate, Serum: 19.2:   PLEASE NOTE NEW REFERENCE RANGE ng/mL (01.22.19 @ 07:00)                    [All pertinent recent Imaging/Reports reviewed]           *****A S S E S S M E N T   A N D   P L A N :      77 y/o female, with a PmHx of COPD (on 2-3L O2 prn), paroxsymal a-fib (on pradaxa), CAD (s/p stent 11/2018), CHF, HTN, HLD, and anxiety, presenting to the [facility] ED with right foot pressure ulcer pain x several weeks. The patient has a dime-sized ulcer with scant serosanguinous drainage on her right calcaneous that she acquired from a shoe weeks ago. She reports that she woke up last night around 4am with excruciating, unrelenting right root pain with SOB x a few minutes followed by chest pain x several hours. The patient reports that the ulcer pain began to gradually worsen since her last hospital visit, where she was d/c with vancomycin. The foot pain is sharp, 10/10, and radiates up to her calf. The patient took two puffs of her Symbicort for the SOB with no relief, but it resolved spontaneously within a few minutes. The chest pain began gradually and was described as a midsternal, 8/10, tightness with no radiation and accompanied by palpitations. Of note, the patient reports chronic cough, b/l LE edema, JACKSON with walking 1/2 block, orthopnea x1 pillow, occasional PND, melena x weeks, easy bruising/bleeding, and 10 lbs weight loss over the past 6 months with no change in appetite. The patient denies fever, chills, SOB at rest, diaphoresis, dizziness, claudication, wheezing, changes in vision and hearing, abdominal pain, change in bowel and urinary habits, dysuria, hematuria.   Problem/Recommendations 1: toxic metabolic encephalopathy improving  regulate sleep wake cycle/ avoid day time sleepiness.    b12/folate/tsh/wnl   thiamine 100 daily.           Problem/Recommendations 2: hyponatremia mild  of unclear etiology   defer to primary for w/u   possible volume deficit.      Thank you for allowing me to participate in the care of this patient. Please do not hesitate to call me if you have any  questions.        ________________  Kary Mercado MD  [facility] ([facility]  [phone]      30 minutes spent on total encounter; more than 50 % of the visit was  spent counseling about plan of care, compliance to diet/exercise and medication regimen and or  coordinating care by the attending physician.   At the present time, [facility] does not  provide outpatient followup, best to call the your insurance to find a participating provider.  This was explained to you at the time of the visit. Alternatively, if your insurance allows it, you can follow up with a neurologist  Dr. Anish Camacho([place]) [phone] or Dr. Michael Nissenbaum [phone]

## 2019-01-24 NOTE — PROGRESS NOTE ADULT - SUBJECTIVE AND OBJECTIVE BOX
Called by RN to evaluate pt noted with episodes of desaturation in 80's, Pt Found in Bed, Denies any SOB or Chest pain, No Distress Noted    MEDICATIONS  (STANDING):  atorvastatin 40 milliGRAM(s) Oral at bedtime  buDESOnide  80 MICROgram(s)/formoterol 4.5 MICROgram(s) Inhaler 2 Puff(s) Inhalation two times a day  clopidogrel Tablet 75 milliGRAM(s) Oral daily  dabigatran 75 milliGRAM(s) Oral every 12 hours  furosemide    Tablet 40 milliGRAM(s) Oral daily  metoprolol tartrate 12.5 milliGRAM(s) Oral every 12 hours  pantoprazole  Injectable 40 milliGRAM(s) IV Push two times a day  senna 2 Tablet(s) Oral at bedtime  thiamine 100 milliGRAM(s) Oral daily    MEDICATIONS  (PRN):  acetaminophen   Tablet .. 650 milliGRAM(s) Oral every 6 hours PRN Mild Pain (1 - 3), Moderate Pain (4 - 6), Severe Pain (7 - 10)  LORazepam     Tablet 1 milliGRAM(s) Oral two times a day PRN Anxiety  traMADol 50 milliGRAM(s) Oral every 8 hours PRN Severe Pain (7 - 10)      Vital Signs Last 24 Hrs  T(C): 36.7 (24 Jan 2019 05:08), Max: 36.7 (23 Jan 2019 21:08)  T(F): 98 (24 Jan 2019 05:08), Max: 98.1 (23 Jan 2019 21:08)  HR: 99 (24 Jan 2019 09:15) (74 - 99)  BP: 117/73 (24 Jan 2019 09:15) (107/56 - 129/62)  BP(mean): --  RR: 18 (24 Jan 2019 09:15) (18 - 18)  SpO2: 93% (24 Jan 2019 09:15) (93% - 100%)  I&O's Summary    23 Jan 2019 07:01  -  24 Jan 2019 07:00  --------------------------------------------------------  IN: 1460 mL / OUT: 0 mL / NET: 1460 mL    Appearance: Normal	  Cardiovascular: Normal S1 S2, No JVD  Respiratory: Bi Basilar Crackles ausculted  Psychiatry: A & O x 3, Mood & affect appropriate  Gastrointestinal:  Soft, Non-tender, + BS	  Extremities: + B/L  edema                           10.4   6.08  )-----------( 218      ( 24 Jan 2019 06:20 )             34.1       01-24    134<L>  |  92<L>  |  23  ----------------------------<  78  3.8   |  30  |  1.34<H>    Ca    8.8      24 Jan 2019 06:20  Mg     1.9     01-24

## 2019-01-24 NOTE — CONSULT NOTE ADULT - ASSESSMENT
77 y/o female, with a PmHx of COPD (on 2-3L O2 prn), paroxsymal a-fib (on pradaxa), HTN, HLD, and anxiety, presenting to the [facility] ED with right foot pressure ulcer pain, SOB, and CP, found to have Afib @102 bpm, H/H 8/24.7, mild interstitial pulmonary edema, proBNP 1374, guiac positive, BUN/Cr 36/1.68, admitted to telemetry for nonhealing pressure ulcer, Afib, CHF exacerbation, r/o ACS.

## 2019-01-25 LAB
ANION GAP SERPL CALC-SCNC: 9 MMO/L — SIGNIFICANT CHANGE UP (ref 7–14)
BASOPHILS # BLD AUTO: 0.04 K/UL — SIGNIFICANT CHANGE UP (ref 0–0.2)
BASOPHILS NFR BLD AUTO: 0.5 % — SIGNIFICANT CHANGE UP (ref 0–2)
BUN SERPL-MCNC: 22 MG/DL — SIGNIFICANT CHANGE UP (ref 7–23)
CALCIUM SERPL-MCNC: 8.6 MG/DL — SIGNIFICANT CHANGE UP (ref 8.4–10.5)
CHLORIDE SERPL-SCNC: 97 MMOL/L — LOW (ref 98–107)
CO2 SERPL-SCNC: 32 MMOL/L — HIGH (ref 22–31)
CREAT SERPL-MCNC: 1.34 MG/DL — HIGH (ref 0.5–1.3)
EOSINOPHIL # BLD AUTO: 0.17 K/UL — SIGNIFICANT CHANGE UP (ref 0–0.5)
EOSINOPHIL NFR BLD AUTO: 2.1 % — SIGNIFICANT CHANGE UP (ref 0–6)
GLUCOSE SERPL-MCNC: 88 MG/DL — SIGNIFICANT CHANGE UP (ref 70–99)
HCT VFR BLD CALC: 30.2 % — LOW (ref 34.5–45)
HCT VFR BLD CALC: 30.2 % — LOW (ref 34.5–45)
HGB BLD-MCNC: 9.7 G/DL — LOW (ref 11.5–15.5)
HGB BLD-MCNC: 9.7 G/DL — LOW (ref 11.5–15.5)
IMM GRANULOCYTES NFR BLD AUTO: 0.2 % — SIGNIFICANT CHANGE UP (ref 0–1.5)
LYMPHOCYTES # BLD AUTO: 0.44 K/UL — LOW (ref 1–3.3)
LYMPHOCYTES # BLD AUTO: 5.3 % — LOW (ref 13–44)
MAGNESIUM SERPL-MCNC: 1.6 MG/DL — SIGNIFICANT CHANGE UP (ref 1.6–2.6)
MCHC RBC-ENTMCNC: 28.3 PG — SIGNIFICANT CHANGE UP (ref 27–34)
MCHC RBC-ENTMCNC: 28.3 PG — SIGNIFICANT CHANGE UP (ref 27–34)
MCHC RBC-ENTMCNC: 32.1 % — SIGNIFICANT CHANGE UP (ref 32–36)
MCHC RBC-ENTMCNC: 32.1 % — SIGNIFICANT CHANGE UP (ref 32–36)
MCV RBC AUTO: 88 FL — SIGNIFICANT CHANGE UP (ref 80–100)
MCV RBC AUTO: 88 FL — SIGNIFICANT CHANGE UP (ref 80–100)
MONOCYTES # BLD AUTO: 0.81 K/UL — SIGNIFICANT CHANGE UP (ref 0–0.9)
MONOCYTES NFR BLD AUTO: 9.8 % — SIGNIFICANT CHANGE UP (ref 2–14)
NEUTROPHILS # BLD AUTO: 6.81 K/UL — SIGNIFICANT CHANGE UP (ref 1.8–7.4)
NEUTROPHILS NFR BLD AUTO: 82.1 % — HIGH (ref 43–77)
NRBC # FLD: 0 K/UL — LOW (ref 25–125)
NRBC # FLD: 0 K/UL — LOW (ref 25–125)
PLATELET # BLD AUTO: 203 K/UL — SIGNIFICANT CHANGE UP (ref 150–400)
PLATELET # BLD AUTO: 203 K/UL — SIGNIFICANT CHANGE UP (ref 150–400)
PMV BLD: 10.2 FL — SIGNIFICANT CHANGE UP (ref 7–13)
PMV BLD: 10.2 FL — SIGNIFICANT CHANGE UP (ref 7–13)
POTASSIUM SERPL-MCNC: 3.6 MMOL/L — SIGNIFICANT CHANGE UP (ref 3.5–5.3)
POTASSIUM SERPL-SCNC: 3.6 MMOL/L — SIGNIFICANT CHANGE UP (ref 3.5–5.3)
RBC # BLD: 3.43 M/UL — LOW (ref 3.8–5.2)
RBC # BLD: 3.43 M/UL — LOW (ref 3.8–5.2)
RBC # FLD: 16.7 % — HIGH (ref 10.3–14.5)
RBC # FLD: 16.7 % — HIGH (ref 10.3–14.5)
SODIUM SERPL-SCNC: 138 MMOL/L — SIGNIFICANT CHANGE UP (ref 135–145)
WBC # BLD: 8.29 K/UL — SIGNIFICANT CHANGE UP (ref 3.8–10.5)
WBC # BLD: 8.29 K/UL — SIGNIFICANT CHANGE UP (ref 3.8–10.5)
WBC # FLD AUTO: 8.29 K/UL — SIGNIFICANT CHANGE UP (ref 3.8–10.5)
WBC # FLD AUTO: 8.29 K/UL — SIGNIFICANT CHANGE UP (ref 3.8–10.5)

## 2019-01-25 RX ORDER — TRAMADOL HYDROCHLORIDE 50 MG/1
50 TABLET ORAL EVERY 8 HOURS
Qty: 0 | Refills: 0 | Status: DISCONTINUED | OUTPATIENT
Start: 2019-01-25 | End: 2019-01-31

## 2019-01-25 RX ORDER — METOPROLOL TARTRATE 50 MG
25 TABLET ORAL
Qty: 0 | Refills: 0 | Status: DISCONTINUED | OUTPATIENT
Start: 2019-01-25 | End: 2019-01-28

## 2019-01-25 RX ADMIN — Medication 40 MILLIGRAM(S): at 05:20

## 2019-01-25 RX ADMIN — PANTOPRAZOLE SODIUM 40 MILLIGRAM(S): 20 TABLET, DELAYED RELEASE ORAL at 17:12

## 2019-01-25 RX ADMIN — TRAMADOL HYDROCHLORIDE 50 MILLIGRAM(S): 50 TABLET ORAL at 16:58

## 2019-01-25 RX ADMIN — Medication 25 MILLIGRAM(S): at 17:12

## 2019-01-25 RX ADMIN — CLOPIDOGREL BISULFATE 75 MILLIGRAM(S): 75 TABLET, FILM COATED ORAL at 11:16

## 2019-01-25 RX ADMIN — Medication 100 MILLIGRAM(S): at 11:16

## 2019-01-25 RX ADMIN — DABIGATRAN ETEXILATE MESYLATE 75 MILLIGRAM(S): 150 CAPSULE ORAL at 17:12

## 2019-01-25 RX ADMIN — Medication 12.5 MILLIGRAM(S): at 05:20

## 2019-01-25 RX ADMIN — PANTOPRAZOLE SODIUM 40 MILLIGRAM(S): 20 TABLET, DELAYED RELEASE ORAL at 05:40

## 2019-01-25 RX ADMIN — ATORVASTATIN CALCIUM 40 MILLIGRAM(S): 80 TABLET, FILM COATED ORAL at 22:06

## 2019-01-25 RX ADMIN — BUDESONIDE AND FORMOTEROL FUMARATE DIHYDRATE 2 PUFF(S): 160; 4.5 AEROSOL RESPIRATORY (INHALATION) at 11:16

## 2019-01-25 RX ADMIN — Medication 1 MILLIGRAM(S): at 00:01

## 2019-01-25 RX ADMIN — Medication 650 MILLIGRAM(S): at 05:21

## 2019-01-25 RX ADMIN — BUDESONIDE AND FORMOTEROL FUMARATE DIHYDRATE 2 PUFF(S): 160; 4.5 AEROSOL RESPIRATORY (INHALATION) at 22:06

## 2019-01-25 RX ADMIN — Medication 1 MILLIGRAM(S): at 16:58

## 2019-01-25 RX ADMIN — Medication 650 MILLIGRAM(S): at 05:50

## 2019-01-25 RX ADMIN — DABIGATRAN ETEXILATE MESYLATE 75 MILLIGRAM(S): 150 CAPSULE ORAL at 05:20

## 2019-01-25 NOTE — PROGRESS NOTE ADULT - SUBJECTIVE AND OBJECTIVE BOX
Patient is a 79y old  Female who presents with a chief complaint of right foot pressure ulcer pain (25 Jan 2019 12:17)      Any change in ROS: pt is doing ok: no SOB     MEDICATIONS  (STANDING):  atorvastatin 40 milliGRAM(s) Oral at bedtime  buDESOnide  80 MICROgram(s)/formoterol 4.5 MICROgram(s) Inhaler 2 Puff(s) Inhalation two times a day  clopidogrel Tablet 75 milliGRAM(s) Oral daily  dabigatran 75 milliGRAM(s) Oral every 12 hours  furosemide    Tablet 40 milliGRAM(s) Oral daily  metoprolol tartrate 25 milliGRAM(s) Oral two times a day  pantoprazole  Injectable 40 milliGRAM(s) IV Push two times a day  senna 2 Tablet(s) Oral at bedtime  thiamine 100 milliGRAM(s) Oral daily    MEDICATIONS  (PRN):  acetaminophen   Tablet .. 650 milliGRAM(s) Oral every 6 hours PRN Mild Pain (1 - 3), Moderate Pain (4 - 6), Severe Pain (7 - 10)  LORazepam     Tablet 1 milliGRAM(s) Oral two times a day PRN Anxiety  traMADol 50 milliGRAM(s) Oral every 8 hours PRN Severe Pain (7 - 10)    Vital Signs Last 24 Hrs  T(C): 37.1 (25 Jan 2019 13:16), Max: 38.8 (25 Jan 2019 05:58)  T(F): 98.7 (25 Jan 2019 13:16), Max: 101.8 (25 Jan 2019 05:58)  HR: 99 (25 Jan 2019 13:16) (62 - 115)  BP: 132/66 (25 Jan 2019 13:16) (132/66 - 155/85)  BP(mean): --  RR: 18 (25 Jan 2019 13:16) (16 - 19)  SpO2: 97% (25 Jan 2019 13:16) (94% - 100%)    I&O's Summary    24 Jan 2019 07:01  -  25 Jan 2019 07:00  --------------------------------------------------------  IN: 780 mL / OUT: 0 mL / NET: 780 mL          Physical Exam:   GENERAL: weak  HEENT: COLEEN/   Atraumatic, Normocephalic  ENMT: No tonsillar erythema, exudates, or enlargement; Moist mucous membranes, Good dentition, No lesions  NECK: Supple, No JVD, Normal thyroid  CHEST/LUNG: Clear to auscultaion, ; No rales, rhonchi, wheezing, or rubs  CVS: Regular rate and rhythm; No murmurs, rubs, or gallops  GI: : Soft, Nontender, Nondistended; Bowel sounds present  NERVOUS SYSTEM:  Alert & Oriented X3  EXTREMITIES: -edema  LYMPH: No lymphadenopathy noted  SKIN: No rashes or lesions  ENDOCRINOLOGY: No Thyromegaly  PSYCH: Appropriate    Labs:                              9.7    8.29  )-----------( 203      ( 25 Jan 2019 06:25 )             30.2                         10.4   6.08  )-----------( 218      ( 24 Jan 2019 06:20 )             34.1                         10.1   3.97  )-----------( 216      ( 23 Jan 2019 06:50 )             32.7                         9.2    4.96  )-----------( 210      ( 22 Jan 2019 07:00 )             29.5     01-25    138  |  97<L>  |  22  ----------------------------<  88  3.6   |  32<H>  |  1.34<H>  01-24    134<L>  |  92<L>  |  23  ----------------------------<  78  3.8   |  30  |  1.34<H>  01-23    135  |  93<L>  |  26<H>  ----------------------------<  79  4.1   |  30  |  1.44<H>  01-22    135  |  94<L>  |  29<H>  ----------------------------<  81  4.0   |  29  |  1.51<H>    Ca    8.6      25 Jan 2019 06:25  Ca    8.8      24 Jan 2019 06:20  Mg     1.6     01-25  Mg     1.9     01-24      CAPILLARY BLOOD GLUCOSE          < from: CT Chest No Cont (01.24.19 @ 19:08) >    VESSELS: Left three-vessel arch. Atherosclerotic changes.    HEART: Heart size is enlarged. No pericardial effusion. Coronary   calcifications.    MEDIASTINUM AND GEOVANI: No lymphadenopathy.    CHEST WALL AND LOWER NECK: Within normal limits.    VISUALIZED UPPER ABDOMEN: Within normal limits.    BONES: Healed left posterior rib fracture deformities.  Degenerative   changes.    IMPRESSION:   Loculated right pleural effusion likely corresponds to triangular opacity   seen on recent chest radiograph.    Partially loculated bilateral pleural effusions. Emphysema.                  SABINA HOPKINS M.D., RADIOLOGY RESIDENT  This document has been electronically signed.  SHERMAN WEINSTEIN M.D., ATTENDING RADIOLOGIST  This document has been electronically signed. Jan 25 2019 10:32AM    < end of copied text >              RECENT CULTURES:        RESPIRATORY CULTURES:          Studies  Chest X-RAY  CT SCAN Chest   Venous Dopplers: LE:   CT Abdomen  Others    < from: Transthoracic Echocardiogram (01.17.19 @ 17:11) >  ventricular internal dimensions and wall thicknesses.  Right Heart: Moderate right atrial enlargement. Normal  right ventricular size and function. Normal tricuspid  valve.  Moderate tricuspid regurgitation. Normal pulmonic  valve.  Mild pulmonic regurgitation.  Pericardium/PleuraNormal pericardium with no pericardial  effusion.  Hemodynamic: Estimated right ventricular systolic pressure  equals 53 mm Hg, assuming right atrial pressure equals 10  mm Hg, consistent with moderate pulmonary hypertension.  ------------------------------------------------------------------------  CONCLUSIONS:  1. Mitral annular calcification, otherwise normal mitral  valve. Mild-moderate mitral regurgitation.  2. Calcified trileaflet aortic valve with normal opening.  Mild aortic regurgitation.  3. Severely dilated left atrium.  LA volume index = 50  cc/m2.  4. Normal left ventricular internal dimensions and wall  thicknesses.  5. Normal left ventricular systolic function. No segmental  wall motion abnormalities.  6. Moderate right atrial enlargement.  7. Normal right ventricular size and function.  8. Estimated right ventricular systolic pressure equals 53  mm Hg, assuming right atrial pressure equals 10 mm Hg,  consistent with moderate pulmonary hypertension.  *** Compared with echocardiogram of 1/31/2018, no  significant changes noted.  ------------------------------------------------------------------------  Confirmed on  1/17/2019 - 18:08:23 by Baltazar Orosco M.D.  ------------------------------------------------------------------------    < end of copied text >        < from: CT Abdomen and Pelvis w/ Oral Cont and w/ IV Cont (01.04.19 @ 20:56) >  FINDINGS:    LOWER CHEST: Cardiomegaly. Coronary artery calcifications. Trace   bilateral pleural effusions with adjacent bibasilar compressive   atelectasis..    < end of copied text >

## 2019-01-25 NOTE — PROGRESS NOTE ADULT - ASSESSMENT
79 y/o female, with a PmHx of COPD (on 2-3L O2 prn), paroxsymal a-fib (on pradaxa), CAD (s/p stent 11/2018), DCHF, HTN, HLD, and anxiety, presenting with acute/chronic diastolic chf, chest pain r/o acs, GIB, non healing pressure ulcer.     1. Atypical chest pain, resolved  in the setting of acute CHF exacerbation, afib w/ RVR   cv stable, no evidence of acute ischemia/ACS   Echo with nl lv fxn  continue statin, bb, plavix     2. Acute/chronic diastolic chf  stable   cont lasix daily   pulmo f/u for effusion  cont bb  echo with nl lv fxn    3. AFIB, hx   rate controlled,  inc rates today, can inc metoprolol to 25mg bid  no pauses off dig   heme stable  CHADS 3 - continue with pradaxa     4. CAD s/p PCI (11/2018)  cv stable, no evidence of acute ischemia   continue bb, statin, continue plavix given recent PCI      5. GIB   +GUIAC , +anemia s/p 1uprbc,   continue to trend cbc  GI f/u      6. non healing foot ulcer  med f/u  ID f/u ,   vascular studies noted, pod f/u    7. IGOR/CKD   renal f/u     dvt ppx

## 2019-01-25 NOTE — PROGRESS NOTE ADULT - SUBJECTIVE AND OBJECTIVE BOX
[facility] NEPHROLOGY- PROGRESS NOTE    78y Female with history of CHF, COPD presents with SOB found to have guaiac positive anemia and afib with RVR. Nephrology consulted for elevated Scr.    REVIEW OF SYSTEMS:  Gen: no changes in weight, no dysuria/urgency  Cards: no chest pain  Resp: no dyspnea  GI: no nausea or vomiting or diarrhea  Vascular: no LE edema +R foot pain    No Known Allergies      Hospital Medications: Medications reviewed      VITALS:  T(F): 101.8 (19 @ 05:58), Max: 101.8 (19 @ 05:58)  HR: 115 (19 @ 05:15)  BP: 132/83 (19 @ 05:15)  RR: 19 (19 @ 05:15)  SpO2: 94% (19 @ 05:15)  Wt(kg): --     @ 07:01  -   @ 07:00  --------------------------------------------------------  IN: 780 mL / OUT: 0 mL / NET: 780 mL        PHYSICAL EXAM:    Gen: NAD, calm  Cards: Irregularly irregular, +S1/S2, no M/G/R  Resp: course BS, bibasilar rales  GI: soft, NT/ND, NABS  Vascular: no LE edema B/L      LABS:      138  |  97<L>  |  22  ----------------------------<  88  3.6   |  32<H>  |  1.34<H>    Ca    8.6      2019 06:25  Mg     1.6           Creatinine Trend: 1.34 <--, 1.34 <--, 1.44 <--, 1.51 <--, 1.76 <--, 1.87 <--, 2.01 <--                        9.7    8.29  )-----------( 203      ( 2019 06:25 )             30.2     Urine Studies:  Urinalysis Basic - ( 2019 19:40 )    Color: YELLOW / Appearance: Lt TURBID / S.015 / pH: 6.0  Gluc: NEGATIVE / Ketone: NEGATIVE  / Bili: NEGATIVE / Urobili: NORMAL   Blood: MODERATE / Protein: 20 / Nitrite: POSITIVE   Leuk Esterase: LARGE / RBC: 11-25 / WBC >50   Sq Epi: FEW / Non Sq Epi:  / Bacteria: FEW      Creatinine, Random Urine: 65.50 mg/dL ( @ 19:40)  Protein, Random Urine: 35.8 mg/dL ( @ 19:40)        < from: CT Chest No Cont (19 @ 19:08) >  IMPRESSION:   Loculated right pleural effusion likely corresponds to triangular opacity   seen on recent chest radiograph.    Partially loculated bilateral pleural effusions. Emphysema.    < end of copied text >

## 2019-01-25 NOTE — PROGRESS NOTE ADULT - SUBJECTIVE AND OBJECTIVE BOX
Patient is a 79y old  Female who presents with a chief complaint of right foot pressure ulcer pain (25 Jan 2019 13:22)      INTERVAL HPI/OVERNIGHT EVENTS:  T(C): 37.3 (01-25-19 @ 17:11), Max: 38.8 (01-25-19 @ 05:58)  HR: 100 (01-25-19 @ 17:11) (62 - 115)  BP: 126/68 (01-25-19 @ 17:11) (93/53 - 132/83)  RR: 22 (01-25-19 @ 17:11) (16 - 22)  SpO2: 96% (01-25-19 @ 17:11) (94% - 100%)  Wt(kg): --  I&O's Summary    24 Jan 2019 07:01  -  25 Jan 2019 07:00  --------------------------------------------------------  IN: 780 mL / OUT: 0 mL / NET: 780 mL    25 Jan 2019 07:01  -  25 Jan 2019 19:10  --------------------------------------------------------  IN: 118 mL / OUT: 0 mL / NET: 118 mL        LABS:                        9.7    8.29  )-----------( 203      ( 25 Jan 2019 06:25 )             30.2     01-25    138  |  97<L>  |  22  ----------------------------<  88  3.6   |  32<H>  |  1.34<H>    Ca    8.6      25 Jan 2019 06:25  Mg     1.6     01-25          CAPILLARY BLOOD GLUCOSE                MEDICATIONS  (STANDING):  atorvastatin 40 milliGRAM(s) Oral at bedtime  buDESOnide  80 MICROgram(s)/formoterol 4.5 MICROgram(s) Inhaler 2 Puff(s) Inhalation two times a day  clopidogrel Tablet 75 milliGRAM(s) Oral daily  dabigatran 75 milliGRAM(s) Oral every 12 hours  furosemide    Tablet 40 milliGRAM(s) Oral daily  metoprolol tartrate 25 milliGRAM(s) Oral two times a day  pantoprazole  Injectable 40 milliGRAM(s) IV Push two times a day  senna 2 Tablet(s) Oral at bedtime  thiamine 100 milliGRAM(s) Oral daily    MEDICATIONS  (PRN):  acetaminophen   Tablet .. 650 milliGRAM(s) Oral every 6 hours PRN Mild Pain (1 - 3), Moderate Pain (4 - 6), Severe Pain (7 - 10)  traMADol 50 milliGRAM(s) Oral every 8 hours PRN Severe Pain (7 - 10)          PHYSICAL EXAM:  GENERAL: NAD, well-groomed, well-developed  HEAD:  Atraumatic, Normocephalic  CHEST/LUNG: Clear to percussion bilaterally; No rales, rhonchi, wheezing, or rubs  HEART: Regular rate and rhythm; No murmurs, rubs, or gallops  ABDOMEN: Soft, Nontender, Nondistended; Bowel sounds present  EXTREMITIES:  2+ Peripheral Pulses, No clubbing, cyanosis, or edema  LYMPH: No lymphadenopathy noted  SKIN: No rashes or lesions    Care Discussed with Consultants/Other Providers [ ] YES  [ ] NO

## 2019-01-25 NOTE — PROGRESS NOTE ADULT - PROBLEM SELECTOR PLAN 1
Likely hemodynamically mediated in setting of afib, ADHF, anemia and IV diuretic use now resolved. Avoid nephrotoxins.    Patient with pyuria and microscopic hematuria but asymptomatic for UTI for which can hold off on abx.

## 2019-01-25 NOTE — PROGRESS NOTE ADULT - ASSESSMENT
77 y/o female, with a PmHx of COPD (on 2-3L O2 prn), paroxsymal a-fib (on pradaxa), HTN, HLD, and anxiety, presenting to the [facility] ED with right foot pressure ulcer pain, SOB, and CP, found to have Afib @102 bpm, H/H 8/24.7, mild interstitial pulmonary edema, proBNP 1374, guiac positive, BUN/Cr 36/1.68, admitted to telemetry for nonhealing pressure ulcer, Afib, CHF exacerbation, r/o ACS.

## 2019-01-25 NOTE — PROGRESS NOTE ADULT - ASSESSMENT
Problem/Plan - 1:  ·  Problem: Acute on chronic systolic and diastolic heart failure, NYHA class 3.  Plan:telemonitor   2G Sodium 1800 ADA diet with 1500 cc Fluid restriction  Strict I&Os plus Daily weights.  diuresis as per cards  cardio consult appreciated    Problem/Plan - 2:  ·  Problem: GI bleed.  Plan: GI consult appreciated  start xarelto and follow CBC    Problem/Plan - 3:  ·  Problem: Ulcer of heel, right, with fat layer exposed.  Plan: ID fu   podiatry fu  check STONEY/PVR    Problem/Plan - 4:  Problem: Chronic atrial fibrillation with rapid ventricular response.  Plan: cw NOVC    Problem/Plan - 5:  ·  Problem: CKD (chronic kidney disease) stage 4, GFR 15-29 ml/min.  Plan: Monitor electrolytes  Trend BUN/Cr  renal consult appreciated

## 2019-01-25 NOTE — PROGRESS NOTE ADULT - SUBJECTIVE AND OBJECTIVE BOX
EBVERLEY DEL RIO:[id number],   79yFemale followed for:  No Known Allergies    PAST MEDICAL & SURGICAL HISTORY:  CAD (coronary artery disease): s/p stent 2018  CHF (congestive heart failure)  COPD (chronic obstructive pulmonary disease): on 2-3L O2 at home prn  Anxiety  TIA (transient ischemic attack): many years ago  PAF (paroxysmal atrial fibrillation)  HLD (hyperlipidemia)  HTN (hypertension)  H/O total hysterectomy: 2014  History of cataract surgery: b/l 2015  History of repair of hip fracture: luisa placed in RLE 2015  H/O spinal fusion: c2-6 in 2017    FAMILY HISTORY:  No pertinent family history in first degree relatives    MEDICATIONS  (STANDING):  atorvastatin 40 milliGRAM(s) Oral at bedtime  buDESOnide  80 MICROgram(s)/formoterol 4.5 MICROgram(s) Inhaler 2 Puff(s) Inhalation two times a day  clopidogrel Tablet 75 milliGRAM(s) Oral daily  dabigatran 75 milliGRAM(s) Oral every 12 hours  furosemide    Tablet 40 milliGRAM(s) Oral daily  metoprolol tartrate 12.5 milliGRAM(s) Oral every 12 hours  pantoprazole  Injectable 40 milliGRAM(s) IV Push two times a day  senna 2 Tablet(s) Oral at bedtime  thiamine 100 milliGRAM(s) Oral daily    MEDICATIONS  (PRN):  acetaminophen   Tablet .. 650 milliGRAM(s) Oral every 6 hours PRN Mild Pain (1 - 3), Moderate Pain (4 - 6), Severe Pain (7 - 10)  LORazepam     Tablet 1 milliGRAM(s) Oral two times a day PRN Anxiety      Vital Signs Last 24 Hrs  T(C): 38.8 (25 Jan 2019 05:58), Max: 38.8 (25 Jan 2019 05:58)  T(F): 101.8 (25 Jan 2019 05:58), Max: 101.8 (25 Jan 2019 05:58)  HR: 115 (25 Jan 2019 05:15) (62 - 115)  BP: 132/83 (25 Jan 2019 05:15) (106/58 - 155/85)  BP(mean): --  RR: 19 (25 Jan 2019 05:15) (16 - 19)  SpO2: 94% (25 Jan 2019 05:15) (93% - 100%)  nc/at  s1s2  cta  soft, nt, nd no guarding or rebound  no c/c/e    CBC Full  -  ( 25 Jan 2019 06:25 )  WBC Count : 8.29 K/uL  Hemoglobin : 9.7 g/dL  Hematocrit : 30.2 %  Platelet Count - Automated : 203 K/uL  Mean Cell Volume : 88.0 fL  Mean Cell Hemoglobin : 28.3 pg  Mean Cell Hemoglobin Concentration : 32.1 %  Auto Neutrophil # : 6.81 K/uL  Auto Lymphocyte # : 0.44 K/uL  Auto Monocyte # : 0.81 K/uL  Auto Eosinophil # : 0.17 K/uL  Auto Basophil # : 0.04 K/uL  Auto Neutrophil % : 82.1 %  Auto Lymphocyte % : 5.3 %  Auto Monocyte % : 9.8 %  Auto Eosinophil % : 2.1 %  Auto Basophil % : 0.5 %    01-25    138  |  97<L>  |  22  ----------------------------<  88  3.6   |  32<H>  |  1.34<H>    Ca    8.6      25 Jan 2019 06:25  Mg     1.6     01-25

## 2019-01-25 NOTE — PROGRESS NOTE ADULT - SUBJECTIVE AND OBJECTIVE BOX
CARDIOLOGY FOLLOW UP NOTE - DR. MARTINEZ    Subjective:    no chest pain, sob    PHYSICAL EXAM:  T(C): 38.8 (01-25-19 @ 05:58), Max: 38.8 (01-25-19 @ 05:58)  HR: 115 (01-25-19 @ 05:15) (62 - 115)  BP: 132/83 (01-25-19 @ 05:15) (132/67 - 155/85)  RR: 19 (01-25-19 @ 05:15) (16 - 19)  SpO2: 94% (01-25-19 @ 05:15) (94% - 100%)  Wt(kg): --  I&O's Summary    24 Jan 2019 07:01  -  25 Jan 2019 07:00  --------------------------------------------------------  IN: 780 mL / OUT: 0 mL / NET: 780 mL        Appearance: Normal	  Cardiovascular: Normal S1 S2, irreg  Respiratory: Lungs clear to auscultation	  Gastrointestinal:  Soft, Non-tender, + BS	  Extremities: Normal range of motion, No clubbing, cyanosis or edema    MEDICATIONS  (STANDING):  atorvastatin 40 milliGRAM(s) Oral at bedtime  buDESOnide  80 MICROgram(s)/formoterol 4.5 MICROgram(s) Inhaler 2 Puff(s) Inhalation two times a day  clopidogrel Tablet 75 milliGRAM(s) Oral daily  dabigatran 75 milliGRAM(s) Oral every 12 hours  furosemide    Tablet 40 milliGRAM(s) Oral daily  metoprolol tartrate 12.5 milliGRAM(s) Oral every 12 hours  pantoprazole  Injectable 40 milliGRAM(s) IV Push two times a day  senna 2 Tablet(s) Oral at bedtime  thiamine 100 milliGRAM(s) Oral daily      TELEMETRY: 	    ECG:  	  RADIOLOGY:   DIAGNOSTIC TESTING:  [ ] Echocardiogram:  [ ] Catheterization:  [ ] Stress Test:    OTHER: 	    LABS:	 	    CARDIAC MARKERS:                                9.7    8.29  )-----------( 203      ( 25 Jan 2019 06:25 )             30.2     01-25    138  |  97<L>  |  22  ----------------------------<  88  3.6   |  32<H>  |  1.34<H>    Ca    8.6      25 Jan 2019 06:25  Mg     1.6     01-25      proBNP:     Lipid Profile:   HgA1c:

## 2019-01-25 NOTE — PROGRESS NOTE ADULT - PROBLEM SELECTOR PLAN 1
pt ct scan chest reviewed: has bilateral loculated effusions. DW daughter: she knew about the pleural effusion from before: she was told about it when she was at [facility]. She is not wheezing:   :

## 2019-01-26 DIAGNOSIS — E87.6 HYPOKALEMIA: ICD-10-CM

## 2019-01-26 LAB
ANION GAP SERPL CALC-SCNC: 10 MMO/L — SIGNIFICANT CHANGE UP (ref 7–14)
BASOPHILS # BLD AUTO: 0.04 K/UL — SIGNIFICANT CHANGE UP (ref 0–0.2)
BASOPHILS NFR BLD AUTO: 0.5 % — SIGNIFICANT CHANGE UP (ref 0–2)
BUN SERPL-MCNC: 23 MG/DL — SIGNIFICANT CHANGE UP (ref 7–23)
CALCIUM SERPL-MCNC: 8.5 MG/DL — SIGNIFICANT CHANGE UP (ref 8.4–10.5)
CHLORIDE SERPL-SCNC: 93 MMOL/L — LOW (ref 98–107)
CO2 SERPL-SCNC: 32 MMOL/L — HIGH (ref 22–31)
CREAT SERPL-MCNC: 1.34 MG/DL — HIGH (ref 0.5–1.3)
EOSINOPHIL # BLD AUTO: 0.19 K/UL — SIGNIFICANT CHANGE UP (ref 0–0.5)
EOSINOPHIL NFR BLD AUTO: 2.6 % — SIGNIFICANT CHANGE UP (ref 0–6)
GLUCOSE SERPL-MCNC: 86 MG/DL — SIGNIFICANT CHANGE UP (ref 70–99)
HCT VFR BLD CALC: 31.9 % — LOW (ref 34.5–45)
HCT VFR BLD CALC: 31.9 % — LOW (ref 34.5–45)
HGB BLD-MCNC: 10.1 G/DL — LOW (ref 11.5–15.5)
HGB BLD-MCNC: 10.1 G/DL — LOW (ref 11.5–15.5)
IMM GRANULOCYTES NFR BLD AUTO: 0.4 % — SIGNIFICANT CHANGE UP (ref 0–1.5)
LYMPHOCYTES # BLD AUTO: 0.54 K/UL — LOW (ref 1–3.3)
LYMPHOCYTES # BLD AUTO: 7.3 % — LOW (ref 13–44)
MAGNESIUM SERPL-MCNC: 1.6 MG/DL — SIGNIFICANT CHANGE UP (ref 1.6–2.6)
MCHC RBC-ENTMCNC: 28.5 PG — SIGNIFICANT CHANGE UP (ref 27–34)
MCHC RBC-ENTMCNC: 28.5 PG — SIGNIFICANT CHANGE UP (ref 27–34)
MCHC RBC-ENTMCNC: 31.7 % — LOW (ref 32–36)
MCHC RBC-ENTMCNC: 31.7 % — LOW (ref 32–36)
MCV RBC AUTO: 89.9 FL — SIGNIFICANT CHANGE UP (ref 80–100)
MCV RBC AUTO: 89.9 FL — SIGNIFICANT CHANGE UP (ref 80–100)
MONOCYTES # BLD AUTO: 1.36 K/UL — HIGH (ref 0–0.9)
MONOCYTES NFR BLD AUTO: 18.5 % — HIGH (ref 2–14)
NEUTROPHILS # BLD AUTO: 5.2 K/UL — SIGNIFICANT CHANGE UP (ref 1.8–7.4)
NEUTROPHILS NFR BLD AUTO: 70.7 % — SIGNIFICANT CHANGE UP (ref 43–77)
NRBC # FLD: 0 K/UL — LOW (ref 25–125)
NRBC # FLD: 0 K/UL — LOW (ref 25–125)
PLATELET # BLD AUTO: 176 K/UL — SIGNIFICANT CHANGE UP (ref 150–400)
PLATELET # BLD AUTO: 176 K/UL — SIGNIFICANT CHANGE UP (ref 150–400)
PMV BLD: 10.3 FL — SIGNIFICANT CHANGE UP (ref 7–13)
PMV BLD: 10.3 FL — SIGNIFICANT CHANGE UP (ref 7–13)
POTASSIUM SERPL-MCNC: 3.1 MMOL/L — LOW (ref 3.5–5.3)
POTASSIUM SERPL-SCNC: 3.1 MMOL/L — LOW (ref 3.5–5.3)
RBC # BLD: 3.55 M/UL — LOW (ref 3.8–5.2)
RBC # BLD: 3.55 M/UL — LOW (ref 3.8–5.2)
RBC # FLD: 16.6 % — HIGH (ref 10.3–14.5)
RBC # FLD: 16.6 % — HIGH (ref 10.3–14.5)
SODIUM SERPL-SCNC: 135 MMOL/L — SIGNIFICANT CHANGE UP (ref 135–145)
SPECIMEN SOURCE: SIGNIFICANT CHANGE UP
SPECIMEN SOURCE: SIGNIFICANT CHANGE UP
WBC # BLD: 7.36 K/UL — SIGNIFICANT CHANGE UP (ref 3.8–10.5)
WBC # BLD: 7.36 K/UL — SIGNIFICANT CHANGE UP (ref 3.8–10.5)
WBC # FLD AUTO: 7.36 K/UL — SIGNIFICANT CHANGE UP (ref 3.8–10.5)
WBC # FLD AUTO: 7.36 K/UL — SIGNIFICANT CHANGE UP (ref 3.8–10.5)

## 2019-01-26 RX ORDER — POTASSIUM CHLORIDE 20 MEQ
40 PACKET (EA) ORAL EVERY 4 HOURS
Qty: 0 | Refills: 0 | Status: COMPLETED | OUTPATIENT
Start: 2019-01-26 | End: 2019-01-26

## 2019-01-26 RX ORDER — POTASSIUM CHLORIDE 20 MEQ
40 PACKET (EA) ORAL EVERY 4 HOURS
Qty: 0 | Refills: 0 | Status: DISCONTINUED | OUTPATIENT
Start: 2019-01-26 | End: 2019-01-26

## 2019-01-26 RX ORDER — ACETAMINOPHEN 500 MG
650 TABLET ORAL EVERY 6 HOURS
Qty: 0 | Refills: 0 | Status: DISCONTINUED | OUTPATIENT
Start: 2019-01-26 | End: 2019-02-15

## 2019-01-26 RX ADMIN — TRAMADOL HYDROCHLORIDE 50 MILLIGRAM(S): 50 TABLET ORAL at 13:00

## 2019-01-26 RX ADMIN — Medication 650 MILLIGRAM(S): at 16:15

## 2019-01-26 RX ADMIN — DABIGATRAN ETEXILATE MESYLATE 75 MILLIGRAM(S): 150 CAPSULE ORAL at 06:23

## 2019-01-26 RX ADMIN — Medication 0.5 MILLIGRAM(S): at 15:58

## 2019-01-26 RX ADMIN — TRAMADOL HYDROCHLORIDE 50 MILLIGRAM(S): 50 TABLET ORAL at 12:13

## 2019-01-26 RX ADMIN — BUDESONIDE AND FORMOTEROL FUMARATE DIHYDRATE 2 PUFF(S): 160; 4.5 AEROSOL RESPIRATORY (INHALATION) at 21:54

## 2019-01-26 RX ADMIN — PANTOPRAZOLE SODIUM 40 MILLIGRAM(S): 20 TABLET, DELAYED RELEASE ORAL at 06:23

## 2019-01-26 RX ADMIN — Medication 650 MILLIGRAM(S): at 03:58

## 2019-01-26 RX ADMIN — Medication 40 MILLIEQUIVALENT(S): at 16:04

## 2019-01-26 RX ADMIN — ATORVASTATIN CALCIUM 40 MILLIGRAM(S): 80 TABLET, FILM COATED ORAL at 21:54

## 2019-01-26 RX ADMIN — PANTOPRAZOLE SODIUM 40 MILLIGRAM(S): 20 TABLET, DELAYED RELEASE ORAL at 18:50

## 2019-01-26 RX ADMIN — Medication 650 MILLIGRAM(S): at 15:31

## 2019-01-26 RX ADMIN — Medication 40 MILLIEQUIVALENT(S): at 20:08

## 2019-01-26 RX ADMIN — CLOPIDOGREL BISULFATE 75 MILLIGRAM(S): 75 TABLET, FILM COATED ORAL at 12:01

## 2019-01-26 RX ADMIN — Medication 650 MILLIGRAM(S): at 02:58

## 2019-01-26 RX ADMIN — Medication 100 MILLIGRAM(S): at 12:01

## 2019-01-26 RX ADMIN — DABIGATRAN ETEXILATE MESYLATE 75 MILLIGRAM(S): 150 CAPSULE ORAL at 18:49

## 2019-01-26 RX ADMIN — BUDESONIDE AND FORMOTEROL FUMARATE DIHYDRATE 2 PUFF(S): 160; 4.5 AEROSOL RESPIRATORY (INHALATION) at 11:59

## 2019-01-26 NOTE — PROGRESS NOTE ADULT - SUBJECTIVE AND OBJECTIVE BOX
Patient is a 79y old  Female who presents with a chief complaint of right foot pressure ulcer pain (26 Jan 2019 18:56)      INTERVAL HPI/OVERNIGHT EVENTS:  T(C): 36.7 (01-26-19 @ 14:45), Max: 38.4 (01-26-19 @ 02:32)  HR: 95 (01-26-19 @ 18:41) (81 - 108)  BP: 99/53 (01-26-19 @ 18:41) (98/49 - 122/84)  RR: 18 (01-26-19 @ 14:45) (18 - 18)  SpO2: 95% (01-26-19 @ 14:45) (95% - 99%)  Wt(kg): --  I&O's Summary    25 Jan 2019 07:01  -  26 Jan 2019 07:00  --------------------------------------------------------  IN: 118 mL / OUT: 0 mL / NET: 118 mL        LABS:                        10.1   7.36  )-----------( 176      ( 26 Jan 2019 03:00 )             31.9     01-26    135  |  93<L>  |  23  ----------------------------<  86  3.1<L>   |  32<H>  |  1.34<H>    Ca    8.5      26 Jan 2019 03:00  Mg     1.6     01-26          CAPILLARY BLOOD GLUCOSE                MEDICATIONS  (STANDING):  atorvastatin 40 milliGRAM(s) Oral at bedtime  buDESOnide  80 MICROgram(s)/formoterol 4.5 MICROgram(s) Inhaler 2 Puff(s) Inhalation two times a day  clopidogrel Tablet 75 milliGRAM(s) Oral daily  dabigatran 75 milliGRAM(s) Oral every 12 hours  furosemide    Tablet 40 milliGRAM(s) Oral daily  metoprolol tartrate 25 milliGRAM(s) Oral two times a day  pantoprazole  Injectable 40 milliGRAM(s) IV Push two times a day  potassium chloride    Tablet ER 40 milliEquivalent(s) Oral every 4 hours  thiamine 100 milliGRAM(s) Oral daily    MEDICATIONS  (PRN):  acetaminophen   Tablet .. 650 milliGRAM(s) Oral every 6 hours PRN Temp greater or equal to 38C (100.4F), Mild Pain (1 - 3), Moderate Pain (4 - 6)  traMADol 50 milliGRAM(s) Oral every 8 hours PRN Severe Pain (7 - 10)          PHYSICAL EXAM:  GENERAL: NAD, well-groomed, well-developed  HEAD:  Atraumatic, Normocephalic  CHEST/LUNG: Clear to percussion bilaterally; No rales, rhonchi, wheezing, or rubs  HEART: Regular rate and rhythm; No murmurs, rubs, or gallops  ABDOMEN: Soft, Nontender, Nondistended; Bowel sounds present  EXTREMITIES:  2+ Peripheral Pulses, No clubbing, cyanosis, or edema  LYMPH: No lymphadenopathy noted  SKIN: No rashes or lesions    Care Discussed with Consultants/Other Providers [ ] YES  [ ] NO

## 2019-01-26 NOTE — PROGRESS NOTE ADULT - SUBJECTIVE AND OBJECTIVE BOX
CARDIOLOGY FOLLOW UP - Dr. Vieyra    CC no cp or sob       PHYSICAL EXAM:  T(C): 37.3 (01-26-19 @ 06:21), Max: 38.4 (01-26-19 @ 02:32)  HR: 84 (01-26-19 @ 06:21) (81 - 100)  BP: 98/49 (01-26-19 @ 06:21) (93/53 - 132/66)  RR: 18 (01-26-19 @ 06:21) (18 - 22)  SpO2: 99% (01-26-19 @ 06:21) (95% - 99%)  Wt(kg): --  I&O's Summary    25 Jan 2019 07:01  -  26 Jan 2019 07:00  --------------------------------------------------------  IN: 118 mL / OUT: 0 mL / NET: 118 mL        Appearance: Normal	  Cardiovascular: Normal S1 S2, irregular   Respiratory: Lungs clear to auscultation	  Gastrointestinal:  Soft, Non-tender, + BS	  Extremities: Normal range of motion, No clubbing, cyanosis or edema        MEDICATIONS  (STANDING):  atorvastatin 40 milliGRAM(s) Oral at bedtime  buDESOnide  80 MICROgram(s)/formoterol 4.5 MICROgram(s) Inhaler 2 Puff(s) Inhalation two times a day  clopidogrel Tablet 75 milliGRAM(s) Oral daily  dabigatran 75 milliGRAM(s) Oral every 12 hours  furosemide    Tablet 40 milliGRAM(s) Oral daily  metoprolol tartrate 25 milliGRAM(s) Oral two times a day  pantoprazole  Injectable 40 milliGRAM(s) IV Push two times a day  potassium chloride   Powder 40 milliEquivalent(s) Oral every 4 hours  thiamine 100 milliGRAM(s) Oral daily      TELEMETRY: afib HR 80-90s 	    ECG:  	  RADIOLOGY:   DIAGNOSTIC TESTING:  [ ] Echocardiogram:  [ ]  Catheterization:  [ ] Stress Test:    OTHER: 	    LABS:	 	                                10.1   7.36  )-----------( 176      ( 26 Jan 2019 03:00 )             31.9     01-26    135  |  93<L>  |  23  ----------------------------<  86  3.1<L>   |  32<H>  |  1.34<H>    Ca    8.5      26 Jan 2019 03:00  Mg     1.6     01-26

## 2019-01-26 NOTE — PROGRESS NOTE ADULT - ASSESSMENT
Problem/Plan - 1:  ·  Problem: Acute on chronic systolic and diastolic heart failure, NYHA class 3.  Plan:telemonitor   2G Sodium 1800 ADA diet with 1500 cc Fluid restriction  Strict I&Os plus Daily weights.  diuresis as per cards  cardio consult appreciated    Problem/Plan - 2:  ·  Problem: GI bleed.  Plan: GI consult appreciated  start xarelto and follow CBC    Problem/Plan - 3:  ·  Problem: Ulcer of heel, right, with fat layer exposed.  Plan: ID fu   podiatry fupodiatry fu   STONEY/PVR reviewed  vascular consult     Problem/Plan - 4:  Problem: Chronic atrial fibrillation with rapid ventricular response.  Plan: cw NOV    Problem/Plan - 5:  ·  Problem: CKD (chronic kidney disease) stage 4, GFR 15-29 ml/min.  Plan: Monitor electrolytes  Trend BUN/Cr  renal consult appreciated

## 2019-01-26 NOTE — PROGRESS NOTE ADULT - ASSESSMENT
78 year old female with right heel ulceration (stable); RLE pain with non-palp pulses  -Cont dressing right foot ulceration with santyl and DSD  -STONEY/PVR poor, 0.60 on the effected limb with flat waveforms.  Rec vasc sx consult.   -No local signs of infection - would recommend monitoring off antibiotics  -Z floats at all times  -Will follow

## 2019-01-26 NOTE — PROGRESS NOTE ADULT - SUBJECTIVE AND OBJECTIVE BOX
[facility] NEPHROLOGY- PROGRESS NOTE, Follow up     Patient is a 79y Female with history of CHF, COPD presents with SOB found to have guaiac positive anemia and afib with RVR. Nephrology consulted for elevated Scr.    REVIEW OF SYSTEMS:  Gen: no changes in weight, no dysuria/urgency  Cards: no chest pain  Resp: no dyspnea  GI: no nausea or vomiting or diarrhea  Vascular: no LE edema +R foot pain   Allergy:  No Known Allergies    Hospital Medications:   MEDICATIONS  (STANDING):  atorvastatin 40 milliGRAM(s) Oral at bedtime  buDESOnide  80 MICROgram(s)/formoterol 4.5 MICROgram(s) Inhaler 2 Puff(s) Inhalation two times a day  clopidogrel Tablet 75 milliGRAM(s) Oral daily  dabigatran 75 milliGRAM(s) Oral every 12 hours  furosemide    Tablet 40 milliGRAM(s) Oral daily  metoprolol tartrate 25 milliGRAM(s) Oral two times a day  pantoprazole  Injectable 40 milliGRAM(s) IV Push two times a day  potassium chloride    Tablet ER 40 milliEquivalent(s) Oral every 4 hours  thiamine 100 milliGRAM(s) Oral daily      VITALS:  T(F): 98 (01-26-19 @ 14:45), Max: 101.1 (01-26-19 @ 02:32)  HR: 95 (01-26-19 @ 18:41)  BP: 99/53 (01-26-19 @ 18:41)  RR: 18 (01-26-19 @ 14:45)  SpO2: 95% (01-26-19 @ 14:45)  Wt(kg): --    01-25 @ 07:01  -  01-26 @ 07:00  --------------------------------------------------------  IN: 118 mL / OUT: 0 mL / NET: 118 mL        PHYSICAL EXAM:    Gen: NAD, calm  Cards: Irregularly irregular, +S1/S2, no M/G/R  Resp: course BS, bibasilar rales  GI: soft, NT/ND, NABS  Vascular: no LE edema B/L      LABS:  01-26    135  |  93<L>  |  23  ----------------------------<  86  3.1<L>   |  32<H>  |  1.34<H>    Ca    8.5      26 Jan 2019 03:00  Mg     1.6     01-26      Creatinine Trend: 1.34 <--, 1.34 <--, 1.34 <--, 1.44 <--, 1.51 <--, 1.76 <--, 1.87 <--                        10.1   7.36  )-----------( 176      ( 26 Jan 2019 03:00 )             31.9     Urine Studies:      RADIOLOGY & ADDITIONAL STUDIES:

## 2019-01-26 NOTE — PROGRESS NOTE ADULT - SUBJECTIVE AND OBJECTIVE BOX
HPI:  79 y/o female, with a PmHx of COPD (on 2-3L O2 prn), paroxsymal a-fib (on pradaxa), CAD (s/p stent 11/2018), CHF, HTN, HLD, and anxiety, presenting to the [facility] ED with right foot pressure ulcer pain x several weeks. The patient has a dime-sized ulcer with scant serosanguinous drainage on her right calcaneous that she acquired from a shoe weeks ago. She reports that she woke up last night around 4am with excruciating, unrelenting right root pain with SOB x a few minutes followed by chest pain x several hours. The patient reports that the ulcer pain began to gradually worsen since her last hospital visit, where she was d/c with vancomycin. The foot pain is sharp, 10/10, and radiates up to her calf. The patient took two puffs of her Symbicort for the SOB with no relief, but it resolved spontaneously within a few minutes. The chest pain began gradually and was described as a midsternal, 8/10, tightness with no radiation and accompanied by palpitations. Of note, the patient reports chronic cough, b/l LE edema, JACKSON with walking 1/2 block, orthopnea x1 pillow, occasional PND, melena x weeks, easy bruising/bleeding, and 10 lbs weight loss over the past 6 months with no change in appetite. The patient denies fever, chills, SOB at rest, diaphoresis, dizziness, claudication, wheezing, changes in vision and hearing, abdominal pain, change in bowel and urinary habits, dysuria, hematuria. (17 Jan 2019 12:50)    Patient is a 78y old  Female who presents with a chief complaint of right foot pressure ulcer pain (21 Jan 2019 09:09)    Allergies    No Known Allergies    Intolerances      Vital Signs Last 24 Hrs  T(C): 36.8 (21 Jan 2019 05:34), Max: 36.8 (21 Jan 2019 05:34)  T(F): 98.3 (21 Jan 2019 05:34), Max: 98.3 (21 Jan 2019 05:34)  HR: 79 (21 Jan 2019 05:34) (56 - 79)  BP: 102/53 (21 Jan 2019 05:34) (102/53 - 118/69)  BP(mean): --  RR: 18 (21 Jan 2019 05:34) (18 - 18)  SpO2: 100% (21 Jan 2019 05:34) (95% - 100%)                        9.9    9.80  )-----------( 221      ( 21 Jan 2019 00:40 )             31.4     01-20    133<L>  |  93<L>  |  36<H>  ----------------------------<  103<H>  4.3   |  26  |  1.87<H>    Ca    8.3<L>      20 Jan 2019 05:50  Mg     1.8     01-20      CAPILLARY BLOOD GLUCOSE        MEDICATIONS  (STANDING):  atorvastatin 40 milliGRAM(s) Oral at bedtime  buDESOnide  80 MICROgram(s)/formoterol 4.5 MICROgram(s) Inhaler 2 Puff(s) Inhalation two times a day  clopidogrel Tablet 75 milliGRAM(s) Oral daily  digoxin     Tablet 0.125 milliGRAM(s) Oral daily  furosemide    Tablet 40 milliGRAM(s) Oral daily  heparin  Infusion.  Unit(s)/Hr (11 mL/Hr) IV Continuous <Continuous>  metoprolol tartrate 12.5 milliGRAM(s) Oral every 12 hours  pantoprazole  Injectable 40 milliGRAM(s) IV Push two times a day  senna 2 Tablet(s) Oral at bedtime    MEDICATIONS  (PRN):  acetaminophen   Tablet .. 650 milliGRAM(s) Oral every 6 hours PRN Mild Pain (1 - 3), Moderate Pain (4 - 6), Severe Pain (7 - 10)  heparin  Injectable 4500 Unit(s) IV Push every 6 hours PRN For aPTT less than 40  heparin  Injectable 2000 Unit(s) IV Push every 6 hours PRN For aPTT between 40 - 57  LORazepam     Tablet 1 milliGRAM(s) Oral two times a day PRN Anxiety  traMADol 50 milliGRAM(s) Oral every 8 hours PRN Severe Pain (7 - 10)    PAST MEDICAL & SURGICAL HISTORY:  CAD (coronary artery disease): s/p stent 2018  CHF (congestive heart failure)  COPD (chronic obstructive pulmonary disease): on 2-3L O2 at home prn  Anxiety  TIA (transient ischemic attack): many years ago  PAF (paroxysmal atrial fibrillation)  HLD (hyperlipidemia)  HTN (hypertension)  H/O total hysterectomy: 2014  History of cataract surgery: b/l 2015  History of repair of hip fracture: luisa placed in RLE 2015  H/O spinal fusion: c2-6 in 2017        MOISES:  Posterior heel ulceration, partial thickness granular base.    No local signs of infection. No probe to bone.  No drainage or malodor.  ++pain on exam  Pedal pulses non palp bilateral lower extremities  Sensation intact to b/l feet  No gross deformities

## 2019-01-26 NOTE — PROGRESS NOTE ADULT - SUBJECTIVE AND OBJECTIVE BOX
Patient is a 79y old  Female who presents with a chief complaint of right foot pressure ulcer pain (26 Jan 2019 13:42)      Any change in ROS:   Pt is doing ok: has pin in rigth leg: denies any SOB , no wheezing !@  MEDICATIONS  (STANDING):  atorvastatin 40 milliGRAM(s) Oral at bedtime  buDESOnide  80 MICROgram(s)/formoterol 4.5 MICROgram(s) Inhaler 2 Puff(s) Inhalation two times a day  clopidogrel Tablet 75 milliGRAM(s) Oral daily  dabigatran 75 milliGRAM(s) Oral every 12 hours  furosemide    Tablet 40 milliGRAM(s) Oral daily  metoprolol tartrate 25 milliGRAM(s) Oral two times a day  pantoprazole  Injectable 40 milliGRAM(s) IV Push two times a day  potassium chloride    Tablet ER 40 milliEquivalent(s) Oral every 4 hours  thiamine 100 milliGRAM(s) Oral daily    MEDICATIONS  (PRN):  acetaminophen   Tablet .. 650 milliGRAM(s) Oral every 6 hours PRN Temp greater or equal to 38C (100.4F), Mild Pain (1 - 3), Moderate Pain (4 - 6)  traMADol 50 milliGRAM(s) Oral every 8 hours PRN Severe Pain (7 - 10)    Vital Signs Last 24 Hrs  T(C): 37.3 (26 Jan 2019 06:21), Max: 38.4 (26 Jan 2019 02:32)  T(F): 99.2 (26 Jan 2019 06:21), Max: 101.1 (26 Jan 2019 02:32)  HR: 84 (26 Jan 2019 06:21) (81 - 100)  BP: 98/49 (26 Jan 2019 06:21) (98/49 - 126/68)  BP(mean): --  RR: 18 (26 Jan 2019 06:21) (18 - 22)  SpO2: 99% (26 Jan 2019 06:21) (96% - 99%)    I&O's Summary    25 Jan 2019 07:01  -  26 Jan 2019 07:00  --------------------------------------------------------  IN: 118 mL / OUT: 0 mL / NET: 118 mL          Physical Exam:   GENERAL: NAD, well-groomed, well-developed  HEENT: COLEEN/   Atraumatic, Normocephalic  ENMT: No tonsillar erythema, exudates, or enlargement; Moist mucous membranes, Good dentition, No lesions  NECK: Supple, No JVD, Normal thyroid  CHEST/LUNG: Clear to auscultaion: no wheezing   CVS: Regular rate and rhythm; No murmurs, rubs, or gallops  GI: : Soft, Nontender, Nondistended; Bowel sounds present  NERVOUS SYSTEM:  Alert & Oriented X3  EXTREMITIES:  2+ Peripheral Pulses, No clubbing, cyanosis, or edema  LYMPH: No lymphadenopathy noted  SKIN: No rashes or lesions  ENDOCRINOLOGY: No Thyromegaly  PSYCH: Appropriate    Labs:                              10.1   7.36  )-----------( 176      ( 26 Jan 2019 03:00 )             31.9                         9.7    8.29  )-----------( 203      ( 25 Jan 2019 06:25 )             30.2                         10.4   6.08  )-----------( 218      ( 24 Jan 2019 06:20 )             34.1                         10.1   3.97  )-----------( 216      ( 23 Jan 2019 06:50 )             32.7     01-26    135  |  93<L>  |  23  ----------------------------<  86  3.1<L>   |  32<H>  |  1.34<H>  01-25    138  |  97<L>  |  22  ----------------------------<  88  3.6   |  32<H>  |  1.34<H>  01-24    134<L>  |  92<L>  |  23  ----------------------------<  78  3.8   |  30  |  1.34<H>  01-23    135  |  93<L>  |  26<H>  ----------------------------<  79  4.1   |  30  |  1.44<H>    Ca    8.5      26 Jan 2019 03:00  Ca    8.6      25 Jan 2019 06:25  Mg     1.6     01-26  Mg     1.6     01-25      CAPILLARY BLOOD GLUCOSE            < from: CT Chest No Cont (01.24.19 @ 19:08) >    LUNGS AND LARGE AIRWAYS: Secretions/debris within the trachea.  Emphysema.    PLEURA: Partiallyloculated small bilateral pleural effusions, greater on   the right.    VESSELS: Left three-vessel arch. Atherosclerotic changes.    HEART: Heart size is enlarged. No pericardial effusion. Coronary   calcifications.    MEDIASTINUM AND GEOVANI: No lymphadenopathy.    CHEST WALL AND LOWER NECK: Within normal limits.    VISUALIZED UPPER ABDOMEN: Within normal limits.    BONES: Healed left posterior rib fracture deformities.  Degenerative   changes.    IMPRESSION:   Loculated right pleural effusion likely corresponds to triangular opacity   seen on recent chest radiograph.    Partially loculated bilateral pleural effusions. Emphysema.                  SABINA HOPKINS M.D., RADIOLOGY RESIDENT  This document has been electronically signed.  SHERMAN WEINSTEIN M.D., ATTENDING RADIOLOGIST  This document has been electronically signed. Jan 25 2019 10:32AM    < end of copied text >            RECENT CULTURES:  01-25 @ 07:03 BLOOD PERIPHERAL                  NO ORGANISMS ISOLATED  NO ORGANISMS ISOLATED AT 24 HOURS        RESPIRATORY CULTURES:          Studies  Chest X-RAY  CT SCAN Chest   Venous Dopplers: LE:   CT Abdomen  Others

## 2019-01-26 NOTE — PROGRESS NOTE ADULT - ASSESSMENT
79 y/o female, with a PmHx of COPD (on 2-3L O2 prn), paroxsymal a-fib (on pradaxa), HTN, HLD, and anxiety, presenting to the [facility] ED with right foot pressure ulcer pain, SOB, and CP, found to have Afib @102 bpm, H/H 8/24.7, mild interstitial pulmonary edema, proBNP 1374, guiac positive, BUN/Cr 36/1.68, admitted to telemetry for nonhealing pressure ulcer, Afib, CHF exacerbation, r/o ACS.

## 2019-01-26 NOTE — PROGRESS NOTE ADULT - PROBLEM SELECTOR PLAN 1
pt ct scan chest reviewed: has bilateral loculated effusions. DW daughter: she knew about the pleural effusion from before: she was told about it when she was at [facility]. She is not wheezin/26: stable from pulm perspective: no wheezing: c/o pain in right leg: pain control!!  :

## 2019-01-26 NOTE — PROGRESS NOTE ADULT - SUBJECTIVE AND OBJECTIVE BOX
BEVERLEY DEL RIO:[id number],   79yFemale followed for:  No Known Allergies    PAST MEDICAL & SURGICAL HISTORY:  CAD (coronary artery disease): s/p stent 2018  CHF (congestive heart failure)  COPD (chronic obstructive pulmonary disease): on 2-3L O2 at home prn  Anxiety  TIA (transient ischemic attack): many years ago  PAF (paroxysmal atrial fibrillation)  HLD (hyperlipidemia)  HTN (hypertension)  H/O total hysterectomy: 2014  History of cataract surgery: b/l 2015  History of repair of hip fracture: luisa placed in RLE 2015  H/O spinal fusion: c2-6 in 2017    FAMILY HISTORY:  No pertinent family history in first degree relatives    MEDICATIONS  (STANDING):  atorvastatin 40 milliGRAM(s) Oral at bedtime  buDESOnide  80 MICROgram(s)/formoterol 4.5 MICROgram(s) Inhaler 2 Puff(s) Inhalation two times a day  clopidogrel Tablet 75 milliGRAM(s) Oral daily  dabigatran 75 milliGRAM(s) Oral every 12 hours  furosemide    Tablet 40 milliGRAM(s) Oral daily  metoprolol tartrate 25 milliGRAM(s) Oral two times a day  pantoprazole  Injectable 40 milliGRAM(s) IV Push two times a day  thiamine 100 milliGRAM(s) Oral daily    MEDICATIONS  (PRN):  acetaminophen   Tablet .. 650 milliGRAM(s) Oral every 6 hours PRN Temp greater or equal to 38C (100.4F), Mild Pain (1 - 3), Moderate Pain (4 - 6)  traMADol 50 milliGRAM(s) Oral every 8 hours PRN Severe Pain (7 - 10)      Vital Signs Last 24 Hrs  T(C): 37.3 (26 Jan 2019 06:21), Max: 38.4 (26 Jan 2019 02:32)  T(F): 99.2 (26 Jan 2019 06:21), Max: 101.1 (26 Jan 2019 02:32)  HR: 84 (26 Jan 2019 06:21) (81 - 100)  BP: 98/49 (26 Jan 2019 06:21) (93/53 - 132/66)  BP(mean): --  RR: 18 (26 Jan 2019 06:21) (18 - 22)  SpO2: 99% (26 Jan 2019 06:21) (95% - 99%)  nc/at  s1s2  cta  soft, nt, nd no guarding or rebound  no c/c/e    CBC Full  -  ( 26 Jan 2019 03:00 )  WBC Count : 7.36 K/uL  Hemoglobin : 10.1 g/dL  Hematocrit : 31.9 %  Platelet Count - Automated : 176 K/uL  Mean Cell Volume : 89.9 fL  Mean Cell Hemoglobin : 28.5 pg  Mean Cell Hemoglobin Concentration : 31.7 %  Auto Neutrophil # : 5.20 K/uL  Auto Lymphocyte # : 0.54 K/uL  Auto Monocyte # : 1.36 K/uL  Auto Eosinophil # : 0.19 K/uL  Auto Basophil # : 0.04 K/uL  Auto Neutrophil % : 70.7 %  Auto Lymphocyte % : 7.3 %  Auto Monocyte % : 18.5 %  Auto Eosinophil % : 2.6 %  Auto Basophil % : 0.5 %    01-26    135  |  93<L>  |  23  ----------------------------<  86  3.1<L>   |  32<H>  |  1.34<H>    Ca    8.5      26 Jan 2019 03:00  Mg     1.6     01-26

## 2019-01-26 NOTE — PROGRESS NOTE ADULT - ASSESSMENT
79 y/o female, with a PmHx of COPD (on 2-3L O2 prn), paroxsymal a-fib (on pradaxa), CAD (s/p stent 11/2018), DCHF, HTN, HLD, and anxiety, presenting with acute/chronic diastolic chf, chest pain r/o acs, GIB, non healing pressure ulcer.     1. Atypical chest pain, resolved  in the setting of acute CHF exacerbation, afib w/ RVR   cv stable, no evidence of acute ischemia/ACS   Echo with nl lv fxn  continue statin, bb, plavix     2. Acute/chronic diastolic chf  stable   cont lasix daily   pulmo f/u for effusion- manage conservatively per pulm  cont bb  echo with nl lv fxn    3. AFIB, hx   rate controlled,  continue with metoprolol to 25mg bid  no pauses off dig   heme stable  CHADS 3 - continue with pradaxa     4. CAD s/p PCI (11/2018)  cv stable, no evidence of acute ischemia   continue bb, statin, continue plavix given recent PCI      5. GIB   +GUIAC , +anemia s/p 1uprbc,   continue to trend cbc  GI f/u      6. non healing foot ulcer  med f/u  ID f/u ,   vascular studies noted, pod f/u    7. IGOR/CKD   renal f/u     dvt ppx 79 y/o female, with a PmHx of COPD (on 2-3L O2 prn), paroxsymal a-fib (on pradaxa), CAD (s/p stent 11/2018), DCHF, HTN, HLD, and anxiety, presenting with acute/chronic diastolic chf, chest pain r/o acs, GIB, non healing pressure ulcer.     1. Atypical chest pain, resolved  in the setting of acute CHF exacerbation, afib w/ RVR   cv stable, no evidence of acute ischemia/ACS   Echo with nl lv fxn  continue statin, bb, plavix     2. Acute/chronic diastolic chf  stable   cont lasix daily   pulmo f/u for effusion- manage conservatively per pulm  cont bb  echo with nl lv fxn    3. AFIB, hx   rate controlled,  continue with metoprolol to 25mg bid  no pauses off dig   bp low normal this am, trend for now, asymptomatic   heme stable  CHADS 3 - continue with pradaxa     4. CAD s/p PCI (11/2018)  cv stable, no evidence of acute ischemia   continue bb, statin, continue plavix given recent PCI      5. GIB   +GUIAC , +anemia s/p 1uprbc,   continue to trend cbc  GI f/u      6. non healing foot ulcer  med f/u  ID f/u ,   vascular studies noted, pod f/u    7. IGOR/CKD   renal f/u     dvt ppx

## 2019-01-27 LAB
ANION GAP SERPL CALC-SCNC: 8 MMO/L — SIGNIFICANT CHANGE UP (ref 7–14)
ANISOCYTOSIS BLD QL: SLIGHT — SIGNIFICANT CHANGE UP
ANISOCYTOSIS BLD QL: SLIGHT — SIGNIFICANT CHANGE UP
BASOPHILS # BLD AUTO: 0.03 K/UL — SIGNIFICANT CHANGE UP (ref 0–0.2)
BASOPHILS NFR BLD AUTO: 0.6 % — SIGNIFICANT CHANGE UP (ref 0–2)
BASOPHILS NFR SPEC: 0 % — SIGNIFICANT CHANGE UP (ref 0–2)
BASOPHILS NFR SPEC: 0 % — SIGNIFICANT CHANGE UP (ref 0–2)
BLASTS # FLD: 0 % — SIGNIFICANT CHANGE UP (ref 0–0)
BLASTS # FLD: 0 % — SIGNIFICANT CHANGE UP (ref 0–0)
BUN SERPL-MCNC: 24 MG/DL — HIGH (ref 7–23)
CALCIUM SERPL-MCNC: 8.7 MG/DL — SIGNIFICANT CHANGE UP (ref 8.4–10.5)
CHLORIDE SERPL-SCNC: 96 MMOL/L — LOW (ref 98–107)
CO2 SERPL-SCNC: 33 MMOL/L — HIGH (ref 22–31)
CREAT SERPL-MCNC: 1.29 MG/DL — SIGNIFICANT CHANGE UP (ref 0.5–1.3)
EOSINOPHIL # BLD AUTO: 0.38 K/UL — SIGNIFICANT CHANGE UP (ref 0–0.5)
EOSINOPHIL NFR BLD AUTO: 7.1 % — HIGH (ref 0–6)
EOSINOPHIL NFR FLD: 6.2 % — HIGH (ref 0–6)
EOSINOPHIL NFR FLD: 6.2 % — HIGH (ref 0–6)
GIANT PLATELETS BLD QL SMEAR: PRESENT — SIGNIFICANT CHANGE UP
GIANT PLATELETS BLD QL SMEAR: PRESENT — SIGNIFICANT CHANGE UP
GLUCOSE SERPL-MCNC: 87 MG/DL — SIGNIFICANT CHANGE UP (ref 70–99)
HCT VFR BLD CALC: 34.8 % — SIGNIFICANT CHANGE UP (ref 34.5–45)
HCT VFR BLD CALC: 34.8 % — SIGNIFICANT CHANGE UP (ref 34.5–45)
HGB BLD-MCNC: 10.8 G/DL — LOW (ref 11.5–15.5)
HGB BLD-MCNC: 10.8 G/DL — LOW (ref 11.5–15.5)
HYPOCHROMIA BLD QL: SLIGHT — SIGNIFICANT CHANGE UP
HYPOCHROMIA BLD QL: SLIGHT — SIGNIFICANT CHANGE UP
IMM GRANULOCYTES NFR BLD AUTO: 0.4 % — SIGNIFICANT CHANGE UP (ref 0–1.5)
LYMPHOCYTES # BLD AUTO: 0.41 K/UL — LOW (ref 1–3.3)
LYMPHOCYTES # BLD AUTO: 7.7 % — LOW (ref 13–44)
LYMPHOCYTES NFR SPEC AUTO: 5.3 % — LOW (ref 13–44)
LYMPHOCYTES NFR SPEC AUTO: 5.3 % — LOW (ref 13–44)
MACROCYTES BLD QL: SLIGHT — SIGNIFICANT CHANGE UP
MACROCYTES BLD QL: SLIGHT — SIGNIFICANT CHANGE UP
MAGNESIUM SERPL-MCNC: 1.6 MG/DL — SIGNIFICANT CHANGE UP (ref 1.6–2.6)
MCHC RBC-ENTMCNC: 27.6 PG — SIGNIFICANT CHANGE UP (ref 27–34)
MCHC RBC-ENTMCNC: 27.6 PG — SIGNIFICANT CHANGE UP (ref 27–34)
MCHC RBC-ENTMCNC: 31 % — LOW (ref 32–36)
MCHC RBC-ENTMCNC: 31 % — LOW (ref 32–36)
MCV RBC AUTO: 89 FL — SIGNIFICANT CHANGE UP (ref 80–100)
MCV RBC AUTO: 89 FL — SIGNIFICANT CHANGE UP (ref 80–100)
METAMYELOCYTES # FLD: 0 % — SIGNIFICANT CHANGE UP (ref 0–1)
METAMYELOCYTES # FLD: 0 % — SIGNIFICANT CHANGE UP (ref 0–1)
MONOCYTES # BLD AUTO: 1.01 K/UL — HIGH (ref 0–0.9)
MONOCYTES NFR BLD AUTO: 18.9 % — HIGH (ref 2–14)
MONOCYTES NFR BLD: 7.1 % — SIGNIFICANT CHANGE UP (ref 2–9)
MONOCYTES NFR BLD: 7.1 % — SIGNIFICANT CHANGE UP (ref 2–9)
MYELOCYTES NFR BLD: 0 % — SIGNIFICANT CHANGE UP (ref 0–0)
MYELOCYTES NFR BLD: 0 % — SIGNIFICANT CHANGE UP (ref 0–0)
NEUTROPHIL AB SER-ACNC: 48.7 % — SIGNIFICANT CHANGE UP (ref 43–77)
NEUTROPHIL AB SER-ACNC: 48.7 % — SIGNIFICANT CHANGE UP (ref 43–77)
NEUTROPHILS # BLD AUTO: 3.48 K/UL — SIGNIFICANT CHANGE UP (ref 1.8–7.4)
NEUTROPHILS NFR BLD AUTO: 65.3 % — SIGNIFICANT CHANGE UP (ref 43–77)
NEUTS BAND # BLD: 28.3 % — HIGH (ref 0–6)
NEUTS BAND # BLD: 28.3 % — HIGH (ref 0–6)
NRBC # FLD: 0 K/UL — LOW (ref 25–125)
NRBC # FLD: 0 K/UL — LOW (ref 25–125)
OTHER - HEMATOLOGY %: 0 — SIGNIFICANT CHANGE UP
OTHER - HEMATOLOGY %: 0 — SIGNIFICANT CHANGE UP
OVALOCYTES BLD QL SMEAR: SLIGHT — SIGNIFICANT CHANGE UP
OVALOCYTES BLD QL SMEAR: SLIGHT — SIGNIFICANT CHANGE UP
PLATELET # BLD AUTO: 193 K/UL — SIGNIFICANT CHANGE UP (ref 150–400)
PLATELET # BLD AUTO: 193 K/UL — SIGNIFICANT CHANGE UP (ref 150–400)
PLATELET COUNT - ESTIMATE: NORMAL — SIGNIFICANT CHANGE UP
PLATELET COUNT - ESTIMATE: NORMAL — SIGNIFICANT CHANGE UP
PMV BLD: 10 FL — SIGNIFICANT CHANGE UP (ref 7–13)
PMV BLD: 10 FL — SIGNIFICANT CHANGE UP (ref 7–13)
POIKILOCYTOSIS BLD QL AUTO: SLIGHT — SIGNIFICANT CHANGE UP
POIKILOCYTOSIS BLD QL AUTO: SLIGHT — SIGNIFICANT CHANGE UP
POTASSIUM SERPL-MCNC: 4.3 MMOL/L — SIGNIFICANT CHANGE UP (ref 3.5–5.3)
POTASSIUM SERPL-SCNC: 4.3 MMOL/L — SIGNIFICANT CHANGE UP (ref 3.5–5.3)
PROMYELOCYTES # FLD: 0 % — SIGNIFICANT CHANGE UP (ref 0–0)
PROMYELOCYTES # FLD: 0 % — SIGNIFICANT CHANGE UP (ref 0–0)
RBC # BLD: 3.91 M/UL — SIGNIFICANT CHANGE UP (ref 3.8–5.2)
RBC # BLD: 3.91 M/UL — SIGNIFICANT CHANGE UP (ref 3.8–5.2)
RBC # FLD: 16.4 % — HIGH (ref 10.3–14.5)
RBC # FLD: 16.4 % — HIGH (ref 10.3–14.5)
SMUDGE CELLS # BLD: PRESENT — SIGNIFICANT CHANGE UP
SMUDGE CELLS # BLD: PRESENT — SIGNIFICANT CHANGE UP
SODIUM SERPL-SCNC: 137 MMOL/L — SIGNIFICANT CHANGE UP (ref 135–145)
SPECIMEN SOURCE: SIGNIFICANT CHANGE UP
VARIANT LYMPHS # BLD: 4.4 % — SIGNIFICANT CHANGE UP
VARIANT LYMPHS # BLD: 4.4 % — SIGNIFICANT CHANGE UP
WBC # BLD: 5.33 K/UL — SIGNIFICANT CHANGE UP (ref 3.8–10.5)
WBC # BLD: 5.33 K/UL — SIGNIFICANT CHANGE UP (ref 3.8–10.5)
WBC # FLD AUTO: 5.33 K/UL — SIGNIFICANT CHANGE UP (ref 3.8–10.5)
WBC # FLD AUTO: 5.33 K/UL — SIGNIFICANT CHANGE UP (ref 3.8–10.5)

## 2019-01-27 RX ORDER — IPRATROPIUM/ALBUTEROL SULFATE 18-103MCG
3 AEROSOL WITH ADAPTER (GRAM) INHALATION EVERY 6 HOURS
Qty: 0 | Refills: 0 | Status: DISCONTINUED | OUTPATIENT
Start: 2019-01-27 | End: 2019-01-30

## 2019-01-27 RX ADMIN — Medication 25 MILLIGRAM(S): at 18:19

## 2019-01-27 RX ADMIN — Medication 650 MILLIGRAM(S): at 22:24

## 2019-01-27 RX ADMIN — Medication 650 MILLIGRAM(S): at 21:39

## 2019-01-27 RX ADMIN — TRAMADOL HYDROCHLORIDE 50 MILLIGRAM(S): 50 TABLET ORAL at 13:31

## 2019-01-27 RX ADMIN — PANTOPRAZOLE SODIUM 40 MILLIGRAM(S): 20 TABLET, DELAYED RELEASE ORAL at 18:19

## 2019-01-27 RX ADMIN — Medication 25 MILLIGRAM(S): at 05:04

## 2019-01-27 RX ADMIN — Medication 3 MILLILITER(S): at 21:34

## 2019-01-27 RX ADMIN — TRAMADOL HYDROCHLORIDE 50 MILLIGRAM(S): 50 TABLET ORAL at 21:39

## 2019-01-27 RX ADMIN — TRAMADOL HYDROCHLORIDE 50 MILLIGRAM(S): 50 TABLET ORAL at 05:04

## 2019-01-27 RX ADMIN — BUDESONIDE AND FORMOTEROL FUMARATE DIHYDRATE 2 PUFF(S): 160; 4.5 AEROSOL RESPIRATORY (INHALATION) at 21:00

## 2019-01-27 RX ADMIN — Medication 3 MILLILITER(S): at 16:47

## 2019-01-27 RX ADMIN — PANTOPRAZOLE SODIUM 40 MILLIGRAM(S): 20 TABLET, DELAYED RELEASE ORAL at 05:04

## 2019-01-27 RX ADMIN — DABIGATRAN ETEXILATE MESYLATE 75 MILLIGRAM(S): 150 CAPSULE ORAL at 05:04

## 2019-01-27 RX ADMIN — Medication 0.5 MILLIGRAM(S): at 14:44

## 2019-01-27 RX ADMIN — TRAMADOL HYDROCHLORIDE 50 MILLIGRAM(S): 50 TABLET ORAL at 22:24

## 2019-01-27 RX ADMIN — TRAMADOL HYDROCHLORIDE 50 MILLIGRAM(S): 50 TABLET ORAL at 14:25

## 2019-01-27 RX ADMIN — TRAMADOL HYDROCHLORIDE 50 MILLIGRAM(S): 50 TABLET ORAL at 06:04

## 2019-01-27 RX ADMIN — Medication 40 MILLIGRAM(S): at 05:04

## 2019-01-27 RX ADMIN — ATORVASTATIN CALCIUM 40 MILLIGRAM(S): 80 TABLET, FILM COATED ORAL at 21:00

## 2019-01-27 RX ADMIN — DABIGATRAN ETEXILATE MESYLATE 75 MILLIGRAM(S): 150 CAPSULE ORAL at 18:19

## 2019-01-27 NOTE — PROGRESS NOTE ADULT - SUBJECTIVE AND OBJECTIVE BOX
[facility] NEPHROLOGY- PROGRESS NOTE, Follow up     Patient is a 79y Female with history of CHF, COPD presents with SOB found to have guaiac positive anemia and afib with RVR. Nephrology consulted for elevated Scr.    REVIEW OF SYSTEMS:  Gen: no changes in weight, no dysuria/urgency  Cards: no chest pain  Resp: no dyspnea  GI: no nausea or vomiting or diarrhea  Vascular: no LE edema +R foot pain      Allergy:  No Known Allergies    Hospital Medications:   MEDICATIONS  (STANDING):  atorvastatin 40 milliGRAM(s) Oral at bedtime  buDESOnide  80 MICROgram(s)/formoterol 4.5 MICROgram(s) Inhaler 2 Puff(s) Inhalation two times a day  clopidogrel Tablet 75 milliGRAM(s) Oral daily  dabigatran 75 milliGRAM(s) Oral every 12 hours  furosemide    Tablet 40 milliGRAM(s) Oral daily  metoprolol tartrate 25 milliGRAM(s) Oral two times a day  pantoprazole  Injectable 40 milliGRAM(s) IV Push two times a day  thiamine 100 milliGRAM(s) Oral daily        VITALS:  T(F): 98.8 (01-27-19 @ 05:02), Max: 98.8 (01-27-19 @ 05:02)  HR: 81 (01-27-19 @ 05:02)  BP: 125/64 (01-27-19 @ 05:02)  RR: 18 (01-27-19 @ 05:02)  SpO2: 98% (01-27-19 @ 05:02)  Wt(kg): --    01-26 @ 07:01  -  01-27 @ 07:00  --------------------------------------------------------  IN: 1405 mL / OUT: 0 mL / NET: 1405 mL        PHYSICAL EXAM:  Gen: NAD, calm  Cards: Irregularly irregular, +S1/S2, no M/G/R  Resp: course BS, bibasilar rales  GI: soft, NT/ND, NABS  Vascular: no LE edema B/L    LABS:  01-27    137  |  96<L>  |  24<H>  ----------------------------<  87  4.3   |  33<H>  |  1.29    Ca    8.7      27 Jan 2019 04:45  Mg     1.6     01-27      Creatinine Trend: 1.29 <--, 1.34 <--, 1.34 <--, 1.34 <--, 1.44 <--, 1.51 <--, 1.76 <--                        10.8   5.33  )-----------( 193      ( 27 Jan 2019 04:45 )             34.8     Urine Studies:      RADIOLOGY & ADDITIONAL STUDIES:

## 2019-01-27 NOTE — PROGRESS NOTE ADULT - ASSESSMENT
Problem/Plan - 1:  ·  Problem: Acute on chronic systolic and diastolic heart failure, NYHA class 3.  Plan:telemonitor   2G Sodium 1800 ADA diet with 1500 cc Fluid restriction  Strict I&Os plus Daily weights.  diuresis as per cards  cardio consult appreciated    Problem/Plan - 2:  ·  Problem: GI bleed.  Plan: GI consult appreciated  start xarelto and follow CBC    Problem/Plan - 3:  ·  Problem: Ulcer of heel, right, with fat layer exposed.  Plan: ID fu  STONEY/PVR reviewed  vascular consult     Problem/Plan - 4:  Problem: Chronic atrial fibrillation with rapid ventricular response.  Plan: cw NOVC    Problem/Plan - 5:  ·  Problem: CKD (chronic kidney disease) stage 4, GFR 15-29 ml/min.  Plan: Monitor electrolytes  Trend BUN/Crrenal consult appreciated

## 2019-01-27 NOTE — PROGRESS NOTE ADULT - SUBJECTIVE AND OBJECTIVE BOX
BEVERLEY DEL RIO:[id number],   79yFemale followed for:  No Known Allergies    PAST MEDICAL & SURGICAL HISTORY:  CAD (coronary artery disease): s/p stent 2018  CHF (congestive heart failure)  COPD (chronic obstructive pulmonary disease): on 2-3L O2 at home prn  Anxiety  TIA (transient ischemic attack): many years ago  PAF (paroxysmal atrial fibrillation)  HLD (hyperlipidemia)  HTN (hypertension)  H/O total hysterectomy: 2014  History of cataract surgery: b/l 2015  History of repair of hip fracture: luisa placed in RLE 2015  H/O spinal fusion: c2-6 in 2017    FAMILY HISTORY:  No pertinent family history in first degree relatives    MEDICATIONS  (STANDING):  atorvastatin 40 milliGRAM(s) Oral at bedtime  buDESOnide  80 MICROgram(s)/formoterol 4.5 MICROgram(s) Inhaler 2 Puff(s) Inhalation two times a day  clopidogrel Tablet 75 milliGRAM(s) Oral daily  dabigatran 75 milliGRAM(s) Oral every 12 hours  furosemide    Tablet 40 milliGRAM(s) Oral daily  metoprolol tartrate 25 milliGRAM(s) Oral two times a day  pantoprazole  Injectable 40 milliGRAM(s) IV Push two times a day  thiamine 100 milliGRAM(s) Oral daily    MEDICATIONS  (PRN):  acetaminophen   Tablet .. 650 milliGRAM(s) Oral every 6 hours PRN Temp greater or equal to 38C (100.4F), Mild Pain (1 - 3), Moderate Pain (4 - 6)  traMADol 50 milliGRAM(s) Oral every 8 hours PRN Severe Pain (7 - 10)      Vital Signs Last 24 Hrs  T(C): 37.1 (27 Jan 2019 05:02), Max: 37.1 (27 Jan 2019 05:02)  T(F): 98.8 (27 Jan 2019 05:02), Max: 98.8 (27 Jan 2019 05:02)  HR: 81 (27 Jan 2019 05:02) (81 - 108)  BP: 125/64 (27 Jan 2019 05:02) (86/47 - 125/64)  BP(mean): --  RR: 18 (27 Jan 2019 05:02) (18 - 18)  SpO2: 98% (27 Jan 2019 05:02) (95% - 99%)  nc/at  s1s2  cta  soft, nt, nd no guarding or rebound  no c/c/e    CBC Full  -  ( 27 Jan 2019 04:45 )  WBC Count : 5.33 K/uL  Hemoglobin : 10.8 g/dL  Hematocrit : 34.8 %  Platelet Count - Automated : 193 K/uL  Mean Cell Volume : 89.0 fL  Mean Cell Hemoglobin : 27.6 pg  Mean Cell Hemoglobin Concentration : 31.0 %  Auto Neutrophil # : 3.48 K/uL  Auto Lymphocyte # : 0.41 K/uL  Auto Monocyte # : 1.01 K/uL  Auto Eosinophil # : 0.38 K/uL  Auto Basophil # : 0.03 K/uL  Auto Neutrophil % : 65.3 %  Auto Lymphocyte % : 7.7 %  Auto Monocyte % : 18.9 %  Auto Eosinophil % : 7.1 %  Auto Basophil % : 0.6 %    01-27    137  |  96<L>  |  24<H>  ----------------------------<  87  4.3   |  33<H>  |  1.29    Ca    8.7      27 Jan 2019 04:45  Mg     1.6     01-27

## 2019-01-27 NOTE — PROGRESS NOTE ADULT - ASSESSMENT
77 y/o female, with a PmHx of COPD (on 2-3L O2 prn), paroxsymal a-fib (on pradaxa), CAD (s/p stent 11/2018), DCHF, HTN, HLD, and anxiety, presenting with acute/chronic diastolic chf, chest pain r/o acs, GIB, non healing pressure ulcer.     1. Atypical chest pain, resolved  in the setting of acute CHF exacerbation, afib w/ RVR   cv stable, no evidence of acute ischemia/ACS   Echo with nl lv fxn  continue statin, bb, plavix     2. Acute/chronic diastolic chf  stable   cont lasix daily   pulmo f/u for effusion- manage conservatively per pulm  cont bb  echo with nl lv fxn    3. AFIB, hx   rate borderline elevated,  continue with metoprolol to 25mg bid, if needed increase to 37.5mg PO q12  no pauses off dig   bp imporved, trend for now, asymptomatic   heme stable  CHADS 3 - continue with pradaxa     4. CAD s/p PCI (11/2018)  cv stable, no evidence of acute ischemia   continue bb, statin, continue plavix given recent PCI      5. GIB   +GUIAC , +anemia s/p 1uprbc,   continue to trend cbc  GI f/u      6. non healing foot ulcer  med f/u  ID f/u ,   vascular studies noted, pod f/u    7. IGOR/CKD   renal f/u     dvt ppx

## 2019-01-27 NOTE — PROGRESS NOTE ADULT - SUBJECTIVE AND OBJECTIVE BOX
CC: no new complaints    TELEMETRY:     PHYSICAL EXAM:    T(C): 37.1 (01-27-19 @ 05:02), Max: 37.1 (01-27-19 @ 05:02)  HR: 81 (01-27-19 @ 05:02) (81 - 108)  BP: 125/64 (01-27-19 @ 05:02) (86/47 - 125/64)  RR: 18 (01-27-19 @ 05:02) (18 - 18)  SpO2: 98% (01-27-19 @ 05:02) (95% - 99%)  Wt(kg): --  I&O's Summary    26 Jan 2019 07:01  -  27 Jan 2019 07:00  --------------------------------------------------------  IN: 1405 mL / OUT: 0 mL / NET: 1405 mL        Appearance: Normal	  Cardiovascular:irreg S1 S2,RRR, No JVD, No murmurs  Respiratory: Lungs clear to auscultation	  Gastrointestinal:  Soft, Non-tender, + BS	  Extremities: Normal range of motion, No clubbing, cyanosis or edema  Vascular: Peripheral pulses palpable 2+ bilaterally     LABS:	 	                          10.8   5.33  )-----------( 193      ( 27 Jan 2019 04:45 )             34.8     01-27    137  |  96<L>  |  24<H>  ----------------------------<  87  4.3   |  33<H>  |  1.29    Ca    8.7      27 Jan 2019 04:45  Mg     1.6     01-27            CARDIAC MARKERS:

## 2019-01-27 NOTE — PROGRESS NOTE ADULT - PROBLEM SELECTOR PLAN 1
pt ct scan chest reviewed: has bilateral loculated effusions. DW daughter: she knew about the pleural effusion from before: she was told about it when she was at [facility]. She is not wheezin/26: stable from pulm perspective: no wheezing: c/o pain in right leg: pain control!!  : pt has some cough but no wheezing: Already on pradaxa: no wheezing: since she is coughing, would start her on duoneb q 6 hours ATC

## 2019-01-27 NOTE — PROGRESS NOTE ADULT - SUBJECTIVE AND OBJECTIVE BOX
Patient is a 79y old  Female who presents with a chief complaint of right foot pressure ulcer pain (27 Jan 2019 15:02)      INTERVAL HPI/OVERNIGHT EVENTS:  T(C): 37.4 (01-27-19 @ 15:33), Max: 37.4 (01-27-19 @ 15:33)  HR: 113 (01-27-19 @ 18:13) (71 - 113)  BP: 136/73 (01-27-19 @ 18:13) (86/47 - 136/73)  RR: 18 (01-27-19 @ 15:33) (18 - 18)  SpO2: 94% (01-27-19 @ 16:47) (94% - 100%)  Wt(kg): --  I&O's Summary    26 Jan 2019 07:01  -  27 Jan 2019 07:00  --------------------------------------------------------  IN: 1405 mL / OUT: 0 mL / NET: 1405 mL        LABS:                        10.8   5.33  )-----------( 193      ( 27 Jan 2019 04:45 )             34.8     01-27    137  |  96<L>  |  24<H>  ----------------------------<  87  4.3   |  33<H>  |  1.29    Ca    8.7      27 Jan 2019 04:45  Mg     1.6     01-27          CAPILLARY BLOOD GLUCOSE                MEDICATIONS  (STANDING):  ALBUTerol/ipratropium for Nebulization 3 milliLiter(s) Nebulizer every 6 hours  atorvastatin 40 milliGRAM(s) Oral at bedtime  buDESOnide  80 MICROgram(s)/formoterol 4.5 MICROgram(s) Inhaler 2 Puff(s) Inhalation two times a day  clopidogrel Tablet 75 milliGRAM(s) Oral daily  dabigatran 75 milliGRAM(s) Oral every 12 hours  furosemide    Tablet 40 milliGRAM(s) Oral daily  metoprolol tartrate 25 milliGRAM(s) Oral two times a day  pantoprazole  Injectable 40 milliGRAM(s) IV Push two times a day  thiamine 100 milliGRAM(s) Oral daily    MEDICATIONS  (PRN):  acetaminophen   Tablet .. 650 milliGRAM(s) Oral every 6 hours PRN Temp greater or equal to 38C (100.4F), Mild Pain (1 - 3), Moderate Pain (4 - 6)  LORazepam     Tablet 0.5 milliGRAM(s) Oral two times a day PRN Anxiety  traMADol 50 milliGRAM(s) Oral every 8 hours PRN Severe Pain (7 - 10)          PHYSICAL EXAM:  GENERAL: NAD, well-groomed, well-developed  HEAD:  Atraumatic, Normocephalic  CHEST/LUNG: Clear to percussion bilaterally; No rales, rhonchi, wheezing, or rubs  HEART: Regular rate and rhythm; No murmurs, rubs, or gallops  ABDOMEN: Soft, Nontender, Nondistended; Bowel sounds present  EXTREMITIES:  2+ Peripheral Pulses, No clubbing, cyanosis, or edema  LYMPH: No lymphadenopathy noted  SKIN: No rashes or lesions    Care Discussed with Consultants/Other Providers [ ] YES  [ ] NO

## 2019-01-27 NOTE — PROGRESS NOTE ADULT - ASSESSMENT
77 y/o female, with a PmHx of COPD (on 2-3L O2 prn), paroxsymal a-fib (on pradaxa), HTN, HLD, and anxiety, presenting to the [facility] ED with right foot pressure ulcer pain, SOB, and CP, found to have Afib @102 bpm, H/H 8/24.7, mild interstitial pulmonary edema, proBNP 1374, guiac positive, BUN/Cr 36/1.68, admitted to telemetry for nonhealing pressure ulcer, Afib, CHF exacerbation, r/o ACS.

## 2019-01-27 NOTE — PROGRESS NOTE ADULT - SUBJECTIVE AND OBJECTIVE BOX
Patient is a 79y old  Female who presents with a chief complaint of right foot pressure ulcer pain (27 Jan 2019 12:16)      Any change in ROS: pt is doing ok : has some cough for now today     MEDICATIONS  (STANDING):  atorvastatin 40 milliGRAM(s) Oral at bedtime  buDESOnide  80 MICROgram(s)/formoterol 4.5 MICROgram(s) Inhaler 2 Puff(s) Inhalation two times a day  clopidogrel Tablet 75 milliGRAM(s) Oral daily  dabigatran 75 milliGRAM(s) Oral every 12 hours  furosemide    Tablet 40 milliGRAM(s) Oral daily  metoprolol tartrate 25 milliGRAM(s) Oral two times a day  pantoprazole  Injectable 40 milliGRAM(s) IV Push two times a day  thiamine 100 milliGRAM(s) Oral daily    MEDICATIONS  (PRN):  acetaminophen   Tablet .. 650 milliGRAM(s) Oral every 6 hours PRN Temp greater or equal to 38C (100.4F), Mild Pain (1 - 3), Moderate Pain (4 - 6)  LORazepam     Tablet 0.5 milliGRAM(s) Oral two times a day PRN Anxiety  traMADol 50 milliGRAM(s) Oral every 8 hours PRN Severe Pain (7 - 10)    Vital Signs Last 24 Hrs  T(C): 37.1 (27 Jan 2019 05:02), Max: 37.1 (27 Jan 2019 05:02)  T(F): 98.8 (27 Jan 2019 05:02), Max: 98.8 (27 Jan 2019 05:02)  HR: 81 (27 Jan 2019 05:02) (81 - 102)  BP: 125/64 (27 Jan 2019 05:02) (86/47 - 125/64)  BP(mean): --  RR: 18 (27 Jan 2019 05:02) (18 - 18)  SpO2: 98% (27 Jan 2019 05:02) (96% - 99%)    I&O's Summary    26 Jan 2019 07:01  -  27 Jan 2019 07:00  --------------------------------------------------------  IN: 1405 mL / OUT: 0 mL / NET: 1405 mL          Physical Exam:   GENERAL: NAD, well-groomed, well-developed  HEENT: COLEEN/   Atraumatic, Normocephalic  ENMT: No tonsillar erythema, exudates, or enlargement; Moist mucous membranes, Good dentition, No lesions  NECK: Supple, No JVD, Normal thyroid  CHEST/LUNG: Clear to auscultaion  CVS: Regular rate and rhythm; No murmurs, rubs, or gallops  GI: : Soft, Nontender, Nondistended; Bowel sounds present  NERVOUS SYSTEM:  Alert & Oriented X3  EXTREMITIES:  - edema  LYMPH: No lymphadenopathy noted  SKIN: No rashes or lesions  ENDOCRINOLOGY: No Thyromegaly  PSYCH: Appropriate    Labs:                              10.8   5.33  )-----------( 193      ( 27 Jan 2019 04:45 )             34.8                         10.1   7.36  )-----------( 176      ( 26 Jan 2019 03:00 )             31.9                         9.7    8.29  )-----------( 203      ( 25 Jan 2019 06:25 )             30.2                         10.4   6.08  )-----------( 218      ( 24 Jan 2019 06:20 )             34.1     01-27    137  |  96<L>  |  24<H>  ----------------------------<  87  4.3   |  33<H>  |  1.29  01-26    135  |  93<L>  |  23  ----------------------------<  86  3.1<L>   |  32<H>  |  1.34<H>  01-25    138  |  97<L>  |  22  ----------------------------<  88  3.6   |  32<H>  |  1.34<H>  01-24    134<L>  |  92<L>  |  23  ----------------------------<  78  3.8   |  30  |  1.34<H>    Ca    8.7      27 Jan 2019 04:45  Ca    8.5      26 Jan 2019 03:00  Mg     1.6     01-27  Mg     1.6     01-26      CAPILLARY BLOOD GLUCOSE      < from: CT Chest No Cont (01.24.19 @ 19:08) >  VESSELS: Left three-vessel arch. Atherosclerotic changes.    HEART: Heart size is enlarged. No pericardial effusion. Coronary   calcifications.    MEDIASTINUM AND GEOVANI: No lymphadenopathy.    CHEST WALL AND LOWER NECK: Within normal limits.    VISUALIZED UPPER ABDOMEN: Within normal limits.    BONES: Healed left posterior rib fracture deformities.  Degenerative   changes.    IMPRESSION:   Loculated right pleural effusion likely corresponds to triangular opacity   seen on recent chest radiograph.    Partially loculated bilateral pleural effusions. Emphysema.                  SABINA HOPKINS M.D., RADIOLOGY RESIDENT  This document has been electronically signed.  SHERMAN WEINSTEIN M.D., ATTENDING RADIOLOGIST  This document has been electronically signed. Jan 25 2019 10:32AM    < end of copied text >                  RECENT CULTURES:  01-26 @ 03:14 BLOOD PERIPHERAL                  NO ORGANISMS ISOLATED  NO ORGANISMS ISOLATED AT 24 HOURS  01-25 @ 07:03 BLOOD PERIPHERAL            < from: CT Chest No Cont (01.24.19 @ 19:08) >    VISUALIZED UPPER ABDOMEN: Within normal limits.    BONES: Healed left posterior rib fracture deformities.  Degenerative   changes.    IMPRESSION:   Loculated right pleural effusion likely corresponds to triangular opacity   seen on recent chest radiograph.    Partially loculated bilateral pleural effusions. Emphysema.                  SABINA HOPKINS M.D., RADIOLOGY RESIDENT  This document has been electronically signed.  SHERMAN WEINSTEIN M.D., ATTENDING RADIOLOGIST  This document has been electronically signed. Jan 25 2019 10:32AM    < end of copied text >        NO ORGANISMS ISOLATED  NO ORGANISMS ISOLATED AT 48 HRS.        RESPIRATORY CULTURES:          Studies  Chest X-RAY  CT SCAN Chest   Venous Dopplers: LE:   CT Abdomen  Others

## 2019-01-28 DIAGNOSIS — I77.1 STRICTURE OF ARTERY: ICD-10-CM

## 2019-01-28 LAB
ANION GAP SERPL CALC-SCNC: 11 MMO/L — SIGNIFICANT CHANGE UP (ref 7–14)
APPEARANCE UR: SIGNIFICANT CHANGE UP
B PERT DNA SPEC QL NAA+PROBE: NOT DETECTED — SIGNIFICANT CHANGE UP
BACTERIA # UR AUTO: SIGNIFICANT CHANGE UP
BASOPHILS # BLD AUTO: 0.07 K/UL — SIGNIFICANT CHANGE UP (ref 0–0.2)
BASOPHILS NFR BLD AUTO: 0.5 % — SIGNIFICANT CHANGE UP (ref 0–2)
BILIRUB UR-MCNC: NEGATIVE — SIGNIFICANT CHANGE UP
BLOOD UR QL VISUAL: HIGH
BUN SERPL-MCNC: 27 MG/DL — HIGH (ref 7–23)
C DIFF TOX GENS STL QL NAA+PROBE: DETECTED — HIGH
C PNEUM DNA SPEC QL NAA+PROBE: NOT DETECTED — SIGNIFICANT CHANGE UP
CALCIUM SERPL-MCNC: 8.7 MG/DL — SIGNIFICANT CHANGE UP (ref 8.4–10.5)
CHLORIDE SERPL-SCNC: 95 MMOL/L — LOW (ref 98–107)
CO2 SERPL-SCNC: 28 MMOL/L — SIGNIFICANT CHANGE UP (ref 22–31)
COLOR SPEC: YELLOW — SIGNIFICANT CHANGE UP
CREAT SERPL-MCNC: 1.43 MG/DL — HIGH (ref 0.5–1.3)
EOSINOPHIL # BLD AUTO: 0.17 K/UL — SIGNIFICANT CHANGE UP (ref 0–0.5)
EOSINOPHIL NFR BLD AUTO: 1.2 % — SIGNIFICANT CHANGE UP (ref 0–6)
EPI CELLS # UR: SIGNIFICANT CHANGE UP
FLUAV H1 2009 PAND RNA SPEC QL NAA+PROBE: NOT DETECTED — SIGNIFICANT CHANGE UP
FLUAV H1 RNA SPEC QL NAA+PROBE: NOT DETECTED — SIGNIFICANT CHANGE UP
FLUAV H3 RNA SPEC QL NAA+PROBE: DETECTED — HIGH
FLUBV RNA SPEC QL NAA+PROBE: NOT DETECTED — SIGNIFICANT CHANGE UP
GLUCOSE SERPL-MCNC: 79 MG/DL — SIGNIFICANT CHANGE UP (ref 70–99)
GLUCOSE UR-MCNC: NEGATIVE — SIGNIFICANT CHANGE UP
HADV DNA SPEC QL NAA+PROBE: NOT DETECTED — SIGNIFICANT CHANGE UP
HCOV PNL SPEC NAA+PROBE: SIGNIFICANT CHANGE UP
HCT VFR BLD CALC: 35.6 % — SIGNIFICANT CHANGE UP (ref 34.5–45)
HGB BLD-MCNC: 10.7 G/DL — LOW (ref 11.5–15.5)
HMPV RNA SPEC QL NAA+PROBE: NOT DETECTED — SIGNIFICANT CHANGE UP
HPIV1 RNA SPEC QL NAA+PROBE: NOT DETECTED — SIGNIFICANT CHANGE UP
HPIV2 RNA SPEC QL NAA+PROBE: NOT DETECTED — SIGNIFICANT CHANGE UP
HPIV3 RNA SPEC QL NAA+PROBE: NOT DETECTED — SIGNIFICANT CHANGE UP
HPIV4 RNA SPEC QL NAA+PROBE: NOT DETECTED — SIGNIFICANT CHANGE UP
IMM GRANULOCYTES NFR BLD AUTO: 0.8 % — SIGNIFICANT CHANGE UP (ref 0–1.5)
KETONES UR-MCNC: NEGATIVE — SIGNIFICANT CHANGE UP
LEUKOCYTE ESTERASE UR-ACNC: HIGH
LYMPHOCYTES # BLD AUTO: 0.61 K/UL — LOW (ref 1–3.3)
LYMPHOCYTES # BLD AUTO: 4.4 % — LOW (ref 13–44)
MAGNESIUM SERPL-MCNC: 1.7 MG/DL — SIGNIFICANT CHANGE UP (ref 1.6–2.6)
MCHC RBC-ENTMCNC: 27.2 PG — SIGNIFICANT CHANGE UP (ref 27–34)
MCHC RBC-ENTMCNC: 30.1 % — LOW (ref 32–36)
MCV RBC AUTO: 90.4 FL — SIGNIFICANT CHANGE UP (ref 80–100)
MONOCYTES # BLD AUTO: 2.04 K/UL — HIGH (ref 0–0.9)
MONOCYTES NFR BLD AUTO: 14.8 % — HIGH (ref 2–14)
NEUTROPHILS # BLD AUTO: 10.77 K/UL — HIGH (ref 1.8–7.4)
NEUTROPHILS NFR BLD AUTO: 78.3 % — HIGH (ref 43–77)
NITRITE UR-MCNC: POSITIVE — SIGNIFICANT CHANGE UP
NRBC # FLD: 0 K/UL — LOW (ref 25–125)
PH UR: 6.5 — SIGNIFICANT CHANGE UP (ref 5–8)
PLATELET # BLD AUTO: 203 K/UL — SIGNIFICANT CHANGE UP (ref 150–400)
PMV BLD: 10.5 FL — SIGNIFICANT CHANGE UP (ref 7–13)
POTASSIUM SERPL-MCNC: 4.4 MMOL/L — SIGNIFICANT CHANGE UP (ref 3.5–5.3)
POTASSIUM SERPL-SCNC: 4.4 MMOL/L — SIGNIFICANT CHANGE UP (ref 3.5–5.3)
PROT UR-MCNC: 200 — HIGH
RBC # BLD: 3.94 M/UL — SIGNIFICANT CHANGE UP (ref 3.8–5.2)
RBC # FLD: 16.3 % — HIGH (ref 10.3–14.5)
RBC CASTS # UR COMP ASSIST: >50 — HIGH (ref 0–?)
RSV RNA SPEC QL NAA+PROBE: NOT DETECTED — SIGNIFICANT CHANGE UP
RV+EV RNA SPEC QL NAA+PROBE: NOT DETECTED — SIGNIFICANT CHANGE UP
SODIUM SERPL-SCNC: 134 MMOL/L — LOW (ref 135–145)
SP GR SPEC: 1.01 — SIGNIFICANT CHANGE UP (ref 1–1.04)
UROBILINOGEN FLD QL: NORMAL — SIGNIFICANT CHANGE UP
WBC # BLD: 13.77 K/UL — HIGH (ref 3.8–10.5)
WBC # FLD AUTO: 13.77 K/UL — HIGH (ref 3.8–10.5)
WBC UR QL: >50 — HIGH (ref 0–?)

## 2019-01-28 PROCEDURE — 99223 1ST HOSP IP/OBS HIGH 75: CPT | Mod: GC

## 2019-01-28 PROCEDURE — 71045 X-RAY EXAM CHEST 1 VIEW: CPT | Mod: 26

## 2019-01-28 RX ORDER — METRONIDAZOLE 500 MG
500 TABLET ORAL EVERY 8 HOURS
Qty: 0 | Refills: 0 | Status: DISCONTINUED | OUTPATIENT
Start: 2019-01-28 | End: 2019-01-30

## 2019-01-28 RX ORDER — VANCOMYCIN HCL 1 G
125 VIAL (EA) INTRAVENOUS EVERY 6 HOURS
Qty: 0 | Refills: 0 | Status: DISCONTINUED | OUTPATIENT
Start: 2019-01-28 | End: 2019-02-07

## 2019-01-28 RX ORDER — METOPROLOL TARTRATE 50 MG
37.5 TABLET ORAL
Qty: 0 | Refills: 0 | Status: DISCONTINUED | OUTPATIENT
Start: 2019-01-28 | End: 2019-01-30

## 2019-01-28 RX ORDER — METRONIDAZOLE 500 MG
500 TABLET ORAL ONCE
Qty: 0 | Refills: 0 | Status: COMPLETED | OUTPATIENT
Start: 2019-01-28 | End: 2019-01-28

## 2019-01-28 RX ORDER — METOPROLOL TARTRATE 50 MG
12.5 TABLET ORAL ONCE
Qty: 0 | Refills: 0 | Status: COMPLETED | OUTPATIENT
Start: 2019-01-28 | End: 2019-01-28

## 2019-01-28 RX ORDER — MORPHINE SULFATE 50 MG/1
1 CAPSULE, EXTENDED RELEASE ORAL ONCE
Qty: 0 | Refills: 0 | Status: DISCONTINUED | OUTPATIENT
Start: 2019-01-28 | End: 2019-01-28

## 2019-01-28 RX ORDER — METRONIDAZOLE 500 MG
TABLET ORAL
Qty: 0 | Refills: 0 | Status: DISCONTINUED | OUTPATIENT
Start: 2019-01-28 | End: 2019-01-30

## 2019-01-28 RX ADMIN — Medication 125 MILLIGRAM(S): at 17:37

## 2019-01-28 RX ADMIN — PANTOPRAZOLE SODIUM 40 MILLIGRAM(S): 20 TABLET, DELAYED RELEASE ORAL at 17:15

## 2019-01-28 RX ADMIN — Medication 40 MILLIGRAM(S): at 05:04

## 2019-01-28 RX ADMIN — Medication 100 MILLIGRAM(S): at 06:38

## 2019-01-28 RX ADMIN — DABIGATRAN ETEXILATE MESYLATE 75 MILLIGRAM(S): 150 CAPSULE ORAL at 05:04

## 2019-01-28 RX ADMIN — BUDESONIDE AND FORMOTEROL FUMARATE DIHYDRATE 2 PUFF(S): 160; 4.5 AEROSOL RESPIRATORY (INHALATION) at 23:08

## 2019-01-28 RX ADMIN — MORPHINE SULFATE 1 MILLIGRAM(S): 50 CAPSULE, EXTENDED RELEASE ORAL at 12:58

## 2019-01-28 RX ADMIN — PANTOPRAZOLE SODIUM 40 MILLIGRAM(S): 20 TABLET, DELAYED RELEASE ORAL at 05:04

## 2019-01-28 RX ADMIN — Medication 30 MILLIGRAM(S): at 23:09

## 2019-01-28 RX ADMIN — Medication 100 MILLIGRAM(S): at 23:09

## 2019-01-28 RX ADMIN — Medication 3 MILLILITER(S): at 22:39

## 2019-01-28 RX ADMIN — BUDESONIDE AND FORMOTEROL FUMARATE DIHYDRATE 2 PUFF(S): 160; 4.5 AEROSOL RESPIRATORY (INHALATION) at 09:49

## 2019-01-28 RX ADMIN — MORPHINE SULFATE 1 MILLIGRAM(S): 50 CAPSULE, EXTENDED RELEASE ORAL at 12:13

## 2019-01-28 RX ADMIN — Medication 100 MILLIGRAM(S): at 14:42

## 2019-01-28 RX ADMIN — TRAMADOL HYDROCHLORIDE 50 MILLIGRAM(S): 50 TABLET ORAL at 10:56

## 2019-01-28 RX ADMIN — CLOPIDOGREL BISULFATE 75 MILLIGRAM(S): 75 TABLET, FILM COATED ORAL at 12:13

## 2019-01-28 RX ADMIN — Medication 37.5 MILLIGRAM(S): at 17:15

## 2019-01-28 RX ADMIN — Medication 3 MILLILITER(S): at 04:55

## 2019-01-28 RX ADMIN — Medication 12.5 MILLIGRAM(S): at 13:33

## 2019-01-28 RX ADMIN — Medication 100 MILLIGRAM(S): at 12:13

## 2019-01-28 RX ADMIN — Medication 25 MILLIGRAM(S): at 05:04

## 2019-01-28 RX ADMIN — ATORVASTATIN CALCIUM 40 MILLIGRAM(S): 80 TABLET, FILM COATED ORAL at 23:10

## 2019-01-28 RX ADMIN — DABIGATRAN ETEXILATE MESYLATE 75 MILLIGRAM(S): 150 CAPSULE ORAL at 17:33

## 2019-01-28 RX ADMIN — Medication 3 MILLILITER(S): at 11:20

## 2019-01-28 RX ADMIN — Medication 125 MILLIGRAM(S): at 13:33

## 2019-01-28 RX ADMIN — TRAMADOL HYDROCHLORIDE 50 MILLIGRAM(S): 50 TABLET ORAL at 09:56

## 2019-01-28 RX ADMIN — Medication 125 MILLIGRAM(S): at 23:09

## 2019-01-28 NOTE — PROGRESS NOTE ADULT - PROBLEM SELECTOR PLAN 1
She is coughing still and has low grade temp as well as high WBC count today : Will do chest x-ray as well as RVP: She may need steroids : being treated for c diff

## 2019-01-28 NOTE — CONSULT NOTE ADULT - EXTREMITIES COMMENTS
moderate art insuff w mod trophic skin changes   right  achilles tendon area 2mm diam ulcer w/o granulation tissue

## 2019-01-28 NOTE — CONSULT NOTE ADULT - CONSULT REASON
COPD
Elevated Cr
R Heel Ulcer
R posterior ankle pain
Right foot pain with ulcer
caleb
confusion
right foot pain, volume overload     hx COPD (on 2-3L O2 prn), paroxsymal a-fib (on pradaxa), CAD (s/p stent 11/2018), CHF, HTN, HLD, and anxiety
hypotension, volume overload

## 2019-01-28 NOTE — CONSULT NOTE ADULT - ATTENDING COMMENTS
Agree with above NP note.  patient with history of cad, pci, chronic acevedo HF adm with sob, acute on chronic acevedo hf, low bp, worsening anemia  clinically improved  cont iv lasix   echo with nl lv fxn  gi eval noted  prbcs as needed  id eval   no acs  cont antiplat
[facility] NEPHROLOGY  Harish Valera M.D.  Kevin Cedeno D.O.  Rachele Mckeon M.D.  Christina Westfall, MSN, ANP-C    Telephone: [phone]  Facsimile: [phone]    [address] [place] [address].
I performed a history and physical exam of the patient and discussed  the findings and plan with the house officer. I reviewed the resident note and agree with the findings and plan

## 2019-01-28 NOTE — PROGRESS NOTE ADULT - SUBJECTIVE AND OBJECTIVE BOX
[facility] NEPHROLOGY- PROGRESS NOTE    78y Female with history of CHF, COPD presents with SOB found to have guaiac positive anemia and afib with RVR. Nephrology consulted for elevated Scr.    REVIEW OF SYSTEMS:  Gen: no changes in weight, no dysuria/urgency  Cards: no chest pain  Resp: no dyspnea  GI: no nausea or vomiting, + diarrhea  Vascular: no LE edema +R foot pain    No Known Allergies      Hospital Medications: Medications reviewed      VITALS:  T(F): 98.5 (19 @ 12:04), Max: 99.8 (19 @ 21:14)  HR: 119 (19 @ 12:04)  BP: 95/57 (19 @ 12:04)  RR: 22 (19 @ 12:04)  SpO2: 94% (19 @ 12:04)  Wt(kg): --     @ 07:01  -   @ 07:00  --------------------------------------------------------  IN: 300 mL / OUT: 0 mL / NET: 300 mL        PHYSICAL EXAM:    Gen: NAD, calm  Cards: Irregularly irregular, +S1/S2, no M/G/R  Resp: course BS, bibasilar rales  GI: soft, NT/ND, NABS  Vascular: no LE edema B/L      LABS:      134<L>  |  95<L>  |  27<H>  ----------------------------<  79  4.4   |  28  |  1.43<H>    Ca    8.7      2019 06:59  Mg     1.7           Creatinine Trend: 1.43 <--, 1.29 <--, 1.34 <--, 1.34 <--, 1.34 <--, 1.44 <--, 1.51 <--                        10.7   13.77 )-----------( 203      ( 2019 06:59 )             35.6     Urine Studies:  Urinalysis Basic - ( 2019 23:25 )    Color: YELLOW / Appearance: TURBID / S.015 / pH: 6.5  Gluc: NEGATIVE / Ketone: NEGATIVE  / Bili: NEGATIVE / Urobili: NORMAL   Blood: MODERATE / Protein: 200 / Nitrite: POSITIVE   Leuk Esterase: LARGE / RBC: >50 / WBC >50   Sq Epi:  / Non Sq Epi: FEW / Bacteria: LARGE

## 2019-01-28 NOTE — PROGRESS NOTE ADULT - ASSESSMENT
78 year old female with right heel ulceration (stable); RLE pain with non-palp pulses  -Cont dressing right foot ulceration with santyl and DSD  -STONEY/PVR poor, 0.60 on the effected limb with flat waveforms.  Rec vasc sx consult.   -No local signs of infection - would recommend monitoring off antibiotics  -Z floats at all times  - D/w Dr. Boyce  -Will follow

## 2019-01-28 NOTE — CHART NOTE - NSCHARTNOTEFT_GEN_A_CORE
PA Note     RVP Result- Positive for Influenza AH!, Tamiflu started  Contact & Droplet Precautions ordered for C. Diff & Flu  Case discussed with Dr. Boyce, agreed with above Plan

## 2019-01-28 NOTE — PROGRESS NOTE ADULT - SUBJECTIVE AND OBJECTIVE BOX
HPI:  77 y/o female, with a PmHx of COPD (on 2-3L O2 prn), paroxsymal a-fib (on pradaxa), CAD (s/p stent 11/2018), CHF, HTN, HLD, and anxiety, presenting to the [facility] ED with right foot pressure ulcer pain x several weeks. The patient has a dime-sized ulcer with scant serosanguinous drainage on her right calcaneous that she acquired from a shoe weeks ago. She reports that she woke up last night around 4am with excruciating, unrelenting right root pain with SOB x a few minutes followed by chest pain x several hours. The patient reports that the ulcer pain began to gradually worsen since her last hospital visit, where she was d/c with vancomycin. The foot pain is sharp, 10/10, and radiates up to her calf. The patient took two puffs of her Symbicort for the SOB with no relief, but it resolved spontaneously within a few minutes. The chest pain began gradually and was described as a midsternal, 8/10, tightness with no radiation and accompanied by palpitations. Of note, the patient reports chronic cough, b/l LE edema, JACKSON with walking 1/2 block, orthopnea x1 pillow, occasional PND, melena x weeks, easy bruising/bleeding, and 10 lbs weight loss over the past 6 months with no change in appetite. The patient denies fever, chills, SOB at rest, diaphoresis, dizziness, claudication, wheezing, changes in vision and hearing, abdominal pain, change in bowel and urinary habits, dysuria, hematuria. (17 Jan 2019 12:50)    Patient is a 78y old  Female who presents with a chief complaint of right foot pressure ulcer pain (21 Jan 2019 09:09)    Allergies    No Known Allergies    Intolerances      Vital Signs Last 24 Hrs  T(C): 36.8 (21 Jan 2019 05:34), Max: 36.8 (21 Jan 2019 05:34)  T(F): 98.3 (21 Jan 2019 05:34), Max: 98.3 (21 Jan 2019 05:34)  HR: 79 (21 Jan 2019 05:34) (56 - 79)  BP: 102/53 (21 Jan 2019 05:34) (102/53 - 118/69)  BP(mean): --  RR: 18 (21 Jan 2019 05:34) (18 - 18)  SpO2: 100% (21 Jan 2019 05:34) (95% - 100%)                        9.9    9.80  )-----------( 221      ( 21 Jan 2019 00:40 )             31.4     01-20    133<L>  |  93<L>  |  36<H>  ----------------------------<  103<H>  4.3   |  26  |  1.87<H>    Ca    8.3<L>      20 Jan 2019 05:50  Mg     1.8     01-20      CAPILLARY BLOOD GLUCOSE        MEDICATIONS  (STANDING):  atorvastatin 40 milliGRAM(s) Oral at bedtime  buDESOnide  80 MICROgram(s)/formoterol 4.5 MICROgram(s) Inhaler 2 Puff(s) Inhalation two times a day  clopidogrel Tablet 75 milliGRAM(s) Oral daily  digoxin     Tablet 0.125 milliGRAM(s) Oral daily  furosemide    Tablet 40 milliGRAM(s) Oral daily  heparin  Infusion.  Unit(s)/Hr (11 mL/Hr) IV Continuous <Continuous>  metoprolol tartrate 12.5 milliGRAM(s) Oral every 12 hours  pantoprazole  Injectable 40 milliGRAM(s) IV Push two times a day  senna 2 Tablet(s) Oral at bedtime    MEDICATIONS  (PRN):  acetaminophen   Tablet .. 650 milliGRAM(s) Oral every 6 hours PRN Mild Pain (1 - 3), Moderate Pain (4 - 6), Severe Pain (7 - 10)  heparin  Injectable 4500 Unit(s) IV Push every 6 hours PRN For aPTT less than 40  heparin  Injectable 2000 Unit(s) IV Push every 6 hours PRN For aPTT between 40 - 57  LORazepam     Tablet 1 milliGRAM(s) Oral two times a day PRN Anxiety  traMADol 50 milliGRAM(s) Oral every 8 hours PRN Severe Pain (7 - 10)    PAST MEDICAL & SURGICAL HISTORY:  CAD (coronary artery disease): s/p stent 2018  CHF (congestive heart failure)  COPD (chronic obstructive pulmonary disease): on 2-3L O2 at home prn  Anxiety  TIA (transient ischemic attack): many years ago  PAF (paroxysmal atrial fibrillation)  HLD (hyperlipidemia)  HTN (hypertension)  H/O total hysterectomy: 2014  History of cataract surgery: b/l 2015  History of repair of hip fracture: luisa placed in RLE 2015  H/O spinal fusion: c2-6 in 2017        MOISES:  Posterior heel ulceration, partial thickness granular base.    No local signs of infection. No probe to bone.  No drainage or malodor.  ++pain on exam  Pedal pulses non palp bilateral lower extremities  Sensation intact to b/l feet  No gross deformities

## 2019-01-28 NOTE — CONSULT NOTE ADULT - PROBLEM SELECTOR RECOMMENDATION 9
on Symbicort and oxygen: Her ct chest is pending: I doubt she has pneumonia: She has no high WBC count as well as no fever: Cont o2 to sao2 above 88%
Patient appears to have h/o CKD-3 with baseline Scr 1.5-1.6 as per prior records. Renal function near baseline despite afib with RVR, ADHF and anemia secondary to GIB. Check urinalysis and spot urine TP/CR. Prior CT reviewed without post-obstructive process.
I performed a history and physical exam of the patient and discussed  the findings and plan with the house officer. I reviewed the resident note and agree with the findings and plan

## 2019-01-28 NOTE — CONSULT NOTE ADULT - PROBLEM SELECTOR PROBLEM 1
CKD (chronic kidney disease) stage 3, GFR 30-59 ml/min
COPD (chronic obstructive pulmonary disease)
Arterial insufficiency with ischemic ulcer

## 2019-01-28 NOTE — PROGRESS NOTE ADULT - SUBJECTIVE AND OBJECTIVE BOX
Patient is a 79y old  Female who presents with a chief complaint of right foot pressure ulcer pain (2019 14:16)      INTERVAL HPI/OVERNIGHT EVENTS:  T(C): 36.7 (19 @ 17:12), Max: 37.7 (19 @ 21:14)  HR: 111 (19 @ 17:12) (89 - 119)  BP: 128/73 (19 @ 17:12) (95/57 - 136/73)  RR: 20 (19 @ 17:12) (18 - 22)  SpO2: 94% (19 @ 17:12) (93% - 99%)  Wt(kg): --  I&O's Summary    2019 07:  -  2019 07:00  --------------------------------------------------------  IN: 300 mL / OUT: 0 mL / NET: 300 mL        LABS:                        10.7   13.77 )-----------( 203      ( 2019 06:59 )             35.6         134<L>  |  95<L>  |  27<H>  ----------------------------<  79  4.4   |  28  |  1.43<H>    Ca    8.7      2019 06:59  Mg     1.7             Urinalysis Basic - ( 2019 23:25 )    Color: YELLOW / Appearance: TURBID / S.015 / pH: 6.5  Gluc: NEGATIVE / Ketone: NEGATIVE  / Bili: NEGATIVE / Urobili: NORMAL   Blood: MODERATE / Protein: 200 / Nitrite: POSITIVE   Leuk Esterase: LARGE / RBC: >50 / WBC >50   Sq Epi: x / Non Sq Epi: FEW / Bacteria: LARGE      CAPILLARY BLOOD GLUCOSE            Urinalysis Basic - ( 2019 23:25 )    Color: YELLOW / Appearance: TURBID / S.015 / pH: 6.5  Gluc: NEGATIVE / Ketone: NEGATIVE  / Bili: NEGATIVE / Urobili: NORMAL   Blood: MODERATE / Protein: 200 / Nitrite: POSITIVE   Leuk Esterase: LARGE / RBC: >50 / WBC >50   Sq Epi: x / Non Sq Epi: FEW / Bacteria: LARGE        MEDICATIONS  (STANDING):  ALBUTerol/ipratropium for Nebulization 3 milliLiter(s) Nebulizer every 6 hours  atorvastatin 40 milliGRAM(s) Oral at bedtime  buDESOnide  80 MICROgram(s)/formoterol 4.5 MICROgram(s) Inhaler 2 Puff(s) Inhalation two times a day  clopidogrel Tablet 75 milliGRAM(s) Oral daily  dabigatran 75 milliGRAM(s) Oral every 12 hours  furosemide    Tablet 40 milliGRAM(s) Oral daily  metoprolol tartrate 37.5 milliGRAM(s) Oral two times a day  metroNIDAZOLE  IVPB      metroNIDAZOLE  IVPB 500 milliGRAM(s) IV Intermittent every 8 hours  pantoprazole  Injectable 40 milliGRAM(s) IV Push two times a day  thiamine 100 milliGRAM(s) Oral daily  vancomycin    Solution 125 milliGRAM(s) Oral every 6 hours    MEDICATIONS  (PRN):  acetaminophen   Tablet .. 650 milliGRAM(s) Oral every 6 hours PRN Temp greater or equal to 38C (100.4F), Mild Pain (1 - 3), Moderate Pain (4 - 6)  LORazepam     Tablet 0.5 milliGRAM(s) Oral two times a day PRN Anxiety  traMADol 50 milliGRAM(s) Oral every 8 hours PRN Severe Pain (7 - 10)          PHYSICAL EXAM:  GENERAL: NAD, well-groomed, well-developed  HEAD:  Atraumatic, Normocephalic  CHEST/LUNG: Clear to percussion bilaterally; No rales, rhonchi, wheezing, or rubs  HEART: Regular rate and rhythm; No murmurs, rubs, or gallops  ABDOMEN: Soft, Nontender, Nondistended; Bowel sounds present  EXTREMITIES:  2+ Peripheral Pulses, No clubbing, cyanosis, or edema  LYMPH: No lymphadenopathy noted  SKIN: No rashes or lesions    Care Discussed with Consultants/Other Providers [ ] YES  [ ] NO

## 2019-01-28 NOTE — PROGRESS NOTE ADULT - SUBJECTIVE AND OBJECTIVE BOX
Patient is a 79y old  Female who presents with a chief complaint of right foot pressure ulcer pain (2019 12:09)      Any change in ROS: she has cough : WBC increased:   she isnot really SOb at rest:     MEDICATIONS  (STANDING):  ALBUTerol/ipratropium for Nebulization 3 milliLiter(s) Nebulizer every 6 hours  atorvastatin 40 milliGRAM(s) Oral at bedtime  buDESOnide  80 MICROgram(s)/formoterol 4.5 MICROgram(s) Inhaler 2 Puff(s) Inhalation two times a day  clopidogrel Tablet 75 milliGRAM(s) Oral daily  dabigatran 75 milliGRAM(s) Oral every 12 hours  furosemide    Tablet 40 milliGRAM(s) Oral daily  metoprolol tartrate 25 milliGRAM(s) Oral two times a day  metroNIDAZOLE  IVPB      metroNIDAZOLE  IVPB 500 milliGRAM(s) IV Intermittent every 8 hours  pantoprazole  Injectable 40 milliGRAM(s) IV Push two times a day  thiamine 100 milliGRAM(s) Oral daily  vancomycin    Solution 125 milliGRAM(s) Oral every 6 hours    MEDICATIONS  (PRN):  acetaminophen   Tablet .. 650 milliGRAM(s) Oral every 6 hours PRN Temp greater or equal to 38C (100.4F), Mild Pain (1 - 3), Moderate Pain (4 - 6)  LORazepam     Tablet 0.5 milliGRAM(s) Oral two times a day PRN Anxiety  traMADol 50 milliGRAM(s) Oral every 8 hours PRN Severe Pain (7 - 10)    Vital Signs Last 24 Hrs  T(C): 36.9 (2019 12:04), Max: 37.7 (2019 21:14)  T(F): 98.5 (2019 12:04), Max: 99.8 (2019 21:14)  HR: 119 (2019 12:04) (71 - 119)  BP: 95/57 (2019 12:04) (95/57 - 136/73)  BP(mean): 87 (2019 04:52) (87 - 87)  RR: 22 (2019 12:04) (18 - 22)  SpO2: 94% (2019 12:04) (93% - 100%)    I&O's Summary    2019 07:01  -  2019 07:00  --------------------------------------------------------  IN: 300 mL / OUT: 0 mL / NET: 300 mL          Physical Exam:   GENERAL: NAD, well-groomed, well-developed  HEENT: COLEEN/   Atraumatic, Normocephalic  ENMT: No tonsillar erythema, exudates, or enlargement; Moist mucous membranes, Good dentition, No lesions  NECK: Supple, No JVD, Normal thyroid  CHEST/LUNG: not wheezing  CVS: Regular rate and rhythm; No murmurs, rubs, or gallops  GI: : Soft, Nontender, Nondistended; Bowel sounds present  NERVOUS SYSTEM:  Alert & Oriented X3  EXTREMITIES:  2+ Peripheral Pulses, No clubbing, cyanosis, or edema  LYMPH: No lymphadenopathy noted  SKIN: No rashes or lesions  ENDOCRINOLOGY: No Thyromegaly  PSYCH: Appropriate    Labs:                              10.7   13.77 )-----------( 203      ( 2019 06:59 )             35.6                         10.8   5.33  )-----------( 193      ( 2019 04:45 )             34.8                         10.1   7.36  )-----------( 176      ( 2019 03:00 )             31.9                         9.7    8.29  )-----------( 203      ( 2019 06:25 )             30.2         134<L>  |  95<L>  |  27<H>  ----------------------------<  79  4.4   |  28  |  1.43<H>      137  |  96<L>  |  24<H>  ----------------------------<  87  4.3   |  33<H>  |  1.29      135  |  93<L>  |  23  ----------------------------<  86  3.1<L>   |  32<H>  |  1.34<H>      138  |  97<L>  |  22  ----------------------------<  88  3.6   |  32<H>  |  1.34<H>    Ca    8.7      2019 06:59  Ca    8.7      2019 04:45  Mg     1.7       Mg     1.6           CAPILLARY BLOOD GLUCOSE              Urinalysis Basic - ( 2019 23:25 )    Color: YELLOW / Appearance: TURBID / S.015 / pH: 6.5  Gluc: NEGATIVE / Ketone: NEGATIVE  / Bili: NEGATIVE / Urobili: NORMAL   Blood: MODERATE / Protein: 200 / Nitrite: POSITIVE   Leuk Esterase: LARGE / RBC: >50 / WBC >50   Sq Epi: x / Non Sq Epi: FEW / Bacteria: LARGE            RECENT CULTURES:   @ 03:14 BLOOD PERIPHERAL                  NO ORGANISMS ISOLATED  NO ORGANISMS ISOLATED AT 48 HRS.   @ 07:03 BLOOD PERIPHERAL         < from: CT Chest No Cont (19 @ 19:08) >      < end of copied text >           NO ORGANISMS ISOLATED  NO ORGANISMS ISOLATED AT 72 HRS.        RESPIRATORY CULTURES:          Studies  Chest X-RAY  CT SCAN Chest   Venous Dopplers: LE:   CT Abdomen  Others

## 2019-01-28 NOTE — PROGRESS NOTE ADULT - SUBJECTIVE AND OBJECTIVE BOX
CARDIOLOGY FOLLOW UP - Dr. Vieyra    CC c/o RLE pain, vascular at bedside doing dsg change  RAFIB to 140s on monitor   also hypoxic, +cough, increase in wbc       PHYSICAL EXAM:  T(C): 36.9 (01-28-19 @ 12:04), Max: 37.7 (01-27-19 @ 21:14)  HR: 119 (01-28-19 @ 12:04) (71 - 119)  BP: 95/57 (01-28-19 @ 12:04) (95/57 - 136/73)  RR: 22 (01-28-19 @ 12:04) (18 - 22)  SpO2: 94% (01-28-19 @ 12:04) (93% - 100%)  Wt(kg): --  I&O's Summary    27 Jan 2019 07:01  -  28 Jan 2019 07:00  --------------------------------------------------------  IN: 300 mL / OUT: 0 mL / NET: 300 mL        Appearance: Normal	  Cardiovascular: Normal S1 S2,irregular, No JVD, No murmurs  Respiratory: Lungs clear to auscultation	  Gastrointestinal:  Soft, Non-tender, + BS	  Extremities: Normal range of motion, No clubbing, RLE dressing         MEDICATIONS  (STANDING):  ALBUTerol/ipratropium for Nebulization 3 milliLiter(s) Nebulizer every 6 hours  atorvastatin 40 milliGRAM(s) Oral at bedtime  buDESOnide  80 MICROgram(s)/formoterol 4.5 MICROgram(s) Inhaler 2 Puff(s) Inhalation two times a day  clopidogrel Tablet 75 milliGRAM(s) Oral daily  dabigatran 75 milliGRAM(s) Oral every 12 hours  furosemide    Tablet 40 milliGRAM(s) Oral daily  metoprolol tartrate 25 milliGRAM(s) Oral two times a day  metroNIDAZOLE  IVPB      metroNIDAZOLE  IVPB 500 milliGRAM(s) IV Intermittent every 8 hours  morphine  - Injectable 1 milliGRAM(s) IV Push once  pantoprazole  Injectable 40 milliGRAM(s) IV Push two times a day  thiamine 100 milliGRAM(s) Oral daily  vancomycin    Solution 125 milliGRAM(s) Oral every 6 hours      TELEMETRY: afib 90-140s     ECG:  	  RADIOLOGY:   DIAGNOSTIC TESTING:  [ ] Echocardiogram:  [ ]  Catheterization:  [ ] Stress Test:    OTHER: 	    LABS:	 	                                10.7   13.77 )-----------( 203      ( 28 Jan 2019 06:59 )             35.6     01-28    134<L>  |  95<L>  |  27<H>  ----------------------------<  79  4.4   |  28  |  1.43<H>    Ca    8.7      28 Jan 2019 06:59  Mg     1.7     01-28

## 2019-01-28 NOTE — PROGRESS NOTE ADULT - ASSESSMENT
79 y/o female, with a PmHx of COPD (on 2-3L O2 prn), paroxsymal a-fib (on pradaxa), HTN, HLD, and anxiety, presenting to the [facility] ED with right foot pressure ulcer pain, SOB, and CP, found to have Afib @102 bpm, H/H 8/24.7, mild interstitial pulmonary edema, proBNP 1374, guiac positive, BUN/Cr 36/1.68, admitted to telemetry for nonhealing pressure ulcer, Afib, CHF exacerbation, r/o ACS.

## 2019-01-28 NOTE — CONSULT NOTE ADULT - ASSESSMENT
79F w/ nonhealing R posterior ankle ulcer    - STONEY/PVR noted  - we will plan for RLE angiogram - CKD and positive c.diff noted, so infection would need to be cleared and patient optimized from nephrology standpoint  - continue local wound care, offloading  - seen and examined with Dr. Velazquez - vascular surgery will follow    JUAN Perales MD  [facility] (C team) [id number] [id number] w/ nonhealing R posterior ankle ulcer and arterial insuff    - STONEY/PVR noted  - we will plan for RLE angiogram - CKD and positive c.diff noted, so infection would need to be cleared and patient optimized from nephrology standpoint prior to proceeding w rle angio  - continue local wound care, offloading  -

## 2019-01-28 NOTE — CONSULT NOTE ADULT - SUBJECTIVE AND OBJECTIVE BOX
VASCULAR SURGERY CONSULT NOTE    Patient is a 79y old  Female who presents with a chief complaint of right foot pressure ulcer pain (2019 13:22)      HPI:  77 y/o female, with a PmHx of COPD (on 2-3L O2 prn), paroxsymal a-fib (on pradaxa), CAD (s/p stent 2018), CHF, HTN, HLD, and anxiety, presenting to the [facility] ED with right foot pressure ulcer pain x several weeks. The patient has a dime-sized ulcer with scant serosanguinous drainage on her right calcaneous that she acquired from a shoe weeks ago. She reports that she woke up last night around 4am with excruciating, unrelenting right root pain with SOB x a few minutes followed by chest pain x several hours. The patient reports that the ulcer pain began to gradually worsen since her last hospital visit, where she was d/c with vancomycin. The foot pain is sharp, 10/10, and radiates up to her calf. The patient took two puffs of her Symbicort for the SOB with no relief, but it resolved spontaneously within a few minutes. The chest pain began gradually and was described as a midsternal, 8/10, tightness with no radiation and accompanied by palpitations. Of note, the patient reports chronic cough, b/l LE edema, JACKSON with walking 1/2 block, orthopnea x1 pillow, occasional PND, melena x weeks, easy bruising/bleeding, and 10 lbs weight loss over the past 6 months with no change in appetite. The patient denies fever, chills, SOB at rest, diaphoresis, dizziness, claudication, wheezing, changes in vision and hearing, abdominal pain, change in bowel and urinary habits, dysuria, hematuria. (2019 12:50)    Vascular was consulted in the setting of above. She ambulates mostly with a walker, although recently she has been less ambulatory than usual due to pain and weakness. She has not seen a vascular surgeon before. She is having pain in the R foot at the time of my exam.    PAST MEDICAL & SURGICAL HISTORY:  CAD (coronary artery disease): s/p stent   CHF (congestive heart failure)  COPD (chronic obstructive pulmonary disease): on 2-3L O2 at home prn  Anxiety  TIA (transient ischemic attack): many years ago  PAF (paroxysmal atrial fibrillation)  HLD (hyperlipidemia)  HTN (hypertension)  H/O total hysterectomy:   History of cataract surgery: b/l   History of repair of hip fracture: luisa placed in RLE   H/O spinal fusion: c2-6 in 2017    [  ] No significant past history as reviewed with the patient and family    FAMILY HISTORY:  No pertinent family history in first degree relatives    [  ] Family history not pertinent as reviewed with the patient and family    SOCIAL HISTORY:    MEDICATIONS  (STANDING):  ALBUTerol/ipratropium for Nebulization 3 milliLiter(s) Nebulizer every 6 hours  atorvastatin 40 milliGRAM(s) Oral at bedtime  buDESOnide  80 MICROgram(s)/formoterol 4.5 MICROgram(s) Inhaler 2 Puff(s) Inhalation two times a day  clopidogrel Tablet 75 milliGRAM(s) Oral daily  dabigatran 75 milliGRAM(s) Oral every 12 hours  furosemide    Tablet 40 milliGRAM(s) Oral daily  metoprolol tartrate 37.5 milliGRAM(s) Oral two times a day  metroNIDAZOLE  IVPB      metroNIDAZOLE  IVPB 500 milliGRAM(s) IV Intermittent every 8 hours  pantoprazole  Injectable 40 milliGRAM(s) IV Push two times a day  thiamine 100 milliGRAM(s) Oral daily  vancomycin    Solution 125 milliGRAM(s) Oral every 6 hours    MEDICATIONS  (PRN):  acetaminophen   Tablet .. 650 milliGRAM(s) Oral every 6 hours PRN Temp greater or equal to 38C (100.4F), Mild Pain (1 - 3), Moderate Pain (4 - 6)  LORazepam     Tablet 0.5 milliGRAM(s) Oral two times a day PRN Anxiety  traMADol 50 milliGRAM(s) Oral every 8 hours PRN Severe Pain (7 - 10)    Allergies    No Known Allergies    Intolerances        Vital Signs Last 24 Hrs  T(C): 36.9 (2019 12:04), Max: 37.7 (2019 21:14)  T(F): 98.5 (2019 12:04), Max: 99.8 (2019 21:14)  HR: 119 (2019 12:04) (71 - 119)  BP: 95/57 (2019 12:04) (95/57 - 136/73)  BP(mean): 87 (2019 04:52) (87 - 87)  RR: 22 (2019 12:04) (18 - 22)  SpO2: 94% (2019 12:04) (93% - 100%)  Daily     Daily Weight in k (2019 04:52)    NAD, awake and alert  No rashes  No jaundice or scleral icterus  Respirations nonlabored  CV Regular  Abdomen soft, nontender, nondistended  No guarding or rebound tenderness  Palpable femorals and popliteals b/l  DP/PT signals b/l  Extremities warm   R ankle just above calcaneous with punctate ulceration ~1.5cm                        10.7   13.77 )-----------( 203      ( 2019 06:59 )             35.6     0128    134<L>  |  95<L>  |  27<H>  ----------------------------<  79  4.4   |  28  |  1.43<H>    Ca    8.7      2019 06:59  Mg     1.7             Urinalysis Basic - ( 2019 23:25 )    Color: YELLOW / Appearance: TURBID / S.015 / pH: 6.5  Gluc: NEGATIVE / Ketone: NEGATIVE  / Bili: NEGATIVE / Urobili: NORMAL   Blood: MODERATE / Protein: 200 / Nitrite: POSITIVE   Leuk Esterase: LARGE / RBC: >50 / WBC >50   Sq Epi: x / Non Sq Epi: FEW / Bacteria: LARGE        IMAGING STUDIES:  < from: VA Duplex Physiol Ext Low 3+ Level, BI. (19 @ 09:19) >  Procedure: Segmental Pressure/Waveform - complete  noninvasive physiologic studies of the bilateral lower  extremity arteries. Level 3+, Lower  Indications: Atheroembolism of bilateral lower extremities  (I75.023)  ------------------------------------------------------------------------  PRESSURE (mm Hg):  ------------------------------------------------------------------------  RIGHT (BP):  Brachial: 138  High Thigh:  Low Thigh: 146  Calf: 70  Ankle-PT:  Ankle-DP: 83  Greate Toe:  STONEY: 0.60  ------------------------------------------------------------------------  LEFT (BP):  Brachial: 129  High Thigh:  Low Thigh: 168  Calf: 142  Ankle-PT: 145  Ankle-DP: 143  Greate Toe:  STONEY: 1.05  ------------------------------------------------------------------------  WAVEFORM:  ------------------------------------------------------------------------  RIGHT:  CFA:  Popliteal:  Ankle-PT:  Ankle-DP:  ------------------------------------------------------------------------  LEFT:  CFA:  Popliteal:  Ankle-PT:  Ankle-DP:  ------------------------------------------------------------------------  PSA/AVF:  ------------------------------------------------------------------------  RIGHT:  Pseudoaneurysm:  A-V fistula:  TBI:  ------------------------------------------------------------------------  LEFT:  Pseudoaneurysm:  A-V fistula:  TBI:  ------------------------------------------------------------------------  Right Findings: The ankle-brachial index (STONEY) on the  right is moderately decreased (0.60).    Pulse volume recording (PVR) waveforms are normal at the  right low thigh.  Waveforms are diminished in amplitude at  the right calf, and ankle segments. Waveforms are absent  at the metatarsal and digit segments.  There is a significant decrease in segmental pressures  between the low thigh and calf, suggestive of  femoropopliteal arterial disease.  Left Findings: The ankle-brachial index (STONEY) on the left  is normal (1.05).  The toe-brachial index (TBI) on the left is moderately  decreased (0.41).  The left toe pressure is 57 mmHg.  Pulse volume recording (PVR) waveforms appear normal at  the left thigh and calf. Waveforms are diminished at the  ankle and metatarsal segments.  ------------------------------------------------------------------------  Summary/Impressions:  1. Right STONEY is moderately decreased (0.60). Right  metatarsal and digit waveforms are flat. Findings suggest  the presence of distal infrapopliteal arterial disease in  the right lower extremity, including severe small vessel  disease in the right foot.  2. Left STONEY is normal (1.05).  Left TBI is moderately  decreased (0.41), and the left toe pressure is 57 mmHg.  Findings suggest the presence of small vessel arterial  disease in the left foot.    < end of copied text >

## 2019-01-28 NOTE — PROVIDER CONTACT NOTE (OTHER) - SITUATION
pt deasting to 84% with a good pleth on 4L humidifed O2, increased to 5L still no change, pt is lethargic

## 2019-01-28 NOTE — CONSULT NOTE ADULT - REASON FOR ADMISSION
right foot pressure ulcer pain
right foot pressure ulcer
right foot pressure ulcer pain

## 2019-01-28 NOTE — PROGRESS NOTE ADULT - SUBJECTIVE AND OBJECTIVE BOX
[facility]        - Patient seen and examined.  - Today, patient is without complaints.         *****MEDICATIONS: Current medication reviewed and documented.    MEDICATIONS  (STANDING):  ALBUTerol/ipratropium for Nebulization 3 milliLiter(s) Nebulizer every 6 hours  atorvastatin 40 milliGRAM(s) Oral at bedtime  buDESOnide  80 MICROgram(s)/formoterol 4.5 MICROgram(s) Inhaler 2 Puff(s) Inhalation two times a day  clopidogrel Tablet 75 milliGRAM(s) Oral daily  dabigatran 75 milliGRAM(s) Oral every 12 hours  furosemide    Tablet 40 milliGRAM(s) Oral daily  metoprolol tartrate 37.5 milliGRAM(s) Oral two times a day  metoprolol tartrate 12.5 milliGRAM(s) Oral once  metroNIDAZOLE  IVPB      metroNIDAZOLE  IVPB 500 milliGRAM(s) IV Intermittent every 8 hours  pantoprazole  Injectable 40 milliGRAM(s) IV Push two times a day  thiamine 100 milliGRAM(s) Oral daily  vancomycin    Solution 125 milliGRAM(s) Oral every 6 hours    MEDICATIONS  (PRN):  acetaminophen   Tablet .. 650 milliGRAM(s) Oral every 6 hours PRN Temp greater or equal to 38C (100.4F), Mild Pain (1 - 3), Moderate Pain (4 - 6)  LORazepam     Tablet 0.5 milliGRAM(s) Oral two times a day PRN Anxiety  traMADol 50 milliGRAM(s) Oral every 8 hours PRN Severe Pain (7 - 10)           ***** REVIEW OF SYSTEM:  GEN: no fever, no chills, no pain  RESP: no SOB, no cough, no sputum  CVS: no chest pain, no palpitations, no edema  GI: no abdominal pain, no nausea, no vomiting, no constipation, no diarrhea  : no dysurea, no frequency  NEURO: no headache, no diziness  PSYCH: no depression, not anxious  Derm : no itching, no rash         ***** VITAL SIGNS:  T(F): 98.5 (19 @ 12:04), Max: 99.8 (19 @ 21:14)  HR: 119 (19 @ 12:04) (71 - 119)  BP: 95/57 (19 @ 12:04) (95/57 - 136/73)  RR: 22 (19 @ 12:04) (18 - 22)  SpO2: 94% (19 @ 12:04) (93% - 100%)  Wt(kg): --  ,   I&O's Summary    2019 07:01  -  2019 07:00  --------------------------------------------------------  IN: 300 mL / OUT: 0 mL / NET: 300 mL         Alert oriented x1more lethargic today.      EOMI fundi not visualized,  VFF to confrontration  No facial asymmetry   Tongue is midline   Palate elevates symmetrically   Moving all 4 ext symmetrically no pronator drift   Reflexes are symmetric throughout   sensation is grossly symmetric  Gait : not assessed.  B/L down going toes          *****LAB AND IMAGING:                        10.7   13.77 )-----------( 203      ( 2019 06:59 )             35.6                   134<L>  |  95<L>  |  27<H>  ----------------------------<  79  4.4   |  28  |  1.43<H>    Ca    8.7      2019 06:59  Mg     1.7                              Urinalysis Basic - ( 2019 23:25 )    Color: YELLOW / Appearance: TURBID / S.015 / pH: 6.5  Gluc: NEGATIVE / Ketone: NEGATIVE  / Bili: NEGATIVE / Urobili: NORMAL   Blood: MODERATE / Protein: 200 / Nitrite: POSITIVE   Leuk Esterase: LARGE / RBC: >50 / WBC >50   Sq Epi: x / Non Sq Epi: FEW / Bacteria: LARGE  Cdiff positive.     [All pertinent recent Imaging/Reports reviewed]           *****A S S E S S M E N T   A N D   P L A N :        77 y/o female, with a PmHx of COPD (on 2-3L O2 prn), paroxsymal a-fib (on pradaxa), CAD (s/p stent 2018), CHF, HTN, HLD, and anxiety, presenting to the [facility] ED with right foot pressure ulcer pain x several weeks. The patient has a dime-sized ulcer with scant serosanguinous drainage on her right calcaneous that she acquired from a shoe weeks ago. She reports that she woke up last night around 4am with excruciating, unrelenting right root pain with SOB x a few minutes followed by chest pain x several hours. The patient reports that the ulcer pain began to gradually worsen since her last hospital visit, where she was d/c with vancomycin. The foot pain is sharp, 10/10, and radiates up to her calf. The patient took two puffs of her Symbicort for the SOB with no relief, but it resolved spontaneously within a few minutes. The chest pain began gradually and was described as a midsternal, 8/10, tightness with no radiation and accompanied by palpitations. Of note, the patient reports chronic cough, b/l LE edema, JACKSON with walking 1/2 block, orthopnea x1 pillow, occasional PND, melena x weeks, easy bruising/bleeding, and 10 lbs weight loss over the past 6 months with no change in appetite. The patient denies fever, chills, SOB at rest, diaphoresis, dizziness, claudication, wheezing, changes in vision and hearing, abdominal pain, change in bowel and urinary habits, dysuria, hematuria.   Problem/Recommendations 1: toxic metabolic encephalopathy slightly worse today.   regulate sleep wake cycle/ avoid day time sleepiness.    b12/folate/tsh/wnl   thiamine 100 daily.   Problem/Recommendations 2: hyponatremia mild  of unclear etiology   defer to primary for w/u   possible volume deficit.  now cdfiff positive     Thank you for allowing me to participate in the care of this patient. Please do not hesitate to call me if you have any  questions.        ________________  Kary Mercado MD  [facility] ([facility]  [phone]      30 minutes spent on total encounter; more than 50 % of the visit was  spent counseling about plan of care, compliance to diet/exercise and medication regimen and or  coordinating care by the attending physician.   At the present time, [facility] does not  provide outpatient followup, best to call the your insurance to find a participating provider.  This was explained to you at the time of the visit. Alternatively, if your insurance allows it, you can follow up with a neurologist  Dr. Anish Camacho([place]) [phone] or Dr. Michael Nissenbaum [phone]

## 2019-01-28 NOTE — PROGRESS NOTE ADULT - ASSESSMENT
79 y/o female, with a PmHx of COPD (on 2-3L O2 prn), paroxsymal a-fib (on pradaxa), CAD (s/p stent 11/2018), DCHF, HTN, HLD, and anxiety, presenting with acute/chronic diastolic chf, chest pain r/o acs, GIB, non healing pressure ulcer.     1. Atypical chest pain, resolved  in the setting of acute CHF exacerbation, afib w/ RVR   cv stable, no evidence of acute ischemia/ACS   Echo with nl lv fxn  continue statin, bb, plavix     2. Acute/chronic diastolic chf  stable  hypoxic this am on nasal cannula, + cough and increase in wbc noted, consider repeat CXR   euvolemic on exam, cont lasix daily   pulmo f/u for effusion- manage conservatively per pulm  cont bb  echo with nl lv fxn    3. AFIB, hx   RVR this am to 140s, increase bb 37.5mg PO q12, give 12.5mg now   no pauses off dig   bp improved trend for now, asymptomatic   heme stable  CHADS 3 - continue with pradaxa     4. CAD s/p PCI (11/2018)  cv stable, no evidence of acute ischemia   continue bb, statin, continue plavix given recent PCI      5. GIB   +GUIAC , +anemia s/p 1uprbc,   continue to trend cbc  GI f/u      6. non healing foot ulcer  med f/u  ID f/u ,   vascular studies noted, pod f/u    7. IGOR/CKD   renal f/u     8.Cdiff   med f/u     dvt ppx

## 2019-01-28 NOTE — PROGRESS NOTE ADULT - PROBLEM SELECTOR PLAN 1
Likely hemodynamically mediated in setting of afib, ADHF, anemia and IV diuretic use now resolved. Avoid nephrotoxins.    Patient with pyuria and microscopic hematuria. Follow up urine culture (P).

## 2019-01-28 NOTE — PROGRESS NOTE ADULT - ASSESSMENT
Problem/Plan - 1:  ·  Problem: Acute on chronic systolic and diastolic heart failure, NYHA class 3.  Plan:telemonitor   diuresis as per cards  cardio consult appreciated    Problem/Plan - 2:  ·  Problem: C diff colitis  Plan: started vanco and flagyl    Problem/Plan - 3:·  Problem: Ulcer of heel, right, with fat layer exposed.  Plan: ID fu  STONEY/PVR reviewed  vascular consult appreciated    Problem/Plan - 4:  Problem: Chronic atrial fibrillation with rapid ventricular response.  Plan: cw NAOMI    Problem/Plan - 5:  ·  Problem: CKD (chronic kidney disease) stage 4, GFR 15-29 ml/min.  Plan: Monitor electrolytes  Trend BUN/Crrenal consult appreciated

## 2019-01-29 DIAGNOSIS — J11.1 INFLUENZA DUE TO UNIDENTIFIED INFLUENZA VIRUS WITH OTHER RESPIRATORY MANIFESTATIONS: ICD-10-CM

## 2019-01-29 LAB
ANION GAP SERPL CALC-SCNC: 12 MMO/L — SIGNIFICANT CHANGE UP (ref 7–14)
BUN SERPL-MCNC: 31 MG/DL — HIGH (ref 7–23)
CALCIUM SERPL-MCNC: 8.2 MG/DL — LOW (ref 8.4–10.5)
CHLORIDE SERPL-SCNC: 95 MMOL/L — LOW (ref 98–107)
CO2 SERPL-SCNC: 30 MMOL/L — SIGNIFICANT CHANGE UP (ref 22–31)
CREAT SERPL-MCNC: 1.34 MG/DL — HIGH (ref 0.5–1.3)
GLUCOSE SERPL-MCNC: 86 MG/DL — SIGNIFICANT CHANGE UP (ref 70–99)
HCT VFR BLD CALC: 35.9 % — SIGNIFICANT CHANGE UP (ref 34.5–45)
HGB BLD-MCNC: 10.7 G/DL — LOW (ref 11.5–15.5)
MCHC RBC-ENTMCNC: 26.8 PG — LOW (ref 27–34)
MCHC RBC-ENTMCNC: 29.8 % — LOW (ref 32–36)
MCV RBC AUTO: 90 FL — SIGNIFICANT CHANGE UP (ref 80–100)
NRBC # FLD: 0 K/UL — LOW (ref 25–125)
NT-PROBNP SERPL-SCNC: SIGNIFICANT CHANGE UP PG/ML
PLATELET # BLD AUTO: 210 K/UL — SIGNIFICANT CHANGE UP (ref 150–400)
PMV BLD: 10.2 FL — SIGNIFICANT CHANGE UP (ref 7–13)
POTASSIUM SERPL-MCNC: 3.8 MMOL/L — SIGNIFICANT CHANGE UP (ref 3.5–5.3)
POTASSIUM SERPL-SCNC: 3.8 MMOL/L — SIGNIFICANT CHANGE UP (ref 3.5–5.3)
RBC # BLD: 3.99 M/UL — SIGNIFICANT CHANGE UP (ref 3.8–5.2)
RBC # FLD: 16.4 % — HIGH (ref 10.3–14.5)
SODIUM SERPL-SCNC: 137 MMOL/L — SIGNIFICANT CHANGE UP (ref 135–145)
SPECIMEN SOURCE: SIGNIFICANT CHANGE UP
WBC # BLD: 8.88 K/UL — SIGNIFICANT CHANGE UP (ref 3.8–10.5)
WBC # FLD AUTO: 8.88 K/UL — SIGNIFICANT CHANGE UP (ref 3.8–10.5)

## 2019-01-29 PROCEDURE — 99232 SBSQ HOSP IP/OBS MODERATE 35: CPT

## 2019-01-29 PROCEDURE — 71250 CT THORAX DX C-: CPT | Mod: 26

## 2019-01-29 RX ORDER — CEFTRIAXONE 500 MG/1
INJECTION, POWDER, FOR SOLUTION INTRAMUSCULAR; INTRAVENOUS
Qty: 0 | Refills: 0 | Status: DISCONTINUED | OUTPATIENT
Start: 2019-01-29 | End: 2019-01-30

## 2019-01-29 RX ORDER — CEFTRIAXONE 500 MG/1
1 INJECTION, POWDER, FOR SOLUTION INTRAMUSCULAR; INTRAVENOUS EVERY 24 HOURS
Qty: 0 | Refills: 0 | Status: DISCONTINUED | OUTPATIENT
Start: 2019-01-30 | End: 2019-01-30

## 2019-01-29 RX ORDER — CEFTRIAXONE 500 MG/1
1 INJECTION, POWDER, FOR SOLUTION INTRAMUSCULAR; INTRAVENOUS ONCE
Qty: 0 | Refills: 0 | Status: COMPLETED | OUTPATIENT
Start: 2019-01-29 | End: 2019-01-29

## 2019-01-29 RX ADMIN — TRAMADOL HYDROCHLORIDE 50 MILLIGRAM(S): 50 TABLET ORAL at 07:35

## 2019-01-29 RX ADMIN — Medication 125 MILLIGRAM(S): at 12:03

## 2019-01-29 RX ADMIN — Medication 100 MILLIGRAM(S): at 14:11

## 2019-01-29 RX ADMIN — Medication 650 MILLIGRAM(S): at 13:25

## 2019-01-29 RX ADMIN — Medication 37.5 MILLIGRAM(S): at 05:04

## 2019-01-29 RX ADMIN — Medication 3 MILLILITER(S): at 10:46

## 2019-01-29 RX ADMIN — PANTOPRAZOLE SODIUM 40 MILLIGRAM(S): 20 TABLET, DELAYED RELEASE ORAL at 05:04

## 2019-01-29 RX ADMIN — TRAMADOL HYDROCHLORIDE 50 MILLIGRAM(S): 50 TABLET ORAL at 15:35

## 2019-01-29 RX ADMIN — TRAMADOL HYDROCHLORIDE 50 MILLIGRAM(S): 50 TABLET ORAL at 16:30

## 2019-01-29 RX ADMIN — Medication 100 MILLIGRAM(S): at 21:56

## 2019-01-29 RX ADMIN — TRAMADOL HYDROCHLORIDE 50 MILLIGRAM(S): 50 TABLET ORAL at 08:22

## 2019-01-29 RX ADMIN — Medication 3 MILLILITER(S): at 22:03

## 2019-01-29 RX ADMIN — Medication 3 MILLILITER(S): at 05:12

## 2019-01-29 RX ADMIN — CLOPIDOGREL BISULFATE 75 MILLIGRAM(S): 75 TABLET, FILM COATED ORAL at 12:03

## 2019-01-29 RX ADMIN — Medication 40 MILLIGRAM(S): at 05:04

## 2019-01-29 RX ADMIN — Medication 30 MILLIGRAM(S): at 05:04

## 2019-01-29 RX ADMIN — DABIGATRAN ETEXILATE MESYLATE 75 MILLIGRAM(S): 150 CAPSULE ORAL at 18:06

## 2019-01-29 RX ADMIN — CEFTRIAXONE 100 GRAM(S): 500 INJECTION, POWDER, FOR SOLUTION INTRAMUSCULAR; INTRAVENOUS at 18:06

## 2019-01-29 RX ADMIN — Medication 30 MILLIGRAM(S): at 18:05

## 2019-01-29 RX ADMIN — DABIGATRAN ETEXILATE MESYLATE 75 MILLIGRAM(S): 150 CAPSULE ORAL at 05:04

## 2019-01-29 RX ADMIN — Medication 650 MILLIGRAM(S): at 12:19

## 2019-01-29 RX ADMIN — BUDESONIDE AND FORMOTEROL FUMARATE DIHYDRATE 2 PUFF(S): 160; 4.5 AEROSOL RESPIRATORY (INHALATION) at 09:27

## 2019-01-29 RX ADMIN — Medication 3 MILLILITER(S): at 16:42

## 2019-01-29 RX ADMIN — Medication 100 MILLIGRAM(S): at 05:04

## 2019-01-29 RX ADMIN — Medication 125 MILLIGRAM(S): at 18:05

## 2019-01-29 RX ADMIN — ATORVASTATIN CALCIUM 40 MILLIGRAM(S): 80 TABLET, FILM COATED ORAL at 21:46

## 2019-01-29 RX ADMIN — Medication 100 MILLIGRAM(S): at 12:03

## 2019-01-29 RX ADMIN — Medication 125 MILLIGRAM(S): at 05:04

## 2019-01-29 RX ADMIN — PANTOPRAZOLE SODIUM 40 MILLIGRAM(S): 20 TABLET, DELAYED RELEASE ORAL at 18:05

## 2019-01-29 RX ADMIN — BUDESONIDE AND FORMOTEROL FUMARATE DIHYDRATE 2 PUFF(S): 160; 4.5 AEROSOL RESPIRATORY (INHALATION) at 21:45

## 2019-01-29 NOTE — PROGRESS NOTE ADULT - SUBJECTIVE AND OBJECTIVE BOX
BEVERLEY DEL RIO:[id number],   79yFemale followed for:  No Known Allergies    PAST MEDICAL & SURGICAL HISTORY:  CAD (coronary artery disease): s/p stent 2018  CHF (congestive heart failure)  COPD (chronic obstructive pulmonary disease): on 2-3L O2 at home prn  Anxiety  TIA (transient ischemic attack): many years ago  PAF (paroxysmal atrial fibrillation)  HLD (hyperlipidemia)  HTN (hypertension)  H/O total hysterectomy: 2014  History of cataract surgery: b/l 2015  History of repair of hip fracture: luisa placed in RLE 2015  H/O spinal fusion: c2-6 in 2017    FAMILY HISTORY:  No pertinent family history in first degree relatives    MEDICATIONS  (STANDING):  ALBUTerol/ipratropium for Nebulization 3 milliLiter(s) Nebulizer every 6 hours  atorvastatin 40 milliGRAM(s) Oral at bedtime  buDESOnide  80 MICROgram(s)/formoterol 4.5 MICROgram(s) Inhaler 2 Puff(s) Inhalation two times a day  clopidogrel Tablet 75 milliGRAM(s) Oral daily  dabigatran 75 milliGRAM(s) Oral every 12 hours  furosemide    Tablet 40 milliGRAM(s) Oral daily  metoprolol tartrate 37.5 milliGRAM(s) Oral two times a day  metroNIDAZOLE  IVPB      metroNIDAZOLE  IVPB 500 milliGRAM(s) IV Intermittent every 8 hours  oseltamivir 30 milliGRAM(s) Oral two times a day  pantoprazole  Injectable 40 milliGRAM(s) IV Push two times a day  thiamine 100 milliGRAM(s) Oral daily  vancomycin    Solution 125 milliGRAM(s) Oral every 6 hours    MEDICATIONS  (PRN):  acetaminophen   Tablet .. 650 milliGRAM(s) Oral every 6 hours PRN Temp greater or equal to 38C (100.4F), Mild Pain (1 - 3), Moderate Pain (4 - 6)  LORazepam     Tablet 0.5 milliGRAM(s) Oral two times a day PRN Anxiety  traMADol 50 milliGRAM(s) Oral every 8 hours PRN Severe Pain (7 - 10)      Vital Signs Last 24 Hrs  T(C): 36.6 (29 Jan 2019 05:01), Max: 36.9 (28 Jan 2019 12:04)  T(F): 97.9 (29 Jan 2019 05:01), Max: 98.5 (28 Jan 2019 12:04)  HR: 101 (29 Jan 2019 05:12) (88 - 119)  BP: 108/65 (29 Jan 2019 05:01) (95/57 - 128/73)  BP(mean): --  RR: 18 (29 Jan 2019 05:01) (18 - 22)  SpO2: 98% (29 Jan 2019 05:12) (94% - 100%)  nc/at  s1s2  cta  soft, nt, nd no guarding or rebound  no c/c/e    CBC Full  -  ( 29 Jan 2019 07:40 )  WBC Count : 8.88 K/uL  Hemoglobin : 10.7 g/dL  Hematocrit : 35.9 %  Platelet Count - Automated : 210 K/uL  Mean Cell Volume : 90.0 fL  Mean Cell Hemoglobin : 26.8 pg  Mean Cell Hemoglobin Concentration : 29.8 %  Auto Neutrophil # : x  Auto Lymphocyte # : x  Auto Monocyte # : x  Auto Eosinophil # : x  Auto Basophil # : x  Auto Neutrophil % : x  Auto Lymphocyte % : x  Auto Monocyte % : x  Auto Eosinophil % : x  Auto Basophil % : x    01-29    137  |  95<L>  |  31<H>  ----------------------------<  86  3.8   |  30  |  1.34<H>    Ca    8.2<L>      29 Jan 2019 07:41  Mg     1.7     01-28

## 2019-01-29 NOTE — PROGRESS NOTE ADULT - SUBJECTIVE AND OBJECTIVE BOX
covering for dr gonzalez  patient admitted for rt foot pressure ulcer pain  now positive influenza started tamiflu  ICU Vital Signs Last 24 Hrs  T(C): 36.6 (29 Jan 2019 11:52), Max: 36.7 (28 Jan 2019 17:12)  T(F): 97.8 (29 Jan 2019 11:52), Max: 98.1 (28 Jan 2019 17:12)  HR: 115 (29 Jan 2019 15:30) (88 - 140)  BP: 98/65 (29 Jan 2019 15:30) (98/65 - 128/73)  BP(mean): --  ABP: --  ABP(mean): --  RR: 18 (29 Jan 2019 15:30) (18 - 20)  SpO2: 98% (29 Jan 2019 15:30) (94% - 100%)                        10.7   8.88  )-----------( 210      ( 29 Jan 2019 07:40 )             35.9   01-29    137  |  95<L>  |  31<H>  ----------------------------<  86  3.8   |  30  |  1.34<H>    Ca    8.2<L>      29 Jan 2019 07:41  Mg     1.7     01-28

## 2019-01-29 NOTE — PROGRESS NOTE ADULT - ASSESSMENT
79 y/o female, with a PmHx of COPD (on 2-3L O2 prn), paroxsymal a-fib (on pradaxa), CAD (s/p stent 11/2018), DCHF, HTN, HLD, and anxiety, presenting with acute/chronic diastolic chf, chest pain r/o acs, GIB, non healing pressure ulcer. Hospital course now complicated ruling in + RVP and CDiff     1. Atypical chest pain, resolved  in the setting of acute CHF exacerbation, afib w/ RVR   cv stable, no evidence of acute ischemia/ACS   Echo with nl lv fxn  continue statin, bb, plavix     2. Acute/chronic diastolic chf  stable  remains hypoxic on room air, on supplemental O2.. denies SOB  does not appear overtly overloaded on exam, cont lasix 40 mg daily  chest xray revealed multifocal airspace disease, may reflect aveolar component of edema.  recommend CT chest non contrast to eval pleural effusion    + rvp   pulmo f/u   cont bb  echo with nl lv fxn    3. AFIB,  intermitted of RVR likely in the setting of hypoxia, acute viral/ bacterial infection   continue with  37.5mg PO q12  sys bp soft, if RVR continue cheko consider restarting dig   CHADS 3 - continue with pradaxa     4. CAD s/p PCI (11/2018)  cv stable, no evidence of acute ischemia   continue bb, statin, continue plavix given recent PCI      5. GIB   +GUIAC , +anemia s/p 1uprbc,   continue to trend cbc  GI f/u      6. non healing foot ulcer  med f/u  ID f/u , vascular studies noted, pod f/u  eventual plan for RLE angiogram    7. IGOR/CKD   renal f/u     8.Cdiff   po abx, id f/u    7. + influenza   on Tamiflu  f.u CT chest , pulm med f/u      8. UTI   pending UC, med f/u     dvt ppx

## 2019-01-29 NOTE — PROGRESS NOTE ADULT - SUBJECTIVE AND OBJECTIVE BOX
HPI:  77 y/o female, with a PmHx of COPD (on 2-3L O2 prn), paroxsymal a-fib (on pradaxa), CAD (s/p stent 11/2018), CHF, HTN, HLD, and anxiety, presenting to the [facility] ED with right foot pressure ulcer pain x several weeks. The patient has a dime-sized ulcer with scant serosanguinous drainage on her right calcaneous that she acquired from a shoe weeks ago. She reports that she woke up last night around 4am with excruciating, unrelenting right root pain with SOB x a few minutes followed by chest pain x several hours. The patient reports that the ulcer pain began to gradually worsen since her last hospital visit, where she was d/c with vancomycin. The foot pain is sharp, 10/10, and radiates up to her calf. The patient took two puffs of her Symbicort for the SOB with no relief, but it resolved spontaneously within a few minutes. The chest pain began gradually and was described as a midsternal, 8/10, tightness with no radiation and accompanied by palpitations. Of note, the patient reports chronic cough, b/l LE edema, JACKSON with walking 1/2 block, orthopnea x1 pillow, occasional PND, melena x weeks, easy bruising/bleeding, and 10 lbs weight loss over the past 6 months with no change in appetite. The patient denies fever, chills, SOB at rest, diaphoresis, dizziness, claudication, wheezing, changes in vision and hearing, abdominal pain, change in bowel and urinary habits, dysuria, hematuria. (17 Jan 2019 12:50)    Patient is a 78y old  Female who presents with a chief complaint of right foot pressure ulcer pain (21 Jan 2019 09:09)    Allergies    No Known Allergies    Intolerances      Vital Signs Last 24 Hrs  T(C): 36.8 (21 Jan 2019 05:34), Max: 36.8 (21 Jan 2019 05:34)  T(F): 98.3 (21 Jan 2019 05:34), Max: 98.3 (21 Jan 2019 05:34)  HR: 79 (21 Jan 2019 05:34) (56 - 79)  BP: 102/53 (21 Jan 2019 05:34) (102/53 - 118/69)  BP(mean): --  RR: 18 (21 Jan 2019 05:34) (18 - 18)  SpO2: 100% (21 Jan 2019 05:34) (95% - 100%)                        9.9    9.80  )-----------( 221      ( 21 Jan 2019 00:40 )             31.4     01-20    133<L>  |  93<L>  |  36<H>  ----------------------------<  103<H>  4.3   |  26  |  1.87<H>    Ca    8.3<L>      20 Jan 2019 05:50  Mg     1.8     01-20      CAPILLARY BLOOD GLUCOSE        MEDICATIONS  (STANDING):  atorvastatin 40 milliGRAM(s) Oral at bedtime  buDESOnide  80 MICROgram(s)/formoterol 4.5 MICROgram(s) Inhaler 2 Puff(s) Inhalation two times a day  clopidogrel Tablet 75 milliGRAM(s) Oral daily  digoxin     Tablet 0.125 milliGRAM(s) Oral daily  furosemide    Tablet 40 milliGRAM(s) Oral daily  heparin  Infusion.  Unit(s)/Hr (11 mL/Hr) IV Continuous <Continuous>  metoprolol tartrate 12.5 milliGRAM(s) Oral every 12 hours  pantoprazole  Injectable 40 milliGRAM(s) IV Push two times a day  senna 2 Tablet(s) Oral at bedtime    MEDICATIONS  (PRN):  acetaminophen   Tablet .. 650 milliGRAM(s) Oral every 6 hours PRN Mild Pain (1 - 3), Moderate Pain (4 - 6), Severe Pain (7 - 10)  heparin  Injectable 4500 Unit(s) IV Push every 6 hours PRN For aPTT less than 40  heparin  Injectable 2000 Unit(s) IV Push every 6 hours PRN For aPTT between 40 - 57  LORazepam     Tablet 1 milliGRAM(s) Oral two times a day PRN Anxiety  traMADol 50 milliGRAM(s) Oral every 8 hours PRN Severe Pain (7 - 10)    PAST MEDICAL & SURGICAL HISTORY:  CAD (coronary artery disease): s/p stent 2018  CHF (congestive heart failure)  COPD (chronic obstructive pulmonary disease): on 2-3L O2 at home prn  Anxiety  TIA (transient ischemic attack): many years ago  PAF (paroxysmal atrial fibrillation)  HLD (hyperlipidemia)  HTN (hypertension)  H/O total hysterectomy: 2014  History of cataract surgery: b/l 2015  History of repair of hip fracture: luisa placed in RLE 2015  H/O spinal fusion: c2-6 in 2017        MOISES:  Posterior heel ulceration, partial thickness granular base.    No local signs of infection. No probe to bone.  No drainage or malodor.  ++pain on exam  Pedal pulses non palp bilateral lower extremities  Sensation intact to b/l feet  No gross deformities

## 2019-01-29 NOTE — PROGRESS NOTE ADULT - ASSESSMENT
79F w/ nonhealing R posterior ankle ulcer and arterial insuff    - STONEY/PVR noted  - we will plan for RLE angiogram - CKD and positive c.diff noted, so infection would need to be cleared and patient optimized from nephrology standpoint prior to proceeding w rle angio  - Please have nephrology document clearance for contrast load from angiogram  - continue local wound care, offloading  - Please page with questions    [id number] w/ nonhealing R posterior ankle ulcer and arterial insuff    -continue woundcare and rx for c diff  will follow and jorge for le angio when c diff cleared

## 2019-01-29 NOTE — PROGRESS NOTE ADULT - SUBJECTIVE AND OBJECTIVE BOX
[facility] NEPHROLOGY- PROGRESS NOTE    78y Female with history of CHF, COPD presents with SOB found to have guaiac positive anemia and afib with RVR. Nephrology consulted for elevated Scr.    REVIEW OF SYSTEMS:  Gen: no changes in weight  Cards: no chest pain  Resp: + dyspnea with cough  GI: no nausea or vomiting, + diarrhea  Vascular: no LE edema +R foot pain    No Known Allergies      Hospital Medications: Medications reviewed      VITALS:  T(F): 98 (19 @ 09:20), Max: 98.5 (19 @ 12:04)  HR: 140 (19 @ 09:20)  BP: 108/63 (19 @ 09:20)  RR: 18 (19 @ 09:20)  SpO2: 95% (19 @ 09:20)  Wt(kg): --      PHYSICAL EXAM:    Gen: NAD, calm  Cards: Irregularly irregular, +S1/S2, no M/G/R  Resp: course BS, bibasilar rales  GI: soft, NT/ND, NABS  Vascular: no LE edema B/L      LABS:      137  |  95<L>  |  31<H>  ----------------------------<  86  3.8   |  30  |  1.34<H>    Ca    8.2<L>      2019 07:41  Mg     1.7           Creatinine Trend: 1.34 <--, 1.43 <--, 1.29 <--, 1.34 <--, 1.34 <--, 1.34 <--, 1.44 <--                        10.7   8.88  )-----------( 210      ( 2019 07:40 )             35.9     Urine Studies:  Urinalysis Basic - ( 2019 23:25 )    Color: YELLOW / Appearance: TURBID / S.015 / pH: 6.5  Gluc: NEGATIVE / Ketone: NEGATIVE  / Bili: NEGATIVE / Urobili: NORMAL   Blood: MODERATE / Protein: 200 / Nitrite: POSITIVE   Leuk Esterase: LARGE / RBC: >50 / WBC >50   Sq Epi:  / Non Sq Epi: FEW / Bacteria: LARGE

## 2019-01-29 NOTE — PROGRESS NOTE ADULT - ASSESSMENT
79 y/o female, with a PmHx of COPD (on 2-3L O2 prn), paroxsymal a-fib (on pradaxa), HTN, HLD, and anxiety, presenting to the [facility] ED with right foot pressure ulcer pain, SOB, and CP, found to have Afib @102 bpm, H/H 8/24.7, mild interstitial pulmonary edema, proBNP 1374, guiac positive, BUN/Cr 36/1.68, admitted to telemetry for nonhealing pressure ulcer, Afib, CHF exacerbation, r/o ACS.

## 2019-01-29 NOTE — PROGRESS NOTE ADULT - ASSESSMENT
79 h/o female admitted for rt postrior ankle pressure ulcer    vascular following needs angiogram  need nephrology clearence  local wound care    Influenza positive   continue tamiflu for 5 days    CDIFF POSITIVE  continue vancomycin flagyl    chronic afib with rapid vntricular response  cardiology following    CKD   monitor BUN/CREATININE  appreciate nephrology f/u

## 2019-01-29 NOTE — PROGRESS NOTE ADULT - SUBJECTIVE AND OBJECTIVE BOX
Patient is a 79y old  Female who presents with a chief complaint of right foot pressure ulcer pain (2019 15:53)      Any change in ROS: Found to have new influenza:     MEDICATIONS  (STANDING):  ALBUTerol/ipratropium for Nebulization 3 milliLiter(s) Nebulizer every 6 hours  atorvastatin 40 milliGRAM(s) Oral at bedtime  buDESOnide  80 MICROgram(s)/formoterol 4.5 MICROgram(s) Inhaler 2 Puff(s) Inhalation two times a day  clopidogrel Tablet 75 milliGRAM(s) Oral daily  dabigatran 75 milliGRAM(s) Oral every 12 hours  furosemide    Tablet 40 milliGRAM(s) Oral daily  metoprolol tartrate 37.5 milliGRAM(s) Oral two times a day  metroNIDAZOLE  IVPB      metroNIDAZOLE  IVPB 500 milliGRAM(s) IV Intermittent every 8 hours  oseltamivir 30 milliGRAM(s) Oral two times a day  pantoprazole  Injectable 40 milliGRAM(s) IV Push two times a day  thiamine 100 milliGRAM(s) Oral daily  vancomycin    Solution 125 milliGRAM(s) Oral every 6 hours    MEDICATIONS  (PRN):  acetaminophen   Tablet .. 650 milliGRAM(s) Oral every 6 hours PRN Temp greater or equal to 38C (100.4F), Mild Pain (1 - 3), Moderate Pain (4 - 6)  LORazepam     Tablet 0.5 milliGRAM(s) Oral two times a day PRN Anxiety  traMADol 50 milliGRAM(s) Oral every 8 hours PRN Severe Pain (7 - 10)    Vital Signs Last 24 Hrs  T(C): 36.6 (2019 11:52), Max: 36.7 (2019 17:12)  T(F): 97.8 (2019 11:52), Max: 98.1 (2019 17:12)  HR: 100 (2019 16:43) (88 - 140)  BP: 98/65 (2019 15:30) (98/65 - 128/73)  BP(mean): --  RR: 18 (2019 15:30) (18 - 20)  SpO2: 98% (2019 15:30) (94% - 100%)    I&O's Summary        Physical Exam:   GENERAL: NAD, well-groomed, well-developed  HEENT: COLEEN/   Atraumatic, Normocephalic  ENMT: No tonsillar erythema, exudates, or enlargement; Moist mucous membranes, Good dentition, No lesions  NECK: Supple, No JVD, Normal thyroid  CHEST/LUNG: Bibjasilra crackles: no wheezing   CVS: Regular rate and rhythm; No murmurs, rubs, or gallops  GI: : Soft, Nontender, Nondistended; Bowel sounds present  NERVOUS SYSTEM:  Alert & Oriented X3  EXTREMITIES:  2+ Peripheral Pulses, No clubbing, cyanosis, or edema  LYMPH: No lymphadenopathy noted  SKIN: No rashes or lesions  ENDOCRINOLOGY: No Thyromegaly  PSYCH: Appropriate    Labs:                              10.7   8.88  )-----------( 210      ( 2019 07:40 )             35.9                         10.7   13.77 )-----------( 203      ( 2019 06:59 )             35.6                         10.8   5.33  )-----------( 193      ( 2019 04:45 )             34.8                         10.1   7.36  )-----------( 176      ( 2019 03:00 )             31.9         137  |  95<L>  |  31<H>  ----------------------------<  86  3.8   |  30  |  1.34<H>      134<L>  |  95<L>  |  27<H>  ----------------------------<  79  4.4   |  28  |  1.43<H>      137  |  96<L>  |  24<H>  ----------------------------<  87  4.3   |  33<H>  |  1.29      135  |  93<L>  |  23  ----------------------------<  86  3.1<L>   |  32<H>  |  1.34<H>    Ca    8.2<L>      2019 07:41  Ca    8.7      2019 06:59  Mg     1.7           CAPILLARY BLOOD GLUCOSE      < from: Xray Chest 1 View- PORTABLE-Urgent (19 @ 12:57) >    Comparison: Most recent prior     FINDINGS:    Lines/Tubes: None    Heart and mediastinum:  Unchanged.    Lungs, pleura,and airways: Multifocal airspace disease noted with   interstitial edema.    Bones and soft tissues: The bones and soft tissues are unchanged.    Impression:    Multifocal airspace disease which may reflect an alveolar component of   edema. This appears slightly worse than on previous exam.                  ALEKSANDAR HARMON M.D., ATTENDING RADIOLOGIST  This document has been electronically signed. 2019  1:12PM        < end of copied text >          Urinalysis Basic - ( 2019 23:25 )    Color: YELLOW / Appearance: TURBID / S.015 / pH: 6.5  Gluc: NEGATIVE / Ketone: NEGATIVE  / Bili: NEGATIVE / Urobili: NORMAL   Blood: MODERATE / Protein: 200 / Nitrite: POSITIVE   Leuk Esterase: LARGE / RBC: >50 / WBC >50   Sq Epi: x / Non Sq Epi: FEW / Bacteria: LARGE      Serum Pro-Brain Natriuretic Peptide: 68218 pg/mL ( @ 07:41)        RECENT CULTURES:   @ 23:40 URINE CATHETER                   @ 03:14 BLOOD PERIPHERAL                  NO ORGANISMS ISOLATED  NO ORGANISMS ISOLATED AT 72 HRS.   @ 07:03 BLOOD PERIPHERAL                  NO ORGANISMS ISOLATED  NO ORGANISMS ISOLATED AT 96 HOURS        RESPIRATORY CULTURES:          Studies  Chest X-RAY  CT SCAN Chest   Venous Dopplers: LE:   CT Abdomen  Others

## 2019-01-29 NOTE — PROGRESS NOTE ADULT - SUBJECTIVE AND OBJECTIVE BOX
CARDIOLOGY FOLLOW UP - Dr. Vieyra    CC no cp or sob       PHYSICAL EXAM:  T(C): 36.6 (01-29-19 @ 11:52), Max: 36.7 (01-28-19 @ 17:12)  HR: 111 (01-29-19 @ 11:52) (88 - 140)  BP: 99/63 (01-29-19 @ 11:52) (99/63 - 128/73)  RR: 18 (01-29-19 @ 11:52) (18 - 20)  SpO2: 99% (01-29-19 @ 11:52) (94% - 100%)  Wt(kg): --  I&O's Summary      Appearance: Normal	  Cardiovascular: Normal S1 S2, irregular   Respiratory: coarse , exp wheeze   Gastrointestinal:  Soft, Non-tender, + BS	  Extremities: Normal range of motion, No clubbing, cyanosis or edema        MEDICATIONS  (STANDING):  ALBUTerol/ipratropium for Nebulization 3 milliLiter(s) Nebulizer every 6 hours  atorvastatin 40 milliGRAM(s) Oral at bedtime  buDESOnide  80 MICROgram(s)/formoterol 4.5 MICROgram(s) Inhaler 2 Puff(s) Inhalation two times a day  clopidogrel Tablet 75 milliGRAM(s) Oral daily  dabigatran 75 milliGRAM(s) Oral every 12 hours  furosemide    Tablet 40 milliGRAM(s) Oral daily  metoprolol tartrate 37.5 milliGRAM(s) Oral two times a day  metroNIDAZOLE  IVPB      metroNIDAZOLE  IVPB 500 milliGRAM(s) IV Intermittent every 8 hours  oseltamivir 30 milliGRAM(s) Oral two times a day  pantoprazole  Injectable 40 milliGRAM(s) IV Push two times a day  thiamine 100 milliGRAM(s) Oral daily  vancomycin    Solution 125 milliGRAM(s) Oral every 6 hours      TELEMETRY: Afib with RVR HR 90-130s 	    ECG:  	  RADIOLOGY:   DIAGNOSTIC TESTING:  [ ] Echocardiogram:  [ ]  Catheterization:  [ ] Stress Test:    OTHER: 	  < from: Xray Chest 1 View- PORTABLE-Urgent (01.28.19 @ 12:57) >    Impression:    Multifocal airspace disease which may reflect an alveolar component of   edema. This appears slightly worse than on previous exam.        < end of copied text >    LABS:	 	                                10.7   8.88  )-----------( 210      ( 29 Jan 2019 07:40 )             35.9     01-29    137  |  95<L>  |  31<H>  ----------------------------<  86  3.8   |  30  |  1.34<H>    Ca    8.2<L>      29 Jan 2019 07:41  Mg     1.7     01-28

## 2019-01-29 NOTE — PROGRESS NOTE ADULT - ASSESSMENT
78 year old female with right heel ulceration (stable); RLE pain with non-palp pulses  -Cont dressing right foot ulceration with santyl and DSD  -STONEY/PVR poor, 0.60 on the effected limb with flat waveforms.  -  -Vasc sx consult noted, planning RLE angio once optimized renally   -No local signs of infection - would recommend monitoring off antibiotics.  Wound is unchanged.   -Z floats at all times  -Will follow

## 2019-01-29 NOTE — PROGRESS NOTE ADULT - SUBJECTIVE AND OBJECTIVE BOX
Surgery Progress Note    SUBJECTIVE: Pt seen and examined at bedside. Patient comfortable and in no-apparent distress.       Vital Signs Last 24 Hrs  T(C): 36.6 (2019 05:01), Max: 36.9 (2019 12:04)  T(F): 97.9 (2019 05:01), Max: 98.5 (2019 12:04)  HR: 101 (2019 05:12) (88 - 119)  BP: 108/65 (2019 05:01) (95/57 - 128/73)  BP(mean): --  RR: 18 (2019 05:01) (18 - 22)  SpO2: 98% (2019 05:12) (94% - 100%)    Physical Exam:  General Appearance: Appears well, NAD  Respiratory: No labored breathing  CV: Pulse regularly present  Abdomen: Soft, nontense  Palpable femorals and popliteals b/l  DP/PT signals b/l  Extremities warm   R ankle just above calcaneous with punctate ulceration ~1.5cm    LABS:                        10.7   13.77 )-----------( 203      ( 2019 06:59 )             35.6     01-28    134<L>  |  95<L>  |  27<H>  ----------------------------<  79  4.4   |  28  |  1.43<H>    Ca    8.7      2019 06:59  Mg     1.7             Urinalysis Basic - ( 2019 23:25 )    Color: YELLOW / Appearance: TURBID / S.015 / pH: 6.5  Gluc: NEGATIVE / Ketone: NEGATIVE  / Bili: NEGATIVE / Urobili: NORMAL   Blood: MODERATE / Protein: 200 / Nitrite: POSITIVE   Leuk Esterase: LARGE / RBC: >50 / WBC >50   Sq Epi: x / Non Sq Epi: FEW / Bacteria: LARGE        INs and OUTs: Surgery Progress Note    SUBJECTIVE: Pt seen and examined at bedside. Patient comfortable and in no-apparent distress.       Vital Signs Last 24 Hrs  T(C): 36.6 (2019 05:01), Max: 36.9 (2019 12:04)  T(F): 97.9 (2019 05:01), Max: 98.5 (2019 12:04)  HR: 101 (2019 05:12) (88 - 119)  BP: 108/65 (2019 05:01) (95/57 - 128/73)  BP(mean): --  RR: 18 (2019 05:01) (18 - 22)  SpO2: 98% (2019 05:12) (94% - 100%)    Physical Exam:  General Appearance: Appears well, NAD  Respiratory: No labored breathing  CV: Pulse regularly present  Abdomen: Soft, nontense  Palpable femorals and popliteals b/l  DP/PT signals b/l  Extremities warm   R ankle just above calcaneous with punctate ulceration  3mm diam     LABS:                        10.7   13.77 )-----------( 203      ( 2019 06:59 )             35.6     01-    134<L>  |  95<L>  |  27<H>  ----------------------------<  79  4.4   |  28  |  1.43<H>    Ca    8.7      2019 06:59  Mg     1.7             Urinalysis Basic - ( 2019 23:25 )    Color: YELLOW / Appearance: TURBID / S.015 / pH: 6.5  Gluc: NEGATIVE / Ketone: NEGATIVE  / Bili: NEGATIVE / Urobili: NORMAL   Blood: MODERATE / Protein: 200 / Nitrite: POSITIVE   Leuk Esterase: LARGE / RBC: >50 / WBC >50   Sq Epi: x / Non Sq Epi: FEW / Bacteria: LARGE

## 2019-01-29 NOTE — CHART NOTE - NSCHARTNOTEFT_GEN_A_CORE
UA with Positive nitrite, + leukoestrase, Urine culture with >100,000 CFU.  Patient is on Vanco and Flagyl for Cdiff started 1/28.  Will start Cetriaxone.  PMD made aware.  [place] PAIZ

## 2019-01-30 LAB
-  AMIKACIN: SIGNIFICANT CHANGE UP
-  AZTREONAM: SIGNIFICANT CHANGE UP
-  CEFEPIME: SIGNIFICANT CHANGE UP
-  CEFTAZIDIME: SIGNIFICANT CHANGE UP
-  CIPROFLOXACIN: SIGNIFICANT CHANGE UP
-  GENTAMICIN: SIGNIFICANT CHANGE UP
-  IMIPENEM: SIGNIFICANT CHANGE UP
-  LEVOFLOXACIN: SIGNIFICANT CHANGE UP
-  MEROPENEM: SIGNIFICANT CHANGE UP
-  TOBRAMYCIN: SIGNIFICANT CHANGE UP
ANION GAP SERPL CALC-SCNC: 9 MMO/L — SIGNIFICANT CHANGE UP (ref 7–14)
BACTERIA BLD CULT: SIGNIFICANT CHANGE UP
BACTERIA BLD CULT: SIGNIFICANT CHANGE UP
BACTERIA UR CULT: SIGNIFICANT CHANGE UP
BUN SERPL-MCNC: 40 MG/DL — HIGH (ref 7–23)
CALCIUM SERPL-MCNC: 8.3 MG/DL — LOW (ref 8.4–10.5)
CHLORIDE SERPL-SCNC: 96 MMOL/L — LOW (ref 98–107)
CO2 SERPL-SCNC: 32 MMOL/L — HIGH (ref 22–31)
CREAT SERPL-MCNC: 1.77 MG/DL — HIGH (ref 0.5–1.3)
GLUCOSE SERPL-MCNC: 104 MG/DL — HIGH (ref 70–99)
HCT VFR BLD CALC: 33.6 % — LOW (ref 34.5–45)
HGB BLD-MCNC: 10.3 G/DL — LOW (ref 11.5–15.5)
MAGNESIUM SERPL-MCNC: 1.8 MG/DL — SIGNIFICANT CHANGE UP (ref 1.6–2.6)
MCHC RBC-ENTMCNC: 28.1 PG — SIGNIFICANT CHANGE UP (ref 27–34)
MCHC RBC-ENTMCNC: 30.7 % — LOW (ref 32–36)
MCV RBC AUTO: 91.8 FL — SIGNIFICANT CHANGE UP (ref 80–100)
METHOD TYPE: SIGNIFICANT CHANGE UP
NRBC # FLD: 0 K/UL — LOW (ref 25–125)
ORGANISM # SPEC MICROSCOPIC CNT: SIGNIFICANT CHANGE UP
ORGANISM # SPEC MICROSCOPIC CNT: SIGNIFICANT CHANGE UP
PLATELET # BLD AUTO: 183 K/UL — SIGNIFICANT CHANGE UP (ref 150–400)
PMV BLD: 10.3 FL — SIGNIFICANT CHANGE UP (ref 7–13)
POTASSIUM SERPL-MCNC: 3.3 MMOL/L — LOW (ref 3.5–5.3)
POTASSIUM SERPL-SCNC: 3.3 MMOL/L — LOW (ref 3.5–5.3)
RBC # BLD: 3.66 M/UL — LOW (ref 3.8–5.2)
RBC # FLD: 16.2 % — HIGH (ref 10.3–14.5)
SODIUM SERPL-SCNC: 137 MMOL/L — SIGNIFICANT CHANGE UP (ref 135–145)
WBC # BLD: 7.91 K/UL — SIGNIFICANT CHANGE UP (ref 3.8–10.5)
WBC # FLD AUTO: 7.91 K/UL — SIGNIFICANT CHANGE UP (ref 3.8–10.5)

## 2019-01-30 PROCEDURE — 99232 SBSQ HOSP IP/OBS MODERATE 35: CPT

## 2019-01-30 PROCEDURE — 99233 SBSQ HOSP IP/OBS HIGH 50: CPT

## 2019-01-30 RX ORDER — POTASSIUM CHLORIDE 20 MEQ
10 PACKET (EA) ORAL EVERY 4 HOURS
Qty: 0 | Refills: 0 | Status: COMPLETED | OUTPATIENT
Start: 2019-01-30 | End: 2019-01-30

## 2019-01-30 RX ORDER — MIDODRINE HYDROCHLORIDE 2.5 MG/1
5 TABLET ORAL EVERY 8 HOURS
Qty: 0 | Refills: 0 | Status: DISCONTINUED | OUTPATIENT
Start: 2019-01-30 | End: 2019-02-15

## 2019-01-30 RX ORDER — METOPROLOL TARTRATE 50 MG
50 TABLET ORAL
Qty: 0 | Refills: 0 | Status: DISCONTINUED | OUTPATIENT
Start: 2019-01-30 | End: 2019-02-15

## 2019-01-30 RX ORDER — LEVALBUTEROL 1.25 MG/.5ML
0.63 SOLUTION, CONCENTRATE RESPIRATORY (INHALATION) EVERY 6 HOURS
Qty: 0 | Refills: 0 | Status: DISCONTINUED | OUTPATIENT
Start: 2019-01-30 | End: 2019-02-15

## 2019-01-30 RX ORDER — SODIUM CHLORIDE 9 MG/ML
1000 INJECTION INTRAMUSCULAR; INTRAVENOUS; SUBCUTANEOUS
Qty: 0 | Refills: 0 | Status: DISCONTINUED | OUTPATIENT
Start: 2019-01-30 | End: 2019-01-31

## 2019-01-30 RX ADMIN — SODIUM CHLORIDE 50 MILLILITER(S): 9 INJECTION INTRAMUSCULAR; INTRAVENOUS; SUBCUTANEOUS at 15:45

## 2019-01-30 RX ADMIN — Medication 100 MILLIEQUIVALENT(S): at 13:10

## 2019-01-30 RX ADMIN — PANTOPRAZOLE SODIUM 40 MILLIGRAM(S): 20 TABLET, DELAYED RELEASE ORAL at 17:14

## 2019-01-30 RX ADMIN — LEVALBUTEROL 0.63 MILLIGRAM(S): 1.25 SOLUTION, CONCENTRATE RESPIRATORY (INHALATION) at 21:59

## 2019-01-30 RX ADMIN — Medication 0.5 MILLIGRAM(S): at 23:38

## 2019-01-30 RX ADMIN — BUDESONIDE AND FORMOTEROL FUMARATE DIHYDRATE 2 PUFF(S): 160; 4.5 AEROSOL RESPIRATORY (INHALATION) at 08:22

## 2019-01-30 RX ADMIN — Medication 20 MILLIGRAM(S): at 14:22

## 2019-01-30 RX ADMIN — Medication 100 MILLIEQUIVALENT(S): at 10:45

## 2019-01-30 RX ADMIN — Medication 125 MILLIGRAM(S): at 17:14

## 2019-01-30 RX ADMIN — Medication 125 MILLIGRAM(S): at 05:50

## 2019-01-30 RX ADMIN — CLOPIDOGREL BISULFATE 75 MILLIGRAM(S): 75 TABLET, FILM COATED ORAL at 11:55

## 2019-01-30 RX ADMIN — Medication 20 MILLIGRAM(S): at 23:17

## 2019-01-30 RX ADMIN — ATORVASTATIN CALCIUM 40 MILLIGRAM(S): 80 TABLET, FILM COATED ORAL at 23:11

## 2019-01-30 RX ADMIN — Medication 0.5 MILLIGRAM(S): at 06:57

## 2019-01-30 RX ADMIN — Medication 3 MILLILITER(S): at 18:18

## 2019-01-30 RX ADMIN — Medication 100 MILLIGRAM(S): at 15:45

## 2019-01-30 RX ADMIN — Medication 125 MILLIGRAM(S): at 23:11

## 2019-01-30 RX ADMIN — DABIGATRAN ETEXILATE MESYLATE 75 MILLIGRAM(S): 150 CAPSULE ORAL at 17:14

## 2019-01-30 RX ADMIN — Medication 100 MILLIGRAM(S): at 05:49

## 2019-01-30 RX ADMIN — Medication 125 MILLIGRAM(S): at 00:18

## 2019-01-30 RX ADMIN — Medication 3 MILLILITER(S): at 03:49

## 2019-01-30 RX ADMIN — MIDODRINE HYDROCHLORIDE 5 MILLIGRAM(S): 2.5 TABLET ORAL at 23:11

## 2019-01-30 RX ADMIN — DABIGATRAN ETEXILATE MESYLATE 75 MILLIGRAM(S): 150 CAPSULE ORAL at 05:50

## 2019-01-30 RX ADMIN — Medication 125 MILLIGRAM(S): at 11:55

## 2019-01-30 RX ADMIN — Medication 30 MILLIGRAM(S): at 05:50

## 2019-01-30 RX ADMIN — Medication 3 MILLILITER(S): at 09:20

## 2019-01-30 RX ADMIN — Medication 100 MILLIGRAM(S): at 11:55

## 2019-01-30 RX ADMIN — BUDESONIDE AND FORMOTEROL FUMARATE DIHYDRATE 2 PUFF(S): 160; 4.5 AEROSOL RESPIRATORY (INHALATION) at 23:11

## 2019-01-30 RX ADMIN — Medication 30 MILLIGRAM(S): at 17:14

## 2019-01-30 RX ADMIN — SODIUM CHLORIDE 50 MILLILITER(S): 9 INJECTION INTRAMUSCULAR; INTRAVENOUS; SUBCUTANEOUS at 11:54

## 2019-01-30 RX ADMIN — PANTOPRAZOLE SODIUM 40 MILLIGRAM(S): 20 TABLET, DELAYED RELEASE ORAL at 05:50

## 2019-01-30 RX ADMIN — MIDODRINE HYDROCHLORIDE 5 MILLIGRAM(S): 2.5 TABLET ORAL at 15:45

## 2019-01-30 NOTE — PROGRESS NOTE ADULT - ASSESSMENT
79F w/ nonhealing R posterior ankle ulcer and arterial insuff    - Continue wound care  - Awaiting clearance of c. diff  - Will schedule angiogram when patient clears c. diff    [id number]

## 2019-01-30 NOTE — PROGRESS NOTE ADULT - SUBJECTIVE AND OBJECTIVE BOX
[facility] NEPHROLOGY- PROGRESS NOTE    78y Female with history of CHF, COPD presents with SOB found to have guaiac positive anemia and afib with RVR. Nephrology consulted for elevated Scr.    REVIEW OF SYSTEMS:  Gen: no changes in weight  Cards: no chest pain  Resp: + dyspnea with cough  GI: no nausea or vomiting, + diarrhea  Vascular: no LE edema +R foot pain    No Known Allergies      Hospital Medications: Medications reviewed      VITALS:  T(F): 97.8 (19 @ 05:46), Max: 98 (19 @ 22:50)  HR: 100 (19 @ 09:20)  BP: 97/64 (19 @ 05:46)  RR: 19 (19 @ 05:46)  SpO2: 100% (19 @ 05:46)  Wt(kg): --     @ 07:  -   @ 07:00  --------------------------------------------------------  IN: 170 mL / OUT: 2 mL / NET: 168 mL        PHYSICAL EXAM:    Gen: NAD, calm  Cards: Irregularly irregular, +S1/S2, no M/G/R  Resp: course BS, bibasilar rales  GI: soft, NT/ND, NABS  Vascular: no LE edema B/L      LABS:      137  |  96<L>  |  40<H>  ----------------------------<  104<H>  3.3<L>   |  32<H>  |  1.77<H>    Ca    8.3<L>      2019 07:09  Mg     1.8           Creatinine Trend: 1.77 <--, 1.34 <--, 1.43 <--, 1.29 <--, 1.34 <--, 1.34 <--, 1.34 <--                        10.3   7.91  )-----------( 183      ( 2019 07:09 )             33.6     Urine Studies:  Urinalysis Basic - ( 2019 23:25 )    Color: YELLOW / Appearance: TURBID / S.015 / pH: 6.5  Gluc: NEGATIVE / Ketone: NEGATIVE  / Bili: NEGATIVE / Urobili: NORMAL   Blood: MODERATE / Protein: 200 / Nitrite: POSITIVE   Leuk Esterase: LARGE / RBC: >50 / WBC >50   Sq Epi:  / Non Sq Epi: FEW / Bacteria: LARGE [facility] NEPHROLOGY- PROGRESS NOTE    78y Female with history of CHF, COPD presents with SOB found to have guaiac positive anemia and afib with RVR. Nephrology consulted for elevated Scr.    REVIEW OF SYSTEMS:  Gen: no changes in weight  Cards: no chest pain  Resp: + dyspnea with cough  GI: no nausea or vomiting, + diarrhea  Vascular: no LE edema +R foot pain    No Known Allergies      Hospital Medications: Medications reviewed      VITALS:  T(F): 97.8 (19 @ 05:46), Max: 98 (19 @ 22:50)  HR: 100 (19 @ 09:20)  BP: 97/64 (19 @ 05:46)  RR: 19 (19 @ 05:46)  SpO2: 100% (19 @ 05:46)  Wt(kg): --     @ 07:  -   @ 07:00  --------------------------------------------------------  IN: 170 mL / OUT: 2 mL / NET: 168 mL        PHYSICAL EXAM:    Gen: NAD, calm  Cards: Irregularly irregular, +S1/S2, no M/G/R  Resp: course BS, bibasilar rales  GI: soft, NT/ND, NABS  Vascular: no LE edema B/L      LABS:      137  |  96<L>  |  40<H>  ----------------------------<  104<H>  3.3<L>   |  32<H>  |  1.77<H>    Ca    8.3<L>      2019 07:09  Mg     1.8           Creatinine Trend: 1.77 <--, 1.34 <--, 1.43 <--, 1.29 <--, 1.34 <--, 1.34 <--, 1.34 <--                        10.3   7.91  )-----------( 183      ( 2019 07:09 )             33.6     Urine Studies:  Urinalysis Basic - ( 2019 23:25 )    Color: YELLOW / Appearance: TURBID / S.015 / pH: 6.5  Gluc: NEGATIVE / Ketone: NEGATIVE  / Bili: NEGATIVE / Urobili: NORMAL   Blood: MODERATE / Protein: 200 / Nitrite: POSITIVE   Leuk Esterase: LARGE / RBC: >50 / WBC >50   Sq Epi:  / Non Sq Epi: FEW / Bacteria: LARGE        < from: CT Chest No Cont (19 @ 19:48) >  IMPRESSION:     Partially loculated small bilateral pleural effusions appear decreased in   size from prior exam. Mild interlobular septal thickeningsecondary to   mild pulmonary edema also improved.    Multiple small nodular opacities or tree-in-bud opacities noted within   the bilateral upper lobes secondary to  impacted distal airways, many of   which are new since the 2019 likely of infectious etiology.    Debris noted within the trachea and left lower lobar bronchi new since   the prior study with associated left lower lobe patchy opacity with mild   interval increase in size representing atelectasis and/or pneumonia. The   constellation of findings are suggestive of aspiration.    < end of copied text >

## 2019-01-30 NOTE — PROGRESS NOTE ADULT - SUBJECTIVE AND OBJECTIVE BOX
Patient is a 79y old  Female who presents with a chief complaint of right foot pressure ulcer pain (30 Jan 2019 12:27)      Any change in ROS: Todasy she is wheezing:     MEDICATIONS  (STANDING):  ALBUTerol/ipratropium for Nebulization 3 milliLiter(s) Nebulizer every 6 hours  atorvastatin 40 milliGRAM(s) Oral at bedtime  buDESOnide  80 MICROgram(s)/formoterol 4.5 MICROgram(s) Inhaler 2 Puff(s) Inhalation two times a day  cefTRIAXone   IVPB      cefTRIAXone   IVPB 1 Gram(s) IV Intermittent every 24 hours  clopidogrel Tablet 75 milliGRAM(s) Oral daily  dabigatran 75 milliGRAM(s) Oral every 12 hours  furosemide    Tablet 40 milliGRAM(s) Oral daily  methylPREDNISolone sodium succinate Injectable 20 milliGRAM(s) IV Push every 8 hours  metoprolol tartrate 50 milliGRAM(s) Oral two times a day  metroNIDAZOLE  IVPB      metroNIDAZOLE  IVPB 500 milliGRAM(s) IV Intermittent every 8 hours  midodrine 5 milliGRAM(s) Oral every 8 hours  oseltamivir 30 milliGRAM(s) Oral two times a day  pantoprazole  Injectable 40 milliGRAM(s) IV Push two times a day  sodium chloride 0.9%. 1000 milliLiter(s) (50 mL/Hr) IV Continuous <Continuous>  thiamine 100 milliGRAM(s) Oral daily  vancomycin    Solution 125 milliGRAM(s) Oral every 6 hours    MEDICATIONS  (PRN):  acetaminophen   Tablet .. 650 milliGRAM(s) Oral every 6 hours PRN Temp greater or equal to 38C (100.4F), Mild Pain (1 - 3), Moderate Pain (4 - 6)  LORazepam     Tablet 0.5 milliGRAM(s) Oral two times a day PRN Anxiety  traMADol 50 milliGRAM(s) Oral every 8 hours PRN Severe Pain (7 - 10)    Vital Signs Last 24 Hrs  T(C): 36.6 (30 Jan 2019 05:46), Max: 36.7 (29 Jan 2019 22:50)  T(F): 97.8 (30 Jan 2019 05:46), Max: 98 (29 Jan 2019 22:50)  HR: 100 (30 Jan 2019 09:20) (92 - 115)  BP: 97/64 (30 Jan 2019 05:46) (97/64 - 102/67)  BP(mean): --  RR: 19 (30 Jan 2019 05:46) (16 - 19)  SpO2: 100% (30 Jan 2019 05:46) (95% - 100%)    I&O's Summary    29 Jan 2019 07:01  -  30 Jan 2019 07:00  --------------------------------------------------------  IN: 170 mL / OUT: 2 mL / NET: 168 mL          Physical Exam:   GENERAL: NAD, well-groomed, well-developed  HEENT: COLEEN/   Atraumatic, Normocephalic  ENMT: No tonsillar erythema, exudates, or enlargement; Moist mucous membranes, Good dentition, No lesions  NECK: Supple, No JVD, Normal thyroid  CHEST/LUNG: Wheezing+  CVS: Regular rate and rhythm; No murmurs, rubs, or gallops  GI: : Soft, Nontender, Nondistended; Bowel sounds present  NERVOUS SYSTEM:  Alert & Oriented X3  EXTREMITIES:  2+ Peripheral Pulses, No clubbing, cyanosis, or edema  LYMPH: No lymphadenopathy noted  SKIN: No rashes or lesions  ENDOCRINOLOGY: No Thyromegaly  PSYCH: Appropriate    Labs:                              10.3   7.91  )-----------( 183      ( 30 Jan 2019 07:09 )             33.6                         10.7   8.88  )-----------( 210      ( 29 Jan 2019 07:40 )             35.9                         10.7   13.77 )-----------( 203      ( 28 Jan 2019 06:59 )             35.6                         10.8   5.33  )-----------( 193      ( 27 Jan 2019 04:45 )             34.8     01-30    137  |  96<L>  |  40<H>  ----------------------------<  104<H>  3.3<L>   |  32<H>  |  1.77<H>  01-29    137  |  95<L>  |  31<H>  ----------------------------<  86  3.8   |  30  |  1.34<H>  01-28    134<L>  |  95<L>  |  27<H>  ----------------------------<  79  4.4   |  28  |  1.43<H>  01-27    137  |  96<L>  |  24<H>  ----------------------------<  87  4.3   |  33<H>  |  1.29    Ca    8.3<L>      30 Jan 2019 07:09  Ca    8.2<L>      29 Jan 2019 07:41  Mg     1.8     01-30      CAPILLARY BLOOD GLUCOSE                Serum Pro-Brain Natriuretic Peptide: 51991 pg/mL (01-29 @ 07:41)        RECENT CULTURES:  01-27 @ 23:40 URINE CATHETER                  01-26 @ 03:14 BLOOD PERIPHERAL                  NO ORGANISMS ISOLATED  NO ORGANISMS ISOLATED AT 96 HOURS  01-25 @ 07:03 BLOOD PERIPHERAL            < from: CT Chest No Cont (01.29.19 @ 19:48) >  calcifications. Aortic valvular calcifications. Mitral annular   calcifications.  MEDIASTINUM AND GEOVANI: Scattered subcentimeter lymph nodes.  CHEST WALL AND LOWER NECK: Heterogenous appearing thyroid gland.  VISUALIZED UPPER ABDOMEN: Partially visualized left renal cyst.  BONES: Degenerative changes of the spine. Old healed rib fractures.    IMPRESSION:     Partially loculated small bilateral pleural effusions appear decreased in   size from prior exam. Mild interlobular septal thickeningsecondary to   mild pulmonary edema also improved.    Multiple small nodular opacities or tree-in-bud opacities noted within   the bilateral upper lobes secondary to  impacted distal airways, many of   which are new since the January 24, 2019 likely of infectious etiology.    Debris noted within the trachea and left lower lobar bronchi new since   the prior study with associated left lower lobe patchy opacity with mild   interval increase in size representing atelectasis and/or pneumonia. The   constellation of findings are suggestive of aspiration.                    KAMRON SMALLS M.D., RADIOLOGY RESIDENT  This document has been electronically signed.  ZACH MORROW M.D. ATTENDING RADIOLOGIST  This document has been electronically signed. Jan 30 2019 10:21AM        < end of copied text >        NO ORGANISMS ISOLATED        RESPIRATORY CULTURES:          Studies  Chest X-RAY  CT SCAN Chest   Venous Dopplers: LE:   CT Abdomen  Others

## 2019-01-30 NOTE — PROGRESS NOTE ADULT - SUBJECTIVE AND OBJECTIVE BOX
developed sob wheezing today    ICU Vital Signs Last 24 Hrs  T(C): 36.4 (30 Jan 2019 15:49), Max: 36.7 (29 Jan 2019 22:50)  T(F): 97.6 (30 Jan 2019 15:49), Max: 98 (29 Jan 2019 22:50)  HR: 132 (30 Jan 2019 17:10) (92 - 133)  BP: 90/65 (30 Jan 2019 17:10) (90/65 - 102/67)  BP(mean): --  ABP: --  ABP(mean): --  RR: 18 (30 Jan 2019 15:49) (16 - 19)  SpO2: 88% (30 Jan 2019 17:10) (88% - 100%)                        10.3   7.91  )-----------( 183      ( 30 Jan 2019 07:09 )             33.6   01-30    137  |  96<L>  |  40<H>  ----------------------------<  104<H>  3.3<L>   |  32<H>  |  1.77<H>    Ca    8.3<L>      30 Jan 2019 07:09  Mg     1.8     01-30    MEDICATIONS  (STANDING):  ALBUTerol/ipratropium for Nebulization 3 milliLiter(s) Nebulizer every 6 hours  atorvastatin 40 milliGRAM(s) Oral at bedtime  buDESOnide  80 MICROgram(s)/formoterol 4.5 MICROgram(s) Inhaler 2 Puff(s) Inhalation two times a day  clopidogrel Tablet 75 milliGRAM(s) Oral daily  dabigatran 75 milliGRAM(s) Oral every 12 hours  furosemide    Tablet 40 milliGRAM(s) Oral daily  methylPREDNISolone sodium succinate Injectable 20 milliGRAM(s) IV Push every 8 hours  metoprolol tartrate 50 milliGRAM(s) Oral two times a day  metroNIDAZOLE  IVPB      metroNIDAZOLE  IVPB 500 milliGRAM(s) IV Intermittent every 8 hours  midodrine 5 milliGRAM(s) Oral every 8 hours  oseltamivir 30 milliGRAM(s) Oral two times a day  pantoprazole  Injectable 40 milliGRAM(s) IV Push two times a day  sodium chloride 0.9%. 1000 milliLiter(s) (50 mL/Hr) IV Continuous <Continuous>  thiamine 100 milliGRAM(s) Oral daily  vancomycin    Solution 125 milliGRAM(s) Oral every 6 hours

## 2019-01-30 NOTE — PROGRESS NOTE ADULT - ASSESSMENT
chest xray 1/28/19:  revealed multifocal airspace disease, may reflect aveolar component of edema.    a/p   77 y/o female, with a PmHx of COPD (on 2-3L O2 prn), paroxsymal a-fib (on pradaxa), CAD (s/p stent 11/2018), DCHF, HTN, HLD, and anxiety, presenting with acute/chronic diastolic chf, chest pain r/o acs, GIB, non healing pressure ulcer. Hospital course now complicated ruling in + RVP and CDiff / uti     1. Atypical chest pain, resolved  in the setting of acute CHF exacerbation, afib w/ RVR   cv stable, no evidence of acute ischemia/ACS   Echo with nl lv fxn  continue statin, bb, plavix     2. Acute/chronic diastolic chf  remains hypoxic on room air, on supplemental O2.. denies SOB, volume status is stable   ct chest revealed decreased bl pleural effusion, mutiple small nodular opacities secondary to impacted distal airways likely infectious etiology? pna LLL, aspiration    + rvp , pulmo f/u   creat rising, hold diuretic given active diarrhea and decrease PO intake   cont bb  echo with nl lv fxn    3. AFIB,  intermitted of RVR likely in the setting of hypoxia, acute viral/ bacterial infection   dig on hold due to hx of pauses and bradycardia   sys bp soft, will start midodrine 5 mg q 8 hrs for bp support giving room to uptitrate BB for rate control   increase lopressor to 50 mg BID  CHADS 3 - continue with pradaxa     4. CAD s/p PCI (11/2018)  cv stable, no evidence of acute ischemia   continue bb, statin, continue plavix given recent PCI      5. GIB   +GUIAC , +anemia s/p 1uprbc,   continue to trend cbc  GI f/u      6. non healing foot ulcer  med f/u  ID f/u , vascular studies noted, pod f/u  eventual plan for RLE angiogram    7. IGOR/CKD   creat rising, will hold lasix given decrease po intake and + diarrhea   renal f/u     8.Cdiff   po abx, id f/u    7. hypoxia    likely in the setting of resp infection ? aspiration   no decomp CHF on exam    + influenza   on Tamiflu  ct chest revealed decreased bl pleural effusion, mutiple small nodular opacities secondary to impacted distal airways likely infectious etiology? pna LLL, aspiration    pulm f/u  pending speech and swallow eval     8. UTI   pending UC, med f/u     dvt ppx

## 2019-01-30 NOTE — PROGRESS NOTE ADULT - SUBJECTIVE AND OBJECTIVE BOX
Surgery Progress Note    SUBJECTIVE: Pt seen and examined at bedside. Patient comfortable and in no-apparent distress.       Vital Signs Last 24 Hrs  T(C): 36.6 (30 Jan 2019 05:46), Max: 36.7 (29 Jan 2019 09:20)  T(F): 97.8 (30 Jan 2019 05:46), Max: 98 (29 Jan 2019 09:20)  HR: 102 (30 Jan 2019 05:46) (92 - 140)  BP: 97/64 (30 Jan 2019 05:46) (97/64 - 108/63)  BP(mean): --  RR: 19 (30 Jan 2019 05:46) (16 - 19)  SpO2: 100% (30 Jan 2019 05:46) (95% - 100%)    Physical Exam:  General Appearance: Appears well, NAD  Respiratory: No labored breathing  CV: Pulse regularly present  Abdomen: Soft, nontense  Palpable femorals and popliteals b/l  DP/PT signals b/l  Extremities warm   R ankle just above calcaneous with punctate ulceration  3mm diam       LABS:                        10.7   8.88  )-----------( 210      ( 29 Jan 2019 07:40 )             35.9     01-29    137  |  95<L>  |  31<H>  ----------------------------<  86  3.8   |  30  |  1.34<H>    Ca    8.2<L>      29 Jan 2019 07:41            INs and OUTs:    01-29-19 @ 07:01  -  01-30-19 @ 07:00  --------------------------------------------------------  IN: 170 mL / OUT: 2 mL / NET: 168 mL Surgery Progress Note    SUBJECTIVE: Pt seen and examined at bedside. Patient comfortable and in no-apparent distress.       Vital Signs Last 24 Hrs  T(C): 36.6 (30 Jan 2019 05:46), Max: 36.7 (29 Jan 2019 09:20)  T(F): 97.8 (30 Jan 2019 05:46), Max: 98 (29 Jan 2019 09:20)  HR: 102 (30 Jan 2019 05:46) (92 - 140)  BP: 97/64 (30 Jan 2019 05:46) (97/64 - 108/63)  BP(mean): --  RR: 19 (30 Jan 2019 05:46) (16 - 19)  SpO2: 100% (30 Jan 2019 05:46) (95% - 100%)    Physical Exam:  General Appearance: Appears well, NAD  Respiratory: No labored breathing  CV: Pulse regularly present  Abdomen: Soft, nontense  Palpable femorals and popliteals b/l  DP/PT signals b/l  Extremities warm   R ankle just above calcaneous with punctate ulceration  3mm diam stable       LABS:                        10.7   8.88  )-----------( 210      ( 29 Jan 2019 07:40 )             35.9     01-29    137  |  95<L>  |  31<H>  ----------------------------<  86  3.8   |  30  |  1.34<H>    Ca    8.2<L>      29 Jan 2019 07:41            INs and OUTs:    01-29-19 @ 07:01  -  01-30-19 @ 07:00  --------------------------------------------------------  IN: 170 mL / OUT: 2 mL / NET: 168 mL

## 2019-01-30 NOTE — PROGRESS NOTE ADULT - ASSESSMENT
79 y/o female, with a PmHx of COPD (on 2-3L O2 prn), paroxsymal a-fib (on pradaxa), HTN, HLD, and anxiety, presenting to the [facility] ED with right foot pressure ulcer pain, SOB, and CP, found to have Afib @102 bpm, H/H 8/24.7, mild interstitial pulmonary edema, proBNP 1374, guiac positive, BUN/Cr 36/1.68, admitted to telemetry for nonhealing pressure ulcer, Afib, CHF exacerbation, r/o ACS.

## 2019-01-30 NOTE — PROGRESS NOTE ADULT - SUBJECTIVE AND OBJECTIVE BOX
Follow Up:  fever, flu, c-diff    Interval History: pt had fever 2 days ago, RVP showed flu, C-diff positive and urine with pseudomonas    ROS:      All other systems negative    Constitutional: no fever, no chills  Head: no trauma  Eyes: no vision changes, no eye pain  ENT:  no sore throat, no rhinorrhea  Cardiovascular:  no chest pain, no palpitation  Respiratory:  no SOB, +cough  GI:  no abd pain, no vomiting, no diarrhea, had 1-2 BM/day  urinary: no dysuria, no hematuria, no flank pain  musculoskeletal:  no joint pain, no joint swelling  skin:  no rash  neurology:  no headache, no seizure, no change in mental status  psych: no anxiety, no depression         Allergies  No Known Allergies        ANTIMICROBIALS:  metroNIDAZOLE  IVPB    metroNIDAZOLE  IVPB 500 every 8 hours  oseltamivir 30 two times a day  vancomycin    Solution 125 every 6 hours      OTHER MEDS:  acetaminophen   Tablet .. 650 milliGRAM(s) Oral every 6 hours PRN  ALBUTerol/ipratropium for Nebulization 3 milliLiter(s) Nebulizer every 6 hours  atorvastatin 40 milliGRAM(s) Oral at bedtime  buDESOnide  80 MICROgram(s)/formoterol 4.5 MICROgram(s) Inhaler 2 Puff(s) Inhalation two times a day  clopidogrel Tablet 75 milliGRAM(s) Oral daily  dabigatran 75 milliGRAM(s) Oral every 12 hours  furosemide    Tablet 40 milliGRAM(s) Oral daily  LORazepam     Tablet 0.5 milliGRAM(s) Oral two times a day PRN  methylPREDNISolone sodium succinate Injectable 20 milliGRAM(s) IV Push every 8 hours  metoprolol tartrate 50 milliGRAM(s) Oral two times a day  midodrine 5 milliGRAM(s) Oral every 8 hours  pantoprazole  Injectable 40 milliGRAM(s) IV Push two times a day  sodium chloride 0.9%. 1000 milliLiter(s) IV Continuous <Continuous>  thiamine 100 milliGRAM(s) Oral daily  traMADol 50 milliGRAM(s) Oral every 8 hours PRN      Vital Signs Last 24 Hrs  T(C): 36.4 (30 Jan 2019 15:49), Max: 36.7 (29 Jan 2019 22:50)  T(F): 97.6 (30 Jan 2019 15:49), Max: 98 (29 Jan 2019 22:50)  HR: 133 (30 Jan 2019 15:49) (92 - 133)  BP: 99/66 (30 Jan 2019 15:49) (97/64 - 102/67)  BP(mean): --  RR: 18 (30 Jan 2019 15:49) (16 - 19)  SpO2: 100% (30 Jan 2019 15:49) (95% - 100%)    Physical Exam:  General:    NAD,  non toxic,  Head: atraumatic, normocephalic  Eye: normal sclera and conjunctiva  ENT:    no oropharyngeal lesions,   no LAD,   neck supple  Cardio:     regular S1, S2,  no murmur  Respiratory:    b/l diffuse wheezing  abd:     soft,   BS +,   no tenderness,    no organomegaly  :   no CVAT,  no suprapubic tenderness,   no  barney  Musculoskeletal:   no joint swelling,   no edema  vascular: no lines, normal pulses  Skin:    no rash, R heel ulcer with dressing  Neurologic:     no focal deficit  psych: normal affect, no suicidal ideation                          10.3   7.91  )-----------( 183      ( 30 Jan 2019 07:09 )             33.6       01-30    137  |  96<L>  |  40<H>  ----------------------------<  104<H>  3.3<L>   |  32<H>  |  1.77<H>    Ca    8.3<L>      30 Jan 2019 07:09  Mg     1.8     01-30            MICROBIOLOGY:  v  URINE CATHETER  01-27-19 --  --  Pseudomonas aeruginosa      BLOOD PERIPHERAL  01-26-19 --  --  --      BLOOD PERIPHERAL  01-25-19 --  --  --      .Stool Feces  01-06-19   GI PCR Results: NOT detected              RADIOLOGY:  Images below reviewed personally  < from: CT Chest No Cont (01.29.19 @ 19:48) >  IMPRESSION:     Partially loculated small bilateral pleural effusions appear decreased in   size from prior exam. Mild interlobular septal thickeningsecondary to   mild pulmonary edema also improved.    Multiple small nodular opacities or tree-in-bud opacities noted within   the bilateral upper lobes secondary to  impacted distal airways, many of   which are new since the January 24, 2019 likely of infectious etiology.    Debris noted within the trachea and left lower lobar bronchi new since   the prior study with associated left lower lobe patchy opacity with mild   interval increase in size representing atelectasis and/or pneumonia. The   constellation of findings are suggestive of aspiration.

## 2019-01-30 NOTE — PROGRESS NOTE ADULT - PROBLEM SELECTOR PLAN 1
Likely hemodynamically mediated secondary to diarrhea and decreased PO intake. Will discuss with cardiology gentle IVF versus holding lasix. Avoid nephrotoxins.    Recommend ID consult for pseudomonas UTI and would change CTX to Zosyn. Likely hemodynamically mediated secondary to diarrhea and decreased PO intake. Will discuss with cardiology starting gentle IVF today. Avoid nephrotoxins.    Recommend ID consult for pseudomonas UTI and would change CTX to Zosyn.

## 2019-01-30 NOTE — PROGRESS NOTE ADULT - ASSESSMENT
79 h/o female admitted for rt postrior ankle pressure ulcer    vascular following needs angiogram  need nephrology clearence  local wound care    Influenza positive   continue tamiflu for 5 days  pt wheezing today  pulmonary f/u appreciated   started on steroids  ct scan mucous plugging   Unlikely  aspiration   speech and swallow eval    CDIFF POSITIVE  continue vancomycin flagyl  dd/c flagyl as per ID  pseudomonas in urine asymptomatic  d/c ceftriaxone as per ID    chronic afib with rapid vntricular response  cardiology following    CKD   monitor BUN/CREATININE  appreciate nephrology f/u

## 2019-01-30 NOTE — PROGRESS NOTE ADULT - PROBLEM SELECTOR PLAN 1
FOUND TO HAVE NEW INFLUENZA IN the hospital : Started on Tamiflu:  1/30: started on solumedrol today as she has been wheezing: She is on antibiotics from ? [facility] : ct scan chest reviewed: has mucus impacted airways: As well as the effusions are smaller: ? left lower lobe opacity : would check speech and swallow: She has  underlying copd :

## 2019-01-30 NOTE — PROGRESS NOTE ADULT - ASSESSMENT
79 f with HTN, HLD, CAD s/p stent, CHF, paroxysmal A-fib COPD (on 2-3L O2 prn),recent admission 1/5/19 - 1/14/19 for C-diff after a recent augmentin course for cellulitis, discharged with PO vanco (End Date: 1/18/19), presented 1/17/19 with right foot pressure ulcer pain x several weeks, which was not infected and was observed off antibiotics and was being treated for CHF, but started to have fevers 1/26-1/28, had no urinary symptoms but urine cx showed pseudomonas, C-diff came back again positive, chest CT with ?aspiration and RVP with flu  pt was started on ceftriaxone for UTI and vanco+flagyl for C-diff  now pt denied any urinary symptoms or diarrhea but has cough and wheezing  WBC elevated after steroids for COPD exacerbation      Flu URI and pneumonia with COPD exacerbation  doubt this is aspiration, appears to be more mucus impaction and CHF  recurrent C-diff  leukocytosis mulifactorial due to steroids and ?C-diff  pseudomonas in the urine but asymptomatic likely asymptomatic bacteriuria    * DC ceftriaxone  * DC flagyl  * c/w PO vanco 125 q qid for 10 days                  then 125 tid for a week                  then 125 bid for a week                  then 125 qd for a week                  then 125 q 48 h for a week                  then 125 every third day for a week  * if fever or worsening status repeat cultures and start zosyn

## 2019-01-30 NOTE — PROGRESS NOTE ADULT - SUBJECTIVE AND OBJECTIVE BOX
BEVERLEY DEL RIO:[id number],   79yFemale followed for:  No Known Allergies    PAST MEDICAL & SURGICAL HISTORY:  CAD (coronary artery disease): s/p stent 2018  CHF (congestive heart failure)  COPD (chronic obstructive pulmonary disease): on 2-3L O2 at home prn  Anxiety  TIA (transient ischemic attack): many years ago  PAF (paroxysmal atrial fibrillation)  HLD (hyperlipidemia)  HTN (hypertension)  H/O total hysterectomy: 2014  History of cataract surgery: b/l 2015  History of repair of hip fracture: luisa placed in RLE 2015  H/O spinal fusion: c2-6 in 2017    FAMILY HISTORY:  No pertinent family history in first degree relatives    MEDICATIONS  (STANDING):  ALBUTerol/ipratropium for Nebulization 3 milliLiter(s) Nebulizer every 6 hours  atorvastatin 40 milliGRAM(s) Oral at bedtime  buDESOnide  80 MICROgram(s)/formoterol 4.5 MICROgram(s) Inhaler 2 Puff(s) Inhalation two times a day  cefTRIAXone   IVPB      cefTRIAXone   IVPB 1 Gram(s) IV Intermittent every 24 hours  clopidogrel Tablet 75 milliGRAM(s) Oral daily  dabigatran 75 milliGRAM(s) Oral every 12 hours  furosemide    Tablet 40 milliGRAM(s) Oral daily  metoprolol tartrate 37.5 milliGRAM(s) Oral two times a day  metroNIDAZOLE  IVPB      metroNIDAZOLE  IVPB 500 milliGRAM(s) IV Intermittent every 8 hours  oseltamivir 30 milliGRAM(s) Oral two times a day  pantoprazole  Injectable 40 milliGRAM(s) IV Push two times a day  potassium chloride  10 mEq/100 mL IVPB 10 milliEquivalent(s) IV Intermittent every 4 hours  thiamine 100 milliGRAM(s) Oral daily  vancomycin    Solution 125 milliGRAM(s) Oral every 6 hours    MEDICATIONS  (PRN):  acetaminophen   Tablet .. 650 milliGRAM(s) Oral every 6 hours PRN Temp greater or equal to 38C (100.4F), Mild Pain (1 - 3), Moderate Pain (4 - 6)  LORazepam     Tablet 0.5 milliGRAM(s) Oral two times a day PRN Anxiety  traMADol 50 milliGRAM(s) Oral every 8 hours PRN Severe Pain (7 - 10)      Vital Signs Last 24 Hrs  T(C): 36.6 (30 Jan 2019 05:46), Max: 36.7 (29 Jan 2019 22:50)  T(F): 97.8 (30 Jan 2019 05:46), Max: 98 (29 Jan 2019 22:50)  HR: 100 (30 Jan 2019 09:20) (92 - 115)  BP: 97/64 (30 Jan 2019 05:46) (97/64 - 102/67)  BP(mean): --  RR: 19 (30 Jan 2019 05:46) (16 - 19)  SpO2: 100% (30 Jan 2019 05:46) (95% - 100%)  nc/at  s1s2  cta  soft, nt, nd no guarding or rebound  no c/c/e    CBC Full  -  ( 30 Jan 2019 07:09 )  WBC Count : 7.91 K/uL  Hemoglobin : 10.3 g/dL  Hematocrit : 33.6 %  Platelet Count - Automated : 183 K/uL  Mean Cell Volume : 91.8 fL  Mean Cell Hemoglobin : 28.1 pg  Mean Cell Hemoglobin Concentration : 30.7 %  Auto Neutrophil # : x  Auto Lymphocyte # : x  Auto Monocyte # : x  Auto Eosinophil # : x  Auto Basophil # : x  Auto Neutrophil % : x  Auto Lymphocyte % : x  Auto Monocyte % : x  Auto Eosinophil % : x  Auto Basophil % : x    01-30    137  |  96<L>  |  40<H>  ----------------------------<  104<H>  3.3<L>   |  32<H>  |  1.77<H>    Ca    8.3<L>      30 Jan 2019 07:09  Mg     1.8     01-30

## 2019-01-30 NOTE — PROGRESS NOTE ADULT - SUBJECTIVE AND OBJECTIVE BOX
CARDIOLOGY FOLLOW UP - Dr. Vieyra    CC no cp or sob       PHYSICAL EXAM:  T(C): 36.6 (01-30-19 @ 05:46), Max: 36.7 (01-29-19 @ 22:50)  HR: 100 (01-30-19 @ 09:20) (92 - 115)  BP: 97/64 (01-30-19 @ 05:46) (97/64 - 102/67)  RR: 19 (01-30-19 @ 05:46) (16 - 19)  SpO2: 100% (01-30-19 @ 05:46) (95% - 100%)  Wt(kg): --  I&O's Summary    29 Jan 2019 07:01  -  30 Jan 2019 07:00  --------------------------------------------------------  IN: 170 mL / OUT: 2 mL / NET: 168 mL        Appearance: Normal	  Cardiovascular: Normal S1 S2, irregular, tachycardia   Respiratory: Lungs clear to auscultation	  Gastrointestinal:  Soft, Non-tender, + BS	  Extremities: Normal range of motion, No clubbing, cyanosis or edema        MEDICATIONS  (STANDING):  ALBUTerol/ipratropium for Nebulization 3 milliLiter(s) Nebulizer every 6 hours  atorvastatin 40 milliGRAM(s) Oral at bedtime  buDESOnide  80 MICROgram(s)/formoterol 4.5 MICROgram(s) Inhaler 2 Puff(s) Inhalation two times a day  cefTRIAXone   IVPB      cefTRIAXone   IVPB 1 Gram(s) IV Intermittent every 24 hours  clopidogrel Tablet 75 milliGRAM(s) Oral daily  dabigatran 75 milliGRAM(s) Oral every 12 hours  furosemide    Tablet 40 milliGRAM(s) Oral daily  metoprolol tartrate 37.5 milliGRAM(s) Oral two times a day  metroNIDAZOLE  IVPB      metroNIDAZOLE  IVPB 500 milliGRAM(s) IV Intermittent every 8 hours  oseltamivir 30 milliGRAM(s) Oral two times a day  pantoprazole  Injectable 40 milliGRAM(s) IV Push two times a day  potassium chloride  10 mEq/100 mL IVPB 10 milliEquivalent(s) IV Intermittent every 4 hours  sodium chloride 0.9%. 1000 milliLiter(s) (50 mL/Hr) IV Continuous <Continuous>  thiamine 100 milliGRAM(s) Oral daily  vancomycin    Solution 125 milliGRAM(s) Oral every 6 hours      TELEMETRY: afib hr 130s 	    ECG:  	  RADIOLOGY:   DIAGNOSTIC TESTING:  [ ] Echocardiogram:  [ ]  Catheterization:  [ ] Stress Test:    OTHER: 	  < from: CT Chest No Cont (01.29.19 @ 19:48) >  IMPRESSION:     Partially loculated small bilateral pleural effusions appear decreased in   size from prior exam. Mild interlobular septal thickeningsecondary to   mild pulmonary edema also improved.    Multiple small nodular opacities or tree-in-bud opacities noted within   the bilateral upper lobes secondary to  impacted distal airways, many of   which are new since the January 24, 2019 likely of infectious etiology.    Debris noted within the trachea and left lower lobar bronchi new since   the prior study with associated left lower lobe patchy opacity with mild   interval increase in size representing atelectasis and/or pneumonia. The   constellation of findings are suggestive of aspiration.        < end of copied text >    LABS:	 	                                10.3   7.91  )-----------( 183      ( 30 Jan 2019 07:09 )             33.6     01-30    137  |  96<L>  |  40<H>  ----------------------------<  104<H>  3.3<L>   |  32<H>  |  1.77<H>    Ca    8.3<L>      30 Jan 2019 07:09  Mg     1.8     01-30

## 2019-01-31 LAB
ANION GAP SERPL CALC-SCNC: 9 MMO/L — SIGNIFICANT CHANGE UP (ref 7–14)
BACTERIA BLD CULT: SIGNIFICANT CHANGE UP
BASOPHILS # BLD AUTO: 0 K/UL — SIGNIFICANT CHANGE UP (ref 0–0.2)
BASOPHILS NFR BLD AUTO: 0 % — SIGNIFICANT CHANGE UP (ref 0–2)
BUN SERPL-MCNC: 41 MG/DL — HIGH (ref 7–23)
CALCIUM SERPL-MCNC: 8.5 MG/DL — SIGNIFICANT CHANGE UP (ref 8.4–10.5)
CHLORIDE SERPL-SCNC: 100 MMOL/L — SIGNIFICANT CHANGE UP (ref 98–107)
CO2 SERPL-SCNC: 31 MMOL/L — SIGNIFICANT CHANGE UP (ref 22–31)
CREAT SERPL-MCNC: 1.34 MG/DL — HIGH (ref 0.5–1.3)
EOSINOPHIL # BLD AUTO: 0 K/UL — SIGNIFICANT CHANGE UP (ref 0–0.5)
EOSINOPHIL NFR BLD AUTO: 0 % — SIGNIFICANT CHANGE UP (ref 0–6)
GLUCOSE SERPL-MCNC: 154 MG/DL — HIGH (ref 70–99)
HCT VFR BLD CALC: 34.2 % — LOW (ref 34.5–45)
HGB BLD-MCNC: 10.1 G/DL — LOW (ref 11.5–15.5)
IMM GRANULOCYTES NFR BLD AUTO: 0.8 % — SIGNIFICANT CHANGE UP (ref 0–1.5)
LYMPHOCYTES # BLD AUTO: 0.38 K/UL — LOW (ref 1–3.3)
LYMPHOCYTES # BLD AUTO: 7.8 % — LOW (ref 13–44)
MAGNESIUM SERPL-MCNC: 1.8 MG/DL — SIGNIFICANT CHANGE UP (ref 1.6–2.6)
MCHC RBC-ENTMCNC: 26.6 PG — LOW (ref 27–34)
MCHC RBC-ENTMCNC: 29.5 % — LOW (ref 32–36)
MCV RBC AUTO: 90.2 FL — SIGNIFICANT CHANGE UP (ref 80–100)
MONOCYTES # BLD AUTO: 0.1 K/UL — SIGNIFICANT CHANGE UP (ref 0–0.9)
MONOCYTES NFR BLD AUTO: 2 % — SIGNIFICANT CHANGE UP (ref 2–14)
NEUTROPHILS # BLD AUTO: 4.38 K/UL — SIGNIFICANT CHANGE UP (ref 1.8–7.4)
NEUTROPHILS NFR BLD AUTO: 89.4 % — HIGH (ref 43–77)
NRBC # FLD: 0 K/UL — LOW (ref 25–125)
PLATELET # BLD AUTO: 221 K/UL — SIGNIFICANT CHANGE UP (ref 150–400)
PMV BLD: 11 FL — SIGNIFICANT CHANGE UP (ref 7–13)
POTASSIUM SERPL-MCNC: 4.4 MMOL/L — SIGNIFICANT CHANGE UP (ref 3.5–5.3)
POTASSIUM SERPL-SCNC: 4.4 MMOL/L — SIGNIFICANT CHANGE UP (ref 3.5–5.3)
RBC # BLD: 3.79 M/UL — LOW (ref 3.8–5.2)
RBC # FLD: 16.2 % — HIGH (ref 10.3–14.5)
SODIUM SERPL-SCNC: 140 MMOL/L — SIGNIFICANT CHANGE UP (ref 135–145)
WBC # BLD: 4.9 K/UL — SIGNIFICANT CHANGE UP (ref 3.8–10.5)
WBC # FLD AUTO: 4.9 K/UL — SIGNIFICANT CHANGE UP (ref 3.8–10.5)

## 2019-01-31 PROCEDURE — 99232 SBSQ HOSP IP/OBS MODERATE 35: CPT

## 2019-01-31 RX ORDER — COLLAGENASE CLOSTRIDIUM HIST. 250 UNIT/G
1 OINTMENT (GRAM) TOPICAL
Qty: 0 | Refills: 0 | Status: DISCONTINUED | OUTPATIENT
Start: 2019-01-31 | End: 2019-02-15

## 2019-01-31 RX ADMIN — Medication 125 MILLIGRAM(S): at 18:32

## 2019-01-31 RX ADMIN — Medication 20 MILLIGRAM(S): at 05:01

## 2019-01-31 RX ADMIN — MIDODRINE HYDROCHLORIDE 5 MILLIGRAM(S): 2.5 TABLET ORAL at 13:25

## 2019-01-31 RX ADMIN — DABIGATRAN ETEXILATE MESYLATE 75 MILLIGRAM(S): 150 CAPSULE ORAL at 18:32

## 2019-01-31 RX ADMIN — LEVALBUTEROL 0.63 MILLIGRAM(S): 1.25 SOLUTION, CONCENTRATE RESPIRATORY (INHALATION) at 15:35

## 2019-01-31 RX ADMIN — Medication 125 MILLIGRAM(S): at 23:08

## 2019-01-31 RX ADMIN — TRAMADOL HYDROCHLORIDE 50 MILLIGRAM(S): 50 TABLET ORAL at 08:00

## 2019-01-31 RX ADMIN — PANTOPRAZOLE SODIUM 40 MILLIGRAM(S): 20 TABLET, DELAYED RELEASE ORAL at 05:03

## 2019-01-31 RX ADMIN — DABIGATRAN ETEXILATE MESYLATE 75 MILLIGRAM(S): 150 CAPSULE ORAL at 05:23

## 2019-01-31 RX ADMIN — TRAMADOL HYDROCHLORIDE 50 MILLIGRAM(S): 50 TABLET ORAL at 23:09

## 2019-01-31 RX ADMIN — Medication 30 MILLIGRAM(S): at 18:32

## 2019-01-31 RX ADMIN — Medication 1 APPLICATION(S): at 05:01

## 2019-01-31 RX ADMIN — LEVALBUTEROL 0.63 MILLIGRAM(S): 1.25 SOLUTION, CONCENTRATE RESPIRATORY (INHALATION) at 23:02

## 2019-01-31 RX ADMIN — Medication 20 MILLIGRAM(S): at 13:25

## 2019-01-31 RX ADMIN — Medication 30 MILLIGRAM(S): at 05:02

## 2019-01-31 RX ADMIN — BUDESONIDE AND FORMOTEROL FUMARATE DIHYDRATE 2 PUFF(S): 160; 4.5 AEROSOL RESPIRATORY (INHALATION) at 13:26

## 2019-01-31 RX ADMIN — Medication 0.5 MILLIGRAM(S): at 07:27

## 2019-01-31 RX ADMIN — Medication 100 MILLIGRAM(S): at 13:25

## 2019-01-31 RX ADMIN — Medication 125 MILLIGRAM(S): at 13:24

## 2019-01-31 RX ADMIN — Medication 20 MILLIGRAM(S): at 21:41

## 2019-01-31 RX ADMIN — PANTOPRAZOLE SODIUM 40 MILLIGRAM(S): 20 TABLET, DELAYED RELEASE ORAL at 18:32

## 2019-01-31 RX ADMIN — MIDODRINE HYDROCHLORIDE 5 MILLIGRAM(S): 2.5 TABLET ORAL at 21:41

## 2019-01-31 RX ADMIN — Medication 1 APPLICATION(S): at 18:32

## 2019-01-31 RX ADMIN — Medication 125 MILLIGRAM(S): at 05:03

## 2019-01-31 RX ADMIN — TRAMADOL HYDROCHLORIDE 50 MILLIGRAM(S): 50 TABLET ORAL at 07:28

## 2019-01-31 RX ADMIN — Medication 0.5 MILLIGRAM(S): at 23:08

## 2019-01-31 RX ADMIN — Medication 50 MILLIGRAM(S): at 18:32

## 2019-01-31 RX ADMIN — Medication 50 MILLIGRAM(S): at 05:23

## 2019-01-31 RX ADMIN — ATORVASTATIN CALCIUM 40 MILLIGRAM(S): 80 TABLET, FILM COATED ORAL at 21:42

## 2019-01-31 RX ADMIN — BUDESONIDE AND FORMOTEROL FUMARATE DIHYDRATE 2 PUFF(S): 160; 4.5 AEROSOL RESPIRATORY (INHALATION) at 21:42

## 2019-01-31 RX ADMIN — MIDODRINE HYDROCHLORIDE 5 MILLIGRAM(S): 2.5 TABLET ORAL at 05:02

## 2019-01-31 RX ADMIN — CLOPIDOGREL BISULFATE 75 MILLIGRAM(S): 75 TABLET, FILM COATED ORAL at 13:25

## 2019-01-31 NOTE — PROGRESS NOTE ADULT - SUBJECTIVE AND OBJECTIVE BOX
CARDIOLOGY FOLLOW UP - Dr. Vieyra    CC no cp or sob       PHYSICAL EXAM:  T(C): 36.4 (01-31-19 @ 04:54), Max: 36.9 (01-30-19 @ 23:05)  HR: 111 (01-31-19 @ 04:54) (102 - 133)  BP: 107/64 (01-31-19 @ 04:54) (90/65 - 107/64)  RR: 18 (01-31-19 @ 04:54) (18 - 18)  SpO2: 99% (01-31-19 @ 04:54) (88% - 100%)  Wt(kg): --  I&O's Summary    30 Jan 2019 07:01  -  31 Jan 2019 07:00  --------------------------------------------------------  IN: 100 mL / OUT: 0 mL / NET: 100 mL        Appearance: Normal	  Cardiovascular: Normal S1 S2, irregular   Respiratory: diminished   Gastrointestinal:  Soft, Non-tender, + BS	  Extremities: Normal range of motion, No clubbing, cyanosis or edema        MEDICATIONS  (STANDING):  atorvastatin 40 milliGRAM(s) Oral at bedtime  buDESOnide  80 MICROgram(s)/formoterol 4.5 MICROgram(s) Inhaler 2 Puff(s) Inhalation two times a day  clopidogrel Tablet 75 milliGRAM(s) Oral daily  collagenase Ointment 1 Application(s) Topical two times a day  dabigatran 75 milliGRAM(s) Oral every 12 hours  levalbuterol Inhalation 0.63 milliGRAM(s) Inhalation every 6 hours  methylPREDNISolone sodium succinate Injectable 20 milliGRAM(s) IV Push every 8 hours  metoprolol tartrate 50 milliGRAM(s) Oral two times a day  midodrine 5 milliGRAM(s) Oral every 8 hours  oseltamivir 30 milliGRAM(s) Oral two times a day  pantoprazole  Injectable 40 milliGRAM(s) IV Push two times a day  thiamine 100 milliGRAM(s) Oral daily  vancomycin    Solution 125 milliGRAM(s) Oral every 6 hours      TELEMETRY: afib HR 60s  pause 2 sec noted  	    ECG:  	  RADIOLOGY:   DIAGNOSTIC TESTING:  [ ] Echocardiogram:  [ ]  Catheterization:  [ ] Stress Test:    OTHER: 	    LABS:	 	                                10.1   4.90  )-----------( 221      ( 31 Jan 2019 07:09 )             34.2     01-31    140  |  100  |  41<H>  ----------------------------<  154<H>  4.4   |  31  |  1.34<H>    Ca    8.5      31 Jan 2019 07:09  Mg     1.8     01-31

## 2019-01-31 NOTE — PROGRESS NOTE ADULT - SUBJECTIVE AND OBJECTIVE BOX
[facility] NEPHROLOGY- PROGRESS NOTE    78y Female with history of CHF, COPD presents with SOB found to have guaiac positive anemia and afib with RVR. Nephrology consulted for elevated Scr.    REVIEW OF SYSTEMS:  Gen: no changes in weight  Cards: no chest pain  Resp: + dyspnea with cough  GI: no nausea or vomiting, + diarrhea  Vascular: no LE edema +R foot pain    No Known Allergies      Hospital Medications: Medications reviewed      VITALS:  T(F): 97.5 (19 @ 04:54), Max: 98.5 (19 @ 23:05)  HR: 111 (19 @ 04:54)  BP: 107/64 (19 @ 04:54)  RR: 18 (19 @ 04:54)  SpO2: 99% (19 @ 04:54)  Wt(kg): --     @ 07:01  -   @ 07:00  --------------------------------------------------------  IN: 100 mL / OUT: 0 mL / NET: 100 mL        PHYSICAL EXAM:    Gen: NAD, calm  Cards: Irregularly irregular, +S1/S2, no M/G/R  Resp: course BS, bibasilar rales  GI: soft, NT/ND, NABS  Vascular: no LE edema B/L      LABS:      140  |  100  |  41<H>  ----------------------------<  154<H>  4.4   |  31  |  1.34<H>    Ca    8.5      2019 07:09  Mg     1.8           Creatinine Trend: 1.34 <--, 1.77 <--, 1.34 <--, 1.43 <--, 1.29 <--, 1.34 <--, 1.34 <--                        10.1   4.90  )-----------( 221      ( 2019 07:09 )             34.2     Urine Studies:  Urinalysis Basic - ( 2019 23:25 )    Color: YELLOW / Appearance: TURBID / S.015 / pH: 6.5  Gluc: NEGATIVE / Ketone: NEGATIVE  / Bili: NEGATIVE / Urobili: NORMAL   Blood: MODERATE / Protein: 200 / Nitrite: POSITIVE   Leuk Esterase: LARGE / RBC: >50 / WBC >50   Sq Epi:  / Non Sq Epi: FEW / Bacteria: LARGE

## 2019-01-31 NOTE — PROGRESS NOTE ADULT - SUBJECTIVE AND OBJECTIVE BOX
Patient is a 79y old  Female who presents with a chief complaint of right foot pressure ulcer pain (31 Jan 2019 17:49)      Vascular Surgery Attending Progress Note    Interval HPI: pt w/o c/o     Medications:  acetaminophen   Tablet .. 650 milliGRAM(s) Oral every 6 hours PRN  atorvastatin 40 milliGRAM(s) Oral at bedtime  buDESOnide  80 MICROgram(s)/formoterol 4.5 MICROgram(s) Inhaler 2 Puff(s) Inhalation two times a day  clopidogrel Tablet 75 milliGRAM(s) Oral daily  collagenase Ointment 1 Application(s) Topical two times a day  dabigatran 75 milliGRAM(s) Oral every 12 hours  levalbuterol Inhalation 0.63 milliGRAM(s) Inhalation every 6 hours  LORazepam     Tablet 0.5 milliGRAM(s) Oral two times a day PRN  methylPREDNISolone sodium succinate Injectable 20 milliGRAM(s) IV Push every 8 hours  metoprolol tartrate 50 milliGRAM(s) Oral two times a day  midodrine 5 milliGRAM(s) Oral every 8 hours  oseltamivir 30 milliGRAM(s) Oral two times a day  pantoprazole  Injectable 40 milliGRAM(s) IV Push two times a day  thiamine 100 milliGRAM(s) Oral daily  traMADol 50 milliGRAM(s) Oral every 8 hours PRN  vancomycin    Solution 125 milliGRAM(s) Oral every 6 hours      Vital Signs Last 24 Hrs  T(C): 36.4 (31 Jan 2019 13:29), Max: 36.9 (30 Jan 2019 23:05)  T(F): 97.6 (31 Jan 2019 13:29), Max: 98.5 (30 Jan 2019 23:05)  HR: 80 (31 Jan 2019 15:35) (80 - 111)  BP: 120/64 (31 Jan 2019 18:33) (104/65 - 124/68)  BP(mean): --  RR: 16 (31 Jan 2019 18:33) (16 - 18)  SpO2: 98% (31 Jan 2019 18:33) (92% - 99%)  I&O's Summary    30 Jan 2019 07:01  -  31 Jan 2019 07:00  --------------------------------------------------------  IN: 100 mL / OUT: 0 mL / NET: 100 mL    31 Jan 2019 07:01  -  31 Jan 2019 19:42  --------------------------------------------------------  IN: 600 mL / OUT: 0 mL / NET: 600 mL        Physical Exam:  Vascular:  achilles tendon wound stable     LABS:                        10.1   4.90  )-----------( 221      ( 31 Jan 2019 07:09 )             34.2     01-31    140  |  100  |  41<H>  ----------------------------<  154<H>  4.4   |  31  |  1.34<H>    Ca    8.5      31 Jan 2019 07:09  Mg     1.8     01-31          GARCÍA HUNAG MD  [phone]

## 2019-01-31 NOTE — PROGRESS NOTE ADULT - ASSESSMENT
79 h/o female admitted for rt postrior ankle pressure ulcer    vascular following needs angiogram  need nephrology clearence  she needs to complete abx for c diff  local wound care    Influenza positive   continue tamiflu for 5 days    CDIFF POSITIVE  continue vancomycin flagyl    chronic afib with rapid vntricular response  cardiology following    CKD   monitor BUN/CREATININE  appreciate nephrology f/u

## 2019-01-31 NOTE — PROGRESS NOTE ADULT - SUBJECTIVE AND OBJECTIVE BOX
CC: F/U for Flu    Saw/spoke to patient. Patient states diarrhea improved--not having large amounts of diarrhea presently. Minimal cough/runny nose.    Allergies  No Known Allergies    ANTIMICROBIALS:  oseltamivir 30 two times a day  vancomycin    Solution 125 every 6 hours    PE:    Vital Signs Last 24 Hrs  T(C): 36.4 (31 Jan 2019 13:29), Max: 36.9 (30 Jan 2019 23:05)  T(F): 97.6 (31 Jan 2019 13:29), Max: 98.5 (30 Jan 2019 23:05)  HR: 84 (31 Jan 2019 13:29) (84 - 133)  BP: 124/68 (31 Jan 2019 13:29) (90/65 - 124/68)  RR: 16 (31 Jan 2019 13:29) (16 - 18)  SpO2: 98% (31 Jan 2019 13:29) (88% - 100%)    Gen: AOx3, NAD, non-toxic, pleasant  CV: S1+S2 normal, nontachycardic  Resp: Clear bilat, no resp distress, no crackles/wheezes  Abd: Soft, nontender, +BS  Ext: No LE edema, no wounds    LABS:                        10.1   4.90  )-----------( 221      ( 31 Jan 2019 07:09 )             34.2     01-31    140  |  100  |  41<H>  ----------------------------<  154<H>  4.4   |  31  |  1.34<H>    Ca    8.5      31 Jan 2019 07:09  Mg     1.8     01-31    MICROBIOLOGY:    URINE CATHETER  01-27-19 --  --  Pseudomonas aeruginosa    BLOOD PERIPHERAL  01-26-19 NGTD    .Stool Feces  01-06-19   GI PCR Results: NOT detected    RADIOLOGY:    1/29 CT:    IMPRESSION:     Partially loculated small bilateral pleural effusions appear decreased in   size from prior exam. Mild interlobular septal thickening secondary to   mild pulmonary edema also improved.    Multiple small nodular opacities or tree-in-bud opacities noted within   the bilateral upper lobes secondary to  impacted distal airways, many of   which are new since the January 24, 2019 likely of infectious etiology.    Debris noted within the trachea and left lower lobar bronchi new since   the prior study with associated left lower lobe patchy opacity with mild   interval increase in size representing atelectasis and/or pneumonia. The   constellation of findings are suggestive of aspiration.

## 2019-01-31 NOTE — PROGRESS NOTE ADULT - PROBLEM SELECTOR PLAN 1
Likely hemodynamically mediated secondary to diarrhea and decreased PO intake resolved with gentle IVF. Avoid nephrotoxins.    Recommend ID consult for pseudomonas UTI and would change CTX to Zosyn.

## 2019-01-31 NOTE — PROGRESS NOTE ADULT - ASSESSMENT
79 f with HTN, HLD, CAD s/p stent, CHF, paroxysmal A-fib COPD (on 2-3L O2 prn),recent admission 1/5/19 - 1/14/19 for C-diff after a recent augmentin course for cellulitis, discharged with PO vanco (End Date: 1/18/19), presented 1/17/19 with right foot pressure ulcer pain x several weeks.  Low suspicion for infected heel ulcer clinically  UCX with Pseudomonas, patient does not complain of any urinary symptoms  C diff positive  RVP with Flu  CT with ? aspiration  Appears to be improving on current therapy (afeb, leukocytosis resolved)--continue present therapy for now; attempt to avoid other antibiotics which could worsen C diff (asymp bacteruria, and unclear aspiration without significant SOB)  Overall, Flu, C diff, fever, positive culture finding  - Tamiflu 30mg q 12  - c/w PO vanco 125 q qid for 10 days                  then 125 tid for a week                  then 125 bid for a week                  then 125 qd for a week                  then 125 q 48 h for a week                  then 125 every third day for a week  - if fever or worsening status repeat cultures and start zosyn for consideration aspiration pneumonia  - Monitor for any alternate signs worsening infection    Benjamin Dolan MD  Pager [phone]  After 5pm and on weekends call [phone]

## 2019-01-31 NOTE — PROGRESS NOTE ADULT - SUBJECTIVE AND OBJECTIVE BOX
covering for drkumar    diarrhoea improving   pt needs angiogram but cdiff treatment has to be completed  ICU Vital Signs Last 24 Hrs  T(C): 36.4 (31 Jan 2019 13:29), Max: 36.9 (30 Jan 2019 23:05)  T(F): 97.6 (31 Jan 2019 13:29), Max: 98.5 (30 Jan 2019 23:05)  HR: 84 (31 Jan 2019 13:29) (84 - 111)  BP: 124/68 (31 Jan 2019 13:29) (104/65 - 124/68)  BP(mean): --  ABP: --  ABP(mean): --  RR: 16 (31 Jan 2019 13:29) (16 - 18)  SpO2: 98% (31 Jan 2019 13:29) (92% - 99%)                        10.1   4.90  )-----------( 221      ( 31 Jan 2019 07:09 )  01-31    140  |  100  |  41<H>  ----------------------------<  154<H>  4.4   |  31  |  1.34<H>    Ca    8.5      31 Jan 2019 07:09  Mg     1.8     01-31  MEDICATIONS  (STANDING):  atorvastatin 40 milliGRAM(s) Oral at bedtime  buDESOnide  80 MICROgram(s)/formoterol 4.5 MICROgram(s) Inhaler 2 Puff(s) Inhalation two times a day  clopidogrel Tablet 75 milliGRAM(s) Oral daily  collagenase Ointment 1 Application(s) Topical two times a day  dabigatran 75 milliGRAM(s) Oral every 12 hours  levalbuterol Inhalation 0.63 milliGRAM(s) Inhalation every 6 hours  methylPREDNISolone sodium succinate Injectable 20 milliGRAM(s) IV Push every 8 hours  metoprolol tartrate 50 milliGRAM(s) Oral two times a day  midodrine 5 milliGRAM(s) Oral every 8 hours  oseltamivir 30 milliGRAM(s) Oral two times a day  pantoprazole  Injectable 40 milliGRAM(s) IV Push two times a day  thiamine 100 milliGRAM(s) Oral daily  vancomycin    Solution 125 milliGRAM(s) Oral every 6 hours    MEDICATIONS  (PRN):  acetaminophen   Tablet .. 650 milliGRAM(s) Oral every 6 hours PRN Temp greater or equal to 38C (100.4F), Mild Pain (1 - 3), Moderate Pain (4 - 6)  LORazepam     Tablet 0.5 milliGRAM(s) Oral two times a day PRN Anxiety  traMADol 50 milliGRAM(s) Oral every 8 hours PRN Severe Pain (7 - 10)

## 2019-01-31 NOTE — PROGRESS NOTE ADULT - ASSESSMENT
chest xray 1/28/19:  revealed multifocal airspace disease, may reflect aveolar component of edema.    a/p   79 y/o female, with a PmHx of COPD (on 2-3L O2 prn), paroxsymal a-fib (on pradaxa), CAD (s/p stent 11/2018), DCHF, HTN, HLD, and anxiety, presenting with acute/chronic diastolic chf, chest pain r/o acs, GIB, non healing pressure ulcer. Hospital course now complicated ruling in + RVP and CDiff / uti     1. Atypical chest pain, resolved  in the setting of acute CHF exacerbation, afib w/ RVR   cv stable, no evidence of acute ischemia/ACS   Echo with nl lv fxn  continue statin, bb, plavix     2. Acute/chronic diastolic chf  remains hypoxic on room air, on supplemental O2.. denies SOB, volume status is stable   ct chest revealed decreased bl pleural effusion, mutiple small nodular opacities secondary to impacted distal airways likely infectious etiology? pna LLL, aspiration    + rvp , pulmo f/u   creat improving, hold diuretic given active diarrhea and decrease PO intake   cont bb  echo with nl lv fxn    3. AFIB,  rate now controlled   dig on hold due to hx of pauses and bradycardia   continue with  midodrine 5 mg q 8 hrs for bp support   continue with lopressor to 50 mg BID  CHADS 3 - continue with pradaxa     4. CAD s/p PCI (11/2018)  cv stable, no evidence of acute ischemia   continue bb, statin, continue plavix given recent PCI      5. GIB   +GUIAC , +anemia s/p 1uprbc,   continue to trend cbc  GI f/u      6. non healing foot ulcer  med f/u  ID f/u , vascular studies noted, pod f/u  eventual plan for RLE angiogram    7. IGOR/CKD   creat improving, continue hold lasix given decrease po intake and + diarrhea   renal f/u     8.Cdiff   po abx, id f/u    7. hypoxia    likely in the setting of resp infection ? aspiration   no decomp CHF on exam    + influenza   on Tamiflu  ct chest revealed decreased bl pleural effusion, mutiple small nodular opacities secondary to impacted distal airways likely infectious etiology? pna LLL, aspiration    pulm f/u  pending speech and swallow eval   iv sterids per pulm     8. UTI   pending UC, med f/u     dvt ppx chest xray 1/28/19:  revealed multifocal airspace disease, may reflect aveolar component of edema.    a/p   77 y/o female, with a PmHx of COPD (on 2-3L O2 prn), paroxsymal a-fib (on pradaxa), CAD (s/p stent 11/2018), DCHF, HTN, HLD, and anxiety, presenting with acute/chronic diastolic chf, chest pain r/o acs, GIB, non healing pressure ulcer. Hospital course now complicated ruling in + RVP and CDiff / uti     1. Atypical chest pain, resolved  in the setting of acute CHF exacerbation, afib w/ RVR   cv stable, no evidence of acute ischemia/ACS   Echo with nl lv fxn  continue statin, bb, plavix     2. Acute/chronic diastolic chf  remains hypoxic on room air, on supplemental O2.. denies SOB, volume status is stable   ct chest revealed decreased bl pleural effusion, mutiple small nodular opacities secondary to impacted distal airways likely infectious etiology? pna LLL, aspiration    + rvp , pulmo f/u   creat improving, hold diuretic given active diarrhea and decrease PO intake   cont bb  echo with nl lv fxn    3. AFIB,  rate now controlled   dig on hold due to hx of pauses and bradycardia   continue with  midodrine 5 mg q 8 hrs for bp support   continue with lopressor to 50 mg BID  CHADS 3 - continue with pradaxa     4. CAD s/p PCI (11/2018)  cv stable, no evidence of acute ischemia   continue bb, statin, continue plavix given recent PCI      5. GIB   +GUIAC , +anemia s/p 1uprbc,   continue to trend cbc  GI f/u      6. non healing foot ulcer  med f/u  ID f/u , vascular studies noted, pod f/u  eventual plan for RLE angiogram    7. IGOR/CKD   creat improving, continue hold lasix given decrease po intake and + diarrhea   renal f/u     8.Cdiff   po abx, id f/u    7. hypoxia    likely in the setting of resp infection ? aspiration   no decomp CHF on exam    + influenza   on Tamiflu  ct chest revealed decreased bl pleural effusion, mutiple small nodular opacities secondary to impacted distal airways likely infectious etiology? pna LLL, aspiration    pulm f/u  s/p speech and swallow eval   iv sterids per pulm     8. UTI   pending UC, med f/u     dvt ppx chest xray 1/28/19:  revealed multifocal airspace disease, may reflect aveolar component of edema.    a/p   79 y/o female, with a PmHx of COPD (on 2-3L O2 prn), paroxsymal a-fib (on pradaxa), CAD (s/p stent 11/2018), DCHF, HTN, HLD, and anxiety, presenting with acute/chronic diastolic chf, chest pain r/o acs, GIB, non healing pressure ulcer. Hospital course now complicated ruling in + RVP and CDiff / uti     1. Atypical chest pain, resolved  in the setting of acute CHF exacerbation, afib w/ RVR   cv stable, no evidence of acute ischemia/ACS   Echo with nl lv fxn  continue statin, bb, plavix     2. Acute/chronic diastolic chf  remains hypoxic on room air, on supplemental O2.. denies SOB, volume status is stable   ct chest revealed decreased bl pleural effusion, mutiple small nodular opacities secondary to impacted distal airways likely infectious etiology? pna LLL, aspiration    + rvp , pulmo f/u   creat improving, hold diuretic given active diarrhea and decrease PO intake   cont bb  echo with nl lv fxn    3. AFIB,  rate now controlled   dig on hold due to hx of pauses and bradycardia   continue with  midodrine 5 mg q 8 hrs for bp support   continue with lopressor to 50 mg BID  CHADS 3 - continue with pradaxa     4. CAD s/p PCI (11/2018)  cv stable, no evidence of acute ischemia   continue bb, statin, continue plavix given recent PCI      5. GIB   +GUIAC , +anemia s/p 1uprbc,   continue to trend cbc  GI f/u      6. non healing foot ulcer  med f/u  ID f/u , vascular studies noted, pod f/u  eventual plan for RLE angiogram    7. IGOR/CKD   creat improving, continue hold lasix given decrease po intake and + diarrhea   renal f/u     8.Cdiff   po abx, id f/u    7. hypoxia    likely in the setting of resp infection ? aspiration   no decomp CHF on exam    + influenza   on Tamiflu  ct chest revealed decreased bl pleural effusion, mutiple small nodular opacities secondary to impacted distal airways likely infectious etiology? pna LLL, aspiration    pulm f/u  s/p speech and swallow eval   iv sterids per pulm     8. UTI    med f/u     dvt ppx

## 2019-01-31 NOTE — PROGRESS NOTE ADULT - SUBJECTIVE AND OBJECTIVE BOX
Patient is a 79y old  Female who presents with a chief complaint of right foot pressure ulcer pain (31 Jan 2019 11:21)      Any change in ROS: on 5 L of oxygen: she says she is feeling better: mild cough present:     MEDICATIONS  (STANDING):  atorvastatin 40 milliGRAM(s) Oral at bedtime  buDESOnide  80 MICROgram(s)/formoterol 4.5 MICROgram(s) Inhaler 2 Puff(s) Inhalation two times a day  clopidogrel Tablet 75 milliGRAM(s) Oral daily  collagenase Ointment 1 Application(s) Topical two times a day  dabigatran 75 milliGRAM(s) Oral every 12 hours  levalbuterol Inhalation 0.63 milliGRAM(s) Inhalation every 6 hours  methylPREDNISolone sodium succinate Injectable 20 milliGRAM(s) IV Push every 8 hours  metoprolol tartrate 50 milliGRAM(s) Oral two times a day  midodrine 5 milliGRAM(s) Oral every 8 hours  oseltamivir 30 milliGRAM(s) Oral two times a day  pantoprazole  Injectable 40 milliGRAM(s) IV Push two times a day  thiamine 100 milliGRAM(s) Oral daily  vancomycin    Solution 125 milliGRAM(s) Oral every 6 hours    MEDICATIONS  (PRN):  acetaminophen   Tablet .. 650 milliGRAM(s) Oral every 6 hours PRN Temp greater or equal to 38C (100.4F), Mild Pain (1 - 3), Moderate Pain (4 - 6)  LORazepam     Tablet 0.5 milliGRAM(s) Oral two times a day PRN Anxiety  traMADol 50 milliGRAM(s) Oral every 8 hours PRN Severe Pain (7 - 10)    Vital Signs Last 24 Hrs  T(C): 36.4 (31 Jan 2019 04:54), Max: 36.9 (30 Jan 2019 23:05)  T(F): 97.5 (31 Jan 2019 04:54), Max: 98.5 (30 Jan 2019 23:05)  HR: 108 (31 Jan 2019 08:00) (102 - 133)  BP: 104/65 (31 Jan 2019 08:00) (90/65 - 107/64)  BP(mean): --  RR: 18 (31 Jan 2019 04:54) (18 - 18)  SpO2: 99% (31 Jan 2019 04:54) (88% - 100%)    I&O's Summary    30 Jan 2019 07:01  -  31 Jan 2019 07:00  --------------------------------------------------------  IN: 100 mL / OUT: 0 mL / NET: 100 mL          Physical Exam:   GENERAL: NAD, well-groomed, well-developed  HEENT: COLEEN/   Atraumatic, Normocephalic  ENMT: No tonsillar erythema, exudates, or enlargement; Moist mucous membranes, Good dentition, No lesions  NECK: Supple, No JVD, Normal thyroid  CHEST/LUNG: Mild wheezing   CVS: Regular rate and rhythm; No murmurs, rubs, or gallops  GI: : Soft, Nontender, Nondistended; Bowel sounds present  NERVOUS SYSTEM:  Alert & Oriented X3  EXTREMITIES:  2+ Peripheral Pulses, No clubbing, cyanosis, or edema  LYMPH: No lymphadenopathy noted  SKIN: No rashes or lesions  ENDOCRINOLOGY: No Thyromegaly  PSYCH: Appropriate    Labs:                              10.1   4.90  )-----------( 221      ( 31 Jan 2019 07:09 )             34.2                         10.3   7.91  )-----------( 183      ( 30 Jan 2019 07:09 )             33.6                         10.7   8.88  )-----------( 210      ( 29 Jan 2019 07:40 )             35.9                         10.7   13.77 )-----------( 203      ( 28 Jan 2019 06:59 )             35.6     01-31    140  |  100  |  41<H>  ----------------------------<  154<H>  4.4   |  31  |  1.34<H>  01-30    137  |  96<L>  |  40<H>  ----------------------------<  104<H>  3.3<L>   |  32<H>  |  1.77<H>  01-29    137  |  95<L>  |  31<H>  ----------------------------<  86  3.8   |  30  |  1.34<H>  01-28    134<L>  |  95<L>  |  27<H>  ----------------------------<  79  4.4   |  28  |  1.43<H>    Ca    8.5      31 Jan 2019 07:09  Ca    8.3<L>      30 Jan 2019 07:09  Mg     1.8     01-31  Mg     1.8     01-30      CAPILLARY BLOOD GLUCOSE                Serum Pro-Brain Natriuretic Peptide: [id number] pg/mL (01-29 @ 07:41)        RECENT CULTURES:  01-27 @ 23:40 URINE CATHETER   NEGATIVE KAYLA 43      Pseudomonas aeruginosa  Pseudomonas aeruginosa       01-26 @ 03:14 BLOOD PERIPHERAL       < from: CT Chest No Cont (01.29.19 @ 19:48) >  calcifications.  MEDIASTINUM AND GEOVANI: Scattered subcentimeter lymph nodes.  CHEST WALL AND LOWER NECK: Heterogenous appearing thyroid gland.  VISUALIZED UPPER ABDOMEN: Partially visualized left renal cyst.  BONES: Degenerative changes of the spine. Old healed rib fractures.    IMPRESSION:     Partially loculated small bilateral pleural effusions appear decreased in   size from prior exam. Mild interlobular septal thickeningsecondary to   mild pulmonary edema also improved.    Multiple small nodular opacities or tree-in-bud opacities noted within   the bilateral upper lobes secondary to  impacted distal airways, many of   which are new since the January 24, 2019 likely of infectious etiology.    Debris noted within the trachea and left lower lobar bronchi new since   the prior study with associated left lower lobe patchy opacity with mild   interval increase in size representing atelectasis and/or pneumonia. The   constellation of findings are suggestive of aspiration.      < end of copied text >             NO ORGANISMS ISOLATED  01-25 @ 07:03 BLOOD PERIPHERAL                  NO ORGANISMS ISOLATED        RESPIRATORY CULTURES:          Studies  Chest X-RAY  CT SCAN Chest   Venous Dopplers: LE:   CT Abdomen  Others

## 2019-01-31 NOTE — CHART NOTE - NSCHARTNOTEFT_GEN_A_CORE
Vascular Surgery    Patient will need angiogram but is currently undergoing treatment for C. Diff.  Once course is completed we will plan for angiogram. If patient is discharged before that time she may follow up with Dr. Velazquez as an outpatient.    [phone]  [facility] - Dept of Vascular Surgery   [address], [address]  [place] [address]    SHAHBAZ Perez PGY 3  [id number]

## 2019-01-31 NOTE — SWALLOW BEDSIDE ASSESSMENT ADULT - COMMENTS
Patient seen upright in bed, alert and cooperative.  Patient reports she has full upper and lower dentures at home but does not have them with her here at the hospital.  The patient reports she is aware of deficits and is able to pick items off the menu she is able to chew.    Acute on Chronic systolic and Diastolic HF Class III  +CHF exac--> s/p IV Lasix 40mg, now on PO since 1/21,   + Afib - s/p dig load, s/p dig daily - on metoprolol po + s/p hep qtt transitioned to pradaxa   +GI Bleed-->s/p 1 Unit PRBC, GI c/s, IV Protonix 40mg BID, pradaxa on hold, no     endoscopy planned, pradaxa resumed 1/22   + Recurrent C. diff  s/p flagyl s/p IV now on PO Vanco   + Flu on Tamiflu   +Ulcer right heal with severe pain- awaiting RLE angiogram once infection clears and optimized from renal standpoint  + IGOR on CKD - renal following-improved

## 2019-01-31 NOTE — PROGRESS NOTE ADULT - ASSESSMENT
79 y/o female, with a PmHx of COPD (on 2-3L O2 prn), paroxsymal a-fib (on pradaxa), HTN, HLD, and anxiety, presenting to the [facility] ED with right foot pressure ulcer pain, SOB, and CP, found to have Afib @102 bpm, H/H 8/24.7, mild interstitial pulmonary edema, proBNP 1374, guiac positive, BUN/Cr 36/1.68, admitted to telemetry for nonhealing pressure ulcer, Afib, CHF exacerbation, r/o ACS.

## 2019-01-31 NOTE — SWALLOW BEDSIDE ASSESSMENT ADULT - SLP PERTINENT HISTORY OF CURRENT PROBLEM
77 yo female PMHx HTN, HLD, CHF, COPD (O2 prn), Afib on pradaxa and R heel ulcer p/w dyspnea at rest, orthopnea, LE edema, anemic and pain at pressure ulcer site admitted to tele for CHF exac, r/o ACS and GI Bleed.

## 2019-01-31 NOTE — CHART NOTE - NSCHARTNOTEFT_GEN_A_CORE
Per chart: Patient is a 79 y/o female, with a PmHx of COPD, paroxsymal a-fib, CAD (s/p stent 11/2018), DCHF, HTN, HLD, and anxiety, presenting with acute/chronic diastolic chf, chest pain r/o acs, GIB, non healing pressure ulcer. Hospital course now complicated ruling in + RVP and CDiff / uti     Source: Patient [ ]    Family [ ]     other [x]: EMR, RN- patient sleeping during interview.     Current Diet : Regular + DASH + 1500 mL fluid restriction + Low sodium + No caffeine + Ensure Enlive 240mls 2x daily (700kcal, 40g protein).     Reported:  Per RN- no GI distress. Last BM 1/30 per flow sheet.     PO intake:  RN reported patient consuming about 60-75% of meals. Consumed about 50% of Ensure supplement. Patient seen by SLP 1/30- recommended to continue Regular consistency + thin liquid diet.     Weight Trend: 1/25- 121.4 pounds --> 1/28: 130 pounds --> 1/29: 128.5 pounds --> 1/31 117.2 pounds. Weight change noticed. Possible weight fluctuation due to fluid shifts. Patient previously noted with + 1 / + 2 edema. Per flow sheet no edema noted on 1/31.     __________________ Pertinent Medications__________________   MEDICATIONS  (STANDING):  atorvastatin 40 milliGRAM(s) Oral at bedtime  buDESOnide  80 MICROgram(s)/formoterol 4.5 MICROgram(s) Inhaler 2 Puff(s) Inhalation two times a day  clopidogrel Tablet 75 milliGRAM(s) Oral daily  collagenase Ointment 1 Application(s) Topical two times a day  dabigatran 75 milliGRAM(s) Oral every 12 hours  levalbuterol Inhalation 0.63 milliGRAM(s) Inhalation every 6 hours  methylPREDNISolone sodium succinate Injectable 20 milliGRAM(s) IV Push every 8 hours  metoprolol tartrate 50 milliGRAM(s) Oral two times a day  midodrine 5 milliGRAM(s) Oral every 8 hours  oseltamivir 30 milliGRAM(s) Oral two times a day  pantoprazole  Injectable 40 milliGRAM(s) IV Push two times a day  thiamine 100 milliGRAM(s) Oral daily  vancomycin    Solution 125 milliGRAM(s) Oral every 6 hours    MEDICATIONS  (PRN):  acetaminophen   Tablet .. 650 milliGRAM(s) Oral every 6 hours PRN Temp greater or equal to 38C (100.4F), Mild Pain (1 - 3), Moderate Pain (4 - 6)  LORazepam     Tablet 0.5 milliGRAM(s) Oral two times a day PRN Anxiety  traMADol 50 milliGRAM(s) Oral every 8 hours PRN Severe Pain (7 - 10)      __________________ Pertinent Labs__________________   01-31 Na140 mmol/L Glu 154 mg/dL<H> K+ 4.4 mmol/L Cr  1.34 mg/dL<H> BUN 41 mg/dL<H>        Skin: Patient noted with wound- right ankle ucler. Pressure injury- right and left heel- stage 1, left ear- suspected deep tissue injury and right ear- stage 1.    Estimated Needs:   [x] no change since previous assessment  [ ] recalculated:       Previous Nutrition Diagnosis:  [x] Malnutrition- severe    Nutrition Diagnosis is [x] ongoing  [ ] resolved [ ] not applicable     New Nutrition Diagnosis: [x] not applicable      Nutrition Recommendations:  1. Continue with Regular + DASH + 1500 mL fluid restriction + Low sodium + No caffeine   2. Continue with Ensure Enlive 240mls 2x daily (700kcal, 40g protein).   3. Would recommend No Carb Prosource (15 grams protein/ 30 mL packet.) x 1 daily to help with wounds and pressure injury  4. Please Encourage po intake, assist with meals and menu selections, provide alternatives PRN.   5. Monitor weights, labs, BM's, skin integrity, p.o. intake.   6. RD to remain available for further nutritional interventions as indicated.-Shaniqua Kauffman, HOANGN, CDN

## 2019-01-31 NOTE — SWALLOW BEDSIDE ASSESSMENT ADULT - SWALLOW EVAL: DIAGNOSIS
1.) Patient presents with adequate oral stage skills for puree, solids and thin liquids marked by adequate bolus acceptance, formation, transfer and clearance.  2.) Pharyngeal stage of swallow for puree, solids, and thin liquids marked by timely swallow trigger, adequate hyolaryngeal elevation and no overt s/s of penetration/aspiration.

## 2019-01-31 NOTE — PROGRESS NOTE ADULT - PROBLEM SELECTOR PLAN 1
FOUND TO HAVE NEW INFLUENZA IN the hospital : Started on Tamiflu:  1/30: started on solumedrol today as she has been wheezing: She is on antibiotics from ? [facility] : ct scan chest reviewed: has mucus impacted airways: As well as the effusions are smaller: ? left lower lobe opacity : would check speech and swallow: She has  underlying copd :  1/31: on tamiflu

## 2019-01-31 NOTE — PROGRESS NOTE ADULT - ASSESSMENT
79F w/ nonhealing R posterior ankle ulcer and arterial insuff    - Continue wound care  - Awaiting clearance of c. diff  - Will schedule angiogram when patient clears c. diff    [id number]

## 2019-02-01 LAB
ANION GAP SERPL CALC-SCNC: 10 MMO/L — SIGNIFICANT CHANGE UP (ref 7–14)
BASOPHILS # BLD AUTO: 0.01 K/UL — SIGNIFICANT CHANGE UP (ref 0–0.2)
BASOPHILS NFR BLD AUTO: 0.1 % — SIGNIFICANT CHANGE UP (ref 0–2)
BUN SERPL-MCNC: 50 MG/DL — HIGH (ref 7–23)
CALCIUM SERPL-MCNC: 9.4 MG/DL — SIGNIFICANT CHANGE UP (ref 8.4–10.5)
CHLORIDE SERPL-SCNC: 97 MMOL/L — LOW (ref 98–107)
CO2 SERPL-SCNC: 29 MMOL/L — SIGNIFICANT CHANGE UP (ref 22–31)
CREAT SERPL-MCNC: 1.23 MG/DL — SIGNIFICANT CHANGE UP (ref 0.5–1.3)
EOSINOPHIL # BLD AUTO: 0 K/UL — SIGNIFICANT CHANGE UP (ref 0–0.5)
EOSINOPHIL NFR BLD AUTO: 0 % — SIGNIFICANT CHANGE UP (ref 0–6)
GLUCOSE SERPL-MCNC: 171 MG/DL — HIGH (ref 70–99)
HCT VFR BLD CALC: 33.2 % — LOW (ref 34.5–45)
HGB BLD-MCNC: 9.8 G/DL — LOW (ref 11.5–15.5)
IMM GRANULOCYTES NFR BLD AUTO: 0.9 % — SIGNIFICANT CHANGE UP (ref 0–1.5)
LYMPHOCYTES # BLD AUTO: 0.59 K/UL — LOW (ref 1–3.3)
LYMPHOCYTES # BLD AUTO: 7.3 % — LOW (ref 13–44)
MAGNESIUM SERPL-MCNC: 1.8 MG/DL — SIGNIFICANT CHANGE UP (ref 1.6–2.6)
MCHC RBC-ENTMCNC: 27.1 PG — SIGNIFICANT CHANGE UP (ref 27–34)
MCHC RBC-ENTMCNC: 29.5 % — LOW (ref 32–36)
MCV RBC AUTO: 92 FL — SIGNIFICANT CHANGE UP (ref 80–100)
MONOCYTES # BLD AUTO: 0.49 K/UL — SIGNIFICANT CHANGE UP (ref 0–0.9)
MONOCYTES NFR BLD AUTO: 6 % — SIGNIFICANT CHANGE UP (ref 2–14)
NEUTROPHILS # BLD AUTO: 6.96 K/UL — SIGNIFICANT CHANGE UP (ref 1.8–7.4)
NEUTROPHILS NFR BLD AUTO: 85.7 % — HIGH (ref 43–77)
NRBC # FLD: 0 K/UL — LOW (ref 25–125)
PLATELET # BLD AUTO: 262 K/UL — SIGNIFICANT CHANGE UP (ref 150–400)
PMV BLD: 11.3 FL — SIGNIFICANT CHANGE UP (ref 7–13)
POTASSIUM SERPL-MCNC: 4.2 MMOL/L — SIGNIFICANT CHANGE UP (ref 3.5–5.3)
POTASSIUM SERPL-SCNC: 4.2 MMOL/L — SIGNIFICANT CHANGE UP (ref 3.5–5.3)
RBC # BLD: 3.61 M/UL — LOW (ref 3.8–5.2)
RBC # FLD: 16.8 % — HIGH (ref 10.3–14.5)
SODIUM SERPL-SCNC: 136 MMOL/L — SIGNIFICANT CHANGE UP (ref 135–145)
WBC # BLD: 8.12 K/UL — SIGNIFICANT CHANGE UP (ref 3.8–10.5)
WBC # FLD AUTO: 8.12 K/UL — SIGNIFICANT CHANGE UP (ref 3.8–10.5)

## 2019-02-01 PROCEDURE — 99232 SBSQ HOSP IP/OBS MODERATE 35: CPT

## 2019-02-01 RX ADMIN — Medication 125 MILLIGRAM(S): at 17:54

## 2019-02-01 RX ADMIN — LEVALBUTEROL 0.63 MILLIGRAM(S): 1.25 SOLUTION, CONCENTRATE RESPIRATORY (INHALATION) at 22:33

## 2019-02-01 RX ADMIN — DABIGATRAN ETEXILATE MESYLATE 75 MILLIGRAM(S): 150 CAPSULE ORAL at 17:56

## 2019-02-01 RX ADMIN — Medication 50 MILLIGRAM(S): at 05:12

## 2019-02-01 RX ADMIN — Medication 20 MILLIGRAM(S): at 05:12

## 2019-02-01 RX ADMIN — BUDESONIDE AND FORMOTEROL FUMARATE DIHYDRATE 2 PUFF(S): 160; 4.5 AEROSOL RESPIRATORY (INHALATION) at 10:07

## 2019-02-01 RX ADMIN — Medication 30 MILLIGRAM(S): at 17:58

## 2019-02-01 RX ADMIN — Medication 125 MILLIGRAM(S): at 12:45

## 2019-02-01 RX ADMIN — ATORVASTATIN CALCIUM 40 MILLIGRAM(S): 80 TABLET, FILM COATED ORAL at 20:48

## 2019-02-01 RX ADMIN — PANTOPRAZOLE SODIUM 40 MILLIGRAM(S): 20 TABLET, DELAYED RELEASE ORAL at 05:11

## 2019-02-01 RX ADMIN — Medication 40 MILLIGRAM(S): at 17:57

## 2019-02-01 RX ADMIN — Medication 1 APPLICATION(S): at 17:56

## 2019-02-01 RX ADMIN — BUDESONIDE AND FORMOTEROL FUMARATE DIHYDRATE 2 PUFF(S): 160; 4.5 AEROSOL RESPIRATORY (INHALATION) at 20:42

## 2019-02-01 RX ADMIN — MIDODRINE HYDROCHLORIDE 5 MILLIGRAM(S): 2.5 TABLET ORAL at 20:48

## 2019-02-01 RX ADMIN — Medication 100 MILLIGRAM(S): at 12:45

## 2019-02-01 RX ADMIN — Medication 30 MILLIGRAM(S): at 05:12

## 2019-02-01 RX ADMIN — Medication 125 MILLIGRAM(S): at 05:11

## 2019-02-01 RX ADMIN — Medication 50 MILLIGRAM(S): at 17:55

## 2019-02-01 RX ADMIN — DABIGATRAN ETEXILATE MESYLATE 75 MILLIGRAM(S): 150 CAPSULE ORAL at 05:11

## 2019-02-01 RX ADMIN — MIDODRINE HYDROCHLORIDE 5 MILLIGRAM(S): 2.5 TABLET ORAL at 05:12

## 2019-02-01 RX ADMIN — PANTOPRAZOLE SODIUM 40 MILLIGRAM(S): 20 TABLET, DELAYED RELEASE ORAL at 17:57

## 2019-02-01 RX ADMIN — MIDODRINE HYDROCHLORIDE 5 MILLIGRAM(S): 2.5 TABLET ORAL at 12:45

## 2019-02-01 RX ADMIN — LEVALBUTEROL 0.63 MILLIGRAM(S): 1.25 SOLUTION, CONCENTRATE RESPIRATORY (INHALATION) at 10:58

## 2019-02-01 RX ADMIN — Medication 1 APPLICATION(S): at 05:12

## 2019-02-01 RX ADMIN — CLOPIDOGREL BISULFATE 75 MILLIGRAM(S): 75 TABLET, FILM COATED ORAL at 12:44

## 2019-02-01 RX ADMIN — TRAMADOL HYDROCHLORIDE 50 MILLIGRAM(S): 50 TABLET ORAL at 00:09

## 2019-02-01 RX ADMIN — Medication 0.5 MILLIGRAM(S): at 20:42

## 2019-02-01 NOTE — PROGRESS NOTE ADULT - ASSESSMENT
chest xray 1/28/19:  revealed multifocal airspace disease, may reflect aveolar component of edema.    a/p   79 y/o female, with a PmHx of COPD (on 2-3L O2 prn), paroxsymal a-fib (on pradaxa), CAD (s/p stent 11/2018), DCHF, HTN, HLD, and anxiety, presenting with acute/chronic diastolic chf, chest pain r/o acs, GIB, non healing pressure ulcer. Hospital course now complicated ruling in + RVP and CDiff / uti     1. Atypical chest pain, resolved  in the setting of acute CHF exacerbation, afib w/ RVR   cv stable, no evidence of acute ischemia/ACS   Echo with nl lv fxn  continue statin, bb, plavix     2. Acute/chronic diastolic chf  remains hypoxic on room air, on supplemental O2.. denies SOB, volume status is stable   ct chest revealed decreased bl pleural effusion, mutiple small nodular opacities secondary to impacted distal airways likely infectious etiology? pna LLL, aspiration    + rvp , pulmo f/u   creat improved, continue hold diuretic given active diarrhea and decrease PO intake  will reassess lasix need tomorrow    cont bb  echo with nl lv fxn    3. AFIB,  rate now controlled   dig on hold due to hx of pauses and bradycardia   continue with  midodrine 5 mg q 8 hrs for bp support   continue with lopressor to 50 mg BID  CHADS 3 - continue with pradaxa     4. CAD s/p PCI (11/2018)  cv stable, no evidence of acute ischemia   continue bb, statin, continue plavix given recent PCI      5. GIB   +GUIAC , +anemia s/p 1uprbc,   continue to trend cbc  GI f/u      6. non healing foot ulcer  med f/u  ID f/u , vascular studies noted, pod f/u  eventual plan for RLE angiogram    7. IGOR/CKD   creat improving, continue hold lasix given decrease po intake and + diarrhea   renal f/u     8.Cdiff   po abx, id f/u    7. hypoxia    likely in the setting of resp infection ? aspiration   no decomp CHF on exam    + influenza   on Tamiflu  ct chest revealed decreased bl pleural effusion, mutiple small nodular opacities secondary to impacted distal airways likely infectious etiology? pna LLL, aspiration    pulm f/u  s/p speech and swallow eval   steroids per pulm     8. UTI    med f/u     dvt ppx

## 2019-02-01 NOTE — PROGRESS NOTE ADULT - SUBJECTIVE AND OBJECTIVE BOX
diarrhoea improving no cp/no sob  ICU Vital Signs Last 24 Hrs  T(C): 36.4 (01 Feb 2019 17:51), Max: 36.4 (01 Feb 2019 13:03)  T(F): 97.5 (01 Feb 2019 17:51), Max: 97.5 (01 Feb 2019 13:03)  HR: 84 (01 Feb 2019 17:51) (76 - 89)  BP: 117/60 (01 Feb 2019 17:51) (110/61 - 135/87)  BP(mean): --  ABP: --  ABP(mean): --  RR: 18 (01 Feb 2019 17:51) (16 - 19)  SpO2: 92% (01 Feb 2019 17:51) (92% - 98%)                        9.8    8.12  )-----------( 262      ( 01 Feb 2019 06:20 )             33.2   02-01    136  |  97<L>  |  50<H>  ----------------------------<  171<H>  4.2   |  29  |  1.23    Ca    9.4      01 Feb 2019 06:24  Mg     1.8     02-01    MEDICATIONS  (STANDING):  atorvastatin 40 milliGRAM(s) Oral at bedtime  buDESOnide  80 MICROgram(s)/formoterol 4.5 MICROgram(s) Inhaler 2 Puff(s) Inhalation two times a day  clopidogrel Tablet 75 milliGRAM(s) Oral daily  collagenase Ointment 1 Application(s) Topical two times a day  dabigatran 75 milliGRAM(s) Oral every 12 hours  levalbuterol Inhalation 0.63 milliGRAM(s) Inhalation every 6 hours  metoprolol tartrate 50 milliGRAM(s) Oral two times a day  midodrine 5 milliGRAM(s) Oral every 8 hours  oseltamivir 30 milliGRAM(s) Oral two times a day  pantoprazole  Injectable 40 milliGRAM(s) IV Push two times a day  predniSONE   Tablet 40 milliGRAM(s) Oral daily  thiamine 100 milliGRAM(s) Oral daily  vancomycin    Solution 125 milliGRAM(s) Oral every 6 hours

## 2019-02-01 NOTE — PROGRESS NOTE ADULT - SUBJECTIVE AND OBJECTIVE BOX
CARDIOLOGY FOLLOW UP - Dr. Vieyra    CC no cp or sob       PHYSICAL EXAM:  T(C): 36.4 (02-01-19 @ 13:03), Max: 36.4 (02-01-19 @ 13:03)  HR: 76 (02-01-19 @ 13:03) (76 - 89)  BP: 115/75 (02-01-19 @ 13:03) (110/61 - 135/87)  RR: 18 (02-01-19 @ 13:03) (16 - 19)  SpO2: 97% (02-01-19 @ 13:03) (95% - 98%)  Wt(kg): --  I&O's Summary    31 Jan 2019 07:01  -  01 Feb 2019 07:00  --------------------------------------------------------  IN: 1040 mL / OUT: 0 mL / NET: 1040 mL    01 Feb 2019 07:01  -  01 Feb 2019 15:35  --------------------------------------------------------  IN: 600 mL / OUT: 0 mL / NET: 600 mL        Appearance: Normal	  Cardiovascular: Normal S1 S2,RRR, No JVD, No murmurs  Respiratory: Lungs clear to auscultation	  Gastrointestinal:  Soft, Non-tender, + BS	  Extremities: Normal range of motion, No clubbing, cyanosis or edema        MEDICATIONS  (STANDING):  atorvastatin 40 milliGRAM(s) Oral at bedtime  buDESOnide  80 MICROgram(s)/formoterol 4.5 MICROgram(s) Inhaler 2 Puff(s) Inhalation two times a day  clopidogrel Tablet 75 milliGRAM(s) Oral daily  collagenase Ointment 1 Application(s) Topical two times a day  dabigatran 75 milliGRAM(s) Oral every 12 hours  levalbuterol Inhalation 0.63 milliGRAM(s) Inhalation every 6 hours  metoprolol tartrate 50 milliGRAM(s) Oral two times a day  midodrine 5 milliGRAM(s) Oral every 8 hours  oseltamivir 30 milliGRAM(s) Oral two times a day  pantoprazole  Injectable 40 milliGRAM(s) IV Push two times a day  predniSONE   Tablet 40 milliGRAM(s) Oral daily  thiamine 100 milliGRAM(s) Oral daily  vancomycin    Solution 125 milliGRAM(s) Oral every 6 hours      TELEMETRY: Afib hr 90- 80, pvc  	    ECG:  	  RADIOLOGY:   DIAGNOSTIC TESTING:  [ ] Echocardiogram:  [ ]  Catheterization:  [ ] Stress Test:    OTHER: 	    LABS:	 	                                9.8    8.12  )-----------( 262      ( 01 Feb 2019 06:20 )             33.2     02-01    136  |  97<L>  |  50<H>  ----------------------------<  171<H>  4.2   |  29  |  1.23    Ca    9.4      01 Feb 2019 06:24  Mg     1.8     02-01

## 2019-02-01 NOTE — PROGRESS NOTE ADULT - ASSESSMENT
79 h/o female admitted for rt postrior ankle pressure ulcer    vascular following needs angiogram  need nephrology clearence  she needs to complete abx for c diff  local wound care    Influenza positive   continue tamiflu for 5 days    CDIFF POSITIVE  continue vancomycin flagyl    chronic afib with rapid vntricular response  cardiology following    CKD   monitor cmp  monitor BUN/CREATININE  appreciate nephrology f/u

## 2019-02-01 NOTE — PROGRESS NOTE ADULT - SUBJECTIVE AND OBJECTIVE BOX
CC: F/U for Flu    Saw/spoke to patient. Patient overall unchanged. No dysuria/bladder pain. SOB/cough improving. Improved diarrhea. No new complaints.    Allergies  No Known Allergies    ANTIMICROBIALS:  oseltamivir 30 two times a day  vancomycin    Solution 125 every 6 hours    PE:    Vital Signs Last 24 Hrs  T(C): 36.3 (01 Feb 2019 05:03), Max: 36.4 (31 Jan 2019 13:29)  T(F): 97.3 (01 Feb 2019 05:03), Max: 97.6 (31 Jan 2019 13:29)  HR: 76 (01 Feb 2019 05:03) (76 - 89)  BP: 135/87 (01 Feb 2019 05:03) (110/61 - 135/87)  RR: 19 (01 Feb 2019 05:03) (16 - 19)  SpO2: 97% (01 Feb 2019 05:03) (95% - 98%)    Gen: AOx3, NAD, non-toxic, pleasant  CV: S1+S2 normal, nontachycardic  Resp: Clear bilat, no resp distress, no crackles/wheezes  Abd: Soft, nontender, +BS  Ext: No LE edema, no wounds    LABS:                        9.8    8.12  )-----------( 262      ( 01 Feb 2019 06:20 )             33.2     02-01    136  |  97<L>  |  50<H>  ----------------------------<  171<H>  4.2   |  29  |  1.23    Ca    9.4      01 Feb 2019 06:24  Mg     1.8     02-01    MICROBIOLOGY:    URINE CATHETER  01-27-19 --  --  Pseudomonas aeruginosa    BLOOD PERIPHERAL  01-26-19 NGTD    .Stool Feces  01-06-19   GI PCR Results: NOT detected    RADIOLOGY:    1/29 CT:    IMPRESSION:     Partially loculated small bilateral pleural effusions appear decreased in   size from prior exam. Mild interlobular septal thickening secondary to   mild pulmonary edema also improved.    Multiple small nodular opacities or tree-in-bud opacities noted within   the bilateral upper lobes secondary to  impacted distal airways, many of   which are new since the January 24, 2019 likely of infectious etiology.    Debris noted within the trachea and left lower lobar bronchi new since   the prior study with associated left lower lobe patchy opacity with mild   interval increase in size representing atelectasis and/or pneumonia. The   constellation of findings are suggestive of aspiration.

## 2019-02-01 NOTE — PROGRESS NOTE ADULT - SUBJECTIVE AND OBJECTIVE BOX
[facility] NEPHROLOGY- PROGRESS NOTE    78y Female with history of CHF, COPD presents with SOB found to have guaiac positive anemia and afib with RVR. Nephrology consulted for elevated Scr.    REVIEW OF SYSTEMS:  Gen: no changes in weight  Cards: no chest pain  Resp: + dyspnea with cough  GI: no nausea or vomiting, + diarrhea improving  Vascular: no LE edema +R foot pain    No Known Allergies      Hospital Medications: Medications reviewed      VITALS:  T(F): 97.3 (19 @ 05:03), Max: 97.6 (19 @ 13:29)  HR: 76 (19 @ 05:03)  BP: 135/87 (19 @ 05:03)  RR: 19 (19 @ 05:03)  SpO2: 97% (19 @ 05:03)  Wt(kg): --     @ 07:01  -   @ 07:00  --------------------------------------------------------  IN: 1040 mL / OUT: 0 mL / NET: 1040 mL        PHYSICAL EXAM:    Gen: NAD, calm  Cards: Irregularly irregular, +S1/S2, no M/G/R  Resp: course BS, bibasilar rales  GI: soft, NT/ND, NABS  Vascular: no LE edema B/L      LABS:      136  |  97<L>  |  50<H>  ----------------------------<  171<H>  4.2   |  29  |  1.23    Ca    9.4      2019 06:24  Mg     1.8     02      Creatinine Trend: 1.23 <--, 1.34 <--, 1.77 <--, 1.34 <--, 1.43 <--, 1.29 <--, 1.34 <--                        9.8    8.12  )-----------( 262      ( 2019 06:20 )             33.2     Urine Studies:  Urinalysis Basic - ( 2019 23:25 )    Color: YELLOW / Appearance: TURBID / S.015 / pH: 6.5  Gluc: NEGATIVE / Ketone: NEGATIVE  / Bili: NEGATIVE / Urobili: NORMAL   Blood: MODERATE / Protein: 200 / Nitrite: POSITIVE   Leuk Esterase: LARGE / RBC: >50 / WBC >50   Sq Epi:  / Non Sq Epi: FEW / Bacteria: LARGE

## 2019-02-01 NOTE — PROGRESS NOTE ADULT - ASSESSMENT
78 year old female with right heel ulceration (stable); RLE pain with non-palp pulses    -Cont dressing right foot ulceration with santyl and DSD  -STONEY/PVR poor, 0.60 on the effected limb with flat waveforms.  -  -Vasc sx consult noted, planning RLE angio once c. diff clears  -No local signs of infection - would recommend monitoring off antibiotics.  Wound is unchanged.   -Z floats at all times  -Will follow

## 2019-02-01 NOTE — PROGRESS NOTE ADULT - PROBLEM SELECTOR PLAN 1
Likely hemodynamically mediated secondary to diarrhea and decreased PO intake resolved with gentle IVF. Avoid nephrotoxins.

## 2019-02-01 NOTE — PROGRESS NOTE ADULT - SUBJECTIVE AND OBJECTIVE BOX
Patient is a 79y old  Female who presents with a chief complaint of right foot pressure ulcer pain (01 Feb 2019 09:52)      Any change in ROS: Today is doing much better   on 4 L of oxygen   She says she feels much Better     MEDICATIONS  (STANDING):  atorvastatin 40 milliGRAM(s) Oral at bedtime  buDESOnide  80 MICROgram(s)/formoterol 4.5 MICROgram(s) Inhaler 2 Puff(s) Inhalation two times a day  clopidogrel Tablet 75 milliGRAM(s) Oral daily  collagenase Ointment 1 Application(s) Topical two times a day  dabigatran 75 milliGRAM(s) Oral every 12 hours  levalbuterol Inhalation 0.63 milliGRAM(s) Inhalation every 6 hours  metoprolol tartrate 50 milliGRAM(s) Oral two times a day  midodrine 5 milliGRAM(s) Oral every 8 hours  oseltamivir 30 milliGRAM(s) Oral two times a day  pantoprazole  Injectable 40 milliGRAM(s) IV Push two times a day  predniSONE   Tablet 40 milliGRAM(s) Oral daily  thiamine 100 milliGRAM(s) Oral daily  vancomycin    Solution 125 milliGRAM(s) Oral every 6 hours    MEDICATIONS  (PRN):  acetaminophen   Tablet .. 650 milliGRAM(s) Oral every 6 hours PRN Temp greater or equal to 38C (100.4F), Mild Pain (1 - 3), Moderate Pain (4 - 6)  LORazepam     Tablet 0.5 milliGRAM(s) Oral two times a day PRN Anxiety  traMADol 50 milliGRAM(s) Oral every 8 hours PRN Severe Pain (7 - 10)    Vital Signs Last 24 Hrs  T(C): 36.3 (01 Feb 2019 05:03), Max: 36.3 (31 Jan 2019 21:40)  T(F): 97.3 (01 Feb 2019 05:03), Max: 97.3 (31 Jan 2019 21:40)  HR: 76 (01 Feb 2019 05:03) (76 - 89)  BP: 135/87 (01 Feb 2019 05:03) (110/61 - 135/87)  BP(mean): --  RR: 19 (01 Feb 2019 05:03) (16 - 19)  SpO2: 97% (01 Feb 2019 05:03) (95% - 98%)    I&O's Summary    31 Jan 2019 07:01  -  01 Feb 2019 07:00  --------------------------------------------------------  IN: 1040 mL / OUT: 0 mL / NET: 1040 mL          Physical Exam:   GENERAL: NAD, well-groomed, well-developed  HEENT: COLEEN/   Atraumatic, Normocephalic  ENMT: No tonsillar erythema, exudates, or enlargement; Moist mucous membranes, Good dentition, No lesions  NECK: Supple, No JVD, Normal thyroid  CHEST/LUNG: Clear to auscultaion  CVS: Regular rate and rhythm; No murmurs, rubs, or gallops  GI: : Soft, Nontender, Nondistended; Bowel sounds present  NERVOUS SYSTEM:  Alert & Oriented X3  EXTREMITIES: -edema  LYMPH: No lymphadenopathy noted  SKIN: No rashes or lesions  ENDOCRINOLOGY: No Thyromegaly  PSYCH: Appropriate    Labs:                              9.8    8.12  )-----------( 262      ( 01 Feb 2019 06:20 )             33.2                         10.1   4.90  )-----------( 221      ( 31 Jan 2019 07:09 )             34.2                         10.3   7.91  )-----------( 183      ( 30 Jan 2019 07:09 )             33.6                         10.7   8.88  )-----------( 210      ( 29 Jan 2019 07:40 )             35.9     02-01    136  |  97<L>  |  50<H>  ----------------------------<  171<H>  4.2   |  29  |  1.23  01-31    140  |  100  |  41<H>  ----------------------------<  154<H>  4.4   |  31  |  1.34<H>  01-30    137  |  96<L>  |  40<H>  ----------------------------<  104<H>  3.3<L>   |  32<H>  |  1.77<H>  01-29    137  |  95<L>  |  31<H>  ----------------------------<  86  3.8   |  30  |  1.34<H>    Ca    9.4      01 Feb 2019 06:24  Ca    8.5      31 Jan 2019 07:09  Mg     1.8     02-01  Mg     1.8     01-31      CAPILLARY BLOOD GLUCOSE            < from: CT Chest No Cont (01.29.19 @ 19:48) >  subsegmental atelectasis. Linear atelectasis within the lingula. There is   mild interlobular septal thickening secondary to pulmonary edema with   mild interval improvement. Small nodular or tree-in-bud opacities noted   in the bilateral upper lobes likely secondary to  impacted distal airways   some of which are new since the prior study.  PLEURA: Partially loculated small bilateral pleural effusions appear   decreased in size from prior exam.  VESSELS: Atherosclerotic calcifications of the aorta  HEART: Cardiomegaly. No pericardial effusion. Coronary artery   calcifications. Aortic valvular calcifications. Mitral annular   calcifications.  MEDIASTINUM AND GEOVANI: Scattered subcentimeter lymph nodes.  CHEST WALL AND LOWER NECK: Heterogenous appearing thyroid gland.  VISUALIZED UPPER ABDOMEN: Partially visualized left renal cyst.  BONES: Degenerative changes of the spine. Old healed rib fractures.    IMPRESSION:     Partially loculated small bilateral pleural effusions appear decreased in   size from prior exam. Mild interlobular septal thickeningsecondary to   mild pulmonary edema also improved.    Multiple small nodular opacities or tree-in-bud opacities noted within   the bilateral upper lobes secondary to  impacted distal airways, many of   which are new since the January 24, 2019 likely of infectious etiology.    Debris noted within the trachea and left lower lobar bronchi new since   the prior study with associated left lower lobe patchy opacity with mild   interval increase in size representing atelectasis and/or pneumonia. The   constellation of findings are suggestive of aspiration.                    KAMRON SMALLS M.D., RADIOLOGY RESIDENT  This document has been electronically signed.  ZACH MORROW M.D. ATTENDING RADIOLOGIST  This document has been electronically signed. Jan 30 2019 10:21AM        < end of copied text >            RECENT CULTURES:  01-27 @ 23:40 URINE CATHETER   NEGATIVE KAYLA 43      Pseudomonas aeruginosa  Pseudomonas aeruginosa       01-26 @ 03:14 BLOOD PERIPHERAL                  NO ORGANISMS ISOLATED        RESPIRATORY CULTURES:          Studies  Chest X-RAY  CT SCAN Chest   Venous Dopplers: LE:   CT Abdomen  Others

## 2019-02-01 NOTE — PROGRESS NOTE ADULT - ASSESSMENT
79 y/o female, with a PmHx of COPD (on 2-3L O2 prn), paroxsymal a-fib (on pradaxa), HTN, HLD, and anxiety, presenting to the [facility] ED with right foot pressure ulcer pain, SOB, and CP, found to have Afib @102 bpm, H/H 8/24.7, mild interstitial pulmonary edema, proBNP 1374, guiac positive, BUN/Cr 36/1.68, admitted to telemetry for nonhealing pressure ulcer, Afib, CHF exacerbation, r/o ACS.

## 2019-02-01 NOTE — PROGRESS NOTE ADULT - ASSESSMENT
79 f with HTN, HLD, CAD s/p stent, CHF, paroxysmal A-fib COPD (on 2-3L O2 prn),recent admission 1/5/19 - 1/14/19 for C-diff after a recent augmentin course for cellulitis, discharged with PO vanco (End Date: 1/18/19), presented 1/17/19 with right foot pressure ulcer pain x several weeks.  Low suspicion for infected heel ulcer clinically  UCX with Pseudomonas, patient does not complain of any urinary symptoms  C diff positive  RVP with Flu  CT with ? aspiration--however resp status improving off abx  Appears to be improving on current therapy (afeb, leukocytosis resolved)--continue present therapy for now; attempt to avoid other antibiotics which could worsen C diff (asymp bacteruria, and unclear aspiration without significant SOB)  Overall, Flu, C diff, fever, positive culture finding  - Tamiflu 30mg q 12 x 5 days total  - c/w PO vanco 125 q qid for 10 days                  then 125 tid for a week                  then 125 bid for a week                  then 125 qd for a week                  then 125 q 48 h for a week                  then 125 every third day for a week  - if fever or worsening status repeat cultures and start zosyn for consideration aspiration pneumonia  - Monitor for any alternate signs worsening infection  - Aspiration precautions    Benjamin Dolan MD  Pager [phone]  After 5pm and on weekends call [phone]

## 2019-02-01 NOTE — PROGRESS NOTE ADULT - SUBJECTIVE AND OBJECTIVE BOX
BEVERLEY DEL RIO:[id number],   79yFemale followed for:  No Known Allergies    PAST MEDICAL & SURGICAL HISTORY:  CAD (coronary artery disease): s/p stent 2018  CHF (congestive heart failure)  COPD (chronic obstructive pulmonary disease): on 2-3L O2 at home prn  Anxiety  TIA (transient ischemic attack): many years ago  PAF (paroxysmal atrial fibrillation)  HLD (hyperlipidemia)  HTN (hypertension)  H/O total hysterectomy: 2014  History of cataract surgery: b/l 2015  History of repair of hip fracture: luisa placed in RLE 2015  H/O spinal fusion: c2-6 in 2017    FAMILY HISTORY:  No pertinent family history in first degree relatives    MEDICATIONS  (STANDING):  atorvastatin 40 milliGRAM(s) Oral at bedtime  buDESOnide  80 MICROgram(s)/formoterol 4.5 MICROgram(s) Inhaler 2 Puff(s) Inhalation two times a day  clopidogrel Tablet 75 milliGRAM(s) Oral daily  collagenase Ointment 1 Application(s) Topical two times a day  dabigatran 75 milliGRAM(s) Oral every 12 hours  levalbuterol Inhalation 0.63 milliGRAM(s) Inhalation every 6 hours  metoprolol tartrate 50 milliGRAM(s) Oral two times a day  midodrine 5 milliGRAM(s) Oral every 8 hours  oseltamivir 30 milliGRAM(s) Oral two times a day  pantoprazole  Injectable 40 milliGRAM(s) IV Push two times a day  thiamine 100 milliGRAM(s) Oral daily  vancomycin    Solution 125 milliGRAM(s) Oral every 6 hours    MEDICATIONS  (PRN):  acetaminophen   Tablet .. 650 milliGRAM(s) Oral every 6 hours PRN Temp greater or equal to 38C (100.4F), Mild Pain (1 - 3), Moderate Pain (4 - 6)  LORazepam     Tablet 0.5 milliGRAM(s) Oral two times a day PRN Anxiety  traMADol 50 milliGRAM(s) Oral every 8 hours PRN Severe Pain (7 - 10)      Vital Signs Last 24 Hrs  T(C): 36.3 (01 Feb 2019 05:03), Max: 36.4 (31 Jan 2019 13:29)  T(F): 97.3 (01 Feb 2019 05:03), Max: 97.6 (31 Jan 2019 13:29)  HR: 76 (01 Feb 2019 05:03) (76 - 89)  BP: 135/87 (01 Feb 2019 05:03) (110/61 - 135/87)  BP(mean): --  RR: 19 (01 Feb 2019 05:03) (16 - 19)  SpO2: 97% (01 Feb 2019 05:03) (95% - 98%)  nc/at  s1s2  cta  soft, nt, nd no guarding or rebound  no c/c/e    CBC Full  -  ( 01 Feb 2019 06:20 )  WBC Count : 8.12 K/uL  Hemoglobin : 9.8 g/dL  Hematocrit : 33.2 %  Platelet Count - Automated : 262 K/uL  Mean Cell Volume : 92.0 fL  Mean Cell Hemoglobin : 27.1 pg  Mean Cell Hemoglobin Concentration : 29.5 %  Auto Neutrophil # : 6.96 K/uL  Auto Lymphocyte # : 0.59 K/uL  Auto Monocyte # : 0.49 K/uL  Auto Eosinophil # : 0.00 K/uL  Auto Basophil # : 0.01 K/uL  Auto Neutrophil % : 85.7 %  Auto Lymphocyte % : 7.3 %  Auto Monocyte % : 6.0 %  Auto Eosinophil % : 0.0 %  Auto Basophil % : 0.1 %    02-01    136  |  97<L>  |  50<H>  ----------------------------<  171<H>  4.2   |  29  |  1.23    Ca    9.4      01 Feb 2019 06:24  Mg     1.8     02-01

## 2019-02-01 NOTE — PROGRESS NOTE ADULT - SUBJECTIVE AND OBJECTIVE BOX
HPI:  79 y/o female, with a PmHx of COPD (on 2-3L O2 prn), paroxsymal a-fib (on pradaxa), CAD (s/p stent 11/2018), CHF, HTN, HLD, and anxiety, presenting to the [facility] ED with right foot pressure ulcer pain x several weeks. The patient has a dime-sized ulcer with scant serosanguinous drainage on her right calcaneous that she acquired from a shoe weeks ago. She reports that she woke up last night around 4am with excruciating, unrelenting right root pain with SOB x a few minutes followed by chest pain x several hours. The patient reports that the ulcer pain began to gradually worsen since her last hospital visit, where she was d/c with vancomycin. The foot pain is sharp, 10/10, and radiates up to her calf. The patient took two puffs of her Symbicort for the SOB with no relief, but it resolved spontaneously within a few minutes. The chest pain began gradually and was described as a midsternal, 8/10, tightness with no radiation and accompanied by palpitations. Of note, the patient reports chronic cough, b/l LE edema, JACKSON with walking 1/2 block, orthopnea x1 pillow, occasional PND, melena x weeks, easy bruising/bleeding, and 10 lbs weight loss over the past 6 months with no change in appetite. The patient denies fever, chills, SOB at rest, diaphoresis, dizziness, claudication, wheezing, changes in vision and hearing, abdominal pain, change in bowel and urinary habits, dysuria, hematuria. (17 Jan 2019 12:50)    Patient is a 78y old  Female who presents with a chief complaint of right foot pressure ulcer pain (21 Jan 2019 09:09)    Allergies    No Known Allergies    Intolerances      Vital Signs Last 24 Hrs  T(C): 36.8 (21 Jan 2019 05:34), Max: 36.8 (21 Jan 2019 05:34)  T(F): 98.3 (21 Jan 2019 05:34), Max: 98.3 (21 Jan 2019 05:34)  HR: 79 (21 Jan 2019 05:34) (56 - 79)  BP: 102/53 (21 Jan 2019 05:34) (102/53 - 118/69)  BP(mean): --  RR: 18 (21 Jan 2019 05:34) (18 - 18)  SpO2: 100% (21 Jan 2019 05:34) (95% - 100%)                        9.9    9.80  )-----------( 221      ( 21 Jan 2019 00:40 )             31.4     01-20    133<L>  |  93<L>  |  36<H>  ----------------------------<  103<H>  4.3   |  26  |  1.87<H>    Ca    8.3<L>      20 Jan 2019 05:50  Mg     1.8     01-20      CAPILLARY BLOOD GLUCOSE        MEDICATIONS  (STANDING):  atorvastatin 40 milliGRAM(s) Oral at bedtime  buDESOnide  80 MICROgram(s)/formoterol 4.5 MICROgram(s) Inhaler 2 Puff(s) Inhalation two times a day  clopidogrel Tablet 75 milliGRAM(s) Oral daily  digoxin     Tablet 0.125 milliGRAM(s) Oral daily  furosemide    Tablet 40 milliGRAM(s) Oral daily  heparin  Infusion.  Unit(s)/Hr (11 mL/Hr) IV Continuous <Continuous>  metoprolol tartrate 12.5 milliGRAM(s) Oral every 12 hours  pantoprazole  Injectable 40 milliGRAM(s) IV Push two times a day  senna 2 Tablet(s) Oral at bedtime    MEDICATIONS  (PRN):  acetaminophen   Tablet .. 650 milliGRAM(s) Oral every 6 hours PRN Mild Pain (1 - 3), Moderate Pain (4 - 6), Severe Pain (7 - 10)  heparin  Injectable 4500 Unit(s) IV Push every 6 hours PRN For aPTT less than 40  heparin  Injectable 2000 Unit(s) IV Push every 6 hours PRN For aPTT between 40 - 57  LORazepam     Tablet 1 milliGRAM(s) Oral two times a day PRN Anxiety  traMADol 50 milliGRAM(s) Oral every 8 hours PRN Severe Pain (7 - 10)    PAST MEDICAL & SURGICAL HISTORY:  CAD (coronary artery disease): s/p stent 2018  CHF (congestive heart failure)  COPD (chronic obstructive pulmonary disease): on 2-3L O2 at home prn  Anxiety  TIA (transient ischemic attack): many years ago  PAF (paroxysmal atrial fibrillation)  HLD (hyperlipidemia)  HTN (hypertension)  H/O total hysterectomy: 2014  History of cataract surgery: b/l 2015  History of repair of hip fracture: luisa placed in RLE 2015  H/O spinal fusion: c2-6 in 2017        MOISES:  Posterior heel ulceration, partial thickness granular base.    No local signs of infection. No probe to bone.  No drainage or malodor.  ++pain on exam  Pedal pulses non palp bilateral lower extremities  Sensation intact to b/l feet  No gross deformities

## 2019-02-01 NOTE — PROGRESS NOTE ADULT - PROBLEM SELECTOR PLAN 1
FOUND TO HAVE NEW INFLUENZA IN the hospital : Started on Tamiflu:  1/30: started on solumedrol today as she has been wheezing: She is on antibiotics from ? [facility] : ct scan chest reviewed: has mucus impacted airways: As well as the effusions are smaller: ? left lower lobe opacity : would check speech and swallow: She has  underlying copd :  1/31: on tamiflu  2/1: Finish 5 days of tamiflu

## 2019-02-02 LAB
ANION GAP SERPL CALC-SCNC: 9 MMO/L — SIGNIFICANT CHANGE UP (ref 7–14)
BASOPHILS # BLD AUTO: 0.01 K/UL — SIGNIFICANT CHANGE UP (ref 0–0.2)
BASOPHILS NFR BLD AUTO: 0.1 % — SIGNIFICANT CHANGE UP (ref 0–2)
BUN SERPL-MCNC: 55 MG/DL — HIGH (ref 7–23)
CALCIUM SERPL-MCNC: 9.6 MG/DL — SIGNIFICANT CHANGE UP (ref 8.4–10.5)
CHLORIDE SERPL-SCNC: 98 MMOL/L — SIGNIFICANT CHANGE UP (ref 98–107)
CO2 SERPL-SCNC: 29 MMOL/L — SIGNIFICANT CHANGE UP (ref 22–31)
CREAT SERPL-MCNC: 1.15 MG/DL — SIGNIFICANT CHANGE UP (ref 0.5–1.3)
EOSINOPHIL # BLD AUTO: 0 K/UL — SIGNIFICANT CHANGE UP (ref 0–0.5)
EOSINOPHIL NFR BLD AUTO: 0 % — SIGNIFICANT CHANGE UP (ref 0–6)
GLUCOSE SERPL-MCNC: 160 MG/DL — HIGH (ref 70–99)
HCT VFR BLD CALC: 32.8 % — LOW (ref 34.5–45)
HGB BLD-MCNC: 10.2 G/DL — LOW (ref 11.5–15.5)
IMM GRANULOCYTES NFR BLD AUTO: 1.2 % — SIGNIFICANT CHANGE UP (ref 0–1.5)
LYMPHOCYTES # BLD AUTO: 0.56 K/UL — LOW (ref 1–3.3)
LYMPHOCYTES # BLD AUTO: 6.1 % — LOW (ref 13–44)
MAGNESIUM SERPL-MCNC: 1.8 MG/DL — SIGNIFICANT CHANGE UP (ref 1.6–2.6)
MCHC RBC-ENTMCNC: 27.7 PG — SIGNIFICANT CHANGE UP (ref 27–34)
MCHC RBC-ENTMCNC: 31.1 % — LOW (ref 32–36)
MCV RBC AUTO: 89.1 FL — SIGNIFICANT CHANGE UP (ref 80–100)
MONOCYTES # BLD AUTO: 0.39 K/UL — SIGNIFICANT CHANGE UP (ref 0–0.9)
MONOCYTES NFR BLD AUTO: 4.2 % — SIGNIFICANT CHANGE UP (ref 2–14)
NEUTROPHILS # BLD AUTO: 8.15 K/UL — HIGH (ref 1.8–7.4)
NEUTROPHILS NFR BLD AUTO: 88.4 % — HIGH (ref 43–77)
NRBC # FLD: 0 K/UL — LOW (ref 25–125)
PLATELET # BLD AUTO: 265 K/UL — SIGNIFICANT CHANGE UP (ref 150–400)
PMV BLD: 11 FL — SIGNIFICANT CHANGE UP (ref 7–13)
POTASSIUM SERPL-MCNC: 4.1 MMOL/L — SIGNIFICANT CHANGE UP (ref 3.5–5.3)
POTASSIUM SERPL-SCNC: 4.1 MMOL/L — SIGNIFICANT CHANGE UP (ref 3.5–5.3)
RBC # BLD: 3.68 M/UL — LOW (ref 3.8–5.2)
RBC # FLD: 16.9 % — HIGH (ref 10.3–14.5)
SODIUM SERPL-SCNC: 136 MMOL/L — SIGNIFICANT CHANGE UP (ref 135–145)
WBC # BLD: 9.22 K/UL — SIGNIFICANT CHANGE UP (ref 3.8–10.5)
WBC # FLD AUTO: 9.22 K/UL — SIGNIFICANT CHANGE UP (ref 3.8–10.5)

## 2019-02-02 RX ORDER — TRAMADOL HYDROCHLORIDE 50 MG/1
50 TABLET ORAL EVERY 8 HOURS
Qty: 0 | Refills: 0 | Status: DISCONTINUED | OUTPATIENT
Start: 2019-02-02 | End: 2019-02-09

## 2019-02-02 RX ADMIN — Medication 0.5 MILLIGRAM(S): at 19:22

## 2019-02-02 RX ADMIN — Medication 125 MILLIGRAM(S): at 00:58

## 2019-02-02 RX ADMIN — TRAMADOL HYDROCHLORIDE 50 MILLIGRAM(S): 50 TABLET ORAL at 20:25

## 2019-02-02 RX ADMIN — MIDODRINE HYDROCHLORIDE 5 MILLIGRAM(S): 2.5 TABLET ORAL at 12:00

## 2019-02-02 RX ADMIN — Medication 125 MILLIGRAM(S): at 05:25

## 2019-02-02 RX ADMIN — Medication 1 APPLICATION(S): at 17:48

## 2019-02-02 RX ADMIN — DABIGATRAN ETEXILATE MESYLATE 75 MILLIGRAM(S): 150 CAPSULE ORAL at 18:26

## 2019-02-02 RX ADMIN — MIDODRINE HYDROCHLORIDE 5 MILLIGRAM(S): 2.5 TABLET ORAL at 05:24

## 2019-02-02 RX ADMIN — LEVALBUTEROL 0.63 MILLIGRAM(S): 1.25 SOLUTION, CONCENTRATE RESPIRATORY (INHALATION) at 22:18

## 2019-02-02 RX ADMIN — Medication 40 MILLIGRAM(S): at 05:26

## 2019-02-02 RX ADMIN — PANTOPRAZOLE SODIUM 40 MILLIGRAM(S): 20 TABLET, DELAYED RELEASE ORAL at 05:24

## 2019-02-02 RX ADMIN — CLOPIDOGREL BISULFATE 75 MILLIGRAM(S): 75 TABLET, FILM COATED ORAL at 11:46

## 2019-02-02 RX ADMIN — ATORVASTATIN CALCIUM 40 MILLIGRAM(S): 80 TABLET, FILM COATED ORAL at 22:38

## 2019-02-02 RX ADMIN — Medication 1 APPLICATION(S): at 04:30

## 2019-02-02 RX ADMIN — Medication 50 MILLIGRAM(S): at 17:48

## 2019-02-02 RX ADMIN — Medication 125 MILLIGRAM(S): at 23:03

## 2019-02-02 RX ADMIN — Medication 50 MILLIGRAM(S): at 05:24

## 2019-02-02 RX ADMIN — DABIGATRAN ETEXILATE MESYLATE 75 MILLIGRAM(S): 150 CAPSULE ORAL at 05:24

## 2019-02-02 RX ADMIN — BUDESONIDE AND FORMOTEROL FUMARATE DIHYDRATE 2 PUFF(S): 160; 4.5 AEROSOL RESPIRATORY (INHALATION) at 08:06

## 2019-02-02 RX ADMIN — LEVALBUTEROL 0.63 MILLIGRAM(S): 1.25 SOLUTION, CONCENTRATE RESPIRATORY (INHALATION) at 04:07

## 2019-02-02 RX ADMIN — MIDODRINE HYDROCHLORIDE 5 MILLIGRAM(S): 2.5 TABLET ORAL at 22:38

## 2019-02-02 RX ADMIN — Medication 125 MILLIGRAM(S): at 17:48

## 2019-02-02 RX ADMIN — Medication 125 MILLIGRAM(S): at 11:44

## 2019-02-02 RX ADMIN — Medication 100 MILLIGRAM(S): at 11:46

## 2019-02-02 RX ADMIN — BUDESONIDE AND FORMOTEROL FUMARATE DIHYDRATE 2 PUFF(S): 160; 4.5 AEROSOL RESPIRATORY (INHALATION) at 20:00

## 2019-02-02 RX ADMIN — Medication 30 MILLIGRAM(S): at 05:24

## 2019-02-02 RX ADMIN — TRAMADOL HYDROCHLORIDE 50 MILLIGRAM(S): 50 TABLET ORAL at 19:55

## 2019-02-02 RX ADMIN — Medication 30 MILLIGRAM(S): at 17:49

## 2019-02-02 RX ADMIN — PANTOPRAZOLE SODIUM 40 MILLIGRAM(S): 20 TABLET, DELAYED RELEASE ORAL at 17:49

## 2019-02-02 NOTE — PROGRESS NOTE ADULT - SUBJECTIVE AND OBJECTIVE BOX
Patient is a 79y old  Female who presents with a chief complaint of right foot pressure ulcer pain (02 Feb 2019 11:42)    Any change in ROS: denies SOB, sputum. cough (++),     MEDICATIONS  (STANDING):  atorvastatin 40 milliGRAM(s) Oral at bedtime  buDESOnide  80 MICROgram(s)/formoterol 4.5 MICROgram(s) Inhaler 2 Puff(s) Inhalation two times a day  clopidogrel Tablet 75 milliGRAM(s) Oral daily  collagenase Ointment 1 Application(s) Topical two times a day  dabigatran 75 milliGRAM(s) Oral every 12 hours  levalbuterol Inhalation 0.63 milliGRAM(s) Inhalation every 6 hours  metoprolol tartrate 50 milliGRAM(s) Oral two times a day  midodrine 5 milliGRAM(s) Oral every 8 hours  oseltamivir 30 milliGRAM(s) Oral two times a day  pantoprazole  Injectable 40 milliGRAM(s) IV Push two times a day  predniSONE   Tablet 40 milliGRAM(s) Oral daily  thiamine 100 milliGRAM(s) Oral daily  vancomycin    Solution 125 milliGRAM(s) Oral every 6 hours    MEDICATIONS  (PRN):  acetaminophen   Tablet .. 650 milliGRAM(s) Oral every 6 hours PRN Temp greater or equal to 38C (100.4F), Mild Pain (1 - 3), Moderate Pain (4 - 6)  LORazepam     Tablet 0.5 milliGRAM(s) Oral two times a day PRN Anxiety    Vital Signs Last 24 Hrs  T(C): 36.7 (02 Feb 2019 08:03), Max: 36.7 (02 Feb 2019 04:44)  T(F): 98 (02 Feb 2019 08:03), Max: 98 (02 Feb 2019 04:44)  HR: 84 (02 Feb 2019 08:03) (76 - 92)  BP: 147/89 (02 Feb 2019 08:03) (108/73 - 147/89)  BP(mean): --  RR: 18 (02 Feb 2019 08:03) (18 - 18)  SpO2: 99% (02 Feb 2019 08:03) (92% - 99%)    I&O's Summary    01 Feb 2019 07:01  -  02 Feb 2019 07:00  --------------------------------------------------------  IN: 805 mL / OUT: 1 mL / NET: 804 mL          Physical Exam:   GENERAL: The patient comfortable with no apparent distress.   HEENT: Head is normocephalic and atraumatic.    NECK: Supple with no elevated JVP.  LUNGS: Clear to auscultation without wheeze and rhonchi.  HEART: S1 and S2 present without murmur.  ABDOMEN: Soft, nontender, and nondistended.   EXTREMITIES: No edema or calf tenderness.  NEUROLOGIC: Grossly intact.    Labs:                              10.2   9.22  )-----------( 265      ( 02 Feb 2019 05:50 )             32.8                         9.8    8.12  )-----------( 262      ( 01 Feb 2019 06:20 )             33.2                         10.1   4.90  )-----------( 221      ( 31 Jan 2019 07:09 )             34.2                         10.3   7.91  )-----------( 183      ( 30 Jan 2019 07:09 )             33.6     02-02    136  |  98  |  55<H>  ----------------------------<  160<H>  4.1   |  29  |  1.15  02-01    136  |  97<L>  |  50<H>  ----------------------------<  171<H>  4.2   |  29  |  1.23  01-31    140  |  100  |  41<H>  ----------------------------<  154<H>  4.4   |  31  |  1.34<H>  01-30    137  |  96<L>  |  40<H>  ----------------------------<  104<H>  3.3<L>   |  32<H>  |  1.77<H>    Ca    9.6      02 Feb 2019 05:50  Ca    9.4      01 Feb 2019 06:24  Mg     1.8     02-02  Mg     1.8     02-01      CAPILLARY BLOOD GLUCOSE      Studies  Chest X-RAY < from: Xray Chest 1 View- PORTABLE-Urgent (01.28.19 @ 12:57) >    EXAM:  XR CHEST PORTABLE URGENT 1V        PROCEDURE DATE:  Jan 28 2019         INTERPRETATION:    Portable chest xray: cough    Comparison: Most recent prior     FINDINGS:    Lines/Tubes: None    Heart and mediastinum:  Unchanged.    Lungs, pleura,and airways: Multifocal airspace disease noted with   interstitial edema.    Bones and soft tissues: The bones and soft tissues are unchanged.    Impression:    Multifocal airspace disease which may reflect an alveolar component of   edema. This appears slightly worse than on previous exam.    < end of copied text >    CT SCAN Chest   < from: CT Chest No Cont (01.29.19 @ 19:48) >  EXAM:  CT CHEST        PROCEDURE DATE:  Jan 29 2019         INTERPRETATION:  CLINICAL INFORMATION: CHF. Positive flu. Crackles.   Possible pneumonia.    COMPARISON: CT chest 1/24/2019    PROCEDURE:   CT of the Chest was performed without intravenous contrast.  Sagittal and coronal reformats were performed.      FINDINGS:    CHEST:     LUNGS AND LARGE AIRWAYS: Debris noted within the trachea. Debris also   noted within the left lower lobar bronchi new since the prior study with   associated patchy opacity involving the anterior segment of the left   lower lobe progressed since the prior study representing atelectasis   and/or pneumonia. Severe centrilobular emphysema. Right basilar   subsegmental atelectasis. Linear atelectasis within the lingula. There is   mild interlobular septal thickening secondary to pulmonary edema with   mild interval improvement. Small nodular or tree-in-bud opacities noted   in the bilateral upper lobes likely secondary to  impacted distal airways   some of which are new since the prior study.  PLEURA: Partially loculated small bilateral pleural effusions appear   decreased in size from prior exam.  VESSELS: Atherosclerotic calcifications of the aorta  HEART: Cardiomegaly. No pericardial effusion. Coronary artery   calcifications. Aortic valvular calcifications. Mitral annular   calcifications.  MEDIASTINUM AND GEOVANI: Scattered subcentimeter lymph nodes.  CHEST WALL AND LOWER NECK: Heterogenous appearing thyroid gland.  VISUALIZED UPPER ABDOMEN: Partially visualized left renal cyst.  BONES: Degenerative changes of the spine. Old healed rib fractures.    IMPRESSION:     Partially loculated small bilateral pleural effusions appear decreased in   size from prior exam. Mild interlobular septal thickeningsecondary to   mild pulmonary edema also improved.    Multiple small nodular opacities or tree-in-bud opacities noted within   the bilateral upper lobes secondary to  impacted distal airways, many of   which are new since the January 24, 2019 likely of infectious etiology.    Debris noted within the trachea and left lower lobar bronchi new since   the prior study with associated left lower lobe patchy opacity with mild   interval increase in size representing atelectasis and/or pneumonia. The   constellation of findings are suggestive of aspiration.      < end of copied text >    Venous Dopplers: LE:   < from: [facility] Duplex Lower Ext Vein Scan, Right (12.18.18 @ 19:54) >  EXAM:  DUPLEX EXT VEINS LOWER RT                            PROCEDURE DATE:  12/18/2018            INTERPRETATION:  CLINICAL INFORMATION: Lower extremity swelling    COMPARISON: None available.    TECHNIQUE: Duplex sonography of the RIGHT LOWER extremity with color and   spectral Doppler, with and without compression.      FINDINGS:    There is normal compressibility of the right common femoral, femoral and   popliteal veins. No calf vein thrombosis is detected.    The contralateral common femoral vein is patent.    Doppler examination shows normal spontaneous and phasic flow.    Mild subcutaneous edema.    IMPRESSION:     No evidence of right lower extremity deep venous thrombosis.    Mild subcutaneous edema.    < end of copied text >    CT Abdomen < from: CT Abdomen and Pelvis w/ Oral Cont and w/ IV Cont (01.04.19 @ 20:56) >  EXAM:  CT ABDOMEN AND PELVIS OC IC                            PROCEDURE DATE:  01/04/2019            INTERPRETATION:  CLINICAL INFORMATION: Abdominal pain and diarrhea         COMPARISON: None.    PROCEDURE:   CT of the Abdomen and Pelvis was performed with intravenous contrast.   Intravenous contrast: 90 ml Omnipaque 350. 10 ml discarded.  Oral contrast: positive contrast was administered.  Sagittal and coronal reformats were performed.    FINDINGS:    LOWER CHEST: Cardiomegaly. Coronary artery calcifications. Trace   bilateral pleural effusions with adjacent bibasilar compressive   atelectasis..    LIVER: Hepatic cysts and additional subcentimeter hypodensities which are   too small to characterize.  BILE DUCTS: Normal caliber.  GALLBLADDER: Within normal limits.  SPLEEN: Within normal limits.  PANCREAS: Within normal limits.  ADRENALS: Within normal limits.  KIDNEYS/URETERS: Bilateral renal cysts and additional bilateral   subcentimeter hypodensities which are too small to characterize. No   hydronephrosis..    BLADDER: Within normal limits.  REPRODUCTIVE ORGANS: Hysterectomy. No adnexal masses.    BOWEL: Pan colonic wall thickening and mucosal hyperenhancement, most   significant in the ascending colon . No extraluminal collection. No   pneumatosis or mesenteric edema. Appendiceal tip in dilated, measuring up   to 1.2 cm (602, 39), nonspecific.  PERITONEUM: No ascites.  VESSELS:  No Michelle mesenteric gas. Atherosclerotic calcifications.  RETROPERITONEUM: No lymphadenopathy.    ABDOMINAL WALL: Within normal limits.  BONES: Right hip total arthroplasty. Age indeterminate compression   fracture deformity of the L1 and T11 vertebral bodies. Multilevel   degenerative changes.    IMPRESSION:   Pan colitis, most severe in the ascending colon.  Possible reactive inflammation of the appendiceal tip.    < end of copied text >    Others  < from: Transthoracic Echocardiogram (01.17.19 @ 17:11) >  Patient name: BEVERLEY DEL RIO  YOB: 1940   Age: 78 (F)   MR#: [id number]  Study Date: 1/17/2019  Location: O/PSonographer: HOWARD Smith  Study quality: Technically good  Referring Physician: Not Available Doctor, MD  Blood Pressure: 123/90 mmHg  Height: 157 cm  Weight: 57 kg  BSA: 1.6 m2  ------------------------------------------------------------------------  PROCEDURE: Transthoracic echocardiogram with 2-D, M-Mode  and complete spectral and color flow Doppler.  INDICATION: Abnormal electrocardiogram (ECG) (EKG) (R94.31)  ------------------------------------------------------------------------  DIMENSIONS:  Dimensions:     Normal Values:  LA:     4.9 cm    2.0 - 4.0 cm  Ao:     3.2 cm    2.0 - 3.8 cm  SEPTUM: 0.7 cm0.6 - 1.2 cm  PWT:    0.7 cm    0.6 - 1.1 cm  LVIDd:  3.8 cm    3.0 - 5.6 cm  LVIDs:  2.6 cm    1.8 - 4.0 cm  Derived Variables:  LVMI: 46 g/m2  RWT: 0.36  Fractional short: 32 %  Ejection Fraction (Teicholtz): 60 %  ------------------------------------------------------------------------  OBSERVATIONS:  Mitral Valve: Mitral annular calcification, otherwise  normal mitral valve. Mild-moderate mitral regurgitation.  Aortic Root: Normal aortic root.  Aortic Valve: Calcified trileaflet aortic valve with normal  opening. Mild aortic regurgitation.  Left Atrium: Severely dilated left atrium.  LA volume index  = 50 cc/m2.  Left Ventricle: Normal left ventricular systolic function.  No segmental wall motion abnormalities. Normal left  ventricular internal dimensions and wall thicknesses.  Right Heart: Moderate right atrial enlargement. Normal  right ventricular size and function. Normal tricuspid  valve.  Moderate tricuspid regurgitation. Normal pulmonic  valve.  Mild pulmonic regurgitation.  Pericardium/PleuraNormal pericardium with no pericardial  effusion.  Hemodynamic: Estimated right ventricular systolic pressure  equals 53 mm Hg, assuming right atrial pressure equals 10  mm Hg, consistent with moderate pulmonary hypertension.  ------------------------------------------------------------------------  CONCLUSIONS:  1. Mitral annular calcification, otherwise normal mitral  valve. Mild-moderate mitral regurgitation.  2. Calcified trileaflet aortic valve with normal opening.  Mild aortic regurgitation.  3. Severely dilated left atrium.  LA volume index = 50  cc/m2.  4. Normal left ventricular internal dimensions and wall  thicknesses.  5. Normal left ventricular systolic function. No segmental  wall motion abnormalities.  6. Moderate right atrial enlargement.  7. Normal right ventricular size and function.  8. Estimated right ventricular systolic pressure equals 53  mm Hg, assuming right atrial pressure equals 10 mm Hg,  consistent with moderate pulmonary hypertension.  *** Compared with echocardiogram of 1/31/2018, no  significant changes noted.    < end of copied text >

## 2019-02-02 NOTE — PROGRESS NOTE ADULT - SUBJECTIVE AND OBJECTIVE BOX
no new issues  ICU Vital Signs Last 24 Hrs  T(C): 36.7 (02 Feb 2019 17:46), Max: 36.7 (02 Feb 2019 04:44)  T(F): 98 (02 Feb 2019 17:46), Max: 98 (02 Feb 2019 04:44)  HR: 75 (02 Feb 2019 17:46) (75 - 92)  BP: 131/75 (02 Feb 2019 17:46) (108/73 - 147/89)  BP(mean): --  ABP: --  ABP(mean): --  RR: 18 (02 Feb 2019 17:46) (18 - 18)  SpO2: 95% (02 Feb 2019 17:46) (95% - 99%)                        10.2   9.22  )-----------( 265      ( 02 Feb 2019 05:50 )             32.8   02-02    136  |  98  |  55<H>  ----------------------------<  160<H>  4.1   |  29  |  1.15    Ca    9.6      02 Feb 2019 05:50  Mg     1.8     02-02    MEDICATIONS  (STANDING):  atorvastatin 40 milliGRAM(s) Oral at bedtime  buDESOnide  80 MICROgram(s)/formoterol 4.5 MICROgram(s) Inhaler 2 Puff(s) Inhalation two times a day  clopidogrel Tablet 75 milliGRAM(s) Oral daily  collagenase Ointment 1 Application(s) Topical two times a day  dabigatran 75 milliGRAM(s) Oral every 12 hours  levalbuterol Inhalation 0.63 milliGRAM(s) Inhalation every 6 hours  metoprolol tartrate 50 milliGRAM(s) Oral two times a day  midodrine 5 milliGRAM(s) Oral every 8 hours  pantoprazole  Injectable 40 milliGRAM(s) IV Push two times a day  predniSONE   Tablet 40 milliGRAM(s) Oral daily  thiamine 100 milliGRAM(s) Oral daily  vancomycin    Solution 125 milliGRAM(s) Oral every 6 hours

## 2019-02-02 NOTE — PROGRESS NOTE ADULT - SUBJECTIVE AND OBJECTIVE BOX
CC: no events    TELEMETRY: AFIB 60-80, 2 sec pause earlier    PHYSICAL EXAM:    T(C): 36.7 (02-02-19 @ 04:44), Max: 36.7 (02-02-19 @ 04:44)  HR: 91 (02-02-19 @ 05:23) (69 - 92)  BP: 118/79 (02-02-19 @ 05:23) (108/73 - 128/81)  RR: 18 (02-02-19 @ 04:44) (18 - 18)  SpO2: 99% (02-02-19 @ 04:44) (92% - 99%)  Wt(kg): --  I&O's Summary    01 Feb 2019 07:01  -  02 Feb 2019 07:00  --------------------------------------------------------  IN: 805 mL / OUT: 1 mL / NET: 804 mL        Appearance: Normal	  Cardiovascular: irreg S1 S2,RRR, No JVD, No murmurs  Respiratory: Lungs clear to auscultation	  Gastrointestinal:  Soft, Non-tender, + BS	  Extremities: Normal range of motion, No clubbing, cyanosis or edema  Vascular: Peripheral pulses palpable 2+ bilaterally     LABS:	 	                          10.2   9.22  )-----------( 265      ( 02 Feb 2019 05:50 )             32.8     02-02    136  |  98  |  55<H>  ----------------------------<  160<H>  4.1   |  29  |  1.15    Ca    9.6      02 Feb 2019 05:50  Mg     1.8     02-02            CARDIAC MARKERS:      MEDICATIONS  (STANDING):  atorvastatin 40 milliGRAM(s) Oral at bedtime  buDESOnide  80 MICROgram(s)/formoterol 4.5 MICROgram(s) Inhaler 2 Puff(s) Inhalation two times a day  clopidogrel Tablet 75 milliGRAM(s) Oral daily  collagenase Ointment 1 Application(s) Topical two times a day  dabigatran 75 milliGRAM(s) Oral every 12 hours  levalbuterol Inhalation 0.63 milliGRAM(s) Inhalation every 6 hours  metoprolol tartrate 50 milliGRAM(s) Oral two times a day  midodrine 5 milliGRAM(s) Oral every 8 hours  oseltamivir 30 milliGRAM(s) Oral two times a day  pantoprazole  Injectable 40 milliGRAM(s) IV Push two times a day  predniSONE   Tablet 40 milliGRAM(s) Oral daily  thiamine 100 milliGRAM(s) Oral daily  vancomycin    Solution 125 milliGRAM(s) Oral every 6 hours

## 2019-02-02 NOTE — PROGRESS NOTE ADULT - SUBJECTIVE AND OBJECTIVE BOX
[facility] NEPHROLOGY- PROGRESS NOTE    78y Female with history of CHF, COPD presents with SOB found to have guaiac positive anemia and afib with RVR. Nephrology consulted for elevated Scr.    REVIEW OF SYSTEMS:  Gen: no changes in weight  Cards: no chest pain  Resp: + dyspnea with cough  GI: no nausea or vomiting, + diarrhea improving  Vascular: no LE edema +R foot pain    No Known Allergies      Hospital Medications: Medications reviewed      VITALS:  T(F): 98 (19 @ 08:03), Max: 98 (19 @ 04:44)  HR: 84 (19 @ 08:03)  BP: 147/89 (19 @ 08:03)  RR: 18 (19 @ 08:03)  SpO2: 99% (19 @ 08:03)  Wt(kg): --     @ 07:01  -   @ 07:00  --------------------------------------------------------  IN: 805 mL / OUT: 1 mL / NET: 804 mL        PHYSICAL EXAM:    Gen: NAD, calm  Cards: Irregularly irregular, +S1/S2, no M/G/R  Resp: course BS, bibasilar rales  GI: soft, NT/ND, NABS  Vascular: no LE edema B/L      LABS:      136  |  98  |  55<H>  ----------------------------<  160<H>  4.1   |  29  |  1.15    Ca    9.6      2019 05:50  Mg     1.8           Creatinine Trend: 1.15 <--, 1.23 <--, 1.34 <--, 1.77 <--, 1.34 <--, 1.43 <--, 1.29 <--                        10.2   9.22  )-----------( 265      ( 2019 05:50 )             32.8     Urine Studies:  Urinalysis Basic - ( 2019 23:25 )    Color: YELLOW / Appearance: TURBID / S.015 / pH: 6.5  Gluc: NEGATIVE / Ketone: NEGATIVE  / Bili: NEGATIVE / Urobili: NORMAL   Blood: MODERATE / Protein: 200 / Nitrite: POSITIVE   Leuk Esterase: LARGE / RBC: >50 / WBC >50   Sq Epi:  / Non Sq Epi: FEW / Bacteria: LARGE

## 2019-02-02 NOTE — PROGRESS NOTE ADULT - ASSESSMENT
77 y/o female, with a PmHx of COPD (on 2-3L O2 prn), paroxsymal a-fib (on pradaxa), HTN, HLD, and anxiety, presenting to the [facility] ED with right foot pressure ulcer pain, SOB, and CP, found to have Afib @102 bpm, H/H 8/24.7, mild interstitial pulmonary edema, proBNP 1374, guiac positive, BUN/Cr 36/1.68, admitted to telemetry for nonhealing pressure ulcer, Afib, CHF exacerbation, r/o ACS.

## 2019-02-02 NOTE — PROGRESS NOTE ADULT - ASSESSMENT
chest xray 1/28/19:  revealed multifocal airspace disease, may reflect aveolar component of edema.    a/p   77 y/o female, with a PmHx of COPD (on 2-3L O2 prn), paroxsymal a-fib (on pradaxa), CAD (s/p stent 11/2018), DCHF, HTN, HLD, and anxiety, presenting with acute/chronic diastolic chf, chest pain r/o acs, GIB, non healing pressure ulcer. Hospital course now complicated ruling in + RVP and CDiff / uti     1. Atypical chest pain, resolved  in the setting of acute CHF exacerbation, afib w/ RVR   cv stable, no evidence of acute ischemia/ACS   Echo with nl lv fxn  continue statin, bb, plavix     2. Acute/chronic diastolic chf  remains hypoxic on room air, on supplemental O2.. denies SOB, volume status is stable   ct chest revealed decreased bl pleural effusion, mutiple small nodular opacities secondary to impacted distal airways likely infectious etiology? pna LLL, aspiration    + rvp , pulmo f/u   creat improved, continue hold diuretic given active diarrhea and decrease PO intake  will reassess lasix need tomorrow    cont bb  echo with nl lv fxn    3. AFIB,  rate now controlled   dig on hold due to hx of pauses and bradycardia   continue with  midodrine 5 mg q 8 hrs for bp support   continue with lopressor to 50 mg BID  CHADS 3 - continue with pradaxa     4. CAD s/p PCI (11/2018)  cv stable, no evidence of acute ischemia   continue bb, statin, continue plavix given recent PCI      5. GIB   +GUIAC , +anemia s/p 1uprbc,   continue to trend cbc  GI f/u      6. non healing foot ulcer  med f/u  ID f/u , vascular studies noted, pod f/u  eventual plan for RLE angiogram    7. IGOR/CKD   creat improving, continue hold lasix given decrease po intake and + diarrhea   renal f/u     8.Cdiff   po abx, id f/u    7. hypoxia    likely in the setting of resp infection ? aspiration   no decomp CHF on exam    + influenza   on Tamiflu  ct chest revealed decreased bl pleural effusion, mutiple small nodular opacities secondary to impacted distal airways likely infectious etiology? pna LLL, aspiration    pulm f/u  s/p speech and swallow eval   steroids per pulm     8. UTI    med f/u     dvt ppx

## 2019-02-02 NOTE — PROGRESS NOTE ADULT - PROBLEM SELECTOR PLAN 1
FOUND TO HAVE NEW INFLUENZA IN the hospital : Started on Tamiflu:  1/30: started on solumedrol today as she has been wheezing: She is on antibiotics from ? [facility] : ct scan chest reviewed: has mucus impacted airways: As well as the effusions are smaller: ? left lower lobe opacity : would check speech and swallow: She has  underlying copd :  1/31: on tamiflu  2/1: Finish 5 days of tamiflu  2/2 treated with Tamiflu

## 2019-02-03 LAB
ANION GAP SERPL CALC-SCNC: 8 MMO/L — SIGNIFICANT CHANGE UP (ref 7–14)
ANION GAP SERPL CALC-SCNC: 8 MMO/L — SIGNIFICANT CHANGE UP (ref 7–14)
BASOPHILS # BLD AUTO: 0.01 K/UL — SIGNIFICANT CHANGE UP (ref 0–0.2)
BASOPHILS NFR BLD AUTO: 0.1 % — SIGNIFICANT CHANGE UP (ref 0–2)
BUN SERPL-MCNC: 53 MG/DL — HIGH (ref 7–23)
BUN SERPL-MCNC: 53 MG/DL — HIGH (ref 7–23)
CALCIUM SERPL-MCNC: 9.3 MG/DL — SIGNIFICANT CHANGE UP (ref 8.4–10.5)
CALCIUM SERPL-MCNC: 9.3 MG/DL — SIGNIFICANT CHANGE UP (ref 8.4–10.5)
CHLORIDE SERPL-SCNC: 98 MMOL/L — SIGNIFICANT CHANGE UP (ref 98–107)
CHLORIDE SERPL-SCNC: 98 MMOL/L — SIGNIFICANT CHANGE UP (ref 98–107)
CO2 SERPL-SCNC: 32 MMOL/L — HIGH (ref 22–31)
CO2 SERPL-SCNC: 32 MMOL/L — HIGH (ref 22–31)
CREAT SERPL-MCNC: 1.04 MG/DL — SIGNIFICANT CHANGE UP (ref 0.5–1.3)
CREAT SERPL-MCNC: 1.04 MG/DL — SIGNIFICANT CHANGE UP (ref 0.5–1.3)
EOSINOPHIL # BLD AUTO: 0.01 K/UL — SIGNIFICANT CHANGE UP (ref 0–0.5)
EOSINOPHIL NFR BLD AUTO: 0.1 % — SIGNIFICANT CHANGE UP (ref 0–6)
GLUCOSE SERPL-MCNC: 96 MG/DL — SIGNIFICANT CHANGE UP (ref 70–99)
GLUCOSE SERPL-MCNC: 96 MG/DL — SIGNIFICANT CHANGE UP (ref 70–99)
HCT VFR BLD CALC: 33.8 % — LOW (ref 34.5–45)
HGB BLD-MCNC: 10.1 G/DL — LOW (ref 11.5–15.5)
IMM GRANULOCYTES NFR BLD AUTO: 0.8 % — SIGNIFICANT CHANGE UP (ref 0–1.5)
LYMPHOCYTES # BLD AUTO: 0.87 K/UL — LOW (ref 1–3.3)
LYMPHOCYTES # BLD AUTO: 7.5 % — LOW (ref 13–44)
MAGNESIUM SERPL-MCNC: 1.9 MG/DL — SIGNIFICANT CHANGE UP (ref 1.6–2.6)
MCHC RBC-ENTMCNC: 26.6 PG — LOW (ref 27–34)
MCHC RBC-ENTMCNC: 29.9 % — LOW (ref 32–36)
MCV RBC AUTO: 88.9 FL — SIGNIFICANT CHANGE UP (ref 80–100)
MONOCYTES # BLD AUTO: 1.15 K/UL — HIGH (ref 0–0.9)
MONOCYTES NFR BLD AUTO: 10 % — SIGNIFICANT CHANGE UP (ref 2–14)
NEUTROPHILS # BLD AUTO: 9.42 K/UL — HIGH (ref 1.8–7.4)
NEUTROPHILS NFR BLD AUTO: 81.5 % — HIGH (ref 43–77)
NRBC # FLD: 0 K/UL — LOW (ref 25–125)
PLATELET # BLD AUTO: 281 K/UL — SIGNIFICANT CHANGE UP (ref 150–400)
PMV BLD: 10.9 FL — SIGNIFICANT CHANGE UP (ref 7–13)
POTASSIUM SERPL-MCNC: 4.6 MMOL/L — SIGNIFICANT CHANGE UP (ref 3.5–5.3)
POTASSIUM SERPL-MCNC: 4.6 MMOL/L — SIGNIFICANT CHANGE UP (ref 3.5–5.3)
POTASSIUM SERPL-SCNC: 4.6 MMOL/L — SIGNIFICANT CHANGE UP (ref 3.5–5.3)
POTASSIUM SERPL-SCNC: 4.6 MMOL/L — SIGNIFICANT CHANGE UP (ref 3.5–5.3)
RBC # BLD: 3.8 M/UL — SIGNIFICANT CHANGE UP (ref 3.8–5.2)
RBC # FLD: 16.8 % — HIGH (ref 10.3–14.5)
SODIUM SERPL-SCNC: 138 MMOL/L — SIGNIFICANT CHANGE UP (ref 135–145)
SODIUM SERPL-SCNC: 138 MMOL/L — SIGNIFICANT CHANGE UP (ref 135–145)
WBC # BLD: 11.55 K/UL — HIGH (ref 3.8–10.5)
WBC # FLD AUTO: 11.55 K/UL — HIGH (ref 3.8–10.5)

## 2019-02-03 RX ORDER — FUROSEMIDE 40 MG
20 TABLET ORAL DAILY
Qty: 0 | Refills: 0 | Status: DISCONTINUED | OUTPATIENT
Start: 2019-02-03 | End: 2019-02-07

## 2019-02-03 RX ADMIN — Medication 125 MILLIGRAM(S): at 05:44

## 2019-02-03 RX ADMIN — BUDESONIDE AND FORMOTEROL FUMARATE DIHYDRATE 2 PUFF(S): 160; 4.5 AEROSOL RESPIRATORY (INHALATION) at 20:34

## 2019-02-03 RX ADMIN — BUDESONIDE AND FORMOTEROL FUMARATE DIHYDRATE 2 PUFF(S): 160; 4.5 AEROSOL RESPIRATORY (INHALATION) at 08:55

## 2019-02-03 RX ADMIN — Medication 125 MILLIGRAM(S): at 23:30

## 2019-02-03 RX ADMIN — Medication 125 MILLIGRAM(S): at 12:38

## 2019-02-03 RX ADMIN — Medication 50 MILLIGRAM(S): at 05:44

## 2019-02-03 RX ADMIN — Medication 100 MILLIGRAM(S): at 12:39

## 2019-02-03 RX ADMIN — Medication 20 MILLIGRAM(S): at 12:39

## 2019-02-03 RX ADMIN — Medication 0.5 MILLIGRAM(S): at 23:34

## 2019-02-03 RX ADMIN — LEVALBUTEROL 0.63 MILLIGRAM(S): 1.25 SOLUTION, CONCENTRATE RESPIRATORY (INHALATION) at 04:27

## 2019-02-03 RX ADMIN — Medication 125 MILLIGRAM(S): at 17:44

## 2019-02-03 RX ADMIN — CLOPIDOGREL BISULFATE 75 MILLIGRAM(S): 75 TABLET, FILM COATED ORAL at 12:38

## 2019-02-03 RX ADMIN — LEVALBUTEROL 0.63 MILLIGRAM(S): 1.25 SOLUTION, CONCENTRATE RESPIRATORY (INHALATION) at 10:21

## 2019-02-03 RX ADMIN — Medication 0.5 MILLIGRAM(S): at 14:47

## 2019-02-03 RX ADMIN — ATORVASTATIN CALCIUM 40 MILLIGRAM(S): 80 TABLET, FILM COATED ORAL at 23:30

## 2019-02-03 RX ADMIN — MIDODRINE HYDROCHLORIDE 5 MILLIGRAM(S): 2.5 TABLET ORAL at 23:30

## 2019-02-03 RX ADMIN — Medication 1 APPLICATION(S): at 05:44

## 2019-02-03 RX ADMIN — Medication 50 MILLIGRAM(S): at 17:44

## 2019-02-03 RX ADMIN — PANTOPRAZOLE SODIUM 40 MILLIGRAM(S): 20 TABLET, DELAYED RELEASE ORAL at 17:44

## 2019-02-03 RX ADMIN — DABIGATRAN ETEXILATE MESYLATE 75 MILLIGRAM(S): 150 CAPSULE ORAL at 05:44

## 2019-02-03 RX ADMIN — Medication 40 MILLIGRAM(S): at 05:44

## 2019-02-03 RX ADMIN — MIDODRINE HYDROCHLORIDE 5 MILLIGRAM(S): 2.5 TABLET ORAL at 05:44

## 2019-02-03 RX ADMIN — MIDODRINE HYDROCHLORIDE 5 MILLIGRAM(S): 2.5 TABLET ORAL at 14:01

## 2019-02-03 RX ADMIN — PANTOPRAZOLE SODIUM 40 MILLIGRAM(S): 20 TABLET, DELAYED RELEASE ORAL at 05:43

## 2019-02-03 RX ADMIN — DABIGATRAN ETEXILATE MESYLATE 75 MILLIGRAM(S): 150 CAPSULE ORAL at 17:44

## 2019-02-03 NOTE — PROGRESS NOTE ADULT - SUBJECTIVE AND OBJECTIVE BOX
[facility] NEPHROLOGY- PROGRESS NOTE    78y Female with history of CHF, COPD presents with SOB found to have guaiac positive anemia and afib with RVR. Nephrology consulted for elevated Scr.    REVIEW OF SYSTEMS:  Gen: no changes in weight  Cards: no chest pain  Resp: + dyspnea with cough  GI: no nausea or vomiting, + diarrhea improving  Vascular: no LE edema +R foot pain    No Known Allergies      Hospital Medications: Medications reviewed      VITALS:  T(F): 97.5 (19 @ 05:42), Max: 98 (19 @ 17:46)  HR: 109 (19 @ 10:21)  BP: 153/72 (19 @ 05:42)  RR: 18 (19 @ 05:42)  SpO2: 99% (19 @ 05:42)  Wt(kg): --     @ 07:01  -   @ 07:00  --------------------------------------------------------  IN: 700 mL / OUT: 0 mL / NET: 700 mL        PHYSICAL EXAM:    Gen: NAD, calm  Cards: Irregularly irregular, +S1/S2, no M/G/R  Resp: course BS, bibasilar rales  GI: soft, NT/ND, NABS  Vascular: no LE edema B/L      LABS:      138  |  98  |  53<H>  ----------------------------<  96  4.6   |  32<H>  |  1.04    Ca    9.3      2019 04:00  Mg     1.9     03      Creatinine Trend: 1.04 <--, 1.15 <--, 1.23 <--, 1.34 <--, 1.77 <--, 1.34 <--, 1.43 <--                        10.1   11.55 )-----------( 281      ( 2019 04:00 )             33.8     Urine Studies:  Urinalysis Basic - ( 2019 23:25 )    Color: YELLOW / Appearance: TURBID / S.015 / pH: 6.5  Gluc: NEGATIVE / Ketone: NEGATIVE  / Bili: NEGATIVE / Urobili: NORMAL   Blood: MODERATE / Protein: 200 / Nitrite: POSITIVE   Leuk Esterase: LARGE / RBC: >50 / WBC >50   Sq Epi:  / Non Sq Epi: FEW / Bacteria: LARGE

## 2019-02-03 NOTE — PROGRESS NOTE ADULT - SUBJECTIVE AND OBJECTIVE BOX
no complaints  ICU Vital Signs Last 24 Hrs  T(C): 36.2 (03 Feb 2019 12:37), Max: 36.7 (02 Feb 2019 17:46)  T(F): 97.1 (03 Feb 2019 12:37), Max: 98 (02 Feb 2019 17:46)  HR: 74 (03 Feb 2019 12:37) (65 - 116)  BP: 108/70 (03 Feb 2019 14:02) (108/70 - 153/72)  BP(mean): --  ABP: --  ABP(mean): --  RR: 18 (03 Feb 2019 12:37) (18 - 18)  SpO2: 95% (03 Feb 2019 12:37) (95% - 99%)                        10.1   11.55 )-----------( 281      ( 03 Feb 2019 04:00 )             33.8   02-03    138  |  98  |  53<H>  ----------------------------<  96  4.6   |  32<H>  |  1.04    Ca    9.3      03 Feb 2019 04:00  Mg     1.9     02-03    MEDICATIONS  (STANDING):  atorvastatin 40 milliGRAM(s) Oral at bedtime  buDESOnide  80 MICROgram(s)/formoterol 4.5 MICROgram(s) Inhaler 2 Puff(s) Inhalation two times a day  clopidogrel Tablet 75 milliGRAM(s) Oral daily  collagenase Ointment 1 Application(s) Topical two times a day  dabigatran 75 milliGRAM(s) Oral every 12 hours  furosemide    Tablet 20 milliGRAM(s) Oral daily  levalbuterol Inhalation 0.63 milliGRAM(s) Inhalation every 6 hours  metoprolol tartrate 50 milliGRAM(s) Oral two times a day  midodrine 5 milliGRAM(s) Oral every 8 hours  pantoprazole  Injectable 40 milliGRAM(s) IV Push two times a day  predniSONE   Tablet 40 milliGRAM(s) Oral daily  thiamine 100 milliGRAM(s) Oral daily  vancomycin    Solution 125 milliGRAM(s) Oral every 6 hours

## 2019-02-03 NOTE — PROGRESS NOTE ADULT - SUBJECTIVE AND OBJECTIVE BOX
Patient is a 79y old  Female who presents with a chief complaint of right foot pressure ulcer pain (03 Feb 2019 11:09)    Any change in ROS: doing ok. denies sob, cough, sputum     MEDICATIONS  (STANDING):  atorvastatin 40 milliGRAM(s) Oral at bedtime  buDESOnide  80 MICROgram(s)/formoterol 4.5 MICROgram(s) Inhaler 2 Puff(s) Inhalation two times a day  clopidogrel Tablet 75 milliGRAM(s) Oral daily  collagenase Ointment 1 Application(s) Topical two times a day  dabigatran 75 milliGRAM(s) Oral every 12 hours  furosemide    Tablet 20 milliGRAM(s) Oral daily  levalbuterol Inhalation 0.63 milliGRAM(s) Inhalation every 6 hours  metoprolol tartrate 50 milliGRAM(s) Oral two times a day  midodrine 5 milliGRAM(s) Oral every 8 hours  pantoprazole  Injectable 40 milliGRAM(s) IV Push two times a day  predniSONE   Tablet 40 milliGRAM(s) Oral daily  thiamine 100 milliGRAM(s) Oral daily  vancomycin    Solution 125 milliGRAM(s) Oral every 6 hours    MEDICATIONS  (PRN):  acetaminophen   Tablet .. 650 milliGRAM(s) Oral every 6 hours PRN Temp greater or equal to 38C (100.4F), Mild Pain (1 - 3), Moderate Pain (4 - 6)  LORazepam     Tablet 0.5 milliGRAM(s) Oral two times a day PRN Anxiety  traMADol 50 milliGRAM(s) Oral every 8 hours PRN Severe Pain (7 - 10)    Vital Signs Last 24 Hrs  T(C): 36.4 (03 Feb 2019 05:42), Max: 36.7 (02 Feb 2019 17:46)  T(F): 97.5 (03 Feb 2019 05:42), Max: 98 (02 Feb 2019 17:46)  HR: 109 (03 Feb 2019 10:21) (65 - 116)  BP: 153/72 (03 Feb 2019 05:42) (131/75 - 153/72)  BP(mean): --  RR: 18 (03 Feb 2019 05:42) (18 - 18)  SpO2: 99% (03 Feb 2019 05:42) (95% - 99%)    I&O's Summary    02 Feb 2019 07:01  -  03 Feb 2019 07:00  --------------------------------------------------------  IN: 700 mL / OUT: 0 mL / NET: 700 mL          Physical Exam:   GENERAL: The patient comfortable with no apparent distress.   HEENT: Head is normocephalic and atraumatic.    NECK: Supple with no elevated JVP.  LUNGS: soft wheezing   HEART: S1 and S2 present without murmur.  ABDOMEN: Soft, nontender, and nondistended.   EXTREMITIES: No edema or calf tenderness.  NEUROLOGIC: Grossly intact.    Labs:                              10.1   11.55 )-----------( 281      ( 03 Feb 2019 04:00 )             33.8                         10.2   9.22  )-----------( 265      ( 02 Feb 2019 05:50 )             32.8                         9.8    8.12  )-----------( 262      ( 01 Feb 2019 06:20 )             33.2                         10.1   4.90  )-----------( 221      ( 31 Jan 2019 07:09 )             34.2     02-03    138  |  98  |  53<H>  ----------------------------<  96  4.6   |  32<H>  |  1.04  02-02    136  |  98  |  55<H>  ----------------------------<  160<H>  4.1   |  29  |  1.15  02-01    136  |  97<L>  |  50<H>  ----------------------------<  171<H>  4.2   |  29  |  1.23  01-31    140  |  100  |  41<H>  ----------------------------<  154<H>  4.4   |  31  |  1.34<H>    Ca    9.3      03 Feb 2019 04:00  Ca    9.6      02 Feb 2019 05:50  Mg     1.9     02-03  Mg     1.8     02-02      CAPILLARY BLOOD GLUCOSE        Studies  Chest X-RAY   < from: Xray Chest 1 View- PORTABLE-Urgent (01.28.19 @ 12:57) >  EXAM:  XR CHEST PORTABLE URGENT 1V        PROCEDURE DATE:  Jan 28 2019         INTERPRETATION:    Portable chest xray: cough    Comparison: Most recent prior     FINDINGS:    Lines/Tubes: None    Heart and mediastinum:  Unchanged.    Lungs, pleura,and airways: Multifocal airspace disease noted with   interstitial edema.    Bones and soft tissues: The bones and soft tissues are unchanged.    Impression:    Multifocal airspace disease which may reflect an alveolar component of   edema. This appears slightly worse than on previous exam.        < end of copied text >    CT SCAN Chest   < from: CT Chest No Cont (01.29.19 @ 19:48) >  EXAM:  CT CHEST        PROCEDURE DATE:  Jan 29 2019         INTERPRETATION:  CLINICAL INFORMATION: CHF. Positive flu. Crackles.   Possible pneumonia.    COMPARISON: CT chest 1/24/2019    PROCEDURE:   CT of the Chest was performed without intravenous contrast.  Sagittal and coronal reformats were performed.      FINDINGS:    CHEST:     LUNGS AND LARGE AIRWAYS: Debris noted within the trachea. Debris also   noted within the left lower lobar bronchi new since the prior study with   associated patchy opacity involving the anterior segment of the left   lower lobe progressed since the prior study representing atelectasis   and/or pneumonia. Severe centrilobular emphysema. Right basilar   subsegmental atelectasis. Linear atelectasis within the lingula. There is   mild interlobular septal thickening secondary to pulmonary edema with   mild interval improvement. Small nodular or tree-in-bud opacities noted   in the bilateral upper lobes likely secondary to  impacted distal airways   some of which are new since the prior study.  PLEURA: Partially loculated small bilateral pleural effusions appear   decreased in size from prior exam.  VESSELS: Atherosclerotic calcifications of the aorta  HEART: Cardiomegaly. No pericardial effusion. Coronary artery   calcifications. Aortic valvular calcifications. Mitral annular   calcifications.  MEDIASTINUM AND GEOVANI: Scattered subcentimeter lymph nodes.  CHEST WALL AND LOWER NECK: Heterogenous appearing thyroid gland.  VISUALIZED UPPER ABDOMEN: Partially visualized left renal cyst.  BONES: Degenerative changes of the spine. Old healed rib fractures.    IMPRESSION:     Partially loculated small bilateral pleural effusions appear decreased in   size from prior exam. Mild interlobular septal thickeningsecondary to   mild pulmonary edema also improved.    Multiple small nodular opacities or tree-in-bud opacities noted within   the bilateral upper lobes secondary to  impacted distal airways, many of   which are new since the January 24, 2019 likely of infectious etiology.    Debris noted within the trachea and left lower lobar bronchi new since   the prior study with associated left lower lobe patchy opacity with mild   interval increase in size representing atelectasis and/or pneumonia. The   constellation of findings are suggestive of aspiration.    < end of copied text >    Venous Dopplers: LE:  < from: [facility] Duplex Lower Ext Vein Scan, Right (12.18.18 @ 19:54) >  EXAM:  DUPLEX EXT VEINS LOWER RT                            PROCEDURE DATE:  12/18/2018            INTERPRETATION:  CLINICAL INFORMATION: Lower extremity swelling    COMPARISON: None available.    TECHNIQUE: Duplex sonography of the RIGHT LOWER extremity with color and   spectral Doppler, with and without compression.      FINDINGS:    There is normal compressibility of the right common femoral, femoral and   popliteal veins. No calf vein thrombosis is detected.    The contralateral common femoral vein is patent.    Doppler examination shows normal spontaneous and phasic flow.    Mild subcutaneous edema.    IMPRESSION:     No evidence of right lower extremity deep venous thrombosis.    Mild subcutaneous edema.        < end of copied text >    CT Abdomen  < from: CT Abdomen and Pelvis w/ Oral Cont and w/ IV Cont (01.04.19 @ 20:56) >  EXAM:  CT ABDOMEN AND PELVIS OC IC                            PROCEDURE DATE:  01/04/2019            INTERPRETATION:  CLINICAL INFORMATION: Abdominal pain and diarrhea         COMPARISON: None.    PROCEDURE:   CT of the Abdomen and Pelvis was performed with intravenous contrast.   Intravenous contrast: 90 ml Omnipaque 350. 10 ml discarded.  Oral contrast: positive contrast was administered.  Sagittal and coronal reformats were performed.    FINDINGS:    LOWER CHEST: Cardiomegaly. Coronary artery calcifications. Trace   bilateral pleural effusions with adjacent bibasilar compressive   atelectasis..    LIVER: Hepatic cysts and additional subcentimeter hypodensities which are   too small to characterize.  BILE DUCTS: Normal caliber.  GALLBLADDER: Within normal limits.  SPLEEN: Within normal limits.  PANCREAS: Within normal limits.  ADRENALS: Within normal limits.  KIDNEYS/URETERS: Bilateral renal cysts and additional bilateral   subcentimeter hypodensities which are too small to characterize. No   hydronephrosis..    BLADDER: Within normal limits.  REPRODUCTIVE ORGANS: Hysterectomy. No adnexal masses.    BOWEL: Pan colonic wall thickening and mucosal hyperenhancement, most   significant in the ascending colon . No extraluminal collection. No   pneumatosis or mesenteric edema. Appendiceal tip in dilated, measuring up   to 1.2 cm (602, 39), nonspecific.  PERITONEUM: No ascites.  VESSELS:  No Michelle mesenteric gas. Atherosclerotic calcifications.  RETROPERITONEUM: No lymphadenopathy.    ABDOMINAL WALL: Within normal limits.  BONES: Right hip total arthroplasty. Age indeterminate compression   fracture deformity of the L1 and T11 vertebral bodies. Multilevel   degenerative changes.    IMPRESSION:   Pan colitis, most severe in the ascending colon.  Possible reactive inflammation of the appendiceal tip.    < end of copied text >    Others  < from: Transthoracic Echocardiogram (01.17.19 @ 17:11) >    Patient name: BEVERLEY DEL RIO  YOB: 1940   Age: 78 (F)   MR#: [id number]  Study Date: 1/17/2019  Location: O/PSonographer: HOWARD Smith  Study quality: Technically good  Referring Physician: Not Available Doctor, MD  Blood Pressure: 123/90 mmHg  Height: 157 cm  Weight: 57 kg  BSA: 1.6 m2  ------------------------------------------------------------------------  PROCEDURE: Transthoracic echocardiogram with 2-D, M-Mode  and complete spectral and color flow Doppler.  INDICATION: Abnormal electrocardiogram (ECG) (EKG) (R94.31)  ------------------------------------------------------------------------  DIMENSIONS:  Dimensions:     Normal Values:  LA:     4.9 cm    2.0 - 4.0 cm  Ao:     3.2 cm    2.0 - 3.8 cm  SEPTUM: 0.7 cm0.6 - 1.2 cm  PWT:    0.7 cm    0.6 - 1.1 cm  LVIDd:  3.8 cm    3.0 - 5.6 cm  LVIDs:  2.6 cm    1.8 - 4.0 cm  Derived Variables:  LVMI: 46 g/m2  RWT: 0.36  Fractional short: 32 %  Ejection Fraction (Teicholtz): 60 %  ------------------------------------------------------------------------  OBSERVATIONS:  Mitral Valve: Mitral annular calcification, otherwise  normal mitral valve. Mild-moderate mitral regurgitation.  Aortic Root: Normal aortic root.  Aortic Valve: Calcified trileaflet aortic valve with normal  opening. Mild aortic regurgitation.  Left Atrium: Severely dilated left atrium.  LA volume index  = 50 cc/m2.  Left Ventricle: Normal left ventricular systolic function.  No segmental wall motion abnormalities. Normal left  ventricular internal dimensions and wall thicknesses.  Right Heart: Moderate right atrial enlargement. Normal  right ventricular size and function. Normal tricuspid  valve.  Moderate tricuspid regurgitation. Normal pulmonic  valve.  Mild pulmonic regurgitation.  Pericardium/PleuraNormal pericardium with no pericardial  effusion.  Hemodynamic: Estimated right ventricular systolic pressure  equals 53 mm Hg, assuming right atrial pressure equals 10  mm Hg, consistent with moderate pulmonary hypertension.  ------------------------------------------------------------------------  CONCLUSIONS:  1. Mitral annular calcification, otherwise normal mitral  valve. Mild-moderate mitral regurgitation.  2. Calcified trileaflet aortic valve with normal opening.  Mild aortic regurgitation.  3. Severely dilated left atrium.  LA volume index = 50  cc/m2.  4. Normal left ventricular internal dimensions and wall  thicknesses.  5. Normal left ventricular systolic function. No segmental  wall motion abnormalities.  6. Moderate right atrial enlargement.  7. Normal right ventricular size and function.  8. Estimated right ventricular systolic pressure equals 53  mm Hg, assuming right atrial pressure equals 10 mm Hg,  consistent with moderate pulmonary hypertension.  *** Compared with echocardiogram of 1/31/2018, no  significant changes noted.  --------------------------------------------------------    < end of copied text >

## 2019-02-03 NOTE — PROGRESS NOTE ADULT - ASSESSMENT
chest xray 1/28/19:  revealed multifocal airspace disease, may reflect aveolar component of edema.    a/p   77 y/o female, with a PmHx of COPD (on 2-3L O2 prn), paroxsymal a-fib (on pradaxa), CAD (s/p stent 11/2018), DCHF, HTN, HLD, and anxiety, presenting with acute/chronic diastolic chf, chest pain r/o acs, GIB, non healing pressure ulcer. Hospital course now complicated ruling in + RVP and CDiff / uti     1. Atypical chest pain, resolved  in the setting of acute CHF exacerbation, afib w/ RVR   cv stable, no evidence of acute ischemia/ACS   Echo with nl lv fxn  continue statin, bb, plavix     2. Acute/chronic diastolic chf  remains hypoxic on room air, on supplemental O2.. denies SOB, volume status is stable   ct chest revealed decreased bl pleural effusion, mutiple small nodular opacities secondary to impacted distal airways likely infectious etiology? pna LLL, aspiration    + rvp , pulmo f/u   creat improved, will restart low dose lasix    cont bb  echo with nl lv fxn    3. AFIB,  rate now controlled   dig on hold due to hx of pauses and bradycardia   continue with  midodrine 5 mg q 8 hrs for bp support   continue with lopressor to 50 mg BID  CHADS 3 - continue with pradaxa     4. CAD s/p PCI (11/2018)  cv stable, no evidence of acute ischemia   continue bb, statin, continue plavix given recent PCI      5. GIB   +GUIAC , +anemia s/p 1uprbc,   continue to trend cbc  GI f/u      6. non healing foot ulcer  med f/u  ID f/u , vascular studies noted, pod f/u  eventual plan for RLE angiogram    7. IGOR/CKD   creat improving, continue hold lasix given decrease po intake and + diarrhea   renal f/u     8.Cdiff   po abx, id f/u    7. hypoxia    likely in the setting of resp infection ? aspiration   no decomp CHF on exam    + influenza   on Tamiflu  ct chest revealed decreased bl pleural effusion, mutiple small nodular opacities secondary to impacted distal airways likely infectious etiology? pna LLL, aspiration    pulm f/u  s/p speech and swallow eval   steroids per pulm     8. UTI    med f/u     dvt ppx

## 2019-02-03 NOTE — PROGRESS NOTE ADULT - SUBJECTIVE AND OBJECTIVE BOX
BEVERLEY DEL RIO:[id number],   79yFemale followed for:  No Known Allergies    PAST MEDICAL & SURGICAL HISTORY:  CAD (coronary artery disease): s/p stent 2018  CHF (congestive heart failure)  COPD (chronic obstructive pulmonary disease): on 2-3L O2 at home prn  Anxiety  TIA (transient ischemic attack): many years ago  PAF (paroxysmal atrial fibrillation)  HLD (hyperlipidemia)  HTN (hypertension)  H/O total hysterectomy: 2014  History of cataract surgery: b/l 2015  History of repair of hip fracture: luisa placed in RLE 2015  H/O spinal fusion: c2-6 in 2017    FAMILY HISTORY:  No pertinent family history in first degree relatives    MEDICATIONS  (STANDING):  atorvastatin 40 milliGRAM(s) Oral at bedtime  buDESOnide  80 MICROgram(s)/formoterol 4.5 MICROgram(s) Inhaler 2 Puff(s) Inhalation two times a day  clopidogrel Tablet 75 milliGRAM(s) Oral daily  collagenase Ointment 1 Application(s) Topical two times a day  dabigatran 75 milliGRAM(s) Oral every 12 hours  furosemide    Tablet 20 milliGRAM(s) Oral daily  levalbuterol Inhalation 0.63 milliGRAM(s) Inhalation every 6 hours  metoprolol tartrate 50 milliGRAM(s) Oral two times a day  midodrine 5 milliGRAM(s) Oral every 8 hours  pantoprazole  Injectable 40 milliGRAM(s) IV Push two times a day  predniSONE   Tablet 40 milliGRAM(s) Oral daily  thiamine 100 milliGRAM(s) Oral daily  vancomycin    Solution 125 milliGRAM(s) Oral every 6 hours    MEDICATIONS  (PRN):  acetaminophen   Tablet .. 650 milliGRAM(s) Oral every 6 hours PRN Temp greater or equal to 38C (100.4F), Mild Pain (1 - 3), Moderate Pain (4 - 6)  LORazepam     Tablet 0.5 milliGRAM(s) Oral two times a day PRN Anxiety  traMADol 50 milliGRAM(s) Oral every 8 hours PRN Severe Pain (7 - 10)      Vital Signs Last 24 Hrs  T(C): 36.2 (03 Feb 2019 12:37), Max: 36.7 (02 Feb 2019 17:46)  T(F): 97.1 (03 Feb 2019 12:37), Max: 98 (02 Feb 2019 17:46)  HR: 74 (03 Feb 2019 12:37) (65 - 116)  BP: 114/70 (03 Feb 2019 12:37) (114/70 - 153/72)  BP(mean): --  RR: 18 (03 Feb 2019 12:37) (18 - 18)  SpO2: 95% (03 Feb 2019 12:37) (95% - 99%)  nc/at  s1s2  cta  soft, nt, nd no guarding or rebound  no c/c/e    CBC Full  -  ( 03 Feb 2019 04:00 )  WBC Count : 11.55 K/uL  Hemoglobin : 10.1 g/dL  Hematocrit : 33.8 %  Platelet Count - Automated : 281 K/uL  Mean Cell Volume : 88.9 fL  Mean Cell Hemoglobin : 26.6 pg  Mean Cell Hemoglobin Concentration : 29.9 %  Auto Neutrophil # : 9.42 K/uL  Auto Lymphocyte # : 0.87 K/uL  Auto Monocyte # : 1.15 K/uL  Auto Eosinophil # : 0.01 K/uL  Auto Basophil # : 0.01 K/uL  Auto Neutrophil % : 81.5 %  Auto Lymphocyte % : 7.5 %  Auto Monocyte % : 10.0 %  Auto Eosinophil % : 0.1 %  Auto Basophil % : 0.1 %    02-03    138  |  98  |  53<H>  ----------------------------<  96  4.6   |  32<H>  |  1.04    Ca    9.3      03 Feb 2019 04:00  Mg     1.9     02-03

## 2019-02-03 NOTE — PROGRESS NOTE ADULT - PROBLEM SELECTOR PLAN 1
FOUND TO HAVE NEW INFLUENZA IN the hospital : Started on Tamiflu:  1/30: started on solumedrol today as she has been wheezing: She is on antibiotics from ? [facility] : ct scan chest reviewed: has mucus impacted airways: As well as the effusions are smaller: ? left lower lobe opacity : would check speech and swallow: She has  underlying copd :  1/31: on tamiflu  2/1: Finish 5 days of tamiflu  2/2 treated with Tamiflu  2/3 treated

## 2019-02-03 NOTE — PROGRESS NOTE ADULT - ASSESSMENT
79 h/o female admitted for rt postrior ankle pressure ulcer    vascular following needs angiogram  need nephrology clearence  she needs to complete abx for c diff  local wound care    Influenza positive   continue tamiflu for 5 days    CDIFF POSITIVE  continue vancomycin taper as per ID    chronic afib with rapid vntricular response  cardiology following    CKD   monitor cmp  monitor BUN/CREATININE  appreciate nephrology f/u

## 2019-02-03 NOTE — PROGRESS NOTE ADULT - ASSESSMENT
77 y/o female, with a PmHx of COPD (on 2-3L O2 prn), paroxsymal a-fib (on pradaxa), HTN, HLD, and anxiety, presenting to the [facility] ED with right foot pressure ulcer pain, SOB, and CP, found to have Afib @102 bpm, H/H 8/24.7, mild interstitial pulmonary edema, proBNP 1374, guiac positive, BUN/Cr 36/1.68, admitted to telemetry for nonhealing pressure ulcer, Afib, CHF exacerbation, r/o ACS.

## 2019-02-03 NOTE — PROGRESS NOTE ADULT - SUBJECTIVE AND OBJECTIVE BOX
CC: no acute events    TELEMETRY:     PHYSICAL EXAM:    T(C): 36.4 (02-03-19 @ 05:42), Max: 36.7 (02-02-19 @ 08:03)  HR: 71 (02-03-19 @ 05:42) (65 - 116)  BP: 153/72 (02-03-19 @ 05:42) (131/75 - 153/72)  RR: 18 (02-03-19 @ 05:42) (18 - 18)  SpO2: 99% (02-03-19 @ 05:42) (95% - 99%)  Wt(kg): --  I&O's Summary    02 Feb 2019 07:01  -  03 Feb 2019 07:00  --------------------------------------------------------  IN: 600 mL / OUT: 0 mL / NET: 600 mL        Appearance: Normal	  Cardiovascular: irreg S1 S2,RRR, No JVD, No murmurs  Respiratory: dec bs at the bases	  Gastrointestinal:  Soft, Non-tender, + BS	  Extremities: Normal range of motion, No clubbing, cyanosis or edema  Vascular: Peripheral pulses palpable 2+ bilaterally     LABS:	 	                          10.1   11.55 )-----------( 281      ( 03 Feb 2019 04:00 )             33.8     02-03    138  |  98  |  53<H>  ----------------------------<  96  4.6   |  32<H>  |  1.04    Ca    9.3      03 Feb 2019 04:00  Mg     1.9     02-03            CARDIAC MARKERS:        MEDICATIONS  (STANDING):  atorvastatin 40 milliGRAM(s) Oral at bedtime  buDESOnide  80 MICROgram(s)/formoterol 4.5 MICROgram(s) Inhaler 2 Puff(s) Inhalation two times a day  clopidogrel Tablet 75 milliGRAM(s) Oral daily  collagenase Ointment 1 Application(s) Topical two times a day  dabigatran 75 milliGRAM(s) Oral every 12 hours  levalbuterol Inhalation 0.63 milliGRAM(s) Inhalation every 6 hours  metoprolol tartrate 50 milliGRAM(s) Oral two times a day  midodrine 5 milliGRAM(s) Oral every 8 hours  pantoprazole  Injectable 40 milliGRAM(s) IV Push two times a day  predniSONE   Tablet 40 milliGRAM(s) Oral daily  thiamine 100 milliGRAM(s) Oral daily  vancomycin    Solution 125 milliGRAM(s) Oral every 6 hours

## 2019-02-04 LAB
ANION GAP SERPL CALC-SCNC: 9 MMO/L — SIGNIFICANT CHANGE UP (ref 7–14)
BASOPHILS # BLD AUTO: 0.02 K/UL — SIGNIFICANT CHANGE UP (ref 0–0.2)
BASOPHILS NFR BLD AUTO: 0.2 % — SIGNIFICANT CHANGE UP (ref 0–2)
BUN SERPL-MCNC: 54 MG/DL — HIGH (ref 7–23)
CALCIUM SERPL-MCNC: 9.6 MG/DL — SIGNIFICANT CHANGE UP (ref 8.4–10.5)
CHLORIDE SERPL-SCNC: 100 MMOL/L — SIGNIFICANT CHANGE UP (ref 98–107)
CO2 SERPL-SCNC: 31 MMOL/L — SIGNIFICANT CHANGE UP (ref 22–31)
CREAT SERPL-MCNC: 1.11 MG/DL — SIGNIFICANT CHANGE UP (ref 0.5–1.3)
EOSINOPHIL # BLD AUTO: 0.02 K/UL — SIGNIFICANT CHANGE UP (ref 0–0.5)
EOSINOPHIL NFR BLD AUTO: 0.2 % — SIGNIFICANT CHANGE UP (ref 0–6)
GLUCOSE SERPL-MCNC: 87 MG/DL — SIGNIFICANT CHANGE UP (ref 70–99)
HCT VFR BLD CALC: 34.4 % — LOW (ref 34.5–45)
HGB BLD-MCNC: 10.4 G/DL — LOW (ref 11.5–15.5)
IMM GRANULOCYTES NFR BLD AUTO: 1.1 % — SIGNIFICANT CHANGE UP (ref 0–1.5)
LYMPHOCYTES # BLD AUTO: 0.98 K/UL — LOW (ref 1–3.3)
LYMPHOCYTES # BLD AUTO: 7.4 % — LOW (ref 13–44)
MAGNESIUM SERPL-MCNC: 1.9 MG/DL — SIGNIFICANT CHANGE UP (ref 1.6–2.6)
MCHC RBC-ENTMCNC: 27.6 PG — SIGNIFICANT CHANGE UP (ref 27–34)
MCHC RBC-ENTMCNC: 30.2 % — LOW (ref 32–36)
MCV RBC AUTO: 91.2 FL — SIGNIFICANT CHANGE UP (ref 80–100)
MONOCYTES # BLD AUTO: 1.16 K/UL — HIGH (ref 0–0.9)
MONOCYTES NFR BLD AUTO: 8.7 % — SIGNIFICANT CHANGE UP (ref 2–14)
NEUTROPHILS # BLD AUTO: 10.99 K/UL — HIGH (ref 1.8–7.4)
NEUTROPHILS NFR BLD AUTO: 82.4 % — HIGH (ref 43–77)
NRBC # FLD: 0 K/UL — LOW (ref 25–125)
PLATELET # BLD AUTO: 292 K/UL — SIGNIFICANT CHANGE UP (ref 150–400)
PMV BLD: 10.8 FL — SIGNIFICANT CHANGE UP (ref 7–13)
POTASSIUM SERPL-MCNC: 4.5 MMOL/L — SIGNIFICANT CHANGE UP (ref 3.5–5.3)
POTASSIUM SERPL-SCNC: 4.5 MMOL/L — SIGNIFICANT CHANGE UP (ref 3.5–5.3)
RBC # BLD: 3.77 M/UL — LOW (ref 3.8–5.2)
RBC # FLD: 17.1 % — HIGH (ref 10.3–14.5)
SODIUM SERPL-SCNC: 140 MMOL/L — SIGNIFICANT CHANGE UP (ref 135–145)
WBC # BLD: 13.32 K/UL — HIGH (ref 3.8–10.5)
WBC # FLD AUTO: 13.32 K/UL — HIGH (ref 3.8–10.5)

## 2019-02-04 PROCEDURE — 99232 SBSQ HOSP IP/OBS MODERATE 35: CPT

## 2019-02-04 RX ORDER — DABIGATRAN ETEXILATE MESYLATE 150 MG/1
150 CAPSULE ORAL EVERY 12 HOURS
Qty: 0 | Refills: 0 | Status: DISCONTINUED | OUTPATIENT
Start: 2019-02-04 | End: 2019-02-04

## 2019-02-04 RX ORDER — DABIGATRAN ETEXILATE MESYLATE 150 MG/1
75 CAPSULE ORAL EVERY 12 HOURS
Qty: 0 | Refills: 0 | Status: DISCONTINUED | OUTPATIENT
Start: 2019-02-04 | End: 2019-02-15

## 2019-02-04 RX ADMIN — BUDESONIDE AND FORMOTEROL FUMARATE DIHYDRATE 2 PUFF(S): 160; 4.5 AEROSOL RESPIRATORY (INHALATION) at 08:07

## 2019-02-04 RX ADMIN — DABIGATRAN ETEXILATE MESYLATE 75 MILLIGRAM(S): 150 CAPSULE ORAL at 05:00

## 2019-02-04 RX ADMIN — Medication 50 MILLIGRAM(S): at 05:01

## 2019-02-04 RX ADMIN — TRAMADOL HYDROCHLORIDE 50 MILLIGRAM(S): 50 TABLET ORAL at 20:40

## 2019-02-04 RX ADMIN — DABIGATRAN ETEXILATE MESYLATE 75 MILLIGRAM(S): 150 CAPSULE ORAL at 18:22

## 2019-02-04 RX ADMIN — MIDODRINE HYDROCHLORIDE 5 MILLIGRAM(S): 2.5 TABLET ORAL at 22:32

## 2019-02-04 RX ADMIN — BUDESONIDE AND FORMOTEROL FUMARATE DIHYDRATE 2 PUFF(S): 160; 4.5 AEROSOL RESPIRATORY (INHALATION) at 22:32

## 2019-02-04 RX ADMIN — Medication 100 MILLIGRAM(S): at 13:00

## 2019-02-04 RX ADMIN — MIDODRINE HYDROCHLORIDE 5 MILLIGRAM(S): 2.5 TABLET ORAL at 13:01

## 2019-02-04 RX ADMIN — CLOPIDOGREL BISULFATE 75 MILLIGRAM(S): 75 TABLET, FILM COATED ORAL at 13:00

## 2019-02-04 RX ADMIN — Medication 40 MILLIGRAM(S): at 05:01

## 2019-02-04 RX ADMIN — Medication 125 MILLIGRAM(S): at 13:00

## 2019-02-04 RX ADMIN — Medication 20 MILLIGRAM(S): at 05:01

## 2019-02-04 RX ADMIN — Medication 50 MILLIGRAM(S): at 18:22

## 2019-02-04 RX ADMIN — MIDODRINE HYDROCHLORIDE 5 MILLIGRAM(S): 2.5 TABLET ORAL at 05:01

## 2019-02-04 RX ADMIN — Medication 1 APPLICATION(S): at 05:00

## 2019-02-04 RX ADMIN — PANTOPRAZOLE SODIUM 40 MILLIGRAM(S): 20 TABLET, DELAYED RELEASE ORAL at 18:23

## 2019-02-04 RX ADMIN — PANTOPRAZOLE SODIUM 40 MILLIGRAM(S): 20 TABLET, DELAYED RELEASE ORAL at 05:00

## 2019-02-04 RX ADMIN — LEVALBUTEROL 0.63 MILLIGRAM(S): 1.25 SOLUTION, CONCENTRATE RESPIRATORY (INHALATION) at 11:02

## 2019-02-04 RX ADMIN — Medication 125 MILLIGRAM(S): at 18:22

## 2019-02-04 RX ADMIN — LEVALBUTEROL 0.63 MILLIGRAM(S): 1.25 SOLUTION, CONCENTRATE RESPIRATORY (INHALATION) at 05:11

## 2019-02-04 RX ADMIN — Medication 125 MILLIGRAM(S): at 05:00

## 2019-02-04 RX ADMIN — ATORVASTATIN CALCIUM 40 MILLIGRAM(S): 80 TABLET, FILM COATED ORAL at 22:39

## 2019-02-04 RX ADMIN — Medication 1 APPLICATION(S): at 18:22

## 2019-02-04 NOTE — PROGRESS NOTE ADULT - SUBJECTIVE AND OBJECTIVE BOX
no new complaints  no cp no sob  ICU Vital Signs Last 24 Hrs  T(C): 36.6 (04 Feb 2019 18:19), Max: 36.7 (04 Feb 2019 15:23)  T(F): 97.9 (04 Feb 2019 18:19), Max: 98 (04 Feb 2019 15:23)  HR: 89 (04 Feb 2019 18:19) (70 - 92)  BP: 128/78 (04 Feb 2019 18:19) (115/75 - 147/92)  BP(mean): --  ABP: --  ABP(mean): --  RR: 20 (04 Feb 2019 18:19) (18 - 22)  SpO2: 95% (04 Feb 2019 18:19) (95% - 100%)                        10.4   13.32 )-----------( 292      ( 04 Feb 2019 05:10 )             34.4   02-04    140  |  100  |  54<H>  ----------------------------<  87  4.5   |  31  |  1.11    Ca    9.6      04 Feb 2019 05:10  Mg     1.9     02-04    MEDICATIONS  (STANDING):  atorvastatin 40 milliGRAM(s) Oral at bedtime  buDESOnide  80 MICROgram(s)/formoterol 4.5 MICROgram(s) Inhaler 2 Puff(s) Inhalation two times a day  clopidogrel Tablet 75 milliGRAM(s) Oral daily  collagenase Ointment 1 Application(s) Topical two times a day  dabigatran 75 milliGRAM(s) Oral every 12 hours  furosemide    Tablet 20 milliGRAM(s) Oral daily  levalbuterol Inhalation 0.63 milliGRAM(s) Inhalation every 6 hours  metoprolol tartrate 50 milliGRAM(s) Oral two times a day  midodrine 5 milliGRAM(s) Oral every 8 hours  pantoprazole  Injectable 40 milliGRAM(s) IV Push two times a day  predniSONE   Tablet 40 milliGRAM(s) Oral daily  thiamine 100 milliGRAM(s) Oral daily  vancomycin    Solution 125 milliGRAM(s) Oral every 6 hours

## 2019-02-04 NOTE — PROGRESS NOTE ADULT - SUBJECTIVE AND OBJECTIVE BOX
CARDIOLOGY FOLLOW UP - Dr. Vieyra    CC c/o rectal bleeding       PHYSICAL EXAM:  T(C): 36.5 (02-04-19 @ 04:56), Max: 36.5 (02-04-19 @ 04:56)  HR: 70 (02-04-19 @ 11:02) (70 - 103)  BP: 147/92 (02-04-19 @ 04:56) (108/70 - 147/92)  RR: 22 (02-04-19 @ 11:02) (18 - 22)  SpO2: 100% (02-04-19 @ 11:02) (95% - 100%)  Wt(kg): --  I&O's Summary      Appearance: Normal	  Cardiovascular: Normal S1 S2,irreg, No JVD, No murmurs  Respiratory: Lungs clear to auscultation	  Gastrointestinal:  Soft, Non-tender, + BS	  Extremities: Normal range of motion, No clubbing, cyanosis or edema        MEDICATIONS  (STANDING):  atorvastatin 40 milliGRAM(s) Oral at bedtime  buDESOnide  80 MICROgram(s)/formoterol 4.5 MICROgram(s) Inhaler 2 Puff(s) Inhalation two times a day  clopidogrel Tablet 75 milliGRAM(s) Oral daily  collagenase Ointment 1 Application(s) Topical two times a day  dabigatran 75 milliGRAM(s) Oral every 12 hours  furosemide    Tablet 20 milliGRAM(s) Oral daily  levalbuterol Inhalation 0.63 milliGRAM(s) Inhalation every 6 hours  metoprolol tartrate 50 milliGRAM(s) Oral two times a day  midodrine 5 milliGRAM(s) Oral every 8 hours  pantoprazole  Injectable 40 milliGRAM(s) IV Push two times a day  predniSONE   Tablet 40 milliGRAM(s) Oral daily  thiamine 100 milliGRAM(s) Oral daily  vancomycin    Solution 125 milliGRAM(s) Oral every 6 hours      TELEMETRY: afib 	    ECG:  	  RADIOLOGY:   DIAGNOSTIC TESTING:  [ ] Echocardiogram:  [ ]  Catheterization:  [ ] Stress Test:    OTHER: 	    LABS:	 	                                10.4   13.32 )-----------( 292      ( 04 Feb 2019 05:10 )             34.4     02-04    140  |  100  |  54<H>  ----------------------------<  87  4.5   |  31  |  1.11    Ca    9.6      04 Feb 2019 05:10  Mg     1.9     02-04

## 2019-02-04 NOTE — PROGRESS NOTE ADULT - SUBJECTIVE AND OBJECTIVE BOX
Patient is a 79y old  Female who presents with a chief complaint of right foot pressure ulcer pain (04 Feb 2019 12:01)      Vascular Surgery Attending Progress Note    Interval HPI: pt w/o c/o     Medications:  acetaminophen   Tablet .. 650 milliGRAM(s) Oral every 6 hours PRN  atorvastatin 40 milliGRAM(s) Oral at bedtime  buDESOnide  80 MICROgram(s)/formoterol 4.5 MICROgram(s) Inhaler 2 Puff(s) Inhalation two times a day  clopidogrel Tablet 75 milliGRAM(s) Oral daily  collagenase Ointment 1 Application(s) Topical two times a day  dabigatran 75 milliGRAM(s) Oral every 12 hours  furosemide    Tablet 20 milliGRAM(s) Oral daily  levalbuterol Inhalation 0.63 milliGRAM(s) Inhalation every 6 hours  metoprolol tartrate 50 milliGRAM(s) Oral two times a day  midodrine 5 milliGRAM(s) Oral every 8 hours  pantoprazole  Injectable 40 milliGRAM(s) IV Push two times a day  predniSONE   Tablet 40 milliGRAM(s) Oral daily  thiamine 100 milliGRAM(s) Oral daily  traMADol 50 milliGRAM(s) Oral every 8 hours PRN  vancomycin    Solution 125 milliGRAM(s) Oral every 6 hours      Vital Signs Last 24 Hrs  T(C): 36.5 (04 Feb 2019 04:56), Max: 36.5 (04 Feb 2019 04:56)  T(F): 97.7 (04 Feb 2019 04:56), Max: 97.7 (04 Feb 2019 04:56)  HR: 70 (04 Feb 2019 11:02) (70 - 103)  BP: 147/92 (04 Feb 2019 04:56) (108/70 - 147/92)  BP(mean): --  RR: 22 (04 Feb 2019 11:02) (18 - 22)  SpO2: 100% (04 Feb 2019 11:02) (97% - 100%)  I&O's Summary      Physical Exam:  Vascular:  stable     LABS:                        10.4   13.32 )-----------( 292      ( 04 Feb 2019 05:10 )             34.4     02-04    140  |  100  |  54<H>  ----------------------------<  87  4.5   |  31  |  1.11    Ca    9.6      04 Feb 2019 05:10  Mg     1.9     02-04          GARCÍA HUANG MD  [phone]

## 2019-02-04 NOTE — PROGRESS NOTE ADULT - SUBJECTIVE AND OBJECTIVE BOX
[facility]        - Patient seen and examined.  - Today, patient is without complaints. She is very worried about her daughter who is admitted to the hospital.          *****MEDICATIONS: Current medication reviewed and documented.    MEDICATIONS  (STANDING):  atorvastatin 40 milliGRAM(s) Oral at bedtime  buDESOnide 160 MICROgram(s)/formoterol 4.5 MICROgram(s) Inhaler 2 Puff(s) Inhalation two times a day  clopidogrel Tablet 75 milliGRAM(s) Oral daily  collagenase Ointment 1 Application(s) Topical two times a day  dabigatran 75 milliGRAM(s) Oral every 12 hours  furosemide    Tablet 20 milliGRAM(s) Oral daily  levalbuterol Inhalation 0.63 milliGRAM(s) Inhalation every 6 hours  metoprolol tartrate 50 milliGRAM(s) Oral two times a day  midodrine 5 milliGRAM(s) Oral every 8 hours  pantoprazole  Injectable 40 milliGRAM(s) IV Push two times a day  phenazopyridine 100 milliGRAM(s) Oral every 8 hours  predniSONE   Tablet 40 milliGRAM(s) Oral daily  thiamine 100 milliGRAM(s) Oral daily  vancomycin    Solution 125 milliGRAM(s) Oral every 6 hours    MEDICATIONS  (PRN):  acetaminophen   Tablet .. 650 milliGRAM(s) Oral every 6 hours PRN Temp greater or equal to 38C (100.4F), Mild Pain (1 - 3), Moderate Pain (4 - 6)  traMADol 50 milliGRAM(s) Oral every 8 hours PRN Severe Pain (7 - 10)           ***** REVIEW OF SYSTEM:  GEN: no fever, no chills, no pain  RESP: no SOB, no cough, no sputum  CVS: no chest pain, no palpitations, no edema  GI: no abdominal pain, no nausea, no vomiting, no constipation, no diarrhea  : no dysurea, no frequency  NEURO: no headache, no diziness  PSYCH: no depression, not anxious  Derm : no itching, no rash         ***** VITAL SIGNS:VSS       *****PHYSICAL EXAM:  pt very pleasant. she is having vision of angels.    alert oriented x 2attention comprehension are fair.  Able to name, repeat.   EOmi fundi not visualized   no nystagmus VFF to confrontation  Tongue is midline  Palate elevates symmetrically   Moving all 4 ext spontaneously no drift appreciated    Gait not assessed.            *****LAB AND IMAGING:                          [All pertinent recent Imaging/Reports reviewed]           *****A S S E S S M E N T   A N D   P L A N :      77 y/o female, with a PmHx of COPD (on 2-3L O2 prn), paroxsymal a-fib (on pradaxa), CAD (s/p stent 11/2018), CHF, HTN, HLD, and anxiety, presenting to the [facility] ED with right foot pressure ulcer pain x several weeks. The patient has a dime-sized ulcer with scant serosanguinous drainage on her right calcaneous that she acquired from a shoe weeks ago. She reports that she woke up last night around 4am with excruciating, unrelenting right root pain with SOB x a few minutes followed by chest pain x several hours. The patient reports that the ulcer pain began to gradually worsen since her last hospital visit, where she was d/c with vancomycin. The foot pain is sharp, 10/10, and radiates up to her calf. The patient took two puffs of her Symbicort for the SOB with no relief, but it resolved spontaneously within a few minutes. The chest pain began gradually and was described as a midsternal, 8/10, tightness with no radiation and accompanied by palpitations. Of note, the patient reports chronic cough, b/l LE edema, JACKSON with walking 1/2 block, orthopnea x1 pillow, occasional PND, melena x weeks, easy bruising/bleeding, and 10 lbs weight loss over the past 6 months with no change in appetite. The patient denies fever, chills, SOB at rest, diaphoresis, dizziness, claudication, wheezing, changes in vision and hearing, abdominal pain, change in bowel and urinary habits, dysuria, hematuria.   Problem/Recommendations 1: toxic metabolic encephalopathy slightly worse today.   regulate sleep wake cycle/ avoid day time sleepiness.    b12/folate/tsh/wnl   thiamine 100 daily.   encouraged po intake.   Problem/Recommendations 2: diarrhea resolved.     Thank you for allowing me to participate in the care of this patient. Please do not hesitate to call me if you have any  questions.        ________________  Kary Mercado MD  [facility]  [phone]      30 minutes spent on total encounter; more than 50 % of the visit was  spent counseling about plan of care, compliance to diet/exercise and medication regimen and or  coordinating care by the attending physician.   At the present time, [facility] does not  provide outpatient followup, best to call the your insurance to find a participating provider.  This was explained to you at the time of the visit. Alternatively, if your insurance allows it, you can follow up with a neurologist  Dr. Anish Camacho([place]) [phone] or Dr. Michael Nissenbaum [phone]

## 2019-02-04 NOTE — PROGRESS NOTE ADULT - SUBJECTIVE AND OBJECTIVE BOX
BEVERLEY DEL RIO:[id number],   79yFemale followed for:  No Known Allergies    PAST MEDICAL & SURGICAL HISTORY:  CAD (coronary artery disease): s/p stent 2018  CHF (congestive heart failure)  COPD (chronic obstructive pulmonary disease): on 2-3L O2 at home prn  Anxiety  TIA (transient ischemic attack): many years ago  PAF (paroxysmal atrial fibrillation)  HLD (hyperlipidemia)  HTN (hypertension)  H/O total hysterectomy: 2014  History of cataract surgery: b/l 2015  History of repair of hip fracture: luisa placed in RLE 2015  H/O spinal fusion: c2-6 in 2017    FAMILY HISTORY:  No pertinent family history in first degree relatives    MEDICATIONS  (STANDING):  atorvastatin 40 milliGRAM(s) Oral at bedtime  buDESOnide  80 MICROgram(s)/formoterol 4.5 MICROgram(s) Inhaler 2 Puff(s) Inhalation two times a day  clopidogrel Tablet 75 milliGRAM(s) Oral daily  collagenase Ointment 1 Application(s) Topical two times a day  dabigatran 75 milliGRAM(s) Oral every 12 hours  furosemide    Tablet 20 milliGRAM(s) Oral daily  levalbuterol Inhalation 0.63 milliGRAM(s) Inhalation every 6 hours  metoprolol tartrate 50 milliGRAM(s) Oral two times a day  midodrine 5 milliGRAM(s) Oral every 8 hours  pantoprazole  Injectable 40 milliGRAM(s) IV Push two times a day  predniSONE   Tablet 40 milliGRAM(s) Oral daily  thiamine 100 milliGRAM(s) Oral daily  vancomycin    Solution 125 milliGRAM(s) Oral every 6 hours    MEDICATIONS  (PRN):  acetaminophen   Tablet .. 650 milliGRAM(s) Oral every 6 hours PRN Temp greater or equal to 38C (100.4F), Mild Pain (1 - 3), Moderate Pain (4 - 6)  traMADol 50 milliGRAM(s) Oral every 8 hours PRN Severe Pain (7 - 10)      Vital Signs Last 24 Hrs  T(C): 36.5 (04 Feb 2019 04:56), Max: 36.5 (04 Feb 2019 04:56)  T(F): 97.7 (04 Feb 2019 04:56), Max: 97.7 (04 Feb 2019 04:56)  HR: 91 (04 Feb 2019 05:11) (74 - 103)  BP: 147/92 (04 Feb 2019 04:56) (108/70 - 147/92)  BP(mean): --  RR: 18 (04 Feb 2019 04:56) (18 - 18)  SpO2: 98% (04 Feb 2019 05:11) (95% - 99%)  nc/at  s1s2  cta  soft, nt, nd no guarding or rebound  no c/c/e    CBC Full  -  ( 04 Feb 2019 05:10 )  WBC Count : 13.32 K/uL  Hemoglobin : 10.4 g/dL  Hematocrit : 34.4 %  Platelet Count - Automated : 292 K/uL  Mean Cell Volume : 91.2 fL  Mean Cell Hemoglobin : 27.6 pg  Mean Cell Hemoglobin Concentration : 30.2 %  Auto Neutrophil # : 10.99 K/uL  Auto Lymphocyte # : 0.98 K/uL  Auto Monocyte # : 1.16 K/uL  Auto Eosinophil # : 0.02 K/uL  Auto Basophil # : 0.02 K/uL  Auto Neutrophil % : 82.4 %  Auto Lymphocyte % : 7.4 %  Auto Monocyte % : 8.7 %  Auto Eosinophil % : 0.2 %  Auto Basophil % : 0.2 %    02-04    140  |  100  |  54<H>  ----------------------------<  87  4.5   |  31  |  1.11    Ca    9.6      04 Feb 2019 05:10  Mg     1.9     02-04

## 2019-02-04 NOTE — PROGRESS NOTE ADULT - ASSESSMENT
chest xray 1/28/19:  revealed multifocal airspace disease, may reflect aveolar component of edema.    a/p   77 y/o female, with a PmHx of COPD (on 2-3L O2 prn), paroxsymal a-fib (on pradaxa), CAD (s/p stent 11/2018), DCHF, HTN, HLD, and anxiety, presenting with acute/chronic diastolic chf, chest pain r/o acs, GIB, non healing pressure ulcer. Hospital course now complicated ruling in + RVP and CDiff / uti     1. Atypical chest pain, resolved  in the setting of acute CHF exacerbation, afib w/ RVR   cv stable, no evidence of acute ischemia/ACS   Echo with nl lv fxn  continue statin, bb, plavix     2. Acute/chronic diastolic chf  remains hypoxic on room air, on supplemental O2.. denies SOB, volume status is stable   ct chest revealed decreased bl pleural effusion, mutiple small nodular opacities secondary to impacted distal airways likely infectious etiology? pna LLL, aspiration    + rvp , pulmo f/u   creat improved, continue low dose lasix   cont bb  echo with nl lv fxn    3. AFIB,  rate now controlled   dig on hold due to hx of pauses and bradycardia   continue with  midodrine 5 mg q 8 hrs for bp support   continue with lopressor to 50 mg BID  CHADS 3 - continue with pradaxa     4. CAD s/p PCI (11/2018)  cv stable, no evidence of acute ischemia   continue bb, statin, continue plavix given recent PCI      5. GIB   +GUIAC , +anemia s/p 1uprbc,   continue to trend cbc  GI f/u      6. non healing foot ulcer  med f/u  ID f/u , vascular studies noted, pod f/u  eventual plan for RLE angiogram    7. IGOR/CKD   creat improved, continue low dose lasix   renal f/u     8.Cdiff   po abx, id f/u    7. hypoxia    likely in the setting of resp infection ? aspiration   no decomp CHF on exam    + influenza s/p Tamiflu  ct chest revealed decreased bl pleural effusion, mutiple small nodular opacities secondary to impacted distal airways likely infectious etiology? pna LLL, aspiration    pulm f/u  s/p speech and swallow eval   steroids per pulm     8. UTI    med f/u     dvt ppx

## 2019-02-04 NOTE — PROGRESS NOTE ADULT - ASSESSMENT
77 y/o female, with a PmHx of COPD (on 2-3L O2 prn), paroxsymal a-fib (on pradaxa), HTN, HLD, and anxiety, presenting to the [facility] ED with right foot pressure ulcer pain, SOB, and CP, found to have Afib @102 bpm, H/H 8/24.7, mild interstitial pulmonary edema, proBNP 1374, guiac positive, BUN/Cr 36/1.68, admitted to telemetry for nonhealing pressure ulcer, Afib, CHF exacerbation, r/o ACS.

## 2019-02-04 NOTE — PROGRESS NOTE ADULT - ASSESSMENT
79F w/ nonhealing R posterior ankle ulcer and arterial insuff    - Continue wound care  - Awaiting clearance of c. diff  - Will schedule angiogram when patient clears c. diff    [id number]

## 2019-02-04 NOTE — PROGRESS NOTE ADULT - SUBJECTIVE AND OBJECTIVE BOX
Patient is a 79y old  Female who presents with a chief complaint of right foot pressure ulcer pain (04 Feb 2019 12:47)      Any change in ROS: She appears comfortable and says she is not SOB and no t been wheezing:     MEDICATIONS  (STANDING):  atorvastatin 40 milliGRAM(s) Oral at bedtime  buDESOnide  80 MICROgram(s)/formoterol 4.5 MICROgram(s) Inhaler 2 Puff(s) Inhalation two times a day  clopidogrel Tablet 75 milliGRAM(s) Oral daily  collagenase Ointment 1 Application(s) Topical two times a day  dabigatran 75 milliGRAM(s) Oral every 12 hours  furosemide    Tablet 20 milliGRAM(s) Oral daily  levalbuterol Inhalation 0.63 milliGRAM(s) Inhalation every 6 hours  metoprolol tartrate 50 milliGRAM(s) Oral two times a day  midodrine 5 milliGRAM(s) Oral every 8 hours  pantoprazole  Injectable 40 milliGRAM(s) IV Push two times a day  predniSONE   Tablet 40 milliGRAM(s) Oral daily  thiamine 100 milliGRAM(s) Oral daily  vancomycin    Solution 125 milliGRAM(s) Oral every 6 hours    MEDICATIONS  (PRN):  acetaminophen   Tablet .. 650 milliGRAM(s) Oral every 6 hours PRN Temp greater or equal to 38C (100.4F), Mild Pain (1 - 3), Moderate Pain (4 - 6)  traMADol 50 milliGRAM(s) Oral every 8 hours PRN Severe Pain (7 - 10)    Vital Signs Last 24 Hrs  T(C): 36.5 (04 Feb 2019 04:56), Max: 36.5 (04 Feb 2019 04:56)  T(F): 97.7 (04 Feb 2019 04:56), Max: 97.7 (04 Feb 2019 04:56)  HR: 70 (04 Feb 2019 11:02) (70 - 103)  BP: 147/92 (04 Feb 2019 04:56) (108/70 - 147/92)  BP(mean): --  RR: 22 (04 Feb 2019 11:02) (18 - 22)  SpO2: 100% (04 Feb 2019 11:02) (97% - 100%)    I&O's Summary        Physical Exam:   GENERAL: NAD, well-groomed, well-developed  HEENT: COLEEN/   Atraumatic, Normocephalic  ENMT: No tonsillar erythema, exudates, or enlargement; Moist mucous membranes, Good dentition, No lesions  NECK: Supple, No JVD, Normal thyroid  CHEST/LUNG: Mild coarse rhonchi+  CVS: Regular rate and rhythm; No murmurs, rubs, or gallops  GI: : Soft, Nontender, Nondistended; Bowel sounds present  NERVOUS SYSTEM:  Alert & Oriented X3  EXTREMITIES:  - edema  LYMPH: No lymphadenopathy noted  SKIN: No rashes or lesions  ENDOCRINOLOGY: No Thyromegaly  PSYCH: Appropriate    Labs:                              10.4   13.32 )-----------( 292      ( 04 Feb 2019 05:10 )             34.4                         10.1   11.55 )-----------( 281      ( 03 Feb 2019 04:00 )             33.8                         10.2   9.22  )-----------( 265      ( 02 Feb 2019 05:50 )             32.8                         9.8    8.12  )-----------( 262      ( 01 Feb 2019 06:20 )             33.2     02-04    140  |  100  |  54<H>  ----------------------------<  87  4.5   |  31  |  1.11  02-03    138  |  98  |  53<H>  ----------------------------<  96  4.6   |  32<H>  |  1.04  02-02    136  |  98  |  55<H>  ----------------------------<  160<H>  4.1   |  29  |  1.15  02-01    136  |  97<L>  |  50<H>  ----------------------------<  171<H>  4.2   |  29  |  1.23    Ca    9.6      04 Feb 2019 05:10  Ca    9.3      03 Feb 2019 04:00  Mg     1.9     02-04  Mg     1.9     02-03      CAPILLARY BLOOD GLUCOSE          < from: CT Chest No Cont (01.29.19 @ 19:48) >  LUNGS AND LARGE AIRWAYS: Debris noted within the trachea. Debris also   noted within the left lower lobar bronchi new since the prior study with   associated patchy opacity involving the anterior segment of the left   lower lobe progressed since the prior study representing atelectasis   and/or pneumonia. Severe centrilobular emphysema. Right basilar   subsegmental atelectasis. Linear atelectasis within the lingula. There is   mild interlobular septal thickening secondary to pulmonary edema with   mild interval improvement. Small nodular or tree-in-bud opacities noted   in the bilateral upper lobes likely secondary to  impacted distal airways   some of which are new since the prior study.  PLEURA: Partially loculated small bilateral pleural effusions appear   decreased in size from prior exam.  VESSELS: Atherosclerotic calcifications of the aorta  HEART: Cardiomegaly. No pericardial effusion. Coronary artery   calcifications. Aortic valvular calcifications. Mitral annular   calcifications.  MEDIASTINUM AND GEOVANI: Scattered subcentimeter lymph nodes.  CHEST WALL AND LOWER NECK: Heterogenous appearing thyroid gland.  VISUALIZED UPPER ABDOMEN: Partially visualized left renal cyst.  BONES: Degenerative changes of the spine. Old healed rib fractures.    IMPRESSION:     Partially loculated small bilateral pleural effusions appear decreased in   size from prior exam. Mild interlobular septal thickeningsecondary to   mild pulmonary edema also improved.    Multiple small nodular opacities or tree-in-bud opacities noted within   the bilateral upper lobes secondary to  impacted distal airways, many of   which are new since the January 24, 2019 likely of infectious etiology.    Debris noted within the trachea and left lower lobar bronchi new since   the prior study with associated left lower lobe patchy opacity with mild   interval increase in size representing atelectasis and/or pneumonia. The   constellation of findings are suggestive of aspiration.      < end of copied text >              RECENT CULTURES:        RESPIRATORY CULTURES:          Studies  Chest X-RAY  CT SCAN Chest   Venous Dopplers: LE:   CT Abdomen  Others

## 2019-02-04 NOTE — PROGRESS NOTE ADULT - ASSESSMENT
79 h/o female admitted for rt postrior ankle pressure ulcer    vascular following needs angiogram  need nephrology clearence  she needs to complete abx for c diff  local wound care    Influenza positive   completed  tamiflu for 5 days    CDIFF POSITIVE  continue vancomycin taper as per ID  wbc count elevated observe off abx    chronic afib with rapid vntricular response  cardiology following    CKD   monitor cmp  monitor BUN/CREATININE  appreciate nephrology f/u

## 2019-02-04 NOTE — PROGRESS NOTE ADULT - SUBJECTIVE AND OBJECTIVE BOX
[facility] NEPHROLOGY- PROGRESS NOTE    78y Female with history of CHF, COPD presents with SOB found to have guaiac positive anemia and afib with RVR. Nephrology consulted for elevated Scr.    REVIEW OF SYSTEMS:  Gen: no changes in weight  Cards: no chest pain  Resp: + dyspnea with cough  GI: no nausea or vomiting, + diarrhea resolved  Vascular: no LE edema +R foot pain    No Known Allergies      Hospital Medications: Medications reviewed      VITALS:  T(F): 97.7 (02-04-19 @ 04:56), Max: 97.7 (02-04-19 @ 04:56)  HR: 70 (02-04-19 @ 11:02)  BP: 147/92 (02-04-19 @ 04:56)  RR: 22 (02-04-19 @ 11:02)  SpO2: 100% (02-04-19 @ 11:02)  Wt(kg): --        PHYSICAL EXAM:    Gen: NAD, calm  Cards: Irregularly irregular, +S1/S2, no M/G/R  Resp: course BS, bibasilar rales  GI: soft, NT/ND, NABS  Vascular: no LE edema B/L      LABS:  02-04    140  |  100  |  54<H>  ----------------------------<  87  4.5   |  31  |  1.11    Ca    9.6      04 Feb 2019 05:10  Mg     1.9     02-04      Creatinine Trend: 1.11 <--, 1.04 <--, 1.15 <--, 1.23 <--, 1.34 <--, 1.77 <--, 1.34 <--                        10.4   13.32 )-----------( 292      ( 04 Feb 2019 05:10 )             34.4     Urine Studies:

## 2019-02-04 NOTE — PROGRESS NOTE ADULT - PROBLEM SELECTOR PLAN 1
FOUND TO HAVE NEW INFLUENZA IN the hospital : Started on Tamiflu:  1/30: started on solumedrol today as she has been wheezing: She is on antibiotics from ? [facility] : ct scan chest reviewed: has mucus impacted airways: As well as the effusions are smaller: ? left lower lobe opacity : would check speech and swallow: She has  underlying copd :  1/31: on tamiflu  2/1: Finish 5 days of tamiflu  2/2 treated with Tamiflu  2/3 treated  2/4: Finished rx!!

## 2019-02-05 LAB
ANION GAP SERPL CALC-SCNC: 9 MMO/L — SIGNIFICANT CHANGE UP (ref 7–14)
BASOPHILS # BLD AUTO: 0.02 K/UL — SIGNIFICANT CHANGE UP (ref 0–0.2)
BASOPHILS NFR BLD AUTO: 0.1 % — SIGNIFICANT CHANGE UP (ref 0–2)
BUN SERPL-MCNC: 51 MG/DL — HIGH (ref 7–23)
CALCIUM SERPL-MCNC: 9.4 MG/DL — SIGNIFICANT CHANGE UP (ref 8.4–10.5)
CHLORIDE SERPL-SCNC: 98 MMOL/L — SIGNIFICANT CHANGE UP (ref 98–107)
CO2 SERPL-SCNC: 31 MMOL/L — SIGNIFICANT CHANGE UP (ref 22–31)
CREAT SERPL-MCNC: 1.04 MG/DL — SIGNIFICANT CHANGE UP (ref 0.5–1.3)
EOSINOPHIL # BLD AUTO: 0.04 K/UL — SIGNIFICANT CHANGE UP (ref 0–0.5)
EOSINOPHIL NFR BLD AUTO: 0.2 % — SIGNIFICANT CHANGE UP (ref 0–6)
GLUCOSE SERPL-MCNC: 75 MG/DL — SIGNIFICANT CHANGE UP (ref 70–99)
HCT VFR BLD CALC: 32 % — LOW (ref 34.5–45)
HGB BLD-MCNC: 10 G/DL — LOW (ref 11.5–15.5)
IMM GRANULOCYTES NFR BLD AUTO: 0.9 % — SIGNIFICANT CHANGE UP (ref 0–1.5)
LYMPHOCYTES # BLD AUTO: 0.85 K/UL — LOW (ref 1–3.3)
LYMPHOCYTES # BLD AUTO: 5.3 % — LOW (ref 13–44)
MAGNESIUM SERPL-MCNC: 2 MG/DL — SIGNIFICANT CHANGE UP (ref 1.6–2.6)
MCHC RBC-ENTMCNC: 27.6 PG — SIGNIFICANT CHANGE UP (ref 27–34)
MCHC RBC-ENTMCNC: 31.3 % — LOW (ref 32–36)
MCV RBC AUTO: 88.4 FL — SIGNIFICANT CHANGE UP (ref 80–100)
MONOCYTES # BLD AUTO: 1.17 K/UL — HIGH (ref 0–0.9)
MONOCYTES NFR BLD AUTO: 7.3 % — SIGNIFICANT CHANGE UP (ref 2–14)
NEUTROPHILS # BLD AUTO: 13.84 K/UL — HIGH (ref 1.8–7.4)
NEUTROPHILS NFR BLD AUTO: 86.2 % — HIGH (ref 43–77)
NRBC # FLD: 0 K/UL — LOW (ref 25–125)
PLATELET # BLD AUTO: 316 K/UL — SIGNIFICANT CHANGE UP (ref 150–400)
PMV BLD: 10.4 FL — SIGNIFICANT CHANGE UP (ref 7–13)
POTASSIUM SERPL-MCNC: 4.8 MMOL/L — SIGNIFICANT CHANGE UP (ref 3.5–5.3)
POTASSIUM SERPL-SCNC: 4.8 MMOL/L — SIGNIFICANT CHANGE UP (ref 3.5–5.3)
RBC # BLD: 3.62 M/UL — LOW (ref 3.8–5.2)
RBC # FLD: 16.9 % — HIGH (ref 10.3–14.5)
SODIUM SERPL-SCNC: 138 MMOL/L — SIGNIFICANT CHANGE UP (ref 135–145)
WBC # BLD: 16.07 K/UL — HIGH (ref 3.8–10.5)
WBC # FLD AUTO: 16.07 K/UL — HIGH (ref 3.8–10.5)

## 2019-02-05 PROCEDURE — 99232 SBSQ HOSP IP/OBS MODERATE 35: CPT

## 2019-02-05 RX ORDER — NYSTATIN CREAM 100000 [USP'U]/G
1 CREAM TOPICAL EVERY 12 HOURS
Qty: 0 | Refills: 0 | Status: DISCONTINUED | OUTPATIENT
Start: 2019-02-05 | End: 2019-02-15

## 2019-02-05 RX ORDER — PHENAZOPYRIDINE HCL 100 MG
100 TABLET ORAL EVERY 8 HOURS
Qty: 0 | Refills: 0 | Status: COMPLETED | OUTPATIENT
Start: 2019-02-05 | End: 2019-02-06

## 2019-02-05 RX ORDER — BUDESONIDE AND FORMOTEROL FUMARATE DIHYDRATE 160; 4.5 UG/1; UG/1
2 AEROSOL RESPIRATORY (INHALATION)
Qty: 0 | Refills: 0 | Status: DISCONTINUED | OUTPATIENT
Start: 2019-02-05 | End: 2019-02-15

## 2019-02-05 RX ADMIN — Medication 100 MILLIGRAM(S): at 22:10

## 2019-02-05 RX ADMIN — DABIGATRAN ETEXILATE MESYLATE 75 MILLIGRAM(S): 150 CAPSULE ORAL at 06:25

## 2019-02-05 RX ADMIN — Medication 40 MILLIGRAM(S): at 06:25

## 2019-02-05 RX ADMIN — MIDODRINE HYDROCHLORIDE 5 MILLIGRAM(S): 2.5 TABLET ORAL at 22:10

## 2019-02-05 RX ADMIN — LEVALBUTEROL 0.63 MILLIGRAM(S): 1.25 SOLUTION, CONCENTRATE RESPIRATORY (INHALATION) at 22:27

## 2019-02-05 RX ADMIN — TRAMADOL HYDROCHLORIDE 50 MILLIGRAM(S): 50 TABLET ORAL at 23:45

## 2019-02-05 RX ADMIN — Medication 50 MILLIGRAM(S): at 06:26

## 2019-02-05 RX ADMIN — Medication 125 MILLIGRAM(S): at 01:35

## 2019-02-05 RX ADMIN — Medication 50 MILLIGRAM(S): at 17:34

## 2019-02-05 RX ADMIN — Medication 125 MILLIGRAM(S): at 06:26

## 2019-02-05 RX ADMIN — DABIGATRAN ETEXILATE MESYLATE 75 MILLIGRAM(S): 150 CAPSULE ORAL at 17:07

## 2019-02-05 RX ADMIN — ATORVASTATIN CALCIUM 40 MILLIGRAM(S): 80 TABLET, FILM COATED ORAL at 22:10

## 2019-02-05 RX ADMIN — Medication 0.5 MILLIGRAM(S): at 12:25

## 2019-02-05 RX ADMIN — Medication 100 MILLIGRAM(S): at 12:24

## 2019-02-05 RX ADMIN — Medication 125 MILLIGRAM(S): at 12:24

## 2019-02-05 RX ADMIN — LEVALBUTEROL 0.63 MILLIGRAM(S): 1.25 SOLUTION, CONCENTRATE RESPIRATORY (INHALATION) at 16:45

## 2019-02-05 RX ADMIN — LEVALBUTEROL 0.63 MILLIGRAM(S): 1.25 SOLUTION, CONCENTRATE RESPIRATORY (INHALATION) at 10:45

## 2019-02-05 RX ADMIN — PANTOPRAZOLE SODIUM 40 MILLIGRAM(S): 20 TABLET, DELAYED RELEASE ORAL at 06:24

## 2019-02-05 RX ADMIN — TRAMADOL HYDROCHLORIDE 50 MILLIGRAM(S): 50 TABLET ORAL at 22:45

## 2019-02-05 RX ADMIN — Medication 650 MILLIGRAM(S): at 12:24

## 2019-02-05 RX ADMIN — Medication 125 MILLIGRAM(S): at 17:07

## 2019-02-05 RX ADMIN — MIDODRINE HYDROCHLORIDE 5 MILLIGRAM(S): 2.5 TABLET ORAL at 06:24

## 2019-02-05 RX ADMIN — Medication 0.5 MILLIGRAM(S): at 17:07

## 2019-02-05 RX ADMIN — NYSTATIN CREAM 1 APPLICATION(S): 100000 CREAM TOPICAL at 19:04

## 2019-02-05 RX ADMIN — MIDODRINE HYDROCHLORIDE 5 MILLIGRAM(S): 2.5 TABLET ORAL at 13:38

## 2019-02-05 RX ADMIN — CLOPIDOGREL BISULFATE 75 MILLIGRAM(S): 75 TABLET, FILM COATED ORAL at 12:25

## 2019-02-05 RX ADMIN — Medication 20 MILLIGRAM(S): at 06:27

## 2019-02-05 RX ADMIN — Medication 650 MILLIGRAM(S): at 13:20

## 2019-02-05 RX ADMIN — PANTOPRAZOLE SODIUM 40 MILLIGRAM(S): 20 TABLET, DELAYED RELEASE ORAL at 17:08

## 2019-02-05 RX ADMIN — Medication 1 APPLICATION(S): at 06:22

## 2019-02-05 RX ADMIN — Medication 1 APPLICATION(S): at 17:07

## 2019-02-05 RX ADMIN — BUDESONIDE AND FORMOTEROL FUMARATE DIHYDRATE 2 PUFF(S): 160; 4.5 AEROSOL RESPIRATORY (INHALATION) at 22:09

## 2019-02-05 NOTE — PROGRESS NOTE ADULT - ASSESSMENT
chest xray 1/28/19:  revealed multifocal airspace disease, may reflect aveolar component of edema.    a/p   77 y/o female, with a PmHx of COPD (on 2-3L O2 prn), paroxsymal a-fib (on pradaxa), CAD (s/p stent 11/2018), DCHF, HTN, HLD, and anxiety, presenting with acute/chronic diastolic chf, chest pain r/o acs, GIB, non healing pressure ulcer. Hospital course now complicated ruling in + RVP and CDiff / uti     1. Atypical chest pain, resolved  in the setting of acute CHF exacerbation, afib w/ RVR   cv stable, no evidence of acute ischemia/ACS   Echo with nl lv fxn  continue statin, bb, plavix     2. Acute/chronic diastolic chf  on supplemental O2.. denies SOB, volume status is stable   ct chest revealed decreased bl pleural effusion, mutiple small nodular opacities secondary to impacted distal airways likely infectious etiology? pna LLL, aspiration    + rvp , pulmo f/u   creat improved, continue low dose lasix   cont bb  echo with nl lv fxn    3. AFIB,  rate now controlled   dig on hold due to hx of pauses and bradycardia   continue with  midodrine 5 mg q 8 hrs for bp support   continue with lopressor to 50 mg BID  CHADS 3 - continue with pradaxa     4. CAD s/p PCI (11/2018)  cv stable, no evidence of acute ischemia   continue bb, statin, continue plavix given recent PCI      5. GIB   +GUIAC , +anemia s/p 1uprbc,   continue to trend cbc  GI f/u      6. non healing foot ulcer  med f/u  ID f/u , vascular studies noted, pod f/u  STONEY/PVR poor, 0.60 on the effected limb with flat waveforms.  -  Vasc sx consult noted, planning RLE angio once c. diff clears    7. IGOR/CKD   creat improved, continue low dose lasix   renal f/u     8.Cdiff   po abx, id f/u    7. hypoxia    likely in the setting of resp infection ? aspiration   no decomp CHF on exam    + influenza s/p Tamiflu  ct chest revealed decreased bl pleural effusion, mutiple small nodular opacities secondary to impacted distal airways likely infectious etiology? pna LLL, aspiration    pulm f/u  s/p speech and swallow eval   steroids per pulm     8. UTI    med f/u     dvt ppx

## 2019-02-05 NOTE — PROGRESS NOTE ADULT - SUBJECTIVE AND OBJECTIVE BOX
Patient is a 79y old  Female who presents with a chief complaint of right foot pressure ulcer pain (05 Feb 2019 12:05)      Any change in ROS: Pt is feeling OK : currntly sheisnot whezing     MEDICATIONS  (STANDING):  atorvastatin 40 milliGRAM(s) Oral at bedtime  buDESOnide  80 MICROgram(s)/formoterol 4.5 MICROgram(s) Inhaler 2 Puff(s) Inhalation two times a day  clopidogrel Tablet 75 milliGRAM(s) Oral daily  collagenase Ointment 1 Application(s) Topical two times a day  dabigatran 75 milliGRAM(s) Oral every 12 hours  furosemide    Tablet 20 milliGRAM(s) Oral daily  levalbuterol Inhalation 0.63 milliGRAM(s) Inhalation every 6 hours  metoprolol tartrate 50 milliGRAM(s) Oral two times a day  midodrine 5 milliGRAM(s) Oral every 8 hours  pantoprazole  Injectable 40 milliGRAM(s) IV Push two times a day  predniSONE   Tablet 40 milliGRAM(s) Oral daily  thiamine 100 milliGRAM(s) Oral daily  vancomycin    Solution 125 milliGRAM(s) Oral every 6 hours    MEDICATIONS  (PRN):  acetaminophen   Tablet .. 650 milliGRAM(s) Oral every 6 hours PRN Temp greater or equal to 38C (100.4F), Mild Pain (1 - 3), Moderate Pain (4 - 6)  traMADol 50 milliGRAM(s) Oral every 8 hours PRN Severe Pain (7 - 10)    Vital Signs Last 24 Hrs  T(C): 36.7 (05 Feb 2019 09:56), Max: 36.7 (04 Feb 2019 15:23)  T(F): 98 (05 Feb 2019 09:56), Max: 98 (04 Feb 2019 15:23)  HR: 68 (05 Feb 2019 10:45) (68 - 92)  BP: 110/69 (05 Feb 2019 09:56) (110/69 - 136/74)  BP(mean): --  RR: 18 (05 Feb 2019 09:56) (18 - 22)  SpO2: 94% (05 Feb 2019 09:56) (94% - 97%)    I&O's Summary        Physical Exam:   GENERAL: NAD, well-groomed, well-developed  HEENT: COLEEN/   Atraumatic, Normocephalic  ENMT: No tonsillar erythema, exudates, or enlargement; Moist mucous membranes, Good dentition, No lesions  NECK: Supple, No JVD, Normal thyroid  CHEST/LUNG: Mild coarse rhonchi  CVS: Regular rate and rhythm; No murmurs, rubs, or gallops  GI: : Soft, Nontender, Nondistended; Bowel sounds present  NERVOUS SYSTEM:  Alert & Oriented X3  EXTREMITIES:  - edema  LYMPH: No lymphadenopathy noted  SKIN: No rashes or lesions  ENDOCRINOLOGY: No Thyromegaly  PSYCH: Appropriate    Labs:                              10.0   16.07 )-----------( 316      ( 05 Feb 2019 08:39 )             32.0                         10.4   13.32 )-----------( 292      ( 04 Feb 2019 05:10 )             34.4                         10.1   11.55 )-----------( 281      ( 03 Feb 2019 04:00 )             33.8                         10.2   9.22  )-----------( 265      ( 02 Feb 2019 05:50 )             32.8     02-05    138  |  98  |  51<H>  ----------------------------<  75  4.8   |  31  |  1.04  02-04    140  |  100  |  54<H>  ----------------------------<  87  4.5   |  31  |  1.11  02-03    138  |  98  |  53<H>  ----------------------------<  96  4.6   |  32<H>  |  1.04  02-02    136  |  98  |  55<H>  ----------------------------<  160<H>  4.1   |  29  |  1.15    Ca    9.4      05 Feb 2019 08:39  Ca    9.6      04 Feb 2019 05:10  Mg     2.0     02-05  Mg     1.9     02-04      CAPILLARY BLOOD GLUCOSE    < from: CT Chest No Cont (01.29.19 @ 19:48) >  BONES: Degenerative changes of the spine. Old healed rib fractures.    IMPRESSION:     Partially loculated small bilateral pleural effusions appear decreased in   size from prior exam. Mild interlobular septal thickeningsecondary to   mild pulmonary edema also improved.    Multiple small nodular opacities or tree-in-bud opacities noted within   the bilateral upper lobes secondary to  impacted distal airways, many of   which are new since the January 24, 2019 likely of infectious etiology.    Debris noted within the trachea and left lower lobar bronchi new since   the prior study with associated left lower lobe patchy opacity with mild   interval increase in size representing atelectasis and/or pneumonia. The   constellation of findings are suggestive of aspiration.                    KAMRON SMALLS M.D., RADIOLOGY RESIDENT  This document has been electronically signed.  ZACH MORROW M.D. ATTENDING RADIOLOGIST  This document has been electronically signed. Jan 30 2019 10:21AM        < end of copied text >                    RECENT CULTURES:        RESPIRATORY CULTURES:          Studies  Chest X-RAY  CT SCAN Chest   Venous Dopplers: LE:   CT Abdomen  Others

## 2019-02-05 NOTE — PROGRESS NOTE ADULT - SUBJECTIVE AND OBJECTIVE BOX
HPI:  79 y/o female, with a PmHx of COPD (on 2-3L O2 prn), paroxsymal a-fib (on pradaxa), CAD (s/p stent 11/2018), CHF, HTN, HLD, and anxiety, presenting to the [facility] ED with right foot pressure ulcer pain x several weeks. The patient has a dime-sized ulcer with scant serosanguinous drainage on her right calcaneous that she acquired from a shoe weeks ago. She reports that she woke up last night around 4am with excruciating, unrelenting right root pain with SOB x a few minutes followed by chest pain x several hours. The patient reports that the ulcer pain began to gradually worsen since her last hospital visit, where she was d/c with vancomycin. The foot pain is sharp, 10/10, and radiates up to her calf. The patient took two puffs of her Symbicort for the SOB with no relief, but it resolved spontaneously within a few minutes. The chest pain began gradually and was described as a midsternal, 8/10, tightness with no radiation and accompanied by palpitations. Of note, the patient reports chronic cough, b/l LE edema, JACKSON with walking 1/2 block, orthopnea x1 pillow, occasional PND, melena x weeks, easy bruising/bleeding, and 10 lbs weight loss over the past 6 months with no change in appetite. The patient denies fever, chills, SOB at rest, diaphoresis, dizziness, claudication, wheezing, changes in vision and hearing, abdominal pain, change in bowel and urinary habits, dysuria, hematuria. (17 Jan 2019 12:50)    Patient is a 78y old  Female who presents with a chief complaint of right foot pressure ulcer pain (21 Jan 2019 09:09)    Allergies    No Known Allergies    Intolerances      Vital Signs Last 24 Hrs  T(C): 36.8 (21 Jan 2019 05:34), Max: 36.8 (21 Jan 2019 05:34)  T(F): 98.3 (21 Jan 2019 05:34), Max: 98.3 (21 Jan 2019 05:34)  HR: 79 (21 Jan 2019 05:34) (56 - 79)  BP: 102/53 (21 Jan 2019 05:34) (102/53 - 118/69)  BP(mean): --  RR: 18 (21 Jan 2019 05:34) (18 - 18)  SpO2: 100% (21 Jan 2019 05:34) (95% - 100%)                        9.9    9.80  )-----------( 221      ( 21 Jan 2019 00:40 )             31.4     01-20    133<L>  |  93<L>  |  36<H>  ----------------------------<  103<H>  4.3   |  26  |  1.87<H>    Ca    8.3<L>      20 Jan 2019 05:50  Mg     1.8     01-20      CAPILLARY BLOOD GLUCOSE        MEDICATIONS  (STANDING):  atorvastatin 40 milliGRAM(s) Oral at bedtime  buDESOnide  80 MICROgram(s)/formoterol 4.5 MICROgram(s) Inhaler 2 Puff(s) Inhalation two times a day  clopidogrel Tablet 75 milliGRAM(s) Oral daily  digoxin     Tablet 0.125 milliGRAM(s) Oral daily  furosemide    Tablet 40 milliGRAM(s) Oral daily  heparin  Infusion.  Unit(s)/Hr (11 mL/Hr) IV Continuous <Continuous>  metoprolol tartrate 12.5 milliGRAM(s) Oral every 12 hours  pantoprazole  Injectable 40 milliGRAM(s) IV Push two times a day  senna 2 Tablet(s) Oral at bedtime    MEDICATIONS  (PRN):  acetaminophen   Tablet .. 650 milliGRAM(s) Oral every 6 hours PRN Mild Pain (1 - 3), Moderate Pain (4 - 6), Severe Pain (7 - 10)  heparin  Injectable 4500 Unit(s) IV Push every 6 hours PRN For aPTT less than 40  heparin  Injectable 2000 Unit(s) IV Push every 6 hours PRN For aPTT between 40 - 57  LORazepam     Tablet 1 milliGRAM(s) Oral two times a day PRN Anxiety  traMADol 50 milliGRAM(s) Oral every 8 hours PRN Severe Pain (7 - 10)    PAST MEDICAL & SURGICAL HISTORY:  CAD (coronary artery disease): s/p stent 2018  CHF (congestive heart failure)  COPD (chronic obstructive pulmonary disease): on 2-3L O2 at home prn  Anxiety  TIA (transient ischemic attack): many years ago  PAF (paroxysmal atrial fibrillation)  HLD (hyperlipidemia)  HTN (hypertension)  H/O total hysterectomy: 2014  History of cataract surgery: b/l 2015  History of repair of hip fracture: luisa placed in RLE 2015  H/O spinal fusion: c2-6 in 2017        MOISES:  Posterior heel ulceration, partial thickness granular base.    No local signs of infection. No probe to bone.  No drainage or malodor.  ++pain on exam  Pedal pulses non palp bilateral lower extremities  Sensation intact to b/l feet  No gross deformities

## 2019-02-05 NOTE — PROGRESS NOTE ADULT - SUBJECTIVE AND OBJECTIVE BOX
CARDIOLOGY FOLLOW UP - Dr. Vieyra    CC no cp/sob       PHYSICAL EXAM:  T(C): 36.7 (02-05-19 @ 09:56), Max: 36.7 (02-04-19 @ 15:23)  HR: 68 (02-05-19 @ 10:45) (68 - 92)  BP: 110/69 (02-05-19 @ 09:56) (110/69 - 136/74)  RR: 18 (02-05-19 @ 09:56) (18 - 22)  SpO2: 94% (02-05-19 @ 09:56) (94% - 97%)  Wt(kg): --  I&O's Summary      Appearance: Normal	  Cardiovascular: Normal S1 S2,irreg, No JVD, No murmurs  Respiratory: Lungs clear to auscultation	  Gastrointestinal:  Soft, Non-tender, + BS	  Extremities: Normal range of motion, No clubbing, cyanosis or edema        MEDICATIONS  (STANDING):  atorvastatin 40 milliGRAM(s) Oral at bedtime  buDESOnide  80 MICROgram(s)/formoterol 4.5 MICROgram(s) Inhaler 2 Puff(s) Inhalation two times a day  clopidogrel Tablet 75 milliGRAM(s) Oral daily  collagenase Ointment 1 Application(s) Topical two times a day  dabigatran 75 milliGRAM(s) Oral every 12 hours  furosemide    Tablet 20 milliGRAM(s) Oral daily  levalbuterol Inhalation 0.63 milliGRAM(s) Inhalation every 6 hours  LORazepam     Tablet 0.5 milliGRAM(s) Oral once  metoprolol tartrate 50 milliGRAM(s) Oral two times a day  midodrine 5 milliGRAM(s) Oral every 8 hours  pantoprazole  Injectable 40 milliGRAM(s) IV Push two times a day  predniSONE   Tablet 40 milliGRAM(s) Oral daily  thiamine 100 milliGRAM(s) Oral daily  vancomycin    Solution 125 milliGRAM(s) Oral every 6 hours      TELEMETRY: afib 	    ECG:  	  RADIOLOGY:   DIAGNOSTIC TESTING:  [ ] Echocardiogram:  [ ]  Catheterization:  [ ] Stress Test:    OTHER: 	    LABS:	 	                                10.0   16.07 )-----------( 316      ( 05 Feb 2019 08:39 )             32.0     02-05    138  |  98  |  51<H>  ----------------------------<  75  4.8   |  31  |  1.04    Ca    9.4      05 Feb 2019 08:39  Mg     2.0     02-05

## 2019-02-05 NOTE — PROGRESS NOTE ADULT - SUBJECTIVE AND OBJECTIVE BOX
no diarrhoea no abd pain  Vital Signs Last 24 Hrs  T(C): 36.4 (05 Feb 2019 17:33), Max: 36.7 (05 Feb 2019 09:56)  T(F): 97.6 (05 Feb 2019 17:33), Max: 98 (05 Feb 2019 09:56)  HR: 85 (05 Feb 2019 17:33) (67 - 92)  BP: 120/74 (05 Feb 2019 17:33) (110/69 - 136/74)  BP(mean): --  RR: 18 (05 Feb 2019 17:33) (18 - 20)  SpO2: 100% (05 Feb 2019 17:33) (94% - 100%)                        10.0   16.07 )-----------( 316      ( 05 Feb 2019 08:39 )             32.0   02-05    138  |  98  |  51<H>  ----------------------------<  75  4.8   |  31  |  1.04    Ca    9.4      05 Feb 2019 08:39  Mg     2.0     02-05    MEDICATIONS  (STANDING):  atorvastatin 40 milliGRAM(s) Oral at bedtime  buDESOnide 160 MICROgram(s)/formoterol 4.5 MICROgram(s) Inhaler 2 Puff(s) Inhalation two times a day  clopidogrel Tablet 75 milliGRAM(s) Oral daily  collagenase Ointment 1 Application(s) Topical two times a day  dabigatran 75 milliGRAM(s) Oral every 12 hours  furosemide    Tablet 20 milliGRAM(s) Oral daily  levalbuterol Inhalation 0.63 milliGRAM(s) Inhalation every 6 hours  metoprolol tartrate 50 milliGRAM(s) Oral two times a day  midodrine 5 milliGRAM(s) Oral every 8 hours  nystatin Powder 1 Application(s) Topical every 12 hours  pantoprazole  Injectable 40 milliGRAM(s) IV Push two times a day  phenazopyridine 100 milliGRAM(s) Oral every 8 hours  predniSONE   Tablet 40 milliGRAM(s) Oral daily  thiamine 100 milliGRAM(s) Oral daily  vancomycin    Solution 125 milliGRAM(s) Oral every 6 hours

## 2019-02-05 NOTE — PROGRESS NOTE ADULT - ASSESSMENT
79 f with HTN, HLD, CAD s/p stent, CHF, paroxysmal A-fib COPD (on 2-3L O2 prn),recent admission 1/5/19 - 1/14/19 for C-diff after a recent augmentin course for cellulitis, discharged with PO vanco (End Date: 1/18/19), presented 1/17/19 with right foot pressure ulcer pain x several weeks.  Low suspicion for infected heel ulcer clinically  UCX with Pseudomonas, patient does not complain of any urinary symptoms  C diff positive  RVP with Flu  CT with ? aspiration--however resp status improving off abx  No diarrhea presently  Note rising WBC, likely 2/2 prednisone (started 2/1)--continue to trend  Overall, Flu, C diff, fever, positive culture finding  - S/p course tamiflu  - c/w PO vanco 125 q qid for 10 days                  then 125 tid for a week                  then 125 bid for a week                  then 125 qd for a week                  then 125 q 48 h for a week                  then 125 every third day for a week  - if fever or worsening status repeat cultures and start zosyn for consideration aspiration pneumonia  - Aspiration precautions  - Okay to DC isolation if no objection from infection control (patient reports no further diarrhea, presently on po vanco taper--will be protracted, s/p course treatment for Influenza)    Benjamin Dolan MD  Pager [phone]  After 5pm and on weekends call [phone]

## 2019-02-05 NOTE — PROGRESS NOTE ADULT - SUBJECTIVE AND OBJECTIVE BOX
CC: F/U for C diff    Saw/spoke to patient. Patient overall well. No further diarrhea. No new complaints. No fevers, no chills.    Allergies  No Known Allergies    ANTIMICROBIALS:  vancomycin    Solution 125 every 6 hours    PE:    Vital Signs Last 24 Hrs  T(C): 36.7 (05 Feb 2019 09:56), Max: 36.7 (04 Feb 2019 15:23)  T(F): 98 (05 Feb 2019 09:56), Max: 98 (04 Feb 2019 15:23)  HR: 68 (05 Feb 2019 10:45) (68 - 92)  BP: 110/69 (05 Feb 2019 09:56) (110/69 - 136/74)  RR: 18 (05 Feb 2019 09:56) (18 - 22)  SpO2: 94% (05 Feb 2019 09:56) (94% - 97%)    Gen: AOx3, NAD, non-toxic  CV: S1+S2 normal, nontachycardic  Resp: Clear bilat, no resp distress, no crackles/wheezes  Abd: Soft, nontender, +BS  Ext: No LE edema, no wounds    LABS:                        10.0   16.07 )-----------( 316      ( 05 Feb 2019 08:39 )             32.0     02-05    138  |  98  |  51<H>  ----------------------------<  75  4.8   |  31  |  1.04    Ca    9.4      05 Feb 2019 08:39  Mg     2.0     02-05    MICROBIOLOGY:    URINE CATHETER  01-27-19 --  --  Pseudomonas aeruginosa    BLOOD PERIPHERAL  01-26-19 NGTD    BLOOD PERIPHERAL  01-25-19 NGTD    .Stool Feces  01-06-19   GI PCR Results: NOT detected    RADIOLOGY:    1/29 CT:    IMPRESSION:     Partially loculated small bilateral pleural effusions appear decreased in   size from prior exam. Mild interlobular septal thickening secondary to   mild pulmonary edema also improved.    Multiple small nodular opacities or tree-in-bud opacities noted within   the bilateral upper lobes secondary to  impacted distal airways, many of   which are new since the January 24, 2019 likely of infectious etiology.    Debris noted within the trachea and left lower lobar bronchi new since   the prior study with associated left lower lobe patchy opacity with mild   interval increase in size representing atelectasis and/or pneumonia. The   constellation of findings are suggestive of aspiration.

## 2019-02-05 NOTE — PROGRESS NOTE ADULT - PROBLEM SELECTOR PLAN 1
FOUND TO HAVE NEW INFLUENZA IN the hospital : Started on Tamiflu:  1/30: started on solumedrol today as she has been wheezing: She is on antibiotics from ? [facility] : ct scan chest reviewed: has mucus impacted airways: As well as the effusions are smaller: ? left lower lobe opacity : would check speech and swallow: She has  underlying copd :  1/31: on tamiflu  2/1: Finish 5 days of tamiflu  2/2 treated with Tamiflu  2/3 treated  2/4: Finished rx!!  2/5: rsolved

## 2019-02-05 NOTE — PROGRESS NOTE ADULT - ASSESSMENT
79F w/ nonhealing R posterior ankle ulcer and arterial insuff    - Continue wound care  - please call back when c diff rx completed

## 2019-02-05 NOTE — PROGRESS NOTE ADULT - ASSESSMENT
79 h/o female admitted for rt postrior ankle pressure ulcer    vascular following needs angiogram  need nephrology clearence  she needs to complete abx for c diff  local wound care    Influenza positive   completed  tamiflu for 5 days    CDIFF POSITIVE  continue vancomycin taper as per ID  wbc count elevated could be due to steroids unlikely aspiration     chronic afib with rapid vntricular response  cardiology following    CKD   monitor cmp  monitor BUN/CREATININE  appreciate nephrology f/u

## 2019-02-05 NOTE — PROGRESS NOTE ADULT - SUBJECTIVE AND OBJECTIVE BOX
Patient is a 79y old  Female who presents with a chief complaint of right foot pressure ulcer pain (05 Feb 2019 18:20)      Vascular Surgery Attending Progress Note    Interval HPI: pt w/o c/o     Medications:  acetaminophen   Tablet .. 650 milliGRAM(s) Oral every 6 hours PRN  atorvastatin 40 milliGRAM(s) Oral at bedtime  buDESOnide 160 MICROgram(s)/formoterol 4.5 MICROgram(s) Inhaler 2 Puff(s) Inhalation two times a day  clopidogrel Tablet 75 milliGRAM(s) Oral daily  collagenase Ointment 1 Application(s) Topical two times a day  dabigatran 75 milliGRAM(s) Oral every 12 hours  furosemide    Tablet 20 milliGRAM(s) Oral daily  levalbuterol Inhalation 0.63 milliGRAM(s) Inhalation every 6 hours  metoprolol tartrate 50 milliGRAM(s) Oral two times a day  midodrine 5 milliGRAM(s) Oral every 8 hours  nystatin Powder 1 Application(s) Topical every 12 hours  pantoprazole  Injectable 40 milliGRAM(s) IV Push two times a day  phenazopyridine 100 milliGRAM(s) Oral every 8 hours  predniSONE   Tablet 40 milliGRAM(s) Oral daily  thiamine 100 milliGRAM(s) Oral daily  traMADol 50 milliGRAM(s) Oral every 8 hours PRN  vancomycin    Solution 125 milliGRAM(s) Oral every 6 hours      Vital Signs Last 24 Hrs  T(C): 36.4 (05 Feb 2019 17:33), Max: 36.7 (05 Feb 2019 09:56)  T(F): 97.6 (05 Feb 2019 17:33), Max: 98 (05 Feb 2019 09:56)  HR: 85 (05 Feb 2019 17:33) (67 - 92)  BP: 120/74 (05 Feb 2019 17:33) (110/69 - 136/74)  BP(mean): --  RR: 18 (05 Feb 2019 17:33) (18 - 20)  SpO2: 100% (05 Feb 2019 17:33) (94% - 100%)  I&O's Summary      Physical Exam:  Vascular: stable dressing c/d/i     LABS:                        10.0   16.07 )-----------( 316      ( 05 Feb 2019 08:39 )             32.0     02-05    138  |  98  |  51<H>  ----------------------------<  75  4.8   |  31  |  1.04    Ca    9.4      05 Feb 2019 08:39  Mg     2.0     02-05          GARCÍA HUANG MD  [phone]

## 2019-02-05 NOTE — PROGRESS NOTE ADULT - SUBJECTIVE AND OBJECTIVE BOX
[facility] NEPHROLOGY- PROGRESS NOTE    78y Female with history of CHF, COPD presents with SOB found to have guaiac positive anemia and afib with RVR. Nephrology consulted for elevated Scr.    REVIEW OF SYSTEMS:  Gen: no changes in weight  Cards: no chest pain  Resp: + dyspnea with cough  GI: no nausea or vomiting, + diarrhea resolved  Vascular: no LE edema +R foot pain    No Known Allergies      Hospital Medications: Medications reviewed      VITALS:  T(F): 98 (02-05-19 @ 09:56), Max: 98 (02-04-19 @ 15:23)  HR: 68 (02-05-19 @ 10:45)  BP: 110/69 (02-05-19 @ 09:56)  RR: 18 (02-05-19 @ 09:56)  SpO2: 94% (02-05-19 @ 09:56)  Wt(kg): --      PHYSICAL EXAM:    Gen: NAD, calm  Cards: Irregularly irregular, +S1/S2, no M/G/R  Resp: course BS, bibasilar rales  GI: soft, NT/ND, NABS  Vascular: no LE edema B/L      LABS:  02-05    138  |  98  |  51<H>  ----------------------------<  75  4.8   |  31  |  1.04    Ca    9.4      05 Feb 2019 08:39  Mg     2.0     02-05      Creatinine Trend: 1.04 <--, 1.11 <--, 1.04 <--, 1.15 <--, 1.23 <--, 1.34 <--, 1.77 <--                        10.0   16.07 )-----------( 316      ( 05 Feb 2019 08:39 )             32.0     Urine Studies:

## 2019-02-05 NOTE — PROGRESS NOTE ADULT - SUBJECTIVE AND OBJECTIVE BOX
BEVERLEY DEL RIO:[id number],   79yFemale followed for:  No Known Allergies    PAST MEDICAL & SURGICAL HISTORY:  CAD (coronary artery disease): s/p stent 2018  CHF (congestive heart failure)  COPD (chronic obstructive pulmonary disease): on 2-3L O2 at home prn  Anxiety  TIA (transient ischemic attack): many years ago  PAF (paroxysmal atrial fibrillation)  HLD (hyperlipidemia)  HTN (hypertension)  H/O total hysterectomy: 2014  History of cataract surgery: b/l 2015  History of repair of hip fracture: luisa placed in RLE 2015  H/O spinal fusion: c2-6 in 2017    FAMILY HISTORY:  No pertinent family history in first degree relatives    MEDICATIONS  (STANDING):  atorvastatin 40 milliGRAM(s) Oral at bedtime  buDESOnide  80 MICROgram(s)/formoterol 4.5 MICROgram(s) Inhaler 2 Puff(s) Inhalation two times a day  clopidogrel Tablet 75 milliGRAM(s) Oral daily  collagenase Ointment 1 Application(s) Topical two times a day  dabigatran 75 milliGRAM(s) Oral every 12 hours  furosemide    Tablet 20 milliGRAM(s) Oral daily  levalbuterol Inhalation 0.63 milliGRAM(s) Inhalation every 6 hours  metoprolol tartrate 50 milliGRAM(s) Oral two times a day  midodrine 5 milliGRAM(s) Oral every 8 hours  pantoprazole  Injectable 40 milliGRAM(s) IV Push two times a day  predniSONE   Tablet 40 milliGRAM(s) Oral daily  thiamine 100 milliGRAM(s) Oral daily  vancomycin    Solution 125 milliGRAM(s) Oral every 6 hours    MEDICATIONS  (PRN):  acetaminophen   Tablet .. 650 milliGRAM(s) Oral every 6 hours PRN Temp greater or equal to 38C (100.4F), Mild Pain (1 - 3), Moderate Pain (4 - 6)  traMADol 50 milliGRAM(s) Oral every 8 hours PRN Severe Pain (7 - 10)      Vital Signs Last 24 Hrs  T(C): 36.3 (04 Feb 2019 21:39), Max: 36.7 (04 Feb 2019 15:23)  T(F): 97.3 (04 Feb 2019 21:39), Max: 98 (04 Feb 2019 15:23)  HR: 92 (04 Feb 2019 21:39) (70 - 92)  BP: 136/74 (04 Feb 2019 21:39) (115/75 - 136/74)  BP(mean): --  RR: 20 (04 Feb 2019 21:39) (20 - 22)  SpO2: 97% (04 Feb 2019 21:39) (95% - 100%)  nc/at  s1s2  cta  soft, nt, nd no guarding or rebound  no c/c/e    CBC Full  -  ( 04 Feb 2019 05:10 )  WBC Count : 13.32 K/uL  Hemoglobin : 10.4 g/dL  Hematocrit : 34.4 %  Platelet Count - Automated : 292 K/uL  Mean Cell Volume : 91.2 fL  Mean Cell Hemoglobin : 27.6 pg  Mean Cell Hemoglobin Concentration : 30.2 %  Auto Neutrophil # : 10.99 K/uL  Auto Lymphocyte # : 0.98 K/uL  Auto Monocyte # : 1.16 K/uL  Auto Eosinophil # : 0.02 K/uL  Auto Basophil # : 0.02 K/uL  Auto Neutrophil % : 82.4 %  Auto Lymphocyte % : 7.4 %  Auto Monocyte % : 8.7 %  Auto Eosinophil % : 0.2 %  Auto Basophil % : 0.2 %    02-04    140  |  100  |  54<H>  ----------------------------<  87  4.5   |  31  |  1.11    Ca    9.6      04 Feb 2019 05:10  Mg     1.9     02-04

## 2019-02-06 LAB
ANION GAP SERPL CALC-SCNC: 12 MMO/L — SIGNIFICANT CHANGE UP (ref 7–14)
BASOPHILS # BLD AUTO: 0.02 K/UL — SIGNIFICANT CHANGE UP (ref 0–0.2)
BASOPHILS NFR BLD AUTO: 0.1 % — SIGNIFICANT CHANGE UP (ref 0–2)
BUN SERPL-MCNC: 49 MG/DL — HIGH (ref 7–23)
CALCIUM SERPL-MCNC: 9.1 MG/DL — SIGNIFICANT CHANGE UP (ref 8.4–10.5)
CHLORIDE SERPL-SCNC: 98 MMOL/L — SIGNIFICANT CHANGE UP (ref 98–107)
CO2 SERPL-SCNC: 32 MMOL/L — HIGH (ref 22–31)
CREAT SERPL-MCNC: 0.97 MG/DL — SIGNIFICANT CHANGE UP (ref 0.5–1.3)
EOSINOPHIL # BLD AUTO: 0.03 K/UL — SIGNIFICANT CHANGE UP (ref 0–0.5)
EOSINOPHIL NFR BLD AUTO: 0.2 % — SIGNIFICANT CHANGE UP (ref 0–6)
GLUCOSE SERPL-MCNC: 87 MG/DL — SIGNIFICANT CHANGE UP (ref 70–99)
HCT VFR BLD CALC: 32.2 % — LOW (ref 34.5–45)
HGB BLD-MCNC: 10.2 G/DL — LOW (ref 11.5–15.5)
IMM GRANULOCYTES NFR BLD AUTO: 0.9 % — SIGNIFICANT CHANGE UP (ref 0–1.5)
LYMPHOCYTES # BLD AUTO: 0.89 K/UL — LOW (ref 1–3.3)
LYMPHOCYTES # BLD AUTO: 5.4 % — LOW (ref 13–44)
MAGNESIUM SERPL-MCNC: 2.1 MG/DL — SIGNIFICANT CHANGE UP (ref 1.6–2.6)
MCHC RBC-ENTMCNC: 27.7 PG — SIGNIFICANT CHANGE UP (ref 27–34)
MCHC RBC-ENTMCNC: 31.7 % — LOW (ref 32–36)
MCV RBC AUTO: 87.5 FL — SIGNIFICANT CHANGE UP (ref 80–100)
MONOCYTES # BLD AUTO: 1.07 K/UL — HIGH (ref 0–0.9)
MONOCYTES NFR BLD AUTO: 6.5 % — SIGNIFICANT CHANGE UP (ref 2–14)
NEUTROPHILS # BLD AUTO: 14.32 K/UL — HIGH (ref 1.8–7.4)
NEUTROPHILS NFR BLD AUTO: 86.9 % — HIGH (ref 43–77)
NRBC # FLD: 0 K/UL — LOW (ref 25–125)
PLATELET # BLD AUTO: 300 K/UL — SIGNIFICANT CHANGE UP (ref 150–400)
PMV BLD: 10.3 FL — SIGNIFICANT CHANGE UP (ref 7–13)
POTASSIUM SERPL-MCNC: 4.7 MMOL/L — SIGNIFICANT CHANGE UP (ref 3.5–5.3)
POTASSIUM SERPL-SCNC: 4.7 MMOL/L — SIGNIFICANT CHANGE UP (ref 3.5–5.3)
RBC # BLD: 3.68 M/UL — LOW (ref 3.8–5.2)
RBC # FLD: 17 % — HIGH (ref 10.3–14.5)
SODIUM SERPL-SCNC: 142 MMOL/L — SIGNIFICANT CHANGE UP (ref 135–145)
WBC # BLD: 16.47 K/UL — HIGH (ref 3.8–10.5)
WBC # FLD AUTO: 16.47 K/UL — HIGH (ref 3.8–10.5)

## 2019-02-06 PROCEDURE — 99232 SBSQ HOSP IP/OBS MODERATE 35: CPT

## 2019-02-06 PROCEDURE — 74176 CT ABD & PELVIS W/O CONTRAST: CPT | Mod: 26

## 2019-02-06 PROCEDURE — 71045 X-RAY EXAM CHEST 1 VIEW: CPT | Mod: 26

## 2019-02-06 RX ORDER — MORPHINE SULFATE 50 MG/1
1 CAPSULE, EXTENDED RELEASE ORAL ONCE
Qty: 0 | Refills: 0 | Status: DISCONTINUED | OUTPATIENT
Start: 2019-02-06 | End: 2019-02-06

## 2019-02-06 RX ADMIN — Medication 125 MILLIGRAM(S): at 05:17

## 2019-02-06 RX ADMIN — Medication 125 MILLIGRAM(S): at 12:29

## 2019-02-06 RX ADMIN — Medication 50 MILLIGRAM(S): at 17:58

## 2019-02-06 RX ADMIN — MIDODRINE HYDROCHLORIDE 5 MILLIGRAM(S): 2.5 TABLET ORAL at 05:18

## 2019-02-06 RX ADMIN — Medication 1 APPLICATION(S): at 05:18

## 2019-02-06 RX ADMIN — Medication 20 MILLIGRAM(S): at 05:17

## 2019-02-06 RX ADMIN — Medication 100 MILLIGRAM(S): at 07:14

## 2019-02-06 RX ADMIN — MORPHINE SULFATE 1 MILLIGRAM(S): 50 CAPSULE, EXTENDED RELEASE ORAL at 16:30

## 2019-02-06 RX ADMIN — Medication 125 MILLIGRAM(S): at 17:57

## 2019-02-06 RX ADMIN — TRAMADOL HYDROCHLORIDE 50 MILLIGRAM(S): 50 TABLET ORAL at 12:27

## 2019-02-06 RX ADMIN — MIDODRINE HYDROCHLORIDE 5 MILLIGRAM(S): 2.5 TABLET ORAL at 21:04

## 2019-02-06 RX ADMIN — Medication 0.5 MILLIGRAM(S): at 06:31

## 2019-02-06 RX ADMIN — PANTOPRAZOLE SODIUM 40 MILLIGRAM(S): 20 TABLET, DELAYED RELEASE ORAL at 05:17

## 2019-02-06 RX ADMIN — DABIGATRAN ETEXILATE MESYLATE 75 MILLIGRAM(S): 150 CAPSULE ORAL at 05:17

## 2019-02-06 RX ADMIN — BUDESONIDE AND FORMOTEROL FUMARATE DIHYDRATE 2 PUFF(S): 160; 4.5 AEROSOL RESPIRATORY (INHALATION) at 21:03

## 2019-02-06 RX ADMIN — LEVALBUTEROL 0.63 MILLIGRAM(S): 1.25 SOLUTION, CONCENTRATE RESPIRATORY (INHALATION) at 21:42

## 2019-02-06 RX ADMIN — MORPHINE SULFATE 1 MILLIGRAM(S): 50 CAPSULE, EXTENDED RELEASE ORAL at 16:15

## 2019-02-06 RX ADMIN — Medication 1 APPLICATION(S): at 17:57

## 2019-02-06 RX ADMIN — BUDESONIDE AND FORMOTEROL FUMARATE DIHYDRATE 2 PUFF(S): 160; 4.5 AEROSOL RESPIRATORY (INHALATION) at 12:30

## 2019-02-06 RX ADMIN — Medication 100 MILLIGRAM(S): at 12:29

## 2019-02-06 RX ADMIN — Medication 50 MILLIGRAM(S): at 05:18

## 2019-02-06 RX ADMIN — TRAMADOL HYDROCHLORIDE 50 MILLIGRAM(S): 50 TABLET ORAL at 13:00

## 2019-02-06 RX ADMIN — DABIGATRAN ETEXILATE MESYLATE 75 MILLIGRAM(S): 150 CAPSULE ORAL at 17:58

## 2019-02-06 RX ADMIN — CLOPIDOGREL BISULFATE 75 MILLIGRAM(S): 75 TABLET, FILM COATED ORAL at 12:29

## 2019-02-06 RX ADMIN — NYSTATIN CREAM 1 APPLICATION(S): 100000 CREAM TOPICAL at 05:18

## 2019-02-06 RX ADMIN — LEVALBUTEROL 0.63 MILLIGRAM(S): 1.25 SOLUTION, CONCENTRATE RESPIRATORY (INHALATION) at 03:49

## 2019-02-06 RX ADMIN — LEVALBUTEROL 0.63 MILLIGRAM(S): 1.25 SOLUTION, CONCENTRATE RESPIRATORY (INHALATION) at 16:33

## 2019-02-06 RX ADMIN — PANTOPRAZOLE SODIUM 40 MILLIGRAM(S): 20 TABLET, DELAYED RELEASE ORAL at 17:58

## 2019-02-06 RX ADMIN — Medication 125 MILLIGRAM(S): at 01:33

## 2019-02-06 RX ADMIN — LEVALBUTEROL 0.63 MILLIGRAM(S): 1.25 SOLUTION, CONCENTRATE RESPIRATORY (INHALATION) at 10:17

## 2019-02-06 RX ADMIN — MIDODRINE HYDROCHLORIDE 5 MILLIGRAM(S): 2.5 TABLET ORAL at 12:29

## 2019-02-06 RX ADMIN — Medication 40 MILLIGRAM(S): at 05:17

## 2019-02-06 RX ADMIN — ATORVASTATIN CALCIUM 40 MILLIGRAM(S): 80 TABLET, FILM COATED ORAL at 21:04

## 2019-02-06 RX ADMIN — NYSTATIN CREAM 1 APPLICATION(S): 100000 CREAM TOPICAL at 17:58

## 2019-02-06 NOTE — PROGRESS NOTE ADULT - SUBJECTIVE AND OBJECTIVE BOX
BEVERLEY DEL RIO:[id number],   79yFemale followed for:  No Known Allergies    PAST MEDICAL & SURGICAL HISTORY:  CAD (coronary artery disease): s/p stent 2018  CHF (congestive heart failure)  COPD (chronic obstructive pulmonary disease): on 2-3L O2 at home prn  Anxiety  TIA (transient ischemic attack): many years ago  PAF (paroxysmal atrial fibrillation)  HLD (hyperlipidemia)  HTN (hypertension)  H/O total hysterectomy: 2014  History of cataract surgery: b/l 2015  History of repair of hip fracture: luisa placed in RLE 2015  H/O spinal fusion: c2-6 in 2017    FAMILY HISTORY:  No pertinent family history in first degree relatives    MEDICATIONS  (STANDING):  atorvastatin 40 milliGRAM(s) Oral at bedtime  buDESOnide 160 MICROgram(s)/formoterol 4.5 MICROgram(s) Inhaler 2 Puff(s) Inhalation two times a day  clopidogrel Tablet 75 milliGRAM(s) Oral daily  collagenase Ointment 1 Application(s) Topical two times a day  dabigatran 75 milliGRAM(s) Oral every 12 hours  furosemide    Tablet 20 milliGRAM(s) Oral daily  levalbuterol Inhalation 0.63 milliGRAM(s) Inhalation every 6 hours  metoprolol tartrate 50 milliGRAM(s) Oral two times a day  midodrine 5 milliGRAM(s) Oral every 8 hours  nystatin Powder 1 Application(s) Topical every 12 hours  pantoprazole  Injectable 40 milliGRAM(s) IV Push two times a day  phenazopyridine 100 milliGRAM(s) Oral every 8 hours  thiamine 100 milliGRAM(s) Oral daily  vancomycin    Solution 125 milliGRAM(s) Oral every 6 hours    MEDICATIONS  (PRN):  acetaminophen   Tablet .. 650 milliGRAM(s) Oral every 6 hours PRN Temp greater or equal to 38C (100.4F), Mild Pain (1 - 3), Moderate Pain (4 - 6)  traMADol 50 milliGRAM(s) Oral every 8 hours PRN Severe Pain (7 - 10)      Vital Signs Last 24 Hrs  T(C): 36.5 (06 Feb 2019 05:10), Max: 36.5 (06 Feb 2019 05:10)  T(F): 97.7 (06 Feb 2019 05:10), Max: 97.7 (06 Feb 2019 05:10)  HR: 89 (06 Feb 2019 10:19) (65 - 108)  BP: 137/74 (06 Feb 2019 05:10) (115/63 - 157/92)  BP(mean): --  RR: 18 (06 Feb 2019 05:10) (18 - 18)  SpO2: 95% (06 Feb 2019 10:19) (93% - 100%)  nc/at  s1s2  cta  soft, nt, nd no guarding or rebound  no c/c/e    CBC Full  -  ( 06 Feb 2019 06:30 )  WBC Count : 16.47 K/uL  Hemoglobin : 10.2 g/dL  Hematocrit : 32.2 %  Platelet Count - Automated : 300 K/uL  Mean Cell Volume : 87.5 fL  Mean Cell Hemoglobin : 27.7 pg  Mean Cell Hemoglobin Concentration : 31.7 %  Auto Neutrophil # : 14.32 K/uL  Auto Lymphocyte # : 0.89 K/uL  Auto Monocyte # : 1.07 K/uL  Auto Eosinophil # : 0.03 K/uL  Auto Basophil # : 0.02 K/uL  Auto Neutrophil % : 86.9 %  Auto Lymphocyte % : 5.4 %  Auto Monocyte % : 6.5 %  Auto Eosinophil % : 0.2 %  Auto Basophil % : 0.1 %    02-06    142  |  98  |  49<H>  ----------------------------<  87  4.7   |  32<H>  |  0.97    Ca    9.1      06 Feb 2019 06:30  Mg     2.1     02-06

## 2019-02-06 NOTE — PROGRESS NOTE ADULT - SUBJECTIVE AND OBJECTIVE BOX
Patient is a 79y old  Female who presents with a chief complaint of right foot pressure ulcer pain (06 Feb 2019 12:01)      Any change in ROS: C/O of abdominal pain : she says her breathing is ok :   not wheezing:     MEDICATIONS  (STANDING):  atorvastatin 40 milliGRAM(s) Oral at bedtime  buDESOnide 160 MICROgram(s)/formoterol 4.5 MICROgram(s) Inhaler 2 Puff(s) Inhalation two times a day  clopidogrel Tablet 75 milliGRAM(s) Oral daily  collagenase Ointment 1 Application(s) Topical two times a day  dabigatran 75 milliGRAM(s) Oral every 12 hours  furosemide    Tablet 20 milliGRAM(s) Oral daily  levalbuterol Inhalation 0.63 milliGRAM(s) Inhalation every 6 hours  metoprolol tartrate 50 milliGRAM(s) Oral two times a day  midodrine 5 milliGRAM(s) Oral every 8 hours  nystatin Powder 1 Application(s) Topical every 12 hours  pantoprazole  Injectable 40 milliGRAM(s) IV Push two times a day  thiamine 100 milliGRAM(s) Oral daily  vancomycin    Solution 125 milliGRAM(s) Oral every 6 hours    MEDICATIONS  (PRN):  acetaminophen   Tablet .. 650 milliGRAM(s) Oral every 6 hours PRN Temp greater or equal to 38C (100.4F), Mild Pain (1 - 3), Moderate Pain (4 - 6)  traMADol 50 milliGRAM(s) Oral every 8 hours PRN Severe Pain (7 - 10)    Vital Signs Last 24 Hrs  T(C): 37.1 (06 Feb 2019 12:30), Max: 37.1 (06 Feb 2019 12:30)  T(F): 98.8 (06 Feb 2019 12:30), Max: 98.8 (06 Feb 2019 12:30)  HR: 87 (06 Feb 2019 12:30) (65 - 108)  BP: 108/63 (06 Feb 2019 12:30) (108/63 - 157/92)  BP(mean): --  RR: 18 (06 Feb 2019 12:30) (18 - 18)  SpO2: 96% (06 Feb 2019 12:30) (93% - 100%)    I&O's Summary        Physical Exam:   GENERAL: NAD, well-groomed, well-developed  HEENT: COLEEN/   Atraumatic, Normocephalic  ENMT: No tonsillar erythema, exudates, or enlargement; Moist mucous membranes, Good dentition, No lesions  NECK: Supple, No JVD, Normal thyroid  CHEST/LUNG: Bibasilar crackles'   CVS: Regular rate and rhythm; No murmurs, rubs, or gallops  GI: : Soft, Nontender, Nondistended; Bowel sounds present  NERVOUS SYSTEM:  Alert & Oriented X3  EXTREMITIES:  -edema  LYMPH: No lymphadenopathy noted  SKIN: No rashes or lesions  ENDOCRINOLOGY: No Thyromegaly  PSYCH: Appropriate    Labs:                              10.2   16.47 )-----------( 300      ( 06 Feb 2019 06:30 )             32.2                         10.0   16.07 )-----------( 316      ( 05 Feb 2019 08:39 )             32.0                         10.4   13.32 )-----------( 292      ( 04 Feb 2019 05:10 )             34.4                         10.1   11.55 )-----------( 281      ( 03 Feb 2019 04:00 )             33.8     02-06    142  |  98  |  49<H>  ----------------------------<  87  4.7   |  32<H>  |  0.97  02-05    138  |  98  |  51<H>  ----------------------------<  75  4.8   |  31  |  1.04  02-04    140  |  100  |  54<H>  ----------------------------<  87  4.5   |  31  |  1.11  02-03    138  |  98  |  53<H>  ----------------------------<  96  4.6   |  32<H>  |  1.04    Ca    9.1      06 Feb 2019 06:30  Ca    9.4      05 Feb 2019 08:39  Mg     2.1     02-06  Mg     2.0     02-05      CAPILLARY BLOOD GLUCOSE    < from: CT Chest No Cont (01.29.19 @ 19:48) >  PLEURA: Partially loculated small bilateral pleural effusions appear   decreased in size from prior exam.  VESSELS: Atherosclerotic calcifications of the aorta  HEART: Cardiomegaly. No pericardial effusion. Coronary artery   calcifications. Aortic valvular calcifications. Mitral annular   calcifications.  MEDIASTINUM AND GEOVANI: Scattered subcentimeter lymph nodes.  CHEST WALL AND LOWER NECK: Heterogenous appearing thyroid gland.  VISUALIZED UPPER ABDOMEN: Partially visualized left renal cyst.  BONES: Degenerative changes of the spine. Old healed rib fractures.    IMPRESSION:     Partially loculated small bilateral pleural effusions appear decreased in   size from prior exam. Mild interlobular septal thickeningsecondary to   mild pulmonary edema also improved.    Multiple small nodular opacities or tree-in-bud opacities noted within   the bilateral upper lobes secondary to  impacted distal airways, many of   which are new since the January 24, 2019 likely of infectious etiology.    Debris noted within the trachea and left lower lobar bronchi new since   the prior study with associated left lower lobe patchy opacity with mild   interval increase in size representing atelectasis and/or pneumonia. The   constellation of findings are suggestive of aspiration.    < end of copied text >                    RECENT CULTURES:        RESPIRATORY CULTURES:          Studies  Chest X-RAY  CT SCAN Chest   Venous Dopplers: LE:   CT Abdomen  Others

## 2019-02-06 NOTE — PROGRESS NOTE ADULT - SUBJECTIVE AND OBJECTIVE BOX
CC: F/U for C diff    Saw/spoke to patient. No fevers, no chills. No further diarrhea. Overall well. No new complaints.    Allergies  No Known Allergies    ANTIMICROBIALS:  vancomycin    Solution 125 every 6 hours    PE:    Vital Signs Last 24 Hrs  T(C): 37.1 (06 Feb 2019 12:30), Max: 37.1 (06 Feb 2019 12:30)  T(F): 98.8 (06 Feb 2019 12:30), Max: 98.8 (06 Feb 2019 12:30)  HR: 87 (06 Feb 2019 12:30) (65 - 108)  BP: 108/63 (06 Feb 2019 12:30) (108/63 - 157/92)  RR: 18 (06 Feb 2019 12:30) (18 - 18)  SpO2: 96% (06 Feb 2019 12:30) (93% - 100%)    Gen: AOx1-2, NAD, fatigued  CV: S1+S2 normal, nontachycardic  Resp: Clear bilat, no resp distress, no crackles/wheezes  Abd: Soft, nontender, +BS  Ext: No LE edema, no wounds    LABS:                        10.2   16.47 )-----------( 300      ( 06 Feb 2019 06:30 )             32.2     02-06    142  |  98  |  49<H>  ----------------------------<  87  4.7   |  32<H>  |  0.97    Ca    9.1      06 Feb 2019 06:30  Mg     2.1     02-06    MICROBIOLOGY:    URINE CATHETER  01-27-19 --  --  Pseudomonas aeruginosa    BLOOD PERIPHERAL  01-26-19 NGTD    BLOOD PERIPHERAL  01-25-19 NGTD    RADIOLOGY:    1/29 CT:    IMPRESSION:     Partially loculated small bilateral pleural effusions appear decreased in   size from prior exam. Mild interlobular septal thickening secondary to   mild pulmonary edema also improved.    Multiple small nodular opacities or tree-in-bud opacities noted within   the bilateral upper lobes secondary to  impacted distal airways, many of   which are new since the January 24, 2019 likely of infectious etiology.    Debris noted within the trachea and left lower lobar bronchi new since   the prior study with associated left lower lobe patchy opacity with mild   interval increase in size representing atelectasis and/or pneumonia. The   constellation of findings are suggestive of aspiration.

## 2019-02-06 NOTE — PROGRESS NOTE ADULT - ASSESSMENT
79 h/o female admitted for rt postrior ankle pressure ulcer    vascular following needs angiogram  need nephrology clearence  she needs to complete abx for c diff  local wound care    Influenza positive   completed  tamiflu for 5 days    CDIFF POSITIVE  continue vancomycin taper as per ID  wbc count elevated could be due to steroids unlikely aspiration     chronic afib with rapid vntricular response  cardiology following    abdominal pain /leucocytosis   could be due to prednisone  CT chest r/o aspiration  CT abdomen as recomended by pulmonary    CKD   monitor cmp  monitor BUN/CREATININE  appreciate nephrology f/u

## 2019-02-06 NOTE — PROGRESS NOTE ADULT - SUBJECTIVE AND OBJECTIVE BOX
[facility] NEPHROLOGY- PROGRESS NOTE    78y Female with history of CHF, COPD presents with SOB found to have guaiac positive anemia and afib with RVR. Nephrology consulted for elevated Scr.    REVIEW OF SYSTEMS:  Gen: no changes in weight  Cards: no chest pain  Resp: + dyspnea with cough  GI: no nausea or vomiting, + diarrhea improving  Vascular: no LE edema +R foot pain    No Known Allergies      Hospital Medications: Medications reviewed      VITALS:  T(F): 97.7 (02-06-19 @ 05:10), Max: 98 (02-05-19 @ 09:56)  HR: 108 (02-06-19 @ 05:10)  BP: 137/74 (02-06-19 @ 05:10)  RR: 18 (02-06-19 @ 05:10)  SpO2: 95% (02-06-19 @ 05:10)  Wt(kg): --      PHYSICAL EXAM:    Gen: NAD, + lethargy today  Cards: Irregularly irregular, +S1/S2, no M/G/R  Resp: course BS, bibasilar rales  GI: soft, NT/ND, NABS  Vascular: no LE edema B/L        LABS:  02-06    142  |  98  |  49<H>  ----------------------------<  87  4.7   |  32<H>  |  0.97    Ca    9.1      06 Feb 2019 06:30  Mg     2.1     02-06      Creatinine Trend: 0.97 <--, 1.04 <--, 1.11 <--, 1.04 <--, 1.15 <--, 1.23 <--, 1.34 <--                        10.2   16.47 )-----------( 300      ( 06 Feb 2019 06:30 )             32.2     Urine Studies:

## 2019-02-06 NOTE — PROGRESS NOTE ADULT - ASSESSMENT
79 F with HTN, HLD, CAD s/p stent, CHF, paroxysmal A-fib COPD (on 2-3L O2 prn),recent admission 1/5/19 - 1/14/19 for C-diff after a recent augmentin course for cellulitis, discharged with PO vanco (End Date: 1/18/19), presented 1/17/19 with right foot pressure ulcer pain x several weeks.  Low suspicion for infected heel ulcer clinically  UCX with Pseudomonas, patient does not complain of any urinary symptoms  C diff positive  RVP with Flu  CT with ? aspiration--however resp status improving off abx  No diarrhea presently  Note rising WBC, likely 2/2 prednisone (started 2/1)--continue to trend  Overall, Flu, C diff, fever, positive culture finding  - c/w PO vanco 125 q qid for 10 days                  then 125 tid for a week                  then 125 bid for a week                  then 125 qd for a week                  then 125 q 48 h for a week                  then 125 every third day for a week  - if fever or worsening status repeat cultures and start zosyn for consideration aspiration pneumonia  - Aspiration precautions    Benjamin Dolan MD  Pager [phone]  After 5pm and on weekends call [phone]

## 2019-02-06 NOTE — PROGRESS NOTE ADULT - SUBJECTIVE AND OBJECTIVE BOX
HPI:  79 y/o female, with a PmHx of COPD (on 2-3L O2 prn), paroxsymal a-fib (on pradaxa), CAD (s/p stent 11/2018), CHF, HTN, HLD, and anxiety, presenting to the [facility] ED with right foot pressure ulcer pain x several weeks. The patient has a dime-sized ulcer with scant serosanguinous drainage on her right calcaneous that she acquired from a shoe weeks ago. She reports that she woke up last night around 4am with excruciating, unrelenting right root pain with SOB x a few minutes followed by chest pain x several hours. The patient reports that the ulcer pain began to gradually worsen since her last hospital visit, where she was d/c with vancomycin. The foot pain is sharp, 10/10, and radiates up to her calf. The patient took two puffs of her Symbicort for the SOB with no relief, but it resolved spontaneously within a few minutes. The chest pain began gradually and was described as a midsternal, 8/10, tightness with no radiation and accompanied by palpitations. Of note, the patient reports chronic cough, b/l LE edema, JACKSON with walking 1/2 block, orthopnea x1 pillow, occasional PND, melena x weeks, easy bruising/bleeding, and 10 lbs weight loss over the past 6 months with no change in appetite. The patient denies fever, chills, SOB at rest, diaphoresis, dizziness, claudication, wheezing, changes in vision and hearing, abdominal pain, change in bowel and urinary habits, dysuria, hematuria. (17 Jan 2019 12:50)    Patient is a 78y old  Female who presents with a chief complaint of right foot pressure ulcer pain (21 Jan 2019 09:09)    Allergies    No Known Allergies    Intolerances      Vital Signs Last 24 Hrs  T(C): 36.8 (21 Jan 2019 05:34), Max: 36.8 (21 Jan 2019 05:34)  T(F): 98.3 (21 Jan 2019 05:34), Max: 98.3 (21 Jan 2019 05:34)  HR: 79 (21 Jan 2019 05:34) (56 - 79)  BP: 102/53 (21 Jan 2019 05:34) (102/53 - 118/69)  BP(mean): --  RR: 18 (21 Jan 2019 05:34) (18 - 18)  SpO2: 100% (21 Jan 2019 05:34) (95% - 100%)                        9.9    9.80  )-----------( 221      ( 21 Jan 2019 00:40 )             31.4     01-20    133<L>  |  93<L>  |  36<H>  ----------------------------<  103<H>  4.3   |  26  |  1.87<H>    Ca    8.3<L>      20 Jan 2019 05:50  Mg     1.8     01-20      CAPILLARY BLOOD GLUCOSE        MEDICATIONS  (STANDING):  atorvastatin 40 milliGRAM(s) Oral at bedtime  buDESOnide  80 MICROgram(s)/formoterol 4.5 MICROgram(s) Inhaler 2 Puff(s) Inhalation two times a day  clopidogrel Tablet 75 milliGRAM(s) Oral daily  digoxin     Tablet 0.125 milliGRAM(s) Oral daily  furosemide    Tablet 40 milliGRAM(s) Oral daily  heparin  Infusion.  Unit(s)/Hr (11 mL/Hr) IV Continuous <Continuous>  metoprolol tartrate 12.5 milliGRAM(s) Oral every 12 hours  pantoprazole  Injectable 40 milliGRAM(s) IV Push two times a day  senna 2 Tablet(s) Oral at bedtime    MEDICATIONS  (PRN):  acetaminophen   Tablet .. 650 milliGRAM(s) Oral every 6 hours PRN Mild Pain (1 - 3), Moderate Pain (4 - 6), Severe Pain (7 - 10)  heparin  Injectable 4500 Unit(s) IV Push every 6 hours PRN For aPTT less than 40  heparin  Injectable 2000 Unit(s) IV Push every 6 hours PRN For aPTT between 40 - 57  LORazepam     Tablet 1 milliGRAM(s) Oral two times a day PRN Anxiety  traMADol 50 milliGRAM(s) Oral every 8 hours PRN Severe Pain (7 - 10)    PAST MEDICAL & SURGICAL HISTORY:  CAD (coronary artery disease): s/p stent 2018  CHF (congestive heart failure)  COPD (chronic obstructive pulmonary disease): on 2-3L O2 at home prn  Anxiety  TIA (transient ischemic attack): many years ago  PAF (paroxysmal atrial fibrillation)  HLD (hyperlipidemia)  HTN (hypertension)  H/O total hysterectomy: 2014  History of cataract surgery: b/l 2015  History of repair of hip fracture: luisa placed in RLE 2015  H/O spinal fusion: c2-6 in 2017        MOISES:  Posterior heel ulceration, partial thickness granular base.    No local signs of infection. No probe to bone.  No drainage or malodor.  ++pain on exam  Pedal pulses non palp bilateral lower extremities  Sensation intact to b/l feet  No gross deformities

## 2019-02-06 NOTE — PROGRESS NOTE ADULT - ASSESSMENT
chest xray 1/28/19:  revealed multifocal airspace disease, may reflect aveolar component of edema.    a/p   79 y/o female, with a PmHx of COPD (on 2-3L O2 prn), paroxsymal a-fib (on pradaxa), CAD (s/p stent 11/2018), DCHF, HTN, HLD, and anxiety, presenting with acute/chronic diastolic chf, chest pain r/o acs, GIB, non healing pressure ulcer. Hospital course now complicated ruling in + RVP and CDiff / uti.     1. Atypical chest pain, resolved  in the setting of acute CHF exacerbation, afib w/ RVR   cv stable, no evidence of acute ischemia/ACS   Echo with nl lv fxn  continue statin, bb, plavix     2. Acute/chronic diastolic chf  on supplemental O2. denies SOB, volume status is stable   ct chest revealed decreased bl pleural effusion, mutiple small nodular opacities secondary to impacted distal airways likely infectious etiology? pna LLL, aspiration    + rvp , pulmo f/u   creat improved, continue low dose lasix   cont bb  echo with nl lv fxn    3. AFIB,  rate now controlled   dig on hold due to hx of pauses and bradycardia   continue with  midodrine 5 mg q 8 hrs for bp support   continue with lopressor to 50 mg BID  CHADS 3 - continue with pradaxa     4. CAD s/p PCI (11/2018)  cv stable, no evidence of acute ischemia   continue bb, statin, continue plavix given recent PCI      5. GIB   +GUIAC , +anemia s/p 1uprbc,   continue to trend cbc  GI f/u      6. non healing foot ulcer  med f/u  ID f/u , vascular studies noted, pod f/u  STONEY/PVR poor, 0.60 on the effected limb with flat waveforms.  -  Vasc sx consult noted, planning RLE angio once c. diff clears    7. IGOR/CKD   creat improved, continue low dose lasix   renal f/u     8.Cdiff   po abx, id f/u    7. hypoxia  - resolved   likely in the setting of resp infection ? aspiration   no decomp CHF on exam    + influenza s/p Tamiflu  ct chest revealed decreased bl pleural effusion, mutiple small nodular opacities secondary to impacted distal airways likely infectious etiology? pna LLL, aspiration    pulm f/u, s/p steroids   s/p speech and swallow eval       8. UTI    med f/u     dvt ppx

## 2019-02-06 NOTE — PROGRESS NOTE ADULT - ASSESSMENT
77 y/o female, with a PmHx of COPD (on 2-3L O2 prn), paroxsymal a-fib (on pradaxa), HTN, HLD, and anxiety, presenting to the [facility] ED with right foot pressure ulcer pain, SOB, and CP, found to have Afib @102 bpm, H/H 8/24.7, mild interstitial pulmonary edema, proBNP 1374, guiac positive, BUN/Cr 36/1.68, admitted to telemetry for nonhealing pressure ulcer, Afib, CHF exacerbation, r/o ACS.

## 2019-02-06 NOTE — PROGRESS NOTE ADULT - SUBJECTIVE AND OBJECTIVE BOX
CARDIOLOGY FOLLOW UP - Dr. Vieyra    CC no cp/sob       PHYSICAL EXAM:  T(C): 36.5 (02-06-19 @ 05:10), Max: 36.5 (02-06-19 @ 05:10)  HR: 89 (02-06-19 @ 10:19) (65 - 108)  BP: 137/74 (02-06-19 @ 05:10) (115/63 - 157/92)  RR: 18 (02-06-19 @ 05:10) (18 - 18)  SpO2: 95% (02-06-19 @ 10:19) (93% - 100%)  Wt(kg): --  I&O's Summary      Appearance: Normal	  Cardiovascular: Normal S1 S2,irreg, No JVD, No murmurs  Respiratory: Lungs clear to auscultation	  Gastrointestinal:  Soft, Non-tender, + BS	  Extremities: Normal range of motion, No clubbing, cyanosis or edema        MEDICATIONS  (STANDING):  atorvastatin 40 milliGRAM(s) Oral at bedtime  buDESOnide 160 MICROgram(s)/formoterol 4.5 MICROgram(s) Inhaler 2 Puff(s) Inhalation two times a day  clopidogrel Tablet 75 milliGRAM(s) Oral daily  collagenase Ointment 1 Application(s) Topical two times a day  dabigatran 75 milliGRAM(s) Oral every 12 hours  furosemide    Tablet 20 milliGRAM(s) Oral daily  levalbuterol Inhalation 0.63 milliGRAM(s) Inhalation every 6 hours  metoprolol tartrate 50 milliGRAM(s) Oral two times a day  midodrine 5 milliGRAM(s) Oral every 8 hours  nystatin Powder 1 Application(s) Topical every 12 hours  pantoprazole  Injectable 40 milliGRAM(s) IV Push two times a day  phenazopyridine 100 milliGRAM(s) Oral every 8 hours  thiamine 100 milliGRAM(s) Oral daily  vancomycin    Solution 125 milliGRAM(s) Oral every 6 hours      TELEMETRY: afib up to 130s 	    ECG:  	  RADIOLOGY:   DIAGNOSTIC TESTING:  [ ] Echocardiogram:  [ ]  Catheterization:  [ ] Stress Test:    OTHER: 	    LABS:	 	                                10.2   16.47 )-----------( 300      ( 06 Feb 2019 06:30 )             32.2     02-06    142  |  98  |  49<H>  ----------------------------<  87  4.7   |  32<H>  |  0.97    Ca    9.1      06 Feb 2019 06:30  Mg     2.1     02-06

## 2019-02-06 NOTE — PROGRESS NOTE ADULT - SUBJECTIVE AND OBJECTIVE BOX
c/o abdominal bloating and mildpain  no diarrhoea no fever no cough  ICU Vital Signs Last 24 Hrs  T(C): 37.1 (06 Feb 2019 12:30), Max: 37.1 (06 Feb 2019 12:30)  T(F): 98.8 (06 Feb 2019 12:30), Max: 98.8 (06 Feb 2019 12:30)  HR: 75 (06 Feb 2019 18:01) (65 - 108)  BP: 138/88 (06 Feb 2019 18:01) (108/63 - 157/92)  BP(mean): --  ABP: --  ABP(mean): --  RR: 18 (06 Feb 2019 18:01) (18 - 18)  SpO2: 96% (06 Feb 2019 18:01) (93% - 98%)  MEDICATIONS  (STANDING):  atorvastatin 40 milliGRAM(s) Oral at bedtime  buDESOnide 160 MICROgram(s)/formoterol 4.5 MICROgram(s) Inhaler 2 Puff(s) Inhalation two times a day  clopidogrel Tablet 75 milliGRAM(s) Oral daily  collagenase Ointment 1 Application(s) Topical two times a day  dabigatran 75 milliGRAM(s) Oral every 12 hours  furosemide    Tablet 20 milliGRAM(s) Oral daily  levalbuterol Inhalation 0.63 milliGRAM(s) Inhalation every 6 hours  metoprolol tartrate 50 milliGRAM(s) Oral two times a day  midodrine 5 milliGRAM(s) Oral every 8 hours  nystatin Powder 1 Application(s) Topical every 12 hours  pantoprazole  Injectable 40 milliGRAM(s) IV Push two times a day  thiamine 100 milliGRAM(s) Oral daily  vancomycin    Solution 125 milliGRAM(s) Oral every 6 hours

## 2019-02-07 LAB
ANION GAP SERPL CALC-SCNC: 11 MMO/L — SIGNIFICANT CHANGE UP (ref 7–14)
BASOPHILS # BLD AUTO: 0.01 K/UL — SIGNIFICANT CHANGE UP (ref 0–0.2)
BASOPHILS NFR BLD AUTO: 0.1 % — SIGNIFICANT CHANGE UP (ref 0–2)
BUN SERPL-MCNC: 51 MG/DL — HIGH (ref 7–23)
CALCIUM SERPL-MCNC: 9 MG/DL — SIGNIFICANT CHANGE UP (ref 8.4–10.5)
CHLORIDE SERPL-SCNC: 98 MMOL/L — SIGNIFICANT CHANGE UP (ref 98–107)
CO2 SERPL-SCNC: 32 MMOL/L — HIGH (ref 22–31)
CREAT SERPL-MCNC: 0.94 MG/DL — SIGNIFICANT CHANGE UP (ref 0.5–1.3)
EOSINOPHIL # BLD AUTO: 0.01 K/UL — SIGNIFICANT CHANGE UP (ref 0–0.5)
EOSINOPHIL NFR BLD AUTO: 0.1 % — SIGNIFICANT CHANGE UP (ref 0–6)
GLUCOSE SERPL-MCNC: 108 MG/DL — HIGH (ref 70–99)
HCT VFR BLD CALC: 33.4 % — LOW (ref 34.5–45)
HGB BLD-MCNC: 10.5 G/DL — LOW (ref 11.5–15.5)
IMM GRANULOCYTES NFR BLD AUTO: 0.8 % — SIGNIFICANT CHANGE UP (ref 0–1.5)
LYMPHOCYTES # BLD AUTO: 0.65 K/UL — LOW (ref 1–3.3)
LYMPHOCYTES # BLD AUTO: 4.6 % — LOW (ref 13–44)
MAGNESIUM SERPL-MCNC: 2.2 MG/DL — SIGNIFICANT CHANGE UP (ref 1.6–2.6)
MCHC RBC-ENTMCNC: 27.8 PG — SIGNIFICANT CHANGE UP (ref 27–34)
MCHC RBC-ENTMCNC: 31.4 % — LOW (ref 32–36)
MCV RBC AUTO: 88.4 FL — SIGNIFICANT CHANGE UP (ref 80–100)
MONOCYTES # BLD AUTO: 1.02 K/UL — HIGH (ref 0–0.9)
MONOCYTES NFR BLD AUTO: 7.3 % — SIGNIFICANT CHANGE UP (ref 2–14)
NEUTROPHILS # BLD AUTO: 12.21 K/UL — HIGH (ref 1.8–7.4)
NEUTROPHILS NFR BLD AUTO: 87.1 % — HIGH (ref 43–77)
NRBC # FLD: 0 K/UL — LOW (ref 25–125)
PLATELET # BLD AUTO: 308 K/UL — SIGNIFICANT CHANGE UP (ref 150–400)
PMV BLD: 10.6 FL — SIGNIFICANT CHANGE UP (ref 7–13)
POTASSIUM SERPL-MCNC: 5.1 MMOL/L — SIGNIFICANT CHANGE UP (ref 3.5–5.3)
POTASSIUM SERPL-SCNC: 5.1 MMOL/L — SIGNIFICANT CHANGE UP (ref 3.5–5.3)
RBC # BLD: 3.78 M/UL — LOW (ref 3.8–5.2)
RBC # FLD: 17.5 % — HIGH (ref 10.3–14.5)
SODIUM SERPL-SCNC: 141 MMOL/L — SIGNIFICANT CHANGE UP (ref 135–145)
WBC # BLD: 14.01 K/UL — HIGH (ref 3.8–10.5)
WBC # FLD AUTO: 14.01 K/UL — HIGH (ref 3.8–10.5)

## 2019-02-07 PROCEDURE — 99232 SBSQ HOSP IP/OBS MODERATE 35: CPT

## 2019-02-07 RX ORDER — VANCOMYCIN HCL 1 G
125 VIAL (EA) INTRAVENOUS EVERY 8 HOURS
Qty: 0 | Refills: 0 | Status: DISCONTINUED | OUTPATIENT
Start: 2019-02-07 | End: 2019-02-15

## 2019-02-07 RX ORDER — FUROSEMIDE 40 MG
40 TABLET ORAL DAILY
Qty: 0 | Refills: 0 | Status: DISCONTINUED | OUTPATIENT
Start: 2019-02-08 | End: 2019-02-11

## 2019-02-07 RX ADMIN — Medication 50 MILLIGRAM(S): at 06:12

## 2019-02-07 RX ADMIN — Medication 125 MILLIGRAM(S): at 00:00

## 2019-02-07 RX ADMIN — CLOPIDOGREL BISULFATE 75 MILLIGRAM(S): 75 TABLET, FILM COATED ORAL at 11:23

## 2019-02-07 RX ADMIN — TRAMADOL HYDROCHLORIDE 50 MILLIGRAM(S): 50 TABLET ORAL at 12:30

## 2019-02-07 RX ADMIN — LEVALBUTEROL 0.63 MILLIGRAM(S): 1.25 SOLUTION, CONCENTRATE RESPIRATORY (INHALATION) at 09:44

## 2019-02-07 RX ADMIN — Medication 50 MILLIGRAM(S): at 18:16

## 2019-02-07 RX ADMIN — BUDESONIDE AND FORMOTEROL FUMARATE DIHYDRATE 2 PUFF(S): 160; 4.5 AEROSOL RESPIRATORY (INHALATION) at 22:07

## 2019-02-07 RX ADMIN — MIDODRINE HYDROCHLORIDE 5 MILLIGRAM(S): 2.5 TABLET ORAL at 22:07

## 2019-02-07 RX ADMIN — NYSTATIN CREAM 1 APPLICATION(S): 100000 CREAM TOPICAL at 18:16

## 2019-02-07 RX ADMIN — PANTOPRAZOLE SODIUM 40 MILLIGRAM(S): 20 TABLET, DELAYED RELEASE ORAL at 06:12

## 2019-02-07 RX ADMIN — Medication 20 MILLIGRAM(S): at 06:11

## 2019-02-07 RX ADMIN — MIDODRINE HYDROCHLORIDE 5 MILLIGRAM(S): 2.5 TABLET ORAL at 06:12

## 2019-02-07 RX ADMIN — Medication 100 MILLIGRAM(S): at 11:23

## 2019-02-07 RX ADMIN — Medication 1 APPLICATION(S): at 06:11

## 2019-02-07 RX ADMIN — LEVALBUTEROL 0.63 MILLIGRAM(S): 1.25 SOLUTION, CONCENTRATE RESPIRATORY (INHALATION) at 22:58

## 2019-02-07 RX ADMIN — ATORVASTATIN CALCIUM 40 MILLIGRAM(S): 80 TABLET, FILM COATED ORAL at 22:07

## 2019-02-07 RX ADMIN — BUDESONIDE AND FORMOTEROL FUMARATE DIHYDRATE 2 PUFF(S): 160; 4.5 AEROSOL RESPIRATORY (INHALATION) at 08:41

## 2019-02-07 RX ADMIN — PANTOPRAZOLE SODIUM 40 MILLIGRAM(S): 20 TABLET, DELAYED RELEASE ORAL at 18:16

## 2019-02-07 RX ADMIN — Medication 125 MILLIGRAM(S): at 06:12

## 2019-02-07 RX ADMIN — Medication 125 MILLIGRAM(S): at 11:23

## 2019-02-07 RX ADMIN — Medication 0.5 MILLIGRAM(S): at 18:16

## 2019-02-07 RX ADMIN — DABIGATRAN ETEXILATE MESYLATE 75 MILLIGRAM(S): 150 CAPSULE ORAL at 18:15

## 2019-02-07 RX ADMIN — LEVALBUTEROL 0.63 MILLIGRAM(S): 1.25 SOLUTION, CONCENTRATE RESPIRATORY (INHALATION) at 17:07

## 2019-02-07 RX ADMIN — Medication 125 MILLIGRAM(S): at 20:21

## 2019-02-07 RX ADMIN — NYSTATIN CREAM 1 APPLICATION(S): 100000 CREAM TOPICAL at 06:11

## 2019-02-07 RX ADMIN — TRAMADOL HYDROCHLORIDE 50 MILLIGRAM(S): 50 TABLET ORAL at 11:23

## 2019-02-07 RX ADMIN — LEVALBUTEROL 0.63 MILLIGRAM(S): 1.25 SOLUTION, CONCENTRATE RESPIRATORY (INHALATION) at 03:46

## 2019-02-07 RX ADMIN — DABIGATRAN ETEXILATE MESYLATE 75 MILLIGRAM(S): 150 CAPSULE ORAL at 06:11

## 2019-02-07 RX ADMIN — Medication 1 APPLICATION(S): at 18:16

## 2019-02-07 RX ADMIN — Medication 0.5 MILLIGRAM(S): at 12:28

## 2019-02-07 NOTE — PROGRESS NOTE ADULT - CARDIOVASCULAR
detailed exam pcp,   [facility]  Phone: (   )    -  Fax: (   )    - pcp,   [facility]  Phone: (   )    -  Fax: (   )    -    Jaswant Fonseca), Urology  [address] [place] [address]  Phone: [phone]  Fax: [phone]    Crys Qureshi), Internal Medicine; Nephrology  [address] [place] [address]  Phone: [phone]  Fax: [phone]

## 2019-02-07 NOTE — PROGRESS NOTE ADULT - SUBJECTIVE AND OBJECTIVE BOX
HPI:  77 y/o female, with a PmHx of COPD (on 2-3L O2 prn), paroxsymal a-fib (on pradaxa), CAD (s/p stent 11/2018), CHF, HTN, HLD, and anxiety, presenting to the [facility] ED with right foot pressure ulcer pain x several weeks. The patient has a dime-sized ulcer with scant serosanguinous drainage on her right calcaneous that she acquired from a shoe weeks ago. She reports that she woke up last night around 4am with excruciating, unrelenting right root pain with SOB x a few minutes followed by chest pain x several hours. The patient reports that the ulcer pain began to gradually worsen since her last hospital visit, where she was d/c with vancomycin. The foot pain is sharp, 10/10, and radiates up to her calf. The patient took two puffs of her Symbicort for the SOB with no relief, but it resolved spontaneously within a few minutes. The chest pain began gradually and was described as a midsternal, 8/10, tightness with no radiation and accompanied by palpitations. Of note, the patient reports chronic cough, b/l LE edema, JACKSON with walking 1/2 block, orthopnea x1 pillow, occasional PND, melena x weeks, easy bruising/bleeding, and 10 lbs weight loss over the past 6 months with no change in appetite. The patient denies fever, chills, SOB at rest, diaphoresis, dizziness, claudication, wheezing, changes in vision and hearing, abdominal pain, change in bowel and urinary habits, dysuria, hematuria. (17 Jan 2019 12:50)    Patient is a 78y old  Female who presents with a chief complaint of right foot pressure ulcer pain (21 Jan 2019 09:09)    Allergies    No Known Allergies    Intolerances      Vital Signs Last 24 Hrs  T(C): 36.8 (21 Jan 2019 05:34), Max: 36.8 (21 Jan 2019 05:34)  T(F): 98.3 (21 Jan 2019 05:34), Max: 98.3 (21 Jan 2019 05:34)  HR: 79 (21 Jan 2019 05:34) (56 - 79)  BP: 102/53 (21 Jan 2019 05:34) (102/53 - 118/69)  BP(mean): --  RR: 18 (21 Jan 2019 05:34) (18 - 18)  SpO2: 100% (21 Jan 2019 05:34) (95% - 100%)                        9.9    9.80  )-----------( 221      ( 21 Jan 2019 00:40 )             31.4     01-20    133<L>  |  93<L>  |  36<H>  ----------------------------<  103<H>  4.3   |  26  |  1.87<H>    Ca    8.3<L>      20 Jan 2019 05:50  Mg     1.8     01-20      CAPILLARY BLOOD GLUCOSE        MEDICATIONS  (STANDING):  atorvastatin 40 milliGRAM(s) Oral at bedtime  buDESOnide  80 MICROgram(s)/formoterol 4.5 MICROgram(s) Inhaler 2 Puff(s) Inhalation two times a day  clopidogrel Tablet 75 milliGRAM(s) Oral daily  digoxin     Tablet 0.125 milliGRAM(s) Oral daily  furosemide    Tablet 40 milliGRAM(s) Oral daily  heparin  Infusion.  Unit(s)/Hr (11 mL/Hr) IV Continuous <Continuous>  metoprolol tartrate 12.5 milliGRAM(s) Oral every 12 hours  pantoprazole  Injectable 40 milliGRAM(s) IV Push two times a day  senna 2 Tablet(s) Oral at bedtime    MEDICATIONS  (PRN):  acetaminophen   Tablet .. 650 milliGRAM(s) Oral every 6 hours PRN Mild Pain (1 - 3), Moderate Pain (4 - 6), Severe Pain (7 - 10)  heparin  Injectable 4500 Unit(s) IV Push every 6 hours PRN For aPTT less than 40  heparin  Injectable 2000 Unit(s) IV Push every 6 hours PRN For aPTT between 40 - 57  LORazepam     Tablet 1 milliGRAM(s) Oral two times a day PRN Anxiety  traMADol 50 milliGRAM(s) Oral every 8 hours PRN Severe Pain (7 - 10)    PAST MEDICAL & SURGICAL HISTORY:  CAD (coronary artery disease): s/p stent 2018  CHF (congestive heart failure)  COPD (chronic obstructive pulmonary disease): on 2-3L O2 at home prn  Anxiety  TIA (transient ischemic attack): many years ago  PAF (paroxysmal atrial fibrillation)  HLD (hyperlipidemia)  HTN (hypertension)  H/O total hysterectomy: 2014  History of cataract surgery: b/l 2015  History of repair of hip fracture: luias placed in RLE 2015  H/O spinal fusion: c2-6 in 2017        MOISES:  Posterior heel ulceration, partial thickness granular base.    No local signs of infection. No probe to bone.  No drainage or malodor.  ++pain on exam  Pedal pulses non palp bilateral lower extremities  Sensation intact to b/l feet  No gross deformities

## 2019-02-07 NOTE — PROGRESS NOTE ADULT - SUBJECTIVE AND OBJECTIVE BOX
BEVERLEY DEL RIO:[id number],   79yFemale followed for:  No Known Allergies    PAST MEDICAL & SURGICAL HISTORY:  CAD (coronary artery disease): s/p stent 2018  CHF (congestive heart failure)  COPD (chronic obstructive pulmonary disease): on 2-3L O2 at home prn  Anxiety  TIA (transient ischemic attack): many years ago  PAF (paroxysmal atrial fibrillation)  HLD (hyperlipidemia)  HTN (hypertension)  H/O total hysterectomy: 2014  History of cataract surgery: b/l 2015  History of repair of hip fracture: luisa placed in RLE 2015  H/O spinal fusion: c2-6 in 2017    FAMILY HISTORY:  No pertinent family history in first degree relatives    MEDICATIONS  (STANDING):  atorvastatin 40 milliGRAM(s) Oral at bedtime  buDESOnide 160 MICROgram(s)/formoterol 4.5 MICROgram(s) Inhaler 2 Puff(s) Inhalation two times a day  clopidogrel Tablet 75 milliGRAM(s) Oral daily  collagenase Ointment 1 Application(s) Topical two times a day  dabigatran 75 milliGRAM(s) Oral every 12 hours  levalbuterol Inhalation 0.63 milliGRAM(s) Inhalation every 6 hours  LORazepam     Tablet 0.5 milliGRAM(s) Oral once  metoprolol tartrate 50 milliGRAM(s) Oral two times a day  midodrine 5 milliGRAM(s) Oral every 8 hours  nystatin Powder 1 Application(s) Topical every 12 hours  pantoprazole  Injectable 40 milliGRAM(s) IV Push two times a day  thiamine 100 milliGRAM(s) Oral daily  vancomycin    Solution 125 milliGRAM(s) Oral every 6 hours    MEDICATIONS  (PRN):  acetaminophen   Tablet .. 650 milliGRAM(s) Oral every 6 hours PRN Temp greater or equal to 38C (100.4F), Mild Pain (1 - 3), Moderate Pain (4 - 6)  traMADol 50 milliGRAM(s) Oral every 8 hours PRN Severe Pain (7 - 10)      Vital Signs Last 24 Hrs  T(C): 36.5 (07 Feb 2019 06:09), Max: 37.1 (06 Feb 2019 12:30)  T(F): 97.7 (07 Feb 2019 06:09), Max: 98.8 (06 Feb 2019 12:30)  HR: 72 (07 Feb 2019 09:44) (64 - 88)  BP: 122/83 (07 Feb 2019 06:09) (108/63 - 138/88)  BP(mean): --  RR: 18 (07 Feb 2019 06:09) (18 - 18)  SpO2: 98% (07 Feb 2019 09:44) (95% - 98%)  nc/at  s1s2  cta  soft, nt, nd no guarding or rebound  no c/c/e    CBC Full  -  ( 07 Feb 2019 03:40 )  WBC Count : 14.01 K/uL  Hemoglobin : 10.5 g/dL  Hematocrit : 33.4 %  Platelet Count - Automated : 308 K/uL  Mean Cell Volume : 88.4 fL  Mean Cell Hemoglobin : 27.8 pg  Mean Cell Hemoglobin Concentration : 31.4 %  Auto Neutrophil # : 12.21 K/uL  Auto Lymphocyte # : 0.65 K/uL  Auto Monocyte # : 1.02 K/uL  Auto Eosinophil # : 0.01 K/uL  Auto Basophil # : 0.01 K/uL  Auto Neutrophil % : 87.1 %  Auto Lymphocyte % : 4.6 %  Auto Monocyte % : 7.3 %  Auto Eosinophil % : 0.1 %  Auto Basophil % : 0.1 %    02-07    141  |  98  |  51<H>  ----------------------------<  108<H>  5.1   |  32<H>  |  0.94    Ca    9.0      07 Feb 2019 03:40  Mg     2.2     02-07

## 2019-02-07 NOTE — PROGRESS NOTE ADULT - SUBJECTIVE AND OBJECTIVE BOX
CC: F/U for C diff    Saw/spoke to patient. No fevers, no chills. Soft stool. Otherwise no new complaints/events.    Allergies  No Known Allergies    ANTIMICROBIALS:  vancomycin    Solution 125 every 6 hours    PE:    Vital Signs Last 24 Hrs  T(C): 36.5 (07 Feb 2019 06:09), Max: 36.5 (07 Feb 2019 06:09)  T(F): 97.7 (07 Feb 2019 06:09), Max: 97.7 (07 Feb 2019 06:09)  HR: 72 (07 Feb 2019 09:44) (64 - 88)  BP: 122/83 (07 Feb 2019 06:09) (118/66 - 138/88)  RR: 18 (07 Feb 2019 06:09) (18 - 18)  SpO2: 98% (07 Feb 2019 09:44) (95% - 98%)    Gen: AOx3, NAD, non-toxic, pleasant  CV: S1+S2 normal, nontachycardic  Resp: Clear bilat, no resp distress, no crackles/wheezes  Abd: Soft, nontender, +BS  Ext: No LE edema, no wounds    LABS:                        10.5   14.01 )-----------( 308      ( 07 Feb 2019 03:40 )             33.4     02-07    141  |  98  |  51<H>  ----------------------------<  108<H>  5.1   |  32<H>  |  0.94    Ca    9.0      07 Feb 2019 03:40  Mg     2.2     02-07    MICROBIOLOGY:    URINE CATHETER  01-27-19 --  --  Pseudomonas aeruginosa    BLOOD PERIPHERAL  01-26-19 NGTD    BLOOD PERIPHERAL  01-25-19 NGTD    RADIOLOGY:    2/6 CT:    FINDINGS:    LOWER CHEST: Moderate right and small left loculated pleural effusions   with adjacent atelectasis, right greater than left. Cardiomegaly.   Coronary calcifications.    LIVER: Small caudate lobe hepatic cyst. Multiple hypodense liver lesions,   too small to characterize.  BILE DUCTS: Normal caliber.  GALLBLADDER: Within normal limits.  SPLEEN: Within normal limits.  PANCREAS: Within normal limits.  ADRENALS: Within normal limits.  KIDNEYS/URETERS: Within normal limits.    BLADDER: Within normal limits.  REPRODUCTIVE ORGANS: Status post hysterectomy. No adnexal masses.    BOWEL: No bowel obstruction. Interval resolution of previously noted   pancolitis. Stool-filled colon and cecum. Borderline dilated appendix   which is decreased in caliber compared to prior examination. No evidence   of acute appendicitis.  PERITONEUM: No ascites.  VESSELS:  Atherosclerosis. 3.1 cm infrarenal abdominal aortic aneurysm.  RETROPERITONEUM: No lymphadenopathy.    ABDOMINAL WALL: Mild anasarca.  BONES: Degenerative changes of thoracic and lumbar spine. Chronic   compression fracture of the L1 vertebral body. Status post intramedullary   nail fixation of the right femur with adjacent heterotopic ossification.    IMPRESSION:   Interval resolution of previously seen pancolitis.

## 2019-02-07 NOTE — PROGRESS NOTE ADULT - SUBJECTIVE AND OBJECTIVE BOX
[facility] NEPHROLOGY- PROGRESS NOTE    78y Female with history of CHF, COPD presents with SOB found to have guaiac positive anemia and afib with RVR. Nephrology consulted for elevated Scr.    REVIEW OF SYSTEMS:  Gen: no changes in weight  Cards: no chest pain  Resp: + dyspnea with cough  GI: no nausea or vomiting, + diarrhea improving  Vascular: no LE edema +R foot pain    No Known Allergies      Hospital Medications: Medications reviewed      VITALS:  T(F): 97.7 (02-07-19 @ 06:09), Max: 98.8 (02-06-19 @ 12:30)  HR: 72 (02-07-19 @ 09:44)  BP: 122/83 (02-07-19 @ 06:09)  RR: 18 (02-07-19 @ 06:09)  SpO2: 98% (02-07-19 @ 09:44)  Wt(kg): --    02-06 @ 07:01  -  02-07 @ 07:00  --------------------------------------------------------  IN: 75 mL / OUT: 200 mL / NET: -125 mL      PHYSICAL EXAM:    Gen: NAD, + lethargy today  Cards: Irregularly irregular, +S1/S2, no M/G/R  Resp: course BS, bibasilar rales  GI: soft, NT/ND, NABS  Vascular: no LE edema B/L        LABS:  02-07    141  |  98  |  51<H>  ----------------------------<  108<H>  5.1   |  32<H>  |  0.94    Ca    9.0      07 Feb 2019 03:40  Mg     2.2     02-07      Creatinine Trend: 0.94 <--, 0.97 <--, 1.04 <--, 1.11 <--, 1.04 <--, 1.15 <--, 1.23 <--                        10.5   14.01 )-----------( 308      ( 07 Feb 2019 03:40 )             33.4     Urine Studies:        < from: CT Abdomen and Pelvis No Cont (02.06.19 @ 20:22) >  INTERPRETATION:  Moderate loculated right and small left pleural   effusions with adjacent atelectasis.  Anemia.  Interval resolution of previously seen pancolitis.  Diffuse moderate colonic distention with stool.  Stool-filled borderline distended appendix measuring up to 1.0 cm without   significant adjacent inflammation.  Chronic vertebral compression fracture deformities.  Please followup official report in AM.    < end of copied text >        < from: Xray Chest 1 View- PORTABLE-Routine (02.06.19 @ 14:48) >  IMPRESSION:     The cardiomediastinal silhouette is not well evaluated in this   projection. Loculated small right pleural effusion. 7 increase in size   since the prior study. Small left pleural effusion. There is no   pneumothorax. Bilateral opacities are unchanged compared to prior   examination on 1/28/2019 compatible with pulmonary edema. There are   degenerative changes of the visualized thoracic spine. Aortic   calcifications.    < end of copied text >

## 2019-02-07 NOTE — PROGRESS NOTE ADULT - SUBJECTIVE AND OBJECTIVE BOX
no new issues no cp no sob  ICU Vital Signs Last 24 Hrs  T(C): 36.5 (07 Feb 2019 14:08), Max: 36.5 (07 Feb 2019 06:09)  T(F): 97.7 (07 Feb 2019 14:08), Max: 97.7 (07 Feb 2019 06:09)  HR: 64 (07 Feb 2019 18:15) (64 - 85)  BP: 118/67 (07 Feb 2019 18:15) (118/66 - 122/83)  BP(mean): --  ABP: --  ABP(mean): --  RR: 16 (07 Feb 2019 14:08) (16 - 18)  SpO2: 95% (07 Feb 2019 14:08) (95% - 98%)                        10.5   14.01 )-----------( 308      ( 07 Feb 2019 03:40 )             33.4   02-07    141  |  98  |  51<H>  ----------------------------<  108<H>  5.1   |  32<H>  |  0.94    Ca    9.0      07 Feb 2019 03:40  Mg     2.2     02-07    LOWER CHEST: Moderate right and small left loculated pleural effusions   with adjacent atelectasis, right greater than left. Cardiomegaly.   Coronary calcifications.    LIVER: Small caudate lobe hepatic cyst. Multiple hypodense liver lesions,   too small to characterize.  BILE DUCTS: Normal caliber.  GALLBLADDER: Within normal limits.  SPLEEN: Within normal limits.  PANCREAS: Within normal limits.  ADRENALS: Within normal limits.  KIDNEYS/URETERS: Within normal limits.    BLADDER: Within normal limits.  REPRODUCTIVE ORGANS: Status post hysterectomy. No adnexal masses.    BOWEL: No bowel obstruction. Interval resolution of previously noted   pancolitis. Stool-filled colon and cecum. Borderline dilated appendix   which is decreased in caliber compared to prior examination. No evidence   of acute appendicitis.  PERITONEUM: No ascites.  ct abdomen  VESSELS:  Atherosclerosis. 3.1 cm infrarenal abdominal aortic aneurysm.  Partially loculated small bilateral pleural effusions appear decreased in   size from prior exam. Mild interlobular septal thickeningsecondary to   mild pulmonary edema also improved.    Multiple small nodular opacities or tree-in-bud opacities noted within   the bilateral upper lobes secondary to  impacted distal airways, many of   which are new since the January 24, 2019 likely of infectious etiology.    Debris noted within the trachea and left lower lobar bronchi new since   the prior study with associated left lower lobe patchy opacity with mild   interval increase in size representing atelectasis and/or pneumonia. The   constellation of findings are suggestive of aspiration.

## 2019-02-07 NOTE — PROGRESS NOTE ADULT - SUBJECTIVE AND OBJECTIVE BOX
Patient is a 79y old  Female who presents with a chief complaint of right foot pressure ulcer pain (07 Feb 2019 11:54)      Any change in ROS: She is pretty anxious!  breathing wise she  is doing much better: She has not been wheezing now:     MEDICATIONS  (STANDING):  atorvastatin 40 milliGRAM(s) Oral at bedtime  buDESOnide 160 MICROgram(s)/formoterol 4.5 MICROgram(s) Inhaler 2 Puff(s) Inhalation two times a day  clopidogrel Tablet 75 milliGRAM(s) Oral daily  collagenase Ointment 1 Application(s) Topical two times a day  dabigatran 75 milliGRAM(s) Oral every 12 hours  levalbuterol Inhalation 0.63 milliGRAM(s) Inhalation every 6 hours  metoprolol tartrate 50 milliGRAM(s) Oral two times a day  midodrine 5 milliGRAM(s) Oral every 8 hours  nystatin Powder 1 Application(s) Topical every 12 hours  pantoprazole  Injectable 40 milliGRAM(s) IV Push two times a day  thiamine 100 milliGRAM(s) Oral daily  vancomycin    Solution 125 milliGRAM(s) Oral every 8 hours    MEDICATIONS  (PRN):  acetaminophen   Tablet .. 650 milliGRAM(s) Oral every 6 hours PRN Temp greater or equal to 38C (100.4F), Mild Pain (1 - 3), Moderate Pain (4 - 6)  traMADol 50 milliGRAM(s) Oral every 8 hours PRN Severe Pain (7 - 10)    Vital Signs Last 24 Hrs  T(C): 36.5 (07 Feb 2019 06:09), Max: 36.5 (07 Feb 2019 06:09)  T(F): 97.7 (07 Feb 2019 06:09), Max: 97.7 (07 Feb 2019 06:09)  HR: 72 (07 Feb 2019 09:44) (64 - 88)  BP: 122/83 (07 Feb 2019 06:09) (118/66 - 138/88)  BP(mean): --  RR: 18 (07 Feb 2019 06:09) (18 - 18)  SpO2: 98% (07 Feb 2019 09:44) (95% - 98%)    I&O's Summary    06 Feb 2019 07:01  -  07 Feb 2019 07:00  --------------------------------------------------------  IN: 75 mL / OUT: 200 mL / NET: -125 mL          Physical Exam:   GENERAL: NAD, well-groomed, well-developed  HEENT: COLEEN/   Atraumatic, Normocephalic  ENMT: No tonsillar erythema, exudates, or enlargement; Moist mucous membranes, Good dentition, No lesions  NECK: Supple, No JVD, Normal thyroid  CHEST/LUNG: Clear to auscultaion  CVS: Regular rate and rhythm; No murmurs, rubs, or gallops  GI: : Soft, Nontender, Nondistended; Bowel sounds present  NERVOUS SYSTEM:  Alert & Oriented X3  EXTREMITIES:  trace   edema  LYMPH: No lymphadenopathy noted  SKIN: No rashes or lesions  ENDOCRINOLOGY: No Thyromegaly  PSYCH: Anxious!!  Labs:                              10.5   14.01 )-----------( 308      ( 07 Feb 2019 03:40 )             33.4                         10.2   16.47 )-----------( 300      ( 06 Feb 2019 06:30 )             32.2                         10.0   16.07 )-----------( 316      ( 05 Feb 2019 08:39 )             32.0                         10.4   13.32 )-----------( 292      ( 04 Feb 2019 05:10 )             34.4     02-07    141  |  98  |  51<H>  ----------------------------<  108<H>  5.1   |  32<H>  |  0.94  02-06    142  |  98  |  49<H>  ----------------------------<  87  4.7   |  32<H>  |  0.97  02-05    138  |  98  |  51<H>  ----------------------------<  75  4.8   |  31  |  1.04  02-04    140  |  100  |  54<H>  ----------------------------<  87  4.5   |  31  |  1.11    Ca    9.0      07 Feb 2019 03:40  Ca    9.1      06 Feb 2019 06:30  Mg     2.2     02-07  Mg     2.1     02-06      CAPILLARY BLOOD GLUCOSE      < from: CT Abdomen and Pelvis No Cont (02.06.19 @ 20:22) >  LIVER: Small caudate lobe hepatic cyst. Multiple hypodense liver lesions,   too small to characterize.  BILE DUCTS: Normal caliber.  GALLBLADDER: Within normal limits.  SPLEEN: Within normal limits.  PANCREAS: Within normal limits.  ADRENALS: Within normal limits.  KIDNEYS/URETERS: Within normal limits.    BLADDER: Within normal limits.  REPRODUCTIVE ORGANS: Status post hysterectomy. No adnexal masses.    BOWEL: No bowel obstruction. Interval resolution of previously noted   pancolitis. Stool-filled colon and cecum. Borderline dilated appendix   which is decreased in caliber compared to prior examination. No evidence   of acute appendicitis.  PERITONEUM: No ascites.  VESSELS:  Atherosclerosis. 3.1 cm infrarenal abdominal aortic aneurysm.  RETROPERITONEUM: No lymphadenopathy.    ABDOMINAL WALL: Mild anasarca.  BONES: Degenerative changes of thoracic and lumbar spine. Chronic   compression fracture of the L1 vertebral body. Status post intramedullary   nail fixation of the right femur with adjacent heterotopic ossification.    IMPRESSION:   Interval resolution of previously seen pancolitis.      < from: CT Abdomen and Pelvis No Cont (02.06.19 @ 20:22) >  FINDINGS:    LOWER CHEST: Moderate right and small left loculated pleural effusions   with adjacent atelectasis, right greater than left. Cardiomegaly.   Coronary calcifications.    < end of copied text >              MOHAMUD SU M.D., RADIOLOGY RESIDENT  This document has been electronically signed.  EKTA KINCAID M.D., ATTENDING RADIOLOGIST  This document has been electronically signed. Feb 7 2019 12:01PM    < end of copied text >                  RECENT CULTURES:        RESPIRATORY CULTURES:          Studies  Chest X-RAY  CT SCAN Chest   Venous Dopplers: LE:   CT Abdomen  Others

## 2019-02-07 NOTE — PROGRESS NOTE ADULT - ASSESSMENT
chest xray 1/28/19:  revealed multifocal airspace disease, may reflect aveolar component of edema.    a/p   79 y/o female, with a PmHx of COPD (on 2-3L O2 prn), paroxsymal a-fib (on pradaxa), CAD (s/p stent 11/2018), DCHF, HTN, HLD, and anxiety, presenting with acute/chronic diastolic chf, chest pain r/o acs, GIB, non healing pressure ulcer. Hospital course now complicated ruling in + RVP and CDiff / uti.     1. Atypical chest pain, resolved  in the setting of acute CHF exacerbation, afib w/ RVR   cv stable, no evidence of acute ischemia/ACS   Echo with nl lv fxn  continue statin, bb, plavix     2. Acute/chronic diastolic chf  on supplemental O2. denies SOB, volume status is stable   ct chest revealed decreased bl pleural effusion, mutiple small nodular opacities secondary to impacted distal airways likely infectious etiology? pna LLL, aspiration    + rvp  repeat cxr with unchanged b/l pulm edema - increase lasix to 40mg daily   cont bb  pulm f/u   echo with nl lv fxn    3. AFIB,  rate now controlled   dig on hold due to hx of pauses and bradycardia   continue with  midodrine 5 mg q 8 hrs for bp support   continue with lopressor to 50 mg BID  CHADS 3 - continue with pradaxa     4. CAD s/p PCI (11/2018)  cv stable, no evidence of acute ischemia   continue bb, statin, continue plavix given recent PCI      5. GIB   +GUIAC , +anemia s/p 1uprbc,   continue to trend cbc  GI f/u      6. non healing foot ulcer  med f/u  ID f/u , vascular studies noted, pod f/u  STONEY/PVR poor, 0.60 on the effected limb with flat waveforms.  -  Vasc sx consult noted, planning RLE angio once c. diff clears    7. IGOR/CKD   creat improved, continue low dose lasix   renal f/u     8.Cdiff   po abx, id f/u    7. hypoxia    likely in the setting of resp infection ? aspiration   no decomp CHF on exam    + influenza s/p Tamiflu  ct chest revealed decreased bl pleural effusion, mutiple small nodular opacities secondary to impacted distal airways likely infectious etiology? pna LLL, aspiration    pulm f/u, s/p steroids   s/p speech and swallow eval       8. UTI    med f/u     dvt ppx

## 2019-02-07 NOTE — PROGRESS NOTE ADULT - ASSESSMENT
79 y/o female, with a PmHx of COPD (on 2-3L O2 prn), paroxsymal a-fib (on pradaxa), HTN, HLD, and anxiety, presenting to the [facility] ED with right foot pressure ulcer pain, SOB, and CP, found to have Afib @102 bpm, H/H 8/24.7, mild interstitial pulmonary edema, proBNP 1374, guiac positive, BUN/Cr 36/1.68, admitted to telemetry for nonhealing pressure ulcer, Afib, CHF exacerbation, r/o ACS.

## 2019-02-07 NOTE — PROGRESS NOTE ADULT - ASSESSMENT
79 F with HTN, HLD, CAD s/p stent, CHF, paroxysmal A-fib COPD (on 2-3L O2 prn),recent admission 1/5/19 - 1/14/19 for C-diff after a recent augmentin course for cellulitis, discharged with PO vanco (End Date: 1/18/19), presented 1/17/19 with right foot pressure ulcer pain x several weeks.  Low suspicion for infected heel ulcer clinically  UCX with Pseudomonas, patient does not complain of any urinary symptoms  C diff positive  RVP with Flu  CT with ? aspiration--however resp status improving off abx  No diarrhea presently, CT reassuring, no further pancolitis  Note rising WBC, likely 2/2 prednisone (started 2/1)--continue to trend  Overall, Flu, C diff, fever, positive culture finding  - c/w PO vanco 125 tid for a week (started 2/7)                  then 125 bid for a week                  then 125 qd for a week                  then 125 q 48 h for a week                  then 125 every third day for a week  - if fever or worsening status repeat cultures and start zosyn for consideration aspiration pneumonia  - Aspiration precautions    Benjamin Dolan MD  Pager [phone]  After 5pm and on weekends call [phone]

## 2019-02-07 NOTE — PROGRESS NOTE ADULT - ASSESSMENT
79 h/o female admitted for rt postrior ankle pressure ulcer    vascular following needs angiogram  need nephrology clearence  she needs to complete abx for c diff  local wound care    Influenza positive   completed  tamiflu for 5 days    CDIFF POSITIVE  continue vancomycin taper as per ID  wbc count elevated could be due to steroids unlikely aspiration   ct chest /abdomen done    chronic afib with rapid ventricular response  cardiology following    abdominal pain /leucocytosis   could be due to prednisone    CT abdomen as recomended by pulmonary    CKD   monitor cmp  monitor BUN/CREATININE  appreciate nephrology f/u  discharge planning

## 2019-02-07 NOTE — PROGRESS NOTE ADULT - SUBJECTIVE AND OBJECTIVE BOX
CARDIOLOGY FOLLOW UP - Dr. Vieyra    CC no cp/sob       PHYSICAL EXAM:  T(C): 36.5 (02-07-19 @ 06:09), Max: 37.1 (02-06-19 @ 12:30)  HR: 72 (02-07-19 @ 09:44) (64 - 88)  BP: 122/83 (02-07-19 @ 06:09) (108/63 - 138/88)  RR: 18 (02-07-19 @ 06:09) (18 - 18)  SpO2: 98% (02-07-19 @ 09:44) (95% - 98%)  Wt(kg): --  I&O's Summary    06 Feb 2019 07:01  -  07 Feb 2019 07:00  --------------------------------------------------------  IN: 75 mL / OUT: 200 mL / NET: -125 mL        Appearance: Normal	  Cardiovascular: Normal S1 S2,RRR, No JVD, No murmurs  Respiratory: +rhonchi   Gastrointestinal:  Soft, Non-tender, + BS	  Extremities: Normal range of motion, No clubbing, cyanosis or edema        MEDICATIONS  (STANDING):  atorvastatin 40 milliGRAM(s) Oral at bedtime  buDESOnide 160 MICROgram(s)/formoterol 4.5 MICROgram(s) Inhaler 2 Puff(s) Inhalation two times a day  clopidogrel Tablet 75 milliGRAM(s) Oral daily  collagenase Ointment 1 Application(s) Topical two times a day  dabigatran 75 milliGRAM(s) Oral every 12 hours  levalbuterol Inhalation 0.63 milliGRAM(s) Inhalation every 6 hours  metoprolol tartrate 50 milliGRAM(s) Oral two times a day  midodrine 5 milliGRAM(s) Oral every 8 hours  nystatin Powder 1 Application(s) Topical every 12 hours  pantoprazole  Injectable 40 milliGRAM(s) IV Push two times a day  thiamine 100 milliGRAM(s) Oral daily  vancomycin    Solution 125 milliGRAM(s) Oral every 6 hours      TELEMETRY: AFIB overall rate controlled 	    ECG:  	  RADIOLOGY:   DIAGNOSTIC TESTING:  [ ] Echocardiogram:  [ ]  Catheterization:  [ ] Stress Test:    OTHER: 	  < from: Xray Chest 1 View- PORTABLE-Routine (02.06.19 @ 14:48) >  IMPRESSION:     The cardiomediastinal silhouette is not well evaluated in this   projection. Loculated small right pleural effusion. 7 increase in size   since the prior study. Small left pleural effusion. There is no   pneumothorax. Bilateral opacities are unchanged compared to prior   examination on 1/28/2019 compatible with pulmonary edema. There are   degenerative changes of the visualized thoracic spine. Aortic   calcifications.    < end of copied text >    LABS:	 	                                10.5   14.01 )-----------( 308      ( 07 Feb 2019 03:40 )             33.4     02-07    141  |  98  |  51<H>  ----------------------------<  108<H>  5.1   |  32<H>  |  0.94    Ca    9.0      07 Feb 2019 03:40  Mg     2.2     02-07

## 2019-02-08 PROCEDURE — 99232 SBSQ HOSP IP/OBS MODERATE 35: CPT

## 2019-02-08 RX ADMIN — MIDODRINE HYDROCHLORIDE 5 MILLIGRAM(S): 2.5 TABLET ORAL at 13:20

## 2019-02-08 RX ADMIN — Medication 50 MILLIGRAM(S): at 05:40

## 2019-02-08 RX ADMIN — MIDODRINE HYDROCHLORIDE 5 MILLIGRAM(S): 2.5 TABLET ORAL at 05:39

## 2019-02-08 RX ADMIN — Medication 1 APPLICATION(S): at 05:38

## 2019-02-08 RX ADMIN — PANTOPRAZOLE SODIUM 40 MILLIGRAM(S): 20 TABLET, DELAYED RELEASE ORAL at 05:39

## 2019-02-08 RX ADMIN — CLOPIDOGREL BISULFATE 75 MILLIGRAM(S): 75 TABLET, FILM COATED ORAL at 13:20

## 2019-02-08 RX ADMIN — Medication 125 MILLIGRAM(S): at 22:33

## 2019-02-08 RX ADMIN — DABIGATRAN ETEXILATE MESYLATE 75 MILLIGRAM(S): 150 CAPSULE ORAL at 05:39

## 2019-02-08 RX ADMIN — TRAMADOL HYDROCHLORIDE 50 MILLIGRAM(S): 50 TABLET ORAL at 10:34

## 2019-02-08 RX ADMIN — Medication 40 MILLIGRAM(S): at 05:43

## 2019-02-08 RX ADMIN — Medication 125 MILLIGRAM(S): at 05:38

## 2019-02-08 RX ADMIN — TRAMADOL HYDROCHLORIDE 50 MILLIGRAM(S): 50 TABLET ORAL at 11:10

## 2019-02-08 RX ADMIN — Medication 125 MILLIGRAM(S): at 13:20

## 2019-02-08 RX ADMIN — Medication 650 MILLIGRAM(S): at 14:26

## 2019-02-08 RX ADMIN — BUDESONIDE AND FORMOTEROL FUMARATE DIHYDRATE 2 PUFF(S): 160; 4.5 AEROSOL RESPIRATORY (INHALATION) at 10:35

## 2019-02-08 RX ADMIN — PANTOPRAZOLE SODIUM 40 MILLIGRAM(S): 20 TABLET, DELAYED RELEASE ORAL at 18:21

## 2019-02-08 RX ADMIN — Medication 1 APPLICATION(S): at 18:20

## 2019-02-08 RX ADMIN — Medication 50 MILLIGRAM(S): at 18:21

## 2019-02-08 RX ADMIN — DABIGATRAN ETEXILATE MESYLATE 75 MILLIGRAM(S): 150 CAPSULE ORAL at 18:20

## 2019-02-08 RX ADMIN — LEVALBUTEROL 0.63 MILLIGRAM(S): 1.25 SOLUTION, CONCENTRATE RESPIRATORY (INHALATION) at 03:58

## 2019-02-08 RX ADMIN — Medication 0.5 MILLIGRAM(S): at 14:26

## 2019-02-08 RX ADMIN — NYSTATIN CREAM 1 APPLICATION(S): 100000 CREAM TOPICAL at 18:22

## 2019-02-08 RX ADMIN — LEVALBUTEROL 0.63 MILLIGRAM(S): 1.25 SOLUTION, CONCENTRATE RESPIRATORY (INHALATION) at 22:45

## 2019-02-08 RX ADMIN — NYSTATIN CREAM 1 APPLICATION(S): 100000 CREAM TOPICAL at 05:40

## 2019-02-08 RX ADMIN — LEVALBUTEROL 0.63 MILLIGRAM(S): 1.25 SOLUTION, CONCENTRATE RESPIRATORY (INHALATION) at 09:07

## 2019-02-08 RX ADMIN — Medication 100 MILLIGRAM(S): at 13:20

## 2019-02-08 RX ADMIN — LEVALBUTEROL 0.63 MILLIGRAM(S): 1.25 SOLUTION, CONCENTRATE RESPIRATORY (INHALATION) at 15:25

## 2019-02-08 RX ADMIN — MIDODRINE HYDROCHLORIDE 5 MILLIGRAM(S): 2.5 TABLET ORAL at 22:33

## 2019-02-08 RX ADMIN — BUDESONIDE AND FORMOTEROL FUMARATE DIHYDRATE 2 PUFF(S): 160; 4.5 AEROSOL RESPIRATORY (INHALATION) at 22:33

## 2019-02-08 RX ADMIN — ATORVASTATIN CALCIUM 40 MILLIGRAM(S): 80 TABLET, FILM COATED ORAL at 22:33

## 2019-02-08 NOTE — PROGRESS NOTE ADULT - SUBJECTIVE AND OBJECTIVE BOX
[facility] NEPHROLOGY- PROGRESS NOTE    78y Female with history of CHF, COPD presents with SOB found to have guaiac positive anemia and afib with RVR. Nephrology consulted for elevated Scr.    REVIEW OF SYSTEMS:  Gen: no changes in weight  Cards: no chest pain  Resp: + dyspnea with cough  GI: no nausea or vomiting, + diarrhea resolved  Vascular: no LE edema +R foot pain    No Known Allergies      Hospital Medications: Medications reviewed      VITALS:  T(F): 97.2 (02-08-19 @ 10:39), Max: 97.9 (02-07-19 @ 22:05)  HR: 86 (02-08-19 @ 10:39)  BP: 98/53 (02-08-19 @ 10:39)  RR: 16 (02-08-19 @ 10:39)  SpO2: 95% (02-08-19 @ 10:39)  Wt(kg): --    02-07 @ 07:01  -  02-08 @ 07:00  --------------------------------------------------------  IN: 360 mL / OUT: 0 mL / NET: 360 mL          PHYSICAL EXAM:    Gen: NAD, + lethargy today  Cards: Irregularly irregular, +S1/S2, no M/G/R  Resp: course BS, bibasilar rales  GI: soft, NT/ND, NABS  Vascular: no LE edema B/L        LABS:  02-07    141  |  98  |  51<H>  ----------------------------<  108<H>  5.1   |  32<H>  |  0.94    Ca    9.0      07 Feb 2019 03:40  Mg     2.2     02-07      Creatinine Trend: 0.94 <--, 0.97 <--, 1.04 <--, 1.11 <--, 1.04 <--, 1.15 <--                        10.5   14.01 )-----------( 308      ( 07 Feb 2019 03:40 )             33.4     Urine Studies:

## 2019-02-08 NOTE — PROGRESS NOTE ADULT - SUBJECTIVE AND OBJECTIVE BOX
CARDIOLOGY FOLLOW UP - Dr. Vieyra    CC c/o cough       PHYSICAL EXAM:  T(C): 36.3 (02-08-19 @ 05:35), Max: 36.6 (02-07-19 @ 22:05)  HR: 88 (02-08-19 @ 09:07) (64 - 98)  BP: 135/89 (02-08-19 @ 05:35) (117/65 - 135/89)  RR: 18 (02-08-19 @ 05:35) (16 - 18)  SpO2: 98% (02-08-19 @ 05:35) (95% - 98%)  Wt(kg): --  I&O's Summary    07 Feb 2019 07:01  -  08 Feb 2019 07:00  --------------------------------------------------------  IN: 360 mL / OUT: 0 mL / NET: 360 mL        Appearance: Normal	  Cardiovascular: Normal S1 S2,RRR, No JVD, No murmurs  Respiratory: +rhonchi   Gastrointestinal:  Soft, Non-tender, + BS	  Extremities: Normal range of motion, No clubbing, cyanosis or edema        MEDICATIONS  (STANDING):  atorvastatin 40 milliGRAM(s) Oral at bedtime  buDESOnide 160 MICROgram(s)/formoterol 4.5 MICROgram(s) Inhaler 2 Puff(s) Inhalation two times a day  clopidogrel Tablet 75 milliGRAM(s) Oral daily  collagenase Ointment 1 Application(s) Topical two times a day  dabigatran 75 milliGRAM(s) Oral every 12 hours  furosemide    Tablet 40 milliGRAM(s) Oral daily  levalbuterol Inhalation 0.63 milliGRAM(s) Inhalation every 6 hours  metoprolol tartrate 50 milliGRAM(s) Oral two times a day  midodrine 5 milliGRAM(s) Oral every 8 hours  nystatin Powder 1 Application(s) Topical every 12 hours  pantoprazole  Injectable 40 milliGRAM(s) IV Push two times a day  thiamine 100 milliGRAM(s) Oral daily  vancomycin    Solution 125 milliGRAM(s) Oral every 8 hours      TELEMETRY: AFIB 	    ECG:  	  RADIOLOGY:   DIAGNOSTIC TESTING:  [ ] Echocardiogram:   [ ]  Catheterization:  [ ] Stress Test:    OTHER: 	    LABS:	 	                                10.5   14.01 )-----------( 308      ( 07 Feb 2019 03:40 )             33.4     02-07    141  |  98  |  51<H>  ----------------------------<  108<H>  5.1   |  32<H>  |  0.94    Ca    9.0      07 Feb 2019 03:40  Mg     2.2     02-07

## 2019-02-08 NOTE — PROGRESS NOTE ADULT - SUBJECTIVE AND OBJECTIVE BOX
[facility]        - Patient seen and examined.  - Today, patient is without complaints.         *****MEDICATIONS: Current medication reviewed and documented.    MEDICATIONS  (STANDING):  atorvastatin 40 milliGRAM(s) Oral at bedtime  buDESOnide 160 MICROgram(s)/formoterol 4.5 MICROgram(s) Inhaler 2 Puff(s) Inhalation two times a day  clopidogrel Tablet 75 milliGRAM(s) Oral daily  collagenase Ointment 1 Application(s) Topical two times a day  dabigatran 75 milliGRAM(s) Oral every 12 hours  furosemide    Tablet 40 milliGRAM(s) Oral daily  levalbuterol Inhalation 0.63 milliGRAM(s) Inhalation every 6 hours  metoprolol tartrate 50 milliGRAM(s) Oral two times a day  midodrine 5 milliGRAM(s) Oral every 8 hours  nystatin Powder 1 Application(s) Topical every 12 hours  pantoprazole  Injectable 40 milliGRAM(s) IV Push two times a day  thiamine 100 milliGRAM(s) Oral daily  vancomycin    Solution 125 milliGRAM(s) Oral every 8 hours    MEDICATIONS  (PRN):  acetaminophen   Tablet .. 650 milliGRAM(s) Oral every 6 hours PRN Temp greater or equal to 38C (100.4F), Mild Pain (1 - 3), Moderate Pain (4 - 6)  LORazepam     Tablet 0.5 milliGRAM(s) Oral two times a day PRN Anxiety  traMADol 50 milliGRAM(s) Oral every 8 hours PRN Severe Pain (7 - 10)           ***** REVIEW OF SYSTEM:  GEN: no fever, no chills, no pain  RESP: no SOB, no cough, no sputum  CVS: no chest pain, no palpitations, no edema  GI: no abdominal pain, no nausea, no vomiting, no constipation, no diarrhea  : no dysurea, no frequency  NEURO: no headache, no diziness  PSYCH: no depression, not anxious  Derm : no itching, no rash         ***** VITAL SIGNS:  T(F): 97.5 (02-08-19 @ 14:11), Max: 97.9 (02-07-19 @ 22:05)  HR: 69 (02-08-19 @ 15:25) (64 - 98)  BP: 106/72 (02-08-19 @ 14:11) (98/53 - 135/89)  RR: 18 (02-08-19 @ 14:11) (16 - 18)  SpO2: 93% (02-08-19 @ 15:25) (93% - 98%)  Wt(kg): --  ,   I&O's Summary    07 Feb 2019 07:01  -  08 Feb 2019 07:00  --------------------------------------------------------  IN: 360 mL / OUT: 0 mL / NET: 360 mL             *****PHYSICAL EXAM: pt very pleasant.    alert oriented x 2attention comprehension are fair.  Able to name, repeat.   EOmi fundi not visualized   no nystagmus VFF to confrontation  Tongue is midline  Palate elevates symmetrically   Moving all 4 ext spontaneously no drift appreciated    Gait not assessed.     Gait not assessed.            *****LAB AND IMAGING:                        10.5   14.01 )-----------( 308      ( 07 Feb 2019 03:40 )             33.4               02-07    141  |  98  |  51<H>  ----------------------------<  108<H>  5.1   |  32<H>  |  0.94    Ca    9.0      07 Feb 2019 03:40  Mg     2.2     02-07                           [All pertinent recent Imaging/Reports reviewed]           *****A S S E S S M E N T   A N D   P L A N :    79 y/o female, with a PmHx of COPD (on 2-3L O2 prn), paroxsymal a-fib (on pradaxa), CAD (s/p stent 11/2018), CHF, HTN, HLD, and anxiety, presenting to the [facility] ED with right foot pressure ulcer pain x several weeks. The patient has a dime-sized ulcer with scant serosanguinous drainage on her right calcaneous that she acquired from a shoe weeks ago. She reports that she woke up last night around 4am with excruciating, unrelenting right root pain with SOB x a few minutes followed by chest pain x several hours. The patient reports that the ulcer pain began to gradually worsen since her last hospital visit, where she was d/c with vancomycin. The foot pain is sharp, 10/10, and radiates up to her calf. The patient took two puffs of her Symbicort for the SOB with no relief, but it resolved spontaneously within a few minutes. The chest pain began gradually and was described as a midsternal, 8/10, tightness with no radiation and accompanied by palpitations. Of note, the patient reports chronic cough, b/l LE edema, JACK[place] with walking 1/2 block, orthopnea x1 pillow, occasional PND, melena x weeks, easy bruising/bleeding, and 10 lbs weight loss over the past 6 months with no change in appetite. The patient denies fever, chills, SOB at rest, diaphoresis, dizziness, claudication, wheezing, changes in vision and hearing, abdominal pain, change in bowel and urinary habits, dysuria, hematuria.   Problem/Recommendations 1: toxic metabolic encephalopathy slightly worse today.   regulate sleep wake cycle/ avoid day time sleepiness.    b12/folate/tsh/wnl   thiamine 100 daily.   encouraged po intake.   Problem/Recommendations 2: diarrhea resolved.       Thank you for allowing me to participate in the care of this patient. Please do not hesitate to call me if you have any  questions.        ________________  Kary Mercado MD  [facility] Neurological [facility] Emergency Department ([facility]  [phone]      30 minutes spent on total encounter; more than 50 % of the visit was  spent counseling about plan of care, compliance to diet/exercise and medication regimen and or  coordinating care by the attending physician.   At the present time, [facility] does not  provide outpatient followup, best to call the your insurance to find a participating provider.  This was explained to you at the time of the visit. Alternatively, if your insurance allows it, you can follow up with a neurologist  Dr. Anish Camacho([place]) [phone] or Dr. Michael Nissenbaum [phone]

## 2019-02-08 NOTE — PROGRESS NOTE ADULT - SUBJECTIVE AND OBJECTIVE BOX
no new issues mihai cough  ICU Vital Signs Last 24 Hrs  T(C): 36.3 (08 Feb 2019 17:59), Max: 36.6 (07 Feb 2019 22:05)  T(F): 97.4 (08 Feb 2019 17:59), Max: 97.9 (07 Feb 2019 22:05)  HR: 73 (08 Feb 2019 17:59) (69 - 98)  BP: 147/82 (08 Feb 2019 17:59) (98/53 - 147/82)  BP(mean): --  ABP: --  ABP(mean): --  RR: 16 (08 Feb 2019 17:59) (16 - 18)  SpO2: 95% (08 Feb 2019 17:59) (93% - 98%)                        10.5   14.01 )-----------( 308      ( 07 Feb 2019 03:40 )             33.4   02-07    141  |  98  |  51<H>  ----------------------------<  108<H>  5.1   |  32<H>  |  0.94    Ca    9.0      07 Feb 2019 03:40  Mg     2.2     02-07    MEDICATIONS  (STANDING):  atorvastatin 40 milliGRAM(s) Oral at bedtime  buDESOnide 160 MICROgram(s)/formoterol 4.5 MICROgram(s) Inhaler 2 Puff(s) Inhalation two times a day  clopidogrel Tablet 75 milliGRAM(s) Oral daily  collagenase Ointment 1 Application(s) Topical two times a day  dabigatran 75 milliGRAM(s) Oral every 12 hours  furosemide    Tablet 40 milliGRAM(s) Oral daily  levalbuterol Inhalation 0.63 milliGRAM(s) Inhalation every 6 hours  metoprolol tartrate 50 milliGRAM(s) Oral two times a day  midodrine 5 milliGRAM(s) Oral every 8 hours  nystatin Powder 1 Application(s) Topical every 12 hours  pantoprazole  Injectable 40 milliGRAM(s) IV Push two times a day  thiamine 100 milliGRAM(s) Oral daily  vancomycin    Solution 125 milliGRAM(s) Oral every 8 hours  ct chest  Partially loculated small bilateral pleural effusions appear decreased in   size from prior exam. Mild interlobular septal thickeningsecondary to   mild pulmonary edema also improved.  ct abdomen  LIVER: Small caudate lobe hepatic cyst. Multiple hypodense liver lesions,   too small to characterize.  BILE DUCTS: Normal caliber.  GALLBLADDER: Within normal limits.  SPLEEN: Within normal limits.  PANCREAS: Within normal limits.  ADRENALS: Within normal limits.  KIDNEYS/URETERS: Within normal limits.    BLADDER: Within normal limits.  REPRODUCTIVE ORGANS: Status post hysterectomy. No adnexal masses.    BOWEL: No bowel obstruction. Interval resolution of previously noted   pancolitis. Stool-filled colon and cecum. Borderline dilated appendix   which is decreased in caliber compared to prior examination. No evidence   of acute appendicitis.  PERITONEUM: No ascites.  VESSELS:  Atherosclerosis. 3.1 cm infrarenal abdominal aortic aneurysm.  RETROPERITONEUM: No lymphadenopathy.    ABDOMINAL WALL: Mild anasarca.

## 2019-02-08 NOTE — PROGRESS NOTE ADULT - SUBJECTIVE AND OBJECTIVE BOX
BEVERLEY DEL RIO:[id number],   79yFemale followed for:  No Known Allergies    PAST MEDICAL & SURGICAL HISTORY:  CAD (coronary artery disease): s/p stent 2018  CHF (congestive heart failure)  COPD (chronic obstructive pulmonary disease): on 2-3L O2 at home prn  Anxiety  TIA (transient ischemic attack): many years ago  PAF (paroxysmal atrial fibrillation)  HLD (hyperlipidemia)  HTN (hypertension)  H/O total hysterectomy: 2014  History of cataract surgery: b/l 2015  History of repair of hip fracture: luisa placed in RLE 2015  H/O spinal fusion: c2-6 in 2017    FAMILY HISTORY:  No pertinent family history in first degree relatives    MEDICATIONS  (STANDING):  atorvastatin 40 milliGRAM(s) Oral at bedtime  buDESOnide 160 MICROgram(s)/formoterol 4.5 MICROgram(s) Inhaler 2 Puff(s) Inhalation two times a day  clopidogrel Tablet 75 milliGRAM(s) Oral daily  collagenase Ointment 1 Application(s) Topical two times a day  dabigatran 75 milliGRAM(s) Oral every 12 hours  furosemide    Tablet 40 milliGRAM(s) Oral daily  levalbuterol Inhalation 0.63 milliGRAM(s) Inhalation every 6 hours  metoprolol tartrate 50 milliGRAM(s) Oral two times a day  midodrine 5 milliGRAM(s) Oral every 8 hours  nystatin Powder 1 Application(s) Topical every 12 hours  pantoprazole  Injectable 40 milliGRAM(s) IV Push two times a day  thiamine 100 milliGRAM(s) Oral daily  vancomycin    Solution 125 milliGRAM(s) Oral every 8 hours    MEDICATIONS  (PRN):  acetaminophen   Tablet .. 650 milliGRAM(s) Oral every 6 hours PRN Temp greater or equal to 38C (100.4F), Mild Pain (1 - 3), Moderate Pain (4 - 6)  LORazepam     Tablet 0.5 milliGRAM(s) Oral two times a day PRN Anxiety  traMADol 50 milliGRAM(s) Oral every 8 hours PRN Severe Pain (7 - 10)      Vital Signs Last 24 Hrs  T(C): 36.3 (08 Feb 2019 05:35), Max: 36.6 (07 Feb 2019 22:05)  T(F): 97.4 (08 Feb 2019 05:35), Max: 97.9 (07 Feb 2019 22:05)  HR: 98 (08 Feb 2019 05:35) (64 - 98)  BP: 135/89 (08 Feb 2019 05:35) (117/65 - 135/89)  BP(mean): --  RR: 18 (08 Feb 2019 05:35) (16 - 18)  SpO2: 98% (08 Feb 2019 05:35) (95% - 98%)  nc/at  s1s2  cta  soft, nt, nd no guarding or rebound  no c/c/e    CBC Full  -  ( 07 Feb 2019 03:40 )  WBC Count : 14.01 K/uL  Hemoglobin : 10.5 g/dL  Hematocrit : 33.4 %  Platelet Count - Automated : 308 K/uL  Mean Cell Volume : 88.4 fL  Mean Cell Hemoglobin : 27.8 pg  Mean Cell Hemoglobin Concentration : 31.4 %  Auto Neutrophil # : 12.21 K/uL  Auto Lymphocyte # : 0.65 K/uL  Auto Monocyte # : 1.02 K/uL  Auto Eosinophil # : 0.01 K/uL  Auto Basophil # : 0.01 K/uL  Auto Neutrophil % : 87.1 %  Auto Lymphocyte % : 4.6 %  Auto Monocyte % : 7.3 %  Auto Eosinophil % : 0.1 %  Auto Basophil % : 0.1 %    02-07    141  |  98  |  51<H>  ----------------------------<  108<H>  5.1   |  32<H>  |  0.94    Ca    9.0      07 Feb 2019 03:40  Mg     2.2     02-07

## 2019-02-08 NOTE — PROGRESS NOTE ADULT - SUBJECTIVE AND OBJECTIVE BOX
Patient is a 79y old  Female who presents with a chief complaint of right foot pressure ulcer pain (08 Feb 2019 12:26)      Any change in ROS: Seems to be doing ok : no SOB     MEDICATIONS  (STANDING):  atorvastatin 40 milliGRAM(s) Oral at bedtime  buDESOnide 160 MICROgram(s)/formoterol 4.5 MICROgram(s) Inhaler 2 Puff(s) Inhalation two times a day  clopidogrel Tablet 75 milliGRAM(s) Oral daily  collagenase Ointment 1 Application(s) Topical two times a day  dabigatran 75 milliGRAM(s) Oral every 12 hours  furosemide    Tablet 40 milliGRAM(s) Oral daily  levalbuterol Inhalation 0.63 milliGRAM(s) Inhalation every 6 hours  metoprolol tartrate 50 milliGRAM(s) Oral two times a day  midodrine 5 milliGRAM(s) Oral every 8 hours  nystatin Powder 1 Application(s) Topical every 12 hours  pantoprazole  Injectable 40 milliGRAM(s) IV Push two times a day  thiamine 100 milliGRAM(s) Oral daily  vancomycin    Solution 125 milliGRAM(s) Oral every 8 hours    MEDICATIONS  (PRN):  acetaminophen   Tablet .. 650 milliGRAM(s) Oral every 6 hours PRN Temp greater or equal to 38C (100.4F), Mild Pain (1 - 3), Moderate Pain (4 - 6)  LORazepam     Tablet 0.5 milliGRAM(s) Oral two times a day PRN Anxiety  traMADol 50 milliGRAM(s) Oral every 8 hours PRN Severe Pain (7 - 10)    Vital Signs Last 24 Hrs  T(C): 36.4 (08 Feb 2019 14:11), Max: 36.6 (07 Feb 2019 22:05)  T(F): 97.5 (08 Feb 2019 14:11), Max: 97.9 (07 Feb 2019 22:05)  HR: 69 (08 Feb 2019 15:25) (64 - 98)  BP: 106/72 (08 Feb 2019 14:11) (98/53 - 135/89)  BP(mean): --  RR: 18 (08 Feb 2019 14:11) (16 - 18)  SpO2: 93% (08 Feb 2019 15:25) (93% - 98%)    I&O's Summary    07 Feb 2019 07:01  -  08 Feb 2019 07:00  --------------------------------------------------------  IN: 360 mL / OUT: 0 mL / NET: 360 mL          Physical Exam:   GENERAL: NAD, well-groomed, well-developed  HEENT: COLEEN/   Atraumatic, Normocephalic  ENMT: No tonsillar erythema, exudates, or enlargement; Moist mucous membranes, Good dentition, No lesions  NECK: Supple, No JVD, Normal thyroid  CHEST/LUNG: Mild coarse rhonchi  CVS: Regular rate and rhythm; No murmurs, rubs, or gallops  GI: : Soft, Nontender, Nondistended; Bowel sounds present  NERVOUS SYSTEM:  Alert & Oriented X3  EXTREMITIES:  2+ Peripheral Pulses, No clubbing, cyanosis, or edema  LYMPH: No lymphadenopathy noted  SKIN: No rashes or lesions  ENDOCRINOLOGY: No Thyromegaly  PSYCH: Appropriate    Labs:                              10.5   14.01 )-----------( 308      ( 07 Feb 2019 03:40 )             33.4                         10.2   16.47 )-----------( 300      ( 06 Feb 2019 06:30 )             32.2                         10.0   16.07 )-----------( 316      ( 05 Feb 2019 08:39 )             32.0     02-07    141  |  98  |  51<H>  ----------------------------<  108<H>  5.1   |  32<H>  |  0.94  02-06    142  |  98  |  49<H>  ----------------------------<  87  4.7   |  32<H>  |  0.97  02-05    138  |  98  |  51<H>  ----------------------------<  75  4.8   |  31  |  1.04    Ca    9.0      07 Feb 2019 03:40  Mg     2.2     02-07      CAPILLARY BLOOD GLUCOSE              < from: CT Abdomen and Pelvis No Cont (02.06.19 @ 20:22) >  inderjit calcifications.    LIVER: Small caudate lobe hepatic cyst. Multiple hypodense liver lesions,   too small to characterize.  BILE DUCTS: Normal caliber.  GALLBLADDER: Within normal limits.  SPLEEN: Within normal limits.  PANCREAS: Within normal limits.  ADRENALS: Within normal limits.  KIDNEYS/URETERS: Within normal limits.    BLADDER: Within normal limits.  REPRODUCTIVE ORGANS: Status post hysterectomy. No adnexal masses.    BOWEL: No bowel obstruction. Interval resolution of previously noted   pancolitis. Stool-filled colon and cecum. Borderline dilated appendix   which is decreased in caliber compared to prior examination. No evidence   of acute appendicitis.  PERITONEUM: No ascites.  VESSELS:  Atherosclerosis. 3.1 cm infrarenal abdominal aortic aneurysm.  RETROPERITONEUM: No lymphadenopathy.    ABDOMINAL WALL: Mild anasarca.  BONES: Degenerative changes of thoracic and lumbar spine. Chronic   compression fracture of the L1 vertebral body. Status post intramedullary   nail fixation of the right femur with adjacent heterotopic ossification.    IMPRESSION:   Interval resolution of previously seen pancolitis.      < from: Xray Chest 1 View- PORTABLE-Routine (02.06.19 @ 14:48) >    PROCEDURE DATE:  Feb 6 2019         INTERPRETATION:  EXAMINATION: XR CHEST PORTABLE ROUTINE 1V    CLINICAL INDICATION:  Cough.     TECHNIQUE: Frontal radiograph of the chest was obtained on 2/6/2019     COMPARISON: Chest radiograph 1/28/2019.    IMPRESSION:     The cardiomediastinal silhouette is not well evaluated in this   projection. Loculated small right pleural effusion. 7 increase in size   since the prior study. Small left pleural effusion. There is no   pneumothorax. Bilateral opacities are unchanged compared to prior   examination on 1/28/2019 compatible with pulmonary edema. There are   degenerative changes of the visualized thoracic spine. Aortic   calcifications.              KAMRON SMALLS M.D., RADIOLOGY RESIDENT  This document has been electronically signed.  ZACH MORROW M.D. ATTENDING RADIOLOGIST  This document has been electronically signed. Feb 6 2019  4:20PM        < end of copied text >              MOHAMUD SU M.D., RADIOLOGY RESIDENT  This document has been electronically signed.  EKTA KINCAID M.D., ATTENDING RADIOLOGIST  This document has been electronically signed. Feb 7 2019 12:01PM        < end of copied text >          RECENT CULTURES:        RESPIRATORY CULTURES:          Studies  Chest X-RAY  CT SCAN Chest   Venous Dopplers: LE:   CT Abdomen  Others

## 2019-02-08 NOTE — PROGRESS NOTE ADULT - ASSESSMENT
79 y/o female, with a PmHx of COPD (on 2-3L O2 prn), paroxsymal a-fib (on pradaxa), HTN, HLD, and anxiety, presenting to the [facility] ED with right foot pressure ulcer pain, SOB, and CP, found to have Afib @102 bpm, H/H 8/24.7, mild interstitial pulmonary edema, proBNP 1374, guiac positive, BUN/Cr 36/1.68, admitted to telemetry for nonhealing pressure ulcer, Afib, CHF exacerbation, r/o ACS.

## 2019-02-08 NOTE — PROGRESS NOTE ADULT - ASSESSMENT
chest xray 1/28/19:  revealed multifocal airspace disease, may reflect aveolar component of edema.    a/p   77 y/o female, with a PmHx of COPD (on 2-3L O2 prn), paroxsymal a-fib (on pradaxa), CAD (s/p stent 11/2018), DCHF, HTN, HLD, and anxiety, presenting with acute/chronic diastolic chf, chest pain r/o acs, GIB, non healing pressure ulcer. Hospital course now complicated ruling in + RVP and CDiff / uti.     1. Atypical chest pain, resolved  in the setting of acute CHF exacerbation, afib w/ RVR   cv stable, no evidence of acute ischemia/ACS   Echo with nl lv fxn  continue statin, bb, plavix     2. Acute/chronic diastolic chf  on supplemental O2. denies SOB, volume status is stable   ct chest revealed decreased bl pleural effusion, mutiple small nodular opacities secondary to impacted distal airways likely infectious etiology? pna LLL, aspiration    + rvp  repeat cxr with unchanged b/l pulm edema - increase lasix to 40mg daily   cont bb  pulm f/u   echo with nl lv fxn    3. AFIB,  rate now controlled   dig on hold due to hx of pauses and bradycardia   continue with  midodrine 5 mg q 8 hrs for bp support   continue with lopressor to 50 mg BID  CHADS 3 - continue with pradaxa     4. CAD s/p PCI (11/2018)  cv stable, no evidence of acute ischemia   continue bb, statin, continue plavix given recent PCI      5. GIB   +GUIAC , +anemia s/p 1uprbc,   continue to trend cbc  GI f/u      6. non healing foot ulcer  med f/u  ID f/u , vascular studies noted, pod f/u  STONEY/PVR poor, 0.60 on the effected limb with flat waveforms.  -  Vasc sx consult noted, planning RLE angio once c. diff clears    7. IGOR/CKD   creat improved, continue lasix daily    renal f/u     8.Cdiff   po abx, id f/u    7. hypoxia    likely in the setting of resp infection ? aspiration   no decomp CHF on exam    + influenza s/p Tamiflu  ct chest revealed decreased bl pleural effusion, mutiple small nodular opacities secondary to impacted distal airways likely infectious etiology? pna LLL, aspiration    pulm f/u, s/p steroids   s/p speech and swallow eval     8. UTI    med f/u     dvt ppx

## 2019-02-08 NOTE — PROGRESS NOTE ADULT - SUBJECTIVE AND OBJECTIVE BOX
CC: F/U C diff    Saw/spoke to patient. No fevers, no chills. Soft stools, otherwise unchanged.    Allergies  No Known Allergies    ANTIMICROBIALS:  vancomycin    Solution 125 every 8 hours    PE:    Vital Signs Last 24 Hrs  T(C): 36.2 (08 Feb 2019 10:39), Max: 36.6 (07 Feb 2019 22:05)  T(F): 97.2 (08 Feb 2019 10:39), Max: 97.9 (07 Feb 2019 22:05)  HR: 86 (08 Feb 2019 10:39) (64 - 98)  BP: 98/53 (08 Feb 2019 10:39) (98/53 - 135/89)  RR: 16 (08 Feb 2019 10:39) (16 - 18)  SpO2: 95% (08 Feb 2019 10:39) (95% - 98%)    Gen: AOx3, NAD, non-toxic, pleasant  CV: S1+S2 normal, nontachycardic  Resp: Clear bilat, no resp distress, no crackles/wheezes  Abd: Soft, nontender, +BS  Ext: No LE edema, no wounds    LABS:                        10.5   14.01 )-----------( 308      ( 07 Feb 2019 03:40 )             33.4     02-07    141  |  98  |  51<H>  ----------------------------<  108<H>  5.1   |  32<H>  |  0.94    Ca    9.0      07 Feb 2019 03:40  Mg     2.2     02-07    MICROBIOLOGY:    URINE CATHETER  01-27-19 --  --  Pseudomonas aeruginosa    BLOOD PERIPHERAL  01-26-19 NGTD    (otherwise reviewed)    RADIOLOGY:    2/6 CT:    FINDINGS:    LOWER CHEST: Moderate right and small left loculated pleural effusions   with adjacent atelectasis, right greater than left. Cardiomegaly.   Coronary calcifications.    LIVER: Small caudate lobe hepatic cyst. Multiple hypodense liver lesions,   too small to characterize.  BILE DUCTS: Normal caliber.  GALLBLADDER: Within normal limits.  SPLEEN: Within normal limits.  PANCREAS: Within normal limits.  ADRENALS: Within normal limits.  KIDNEYS/URETERS: Within normal limits.    BLADDER: Within normal limits.  REPRODUCTIVE ORGANS: Status post hysterectomy. No adnexal masses.    BOWEL: No bowel obstruction. Interval resolution of previously noted   pancolitis. Stool-filled colon and cecum. Borderline dilated appendix   which is decreased in caliber compared to prior examination. No evidence   of acute appendicitis.  PERITONEUM: No ascites.  VESSELS:  Atherosclerosis. 3.1 cm infrarenal abdominal aortic aneurysm.  RETROPERITONEUM: No lymphadenopathy.    ABDOMINAL WALL: Mild anasarca.  BONES: Degenerative changes of thoracic and lumbar spine. Chronic   compression fracture of the L1 vertebral body. Status post intramedullary   nail fixation of the right femur with adjacent heterotopic ossification.    IMPRESSION:   Interval resolution of previously seen pancolitis.

## 2019-02-08 NOTE — PROGRESS NOTE ADULT - ASSESSMENT
79 F with HTN, HLD, CAD s/p stent, CHF, paroxysmal A-fib COPD (on 2-3L O2 prn),recent admission 1/5/19 - 1/14/19 for C-diff after a recent augmentin course for cellulitis, discharged with PO vanco (End Date: 1/18/19), presented 1/17/19 with right foot pressure ulcer pain x several weeks.  Low suspicion for infected heel ulcer clinically  UCX with Pseudomonas, patient does not complain of any urinary symptoms  C diff positive  RVP with Flu  CT with ? aspiration--however resp status improving off abx  No diarrhea presently, CT reassuring, no further pancolitis  Elevated WBC, trending down, likely 2/2 prednisone (started 2/1)--continue to trend  Overall, Flu, C diff, fever, positive culture finding  - c/w PO vanco 125 tid for a week (started 2/7)                  then 125 bid for a week                  then 125 qd for a week                  then 125 q 48 h for a week                  then 125 every third day for a week  - if fever or worsening status repeat cultures and start zosyn for consideration aspiration pneumonia  - Aspiration precautions  - Trend WBC  - Will sign off. Please call with further questions or change in status.    Benjamin Dolan MD  Pager [phone]  After 5pm and on weekends call [phone]

## 2019-02-09 LAB
ANION GAP SERPL CALC-SCNC: 7 MMO/L — SIGNIFICANT CHANGE UP (ref 7–14)
BASOPHILS # BLD AUTO: 0.01 K/UL — SIGNIFICANT CHANGE UP (ref 0–0.2)
BASOPHILS NFR BLD AUTO: 0.1 % — SIGNIFICANT CHANGE UP (ref 0–2)
BUN SERPL-MCNC: 40 MG/DL — HIGH (ref 7–23)
CALCIUM SERPL-MCNC: 8.6 MG/DL — SIGNIFICANT CHANGE UP (ref 8.4–10.5)
CHLORIDE SERPL-SCNC: 96 MMOL/L — LOW (ref 98–107)
CO2 SERPL-SCNC: 36 MMOL/L — HIGH (ref 22–31)
CREAT SERPL-MCNC: 0.96 MG/DL — SIGNIFICANT CHANGE UP (ref 0.5–1.3)
EOSINOPHIL # BLD AUTO: 0.1 K/UL — SIGNIFICANT CHANGE UP (ref 0–0.5)
EOSINOPHIL NFR BLD AUTO: 0.7 % — SIGNIFICANT CHANGE UP (ref 0–6)
GLUCOSE SERPL-MCNC: 88 MG/DL — SIGNIFICANT CHANGE UP (ref 70–99)
HCT VFR BLD CALC: 33.5 % — LOW (ref 34.5–45)
HGB BLD-MCNC: 10.3 G/DL — LOW (ref 11.5–15.5)
IMM GRANULOCYTES NFR BLD AUTO: 0.6 % — SIGNIFICANT CHANGE UP (ref 0–1.5)
LYMPHOCYTES # BLD AUTO: 0.58 K/UL — LOW (ref 1–3.3)
LYMPHOCYTES # BLD AUTO: 4.3 % — LOW (ref 13–44)
MAGNESIUM SERPL-MCNC: 2.1 MG/DL — SIGNIFICANT CHANGE UP (ref 1.6–2.6)
MCHC RBC-ENTMCNC: 27.7 PG — SIGNIFICANT CHANGE UP (ref 27–34)
MCHC RBC-ENTMCNC: 30.7 % — LOW (ref 32–36)
MCV RBC AUTO: 90.1 FL — SIGNIFICANT CHANGE UP (ref 80–100)
MONOCYTES # BLD AUTO: 1.07 K/UL — HIGH (ref 0–0.9)
MONOCYTES NFR BLD AUTO: 8 % — SIGNIFICANT CHANGE UP (ref 2–14)
NEUTROPHILS # BLD AUTO: 11.53 K/UL — HIGH (ref 1.8–7.4)
NEUTROPHILS NFR BLD AUTO: 86.3 % — HIGH (ref 43–77)
NRBC # FLD: 0 K/UL — LOW (ref 25–125)
PLATELET # BLD AUTO: 262 K/UL — SIGNIFICANT CHANGE UP (ref 150–400)
PMV BLD: 10.5 FL — SIGNIFICANT CHANGE UP (ref 7–13)
POTASSIUM SERPL-MCNC: 4.6 MMOL/L — SIGNIFICANT CHANGE UP (ref 3.5–5.3)
POTASSIUM SERPL-SCNC: 4.6 MMOL/L — SIGNIFICANT CHANGE UP (ref 3.5–5.3)
RBC # BLD: 3.72 M/UL — LOW (ref 3.8–5.2)
RBC # FLD: 18.3 % — HIGH (ref 10.3–14.5)
SODIUM SERPL-SCNC: 139 MMOL/L — SIGNIFICANT CHANGE UP (ref 135–145)
WBC # BLD: 13.37 K/UL — HIGH (ref 3.8–10.5)
WBC # FLD AUTO: 13.37 K/UL — HIGH (ref 3.8–10.5)

## 2019-02-09 RX ORDER — ACETAZOLAMIDE 250 MG/1
500 TABLET ORAL ONCE
Qty: 0 | Refills: 0 | Status: COMPLETED | OUTPATIENT
Start: 2019-02-09 | End: 2019-02-09

## 2019-02-09 RX ADMIN — NYSTATIN CREAM 1 APPLICATION(S): 100000 CREAM TOPICAL at 18:22

## 2019-02-09 RX ADMIN — Medication 125 MILLIGRAM(S): at 05:52

## 2019-02-09 RX ADMIN — MIDODRINE HYDROCHLORIDE 5 MILLIGRAM(S): 2.5 TABLET ORAL at 14:08

## 2019-02-09 RX ADMIN — TRAMADOL HYDROCHLORIDE 50 MILLIGRAM(S): 50 TABLET ORAL at 07:08

## 2019-02-09 RX ADMIN — DABIGATRAN ETEXILATE MESYLATE 75 MILLIGRAM(S): 150 CAPSULE ORAL at 18:22

## 2019-02-09 RX ADMIN — TRAMADOL HYDROCHLORIDE 50 MILLIGRAM(S): 50 TABLET ORAL at 06:08

## 2019-02-09 RX ADMIN — LEVALBUTEROL 0.63 MILLIGRAM(S): 1.25 SOLUTION, CONCENTRATE RESPIRATORY (INHALATION) at 16:41

## 2019-02-09 RX ADMIN — Medication 0.5 MILLIGRAM(S): at 19:59

## 2019-02-09 RX ADMIN — LEVALBUTEROL 0.63 MILLIGRAM(S): 1.25 SOLUTION, CONCENTRATE RESPIRATORY (INHALATION) at 04:22

## 2019-02-09 RX ADMIN — Medication 100 MILLIGRAM(S): at 12:23

## 2019-02-09 RX ADMIN — Medication 50 MILLIGRAM(S): at 05:52

## 2019-02-09 RX ADMIN — NYSTATIN CREAM 1 APPLICATION(S): 100000 CREAM TOPICAL at 05:51

## 2019-02-09 RX ADMIN — Medication 125 MILLIGRAM(S): at 14:08

## 2019-02-09 RX ADMIN — MIDODRINE HYDROCHLORIDE 5 MILLIGRAM(S): 2.5 TABLET ORAL at 05:52

## 2019-02-09 RX ADMIN — BUDESONIDE AND FORMOTEROL FUMARATE DIHYDRATE 2 PUFF(S): 160; 4.5 AEROSOL RESPIRATORY (INHALATION) at 08:51

## 2019-02-09 RX ADMIN — LEVALBUTEROL 0.63 MILLIGRAM(S): 1.25 SOLUTION, CONCENTRATE RESPIRATORY (INHALATION) at 21:56

## 2019-02-09 RX ADMIN — ACETAZOLAMIDE 500 MILLIGRAM(S): 250 TABLET ORAL at 18:22

## 2019-02-09 RX ADMIN — LEVALBUTEROL 0.63 MILLIGRAM(S): 1.25 SOLUTION, CONCENTRATE RESPIRATORY (INHALATION) at 12:04

## 2019-02-09 RX ADMIN — PANTOPRAZOLE SODIUM 40 MILLIGRAM(S): 20 TABLET, DELAYED RELEASE ORAL at 05:52

## 2019-02-09 RX ADMIN — Medication 1 APPLICATION(S): at 05:52

## 2019-02-09 RX ADMIN — Medication 40 MILLIGRAM(S): at 05:52

## 2019-02-09 RX ADMIN — PANTOPRAZOLE SODIUM 40 MILLIGRAM(S): 20 TABLET, DELAYED RELEASE ORAL at 18:22

## 2019-02-09 RX ADMIN — Medication 1 APPLICATION(S): at 18:22

## 2019-02-09 RX ADMIN — DABIGATRAN ETEXILATE MESYLATE 75 MILLIGRAM(S): 150 CAPSULE ORAL at 05:52

## 2019-02-09 RX ADMIN — CLOPIDOGREL BISULFATE 75 MILLIGRAM(S): 75 TABLET, FILM COATED ORAL at 12:23

## 2019-02-09 NOTE — PROGRESS NOTE ADULT - SUBJECTIVE AND OBJECTIVE BOX
Patient is a 79y old  Female who presents with a chief complaint of right foot pressure ulcer pain (09 Feb 2019 11:00)    Any change in ROS: denies SOB, sputum. cough (++)    MEDICATIONS  (STANDING):  atorvastatin 40 milliGRAM(s) Oral at bedtime  buDESOnide 160 MICROgram(s)/formoterol 4.5 MICROgram(s) Inhaler 2 Puff(s) Inhalation two times a day  clopidogrel Tablet 75 milliGRAM(s) Oral daily  collagenase Ointment 1 Application(s) Topical two times a day  dabigatran 75 milliGRAM(s) Oral every 12 hours  furosemide    Tablet 40 milliGRAM(s) Oral daily  levalbuterol Inhalation 0.63 milliGRAM(s) Inhalation every 6 hours  metoprolol tartrate 50 milliGRAM(s) Oral two times a day  midodrine 5 milliGRAM(s) Oral every 8 hours  nystatin Powder 1 Application(s) Topical every 12 hours  pantoprazole  Injectable 40 milliGRAM(s) IV Push two times a day  thiamine 100 milliGRAM(s) Oral daily  vancomycin    Solution 125 milliGRAM(s) Oral every 8 hours    MEDICATIONS  (PRN):  acetaminophen   Tablet .. 650 milliGRAM(s) Oral every 6 hours PRN Temp greater or equal to 38C (100.4F), Mild Pain (1 - 3), Moderate Pain (4 - 6)  LORazepam     Tablet 0.5 milliGRAM(s) Oral two times a day PRN Anxiety  traMADol 50 milliGRAM(s) Oral every 8 hours PRN Severe Pain (7 - 10)    Vital Signs Last 24 Hrs  T(C): 36.3 (09 Feb 2019 05:48), Max: 36.4 (08 Feb 2019 14:11)  T(F): 97.3 (09 Feb 2019 05:48), Max: 97.6 (08 Feb 2019 22:21)  HR: 96 (09 Feb 2019 05:48) (62 - 96)  BP: 126/82 (09 Feb 2019 05:48) (106/72 - 147/82)  BP(mean): --  RR: 18 (09 Feb 2019 05:48) (16 - 18)  SpO2: 97% (09 Feb 2019 05:48) (93% - 97%)    I&O's Summary    09 Feb 2019 07:01  -  09 Feb 2019 12:03  --------------------------------------------------------  IN: 240 mL / OUT: 0 mL / NET: 240 mL          Physical Exam:   GENERAL: The patient comfortable with no apparent distress.   HEENT: Head is normocephalic and atraumatic.    NECK: Supple with no elevated JVP.  LUNGS: Crackles   HEART: S1 and S2 present without murmur.  ABDOMEN: Soft, nontender, and nondistended.   EXTREMITIES: No edema or calf tenderness.  NEUROLOGIC: Grossly intact.    Labs:                              10.3   13.37 )-----------( 262      ( 09 Feb 2019 05:00 )             33.5                         10.5   14.01 )-----------( 308      ( 07 Feb 2019 03:40 )             33.4                         10.2   16.47 )-----------( 300      ( 06 Feb 2019 06:30 )             32.2     02-09    139  |  96<L>  |  40<H>  ----------------------------<  88  4.6   |  36<H>  |  0.96  02-07    141  |  98  |  51<H>  ----------------------------<  108<H>  5.1   |  32<H>  |  0.94  02-06    142  |  98  |  49<H>  ----------------------------<  87  4.7   |  32<H>  |  0.97    Ca    8.6      09 Feb 2019 05:00  Mg     2.1     02-09      CAPILLARY BLOOD GLUCOSE    Studies  Chest X-RAY   < from: Xray Chest 1 View- PORTABLE-Routine (02.06.19 @ 14:48) >  EXAM:  XR CHEST PORTABLE ROUTINE 1V        PROCEDURE DATE:  Feb 6 2019         INTERPRETATION:  EXAMINATION: XR CHEST PORTABLE ROUTINE 1V    CLINICAL INDICATION:  Cough.     TECHNIQUE: Frontal radiograph of the chest was obtained on 2/6/2019     COMPARISON: Chest radiograph 1/28/2019.    IMPRESSION:     The cardiomediastinal silhouette is not well evaluated in this   projection. Loculated small right pleural effusion. 7 increase in size   since the prior study. Small left pleural effusion. There is no   pneumothorax. Bilateral opacities are unchanged compared to prior   examination on 1/28/2019 compatible with pulmonary edema. There are   degenerative changes of the visualized thoracic spine. Aortic   calcifications.        < end of copied text >    CT SCAN Chest < from: CT Chest No Cont (01.29.19 @ 19:48) >  EXAM:  CT CHEST        PROCEDURE DATE:  Jan 29 2019         INTERPRETATION:  CLINICAL INFORMATION: CHF. Positive flu. Crackles.   Possible pneumonia.    COMPARISON: CT chest 1/24/2019    PROCEDURE:   CT of the Chest was performed without intravenous contrast.  Sagittal and coronal reformats were performed.      FINDINGS:    CHEST:     LUNGS AND LARGE AIRWAYS: Debris noted within the trachea. Debris also   noted within the left lower lobar bronchi new since the prior study with   associated patchy opacity involving the anterior segment of the left   lower lobe progressed since the prior study representing atelectasis   and/or pneumonia. Severe centrilobular emphysema. Right basilar   subsegmental atelectasis. Linear atelectasis within the lingula. There is   mild interlobular septal thickening secondary to pulmonary edema with   mild interval improvement. Small nodular or tree-in-bud opacities noted   in the bilateral upper lobes likely secondary to  impacted distal airways   some of which are new since the prior study.  PLEURA: Partially loculated small bilateral pleural effusions appear   decreased in size from prior exam.  VESSELS: Atherosclerotic calcifications of the aorta  HEART: Cardiomegaly. No pericardial effusion. Coronary artery   calcifications. Aortic valvular calcifications. Mitral annular   calcifications.  MEDIASTINUM AND GEOVANI: Scattered subcentimeter lymph nodes.  CHEST WALL AND LOWER NECK: Heterogenous appearing thyroid gland.  VISUALIZED UPPER ABDOMEN: Partially visualized left renal cyst.  BONES: Degenerative changes of the spine. Old healed rib fractures.    IMPRESSION:     Partially loculated small bilateral pleural effusions appear decreased in   size from prior exam. Mild interlobular septal thickeningsecondary to   mild pulmonary edema also improved.    Multiple small nodular opacities or tree-in-bud opacities noted within   the bilateral upper lobes secondary to  impacted distal airways, many of   which are new since the January 24, 2019 likely of infectious etiology.    Debris noted within the trachea and left lower lobar bronchi new since   the prior study with associated left lower lobe patchy opacity with mild   interval increase in size representing atelectasis and/or pneumonia. The   constellation of findings are suggestive of aspiration.    < end of copied text >    Venous Dopplers: LE: < from: [facility] Duplex Lower Ext Vein Scan, Right (12.18.18 @ 19:54) >    EXAM:  DUPLEX EXT VEINS LOWER RT                            PROCEDURE DATE:  12/18/2018            INTERPRETATION:  CLINICAL INFORMATION: Lower extremity swelling    COMPARISON: None available.    TECHNIQUE: Duplex sonography of the RIGHT LOWER extremity with color and   spectral Doppler, with and without compression.      FINDINGS:    There is normal compressibility of the right common femoral, femoral and   popliteal veins. No calf vein thrombosis is detected.    The contralateral common femoral vein is patent.    Doppler examination shows normal spontaneous and phasic flow.    Mild subcutaneous edema.    IMPRESSION:     No evidence of right lower extremity deep venous thrombosis.    Mild subcutaneous edema.    < end of copied text >    CT Abdomen < from: CT Abdomen and Pelvis No Cont (02.06.19 @ 20:22) >  EXAM:  CT ABDOMEN AND PELVIS        PROCEDURE DATE:  Feb 6 2019         INTERPRETATION:  CLINICAL INFORMATION: Abdominal pain. History of   hypertension, hyperlipidemia, coronary artery disease, CHF, COPD, atrial   fibrillation presented with right foot pressure ulcer. C. difficile   positive.    COMPARISON: CT abdomen and pelvis from 1/4/2019.    PROCEDURE:   CT of the Abdomen and Pelvis was performed without intravenous contrast.   Intravenous contrast: None.  Oral contrast: None.  Sagittal and coronal reformats were performed.    FINDINGS:    LOWER CHEST: Moderate right and small left loculated pleural effusions   with adjacent atelectasis, right greater than left. Cardiomegaly.   Coronary calcifications.    LIVER: Small caudate lobe hepatic cyst. Multiple hypodense liver lesions,   too small to characterize.  BILE DUCTS: Normal caliber.  GALLBLADDER: Within normal limits.  SPLEEN: Within normal limits.  PANCREAS: Within normal limits.  ADRENALS: Within normal limits.  KIDNEYS/URETERS: Within normal limits.    BLADDER: Within normal limits.  REPRODUCTIVE ORGANS: Status post hysterectomy. No adnexal masses.    BOWEL: No bowel obstruction. Interval resolution of previously noted   pancolitis. Stool-filled colon and cecum. Borderline dilated appendix   which is decreased in caliber compared to prior examination. No evidence   of acute appendicitis.  PERITONEUM: No ascites.  VESSELS:  Atherosclerosis. 3.1 cm infrarenal abdominal aortic aneurysm.  RETROPERITONEUM: No lymphadenopathy.    ABDOMINAL WALL: Mild anasarca.  BONES: Degenerative changes of thoracic and lumbar spine. Chronic   compression fracture of the L1 vertebral body. Status post intramedullary   nail fixation of the right femur with adjacent heterotopic ossification.    IMPRESSION:   Interval resolution of previously seen pancolitis.      < end of copied text >    Others  < from: Transthoracic Echocardiogram (01.17.19 @ 17:11) >    Patient name: BEVERLEY DEL RIO  YOB: 1940   Age: 78 (F)   MR#: [id number]  Study Date: 1/17/2019  Location: O/PSonographer: HOWARD Smith  Study quality: Technically good  Referring Physician: Not Available Doctor, MD  Blood Pressure: 123/90 mmHg  Height: 157 cm  Weight: 57 kg  BSA: 1.6 m2  ------------------------------------------------------------------------  PROCEDURE: Transthoracic echocardiogram with 2-D, M-Mode  and complete spectral and color flow Doppler.  INDICATION: Abnormal electrocardiogram (ECG) (EKG) (R94.31)  ------------------------------------------------------------------------  DIMENSIONS:  Dimensions:     Normal Values:  LA:     4.9 cm    2.0 - 4.0 cm  Ao:     3.2 cm    2.0 - 3.8 cm  SEPTUM: 0.7 cm0.6 - 1.2 cm  PWT:    0.7 cm    0.6 - 1.1 cm  LVIDd:  3.8 cm    3.0 - 5.6 cm  LVIDs:  2.6 cm    1.8 - 4.0 cm  Derived Variables:  LVMI: 46 g/m2  RWT: 0.36  Fractional short: 32 %  Ejection Fraction (Candelarioicholtz): 60 %  ------------------------------------------------------------------------  OBSERVATIONS:  Mitral Valve: Mitral annular calcification, otherwise  normal mitral valve. Mild-moderate mitral regurgitation.  Aortic Root: Normal aortic root.  Aortic Valve: Calcified trileaflet aortic valve with normal  opening. Mild aortic regurgitation.  Left Atrium: Severely dilated left atrium.  LA volume index  = 50 cc/m2.  Left Ventricle: Normal left ventricular systolic function.  No segmental wall motion abnormalities. Normal left  ventricular internal dimensions and wall thicknesses.  Right Heart: Moderate right atrial enlargement. Normal  right ventricular size and function. Normal tricuspid  valve.  Moderate tricuspid regurgitation. Normal pulmonic  valve.  Mild pulmonic regurgitation.  Pericardium/PleuraNormal pericardium with no pericardial  effusion.  Hemodynamic: Estimated right ventricular systolic pressure  equals 53 mm Hg, assuming right atrial pressure equals 10  mm Hg, consistent with moderate pulmonary hypertension.  ------------------------------------------------------------------------  CONCLUSIONS:  1. Mitral annular calcification, otherwise normal mitral  valve. Mild-moderate mitral regurgitation.  2. Calcified trileaflet aortic valve with normal opening.  Mild aortic regurgitation.  3. Severely dilated left atrium.  LA volume index = 50  cc/m2.  4. Normal left ventricular internal dimensions and wall  thicknesses.  5. Normal left ventricular systolic function. No segmental  wall motion abnormalities.  6. Moderate right atrial enlargement.  7. Normal right ventricular size and function.  8. Estimated right ventricular systolic pressure equals 53  mm Hg, assuming right atrial pressure equals 10 mm Hg,  consistent with moderate pulmonary hypertension.  *** Compared with echocardiogram of 1/31/2018, no  significant changes noted.    < end of copied text >

## 2019-02-09 NOTE — PROGRESS NOTE ADULT - SUBJECTIVE AND OBJECTIVE BOX
[facility] NEPHROLOGY- PROGRESS NOTE    78y Female with history of CHF, COPD presents with SOB found to have guaiac positive anemia and afib with RVR. Nephrology consulted for elevated Scr.    REVIEW OF SYSTEMS:  Gen: no changes in weight  Cards: no chest pain  Resp: + dyspnea with cough  GI: no nausea or vomiting, + diarrhea resolved  Vascular: no LE edema +R foot pain    No Known Allergies      Hospital Medications: Medications reviewed      VITALS:  T(F): 97.3 (02-09-19 @ 05:48), Max: 97.6 (02-08-19 @ 22:21)  HR: 74 (02-09-19 @ 12:04)  BP: 126/82 (02-09-19 @ 05:48)  RR: 18 (02-09-19 @ 05:48)  SpO2: 98% (02-09-19 @ 12:04)  Wt(kg): --    02-09 @ 07:01  -  02-09 @ 12:25  --------------------------------------------------------  IN: 240 mL / OUT: 0 mL / NET: 240 mL        PHYSICAL EXAM:    Gen: NAD  Cards: Irregularly irregular, +S1/S2, no M/G/R  Resp: course BS, bibasilar rales R>L  GI: soft, NT/ND, NABS  Vascular: no LE edema B/L    LABS:  02-09    139  |  96<L>  |  40<H>  ----------------------------<  88  4.6   |  36<H>  |  0.96    Ca    8.6      09 Feb 2019 05:00  Mg     2.1     02-09      Creatinine Trend: 0.96 <--, 0.94 <--, 0.97 <--, 1.04 <--, 1.11 <--, 1.04 <--                        10.3   13.37 )-----------( 262      ( 09 Feb 2019 05:00 )             33.5     Urine Studies:

## 2019-02-09 NOTE — PROGRESS NOTE ADULT - SUBJECTIVE AND OBJECTIVE BOX
CARDIOLOGY FOLLOW UP NOTE - DR. MARTINEZ    Subjective:    no new events      PHYSICAL EXAM:  T(C): 36.3 (02-09-19 @ 05:48), Max: 36.4 (02-08-19 @ 22:21)  HR: 74 (02-09-19 @ 12:04) (62 - 96)  BP: 126/82 (02-09-19 @ 05:48) (114/68 - 147/82)  RR: 18 (02-09-19 @ 05:48) (16 - 18)  SpO2: 98% (02-09-19 @ 12:04) (93% - 98%)  Wt(kg): --  I&O's Summary    09 Feb 2019 07:01  -  09 Feb 2019 15:15  --------------------------------------------------------  IN: 530 mL / OUT: 0 mL / NET: 530 mL        Appearance: Normal	  Cardiovascular: Normal S1 S2, irreg  Respiratory: Lungs clear to auscultation	  Gastrointestinal:  Soft, Non-tender, + BS	  Extremities: Normal range of motion, No clubbing, cyanosis or edema  Vascular: Peripheral pulses palpable 2+ bilaterally    MEDICATIONS  (STANDING):  acetazolamide Injectable 500 milliGRAM(s) IV Push once  atorvastatin 40 milliGRAM(s) Oral at bedtime  buDESOnide 160 MICROgram(s)/formoterol 4.5 MICROgram(s) Inhaler 2 Puff(s) Inhalation two times a day  clopidogrel Tablet 75 milliGRAM(s) Oral daily  collagenase Ointment 1 Application(s) Topical two times a day  dabigatran 75 milliGRAM(s) Oral every 12 hours  furosemide    Tablet 40 milliGRAM(s) Oral daily  levalbuterol Inhalation 0.63 milliGRAM(s) Inhalation every 6 hours  metoprolol tartrate 50 milliGRAM(s) Oral two times a day  midodrine 5 milliGRAM(s) Oral every 8 hours  nystatin Powder 1 Application(s) Topical every 12 hours  pantoprazole  Injectable 40 milliGRAM(s) IV Push two times a day  thiamine 100 milliGRAM(s) Oral daily  vancomycin    Solution 125 milliGRAM(s) Oral every 8 hours      TELEMETRY: 	    ECG:  	  RADIOLOGY:   DIAGNOSTIC TESTING:  [ ] Echocardiogram:  [ ] Catheterization:  [ ] Stress Test:    OTHER: 	    LABS:	 	    CARDIAC MARKERS:                                10.3   13.37 )-----------( 262      ( 09 Feb 2019 05:00 )             33.5     02-09    139  |  96<L>  |  40<H>  ----------------------------<  88  4.6   |  36<H>  |  0.96    Ca    8.6      09 Feb 2019 05:00  Mg     2.1     02-09      proBNP:     Lipid Profile:   HgA1c:

## 2019-02-09 NOTE — PROGRESS NOTE ADULT - ASSESSMENT
chest xray 1/28/19:  revealed multifocal airspace disease, may reflect aveolar component of edema.    a/p   77 y/o female, with a PmHx of COPD (on 2-3L O2 prn), paroxsymal a-fib (on pradaxa), CAD (s/p stent 11/2018), DCHF, HTN, HLD, and anxiety, presenting with acute/chronic diastolic chf, chest pain r/o acs, GIB, non healing pressure ulcer. Hospital course now complicated ruling in + RVP and CDiff / uti.     1. Atypical chest pain, resolved  in the setting of acute CHF exacerbation, afib w/ RVR   cv stable, no evidence of acute ischemia/ACS   Echo with nl lv fxn  continue statin, bb, plavix     2. Acute/chronic diastolic chf  on supplemental O2. denies SOB, volume status is stable   ct chest revealed decreased bl pleural effusion, mutiple small nodular opacities secondary to impacted distal airways likely infectious etiology? pna LLL, aspiration    + rvp  repeat cxr with unchanged b/l pulm edema - cont lasix 40mg daily   cont bb  pulm f/u   echo with nl lv fxn    3. AFIB  dig on hold due to hx of pauses and bradycardia   continue with  midodrine 5 mg q 8 hrs for bp support   continue with lopressor to 50 mg BID  CHADS 3 - continue with pradaxa     4. CAD s/p PCI (11/2018)  cv stable, no evidence of acute ischemia   continue bb, statin, continue plavix given recent PCI      5. GIB   +GUIAC , +anemia s/p 1uprbc,   continue to trend cbc  GI f/u      6. non healing foot ulcer  med f/u  ID f/u , vascular studies noted, pod f/u  STONEY/PVR poor, 0.60 on the effected limb with flat waveforms.  -  Vasc sx consult noted, planning RLE angio once c. diff clears    7. IGOR/CKD   creat improved, continue lasix daily    renal f/u     8.Cdiff   po abx, id f/u    7. hypoxia    likely in the setting of resp infection ? aspiration   no decomp CHF on exam    + influenza s/p Tamiflu  ct chest revealed decreased bl pleural effusion, mutiple small nodular opacities secondary to impacted distal airways likely infectious etiology? pna LLL, aspiration    pulm f/u, s/p steroids   s/p speech and swallow eval     8. UTI    med f/u     dvt ppx

## 2019-02-09 NOTE — PROGRESS NOTE ADULT - ASSESSMENT
79 y/o female, with a PmHx of COPD (on 2-3L O2 prn), paroxsymal a-fib (on pradaxa), HTN, HLD, and anxiety, presenting to the [facility] ED with right foot pressure ulcer pain, SOB, and CP, found to have Afib @102 bpm, H/H 8/24.7, mild interstitial pulmonary edema, proBNP 1374, guiac positive, BUN/Cr 36/1.68, admitted to telemetry for nonhealing pressure ulcer, Afib, CHF exacerbation, r/o ACS.

## 2019-02-09 NOTE — PROGRESS NOTE ADULT - SUBJECTIVE AND OBJECTIVE BOX
BEVERLEY DEL RIO:[id number],   79yFemale followed for:  No Known Allergies    PAST MEDICAL & SURGICAL HISTORY:  CAD (coronary artery disease): s/p stent 2018  CHF (congestive heart failure)  COPD (chronic obstructive pulmonary disease): on 2-3L O2 at home prn  Anxiety  TIA (transient ischemic attack): many years ago  PAF (paroxysmal atrial fibrillation)  HLD (hyperlipidemia)  HTN (hypertension)  H/O total hysterectomy: 2014  History of cataract surgery: b/l 2015  History of repair of hip fracture: luisa placed in RLE 2015  H/O spinal fusion: c2-6 in 2017    FAMILY HISTORY:  No pertinent family history in first degree relatives    MEDICATIONS  (STANDING):  atorvastatin 40 milliGRAM(s) Oral at bedtime  buDESOnide 160 MICROgram(s)/formoterol 4.5 MICROgram(s) Inhaler 2 Puff(s) Inhalation two times a day  clopidogrel Tablet 75 milliGRAM(s) Oral daily  collagenase Ointment 1 Application(s) Topical two times a day  dabigatran 75 milliGRAM(s) Oral every 12 hours  furosemide    Tablet 40 milliGRAM(s) Oral daily  levalbuterol Inhalation 0.63 milliGRAM(s) Inhalation every 6 hours  metoprolol tartrate 50 milliGRAM(s) Oral two times a day  midodrine 5 milliGRAM(s) Oral every 8 hours  nystatin Powder 1 Application(s) Topical every 12 hours  pantoprazole  Injectable 40 milliGRAM(s) IV Push two times a day  thiamine 100 milliGRAM(s) Oral daily  vancomycin    Solution 125 milliGRAM(s) Oral every 8 hours    MEDICATIONS  (PRN):  acetaminophen   Tablet .. 650 milliGRAM(s) Oral every 6 hours PRN Temp greater or equal to 38C (100.4F), Mild Pain (1 - 3), Moderate Pain (4 - 6)  LORazepam     Tablet 0.5 milliGRAM(s) Oral two times a day PRN Anxiety  traMADol 50 milliGRAM(s) Oral every 8 hours PRN Severe Pain (7 - 10)      Vital Signs Last 24 Hrs  T(C): 36.3 (09 Feb 2019 05:48), Max: 36.4 (08 Feb 2019 14:11)  T(F): 97.3 (09 Feb 2019 05:48), Max: 97.6 (08 Feb 2019 22:21)  HR: 96 (09 Feb 2019 05:48) (62 - 96)  BP: 126/82 (09 Feb 2019 05:48) (106/72 - 147/82)  BP(mean): --  RR: 18 (09 Feb 2019 05:48) (16 - 18)  SpO2: 97% (09 Feb 2019 05:48) (93% - 97%)  nc/at  s1s2  cta  soft, nt, nd no guarding or rebound  no c/c/e    CBC Full  -  ( 09 Feb 2019 05:00 )  WBC Count : 13.37 K/uL  Hemoglobin : 10.3 g/dL  Hematocrit : 33.5 %  Platelet Count - Automated : 262 K/uL  Mean Cell Volume : 90.1 fL  Mean Cell Hemoglobin : 27.7 pg  Mean Cell Hemoglobin Concentration : 30.7 %  Auto Neutrophil # : 11.53 K/uL  Auto Lymphocyte # : 0.58 K/uL  Auto Monocyte # : 1.07 K/uL  Auto Eosinophil # : 0.10 K/uL  Auto Basophil # : 0.01 K/uL  Auto Neutrophil % : 86.3 %  Auto Lymphocyte % : 4.3 %  Auto Monocyte % : 8.0 %  Auto Eosinophil % : 0.7 %  Auto Basophil % : 0.1 %    02-09    139  |  96<L>  |  40<H>  ----------------------------<  88  4.6   |  36<H>  |  0.96    Ca    8.6      09 Feb 2019 05:00  Mg     2.1     02-09

## 2019-02-09 NOTE — PROGRESS NOTE ADULT - SUBJECTIVE AND OBJECTIVE BOX
no new complaints no diarrhoea  Vital Signs Last 24 Hrs  T(C): 36.3 (09 Feb 2019 19:57), Max: 36.4 (08 Feb 2019 22:21)  T(F): 97.4 (09 Feb 2019 19:57), Max: 97.6 (08 Feb 2019 22:21)  HR: 100 (09 Feb 2019 19:57) (62 - 100)  BP: 117/69 (09 Feb 2019 19:57) (89/53 - 126/82)  BP(mean): --  RR: 16 (09 Feb 2019 19:57) (16 - 18)  SpO2: 98% (09 Feb 2019 19:57) (94% - 98%)                        10.3   13.37 )-----------( 262      ( 09 Feb 2019 05:00 )             33.5   02-09    139  |  96<L>  |  40<H>  ----------------------------<  88  4.6   |  36<H>  |  0.96    Ca    8.6      09 Feb 2019 05:00  Mg     2.1     02-09    MEDICATIONS  (STANDING):  atorvastatin 40 milliGRAM(s) Oral at bedtime  buDESOnide 160 MICROgram(s)/formoterol 4.5 MICROgram(s) Inhaler 2 Puff(s) Inhalation two times a day  clopidogrel Tablet 75 milliGRAM(s) Oral daily  collagenase Ointment 1 Application(s) Topical two times a day  dabigatran 75 milliGRAM(s) Oral every 12 hours  furosemide    Tablet 40 milliGRAM(s) Oral daily  levalbuterol Inhalation 0.63 milliGRAM(s) Inhalation every 6 hours  metoprolol tartrate 50 milliGRAM(s) Oral two times a day  midodrine 5 milliGRAM(s) Oral every 8 hours  nystatin Powder 1 Application(s) Topical every 12 hours  pantoprazole  Injectable 40 milliGRAM(s) IV Push two times a day  thiamine 100 milliGRAM(s) Oral daily  vancomycin    Solution 125 milliGRAM(s) Oral every 8 hours

## 2019-02-10 LAB
ANION GAP SERPL CALC-SCNC: 9 MMO/L — SIGNIFICANT CHANGE UP (ref 7–14)
BASOPHILS # BLD AUTO: 0.01 K/UL — SIGNIFICANT CHANGE UP (ref 0–0.2)
BASOPHILS NFR BLD AUTO: 0.1 % — SIGNIFICANT CHANGE UP (ref 0–2)
BUN SERPL-MCNC: 37 MG/DL — HIGH (ref 7–23)
CALCIUM SERPL-MCNC: 8.8 MG/DL — SIGNIFICANT CHANGE UP (ref 8.4–10.5)
CHLORIDE SERPL-SCNC: 96 MMOL/L — LOW (ref 98–107)
CO2 SERPL-SCNC: 34 MMOL/L — HIGH (ref 22–31)
CREAT SERPL-MCNC: 1.05 MG/DL — SIGNIFICANT CHANGE UP (ref 0.5–1.3)
EOSINOPHIL # BLD AUTO: 0.17 K/UL — SIGNIFICANT CHANGE UP (ref 0–0.5)
EOSINOPHIL NFR BLD AUTO: 1.7 % — SIGNIFICANT CHANGE UP (ref 0–6)
GLUCOSE SERPL-MCNC: 80 MG/DL — SIGNIFICANT CHANGE UP (ref 70–99)
HCT VFR BLD CALC: 32.1 % — LOW (ref 34.5–45)
HGB BLD-MCNC: 9.7 G/DL — LOW (ref 11.5–15.5)
IMM GRANULOCYTES NFR BLD AUTO: 0.9 % — SIGNIFICANT CHANGE UP (ref 0–1.5)
LYMPHOCYTES # BLD AUTO: 0.85 K/UL — LOW (ref 1–3.3)
LYMPHOCYTES # BLD AUTO: 8.3 % — LOW (ref 13–44)
MAGNESIUM SERPL-MCNC: 2.2 MG/DL — SIGNIFICANT CHANGE UP (ref 1.6–2.6)
MCHC RBC-ENTMCNC: 27.2 PG — SIGNIFICANT CHANGE UP (ref 27–34)
MCHC RBC-ENTMCNC: 30.2 % — LOW (ref 32–36)
MCV RBC AUTO: 90.2 FL — SIGNIFICANT CHANGE UP (ref 80–100)
MONOCYTES # BLD AUTO: 0.81 K/UL — SIGNIFICANT CHANGE UP (ref 0–0.9)
MONOCYTES NFR BLD AUTO: 7.9 % — SIGNIFICANT CHANGE UP (ref 2–14)
NEUTROPHILS # BLD AUTO: 8.31 K/UL — HIGH (ref 1.8–7.4)
NEUTROPHILS NFR BLD AUTO: 81.1 % — HIGH (ref 43–77)
NRBC # FLD: 0 K/UL — LOW (ref 25–125)
PHOSPHATE SERPL-MCNC: 3.6 MG/DL — SIGNIFICANT CHANGE UP (ref 2.5–4.5)
PLATELET # BLD AUTO: 279 K/UL — SIGNIFICANT CHANGE UP (ref 150–400)
PMV BLD: 11.2 FL — SIGNIFICANT CHANGE UP (ref 7–13)
POTASSIUM SERPL-MCNC: 4.6 MMOL/L — SIGNIFICANT CHANGE UP (ref 3.5–5.3)
POTASSIUM SERPL-SCNC: 4.6 MMOL/L — SIGNIFICANT CHANGE UP (ref 3.5–5.3)
RBC # BLD: 3.56 M/UL — LOW (ref 3.8–5.2)
RBC # FLD: 18.8 % — HIGH (ref 10.3–14.5)
SODIUM SERPL-SCNC: 139 MMOL/L — SIGNIFICANT CHANGE UP (ref 135–145)
WBC # BLD: 10.24 K/UL — SIGNIFICANT CHANGE UP (ref 3.8–10.5)
WBC # FLD AUTO: 10.24 K/UL — SIGNIFICANT CHANGE UP (ref 3.8–10.5)

## 2019-02-10 RX ADMIN — LEVALBUTEROL 0.63 MILLIGRAM(S): 1.25 SOLUTION, CONCENTRATE RESPIRATORY (INHALATION) at 05:21

## 2019-02-10 RX ADMIN — Medication 50 MILLIGRAM(S): at 17:17

## 2019-02-10 RX ADMIN — BUDESONIDE AND FORMOTEROL FUMARATE DIHYDRATE 2 PUFF(S): 160; 4.5 AEROSOL RESPIRATORY (INHALATION) at 22:34

## 2019-02-10 RX ADMIN — LEVALBUTEROL 0.63 MILLIGRAM(S): 1.25 SOLUTION, CONCENTRATE RESPIRATORY (INHALATION) at 11:20

## 2019-02-10 RX ADMIN — NYSTATIN CREAM 1 APPLICATION(S): 100000 CREAM TOPICAL at 06:24

## 2019-02-10 RX ADMIN — PANTOPRAZOLE SODIUM 40 MILLIGRAM(S): 20 TABLET, DELAYED RELEASE ORAL at 06:25

## 2019-02-10 RX ADMIN — Medication 125 MILLIGRAM(S): at 06:25

## 2019-02-10 RX ADMIN — NYSTATIN CREAM 1 APPLICATION(S): 100000 CREAM TOPICAL at 17:18

## 2019-02-10 RX ADMIN — Medication 0.5 MILLIGRAM(S): at 22:35

## 2019-02-10 RX ADMIN — DABIGATRAN ETEXILATE MESYLATE 75 MILLIGRAM(S): 150 CAPSULE ORAL at 17:17

## 2019-02-10 RX ADMIN — CLOPIDOGREL BISULFATE 75 MILLIGRAM(S): 75 TABLET, FILM COATED ORAL at 12:56

## 2019-02-10 RX ADMIN — Medication 1 APPLICATION(S): at 17:18

## 2019-02-10 RX ADMIN — Medication 1 APPLICATION(S): at 06:24

## 2019-02-10 RX ADMIN — MIDODRINE HYDROCHLORIDE 5 MILLIGRAM(S): 2.5 TABLET ORAL at 22:34

## 2019-02-10 RX ADMIN — DABIGATRAN ETEXILATE MESYLATE 75 MILLIGRAM(S): 150 CAPSULE ORAL at 06:24

## 2019-02-10 RX ADMIN — MIDODRINE HYDROCHLORIDE 5 MILLIGRAM(S): 2.5 TABLET ORAL at 06:24

## 2019-02-10 RX ADMIN — ATORVASTATIN CALCIUM 40 MILLIGRAM(S): 80 TABLET, FILM COATED ORAL at 22:35

## 2019-02-10 RX ADMIN — Medication 125 MILLIGRAM(S): at 22:38

## 2019-02-10 RX ADMIN — BUDESONIDE AND FORMOTEROL FUMARATE DIHYDRATE 2 PUFF(S): 160; 4.5 AEROSOL RESPIRATORY (INHALATION) at 09:00

## 2019-02-10 RX ADMIN — Medication 100 MILLIGRAM(S): at 12:56

## 2019-02-10 RX ADMIN — Medication 125 MILLIGRAM(S): at 13:00

## 2019-02-10 RX ADMIN — Medication 40 MILLIGRAM(S): at 06:24

## 2019-02-10 RX ADMIN — Medication 50 MILLIGRAM(S): at 06:24

## 2019-02-10 RX ADMIN — Medication 0.5 MILLIGRAM(S): at 10:34

## 2019-02-10 RX ADMIN — MIDODRINE HYDROCHLORIDE 5 MILLIGRAM(S): 2.5 TABLET ORAL at 13:00

## 2019-02-10 RX ADMIN — PANTOPRAZOLE SODIUM 40 MILLIGRAM(S): 20 TABLET, DELAYED RELEASE ORAL at 17:18

## 2019-02-10 NOTE — PROGRESS NOTE ADULT - SUBJECTIVE AND OBJECTIVE BOX
Patient is a 79y old  Female who presents with a chief complaint of right foot pressure ulcer pain (10 Feb 2019 11:40)    Any change in ROS: doing ok. no respiratory distress. denies cough, sputums, sob     MEDICATIONS  (STANDING):  atorvastatin 40 milliGRAM(s) Oral at bedtime  buDESOnide 160 MICROgram(s)/formoterol 4.5 MICROgram(s) Inhaler 2 Puff(s) Inhalation two times a day  clopidogrel Tablet 75 milliGRAM(s) Oral daily  collagenase Ointment 1 Application(s) Topical two times a day  dabigatran 75 milliGRAM(s) Oral every 12 hours  furosemide    Tablet 40 milliGRAM(s) Oral daily  levalbuterol Inhalation 0.63 milliGRAM(s) Inhalation every 6 hours  metoprolol tartrate 50 milliGRAM(s) Oral two times a day  midodrine 5 milliGRAM(s) Oral every 8 hours  nystatin Powder 1 Application(s) Topical every 12 hours  pantoprazole  Injectable 40 milliGRAM(s) IV Push two times a day  thiamine 100 milliGRAM(s) Oral daily  vancomycin    Solution 125 milliGRAM(s) Oral every 8 hours    MEDICATIONS  (PRN):  acetaminophen   Tablet .. 650 milliGRAM(s) Oral every 6 hours PRN Temp greater or equal to 38C (100.4F), Mild Pain (1 - 3), Moderate Pain (4 - 6)  LORazepam     Tablet 0.5 milliGRAM(s) Oral two times a day PRN Anxiety    Vital Signs Last 24 Hrs  T(C): 36.4 (10 Feb 2019 06:19), Max: 36.4 (09 Feb 2019 14:29)  T(F): 97.6 (10 Feb 2019 06:19), Max: 97.6 (09 Feb 2019 22:00)  HR: 113 (10 Feb 2019 06:19) (72 - 113)  BP: 105/66 (10 Feb 2019 06:19) (89/53 - 124/74)  BP(mean): --  RR: 16 (10 Feb 2019 06:19) (16 - 18)  SpO2: 96% (10 Feb 2019 06:19) (96% - 100%)    I&O's Summary    09 Feb 2019 07:01  -  10 Feb 2019 07:00  --------------------------------------------------------  IN: 1030 mL / OUT: 0 mL / NET: 1030 mL      Physical Exam:   GENERAL: chronic ill apperance with no apparent distress.   HEENT: Head is normocephalic and atraumatic.    NECK: Supple with no elevated JVP.  LUNGS: Crackles   HEART: S1 and S2 present without murmur.  ABDOMEN: Soft, nontender, and nondistended.   EXTREMITIES: No edema or calf tenderness.  NEUROLOGIC: Grossly intact.    Labs:                              9.7    10.24 )-----------( 279      ( 10 Feb 2019 06:32 )             32.1                         10.3   13.37 )-----------( 262      ( 09 Feb 2019 05:00 )             33.5                         10.5   14.01 )-----------( 308      ( 07 Feb 2019 03:40 )             33.4     02-10    139  |  96<L>  |  37<H>  ----------------------------<  80  4.6   |  34<H>  |  1.05  02-09    139  |  96<L>  |  40<H>  ----------------------------<  88  4.6   |  36<H>  |  0.96  02-07    141  |  98  |  51<H>  ----------------------------<  108<H>  5.1   |  32<H>  |  0.94    Ca    8.8      10 Feb 2019 06:32  Ca    8.6      09 Feb 2019 05:00  Phos  3.6     02-10  Mg     2.2     02-10  Mg     2.1     02-09      CAPILLARY BLOOD GLUCOSE    Studies  Chest X-RAY  < from: Xray Chest 1 View- PORTABLE-Routine (02.06.19 @ 14:48) >  EXAM:  XR CHEST PORTABLE ROUTINE 1V        PROCEDURE DATE:  Feb 6 2019         INTERPRETATION:  EXAMINATION: XR CHEST PORTABLE ROUTINE 1V    CLINICAL INDICATION:  Cough.     TECHNIQUE: Frontal radiograph of the chest was obtained on 2/6/2019     COMPARISON: Chest radiograph 1/28/2019.    IMPRESSION:     The cardiomediastinal silhouette is not well evaluated in this   projection. Loculated small right pleural effusion. 7 increase in size   since the prior study. Small left pleural effusion. There is no   pneumothorax. Bilateral opacities are unchanged compared to prior   examination on 1/28/2019 compatible with pulmonary edema. There are   degenerative changes of the visualized thoracic spine. Aortic   calcifications.    < end of copied text >    CT SCAN Chest   < from: CT Chest No Cont (01.29.19 @ 19:48) >    EXAM:  CT CHEST        PROCEDURE DATE:  Jan 29 2019         INTERPRETATION:  CLINICAL INFORMATION: CHF. Positive flu. Crackles.   Possible pneumonia.    COMPARISON: CT chest 1/24/2019    PROCEDURE:   CT of the Chest was performed without intravenous contrast.  Sagittal and coronal reformats were performed.      FINDINGS:    CHEST:     LUNGS AND LARGE AIRWAYS: Debris noted within the trachea. Debris also   noted within the left lower lobar bronchi new since the prior study with   associated patchy opacity involving the anterior segment of the left   lower lobe progressed since the prior study representing atelectasis   and/or pneumonia. Severe centrilobular emphysema. Right basilar   subsegmental atelectasis. Linear atelectasis within the lingula. There is   mild interlobular septal thickening secondary to pulmonary edema with   mild interval improvement. Small nodular or tree-in-bud opacities noted   in the bilateral upper lobes likely secondary to  impacted distal airways   some of which are new since the prior study.  PLEURA: Partially loculated small bilateral pleural effusions appear   decreased in size from prior exam.  VESSELS: Atherosclerotic calcifications of the aorta  HEART: Cardiomegaly. No pericardial effusion. Coronary artery   calcifications. Aortic valvular calcifications. Mitral annular   calcifications.  MEDIASTINUM AND GEOVANI: Scattered subcentimeter lymph nodes.  CHEST WALL AND LOWER NECK: Heterogenous appearing thyroid gland.  VISUALIZED UPPER ABDOMEN: Partially visualized left renal cyst.  BONES: Degenerative changes of the spine. Old healed rib fractures.    IMPRESSION:     Partially loculated small bilateral pleural effusions appear decreased in   size from prior exam. Mild interlobular septal thickeningsecondary to   mild pulmonary edema also improved.    Multiple small nodular opacities or tree-in-bud opacities noted within   the bilateral upper lobes secondary to  impacted distal airways, many of   which are new since the January 24, 2019 likely of infectious etiology.    Debris noted within the trachea and left lower lobar bronchi new since   the prior study with associated left lower lobe patchy opacity with mild   interval increase in size representing atelectasis and/or pneumonia. The   constellation of findings are suggestive of aspiration.    < end of copied text >    Venous Dopplers: LE:   < from: [facility] Duplex Lower Ext Vein Scan, Right (12.18.18 @ 19:54) >    EXAM:  DUPLEX EXT VEINS LOWER RT                            PROCEDURE DATE:  12/18/2018            INTERPRETATION:  CLINICAL INFORMATION: Lower extremity swelling    COMPARISON: None available.    TECHNIQUE: Duplex sonography of the RIGHT LOWER extremity with color and   spectral Doppler, with and without compression.      FINDINGS:    There is normal compressibility of the right common femoral, femoral and   popliteal veins. No calf vein thrombosis is detected.    The contralateral common femoral vein is patent.    Doppler examination shows normal spontaneous and phasic flow.    Mild subcutaneous edema.    IMPRESSION:     No evidence of right lower extremity deep venous thrombosis.    Mild subcutaneous edema.    < end of copied text >    CT Abdomen  < from: CT Abdomen and Pelvis No Cont (02.06.19 @ 20:22) >    EXAM:  CT ABDOMEN AND PELVIS        PROCEDURE DATE:  Feb 6 2019         INTERPRETATION:  CLINICAL INFORMATION: Abdominal pain. History of   hypertension, hyperlipidemia, coronary artery disease, CHF, COPD, atrial   fibrillation presented with right foot pressure ulcer. C. difficile   positive.    COMPARISON: CT abdomen and pelvis from 1/4/2019.    PROCEDURE:   CT of the Abdomen and Pelvis was performed without intravenous contrast.   Intravenous contrast: None.  Oral contrast: None.  Sagittal and coronal reformats were performed.    FINDINGS:    LOWER CHEST: Moderate right and small left loculated pleural effusions   with adjacent atelectasis, right greater than left. Cardiomegaly.   Coronary calcifications.    LIVER: Small caudate lobe hepatic cyst. Multiple hypodense liver lesions,   too small to characterize.  BILE DUCTS: Normal caliber.  GALLBLADDER: Within normal limits.  SPLEEN: Within normal limits.  PANCREAS: Within normal limits.  ADRENALS: Within normal limits.  KIDNEYS/URETERS: Within normal limits.    BLADDER: Within normal limits.  REPRODUCTIVE ORGANS: Status post hysterectomy. No adnexal masses.    BOWEL: No bowel obstruction. Interval resolution of previously noted   pancolitis. Stool-filled colon and cecum. Borderline dilated appendix   which is decreased in caliber compared to prior examination. No evidence   of acute appendicitis.  PERITONEUM: No ascites.  VESSELS:  Atherosclerosis. 3.1 cm infrarenal abdominal aortic aneurysm.  RETROPERITONEUM: No lymphadenopathy.    ABDOMINAL WALL: Mild anasarca.  BONES: Degenerative changes of thoracic and lumbar spine. Chronic   compression fracture of the L1 vertebral body. Status post intramedullary   nail fixation of the right femur with adjacent heterotopic ossification.    IMPRESSION:   Interval resolution of previously seen pancolitis.    < end of copied text >    Others  < from: Transthoracic Echocardiogram (01.17.19 @ 17:11) >  Patient name: BEVERLEY DEL RIO  YOB: 1940   Age: 78 (F)   MR#: [id number]  Study Date: 1/17/2019  Location: O/PSonographer: HOWARD Smith  Study quality: Technically good  Referring Physician: Not Available Doctor, MD  Blood Pressure: 123/90 mmHg  Height: 157 cm  Weight: 57 kg  BSA: 1.6 m2  ------------------------------------------------------------------------  PROCEDURE: Transthoracic echocardiogram with 2-D, M-Mode  and complete spectral and color flow Doppler.  INDICATION: Abnormal electrocardiogram (ECG) (EKG) (R94.31)  ------------------------------------------------------------------------  DIMENSIONS:  Dimensions:     Normal Values:  LA:     4.9 cm    2.0 - 4.0 cm  Ao:     3.2 cm    2.0 - 3.8 cm  SEPTUM: 0.7 cm0.6 - 1.2 cm  PWT:    0.7 cm    0.6 - 1.1 cm  LVIDd:  3.8 cm    3.0 - 5.6 cm  LVIDs:  2.6 cm    1.8 - 4.0 cm  Derived Variables:  LVMI: 46 g/m2  RWT: 0.36  Fractional short: 32 %  Ejection Fraction (Teicholtz): 60 %  ------------------------------------------------------------------------  OBSERVATIONS:  Mitral Valve: Mitral annular calcification, otherwise  normal mitral valve. Mild-moderate mitral regurgitation.  Aortic Root: Normal aortic root.  Aortic Valve: Calcified trileaflet aortic valve with normal  opening. Mild aortic regurgitation.  Left Atrium: Severely dilated left atrium.  LA volume index  = 50 cc/m2.  Left Ventricle: Normal left ventricular systolic function.  No segmental wall motion abnormalities. Normal left  ventricular internal dimensions and wall thicknesses.  Right Heart: Moderate right atrial enlargement. Normal  right ventricular size and function. Normal tricuspid  valve.  Moderate tricuspid regurgitation. Normal pulmonic  valve.  Mild pulmonic regurgitation.  Pericardium/PleuraNormal pericardium with no pericardial  effusion.  Hemodynamic: Estimated right ventricular systolic pressure  equals 53 mm Hg, assuming right atrial pressure equals 10  mm Hg, consistent with moderate pulmonary hypertension.  ------------------------------------------------------------------------  CONCLUSIONS:  1. Mitral annular calcification, otherwise normal mitral  valve. Mild-moderate mitral regurgitation.  2. Calcified trileaflet aortic valve with normal opening.  Mild aortic regurgitation.  3. Severely dilated left atrium.  LA volume index = 50  cc/m2.  4. Normal left ventricular internal dimensions and wall  thicknesses.  5. Normal left ventricular systolic function. No segmental  wall motion abnormalities.  6. Moderate right atrial enlargement.  7. Normal right ventricular size and function.  8. Estimated right ventricular systolic pressure equals 53  mm Hg, assuming right atrial pressure equals 10 mm Hg,  consistent with moderate pulmonary hypertension.  *** Compared with echocardiogram of 1/31/2018, no  significant changes noted.  ----------------------------------------------------------    < end of copied text > Patient is a 79y old  Female who presents with a chief complaint of right foot pressure ulcer pain (10 Feb 2019 11:40)    Any change in ROS: doing ok. no respiratory distress. denies cough, sputums, sob     MEDICATIONS  (STANDING):  atorvastatin 40 milliGRAM(s) Oral at bedtime  buDESOnide 160 MICROgram(s)/formoterol 4.5 MICROgram(s) Inhaler 2 Puff(s) Inhalation two times a day  clopidogrel Tablet 75 milliGRAM(s) Oral daily  collagenase Ointment 1 Application(s) Topical two times a day  dabigatran 75 milliGRAM(s) Oral every 12 hours  furosemide    Tablet 40 milliGRAM(s) Oral daily  levalbuterol Inhalation 0.63 milliGRAM(s) Inhalation every 6 hours  metoprolol tartrate 50 milliGRAM(s) Oral two times a day  midodrine 5 milliGRAM(s) Oral every 8 hours  nystatin Powder 1 Application(s) Topical every 12 hours  pantoprazole  Injectable 40 milliGRAM(s) IV Push two times a day  thiamine 100 milliGRAM(s) Oral daily  vancomycin    Solution 125 milliGRAM(s) Oral every 8 hours    MEDICATIONS  (PRN):  acetaminophen   Tablet .. 650 milliGRAM(s) Oral every 6 hours PRN Temp greater or equal to 38C (100.4F), Mild Pain (1 - 3), Moderate Pain (4 - 6)  LORazepam     Tablet 0.5 milliGRAM(s) Oral two times a day PRN Anxiety    Vital Signs Last 24 Hrs  T(C): 36.4 (10 Feb 2019 06:19), Max: 36.4 (09 Feb 2019 14:29)  T(F): 97.6 (10 Feb 2019 06:19), Max: 97.6 (09 Feb 2019 22:00)  HR: 113 (10 Feb 2019 06:19) (72 - 113)  BP: 105/66 (10 Feb 2019 06:19) (89/53 - 124/74)  BP(mean): --  RR: 16 (10 Feb 2019 06:19) (16 - 18)  SpO2: 96% (10 Feb 2019 06:19) (96% - 100%)    I&O's Summary    09 Feb 2019 07:01  -  10 Feb 2019 07:00  --------------------------------------------------------  IN: 1030 mL / OUT: 0 mL / NET: 1030 mL      Physical Exam:   GENERAL: chronic ill apperance with no apparent distress.   HEENT: Head is normocephalic and atraumatic.    NECK: Supple with no elevated JVP.  LUNGS: Crackles   HEART: S1 and S2 present without murmur.  ABDOMEN: Soft, nontender, and nondistended.   EXTREMITIES: No edema or calf tenderness.  NEUROLOGIC: Grossly intact.    Labs:                              9.7    10.24 )-----------( 279      ( 10 Feb 2019 06:32 )             32.1                         10.3   13.37 )-----------( 262      ( 09 Feb 2019 05:00 )             33.5                         10.5   14.01 )-----------( 308      ( 07 Feb 2019 03:40 )             33.4     02-10    139  |  96<L>  |  37<H>  ----------------------------<  80  4.6   |  34<H>  |  1.05  02-09    139  |  96<L>  |  40<H>  ----------------------------<  88  4.6   |  36<H>  |  0.96  02-07    141  |  98  |  51<H>  ----------------------------<  108<H>  5.1   |  32<H>  |  0.94    Ca    8.8      10 Feb 2019 06:32  Ca    8.6      09 Feb 2019 05:00  Phos  3.6     02-10  Mg     2.2     02-10  Mg     2.1     02-09      CAPILLARY BLOOD GLUCOSE    Studies  Chest X-RAY  < from: Xray Chest 1 View- PORTABLE-Routine (02.06.19 @ 14:48) >  EXAM:  XR CHEST PORTABLE ROUTINE 1V        PROCEDURE DATE:  Feb 6 2019         INTERPRETATION:  EXAMINATION: XR CHEST PORTABLE ROUTINE 1V    CLINICAL INDICATION:  Cough.     TECHNIQUE: Frontal radiograph of the chest was obtained on 2/6/2019     COMPARISON: Chest radiograph 1/28/2019.    IMPRESSION:     The cardiomediastinal silhouette is not well evaluated in this   projection. Loculated small right pleural effusion. 7 increase in size   since the prior study. Small left pleural effusion. There is no   pneumothorax. Bilateral opacities are unchanged compared to prior   examination on 1/28/2019 compatible with pulmonary edema. There are   degenerative changes of the visualized thoracic spine. Aortic   calcifications.    < end of copied text >    CT SCAN Chest   < from: CT Chest No Cont (01.29.19 @ 19:48) >    EXAM:  CT CHEST        PROCEDURE DATE:  Jan 29 2019         INTERPRETATION:  CLINICAL INFORMATION: CHF. Positive flu. Crackles.   Possible pneumonia.    COMPARISON: CT chest 1/24/2019    PROCEDURE:   CT of the Chest was performed without intravenous contrast.  Sagittal and coronal reformats were performed.      FINDINGS:    CHEST:     LUNGS AND LARGE AIRWAYS: Debris noted within the trachea. Debris also   noted within the left lower lobar bronchi new since the prior study with   associated patchy opacity involving the anterior segment of the left   lower lobe progressed since the prior study representing atelectasis   and/or pneumonia. Severe centrilobular emphysema. Right basilar   subsegmental atelectasis. Linear atelectasis within the lingula. There is   mild interlobular septal thickening secondary to pulmonary edema with   mild interval improvement. Small nodular or tree-in-bud opacities noted   in the bilateral upper lobes likely secondary to  impacted distal airways   some of which are new since the prior study.  PLEURA: Partially loculated small bilateral pleural effusions appear   decreased in size from prior exam.  VESSELS: Atherosclerotic calcifications of the aorta  HEART: Cardiomegaly. No pericardial effusion. Coronary artery   calcifications. Aortic valvular calcifications. Mitral annular   calcifications.  MEDIASTINUM AND GEOVANI: Scattered subcentimeter lymph nodes.  CHEST WALL AND LOWER NECK: Heterogenous appearing thyroid gland.  VISUALIZED UPPER ABDOMEN: Partially visualized left renal cyst.  BONES: Degenerative changes of the spine. Old healed rib fractures.    IMPRESSION:     Partially loculated small bilateral pleural effusions appear decreased in   size from prior exam. Mild interlobular septal thickeningsecondary to   mild pulmonary edema also improved.    Multiple small nodular opacities or tree-in-bud opacities noted within   the bilateral upper lobes secondary to  impacted distal airways, many of   which are new since the January 24, 2019 likely of infectious etiology.    Debris noted within the trachea and left lower lobar bronchi new since   the prior study with associated left lower lobe patchy opacity with mild   interval increase in size representing atelectasis and/or pneumonia. The   constellation of findings are suggestive of aspiration.    < end of copied text >    Venous Dopplers: LE:   < from: [facility] Duplex Lower Ext Vein Scan, Right (12.18.18 @ 19:54) >    EXAM:  DUPLEX EXT VEINS LOWER RT                            PROCEDURE DATE:  12/18/2018            INTERPRETATION:  CLINICAL INFORMATION: Lower extremity swelling    COMPARISON: None available.    TECHNIQUE: Duplex sonography of the RIGHT LOWER extremity with color and   spectral Doppler, with and without compression.      FINDINGS:    There is normal compressibility of the right common femoral, femoral and   popliteal veins. No calf vein thrombosis is detected.    The contralateral common femoral vein is patent.    Doppler examination shows normal spontaneous and phasic flow.    Mild subcutaneous edema.    IMPRESSION:     No evidence of right lower extremity deep venous thrombosis.    Mild subcutaneous edema.    < end of copied text >    CT Abdomen  < from: CT Abdomen and Pelvis No Cont (02.06.19 @ 20:22) >    EXAM:  CT ABDOMEN AND PELVIS        PROCEDURE DATE:  Feb 6 2019         INTERPRETATION:  CLINICAL INFORMATION: Abdominal pain. History of   hypertension, hyperlipidemia, coronary artery disease, CHF, COPD, atrial   fibrillation presented with right foot pressure ulcer. C. difficile   positive.    COMPARISON: CT abdomen and pelvis from 1/4/2019.    PROCEDURE:   CT of the Abdomen and Pelvis was performed without intravenous contrast.   Intravenous contrast: None.  Oral contrast: None.  Sagittal and coronal reformats were performed.    FINDINGS:    LOWER CHEST: Moderate right and small left loculated pleural effusions   with adjacent atelectasis, right greater than left. Cardiomegaly.   Coronary calcifications.    LIVER: Small caudate lobe hepatic cyst. Multiple hypodense liver lesions,   too small to characterize.  BILE DUCTS: Normal caliber.  GALLBLADDER: Within normal limits.  SPLEEN: Within normal limits.  PANCREAS: Within normal limits.  ADRENALS: Within normal limits.  KIDNEYS/URETERS: Within normal limits.    BLADDER: Within normal limits.  REPRODUCTIVE ORGANS: Status post hysterectomy. No adnexal masses.    BOWEL: No bowel obstruction. Interval resolution of previously noted   pancolitis. Stool-filled colon and cecum. Borderline dilated appendix   which is decreased in caliber compared to prior examination. No evidence   of acute appendicitis.  PERITONEUM: No ascites.  VESSELS:  Atherosclerosis. 3.1 cm infrarenal abdominal aortic aneurysm.  RETROPERITONEUM: No lymphadenopathy.    ABDOMINAL WALL: Mild anasarca.  BONES: Degenerative changes of thoracic and lumbar spine. Chronic   compression fracture of the L1 vertebral body. Status post intramedullary   nail fixation of the right femur with adjacent heterotopic ossification.    IMPRESSION:   Interval resolution of previously seen pancolitis.    < end of copied text >    Others  < from: Transthoracic Echocardiogram (01.17.19 @ 17:11) >  Patient name: BEVERLEY DEL RIO  YOB: 1940   Age: 78 (F)   MR#: [id number]  Study Date: 1/17/2019  Location: O/PSonographer: HOWARD Smith  Study quality: Technically good  Referring Physician: Not Available Doctor, MD  Blood Pressure: 123/90 mmHg  Height: 157 cm  Weight: 57 kg  BSA: 1.6 m2  ------------------------------------------------------------------------  PROCEDURE: Transthoracic echocardiogram with 2-D, M-Mode  and complete spectral and color flow Doppler.  INDICATION: Abnormal electrocardiogram (ECG) (EKG) (R94.31)  ------------------------------------------------------------------------  DIMENSIONS:  Dimensions:     Normal Values:  LA:     4.9 cm    2.0 - 4.0 cm  Ao:     3.2 cm    2.0 - 3.8 cm  SEPTUM: 0.7 cm0.6 - 1.2 cm  PWT:    0.7 cm    0.6 - 1.1 cm  LVIDd:  3.8 cm    3.0 - 5.6 cm  LVIDs:  2.6 cm    1.8 - 4.0 cm  Derived Variables:  LVMI: 46 g/m2  RWT: 0.36  Fractional short: 32 %  Ejection Fraction (Teicholtz): 60 %  ------------------------------------------------------------------------  OBSERVATIONS:  Mitral Valve: Mitral annular calcification, otherwise  normal mitral valve. Mild-moderate mitral regurgitation.  Aortic Root: Normal aortic root.  Aortic Valve: Calcified trileaflet aortic valve with normal  opening. Mild aortic regurgitation.  Left Atrium: Severely dilated left atrium.  LA volume index  = 50 cc/m2.  Left Ventricle: Normal left ventricular systolic function.  No segmental wall motion abnormalities. Normal left  ventricular internal dimensions and wall thicknesses.  Right Heart: Moderate right atrial enlargement. Normal  right ventricular size and function. Normal tricuspid  valve.  Moderate tricuspid regurgitation. Normal pulmonic  valve.  Mild pulmonic regurgitation.  Pericardium/PleuraNormal pericardium with no pericardial  effusion.  Hemodynamic: Estimated right ventricular systolic pressure  equals 53 mm Hg, assuming right atrial pressure equals 10  mm Hg, consistent with moderate pulmonary hypertension.  ------------------------------------------------------------------------  CONCLUSIONS:  1. Mitral annular calcification, otherwise normal mitral  valve. Mild-moderate mitral regurgitation.  2. Calcified trileaflet aortic valve with normal opening.  Mild aortic regurgitation.  3. Severely dilated left atrium.  LA volume index = 50  cc/m2.  4. Normal left ventricular internal dimensions and wall  thicknesses.  5. Normal left ventricular systolic function. No segmental  wall motion abnormalities.  6. Moderate right atrial enlargement.  7. Normal right ventricular size and function.  8. Estimated right ventricular systolic pressure equals 53  mm Hg, assuming right atrial pressure equals 10 mm Hg,  consistent with moderate pulmonary hypertension.  *** Compared with echocardiogram of 1/31/2018, no  significant changes noted.  ----------------------------------------------------------    < end of copied text >          < from: Xray Chest 1 View- PORTABLE-Routine (02.06.19 @ 14:48) >    PROCEDURE DATE:  Feb 6 2019         INTERPRETATION:  EXAMINATION: XR CHEST PORTABLE ROUTINE 1V    CLINICAL INDICATION:  Cough.     TECHNIQUE: Frontal radiograph of the chest was obtained on 2/6/2019     COMPARISON: Chest radiograph 1/28/2019.    IMPRESSION:     The cardiomediastinal silhouette is not well evaluated in this   projection. Loculated small right pleural effusion. 7 increase in size   since the prior study. Small left pleural effusion. There is no   pneumothorax. Bilateral opacities are unchanged compared to prior   examination on 1/28/2019 compatible with pulmonary edema. There are   degenerative changes of the visualized thoracic spine. Aortic   calcifications.              KAMRON SMALLS M.D., RADIOLOGY RESIDENT  This document has been electronically signed.  ZACH MORROW M.D. ATTENDING RADIOLOGIST  This document has been electronically signed. Feb 6 2019  4:20PM        < end of copied text >

## 2019-02-10 NOTE — PROGRESS NOTE ADULT - SUBJECTIVE AND OBJECTIVE BOX
CARDIOLOGY FOLLOW UP NOTE - DR. MARTINEZ    Subjective:    no chest pain, sob    PHYSICAL EXAM:  T(C): 36.4 (02-10-19 @ 06:19), Max: 36.4 (02-09-19 @ 14:29)  HR: 113 (02-10-19 @ 06:19) (72 - 113)  BP: 105/66 (02-10-19 @ 06:19) (89/53 - 124/74)  RR: 16 (02-10-19 @ 06:19) (16 - 18)  SpO2: 96% (02-10-19 @ 06:19) (96% - 100%)  Wt(kg): --  I&O's Summary    09 Feb 2019 07:01  -  10 Feb 2019 07:00  --------------------------------------------------------  IN: 1030 mL / OUT: 0 mL / NET: 1030 mL        Appearance: Normal	  Cardiovascular: Normal S1 S2, irreg  Respiratory: Lungs clear to auscultation	  Gastrointestinal:  Soft, Non-tender, + BS	  Extremities: Normal range of motion, No clubbing, cyanosis or edema  Vascular: Peripheral pulses palpable 2+ bilaterally    MEDICATIONS  (STANDING):  atorvastatin 40 milliGRAM(s) Oral at bedtime  buDESOnide 160 MICROgram(s)/formoterol 4.5 MICROgram(s) Inhaler 2 Puff(s) Inhalation two times a day  clopidogrel Tablet 75 milliGRAM(s) Oral daily  collagenase Ointment 1 Application(s) Topical two times a day  dabigatran 75 milliGRAM(s) Oral every 12 hours  furosemide    Tablet 40 milliGRAM(s) Oral daily  levalbuterol Inhalation 0.63 milliGRAM(s) Inhalation every 6 hours  metoprolol tartrate 50 milliGRAM(s) Oral two times a day  midodrine 5 milliGRAM(s) Oral every 8 hours  nystatin Powder 1 Application(s) Topical every 12 hours  pantoprazole  Injectable 40 milliGRAM(s) IV Push two times a day  thiamine 100 milliGRAM(s) Oral daily  vancomycin    Solution 125 milliGRAM(s) Oral every 8 hours      TELEMETRY: 	    ECG:  	  RADIOLOGY:   DIAGNOSTIC TESTING:  [ ] Echocardiogram:  [ ] Catheterization:  [ ] Stress Test:    OTHER: 	    LABS:	 	    CARDIAC MARKERS:                                9.7    10.24 )-----------( 279      ( 10 Feb 2019 06:32 )             32.1     02-10    139  |  96<L>  |  37<H>  ----------------------------<  80  4.6   |  34<H>  |  1.05    Ca    8.8      10 Feb 2019 06:32  Phos  3.6     02-10  Mg     2.2     02-10      proBNP:     Lipid Profile:   HgA1c:

## 2019-02-10 NOTE — PROGRESS NOTE ADULT - SUBJECTIVE AND OBJECTIVE BOX
BEVERLEY DEL RIO:[id number],   79yFemale followed for:  No Known Allergies    PAST MEDICAL & SURGICAL HISTORY:  CAD (coronary artery disease): s/p stent 2018  CHF (congestive heart failure)  COPD (chronic obstructive pulmonary disease): on 2-3L O2 at home prn  Anxiety  TIA (transient ischemic attack): many years ago  PAF (paroxysmal atrial fibrillation)  HLD (hyperlipidemia)  HTN (hypertension)  H/O total hysterectomy: 2014  History of cataract surgery: b/l 2015  History of repair of hip fracture: luisa placed in RLE 2015  H/O spinal fusion: c2-6 in 2017    FAMILY HISTORY:  No pertinent family history in first degree relatives    MEDICATIONS  (STANDING):  atorvastatin 40 milliGRAM(s) Oral at bedtime  buDESOnide 160 MICROgram(s)/formoterol 4.5 MICROgram(s) Inhaler 2 Puff(s) Inhalation two times a day  clopidogrel Tablet 75 milliGRAM(s) Oral daily  collagenase Ointment 1 Application(s) Topical two times a day  dabigatran 75 milliGRAM(s) Oral every 12 hours  furosemide    Tablet 40 milliGRAM(s) Oral daily  levalbuterol Inhalation 0.63 milliGRAM(s) Inhalation every 6 hours  metoprolol tartrate 50 milliGRAM(s) Oral two times a day  midodrine 5 milliGRAM(s) Oral every 8 hours  nystatin Powder 1 Application(s) Topical every 12 hours  pantoprazole  Injectable 40 milliGRAM(s) IV Push two times a day  thiamine 100 milliGRAM(s) Oral daily  vancomycin    Solution 125 milliGRAM(s) Oral every 8 hours    MEDICATIONS  (PRN):  acetaminophen   Tablet .. 650 milliGRAM(s) Oral every 6 hours PRN Temp greater or equal to 38C (100.4F), Mild Pain (1 - 3), Moderate Pain (4 - 6)  LORazepam     Tablet 0.5 milliGRAM(s) Oral two times a day PRN Anxiety      Vital Signs Last 24 Hrs  T(C): 36.4 (10 Feb 2019 06:19), Max: 36.4 (09 Feb 2019 14:29)  T(F): 97.6 (10 Feb 2019 06:19), Max: 97.6 (09 Feb 2019 22:00)  HR: 113 (10 Feb 2019 06:19) (72 - 113)  BP: 105/66 (10 Feb 2019 06:19) (89/53 - 124/74)  BP(mean): --  RR: 16 (10 Feb 2019 06:19) (16 - 18)  SpO2: 96% (10 Feb 2019 06:19) (96% - 100%)  nc/at  s1s2  cta  soft, nt, nd no guarding or rebound  no c/c/e    CBC Full  -  ( 10 Feb 2019 06:32 )  WBC Count : 10.24 K/uL  Hemoglobin : 9.7 g/dL  Hematocrit : 32.1 %  Platelet Count - Automated : 279 K/uL  Mean Cell Volume : 90.2 fL  Mean Cell Hemoglobin : 27.2 pg  Mean Cell Hemoglobin Concentration : 30.2 %  Auto Neutrophil # : 8.31 K/uL  Auto Lymphocyte # : 0.85 K/uL  Auto Monocyte # : 0.81 K/uL  Auto Eosinophil # : 0.17 K/uL  Auto Basophil # : 0.01 K/uL  Auto Neutrophil % : 81.1 %  Auto Lymphocyte % : 8.3 %  Auto Monocyte % : 7.9 %  Auto Eosinophil % : 1.7 %  Auto Basophil % : 0.1 %    02-10    139  |  96<L>  |  37<H>  ----------------------------<  80  4.6   |  34<H>  |  1.05    Ca    8.8      10 Feb 2019 06:32  Phos  3.6     02-10  Mg     2.2     02-10

## 2019-02-10 NOTE — PROGRESS NOTE ADULT - ASSESSMENT
chest xray 1/28/19:  revealed multifocal airspace disease, may reflect aveolar component of edema.    a/p   79 y/o female, with a PmHx of COPD (on 2-3L O2 prn), paroxsymal a-fib (on pradaxa), CAD (s/p stent 11/2018), DCHF, HTN, HLD, and anxiety, presenting with acute/chronic diastolic chf, chest pain r/o acs, GIB, non healing pressure ulcer. Hospital course now complicated ruling in + RVP and CDiff / uti.     1. Atypical chest pain, resolved  in the setting of acute CHF exacerbation, afib w/ RVR   cv stable, no evidence of acute ischemia/ACS   Echo with nl lv fxn  continue statin, bb, plavix     2. Acute/chronic diastolic chf  on supplemental O2. denies SOB, volume status is stable   ct chest revealed decreased bl pleural effusion, mutiple small nodular opacities secondary to impacted distal airways likely infectious etiology? pna LLL, aspiration    + rvp  repeat cxr with unchanged b/l pulm edema - cont lasix 40mg daily   cont bb  pulm f/u   echo with nl lv fxn    3. AFIB  dig on hold due to hx of pauses and bradycardia   continue with  midodrine 5 mg q 8 hrs for bp support   continue with lopressor to 50 mg BID  CHADS 3 - continue with pradaxa     4. CAD s/p PCI (11/2018)  cv stable, no evidence of acute ischemia   continue bb, statin, continue plavix given recent PCI      5. GIB   +GUIAC , +anemia s/p 1uprbc,   continue to trend cbc  GI f/u      6. non healing foot ulcer  med f/u  ID f/u , vascular studies noted, pod f/u  STONEY/PVR poor, 0.60 on the effected limb with flat waveforms.  -  Vasc sx consult noted, planning RLE angio once c. diff clears    7. IGOR/CKD   creat improved, continue lasix daily    renal f/u     8.Cdiff   po abx, id f/u    7. hypoxia    likely in the setting of resp infection ? aspiration   no decomp CHF on exam    + influenza s/p Tamiflu  ct chest revealed decreased bl pleural effusion, mutiple small nodular opacities secondary to impacted distal airways likely infectious etiology? pna LLL, aspiration    pulm f/u, s/p steroids   s/p speech and swallow eval     8. UTI    med f/u     dvt ppx

## 2019-02-10 NOTE — PROGRESS NOTE ADULT - SUBJECTIVE AND OBJECTIVE BOX
no new complaints feels better  Vital Signs Last 24 Hrs  T(C): 36.9 (10 Feb 2019 22:33), Max: 37.2 (10 Feb 2019 14:39)  T(F): 98.5 (10 Feb 2019 22:33), Max: 99 (10 Feb 2019 14:39)  HR: 71 (10 Feb 2019 22:33) (71 - 113)  BP: 96/58 (10 Feb 2019 22:33) (94/60 - 105/66)  BP(mean): --  RR: 18 (10 Feb 2019 22:33) (16 - 18)  SpO2: 100% (10 Feb 2019 22:33) (93% - 100%)                        9.7    10.24 )-----------( 279      ( 10 Feb 2019 06:32 )             32.1   02-10    139  |  96<L>  |  37<H>  ----------------------------<  80  4.6   |  34<H>  |  1.05    Ca    8.8      10 Feb 2019 06:32  Phos  3.6     02-10  Mg     2.2     02-10  MEDICATIONS  (STANDING):  atorvastatin 40 milliGRAM(s) Oral at bedtime  buDESOnide 160 MICROgram(s)/formoterol 4.5 MICROgram(s) Inhaler 2 Puff(s) Inhalation two times a day  clopidogrel Tablet 75 milliGRAM(s) Oral daily  collagenase Ointment 1 Application(s) Topical two times a day  dabigatran 75 milliGRAM(s) Oral every 12 hours  furosemide    Tablet 40 milliGRAM(s) Oral daily  levalbuterol Inhalation 0.63 milliGRAM(s) Inhalation every 6 hours  metoprolol tartrate 50 milliGRAM(s) Oral two times a day  midodrine 5 milliGRAM(s) Oral every 8 hours  nystatin Powder 1 Application(s) Topical every 12 hours  pantoprazole  Injectable 40 milliGRAM(s) IV Push two times a day  thiamine 100 milliGRAM(s) Oral daily  vancomycin    Solution 125 milliGRAM(s) Oral every 8 hours

## 2019-02-10 NOTE — PROGRESS NOTE ADULT - SUBJECTIVE AND OBJECTIVE BOX
[facility] NEPHROLOGY- PROGRESS NOTE    78y Female with history of CHF, COPD presents with SOB found to have guaiac positive anemia and afib with RVR. Nephrology consulted for elevated Scr.    REVIEW OF SYSTEMS:  Gen: no changes in weight  Cards: no chest pain  Resp: + dyspnea- improving.   GI: no nausea or vomiting, + diarrhea resolved  Vascular: no LE edema +R foot pain    No Known Allergies      Hospital Medications: Medications reviewed    VITALS:  T(F): 97.6 (02-10-19 @ 06:19), Max: 97.6 (02-09-19 @ 22:00)  HR: 113 (02-10-19 @ 06:19)  BP: 105/66 (02-10-19 @ 06:19)  RR: 16 (02-10-19 @ 06:19)  SpO2: 96% (02-10-19 @ 06:19)  Wt(kg): --    02-09 @ 07:01  -  02-10 @ 07:00  --------------------------------------------------------  IN: 1030 mL / OUT: 0 mL / NET: 1030 mL        PHYSICAL EXAM:    Gen: NAD  Cards: Irregularly irregular, +S1/S2, no M/G/R  Resp: course BS, bibasilar rales R>L  GI: soft, NT/ND, NABS  Vascular: no LE edema B/L    LABS:  02-10    139  |  96<L>  |  37<H>  ----------------------------<  80  4.6   |  34<H>  |  1.05    Ca    8.8      10 Feb 2019 06:32  Phos  3.6     02-10  Mg     2.2     02-10      Creatinine Trend: 1.05 <--, 0.96 <--, 0.94 <--, 0.97 <--, 1.04 <--, 1.11 <--                        9.7    10.24 )-----------( 279      ( 10 Feb 2019 06:32 )             32.1     Urine Studies:

## 2019-02-10 NOTE — PROGRESS NOTE ADULT - ASSESSMENT
77 y/o female, with a PmHx of COPD (on 2-3L O2 prn), paroxsymal a-fib (on pradaxa), HTN, HLD, and anxiety, presenting to the [facility] ED with right foot pressure ulcer pain, SOB, and CP, found to have Afib @102 bpm, H/H 8/24.7, mild interstitial pulmonary edema, proBNP 1374, guiac positive, BUN/Cr 36/1.68, admitted to telemetry for nonhealing pressure ulcer, Afib, CHF exacerbation, r/o ACS.

## 2019-02-11 LAB
ANION GAP SERPL CALC-SCNC: 8 MMO/L — SIGNIFICANT CHANGE UP (ref 7–14)
BASE EXCESS BLDV CALC-SCNC: 9 MMOL/L — SIGNIFICANT CHANGE UP
BASOPHILS # BLD AUTO: 0.01 K/UL — SIGNIFICANT CHANGE UP (ref 0–0.2)
BASOPHILS NFR BLD AUTO: 0.1 % — SIGNIFICANT CHANGE UP (ref 0–2)
BUN SERPL-MCNC: 41 MG/DL — HIGH (ref 7–23)
CALCIUM SERPL-MCNC: 8.4 MG/DL — SIGNIFICANT CHANGE UP (ref 8.4–10.5)
CHLORIDE SERPL-SCNC: 97 MMOL/L — LOW (ref 98–107)
CO2 SERPL-SCNC: 32 MMOL/L — HIGH (ref 22–31)
CREAT SERPL-MCNC: 1.08 MG/DL — SIGNIFICANT CHANGE UP (ref 0.5–1.3)
EOSINOPHIL # BLD AUTO: 0.15 K/UL — SIGNIFICANT CHANGE UP (ref 0–0.5)
EOSINOPHIL NFR BLD AUTO: 1.4 % — SIGNIFICANT CHANGE UP (ref 0–6)
GLUCOSE SERPL-MCNC: 115 MG/DL — HIGH (ref 70–99)
HCO3 BLDV-SCNC: 32 MMOL/L — HIGH (ref 20–27)
HCT VFR BLD CALC: 30.6 % — LOW (ref 34.5–45)
HGB BLD-MCNC: 9.4 G/DL — LOW (ref 11.5–15.5)
IMM GRANULOCYTES NFR BLD AUTO: 0.6 % — SIGNIFICANT CHANGE UP (ref 0–1.5)
LYMPHOCYTES # BLD AUTO: 0.61 K/UL — LOW (ref 1–3.3)
LYMPHOCYTES # BLD AUTO: 5.7 % — LOW (ref 13–44)
MAGNESIUM SERPL-MCNC: 2.2 MG/DL — SIGNIFICANT CHANGE UP (ref 1.6–2.6)
MCHC RBC-ENTMCNC: 27.9 PG — SIGNIFICANT CHANGE UP (ref 27–34)
MCHC RBC-ENTMCNC: 30.7 % — LOW (ref 32–36)
MCV RBC AUTO: 90.8 FL — SIGNIFICANT CHANGE UP (ref 80–100)
MONOCYTES # BLD AUTO: 0.82 K/UL — SIGNIFICANT CHANGE UP (ref 0–0.9)
MONOCYTES NFR BLD AUTO: 7.6 % — SIGNIFICANT CHANGE UP (ref 2–14)
NEUTROPHILS # BLD AUTO: 9.07 K/UL — HIGH (ref 1.8–7.4)
NEUTROPHILS NFR BLD AUTO: 84.6 % — HIGH (ref 43–77)
NRBC # FLD: 0 K/UL — LOW (ref 25–125)
PCO2 BLDV: 58 MMHG — HIGH (ref 41–51)
PH BLDV: 7.39 PH — SIGNIFICANT CHANGE UP (ref 7.32–7.43)
PLATELET # BLD AUTO: 260 K/UL — SIGNIFICANT CHANGE UP (ref 150–400)
PMV BLD: 11.3 FL — SIGNIFICANT CHANGE UP (ref 7–13)
PO2 BLDV: 40 MMHG — SIGNIFICANT CHANGE UP (ref 35–40)
POTASSIUM SERPL-MCNC: 4.6 MMOL/L — SIGNIFICANT CHANGE UP (ref 3.5–5.3)
POTASSIUM SERPL-SCNC: 4.6 MMOL/L — SIGNIFICANT CHANGE UP (ref 3.5–5.3)
RBC # BLD: 3.37 M/UL — LOW (ref 3.8–5.2)
RBC # FLD: 19.6 % — HIGH (ref 10.3–14.5)
SAO2 % BLDV: 67.5 % — SIGNIFICANT CHANGE UP (ref 60–85)
SODIUM SERPL-SCNC: 137 MMOL/L — SIGNIFICANT CHANGE UP (ref 135–145)
WBC # BLD: 10.72 K/UL — HIGH (ref 3.8–10.5)
WBC # FLD AUTO: 10.72 K/UL — HIGH (ref 3.8–10.5)

## 2019-02-11 PROCEDURE — 71045 X-RAY EXAM CHEST 1 VIEW: CPT | Mod: 26

## 2019-02-11 RX ORDER — FUROSEMIDE 40 MG
40 TABLET ORAL DAILY
Qty: 0 | Refills: 0 | Status: DISCONTINUED | OUTPATIENT
Start: 2019-02-11 | End: 2019-02-13

## 2019-02-11 RX ORDER — TRAMADOL HYDROCHLORIDE 50 MG/1
25 TABLET ORAL EVERY 8 HOURS
Qty: 0 | Refills: 0 | Status: DISCONTINUED | OUTPATIENT
Start: 2019-02-11 | End: 2019-02-15

## 2019-02-11 RX ADMIN — MIDODRINE HYDROCHLORIDE 5 MILLIGRAM(S): 2.5 TABLET ORAL at 13:34

## 2019-02-11 RX ADMIN — Medication 40 MILLIGRAM(S): at 18:05

## 2019-02-11 RX ADMIN — MIDODRINE HYDROCHLORIDE 5 MILLIGRAM(S): 2.5 TABLET ORAL at 22:08

## 2019-02-11 RX ADMIN — Medication 0.5 MILLIGRAM(S): at 22:08

## 2019-02-11 RX ADMIN — Medication 1 APPLICATION(S): at 05:54

## 2019-02-11 RX ADMIN — MIDODRINE HYDROCHLORIDE 5 MILLIGRAM(S): 2.5 TABLET ORAL at 05:53

## 2019-02-11 RX ADMIN — Medication 125 MILLIGRAM(S): at 05:53

## 2019-02-11 RX ADMIN — TRAMADOL HYDROCHLORIDE 25 MILLIGRAM(S): 50 TABLET ORAL at 16:51

## 2019-02-11 RX ADMIN — TRAMADOL HYDROCHLORIDE 25 MILLIGRAM(S): 50 TABLET ORAL at 02:45

## 2019-02-11 RX ADMIN — DABIGATRAN ETEXILATE MESYLATE 75 MILLIGRAM(S): 150 CAPSULE ORAL at 05:53

## 2019-02-11 RX ADMIN — CLOPIDOGREL BISULFATE 75 MILLIGRAM(S): 75 TABLET, FILM COATED ORAL at 13:34

## 2019-02-11 RX ADMIN — TRAMADOL HYDROCHLORIDE 25 MILLIGRAM(S): 50 TABLET ORAL at 01:46

## 2019-02-11 RX ADMIN — Medication 0.5 MILLIGRAM(S): at 13:48

## 2019-02-11 RX ADMIN — BUDESONIDE AND FORMOTEROL FUMARATE DIHYDRATE 2 PUFF(S): 160; 4.5 AEROSOL RESPIRATORY (INHALATION) at 08:54

## 2019-02-11 RX ADMIN — Medication 125 MILLIGRAM(S): at 13:33

## 2019-02-11 RX ADMIN — Medication 40 MILLIGRAM(S): at 05:53

## 2019-02-11 RX ADMIN — Medication 125 MILLIGRAM(S): at 22:08

## 2019-02-11 RX ADMIN — Medication 100 MILLIGRAM(S): at 13:34

## 2019-02-11 RX ADMIN — LEVALBUTEROL 0.63 MILLIGRAM(S): 1.25 SOLUTION, CONCENTRATE RESPIRATORY (INHALATION) at 11:51

## 2019-02-11 RX ADMIN — NYSTATIN CREAM 1 APPLICATION(S): 100000 CREAM TOPICAL at 05:54

## 2019-02-11 RX ADMIN — Medication 50 MILLIGRAM(S): at 05:53

## 2019-02-11 RX ADMIN — PANTOPRAZOLE SODIUM 40 MILLIGRAM(S): 20 TABLET, DELAYED RELEASE ORAL at 05:54

## 2019-02-11 RX ADMIN — TRAMADOL HYDROCHLORIDE 25 MILLIGRAM(S): 50 TABLET ORAL at 16:11

## 2019-02-11 RX ADMIN — BUDESONIDE AND FORMOTEROL FUMARATE DIHYDRATE 2 PUFF(S): 160; 4.5 AEROSOL RESPIRATORY (INHALATION) at 22:08

## 2019-02-11 RX ADMIN — ATORVASTATIN CALCIUM 40 MILLIGRAM(S): 80 TABLET, FILM COATED ORAL at 22:08

## 2019-02-11 NOTE — PROGRESS NOTE ADULT - ASSESSMENT
chest xray 1/28/19:  revealed multifocal airspace disease, may reflect aveolar component of edema.    a/p   77 y/o female, with a PmHx of COPD (on 2-3L O2 prn), paroxsymal a-fib (on pradaxa), CAD (s/p stent 11/2018), DCHF, HTN, HLD, and anxiety, presenting with acute/chronic diastolic chf, chest pain r/o acs, GIB, non healing pressure ulcer. Hospital course now complicated ruling in + RVP and CDiff / uti.     1. Atypical chest pain, resolved  in the setting of acute CHF exacerbation, afib w/ RVR   cv stable, no evidence of acute ischemia/ACS   Echo with nl lv fxn  continue statin, bb, plavix     2. Acute/chronic diastolic chf  on supplemental O2. denies SOB, volume status is stable   ct chest revealed decreased bl pleural effusion, mutiple small nodular opacities secondary to impacted distal airways likely infectious etiology? pna LLL, aspiration    + rvp  repeat cxr with unchanged b/l pulm edema - cont lasix 40mg daily   cont bb  pulm f/u   echo with nl lv fxn    3. AFIB  dig on hold due to hx of pauses and bradycardia   continue with  midodrine 5 mg q 8 hrs for bp support   rate controlled, continue with lopressor to 50 mg BID  CHADS 3 - continue with pradaxa     4. CAD s/p PCI (11/2018)  cv stable, no evidence of acute ischemia   continue bb, statin, continue plavix given recent PCI      5. GIB   +GUIAC , +anemia s/p 1unit prbc,   continue to trend cbc  GI f/u      6. non healing foot ulcer  med f/u  ID f/u , vascular studies noted, pod f/u  STONEY/PVR poor, 0.60 on the effected limb with flat waveforms.  -  Vasc sx consult noted, planning RLE angio once c. diff clears    7. IGOR/CKD   creat improved, continue lasix daily    renal f/u     8.Cdiff   po abx, id f/u    7. hypoxia    likely in the setting of resp infection ? aspiration   no decomp CHF on exam    + influenza s/p Tamiflu  ct chest revealed decreased bl pleural effusion, mutiple small nodular opacities secondary to impacted distal airways likely infectious etiology? pna LLL, aspiration    pulm f/u, s/p steroids   s/p speech and swallow eval     8. UTI    med f/u     dvt ppx

## 2019-02-11 NOTE — PROGRESS NOTE ADULT - SUBJECTIVE AND OBJECTIVE BOX
CARDIOLOGY FOLLOW UP - Dr. Vieyra    CC no cp or sob        PHYSICAL EXAM:  T(C): 36.8 (02-11-19 @ 05:52), Max: 37.2 (02-10-19 @ 14:39)  HR: 73 (02-11-19 @ 05:52) (71 - 88)  BP: 123/65 (02-11-19 @ 05:52) (94/60 - 123/65)  RR: 18 (02-11-19 @ 05:52) (18 - 18)  SpO2: 96% (02-11-19 @ 05:52) (93% - 100%)  Wt(kg): --  I&O's Summary    11 Feb 2019 07:01  -  11 Feb 2019 11:55  --------------------------------------------------------  IN: 234 mL / OUT: 0 mL / NET: 234 mL        Appearance: Normal	  Cardiovascular: Normal S1 S2 irregular   Respiratory: Lungs clear to auscultation/ diminished at base   Gastrointestinal:  Soft, Non-tender, + BS	  Extremities: Normal range of motion, No clubbing, cyanosis or edema        MEDICATIONS  (STANDING):  atorvastatin 40 milliGRAM(s) Oral at bedtime  buDESOnide 160 MICROgram(s)/formoterol 4.5 MICROgram(s) Inhaler 2 Puff(s) Inhalation two times a day  clopidogrel Tablet 75 milliGRAM(s) Oral daily  collagenase Ointment 1 Application(s) Topical two times a day  dabigatran 75 milliGRAM(s) Oral every 12 hours  furosemide    Tablet 40 milliGRAM(s) Oral daily  levalbuterol Inhalation 0.63 milliGRAM(s) Inhalation every 6 hours  metoprolol tartrate 50 milliGRAM(s) Oral two times a day  midodrine 5 milliGRAM(s) Oral every 8 hours  nystatin Powder 1 Application(s) Topical every 12 hours  pantoprazole  Injectable 40 milliGRAM(s) IV Push two times a day  thiamine 100 milliGRAM(s) Oral daily  vancomycin    Solution 125 milliGRAM(s) Oral every 8 hours      TELEMETRY: afib HR 80-90 	    ECG:  	  RADIOLOGY:   DIAGNOSTIC TESTING:  [ ] Echocardiogram:  [ ]  Catheterization:  [ ] Stress Test:    OTHER: 	  < from: Xray Chest 1 View- PORTABLE-Routine (02.11.19 @ 06:54) >  Impression:    Unchanged pulmonary edema and right pleural effusion        < end of copied text >    LABS:	 	                                9.4    10.72 )-----------( 260      ( 11 Feb 2019 06:17 )             30.6     02-11    137  |  97<L>  |  41<H>  ----------------------------<  115<H>  4.6   |  32<H>  |  1.08    Ca    8.4      11 Feb 2019 06:17  Phos  3.6     02-10  Mg     2.2     02-11

## 2019-02-11 NOTE — PROGRESS NOTE ADULT - SUBJECTIVE AND OBJECTIVE BOX
HPI:  79 y/o female, with a PmHx of COPD (on 2-3L O2 prn), paroxsymal a-fib (on pradaxa), CAD (s/p stent 11/2018), CHF, HTN, HLD, and anxiety, presenting to the [facility] ED with right foot pressure ulcer pain x several weeks. The patient has a dime-sized ulcer with scant serosanguinous drainage on her right calcaneous that she acquired from a shoe weeks ago. She reports that she woke up last night around 4am with excruciating, unrelenting right root pain with SOB x a few minutes followed by chest pain x several hours. The patient reports that the ulcer pain began to gradually worsen since her last hospital visit, where she was d/c with vancomycin. The foot pain is sharp, 10/10, and radiates up to her calf. The patient took two puffs of her Symbicort for the SOB with no relief, but it resolved spontaneously within a few minutes. The chest pain began gradually and was described as a midsternal, 8/10, tightness with no radiation and accompanied by palpitations. Of note, the patient reports chronic cough, b/l LE edema, JACKSON with walking 1/2 block, orthopnea x1 pillow, occasional PND, melena x weeks, easy bruising/bleeding, and 10 lbs weight loss over the past 6 months with no change in appetite. The patient denies fever, chills, SOB at rest, diaphoresis, dizziness, claudication, wheezing, changes in vision and hearing, abdominal pain, change in bowel and urinary habits, dysuria, hematuria. (17 Jan 2019 12:50)    Patient is a 78y old  Female who presents with a chief complaint of right foot pressure ulcer pain (21 Jan 2019 09:09)    Allergies    No Known Allergies    Intolerances      Vital Signs Last 24 Hrs  T(C): 36.8 (21 Jan 2019 05:34), Max: 36.8 (21 Jan 2019 05:34)  T(F): 98.3 (21 Jan 2019 05:34), Max: 98.3 (21 Jan 2019 05:34)  HR: 79 (21 Jan 2019 05:34) (56 - 79)  BP: 102/53 (21 Jan 2019 05:34) (102/53 - 118/69)  BP(mean): --  RR: 18 (21 Jan 2019 05:34) (18 - 18)  SpO2: 100% (21 Jan 2019 05:34) (95% - 100%)                        9.9    9.80  )-----------( 221      ( 21 Jan 2019 00:40 )             31.4     01-20    133<L>  |  93<L>  |  36<H>  ----------------------------<  103<H>  4.3   |  26  |  1.87<H>    Ca    8.3<L>      20 Jan 2019 05:50  Mg     1.8     01-20      CAPILLARY BLOOD GLUCOSE        MEDICATIONS  (STANDING):  atorvastatin 40 milliGRAM(s) Oral at bedtime  buDESOnide  80 MICROgram(s)/formoterol 4.5 MICROgram(s) Inhaler 2 Puff(s) Inhalation two times a day  clopidogrel Tablet 75 milliGRAM(s) Oral daily  digoxin     Tablet 0.125 milliGRAM(s) Oral daily  furosemide    Tablet 40 milliGRAM(s) Oral daily  heparin  Infusion.  Unit(s)/Hr (11 mL/Hr) IV Continuous <Continuous>  metoprolol tartrate 12.5 milliGRAM(s) Oral every 12 hours  pantoprazole  Injectable 40 milliGRAM(s) IV Push two times a day  senna 2 Tablet(s) Oral at bedtime    MEDICATIONS  (PRN):  acetaminophen   Tablet .. 650 milliGRAM(s) Oral every 6 hours PRN Mild Pain (1 - 3), Moderate Pain (4 - 6), Severe Pain (7 - 10)  heparin  Injectable 4500 Unit(s) IV Push every 6 hours PRN For aPTT less than 40  heparin  Injectable 2000 Unit(s) IV Push every 6 hours PRN For aPTT between 40 - 57  LORazepam     Tablet 1 milliGRAM(s) Oral two times a day PRN Anxiety  traMADol 50 milliGRAM(s) Oral every 8 hours PRN Severe Pain (7 - 10)    PAST MEDICAL & SURGICAL HISTORY:  CAD (coronary artery disease): s/p stent 2018  CHF (congestive heart failure)  COPD (chronic obstructive pulmonary disease): on 2-3L O2 at home prn  Anxiety  TIA (transient ischemic attack): many years ago  PAF (paroxysmal atrial fibrillation)  HLD (hyperlipidemia)  HTN (hypertension)  H/O total hysterectomy: 2014  History of cataract surgery: b/l 2015  History of repair of hip fracture: luisa placed in RLE 2015  H/O spinal fusion: c2-6 in 2017        MOISES:  Posterior heel ulceration, partial thickness granular base.    No local signs of infection. No probe to bone.  No drainage or malodor.  ++pain on exam  Pedal pulses non palp bilateral lower extremities  Sensation intact to b/l feet  No gross deformities

## 2019-02-11 NOTE — PROGRESS NOTE ADULT - SUBJECTIVE AND OBJECTIVE BOX
BEVERLEY DEL RIO:[id number],   79yFemale followed for:  No Known Allergies    PAST MEDICAL & SURGICAL HISTORY:  CAD (coronary artery disease): s/p stent 2018  CHF (congestive heart failure)  COPD (chronic obstructive pulmonary disease): on 2-3L O2 at home prn  Anxiety  TIA (transient ischemic attack): many years ago  PAF (paroxysmal atrial fibrillation)  HLD (hyperlipidemia)  HTN (hypertension)  H/O total hysterectomy: 2014  History of cataract surgery: b/l 2015  History of repair of hip fracture: luisa placed in RLE 2015  H/O spinal fusion: c2-6 in 2017    FAMILY HISTORY:  No pertinent family history in first degree relatives    MEDICATIONS  (STANDING):  atorvastatin 40 milliGRAM(s) Oral at bedtime  buDESOnide 160 MICROgram(s)/formoterol 4.5 MICROgram(s) Inhaler 2 Puff(s) Inhalation two times a day  clopidogrel Tablet 75 milliGRAM(s) Oral daily  collagenase Ointment 1 Application(s) Topical two times a day  dabigatran 75 milliGRAM(s) Oral every 12 hours  furosemide    Tablet 40 milliGRAM(s) Oral daily  levalbuterol Inhalation 0.63 milliGRAM(s) Inhalation every 6 hours  metoprolol tartrate 50 milliGRAM(s) Oral two times a day  midodrine 5 milliGRAM(s) Oral every 8 hours  nystatin Powder 1 Application(s) Topical every 12 hours  pantoprazole  Injectable 40 milliGRAM(s) IV Push two times a day  thiamine 100 milliGRAM(s) Oral daily  vancomycin    Solution 125 milliGRAM(s) Oral every 8 hours    MEDICATIONS  (PRN):  acetaminophen   Tablet .. 650 milliGRAM(s) Oral every 6 hours PRN Temp greater or equal to 38C (100.4F), Mild Pain (1 - 3), Moderate Pain (4 - 6)  LORazepam     Tablet 0.5 milliGRAM(s) Oral two times a day PRN Anxiety  traMADol 25 milliGRAM(s) Oral every 8 hours PRN Severe Pain (7 - 10)      Vital Signs Last 24 Hrs  T(C): 36.8 (11 Feb 2019 05:52), Max: 37.2 (10 Feb 2019 14:39)  T(F): 98.2 (11 Feb 2019 05:52), Max: 99 (10 Feb 2019 14:39)  HR: 73 (11 Feb 2019 05:52) (71 - 88)  BP: 123/65 (11 Feb 2019 05:52) (94/60 - 123/65)  BP(mean): --  RR: 18 (11 Feb 2019 05:52) (18 - 18)  SpO2: 96% (11 Feb 2019 05:52) (93% - 100%)  nc/at  s1s2  cta  soft, nt, nd no guarding or rebound  no c/c/e    CBC Full  -  ( 11 Feb 2019 06:17 )  WBC Count : 10.72 K/uL  Hemoglobin : 9.4 g/dL  Hematocrit : 30.6 %  Platelet Count - Automated : 260 K/uL  Mean Cell Volume : 90.8 fL  Mean Cell Hemoglobin : 27.9 pg  Mean Cell Hemoglobin Concentration : 30.7 %  Auto Neutrophil # : 9.07 K/uL  Auto Lymphocyte # : 0.61 K/uL  Auto Monocyte # : 0.82 K/uL  Auto Eosinophil # : 0.15 K/uL  Auto Basophil # : 0.01 K/uL  Auto Neutrophil % : 84.6 %  Auto Lymphocyte % : 5.7 %  Auto Monocyte % : 7.6 %  Auto Eosinophil % : 1.4 %  Auto Basophil % : 0.1 %    02-11    137  |  97<L>  |  41<H>  ----------------------------<  115<H>  4.6   |  32<H>  |  1.08    Ca    8.4      11 Feb 2019 06:17  Phos  3.6     02-10  Mg     2.2     02-11

## 2019-02-11 NOTE — PROGRESS NOTE ADULT - SUBJECTIVE AND OBJECTIVE BOX
Patient is a 79y old  Female who presents with a chief complaint of right foot pressure ulcer pain (11 Feb 2019 12:09)      Any change in ROS: Pt is doing ok : no wheezing     MEDICATIONS  (STANDING):  atorvastatin 40 milliGRAM(s) Oral at bedtime  buDESOnide 160 MICROgram(s)/formoterol 4.5 MICROgram(s) Inhaler 2 Puff(s) Inhalation two times a day  clopidogrel Tablet 75 milliGRAM(s) Oral daily  collagenase Ointment 1 Application(s) Topical two times a day  dabigatran 75 milliGRAM(s) Oral every 12 hours  furosemide   Injectable 40 milliGRAM(s) IV Push daily  levalbuterol Inhalation 0.63 milliGRAM(s) Inhalation every 6 hours  metoprolol tartrate 50 milliGRAM(s) Oral two times a day  midodrine 5 milliGRAM(s) Oral every 8 hours  nystatin Powder 1 Application(s) Topical every 12 hours  pantoprazole  Injectable 40 milliGRAM(s) IV Push two times a day  thiamine 100 milliGRAM(s) Oral daily  vancomycin    Solution 125 milliGRAM(s) Oral every 8 hours    MEDICATIONS  (PRN):  acetaminophen   Tablet .. 650 milliGRAM(s) Oral every 6 hours PRN Temp greater or equal to 38C (100.4F), Mild Pain (1 - 3), Moderate Pain (4 - 6)  LORazepam     Tablet 0.5 milliGRAM(s) Oral two times a day PRN Anxiety  traMADol 25 milliGRAM(s) Oral every 8 hours PRN Severe Pain (7 - 10)    Vital Signs Last 24 Hrs  T(C): 36.8 (11 Feb 2019 05:52), Max: 37.2 (10 Feb 2019 14:39)  T(F): 98.2 (11 Feb 2019 05:52), Max: 99 (10 Feb 2019 14:39)  HR: 73 (11 Feb 2019 05:52) (71 - 88)  BP: 123/65 (11 Feb 2019 05:52) (94/60 - 123/65)  BP(mean): --  RR: 18 (11 Feb 2019 05:52) (18 - 18)  SpO2: 96% (11 Feb 2019 05:52) (93% - 100%)    I&O's Summary    11 Feb 2019 07:01  -  11 Feb 2019 12:48  --------------------------------------------------------  IN: 234 mL / OUT: 0 mL / NET: 234 mL          Physical Exam:   GENERAL: NAD, well-groomed, well-developed  HEENT: COLEEN/   Atraumatic, Normocephalic  ENMT: No tonsillar erythema, exudates, or enlargement; Moist mucous membranes, Good dentition, No lesions  NECK: Supple, No JVD, Normal thyroid  CHEST/LUNG: Bibasilar crackles +  CVS: Regular rate and rhythm; No murmurs, rubs, or gallops  GI: : Soft, Nontender, Nondistended; Bowel sounds present  NERVOUS SYSTEM:  Alert & Oriented X3  EXTREMITIES:  -edema  LYMPH: No lymphadenopathy noted  SKIN: No rashes or lesions  ENDOCRINOLOGY: No Thyromegaly  PSYCH: Appropriate    Labs:  32                            9.4    10.72 )-----------( 260      ( 11 Feb 2019 06:17 )             30.6                         9.7    10.24 )-----------( 279      ( 10 Feb 2019 06:32 )             32.1                         10.3   13.37 )-----------( 262      ( 09 Feb 2019 05:00 )             33.5     02-11    137  |  97<L>  |  41<H>  ----------------------------<  115<H>  4.6   |  32<H>  |  1.08  02-10    139  |  96<L>  |  37<H>  ----------------------------<  80  4.6   |  34<H>  |  1.05  02-09    139  |  96<L>  |  40<H>  ----------------------------<  88  4.6   |  36<H>  |  0.96    Ca    8.4      11 Feb 2019 06:17  Ca    8.8      10 Feb 2019 06:32  Phos  3.6     02-10  Mg     2.2     02-11  Mg     2.2     02-10      CAPILLARY BLOOD GLUCOSE          < from: Xray Chest 1 View- PORTABLE-Routine (02.11.19 @ 06:54) >  Portable chest xray: COPD    Comparison: Most recent prior     FINDINGS:    Lines/Tubes: None    Heart and mediastinum:  Unchanged.    Lungs, pleura,and airways: Unchanged interstitial edema. Stable right   effusion    Bones and soft tissues: The bones and soft tissues are unchanged.    Impression:    Unchanged pulmonary edema and right pleural effusion                  ALEKSANDAR HARMON M.D., ATTENDING RADIOLOGIST  This document has been electronically signed. Feb 11 2019 10:10AM        < end of copied text >              RECENT CULTURES:        RESPIRATORY CULTURES:          Studies  Chest X-RAY  CT SCAN Chest   Venous Dopplers: LE:   CT Abdomen  Others

## 2019-02-11 NOTE — PROGRESS NOTE ADULT - ASSESSMENT
79 y/o female, with a PmHx of COPD (on 2-3L O2 prn), paroxsymal a-fib (on pradaxa), HTN, HLD, and anxiety, presenting to the [facility] ED with right foot pressure ulcer pain, SOB, and CP, found to have Afib @102 bpm, H/H 8/24.7, mild interstitial pulmonary edema, proBNP 1374, guiac positive, BUN/Cr 36/1.68, admitted to telemetry for nonhealing pressure ulcer, Afib, CHF exacerbation, r/o ACS.

## 2019-02-11 NOTE — PROGRESS NOTE ADULT - SUBJECTIVE AND OBJECTIVE BOX
[facility] NEPHROLOGY- PROGRESS NOTE    78y Female with history of CHF, COPD presents with SOB found to have guaiac positive anemia and afib with RVR. Nephrology consulted for elevated Scr.    REVIEW OF SYSTEMS:  Gen: no changes in weight  Cards: no chest pain  Resp: + dyspnea- improving.   GI: no nausea or vomiting, + diarrhea improving, + poor PO intake  Vascular: no LE edema +R foot pain    No Known Allergies      Hospital Medications: Medications reviewed      VITALS:  T(F): 98.2 (02-11-19 @ 05:52), Max: 99 (02-10-19 @ 14:39)  HR: 73 (02-11-19 @ 05:52)  BP: 123/65 (02-11-19 @ 05:52)  RR: 18 (02-11-19 @ 05:52)  SpO2: 96% (02-11-19 @ 05:52)  Wt(kg): --    02-11 @ 07:01  -  02-11 @ 12:10  --------------------------------------------------------  IN: 234 mL / OUT: 0 mL / NET: 234 mL        PHYSICAL EXAM:    Gen: NAD  Cards: Irregularly irregular, +S1/S2, no M/G/R  Resp: course BS, bibasilar rales R>L  GI: soft, NT/ND, NABS  Vascular: no LE edema B/L      LABS:  02-11    137  |  97<L>  |  41<H>  ----------------------------<  115<H>  4.6   |  32<H>  |  1.08    Ca    8.4      11 Feb 2019 06:17  Phos  3.6     02-10  Mg     2.2     02-11      Creatinine Trend: 1.08 <--, 1.05 <--, 0.96 <--, 0.94 <--, 0.97 <--, 1.04 <--                        9.4    10.72 )-----------( 260      ( 11 Feb 2019 06:17 )             30.6     Urine Studies:        < from: Xray Chest 1 View- PORTABLE-Routine (02.11.19 @ 06:54) >  Impression:    Unchanged pulmonary edema and right pleural effusion    < end of copied text >

## 2019-02-11 NOTE — PROGRESS NOTE ADULT - SUBJECTIVE AND OBJECTIVE BOX
no new complaints  ICU Vital Signs Last 24 Hrs  T(C): 36.7 (11 Feb 2019 15:19), Max: 36.9 (10 Feb 2019 22:33)  T(F): 98.1 (11 Feb 2019 15:19), Max: 98.5 (10 Feb 2019 22:33)  HR: 71 (11 Feb 2019 18:02) (71 - 88)  BP: 107/66 (11 Feb 2019 18:02) (96/58 - 123/65)  BP(mean): --  ABP: --  ABP(mean): --  RR: 18 (11 Feb 2019 15:19) (18 - 18)  SpO2: 99% (11 Feb 2019 15:19) (96% - 100%)                        9.4    10.72 )-----------( 260      ( 11 Feb 2019 06:17 )             30.6   02-11    137  |  97<L>  |  41<H>  ----------------------------<  115<H>  4.6   |  32<H>  |  1.08    Ca    8.4      11 Feb 2019 06:17  Phos  3.6     02-10  Mg     2.2     02-11  MEDICATIONS  (STANDING):  atorvastatin 40 milliGRAM(s) Oral at bedtime  buDESOnide 160 MICROgram(s)/formoterol 4.5 MICROgram(s) Inhaler 2 Puff(s) Inhalation two times a day  clopidogrel Tablet 75 milliGRAM(s) Oral daily  collagenase Ointment 1 Application(s) Topical two times a day  dabigatran 75 milliGRAM(s) Oral every 12 hours  furosemide   Injectable 40 milliGRAM(s) IV Push daily  levalbuterol Inhalation 0.63 milliGRAM(s) Inhalation every 6 hours  metoprolol tartrate 50 milliGRAM(s) Oral two times a day  midodrine 5 milliGRAM(s) Oral every 8 hours  nystatin Powder 1 Application(s) Topical every 12 hours  pantoprazole  Injectable 40 milliGRAM(s) IV Push two times a day  thiamine 100 milliGRAM(s) Oral daily  vancomycin    Solution 125 milliGRAM(s) Oral every 8 hours

## 2019-02-12 LAB
ANION GAP SERPL CALC-SCNC: 9 MMO/L — SIGNIFICANT CHANGE UP (ref 7–14)
BUN SERPL-MCNC: 44 MG/DL — HIGH (ref 7–23)
CALCIUM SERPL-MCNC: 8.7 MG/DL — SIGNIFICANT CHANGE UP (ref 8.4–10.5)
CHLORIDE SERPL-SCNC: 95 MMOL/L — LOW (ref 98–107)
CO2 SERPL-SCNC: 33 MMOL/L — HIGH (ref 22–31)
CREAT SERPL-MCNC: 1.13 MG/DL — SIGNIFICANT CHANGE UP (ref 0.5–1.3)
GLUCOSE SERPL-MCNC: 96 MG/DL — SIGNIFICANT CHANGE UP (ref 70–99)
HCT VFR BLD CALC: 30.9 % — LOW (ref 34.5–45)
HGB BLD-MCNC: 9.5 G/DL — LOW (ref 11.5–15.5)
MAGNESIUM SERPL-MCNC: 2.3 MG/DL — SIGNIFICANT CHANGE UP (ref 1.6–2.6)
MCHC RBC-ENTMCNC: 27.9 PG — SIGNIFICANT CHANGE UP (ref 27–34)
MCHC RBC-ENTMCNC: 30.7 % — LOW (ref 32–36)
MCV RBC AUTO: 90.6 FL — SIGNIFICANT CHANGE UP (ref 80–100)
NRBC # FLD: 0.02 K/UL — LOW (ref 25–125)
PLATELET # BLD AUTO: 260 K/UL — SIGNIFICANT CHANGE UP (ref 150–400)
PMV BLD: 11 FL — SIGNIFICANT CHANGE UP (ref 7–13)
POTASSIUM SERPL-MCNC: 4.2 MMOL/L — SIGNIFICANT CHANGE UP (ref 3.5–5.3)
POTASSIUM SERPL-SCNC: 4.2 MMOL/L — SIGNIFICANT CHANGE UP (ref 3.5–5.3)
RBC # BLD: 3.41 M/UL — LOW (ref 3.8–5.2)
RBC # FLD: 19.6 % — HIGH (ref 10.3–14.5)
SODIUM SERPL-SCNC: 137 MMOL/L — SIGNIFICANT CHANGE UP (ref 135–145)
WBC # BLD: 11.08 K/UL — HIGH (ref 3.8–10.5)
WBC # FLD AUTO: 11.08 K/UL — HIGH (ref 3.8–10.5)

## 2019-02-12 RX ORDER — ONDANSETRON 8 MG/1
4 TABLET, FILM COATED ORAL ONCE
Qty: 0 | Refills: 0 | Status: COMPLETED | OUTPATIENT
Start: 2019-02-12 | End: 2019-02-13

## 2019-02-12 RX ORDER — LACTOBACILLUS ACIDOPHILUS 100MM CELL
1 CAPSULE ORAL
Qty: 0 | Refills: 0 | Status: DISCONTINUED | OUTPATIENT
Start: 2019-02-12 | End: 2019-02-15

## 2019-02-12 RX ADMIN — LEVALBUTEROL 0.63 MILLIGRAM(S): 1.25 SOLUTION, CONCENTRATE RESPIRATORY (INHALATION) at 09:45

## 2019-02-12 RX ADMIN — Medication 125 MILLIGRAM(S): at 14:48

## 2019-02-12 RX ADMIN — PANTOPRAZOLE SODIUM 40 MILLIGRAM(S): 20 TABLET, DELAYED RELEASE ORAL at 05:53

## 2019-02-12 RX ADMIN — Medication 40 MILLIGRAM(S): at 05:50

## 2019-02-12 RX ADMIN — CLOPIDOGREL BISULFATE 75 MILLIGRAM(S): 75 TABLET, FILM COATED ORAL at 14:48

## 2019-02-12 RX ADMIN — DABIGATRAN ETEXILATE MESYLATE 75 MILLIGRAM(S): 150 CAPSULE ORAL at 05:50

## 2019-02-12 RX ADMIN — Medication 125 MILLIGRAM(S): at 05:53

## 2019-02-12 RX ADMIN — MIDODRINE HYDROCHLORIDE 5 MILLIGRAM(S): 2.5 TABLET ORAL at 14:48

## 2019-02-12 RX ADMIN — Medication 1 TABLET(S): at 17:52

## 2019-02-12 RX ADMIN — NYSTATIN CREAM 1 APPLICATION(S): 100000 CREAM TOPICAL at 18:15

## 2019-02-12 RX ADMIN — LEVALBUTEROL 0.63 MILLIGRAM(S): 1.25 SOLUTION, CONCENTRATE RESPIRATORY (INHALATION) at 16:15

## 2019-02-12 RX ADMIN — PANTOPRAZOLE SODIUM 40 MILLIGRAM(S): 20 TABLET, DELAYED RELEASE ORAL at 17:53

## 2019-02-12 RX ADMIN — TRAMADOL HYDROCHLORIDE 25 MILLIGRAM(S): 50 TABLET ORAL at 05:51

## 2019-02-12 RX ADMIN — Medication 0.5 MILLIGRAM(S): at 22:16

## 2019-02-12 RX ADMIN — BUDESONIDE AND FORMOTEROL FUMARATE DIHYDRATE 2 PUFF(S): 160; 4.5 AEROSOL RESPIRATORY (INHALATION) at 21:30

## 2019-02-12 RX ADMIN — MIDODRINE HYDROCHLORIDE 5 MILLIGRAM(S): 2.5 TABLET ORAL at 05:50

## 2019-02-12 RX ADMIN — Medication 125 MILLIGRAM(S): at 21:29

## 2019-02-12 RX ADMIN — Medication 1 APPLICATION(S): at 05:50

## 2019-02-12 RX ADMIN — NYSTATIN CREAM 1 APPLICATION(S): 100000 CREAM TOPICAL at 05:53

## 2019-02-12 RX ADMIN — Medication 100 MILLIGRAM(S): at 14:48

## 2019-02-12 RX ADMIN — ATORVASTATIN CALCIUM 40 MILLIGRAM(S): 80 TABLET, FILM COATED ORAL at 21:29

## 2019-02-12 RX ADMIN — BUDESONIDE AND FORMOTEROL FUMARATE DIHYDRATE 2 PUFF(S): 160; 4.5 AEROSOL RESPIRATORY (INHALATION) at 14:51

## 2019-02-12 RX ADMIN — Medication 50 MILLIGRAM(S): at 05:50

## 2019-02-12 RX ADMIN — TRAMADOL HYDROCHLORIDE 25 MILLIGRAM(S): 50 TABLET ORAL at 06:20

## 2019-02-12 RX ADMIN — MIDODRINE HYDROCHLORIDE 5 MILLIGRAM(S): 2.5 TABLET ORAL at 21:30

## 2019-02-12 RX ADMIN — DABIGATRAN ETEXILATE MESYLATE 75 MILLIGRAM(S): 150 CAPSULE ORAL at 17:52

## 2019-02-12 RX ADMIN — Medication 50 MILLIGRAM(S): at 23:41

## 2019-02-12 NOTE — PROGRESS NOTE ADULT - SUBJECTIVE AND OBJECTIVE BOX
CARDIOLOGY FOLLOW UP - Dr. Jarrell GEORGE      PHYSICAL EXAM:  T(C): 36.5 (02-12-19 @ 05:15), Max: 36.7 (02-11-19 @ 15:19)  HR: 81 (02-12-19 @ 09:45) (71 - 96)  BP: 117/77 (02-12-19 @ 05:15) (107/66 - 117/77)  RR: 18 (02-12-19 @ 05:15) (18 - 18)  SpO2: 99% (02-12-19 @ 05:15) (95% - 99%)  Wt(kg): --  I&O's Summary    11 Feb 2019 07:01  -  12 Feb 2019 07:00  --------------------------------------------------------  IN: 434 mL / OUT: 150 mL / NET: 284 mL        Appearance: Normal	  Cardiovascular: Normal S1 S2,irregular   Respiratory: dimnished 	  Gastrointestinal:  Soft, Non-tender, + BS	  Extremities: Normal range of motion, bl le edema        MEDICATIONS  (STANDING):  atorvastatin 40 milliGRAM(s) Oral at bedtime  buDESOnide 160 MICROgram(s)/formoterol 4.5 MICROgram(s) Inhaler 2 Puff(s) Inhalation two times a day  clopidogrel Tablet 75 milliGRAM(s) Oral daily  collagenase Ointment 1 Application(s) Topical two times a day  dabigatran 75 milliGRAM(s) Oral every 12 hours  furosemide   Injectable 40 milliGRAM(s) IV Push daily  levalbuterol Inhalation 0.63 milliGRAM(s) Inhalation every 6 hours  metoprolol tartrate 50 milliGRAM(s) Oral two times a day  midodrine 5 milliGRAM(s) Oral every 8 hours  nystatin Powder 1 Application(s) Topical every 12 hours  pantoprazole  Injectable 40 milliGRAM(s) IV Push two times a day  thiamine 100 milliGRAM(s) Oral daily  vancomycin    Solution 125 milliGRAM(s) Oral every 8 hours      TELEMETRY: AFib HE   brief hr to 130s 	    ECG:  	  RADIOLOGY:   DIAGNOSTIC TESTING:  [ ] Echocardiogram:  [ ]  Catheterization:  [ ] Stress Test:    OTHER: 	    LABS:	 	                                9.5    11.08 )-----------( 260      ( 12 Feb 2019 08:00 )             30.9     02-12    137  |  95<L>  |  44<H>  ----------------------------<  96  4.2   |  33<H>  |  1.13    Ca    8.7      12 Feb 2019 08:00  Mg     2.3     02-12 CARDIOLOGY FOLLOW UP - Dr. Vieyra    CC: no events      PHYSICAL EXAM:  T(C): 36.5 (02-12-19 @ 05:15), Max: 36.7 (02-11-19 @ 15:19)  HR: 81 (02-12-19 @ 09:45) (71 - 96)  BP: 117/77 (02-12-19 @ 05:15) (107/66 - 117/77)  RR: 18 (02-12-19 @ 05:15) (18 - 18)  SpO2: 99% (02-12-19 @ 05:15) (95% - 99%)  Wt(kg): --  I&O's Summary    11 Feb 2019 07:01  -  12 Feb 2019 07:00  --------------------------------------------------------  IN: 434 mL / OUT: 150 mL / NET: 284 mL        Appearance: Normal	  Cardiovascular: Normal S1 S2,irregular   Respiratory: dimnished 	  Gastrointestinal:  Soft, Non-tender, + BS	  Extremities: Normal range of motion, bl le edema        MEDICATIONS  (STANDING):  atorvastatin 40 milliGRAM(s) Oral at bedtime  buDESOnide 160 MICROgram(s)/formoterol 4.5 MICROgram(s) Inhaler 2 Puff(s) Inhalation two times a day  clopidogrel Tablet 75 milliGRAM(s) Oral daily  collagenase Ointment 1 Application(s) Topical two times a day  dabigatran 75 milliGRAM(s) Oral every 12 hours  furosemide   Injectable 40 milliGRAM(s) IV Push daily  levalbuterol Inhalation 0.63 milliGRAM(s) Inhalation every 6 hours  metoprolol tartrate 50 milliGRAM(s) Oral two times a day  midodrine 5 milliGRAM(s) Oral every 8 hours  nystatin Powder 1 Application(s) Topical every 12 hours  pantoprazole  Injectable 40 milliGRAM(s) IV Push two times a day  thiamine 100 milliGRAM(s) Oral daily  vancomycin    Solution 125 milliGRAM(s) Oral every 8 hours      TELEMETRY: AFib HE   brief hr to 130s 	    ECG:  	  RADIOLOGY:   DIAGNOSTIC TESTING:  [ ] Echocardiogram:  [ ]  Catheterization:  [ ] Stress Test:    OTHER: 	    LABS:	 	                                9.5    11.08 )-----------( 260      ( 12 Feb 2019 08:00 )             30.9     02-12    137  |  95<L>  |  44<H>  ----------------------------<  96  4.2   |  33<H>  |  1.13    Ca    8.7      12 Feb 2019 08:00  Mg     2.3     02-12

## 2019-02-12 NOTE — PROGRESS NOTE ADULT - SUBJECTIVE AND OBJECTIVE BOX
[facility] NEPHROLOGY- PROGRESS NOTE    78y Female with history of CHF, COPD presents with SOB found to have guaiac positive anemia and afib with RVR. Nephrology consulted for elevated Scr.    REVIEW OF SYSTEMS:  Gen: no changes in weight  Cards: no chest pain  Resp: + dyspnea- improving.   GI: no nausea or vomiting, + diarrhea improving, + poor PO intake  Vascular: no LE edema +R foot pain    No Known Allergies      Hospital Medications: Medications reviewed      VITALS:  T(F): 97.7 (02-12-19 @ 05:15), Max: 98.1 (02-11-19 @ 15:19)  HR: 96 (02-12-19 @ 05:15)  BP: 117/77 (02-12-19 @ 05:15)  RR: 18 (02-12-19 @ 05:15)  SpO2: 99% (02-12-19 @ 05:15)  Wt(kg): --    02-11 @ 07:01  -  02-12 @ 07:00  --------------------------------------------------------  IN: 434 mL / OUT: 150 mL / NET: 284 mL        PHYSICAL EXAM:    Gen: NAD  Cards: Irregularly irregular, +S1/S2, no M/G/R  Resp: course BS, bibasilar rales R>L  GI: soft, NT/ND, NABS  Vascular: no LE edema B/L      LABS:  02-12    137  |  95<L>  |  44<H>  ----------------------------<  96  4.2   |  33<H>  |  1.13    Ca    8.7      12 Feb 2019 08:00  Mg     2.3     02-12      Creatinine Trend: 1.13 <--, 1.08 <--, 1.05 <--, 0.96 <--, 0.94 <--, 0.97 <--                        9.5    11.08 )-----------( 260      ( 12 Feb 2019 08:00 )             30.9     Urine Studies:

## 2019-02-12 NOTE — PROGRESS NOTE ADULT - PROBLEM SELECTOR PLAN 1
resolved and treated!  2/12: CT s can n oted: would need repeat ct scan chest in 6 weeks time to ensure ful resolution of nodules: She has viral infection on this admission

## 2019-02-12 NOTE — PROGRESS NOTE ADULT - SUBJECTIVE AND OBJECTIVE BOX
Patient is a 79y old  Female who presents with a chief complaint of right foot pressure ulcer pain (12 Feb 2019 09:47)      Any change in ROS: Doing ok : no SOB ; on 2 L of oxygen!!    MEDICATIONS  (STANDING):  atorvastatin 40 milliGRAM(s) Oral at bedtime  buDESOnide 160 MICROgram(s)/formoterol 4.5 MICROgram(s) Inhaler 2 Puff(s) Inhalation two times a day  clopidogrel Tablet 75 milliGRAM(s) Oral daily  collagenase Ointment 1 Application(s) Topical two times a day  dabigatran 75 milliGRAM(s) Oral every 12 hours  furosemide   Injectable 40 milliGRAM(s) IV Push daily  levalbuterol Inhalation 0.63 milliGRAM(s) Inhalation every 6 hours  metoprolol tartrate 50 milliGRAM(s) Oral two times a day  midodrine 5 milliGRAM(s) Oral every 8 hours  nystatin Powder 1 Application(s) Topical every 12 hours  pantoprazole  Injectable 40 milliGRAM(s) IV Push two times a day  thiamine 100 milliGRAM(s) Oral daily  vancomycin    Solution 125 milliGRAM(s) Oral every 8 hours    MEDICATIONS  (PRN):  acetaminophen   Tablet .. 650 milliGRAM(s) Oral every 6 hours PRN Temp greater or equal to 38C (100.4F), Mild Pain (1 - 3), Moderate Pain (4 - 6)  LORazepam     Tablet 0.5 milliGRAM(s) Oral two times a day PRN Anxiety  traMADol 25 milliGRAM(s) Oral every 8 hours PRN Severe Pain (7 - 10)    Vital Signs Last 24 Hrs  T(C): 36.6 (12 Feb 2019 14:47), Max: 36.7 (11 Feb 2019 15:19)  T(F): 97.9 (12 Feb 2019 14:47), Max: 98.1 (11 Feb 2019 15:19)  HR: 98 (12 Feb 2019 14:47) (71 - 98)  BP: 123/77 (12 Feb 2019 14:47) (107/66 - 123/77)  BP(mean): --  RR: 16 (12 Feb 2019 14:47) (16 - 18)  SpO2: 96% (12 Feb 2019 14:47) (95% - 99%)    I&O's Summary    11 Feb 2019 07:01  -  12 Feb 2019 07:00  --------------------------------------------------------  IN: 434 mL / OUT: 150 mL / NET: 284 mL          Physical Exam:   GENERAL: NAD, well-groomed, well-developed  HEENT: COLEEN/   Atraumatic, Normocephalic  ENMT: No tonsillar erythema, exudates, or enlargement; Moist mucous membranes, Good dentition, No lesions  NECK: Supple, No JVD, Normal thyroid  CHEST/LUNG: Clear to auscultaio  CVS: Regular rate and rhythm; No murmurs, rubs, or gallops  GI: : Soft, Nontender, Nondistended; Bowel sounds present  NERVOUS SYSTEM:  Alert & Oriented X3  EXTREMITIES:  2+ Peripheral Pulses, No clubbing, cyanosis, or edema  LYMPH: No lymphadenopathy noted  SKIN: No rashes or lesions  ENDOCRINOLOGY: No Thyromegaly  PSYCH: Appropriate    Labs:  32                            9.5    11.08 )-----------( 260      ( 12 Feb 2019 08:00 )             30.9                         9.4    10.72 )-----------( 260      ( 11 Feb 2019 06:17 )             30.6                         9.7    10.24 )-----------( 279      ( 10 Feb 2019 06:32 )             32.1                         10.3   13.37 )-----------( 262      ( 09 Feb 2019 05:00 )             33.5     02-12    137  |  95<L>  |  44<H>  ----------------------------<  96  4.2   |  33<H>  |  1.13  02-11    137  |  97<L>  |  41<H>  ----------------------------<  115<H>  4.6   |  32<H>  |  1.08  02-10    139  |  96<L>  |  37<H>  ----------------------------<  80  4.6   |  34<H>  |  1.05  02-09    139  |  96<L>  |  40<H>  ----------------------------<  88  4.6   |  36<H>  |  0.96    Ca    8.7      12 Feb 2019 08:00  Ca    8.4      11 Feb 2019 06:17  Mg     2.3     02-12  Mg     2.2     02-11      CAPILLARY BLOOD GLUCOSE      < from: Xray Chest 1 View- PORTABLE-Routine (02.11.19 @ 06:54) >    EXAM:  XR CHEST PORTABLE ROUTINE 1V        PROCEDURE DATE:  Feb 11 2019         INTERPRETATION:    Portable chest xray: COPD    Comparison: Most recent prior     FINDINGS:    Lines/Tubes: None    Heart and mediastinum:  Unchanged.    Lungs, pleura,and airways: Unchanged interstitial edema. Stable right   effusion    Bones and soft tissues: The bones and soft tissues are unchanged.    Impression:    Unchanged pulmonary edema and right pleural effusion                  ALEKSANDAR HARMON M.D., ATTENDING RADIOLOGIST  This document has been electronically signed. Feb 11 2019 10:10AM        < end of copied text >          < from: CT Chest No Cont (01.29.19 @ 19:48) >  calcifications. Aortic valvular calcifications. Mitral annular   calcifications.  MEDIASTINUM AND GEOVANI: Scattered subcentimeter lymph nodes.  CHEST WALL AND LOWER NECK: Heterogenous appearing thyroid gland.  VISUALIZED UPPER ABDOMEN: Partially visualized left renal cyst.  BONES: Degenerative changes of the spine. Old healed rib fractures.    IMPRESSION:     Partially loculated small bilateral pleural effusions appear decreased in   size from prior exam. Mild interlobular septal thickeningsecondary to   mild pulmonary edema also improved.    Multiple small nodular opacities or tree-in-bud opacities noted within   the bilateral upper lobes secondary to  impacted distal airways, many of   which are new since the January 24, 2019 likely of infectious etiology.    Debris noted within the trachea and left lower lobar bronchi new since   the prior study with associated left lower lobe patchy opacity with mild   interval increase in size representing atelectasis and/or pneumonia. The   constellation of findings are suggestive of aspiration.    < end of copied text >          RECENT CULTURES:        RESPIRATORY CULTURES:          Studies  Chest X-RAY  CT SCAN Chest   Venous Dopplers: LE:   CT Abdomen  Others

## 2019-02-12 NOTE — PROGRESS NOTE ADULT - ASSESSMENT
chest xray 1/28/19:  revealed multifocal airspace disease, may reflect aveolar component of edema.    a/p   79 y/o female, with a PmHx of COPD (on 2-3L O2 prn), paroxsymal a-fib (on pradaxa), CAD (s/p stent 11/2018), DCHF, HTN, HLD, and anxiety, presenting with acute/chronic diastolic chf, chest pain r/o acs, GIB, non healing pressure ulcer. Hospital course now complicated ruling in + RVP and CDiff / uti.     1. Atypical chest pain, resolved  in the setting of acute CHF exacerbation, afib w/ RVR   cv stable, no evidence of acute ischemia/ACS   Echo with nl lv fxn  continue statin, bb, plavix     2. Acute/chronic diastolic chf  on supplemental O2. denies SOB, volume status is stable   ct chest revealed decreased bl pleural effusion, mutiple small nodular opacities secondary to impacted distal airways likely infectious etiology? pna LLL, aspiration    + rvp  repeat cxr with unchanged b/l pulm edema - cont lasix IV 40mg daily   cont bb  pulm f/u   echo with nl lv fxn    3. AFIB  dig on hold due to hx of pauses and bradycardia   continue with  midodrine 5 mg q 8 hrs for bp support   rate controlled, continue with lopressor to 50 mg BID  CHADS 3 - continue with pradaxa     4. CAD s/p PCI (11/2018)  cv stable, no evidence of acute ischemia   continue bb, statin, continue plavix given recent PCI      5. GIB   +GUIAC , +anemia s/p 1unit prbc,   continue to trend cbc  GI f/u      6. non healing foot ulcer  med f/u  ID f/u , vascular studies noted, pod f/u  STONEY/PVR poor, 0.60 on the effected limb with flat waveforms.  -  Vasc sx consult noted, planning RLE angio once c. diff clears    7. IGOR/CKD   creat improved, continue lasix daily per renal  renal f/u     8.Cdiff   po abx, id f/u    7. hypoxia    likely in the setting of resp infection ? aspiration   no decomp CHF on exam    + influenza s/p Tamiflu  ct chest revealed decreased bl pleural effusion, mutiple small nodular opacities secondary to impacted distal airways likely infectious etiology? pna LLL, aspiration    pulm f/u, s/p steroids   s/p speech and swallow eval     8. UTI    med f/u     dvt ppx

## 2019-02-12 NOTE — PROGRESS NOTE ADULT - SUBJECTIVE AND OBJECTIVE BOX
BEVERLEY DEL RIO:[id number],   79yFemale followed for:  No Known Allergies    PAST MEDICAL & SURGICAL HISTORY:  CAD (coronary artery disease): s/p stent 2018  CHF (congestive heart failure)  COPD (chronic obstructive pulmonary disease): on 2-3L O2 at home prn  Anxiety  TIA (transient ischemic attack): many years ago  PAF (paroxysmal atrial fibrillation)  HLD (hyperlipidemia)  HTN (hypertension)  H/O total hysterectomy: 2014  History of cataract surgery: b/l 2015  History of repair of hip fracture: luisa placed in RLE 2015  H/O spinal fusion: c2-6 in 2017    FAMILY HISTORY:  No pertinent family history in first degree relatives    MEDICATIONS  (STANDING):  atorvastatin 40 milliGRAM(s) Oral at bedtime  buDESOnide 160 MICROgram(s)/formoterol 4.5 MICROgram(s) Inhaler 2 Puff(s) Inhalation two times a day  clopidogrel Tablet 75 milliGRAM(s) Oral daily  collagenase Ointment 1 Application(s) Topical two times a day  dabigatran 75 milliGRAM(s) Oral every 12 hours  furosemide   Injectable 40 milliGRAM(s) IV Push daily  levalbuterol Inhalation 0.63 milliGRAM(s) Inhalation every 6 hours  metoprolol tartrate 50 milliGRAM(s) Oral two times a day  midodrine 5 milliGRAM(s) Oral every 8 hours  nystatin Powder 1 Application(s) Topical every 12 hours  pantoprazole  Injectable 40 milliGRAM(s) IV Push two times a day  thiamine 100 milliGRAM(s) Oral daily  vancomycin    Solution 125 milliGRAM(s) Oral every 8 hours    MEDICATIONS  (PRN):  acetaminophen   Tablet .. 650 milliGRAM(s) Oral every 6 hours PRN Temp greater or equal to 38C (100.4F), Mild Pain (1 - 3), Moderate Pain (4 - 6)  LORazepam     Tablet 0.5 milliGRAM(s) Oral two times a day PRN Anxiety  traMADol 25 milliGRAM(s) Oral every 8 hours PRN Severe Pain (7 - 10)      Vital Signs Last 24 Hrs  T(C): 36.5 (12 Feb 2019 05:15), Max: 36.7 (11 Feb 2019 15:19)  T(F): 97.7 (12 Feb 2019 05:15), Max: 98.1 (11 Feb 2019 15:19)  HR: 96 (12 Feb 2019 05:15) (71 - 96)  BP: 117/77 (12 Feb 2019 05:15) (107/66 - 117/77)  BP(mean): --  RR: 18 (12 Feb 2019 05:15) (18 - 18)  SpO2: 99% (12 Feb 2019 05:15) (95% - 99%)  nc/at  s1s2  cta  soft, nt, nd no guarding or rebound  no c/c/e    CBC Full  -  ( 11 Feb 2019 06:17 )  WBC Count : 10.72 K/uL  Hemoglobin : 9.4 g/dL  Hematocrit : 30.6 %  Platelet Count - Automated : 260 K/uL  Mean Cell Volume : 90.8 fL  Mean Cell Hemoglobin : 27.9 pg  Mean Cell Hemoglobin Concentration : 30.7 %  Auto Neutrophil # : 9.07 K/uL  Auto Lymphocyte # : 0.61 K/uL  Auto Monocyte # : 0.82 K/uL  Auto Eosinophil # : 0.15 K/uL  Auto Basophil # : 0.01 K/uL  Auto Neutrophil % : 84.6 %  Auto Lymphocyte % : 5.7 %  Auto Monocyte % : 7.6 %  Auto Eosinophil % : 1.4 %  Auto Basophil % : 0.1 %    02-11    137  |  97<L>  |  41<H>  ----------------------------<  115<H>  4.6   |  32<H>  |  1.08    Ca    8.4      11 Feb 2019 06:17  Mg     2.2     02-11

## 2019-02-12 NOTE — PROGRESS NOTE ADULT - ASSESSMENT
79 y/o female, with a PmHx of COPD (on 2-3L O2 prn), paroxsymal a-fib (on pradaxa), HTN, HLD, and anxiety, presenting to the [facility] ED with right foot pressure ulcer pain, SOB, and CP, found to have Afib @102 bpm, H/H 8/24.7, mild interstitial pulmonary edema, proBNP 1374, guiac positive, BUN/Cr 36/1.68, admitted to telemetry for nonhealing pressure ulcer, Afib, CHF exacerbation, r/o ACS.

## 2019-02-12 NOTE — PROGRESS NOTE ADULT - NEGATIVE MUSCULOSKELETAL SYMPTOMS
no arthritis/no arthralgia
no neck pain/no arthritis
no myalgia/no arthralgia
no neck pain/no arthralgia

## 2019-02-12 NOTE — PROGRESS NOTE ADULT - SUBJECTIVE AND OBJECTIVE BOX
no diarrhoea no abdominal pain  ICU Vital Signs Last 24 Hrs  T(C): 36.7 (12 Feb 2019 21:26), Max: 36.7 (12 Feb 2019 21:26)  T(F): 98 (12 Feb 2019 21:26), Max: 98 (12 Feb 2019 21:26)  HR: 115 (12 Feb 2019 22:16) (77 - 118)  BP: 105/55 (12 Feb 2019 22:16) (95/59 - 123/77)  BP(mean): --  ABP: --  ABP(mean): --  RR: 18 (12 Feb 2019 22:16) (16 - 18)  SpO2: 98% (12 Feb 2019 22:16) (96% - 99%)                        9.5    11.08 )-----------( 260      ( 12 Feb 2019 08:00 )             30.9   02-12    137  |  95<L>  |  44<H>  ----------------------------<  96  4.2   |  33<H>  |  1.13    Ca    8.7      12 Feb 2019 08:00  Mg     2.3     02-12    MEDICATIONS  (STANDING):  atorvastatin 40 milliGRAM(s) Oral at bedtime  buDESOnide 160 MICROgram(s)/formoterol 4.5 MICROgram(s) Inhaler 2 Puff(s) Inhalation two times a day  clopidogrel Tablet 75 milliGRAM(s) Oral daily  collagenase Ointment 1 Application(s) Topical two times a day  dabigatran 75 milliGRAM(s) Oral every 12 hours  furosemide   Injectable 40 milliGRAM(s) IV Push daily  lactobacillus acidophilus 1 Tablet(s) Oral two times a day with meals  levalbuterol Inhalation 0.63 milliGRAM(s) Inhalation every 6 hours  metoprolol tartrate 50 milliGRAM(s) Oral two times a day  midodrine 5 milliGRAM(s) Oral every 8 hours  nystatin Powder 1 Application(s) Topical every 12 hours  pantoprazole  Injectable 40 milliGRAM(s) IV Push two times a day  thiamine 100 milliGRAM(s) Oral daily  vancomycin    Solution 125 milliGRAM(s) Oral every 8 hours

## 2019-02-13 LAB
ANION GAP SERPL CALC-SCNC: 9 MMO/L — SIGNIFICANT CHANGE UP (ref 7–14)
BUN SERPL-MCNC: 60 MG/DL — HIGH (ref 7–23)
CALCIUM SERPL-MCNC: 8.6 MG/DL — SIGNIFICANT CHANGE UP (ref 8.4–10.5)
CHLORIDE SERPL-SCNC: 94 MMOL/L — LOW (ref 98–107)
CO2 SERPL-SCNC: 35 MMOL/L — HIGH (ref 22–31)
CREAT SERPL-MCNC: 1.21 MG/DL — SIGNIFICANT CHANGE UP (ref 0.5–1.3)
GLUCOSE SERPL-MCNC: 102 MG/DL — HIGH (ref 70–99)
HCT VFR BLD CALC: 29.8 % — LOW (ref 34.5–45)
HGB BLD-MCNC: 9.2 G/DL — LOW (ref 11.5–15.5)
MAGNESIUM SERPL-MCNC: 2.5 MG/DL — SIGNIFICANT CHANGE UP (ref 1.6–2.6)
MCHC RBC-ENTMCNC: 27.9 PG — SIGNIFICANT CHANGE UP (ref 27–34)
MCHC RBC-ENTMCNC: 30.9 % — LOW (ref 32–36)
MCV RBC AUTO: 90.3 FL — SIGNIFICANT CHANGE UP (ref 80–100)
NRBC # FLD: 0 K/UL — LOW (ref 25–125)
PLATELET # BLD AUTO: 235 K/UL — SIGNIFICANT CHANGE UP (ref 150–400)
PMV BLD: 11.5 FL — SIGNIFICANT CHANGE UP (ref 7–13)
POTASSIUM SERPL-MCNC: 5 MMOL/L — SIGNIFICANT CHANGE UP (ref 3.5–5.3)
POTASSIUM SERPL-SCNC: 5 MMOL/L — SIGNIFICANT CHANGE UP (ref 3.5–5.3)
RBC # BLD: 3.3 M/UL — LOW (ref 3.8–5.2)
RBC # FLD: 19.2 % — HIGH (ref 10.3–14.5)
SODIUM SERPL-SCNC: 138 MMOL/L — SIGNIFICANT CHANGE UP (ref 135–145)
WBC # BLD: 9.2 K/UL — SIGNIFICANT CHANGE UP (ref 3.8–10.5)
WBC # FLD AUTO: 9.2 K/UL — SIGNIFICANT CHANGE UP (ref 3.8–10.5)

## 2019-02-13 RX ORDER — FUROSEMIDE 40 MG
40 TABLET ORAL DAILY
Qty: 0 | Refills: 0 | Status: DISCONTINUED | OUTPATIENT
Start: 2019-02-14 | End: 2019-02-15

## 2019-02-13 RX ORDER — METOPROLOL TARTRATE 50 MG
5 TABLET ORAL ONCE
Qty: 0 | Refills: 0 | Status: COMPLETED | OUTPATIENT
Start: 2019-02-13 | End: 2019-02-13

## 2019-02-13 RX ADMIN — MIDODRINE HYDROCHLORIDE 5 MILLIGRAM(S): 2.5 TABLET ORAL at 21:57

## 2019-02-13 RX ADMIN — TRAMADOL HYDROCHLORIDE 25 MILLIGRAM(S): 50 TABLET ORAL at 22:43

## 2019-02-13 RX ADMIN — TRAMADOL HYDROCHLORIDE 25 MILLIGRAM(S): 50 TABLET ORAL at 00:11

## 2019-02-13 RX ADMIN — Medication 125 MILLIGRAM(S): at 21:57

## 2019-02-13 RX ADMIN — DABIGATRAN ETEXILATE MESYLATE 75 MILLIGRAM(S): 150 CAPSULE ORAL at 17:15

## 2019-02-13 RX ADMIN — ONDANSETRON 4 MILLIGRAM(S): 8 TABLET, FILM COATED ORAL at 00:13

## 2019-02-13 RX ADMIN — MIDODRINE HYDROCHLORIDE 5 MILLIGRAM(S): 2.5 TABLET ORAL at 13:36

## 2019-02-13 RX ADMIN — CLOPIDOGREL BISULFATE 75 MILLIGRAM(S): 75 TABLET, FILM COATED ORAL at 10:54

## 2019-02-13 RX ADMIN — DABIGATRAN ETEXILATE MESYLATE 75 MILLIGRAM(S): 150 CAPSULE ORAL at 06:09

## 2019-02-13 RX ADMIN — Medication 100 MILLIGRAM(S): at 10:54

## 2019-02-13 RX ADMIN — Medication 0.5 MILLIGRAM(S): at 22:07

## 2019-02-13 RX ADMIN — Medication 1 TABLET(S): at 10:54

## 2019-02-13 RX ADMIN — LEVALBUTEROL 0.63 MILLIGRAM(S): 1.25 SOLUTION, CONCENTRATE RESPIRATORY (INHALATION) at 10:26

## 2019-02-13 RX ADMIN — NYSTATIN CREAM 1 APPLICATION(S): 100000 CREAM TOPICAL at 17:15

## 2019-02-13 RX ADMIN — TRAMADOL HYDROCHLORIDE 25 MILLIGRAM(S): 50 TABLET ORAL at 01:11

## 2019-02-13 RX ADMIN — LEVALBUTEROL 0.63 MILLIGRAM(S): 1.25 SOLUTION, CONCENTRATE RESPIRATORY (INHALATION) at 15:57

## 2019-02-13 RX ADMIN — Medication 50 MILLIGRAM(S): at 17:15

## 2019-02-13 RX ADMIN — Medication 1 TABLET(S): at 17:15

## 2019-02-13 RX ADMIN — Medication 1 APPLICATION(S): at 06:10

## 2019-02-13 RX ADMIN — PANTOPRAZOLE SODIUM 40 MILLIGRAM(S): 20 TABLET, DELAYED RELEASE ORAL at 06:10

## 2019-02-13 RX ADMIN — ATORVASTATIN CALCIUM 40 MILLIGRAM(S): 80 TABLET, FILM COATED ORAL at 21:57

## 2019-02-13 RX ADMIN — NYSTATIN CREAM 1 APPLICATION(S): 100000 CREAM TOPICAL at 06:10

## 2019-02-13 RX ADMIN — Medication 125 MILLIGRAM(S): at 13:36

## 2019-02-13 RX ADMIN — BUDESONIDE AND FORMOTEROL FUMARATE DIHYDRATE 2 PUFF(S): 160; 4.5 AEROSOL RESPIRATORY (INHALATION) at 10:55

## 2019-02-13 RX ADMIN — Medication 50 MILLIGRAM(S): at 06:09

## 2019-02-13 RX ADMIN — Medication 5 MILLIGRAM(S): at 01:27

## 2019-02-13 RX ADMIN — MIDODRINE HYDROCHLORIDE 5 MILLIGRAM(S): 2.5 TABLET ORAL at 06:10

## 2019-02-13 RX ADMIN — Medication 40 MILLIGRAM(S): at 06:10

## 2019-02-13 RX ADMIN — Medication 125 MILLIGRAM(S): at 06:10

## 2019-02-13 RX ADMIN — TRAMADOL HYDROCHLORIDE 25 MILLIGRAM(S): 50 TABLET ORAL at 21:58

## 2019-02-13 RX ADMIN — BUDESONIDE AND FORMOTEROL FUMARATE DIHYDRATE 2 PUFF(S): 160; 4.5 AEROSOL RESPIRATORY (INHALATION) at 21:58

## 2019-02-13 RX ADMIN — PANTOPRAZOLE SODIUM 40 MILLIGRAM(S): 20 TABLET, DELAYED RELEASE ORAL at 17:15

## 2019-02-13 NOTE — PROGRESS NOTE ADULT - NEGATIVE GASTROINTESTINAL SYMPTOMS
no nausea/no vomiting
no vomiting
no nausea/no abdominal pain/no vomiting
no vomiting
no vomiting/no nausea
no abdominal pain/no vomiting/no diarrhea
no vomiting

## 2019-02-13 NOTE — PROGRESS NOTE ADULT - SUBJECTIVE AND OBJECTIVE BOX
Patient is a 79y old  Female who presents with a chief complaint of right foot pressure ulcer pain (13 Feb 2019 11:40)      Any change in ROS: She is doing ok : no SOB : on 2 L of oxygen    MEDICATIONS  (STANDING):  atorvastatin 40 milliGRAM(s) Oral at bedtime  buDESOnide 160 MICROgram(s)/formoterol 4.5 MICROgram(s) Inhaler 2 Puff(s) Inhalation two times a day  clopidogrel Tablet 75 milliGRAM(s) Oral daily  collagenase Ointment 1 Application(s) Topical two times a day  dabigatran 75 milliGRAM(s) Oral every 12 hours  lactobacillus acidophilus 1 Tablet(s) Oral two times a day with meals  levalbuterol Inhalation 0.63 milliGRAM(s) Inhalation every 6 hours  metoprolol tartrate 50 milliGRAM(s) Oral two times a day  midodrine 5 milliGRAM(s) Oral every 8 hours  nystatin Powder 1 Application(s) Topical every 12 hours  pantoprazole  Injectable 40 milliGRAM(s) IV Push two times a day  thiamine 100 milliGRAM(s) Oral daily  vancomycin    Solution 125 milliGRAM(s) Oral every 8 hours    MEDICATIONS  (PRN):  acetaminophen   Tablet .. 650 milliGRAM(s) Oral every 6 hours PRN Temp greater or equal to 38C (100.4F), Mild Pain (1 - 3), Moderate Pain (4 - 6)  LORazepam     Tablet 0.5 milliGRAM(s) Oral two times a day PRN Anxiety  traMADol 25 milliGRAM(s) Oral every 8 hours PRN Severe Pain (7 - 10)    Vital Signs Last 24 Hrs  T(C): 36.7 (13 Feb 2019 10:52), Max: 36.9 (13 Feb 2019 06:08)  T(F): 98 (13 Feb 2019 10:52), Max: 98.4 (13 Feb 2019 06:08)  HR: 75 (13 Feb 2019 10:52) (61 - 133)  BP: 104/57 (13 Feb 2019 10:52) (95/59 - 123/77)  BP(mean): --  RR: 18 (13 Feb 2019 10:52) (16 - 18)  SpO2: 100% (13 Feb 2019 10:52) (96% - 100%)    I&O's Summary    12 Feb 2019 07:01  -  13 Feb 2019 07:00  --------------------------------------------------------  IN: 720 mL / OUT: 0 mL / NET: 720 mL          Physical Exam:   GENERAL: NAD, well-groomed, well-developed  HEENT: COLEEN/   Atraumatic, Normocephalic  ENMT: No tonsillar erythema, exudates, or enlargement; Moist mucous membranes, Good dentition, No lesions  NECK: Supple, No JVD, Normal thyroid  CHEST/LUNG: NO wHEEZNG-  CVS: Regular rate and rhythm; No murmurs, rubs, or gallops  GI: : Soft, Nontender, Nondistended; Bowel sounds present  NERVOUS SYSTEM:  Alert & Oriented X3  EXTREMITIES:  - edema  LYMPH: No lymphadenopathy noted  SKIN: No rashes or lesions  ENDOCRINOLOGY: No Thyromegaly  PSYCH: Appropriate    Labs:  32                            9.2    9.20  )-----------( 235      ( 13 Feb 2019 07:19 )             29.8                         9.5    11.08 )-----------( 260      ( 12 Feb 2019 08:00 )             30.9                         9.4    10.72 )-----------( 260      ( 11 Feb 2019 06:17 )             30.6                         9.7    10.24 )-----------( 279      ( 10 Feb 2019 06:32 )             32.1     02-13    138  |  94<L>  |  60<H>  ----------------------------<  102<H>  5.0   |  35<H>  |  1.21  02-12    137  |  95<L>  |  44<H>  ----------------------------<  96  4.2   |  33<H>  |  1.13  02-11    137  |  97<L>  |  41<H>  ----------------------------<  115<H>  4.6   |  32<H>  |  1.08  02-10    139  |  96<L>  |  37<H>  ----------------------------<  80  4.6   |  34<H>  |  1.05    Ca    8.6      13 Feb 2019 07:40  Ca    8.7      12 Feb 2019 08:00  Mg     2.5     02-13  Mg     2.3     02-12      CAPILLARY BLOOD GLUCOSE              < from: Xray Chest 1 View- PORTABLE-Routine (02.11.19 @ 06:54) >  PROCEDURE DATE:  Feb 11 2019         INTERPRETATION:    Portable chest xray: COPD    Comparison: Most recent prior     FINDINGS:    Lines/Tubes: None    Heart and mediastinum:  Unchanged.    Lungs, pleura,and airways: Unchanged interstitial edema. Stable right   effusion    Bones and soft tissues: The bones and soft tissues are unchanged.    Impression:    Unchanged pulmonary edema and right pleural effusion                  ALEKSANDAR HARMON M.D., ATTENDING RADIOLOGIST  This document has been electronically signed. Feb 11 2019 10:10AM    < end of copied text >          RECENT CULTURES:        RESPIRATORY CULTURES:          Studies  Chest X-RAY  CT SCAN Chest   Venous Dopplers: LE:   CT Abdomen  Others

## 2019-02-13 NOTE — PROGRESS NOTE ADULT - NEGATIVE CARDIOVASCULAR SYMPTOMS
no palpitations/no chest pain
no chest pain/no dyspnea on exertion
no chest pain/no palpitations
no chest pain/no palpitations
no dyspnea on exertion/no chest pain
no palpitations/no chest pain

## 2019-02-13 NOTE — PROGRESS NOTE ADULT - PROBLEM SELECTOR PLAN 1
resolved and treated!  2/12: CT s can n oted: would need repeat ct scan chest in 6 weeks time to ensure ful resolution of nodules: She has viral infection on this admission  2/13: Resolved!

## 2019-02-13 NOTE — PROGRESS NOTE ADULT - SUBJECTIVE AND OBJECTIVE BOX
pt awaiting placement no longer needs contact precautions for c diff    no diarrhoea no abdominal pain  ICU Vital Signs Last 24 Hrs  T(C): 36.8 (13 Feb 2019 13:35), Max: 36.9 (13 Feb 2019 06:08)  T(F): 98.2 (13 Feb 2019 13:35), Max: 98.4 (13 Feb 2019 06:08)  HR: 72 (13 Feb 2019 17:13) (60 - 133)  BP: 118/66 (13 Feb 2019 17:13) (90/54 - 118/66)  BP(mean): --  ABP: --  ABP(mean): --  RR: 18 (13 Feb 2019 13:35) (18 - 18)  SpO2: 97% (13 Feb 2019 15:57) (96% - 100%)  02-13    138  |  94<L>  |  60<H>  ----------------------------<  102<H>  5.0   |  35<H>  |  1.21    Ca    8.6      13 Feb 2019 07:40  Mg     2.5     02-13                          9.2    9.20  )-----------( 235      ( 13 Feb 2019 07:19 )             29.8   MEDICATIONS  (STANDING):  atorvastatin 40 milliGRAM(s) Oral at bedtime  buDESOnide 160 MICROgram(s)/formoterol 4.5 MICROgram(s) Inhaler 2 Puff(s) Inhalation two times a day  clopidogrel Tablet 75 milliGRAM(s) Oral daily  collagenase Ointment 1 Application(s) Topical two times a day  dabigatran 75 milliGRAM(s) Oral every 12 hours  lactobacillus acidophilus 1 Tablet(s) Oral two times a day with meals  levalbuterol Inhalation 0.63 milliGRAM(s) Inhalation every 6 hours  metoprolol tartrate 50 milliGRAM(s) Oral two times a day  midodrine 5 milliGRAM(s) Oral every 8 hours  nystatin Powder 1 Application(s) Topical every 12 hours  pantoprazole  Injectable 40 milliGRAM(s) IV Push two times a day  thiamine 100 milliGRAM(s) Oral daily  vancomycin    Solution 125 milliGRAM(s) Oral every 8 hours

## 2019-02-13 NOTE — PROGRESS NOTE ADULT - SUBJECTIVE AND OBJECTIVE BOX
CARDIOLOGY FOLLOW UP - Dr. Vieyra    CC no cp or sob       PHYSICAL EXAM:  T(C): 36.7 (02-13-19 @ 10:52), Max: 36.9 (02-13-19 @ 06:08)  HR: 75 (02-13-19 @ 10:52) (61 - 133)  BP: 104/57 (02-13-19 @ 10:52) (95/59 - 123/77)  RR: 18 (02-13-19 @ 10:52) (16 - 18)  SpO2: 100% (02-13-19 @ 10:52) (96% - 100%)  Wt(kg): --  I&O's Summary    12 Feb 2019 07:01  -  13 Feb 2019 07:00  --------------------------------------------------------  IN: 720 mL / OUT: 0 mL / NET: 720 mL        Appearance: Normal	  Cardiovascular: Normal S1 S2,irregular   Respiratory: diminished   Gastrointestinal:  Soft, Non-tender, + BS	  Extremities: Normal range of motion, No clubbing, cyanosis or edema        MEDICATIONS  (STANDING):  atorvastatin 40 milliGRAM(s) Oral at bedtime  buDESOnide 160 MICROgram(s)/formoterol 4.5 MICROgram(s) Inhaler 2 Puff(s) Inhalation two times a day  clopidogrel Tablet 75 milliGRAM(s) Oral daily  collagenase Ointment 1 Application(s) Topical two times a day  dabigatran 75 milliGRAM(s) Oral every 12 hours  lactobacillus acidophilus 1 Tablet(s) Oral two times a day with meals  levalbuterol Inhalation 0.63 milliGRAM(s) Inhalation every 6 hours  metoprolol tartrate 50 milliGRAM(s) Oral two times a day  midodrine 5 milliGRAM(s) Oral every 8 hours  nystatin Powder 1 Application(s) Topical every 12 hours  pantoprazole  Injectable 40 milliGRAM(s) IV Push two times a day  thiamine 100 milliGRAM(s) Oral daily  vancomycin    Solution 125 milliGRAM(s) Oral every 8 hours      TELEMETRY afib HR 80s  afib with RVR over night HR 120s as brief up to 180s 	    ECG:  	  RADIOLOGY:   DIAGNOSTIC TESTING:  [ ] Echocardiogram:  [ ]  Catheterization:  [ ] Stress Test:    OTHER: 	    LABS:	 	                                9.2    9.20  )-----------( 235      ( 13 Feb 2019 07:19 )             29.8     02-13    138  |  94<L>  |  60<H>  ----------------------------<  102<H>  5.0   |  35<H>  |  1.21    Ca    8.6      13 Feb 2019 07:40  Mg     2.5     02-13

## 2019-02-13 NOTE — CHART NOTE - NSCHARTNOTEFT_GEN_A_CORE
As per ID, patient no longer needs to be on contact precautions once at rehab. Additionally, creamy stools are not considered to be indicative of active CDiff infection. As such, patient is medically cleared for rehab. SW/CM made aware. Daughter made aware and to decide on facility by tomorrow.

## 2019-02-13 NOTE — PROGRESS NOTE ADULT - NEGATIVE GENERAL SYMPTOMS
no chills/no fever
no chills/no sweating/no anorexia
no fever/no chills
no malaise/no sweating/no fever
no chills/no fever
no fever/no sweating/no malaise

## 2019-02-13 NOTE — PROGRESS NOTE ADULT - ASSESSMENT
79 h/o female admitted for rt postrior ankle pressure ulcer    vascular following needs angiogram  need nephrology clearence  she needs to complete abx for c diff  local wound care    CDIFF POSITIVE  continue vancomycin taper as per ID  pt does not need contact precautions  ct chest /abdomen done    chronic afib with rapid ventricular response  cardiology following  CKD   monitor cmp  monitor BUN/CREATININE  appreciate nephrology f/u  medically cleared for discharge  discharge planning

## 2019-02-13 NOTE — PROGRESS NOTE ADULT - SUBJECTIVE AND OBJECTIVE BOX
[facility] NEPHROLOGY- PROGRESS NOTE    78y Female with history of CHF, COPD presents with SOB found to have guaiac positive anemia and afib with RVR. Nephrology consulted for elevated Scr.    REVIEW OF SYSTEMS:  Gen: no changes in weight  Cards: no chest pain  Resp: + dyspnea - improving.   GI: no nausea or vomiting, + diarrhea improving  Vascular: no LE edema +R foot pain    No Known Allergies      Hospital Medications: Medications reviewed      VITALS:  T(F): 98.4 (02-13-19 @ 06:08), Max: 98.4 (02-13-19 @ 06:08)  HR: 98 (02-13-19 @ 06:08)  BP: 106/67 (02-13-19 @ 06:08)  RR: 18 (02-13-19 @ 06:08)  SpO2: 100% (02-13-19 @ 06:08)  Wt(kg): --    02-12 @ 07:01  -  02-13 @ 07:00  --------------------------------------------------------  IN: 720 mL / OUT: 0 mL / NET: 720 mL        PHYSICAL EXAM:    Gen: NAD  Cards: Irregularly irregular, +S1/S2, no M/G/R  Resp: course BS, bibasilar rales R>L  GI: soft, NT/ND, NABS  Vascular: no LE edema B/L      LABS:  02-13    138  |  94<L>  |  60<H>  ----------------------------<  102<H>  5.0   |  35<H>  |  1.21    Ca    8.6      13 Feb 2019 07:40  Mg     2.5     02-13      Creatinine Trend: 1.21 <--, 1.13 <--, 1.08 <--, 1.05 <--, 0.96 <--, 0.94 <--                        9.2    9.20  )-----------( 235      ( 13 Feb 2019 07:19 )             29.8     Urine Studies:

## 2019-02-13 NOTE — PROGRESS NOTE ADULT - SUBJECTIVE AND OBJECTIVE BOX
[facility]        - Patient seen and examined.  - Today, patient is without complaints.         *****MEDICATIONS: Current medication reviewed and documented.    MEDICATIONS  (STANDING):  atorvastatin 40 milliGRAM(s) Oral at bedtime  buDESOnide 160 MICROgram(s)/formoterol 4.5 MICROgram(s) Inhaler 2 Puff(s) Inhalation two times a day  clopidogrel Tablet 75 milliGRAM(s) Oral daily  collagenase Ointment 1 Application(s) Topical two times a day  dabigatran 75 milliGRAM(s) Oral every 12 hours  lactobacillus acidophilus 1 Tablet(s) Oral two times a day with meals  levalbuterol Inhalation 0.63 milliGRAM(s) Inhalation every 6 hours  metoprolol tartrate 50 milliGRAM(s) Oral two times a day  midodrine 5 milliGRAM(s) Oral every 8 hours  nystatin Powder 1 Application(s) Topical every 12 hours  pantoprazole  Injectable 40 milliGRAM(s) IV Push two times a day  thiamine 100 milliGRAM(s) Oral daily  vancomycin    Solution 125 milliGRAM(s) Oral every 8 hours    MEDICATIONS  (PRN):  acetaminophen   Tablet .. 650 milliGRAM(s) Oral every 6 hours PRN Temp greater or equal to 38C (100.4F), Mild Pain (1 - 3), Moderate Pain (4 - 6)  LORazepam     Tablet 0.5 milliGRAM(s) Oral two times a day PRN Anxiety  traMADol 25 milliGRAM(s) Oral every 8 hours PRN Severe Pain (7 - 10)           ***** REVIEW OF SYSTEM:  GEN: no fever, no chills, no pain  RESP: no SOB, no cough, no sputum  CVS: no chest pain, no palpitations, no edema  GI: no abdominal pain, no nausea, no vomiting, no constipation, no diarrhea  : no dysurea, no frequency  NEURO: no headache, no diziness  PSYCH: no depression, not anxious  Derm : no itching, no rash         ***** VITAL SIGNS:  T(F): 98.2 (19 @ 13:35), Max: 98.4 (19 @ 06:08)  HR: 72 (19 @ 17:13) (60 - 133)  BP: 118/66 (19 @ 17:13) (90/54 - 118/66)  RR: 18 (19 @ 13:35) (18 - 18)  SpO2: 97% (19 @ 15:57) (96% - 100%)  Wt(kg): --  ,   I&O's Summary    2019 07:01  -  2019 07:00  --------------------------------------------------------  IN: 720 mL / OUT: 0 mL / NET: 720 mL             *****PHYSICAL EXAM:  pt very pleasant. appears comfortable.    alert oriented x 2attention comprehension are fair.  Able to name, repeat.   EOmi fundi not visualized   no nystagmus VFF to confrontation  Tongue is midline  Palate elevates symmetrically   Moving all 4 ext spontaneously no drift appreciated    Gait not assessed.     Gait not assessed.          *****LAB AND IMAGIN.2    9.20  )-----------( 235      ( 2019 07:19 )             29.8               02-13    138  |  94<L>  |  60<H>  ----------------------------<  102<H>  5.0   |  35<H>  |  1.21    Ca    8.6      2019 07:40  Mg     2.5                                [All pertinent recent Imaging/Reports reviewed]           *****A S S E S S M E N T   A N D   P L A N :      77 y/o female, with a PmHx of COPD (on 2-3L O2 prn), paroxsymal a-fib (on pradaxa), CAD (s/p stent 2018), CHF, HTN, HLD, and anxiety, presenting to the [facility] ED with right foot pressure ulcer pain x several weeks. The patient has a dime-sized ulcer with scant serosanguinous drainage on her right calcaneous that she acquired from a shoe weeks ago. She reports that she woke up last night around 4am with excruciating, unrelenting right root pain with SOB x a few minutes followed by chest pain x several hours. The patient reports that the ulcer pain began to gradually worsen since her last hospital visit, where she was d/c with vancomycin. The foot pain is sharp, 10/10, and radiates up to her calf. The patient took two puffs of her Symbicort for the SOB with no relief, but it resolved spontaneously within a few minutes. The chest pain began gradually and was described as a midsternal, 8/10, tightness with no radiation and accompanied by palpitations. Of note, the patient reports chronic cough, b/l LE edema, JACKSON with walking 1/2 block, orthopnea x1 pillow, occasional PND, melena x weeks, easy bruising/bleeding, and 10 lbs weight loss over the past 6 months with no change in appetite. The patient denies fever, chills, SOB at rest, diaphoresis, dizziness, claudication, wheezing, changes in vision and hearing, abdominal pain, change in bowel and urinary habits, dysuria, hematuria.   Problem/Recommendations 1: toxic metabolic encephalopathy slightly worse today.   regulate sleep wake cycle/ avoid day time sleepiness.    b12/folate/tsh/wnl   thiamine 100 daily.   encouraged po intake.   Problem/Recommendations 2: diarrhea resolved.     Thank you for allowing me to participate in the care of this patient. Please do not hesitate to call me if you have any  questions.        ________________  Kary Mercado MD  [facility] ([facility]  [phone]      30 minutes spent on total encounter; more than 50 % of the visit was  spent counseling about plan of care, compliance to diet/exercise and medication regimen and or  coordinating care by the attending physician.   At the present time, [facility] does not  provide outpatient followup, best to call the your insurance to find a participating provider.  This was explained to you at the time of the visit. Alternatively, if your insurance allows it, you can follow up with a neurologist  Dr. Anish Camacho([place]) [phone] or Dr. Michael Nissenbaum [phone]

## 2019-02-13 NOTE — PROGRESS NOTE ADULT - NEGATIVE RESPIRATORY AND THORAX SYMPTOMS
no dyspnea/no wheezing
no wheezing/no dyspnea
no wheezing/no dyspnea/no cough
no cough/no dyspnea
no pleuritic chest pain/no dyspnea
no wheezing/no dyspnea

## 2019-02-13 NOTE — PROGRESS NOTE ADULT - GASTROINTESTINAL DETAILS
nontender/soft
nontender/no masses palpable/soft
nontender/soft/no distention
soft/nontender
soft/nontender
no masses palpable/soft/nontender
soft/nontender/bowel sounds normal
bowel sounds normal/normal/soft/nontender
soft/nontender/normal
soft/no distention/nontender
normal/soft/nontender
soft/nontender/no distention

## 2019-02-13 NOTE — PROGRESS NOTE ADULT - SUBJECTIVE AND OBJECTIVE BOX
BEVERLEY DEL RIO:[id number],   79yFemale followed for:  No Known Allergies    PAST MEDICAL & SURGICAL HISTORY:  CAD (coronary artery disease): s/p stent 2018  CHF (congestive heart failure)  COPD (chronic obstructive pulmonary disease): on 2-3L O2 at home prn  Anxiety  TIA (transient ischemic attack): many years ago  PAF (paroxysmal atrial fibrillation)  HLD (hyperlipidemia)  HTN (hypertension)  H/O total hysterectomy: 2014  History of cataract surgery: b/l 2015  History of repair of hip fracture: luisa placed in RLE 2015  H/O spinal fusion: c2-6 in 2017    FAMILY HISTORY:  No pertinent family history in first degree relatives    MEDICATIONS  (STANDING):  atorvastatin 40 milliGRAM(s) Oral at bedtime  buDESOnide 160 MICROgram(s)/formoterol 4.5 MICROgram(s) Inhaler 2 Puff(s) Inhalation two times a day  clopidogrel Tablet 75 milliGRAM(s) Oral daily  collagenase Ointment 1 Application(s) Topical two times a day  dabigatran 75 milliGRAM(s) Oral every 12 hours  furosemide   Injectable 40 milliGRAM(s) IV Push daily  lactobacillus acidophilus 1 Tablet(s) Oral two times a day with meals  levalbuterol Inhalation 0.63 milliGRAM(s) Inhalation every 6 hours  metoprolol tartrate 50 milliGRAM(s) Oral two times a day  midodrine 5 milliGRAM(s) Oral every 8 hours  nystatin Powder 1 Application(s) Topical every 12 hours  pantoprazole  Injectable 40 milliGRAM(s) IV Push two times a day  thiamine 100 milliGRAM(s) Oral daily  vancomycin    Solution 125 milliGRAM(s) Oral every 8 hours    MEDICATIONS  (PRN):  acetaminophen   Tablet .. 650 milliGRAM(s) Oral every 6 hours PRN Temp greater or equal to 38C (100.4F), Mild Pain (1 - 3), Moderate Pain (4 - 6)  LORazepam     Tablet 0.5 milliGRAM(s) Oral two times a day PRN Anxiety  traMADol 25 milliGRAM(s) Oral every 8 hours PRN Severe Pain (7 - 10)      Vital Signs Last 24 Hrs  T(C): 36.9 (13 Feb 2019 06:08), Max: 36.9 (13 Feb 2019 06:08)  T(F): 98.4 (13 Feb 2019 06:08), Max: 98.4 (13 Feb 2019 06:08)  HR: 98 (13 Feb 2019 06:08) (77 - 133)  BP: 106/67 (13 Feb 2019 06:08) (95/59 - 123/77)  BP(mean): --  RR: 18 (13 Feb 2019 06:08) (16 - 18)  SpO2: 100% (13 Feb 2019 06:08) (96% - 100%)  nc/at  s1s2  cta  soft, nt, nd no guarding or rebound  no c/c/e    CBC Full  -  ( 12 Feb 2019 08:00 )  WBC Count : 11.08 K/uL  Hemoglobin : 9.5 g/dL  Hematocrit : 30.9 %  Platelet Count - Automated : 260 K/uL  Mean Cell Volume : 90.6 fL  Mean Cell Hemoglobin : 27.9 pg  Mean Cell Hemoglobin Concentration : 30.7 %  Auto Neutrophil # : x  Auto Lymphocyte # : x  Auto Monocyte # : x  Auto Eosinophil # : x  Auto Basophil # : x  Auto Neutrophil % : x  Auto Lymphocyte % : x  Auto Monocyte % : x  Auto Eosinophil % : x  Auto Basophil % : x    02-12    137  |  95<L>  |  44<H>  ----------------------------<  96  4.2   |  33<H>  |  1.13    Ca    8.7      12 Feb 2019 08:00  Mg     2.3     02-12

## 2019-02-13 NOTE — PROGRESS NOTE ADULT - ASSESSMENT
79 y/o female, with a PmHx of COPD (on 2-3L O2 prn), paroxsymal a-fib (on pradaxa), HTN, HLD, and anxiety, presenting to the [facility] ED with right foot pressure ulcer pain, SOB, and CP, found to have Afib @102 bpm, H/H 8/24.7, mild interstitial pulmonary edema, proBNP 1374, guiac positive, BUN/Cr 36/1.68, admitted to telemetry for nonhealing pressure ulcer, Afib, CHF exacerbation, r/o ACS.

## 2019-02-13 NOTE — PROGRESS NOTE ADULT - ASSESSMENT
chest xray 1/28/19:  revealed multifocal airspace disease, may reflect aveolar component of edema.    a/p   77 y/o female, with a PmHx of COPD (on 2-3L O2 prn), paroxsymal a-fib (on pradaxa), CAD (s/p stent 11/2018), DCHF, HTN, HLD, and anxiety, presenting with acute/chronic diastolic chf, chest pain r/o acs, GIB, non healing pressure ulcer. Hospital course now complicated ruling in + RVP and CDiff / uti.     1. Atypical chest pain, resolved  in the setting of acute CHF exacerbation, afib w/ RVR   cv stable, no evidence of acute ischemia/ACS   Echo with nl lv fxn  continue statin, bb, plavix     2. Acute/chronic diastolic chf  on supplemental O2. denies SOB, volume status is stable   ct chest revealed decreased bl pleural effusion, mutiple small nodular opacities secondary to impacted distal airways likely infectious etiology? pna LLL, aspiration    + rvp  repeat cxr with unchanged b/l pulm edema no evidence of dypsnea  lasix changed  to 40 mg PO daily  cont bb  pulm f/u   echo with nl lv fxn    3. AFIB  dig on hold due to hx of pauses and bradycardia   continue with  midodrine 5 mg q 8 hrs for bp support   RVR noted over night likely secondary to late administration to BB dose, s/p 5 mg IVP lopressor x 1 over night  now rate controlled, continue with lopressor to 50 mg BID  CHADS 3 - continue with pradaxa     4. CAD s/p PCI (11/2018)  cv stable, no evidence of acute ischemia   continue bb, statin, continue plavix given recent PCI      5. GIB   +GUIAC , +anemia s/p 1unit prbc,   continue to trend cbc  GI f/u      6. non healing foot ulcer  med f/u  ID f/u , vascular studies noted, pod f/u  STONEY/PVR poor, 0.60 on the effected limb with flat waveforms.  -  Vasc sx consult noted, planning RLE angio once c. diff clears    7. IGOR/CKD   creat improved, continue lasix daily per renal  renal f/u     8.Cdiff   po abx, id f/u    7. hypoxia    likely in the setting of resp infection ? aspiration   no decomp CHF on exam    + influenza s/p Tamiflu  ct chest revealed decreased bl pleural effusion, mutiple small nodular opacities secondary to impacted distal airways likely infectious etiology? pna LLL, aspiration    pulm f/u, s/p steroids   s/p speech and swallow eval     8. UTI    med f/u     dvt ppx chest xray 1/28/19:  revealed multifocal airspace disease, may reflect aveolar component of edema.    a/p   77 y/o female, with a PmHx of COPD (on 2-3L O2 prn), paroxsymal a-fib (on pradaxa), CAD (s/p stent 11/2018), DCHF, HTN, HLD, and anxiety, presenting with acute/chronic diastolic chf, chest pain r/o acs, GIB, non healing pressure ulcer. Hospital course now complicated ruling in + RVP and CDiff / uti.     1. Atypical chest pain, resolved  in the setting of acute CHF exacerbation, afib w/ RVR   cv stable,Echo with nl lv fxn  continue statin, bb, plavix     2. Acute/chronic diastolic chf  on supplemental O2. denies SOB, volume status is stable   ct chest revealed decreased bl pleural effusion, mutiple small nodular opacities secondary to impacted distal airways likely infectious etiology? pna LLL, aspiration    + rvp  repeat cxr with unchanged b/l pulm edema, no evidence of dypsnea  sys bp soft yesterday evening, lasix changed  to 40 mg PO daily  cont bb  pulm f/u   echo with nl lv fxn    3. AFIB  dig on hold due to hx of pauses and bradycardia   continue with  midodrine 5 mg q 8 hrs for bp support   RVR noted over night likely secondary to late administration to BB dose, s/p 5 mg IVP lopressor x 1 over night  now rate controlled, continue with lopressor to 50 mg BID  CHADS 3 - continue with pradaxa     4. CAD s/p PCI (11/2018)  cv stable, no evidence of acute ischemia   continue bb, statin, continue plavix given recent PCI      5. GIB   +GUIAC , +anemia s/p 1unit prbc,   continue to trend cbc  GI f/u      6. non healing foot ulcer  med f/u  ID f/u , vascular studies noted, pod f/u  STONEY/PVR poor, 0.60 on the effected limb with flat waveforms.  -  Vasc sx consult noted, planning RLE angio once c. diff clears    7. IGOR/CKD   creat improved, continue lasix daily per renal  renal f/u     8.Cdiff   po abx, id f/u    7. hypoxia    likely in the setting of resp infection ? aspiration   no decomp CHF on exam    + influenza s/p Tamiflu  ct chest revealed decreased bl pleural effusion, mutiple small nodular opacities secondary to impacted distal airways likely infectious etiology? pna LLL, aspiration    pulm f/u, s/p steroids   s/p speech and swallow eval     8. UTI    med f/u     dvt ppx

## 2019-02-14 LAB
ANION GAP SERPL CALC-SCNC: 13 MMO/L — SIGNIFICANT CHANGE UP (ref 7–14)
BASOPHILS # BLD AUTO: 0.03 K/UL — SIGNIFICANT CHANGE UP (ref 0–0.2)
BASOPHILS NFR BLD AUTO: 0.3 % — SIGNIFICANT CHANGE UP (ref 0–2)
BUN SERPL-MCNC: 60 MG/DL — HIGH (ref 7–23)
CALCIUM SERPL-MCNC: 8.8 MG/DL — SIGNIFICANT CHANGE UP (ref 8.4–10.5)
CHLORIDE SERPL-SCNC: 91 MMOL/L — LOW (ref 98–107)
CO2 SERPL-SCNC: 32 MMOL/L — HIGH (ref 22–31)
CREAT SERPL-MCNC: 1.26 MG/DL — SIGNIFICANT CHANGE UP (ref 0.5–1.3)
EOSINOPHIL # BLD AUTO: 0.21 K/UL — SIGNIFICANT CHANGE UP (ref 0–0.5)
EOSINOPHIL NFR BLD AUTO: 2.2 % — SIGNIFICANT CHANGE UP (ref 0–6)
GLUCOSE SERPL-MCNC: 119 MG/DL — HIGH (ref 70–99)
HCT VFR BLD CALC: 31.3 % — LOW (ref 34.5–45)
HGB BLD-MCNC: 9.6 G/DL — LOW (ref 11.5–15.5)
IMM GRANULOCYTES NFR BLD AUTO: 0.5 % — SIGNIFICANT CHANGE UP (ref 0–1.5)
LYMPHOCYTES # BLD AUTO: 0.69 K/UL — LOW (ref 1–3.3)
LYMPHOCYTES # BLD AUTO: 7.3 % — LOW (ref 13–44)
MAGNESIUM SERPL-MCNC: 2.5 MG/DL — SIGNIFICANT CHANGE UP (ref 1.6–2.6)
MCHC RBC-ENTMCNC: 28.2 PG — SIGNIFICANT CHANGE UP (ref 27–34)
MCHC RBC-ENTMCNC: 30.7 % — LOW (ref 32–36)
MCV RBC AUTO: 91.8 FL — SIGNIFICANT CHANGE UP (ref 80–100)
MONOCYTES # BLD AUTO: 0.57 K/UL — SIGNIFICANT CHANGE UP (ref 0–0.9)
MONOCYTES NFR BLD AUTO: 6 % — SIGNIFICANT CHANGE UP (ref 2–14)
NEUTROPHILS # BLD AUTO: 7.89 K/UL — HIGH (ref 1.8–7.4)
NEUTROPHILS NFR BLD AUTO: 83.7 % — HIGH (ref 43–77)
NRBC # FLD: 0 K/UL — LOW (ref 25–125)
PLATELET # BLD AUTO: 208 K/UL — SIGNIFICANT CHANGE UP (ref 150–400)
PMV BLD: 11.3 FL — SIGNIFICANT CHANGE UP (ref 7–13)
POTASSIUM SERPL-MCNC: 4.9 MMOL/L — SIGNIFICANT CHANGE UP (ref 3.5–5.3)
POTASSIUM SERPL-SCNC: 4.9 MMOL/L — SIGNIFICANT CHANGE UP (ref 3.5–5.3)
RBC # BLD: 3.41 M/UL — LOW (ref 3.8–5.2)
RBC # FLD: 19.3 % — HIGH (ref 10.3–14.5)
SODIUM SERPL-SCNC: 136 MMOL/L — SIGNIFICANT CHANGE UP (ref 135–145)
WBC # BLD: 9.44 K/UL — SIGNIFICANT CHANGE UP (ref 3.8–10.5)
WBC # FLD AUTO: 9.44 K/UL — SIGNIFICANT CHANGE UP (ref 3.8–10.5)

## 2019-02-14 RX ADMIN — NYSTATIN CREAM 1 APPLICATION(S): 100000 CREAM TOPICAL at 05:00

## 2019-02-14 RX ADMIN — LEVALBUTEROL 0.63 MILLIGRAM(S): 1.25 SOLUTION, CONCENTRATE RESPIRATORY (INHALATION) at 10:55

## 2019-02-14 RX ADMIN — DABIGATRAN ETEXILATE MESYLATE 75 MILLIGRAM(S): 150 CAPSULE ORAL at 18:07

## 2019-02-14 RX ADMIN — MIDODRINE HYDROCHLORIDE 5 MILLIGRAM(S): 2.5 TABLET ORAL at 12:29

## 2019-02-14 RX ADMIN — MIDODRINE HYDROCHLORIDE 5 MILLIGRAM(S): 2.5 TABLET ORAL at 05:00

## 2019-02-14 RX ADMIN — BUDESONIDE AND FORMOTEROL FUMARATE DIHYDRATE 2 PUFF(S): 160; 4.5 AEROSOL RESPIRATORY (INHALATION) at 08:48

## 2019-02-14 RX ADMIN — PANTOPRAZOLE SODIUM 40 MILLIGRAM(S): 20 TABLET, DELAYED RELEASE ORAL at 05:00

## 2019-02-14 RX ADMIN — CLOPIDOGREL BISULFATE 75 MILLIGRAM(S): 75 TABLET, FILM COATED ORAL at 12:30

## 2019-02-14 RX ADMIN — Medication 50 MILLIGRAM(S): at 05:00

## 2019-02-14 RX ADMIN — Medication 1 APPLICATION(S): at 18:09

## 2019-02-14 RX ADMIN — NYSTATIN CREAM 1 APPLICATION(S): 100000 CREAM TOPICAL at 18:07

## 2019-02-14 RX ADMIN — Medication 125 MILLIGRAM(S): at 05:00

## 2019-02-14 RX ADMIN — Medication 1 TABLET(S): at 18:08

## 2019-02-14 RX ADMIN — Medication 40 MILLIGRAM(S): at 05:01

## 2019-02-14 RX ADMIN — ATORVASTATIN CALCIUM 40 MILLIGRAM(S): 80 TABLET, FILM COATED ORAL at 21:27

## 2019-02-14 RX ADMIN — TRAMADOL HYDROCHLORIDE 25 MILLIGRAM(S): 50 TABLET ORAL at 21:26

## 2019-02-14 RX ADMIN — TRAMADOL HYDROCHLORIDE 25 MILLIGRAM(S): 50 TABLET ORAL at 22:11

## 2019-02-14 RX ADMIN — Medication 50 MILLIGRAM(S): at 18:10

## 2019-02-14 RX ADMIN — BUDESONIDE AND FORMOTEROL FUMARATE DIHYDRATE 2 PUFF(S): 160; 4.5 AEROSOL RESPIRATORY (INHALATION) at 21:27

## 2019-02-14 RX ADMIN — Medication 0.5 MILLIGRAM(S): at 21:26

## 2019-02-14 RX ADMIN — MIDODRINE HYDROCHLORIDE 5 MILLIGRAM(S): 2.5 TABLET ORAL at 21:27

## 2019-02-14 RX ADMIN — Medication 125 MILLIGRAM(S): at 12:29

## 2019-02-14 RX ADMIN — DABIGATRAN ETEXILATE MESYLATE 75 MILLIGRAM(S): 150 CAPSULE ORAL at 05:00

## 2019-02-14 RX ADMIN — Medication 125 MILLIGRAM(S): at 21:27

## 2019-02-14 RX ADMIN — Medication 100 MILLIGRAM(S): at 12:30

## 2019-02-14 RX ADMIN — PANTOPRAZOLE SODIUM 40 MILLIGRAM(S): 20 TABLET, DELAYED RELEASE ORAL at 18:06

## 2019-02-14 RX ADMIN — Medication 1 APPLICATION(S): at 05:01

## 2019-02-14 RX ADMIN — Medication 1 TABLET(S): at 08:48

## 2019-02-14 NOTE — CHART NOTE - NSCHARTNOTEFT_GEN_A_CORE
Patient seen for malnutrition follow up     Source: Patient [ ]    Family [ ]     other [X]: RN, EMR- patient sleeping during interview     Current Diet : Regular + DASH + 1500 mL + Low Sodium + No Carb Prosource (15 grams protein/ 30 mL packet.) + Ensure Enlive 240mls 2x daily (700kcal, 40g protein).     Reported:  Per RN-  denies any nausea/vomiting/diarrhea/constipation or difficulty chewing and swallowing.     PO intake:  < 50% [ ] 50-75% [x]   % [ ]  other :      Current Weight: 2/14--> 110.4 pounds 2/13--> 121.9 pounds, 2/11--> 116.6 pounds. Patient continues to have inconsistent weight trend. ? bed scale accuracy. RN made aware.   No edema noted     __________________ Pertinent Medications__________________   MEDICATIONS  (STANDING):  atorvastatin 40 milliGRAM(s) Oral at bedtime  buDESOnide 160 MICROgram(s)/formoterol 4.5 MICROgram(s) Inhaler 2 Puff(s) Inhalation two times a day  clopidogrel Tablet 75 milliGRAM(s) Oral daily  collagenase Ointment 1 Application(s) Topical two times a day  dabigatran 75 milliGRAM(s) Oral every 12 hours  furosemide    Tablet 40 milliGRAM(s) Oral daily  lactobacillus acidophilus 1 Tablet(s) Oral two times a day with meals  levalbuterol Inhalation 0.63 milliGRAM(s) Inhalation every 6 hours  metoprolol tartrate 50 milliGRAM(s) Oral two times a day  midodrine 5 milliGRAM(s) Oral every 8 hours  nystatin Powder 1 Application(s) Topical every 12 hours  pantoprazole  Injectable 40 milliGRAM(s) IV Push two times a day  thiamine 100 milliGRAM(s) Oral daily  vancomycin    Solution 125 milliGRAM(s) Oral every 8 hours    MEDICATIONS  (PRN):  acetaminophen   Tablet .. 650 milliGRAM(s) Oral every 6 hours PRN Temp greater or equal to 38C (100.4F), Mild Pain (1 - 3), Moderate Pain (4 - 6)  LORazepam     Tablet 0.5 milliGRAM(s) Oral two times a day PRN Anxiety  traMADol 25 milliGRAM(s) Oral every 8 hours PRN Severe Pain (7 - 10)      __________________ Pertinent Labs__________________   02-14 Na136 mmol/L Glu 119 mg/dL<H> K+ 4.9 mmol/L Cr  1.26 mg/dL BUN 60 mg/dL<H> 02-10 Phos 3.6 mg/dL        Skin: right and left heel- stage 1, left ear and right ear- stage 1     Estimated Needs:   [x] no change since previous assessment  [ ] recalculated:       Previous Nutrition Diagnosis:  [x] Malnutrition- Severe     Nutrition Diagnosis is [x] ongoing  [ ] resolved [ ] not applicable     New Nutrition Diagnosis: [x] not applicable    Nutrition Recommendations:  1. Continue with current diet order  2. Continue No Carb Prosource (15 grams protein/ 30 mL packet.) + Ensure Enlive 240mls 2x daily (700kcal, 40g protein).   3. Please Encourage po intake, assist with meals and menu selections, provide alternatives PRN.   4. Monitor weights, labs, BM's, skin integrity, p.o. intake.   5. RD to remain available for further nutritional interventions as indicated.-Shaniqua Kauffman, RDN, CDN

## 2019-02-14 NOTE — PROGRESS NOTE ADULT - CONSTITUTIONAL DETAILS
well-groomed/nad
no distress
well-groomed/no distress
NAD

## 2019-02-14 NOTE — PROGRESS NOTE ADULT - CONSTITUTIONAL
detailed exam
Well-developed, well nourished
detailed exam
Well-developed, well nourished
Well-developed, well nourished
detailed exam
detailed exam
Well-developed, well nourished
detailed exam

## 2019-02-14 NOTE — PROGRESS NOTE ADULT - MS EXT PE MLT D E PC
no pedal edema/no clubbing
no pedal edema/normal
no clubbing/no cyanosis
no clubbing
no clubbing
no clubbing/no cyanosis
pedal edema
no cyanosis/no clubbing
pedal edema
no clubbing/no cyanosis

## 2019-02-14 NOTE — PROGRESS NOTE ADULT - ASSESSMENT
79 h/o female admitted for rt postrior ankle pressure ulcer    vascular following needs angiogram  need nephrology clearence  she needs to complete abx for c diff  local wound care    CDIFF POSITIVE  continue vancomycin taper as per ID  pt does not need contact precautions  ct chest /abdomen done    chronic afib with rapid ventricular response  cardiology following  CKD   monitor cmp  monitor BUN/CREATININE  appreciate nephrology f/u  medically cleared for discharge  discharge planning  vascular f/u once she completes abx

## 2019-02-14 NOTE — PROGRESS NOTE ADULT - RESPIRATORY
detailed exam

## 2019-02-14 NOTE — PROGRESS NOTE ADULT - CVS HE PE MLT D E PC
regular rate and rhythm
regular rate and rhythm/no rub
regular rate and rhythm
regular rate and rhythm/no murmur
regular rate and rhythm
regular rate and rhythm/no murmur
regular rate and rhythm/no murmur
no rub/regular rate and rhythm
regular rate and rhythm
regular rate and rhythm/no murmur

## 2019-02-14 NOTE — PROGRESS NOTE ADULT - RS GEN PE MLT RESP DETAILS PC
airway patent/breath sounds equal
airway patent/clear to auscultation bilaterally
airway patent/clear to auscultation bilaterally
breath sounds equal/airway patent/clear to auscultation bilaterally
wheezes
clear to auscultation bilaterally
breath sounds equal
airway patent/breath sounds equal
breath sounds equal/airway patent/normal
airway patent/breath sounds equal
breath sounds equal
good air movement/breath sounds equal
normal/airway patent
breath sounds equal/clear to auscultation bilaterally
clear to auscultation bilaterally/airway patent
breath sounds equal/clear to auscultation bilaterally

## 2019-02-14 NOTE — PROGRESS NOTE ADULT - ASSESSMENT
chest xray 1/28/19:  revealed multifocal airspace disease, may reflect aveolar component of edema.    a/p   79 y/o female, with a PmHx of COPD (on 2-3L O2 prn), paroxsymal a-fib (on pradaxa), CAD (s/p stent 11/2018), DCHF, HTN, HLD, and anxiety, presenting with acute/chronic diastolic chf, chest pain r/o acs, GIB, non healing pressure ulcer. Hospital course now complicated ruling in + RVP and CDiff / uti.     1. Atypical chest pain, resolved  in the setting of acute CHF exacerbation, afib w/ RVR   cv stable,Echo with nl lv fxn  continue statin, bb, plavix     2. Acute/chronic diastolic chf  on supplemental O2. denies SOB, volume status is stable   ct chest revealed decreased bl pleural effusion, mutiple small nodular opacities secondary to impacted distal airways likely infectious etiology? pna LLL, aspiration    + rvp  repeat cxr with unchanged b/l pulm edema, no evidence of dypsnea  continue lasix 40 mg PO daily  cont bb  pulm f/u   echo with nl lv fxn    3. AFIB  dig on hold due to hx of pauses and bradycardia   continue with  midodrine 5 mg q 8 hrs for bp support   brief afib with RVR noted, overall rates improved, continue with lopressor to 50 mg BID  CHADS 3 - continue with pradaxa     4. CAD s/p PCI (11/2018)  cv stable, no evidence of acute ischemia   continue bb, statin, continue plavix given recent PCI      5. GIB   +GUIAC , +anemia s/p 1unit prbc,   continue to trend cbc  GI f/u      6. non healing foot ulcer  med f/u  ID f/u , vascular studies noted, pod f/u  STONEY/PVR poor, 0.60 on the effected limb with flat waveforms.  -  Vasc sx consult noted, planning RLE angio once c. diff clears    7. IGOR/CKD   creat improved, continue lasix daily per renal  renal f/u     8.Cdiff   po abx, id f/u    7. hypoxia    likely in the setting of resp infection ? aspiration   no decomp CHF on exam    + influenza s/p Tamiflu  ct chest revealed decreased bl pleural effusion, mutiple small nodular opacities secondary to impacted distal airways likely infectious etiology? pna LLL, aspiration    pulm f/u, s/p steroids   s/p speech and swallow eval     8. UTI    med f/u     dvt ppx

## 2019-02-14 NOTE — PROGRESS NOTE ADULT - PROBLEM SELECTOR PLAN 1
resolved and treated!  2/12: CT s can n oted: would need repeat ct scan chest in 6 weeks time to ensure ful resolution of nodules: She has viral infection on this admission  2/13: Resolved!  2/14: STable!

## 2019-02-14 NOTE — PROGRESS NOTE ADULT - SUBJECTIVE AND OBJECTIVE BOX
pt on abx awaiting placement no new complaints  Vital Signs Last 24 Hrs  T(C): 36.4 (14 Feb 2019 18:05), Max: 36.6 (14 Feb 2019 15:46)  T(F): 97.6 (14 Feb 2019 18:05), Max: 97.9 (14 Feb 2019 15:46)  HR: 69 (14 Feb 2019 18:05) (69 - 110)  BP: 103/77 (14 Feb 2019 18:05) (100/68 - 104/66)  BP(mean): --  RR: 18 (14 Feb 2019 18:05) (16 - 18)  SpO2: 95% (14 Feb 2019 18:05) (95% - 100%)                        9.6    9.44  )-----------( 208      ( 14 Feb 2019 08:00 )             31.3   02-14    136  |  91<L>  |  60<H>  ----------------------------<  119<H>  4.9   |  32<H>  |  1.26    Ca    8.8      14 Feb 2019 08:00  Mg     2.5     02-14    MEDICATIONS  (STANDING):  atorvastatin 40 milliGRAM(s) Oral at bedtime  buDESOnide 160 MICROgram(s)/formoterol 4.5 MICROgram(s) Inhaler 2 Puff(s) Inhalation two times a day  clopidogrel Tablet 75 milliGRAM(s) Oral daily  collagenase Ointment 1 Application(s) Topical two times a day  dabigatran 75 milliGRAM(s) Oral every 12 hours  furosemide    Tablet 40 milliGRAM(s) Oral daily  lactobacillus acidophilus 1 Tablet(s) Oral two times a day with meals  levalbuterol Inhalation 0.63 milliGRAM(s) Inhalation every 6 hours  metoprolol tartrate 50 milliGRAM(s) Oral two times a day  midodrine 5 milliGRAM(s) Oral every 8 hours  nystatin Powder 1 Application(s) Topical every 12 hours  pantoprazole  Injectable 40 milliGRAM(s) IV Push two times a day  thiamine 100 milliGRAM(s) Oral daily  vancomycin    Solution 125 milliGRAM(s) Oral every 8 hours

## 2019-02-14 NOTE — PROGRESS NOTE ADULT - EXTREMITIES
detailed exam
No cyanosis, clubbing or edema
detailed exam

## 2019-02-14 NOTE — PROGRESS NOTE ADULT - SUBJECTIVE AND OBJECTIVE BOX
[facility] NEPHROLOGY- PROGRESS NOTE    78y Female with history of CHF, COPD presents with SOB found to have guaiac positive anemia and afib with RVR. Nephrology consulted for elevated Scr.    REVIEW OF SYSTEMS:  Gen: no changes in weight  Cards: no chest pain  Resp: + dyspnea - improving.   GI: no nausea or vomiting, + diarrhea improving  Vascular: no LE edema +R foot pain    No Known Allergies      Hospital Medications: Medications reviewed      VITALS:  T(F): 97.7 (02-14-19 @ 04:58), Max: 98.2 (02-13-19 @ 13:35)  HR: 110 (02-14-19 @ 04:58)  BP: 104/66 (02-14-19 @ 04:58)  RR: 18 (02-14-19 @ 04:58)  SpO2: 100% (02-14-19 @ 04:58)  Wt(kg): --    02-13 @ 07:01  -  02-14 @ 07:00  --------------------------------------------------------  IN: 680 mL / OUT: 0 mL / NET: 680 mL      PHYSICAL EXAM:    Gen: NAD, calm  Cards: Irregularly irregular and tachycardic, +S1/S2, no M/G/R  Resp: course BS, bibasilar rales R>L  GI: soft, NT/ND, NABS  Vascular: no LE edema B/L      LABS:  02-13    138  |  94<L>  |  60<H>  ----------------------------<  102<H>  5.0   |  35<H>  |  1.21    Ca    8.6      13 Feb 2019 07:40  Mg     2.5     02-13      Creatinine Trend: 1.21 <--, 1.13 <--, 1.08 <--, 1.05 <--, 0.96 <--, 0.94 <--                        9.2    9.20  )-----------( 235      ( 13 Feb 2019 07:19 )             29.8     Urine Studies:

## 2019-02-14 NOTE — PROGRESS NOTE ADULT - SUBJECTIVE AND OBJECTIVE BOX
BEVERLEY DEL RIO:[id number],   79yFemale followed for:  No Known Allergies    PAST MEDICAL & SURGICAL HISTORY:  CAD (coronary artery disease): s/p stent 2018  CHF (congestive heart failure)  COPD (chronic obstructive pulmonary disease): on 2-3L O2 at home prn  Anxiety  TIA (transient ischemic attack): many years ago  PAF (paroxysmal atrial fibrillation)  HLD (hyperlipidemia)  HTN (hypertension)  H/O total hysterectomy: 2014  History of cataract surgery: b/l 2015  History of repair of hip fracture: luisa placed in RLE 2015  H/O spinal fusion: c2-6 in 2017    FAMILY HISTORY:  No pertinent family history in first degree relatives    MEDICATIONS  (STANDING):  atorvastatin 40 milliGRAM(s) Oral at bedtime  buDESOnide 160 MICROgram(s)/formoterol 4.5 MICROgram(s) Inhaler 2 Puff(s) Inhalation two times a day  clopidogrel Tablet 75 milliGRAM(s) Oral daily  collagenase Ointment 1 Application(s) Topical two times a day  dabigatran 75 milliGRAM(s) Oral every 12 hours  furosemide    Tablet 40 milliGRAM(s) Oral daily  lactobacillus acidophilus 1 Tablet(s) Oral two times a day with meals  levalbuterol Inhalation 0.63 milliGRAM(s) Inhalation every 6 hours  metoprolol tartrate 50 milliGRAM(s) Oral two times a day  midodrine 5 milliGRAM(s) Oral every 8 hours  nystatin Powder 1 Application(s) Topical every 12 hours  pantoprazole  Injectable 40 milliGRAM(s) IV Push two times a day  thiamine 100 milliGRAM(s) Oral daily  vancomycin    Solution 125 milliGRAM(s) Oral every 8 hours    MEDICATIONS  (PRN):  acetaminophen   Tablet .. 650 milliGRAM(s) Oral every 6 hours PRN Temp greater or equal to 38C (100.4F), Mild Pain (1 - 3), Moderate Pain (4 - 6)  LORazepam     Tablet 0.5 milliGRAM(s) Oral two times a day PRN Anxiety  traMADol 25 milliGRAM(s) Oral every 8 hours PRN Severe Pain (7 - 10)      Vital Signs Last 24 Hrs  T(C): 36.5 (14 Feb 2019 04:58), Max: 36.8 (13 Feb 2019 13:35)  T(F): 97.7 (14 Feb 2019 04:58), Max: 98.2 (13 Feb 2019 13:35)  HR: 110 (14 Feb 2019 04:58) (60 - 110)  BP: 104/66 (14 Feb 2019 04:58) (90/54 - 118/66)  BP(mean): --  RR: 18 (14 Feb 2019 04:58) (16 - 18)  SpO2: 100% (14 Feb 2019 04:58) (96% - 100%)  nc/at  s1s2  cta  soft, nt, nd no guarding or rebound  no c/c/e    CBC Full  -  ( 14 Feb 2019 08:00 )  WBC Count : 9.44 K/uL  Hemoglobin : 9.6 g/dL  Hematocrit : 31.3 %  Platelet Count - Automated : 208 K/uL  Mean Cell Volume : 91.8 fL  Mean Cell Hemoglobin : 28.2 pg  Mean Cell Hemoglobin Concentration : 30.7 %  Auto Neutrophil # : 7.89 K/uL  Auto Lymphocyte # : 0.69 K/uL  Auto Monocyte # : 0.57 K/uL  Auto Eosinophil # : 0.21 K/uL  Auto Basophil # : 0.03 K/uL  Auto Neutrophil % : 83.7 %  Auto Lymphocyte % : 7.3 %  Auto Monocyte % : 6.0 %  Auto Eosinophil % : 2.2 %  Auto Basophil % : 0.3 %    02-14    136  |  91<L>  |  60<H>  ----------------------------<  119<H>  4.9   |  32<H>  |  1.26    Ca    8.8      14 Feb 2019 08:00  Mg     2.5     02-14

## 2019-02-14 NOTE — PROGRESS NOTE ADULT - SUBJECTIVE AND OBJECTIVE BOX
Patient is a 79y old  Female who presents with a chief complaint of right foot pressure ulcer pain (14 Feb 2019 10:31)      Any change in ROS: I am doing ok     MEDICATIONS  (STANDING):  atorvastatin 40 milliGRAM(s) Oral at bedtime  buDESOnide 160 MICROgram(s)/formoterol 4.5 MICROgram(s) Inhaler 2 Puff(s) Inhalation two times a day  clopidogrel Tablet 75 milliGRAM(s) Oral daily  collagenase Ointment 1 Application(s) Topical two times a day  dabigatran 75 milliGRAM(s) Oral every 12 hours  furosemide    Tablet 40 milliGRAM(s) Oral daily  lactobacillus acidophilus 1 Tablet(s) Oral two times a day with meals  levalbuterol Inhalation 0.63 milliGRAM(s) Inhalation every 6 hours  metoprolol tartrate 50 milliGRAM(s) Oral two times a day  midodrine 5 milliGRAM(s) Oral every 8 hours  nystatin Powder 1 Application(s) Topical every 12 hours  pantoprazole  Injectable 40 milliGRAM(s) IV Push two times a day  thiamine 100 milliGRAM(s) Oral daily  vancomycin    Solution 125 milliGRAM(s) Oral every 8 hours    MEDICATIONS  (PRN):  acetaminophen   Tablet .. 650 milliGRAM(s) Oral every 6 hours PRN Temp greater or equal to 38C (100.4F), Mild Pain (1 - 3), Moderate Pain (4 - 6)  LORazepam     Tablet 0.5 milliGRAM(s) Oral two times a day PRN Anxiety  traMADol 25 milliGRAM(s) Oral every 8 hours PRN Severe Pain (7 - 10)    Vital Signs Last 24 Hrs  T(C): 36.5 (14 Feb 2019 04:58), Max: 36.8 (13 Feb 2019 13:35)  T(F): 97.7 (14 Feb 2019 04:58), Max: 98.2 (13 Feb 2019 13:35)  HR: 110 (14 Feb 2019 04:58) (60 - 110)  BP: 104/66 (14 Feb 2019 04:58) (90/54 - 118/66)  BP(mean): --  RR: 18 (14 Feb 2019 04:58) (16 - 18)  SpO2: 100% (14 Feb 2019 04:58) (96% - 100%)    I&O's Summary    13 Feb 2019 07:01  -  14 Feb 2019 07:00  --------------------------------------------------------  IN: 680 mL / OUT: 0 mL / NET: 680 mL          Physical Exam:   GENERAL: NAD, well-groomed, well-developed  HEENT: COLEEN/   Atraumatic, Normocephalic  ENMT: No tonsillar erythema, exudates, or enlargement; Moist mucous membranes, Good dentition, No lesions  NECK: Supple, No JVD, Normal thyroid  CHEST/LUNG: Clear to auscultaion  CVS: Regular rate and rhythm; No murmurs, rubs, or gallops  GI: : Soft, Nontender, Nondistended; Bowel sounds present  NERVOUS SYSTEM:  Alert & Oriented X3  EXTREMITIES:  2+ Peripheral Pulses, No clubbing, cyanosis, or edema  LYMPH: No lymphadenopathy noted  SKIN: No rashes or lesions  ENDOCRINOLOGY: No Thyromegaly  PSYCH: Appropriate    Labs:  32                            9.6    9.44  )-----------( 208      ( 14 Feb 2019 08:00 )             31.3                         9.2    9.20  )-----------( 235      ( 13 Feb 2019 07:19 )             29.8                         9.5    11.08 )-----------( 260      ( 12 Feb 2019 08:00 )             30.9                         9.4    10.72 )-----------( 260      ( 11 Feb 2019 06:17 )             30.6     02-14    136  |  91<L>  |  60<H>  ----------------------------<  119<H>  4.9   |  32<H>  |  1.26  02-13    138  |  94<L>  |  60<H>  ----------------------------<  102<H>  5.0   |  35<H>  |  1.21  02-12    137  |  95<L>  |  44<H>  ----------------------------<  96  4.2   |  33<H>  |  1.13  02-11    137  |  97<L>  |  41<H>  ----------------------------<  115<H>  4.6   |  32<H>  |  1.08    Ca    8.8      14 Feb 2019 08:00  Ca    8.6      13 Feb 2019 07:40  Mg     2.5     02-14  Mg     2.5     02-13      CAPILLARY BLOOD GLUCOSE            < from: Xray Chest 1 View- PORTABLE-Routine (02.11.19 @ 06:54) >  FINDINGS:    Lines/Tubes: None    Heart and mediastinum:  Unchanged.    Lungs, pleura,and airways: Unchanged interstitial edema. Stable right   effusion    Bones and soft tissues: The bones and soft tissues are unchanged.    Impression:    Unchanged pulmonary edema and right pleural effusion                  ALEKSANDAR HARMON M.D., ATTENDING RADIOLOGIST  This document has been electronically signed. Feb 11 2019 10:10AM        < end of copied text >            RECENT CULTURES:        RESPIRATORY CULTURES:          Studies  Chest X-RAY  CT SCAN Chest   Venous Dopplers: LE:   CT Abdomen  Others

## 2019-02-14 NOTE — PROGRESS NOTE ADULT - SUBJECTIVE AND OBJECTIVE BOX
CARDIOLOGY FOLLOW UP - Dr. Vieyra    CC no cp/sob       PHYSICAL EXAM:  T(C): 36.5 (02-14-19 @ 04:58), Max: 36.8 (02-13-19 @ 13:35)  HR: 110 (02-14-19 @ 04:58) (60 - 110)  BP: 104/66 (02-14-19 @ 04:58) (90/54 - 118/66)  RR: 18 (02-14-19 @ 04:58) (16 - 18)  SpO2: 100% (02-14-19 @ 04:58) (96% - 100%)  Wt(kg): --  I&O's Summary    13 Feb 2019 07:01  -  14 Feb 2019 07:00  --------------------------------------------------------  IN: 680 mL / OUT: 0 mL / NET: 680 mL        Appearance: Normal	  Cardiovascular: Normal S1 S2,RRR, No JVD, No murmurs  Respiratory: Lungs clear to auscultation	  Gastrointestinal:  Soft, Non-tender, + BS	  Extremities: Normal range of motion, No clubbing, cyanosis or edema        MEDICATIONS  (STANDING):  atorvastatin 40 milliGRAM(s) Oral at bedtime  buDESOnide 160 MICROgram(s)/formoterol 4.5 MICROgram(s) Inhaler 2 Puff(s) Inhalation two times a day  clopidogrel Tablet 75 milliGRAM(s) Oral daily  collagenase Ointment 1 Application(s) Topical two times a day  dabigatran 75 milliGRAM(s) Oral every 12 hours  furosemide    Tablet 40 milliGRAM(s) Oral daily  lactobacillus acidophilus 1 Tablet(s) Oral two times a day with meals  levalbuterol Inhalation 0.63 milliGRAM(s) Inhalation every 6 hours  metoprolol tartrate 50 milliGRAM(s) Oral two times a day  midodrine 5 milliGRAM(s) Oral every 8 hours  nystatin Powder 1 Application(s) Topical every 12 hours  pantoprazole  Injectable 40 milliGRAM(s) IV Push two times a day  thiamine 100 milliGRAM(s) Oral daily  vancomycin    Solution 125 milliGRAM(s) Oral every 8 hours      TELEMETRY: AFIB 110-120, with brief spikes to 150 at times 	    ECG:  	  RADIOLOGY:   DIAGNOSTIC TESTING:  [ ] Echocardiogram:  [ ]  Catheterization:  [ ] Stress Test:    OTHER: 	    LABS:	 	                                9.6    9.44  )-----------( 208      ( 14 Feb 2019 08:00 )             31.3     02-14    136  |  91<L>  |  60<H>  ----------------------------<  119<H>  4.9   |  32<H>  |  1.26    Ca    8.8      14 Feb 2019 08:00  Mg     2.5     02-14

## 2019-02-14 NOTE — PROGRESS NOTE ADULT - SUBJECTIVE AND OBJECTIVE BOX
[facility]        - Patient seen and examined.  - Today, patient is without complaints.         *****MEDICATIONS: Current medication reviewed and documented.    MEDICATIONS  (STANDING):  atorvastatin 40 milliGRAM(s) Oral at bedtime  buDESOnide 160 MICROgram(s)/formoterol 4.5 MICROgram(s) Inhaler 2 Puff(s) Inhalation two times a day  clopidogrel Tablet 75 milliGRAM(s) Oral daily  collagenase Ointment 1 Application(s) Topical two times a day  dabigatran 75 milliGRAM(s) Oral every 12 hours  furosemide    Tablet 40 milliGRAM(s) Oral daily  lactobacillus acidophilus 1 Tablet(s) Oral two times a day with meals  levalbuterol Inhalation 0.63 milliGRAM(s) Inhalation every 6 hours  metoprolol tartrate 50 milliGRAM(s) Oral two times a day  midodrine 5 milliGRAM(s) Oral every 8 hours  nystatin Powder 1 Application(s) Topical every 12 hours  pantoprazole  Injectable 40 milliGRAM(s) IV Push two times a day  thiamine 100 milliGRAM(s) Oral daily  vancomycin    Solution 125 milliGRAM(s) Oral every 8 hours    MEDICATIONS  (PRN):  acetaminophen   Tablet .. 650 milliGRAM(s) Oral every 6 hours PRN Temp greater or equal to 38C (100.4F), Mild Pain (1 - 3), Moderate Pain (4 - 6)  LORazepam     Tablet 0.5 milliGRAM(s) Oral two times a day PRN Anxiety  traMADol 25 milliGRAM(s) Oral every 8 hours PRN Severe Pain (7 - 10)           ***** REVIEW OF SYSTEM:  GEN: no fever, no chills, no pain  RESP: no SOB, no cough, no sputum  CVS: no chest pain, no palpitations, no edema  GI: no abdominal pain, no nausea, no vomiting, no constipation, no diarrhea  : no dysurea, no frequency  NEURO: no headache, no diziness  PSYCH: no depression, not anxious  Derm : no itching, no rash         ***** VITAL SIGNS:  T(F): 97.9 (19 @ 15:46), Max: 97.9 (19 @ 15:46)  HR: 95 (19 @ 15:46) (95 - 110)  BP: 100/68 (19 @ 15:46) (100/68 - 104/66)  RR: 18 (19 @ 15:46) (16 - 18)  SpO2: 96% (19 @ 15:46) (96% - 100%)  Wt(kg): --  ,   I&O's Summary    2019 07:01  -  2019 07:00  --------------------------------------------------------  IN: 680 mL / OUT: 0 mL / NET: 680 mL             *****PHYSICAL EXAM: pt very pleasant. appears comfortable.    alert oriented x 2attention comprehension are fair.  Able to name, repeat.   EOmi fundi not visualized   no nystagmus VFF to confrontation  Tongue is midline  Palate elevates symmetrically   Moving all 4 ext spontaneously no drift appreciated           *****LAB AND IMAGIN.6    9.44  )-----------( 208      ( 2019 08:00 )             31.3               02-14    136  |  91<L>  |  60<H>  ----------------------------<  119<H>  4.9   |  32<H>  |  1.26    Ca    8.8      2019 08:00  Mg     2.5     02-                           [All pertinent recent Imaging/Reports reviewed]           *****A S S E S S M E N T   A N D   P L A N :        77 y/o female, with a PmHx of COPD (on 2-3L O2 prn), paroxsymal a-fib (on pradaxa), CAD (s/p stent 2018), CHF, HTN, HLD, and anxiety, presenting to the [facility] ED with right foot pressure ulcer pain x several weeks. The patient has a dime-sized ulcer with scant serosanguinous drainage on her right calcaneous that she acquired from a shoe weeks ago. She reports that she woke up last night around 4am with excruciating, unrelenting right root pain with SOB x a few minutes followed by chest pain x several hours. The patient reports that the ulcer pain began to gradually worsen since her last hospital visit, where she was d/c with vancomycin. The foot pain is sharp, 10/10, and radiates up to her calf. The patient took two puffs of her Symbicort for the SOB with no relief, but it resolved spontaneously within a few minutes. The chest pain began gradually and was described as a midsternal, 8/10, tightness with no radiation and accompanied by palpitations. Of note, the patient reports chronic cough, b/l LE edema, JACKSON with walking 1/2 block, orthopnea x1 pillow, occasional PND, melena x weeks, easy bruising/bleeding, and 10 lbs weight loss over the past 6 months with no change in appetite. The patient denies fever, chills, SOB at rest, diaphoresis, dizziness, claudication, wheezing, changes in vision and hearing, abdominal pain, change in bowel and urinary habits, dysuria, hematuria.   Problem/Recommendations 1: toxic metabolic encephalopathy slightly worse today.   regulate sleep wake cycle/ avoid day time sleepiness.    b12/folate/tsh/wnl   thiamine 100 daily.   encouraged po intake.   Problem/Recommendations 2: diarrhea resolved.  Problem/Recommendations 3: non healing ulcer likely due to poor circulation   vascular surgery f/u       Thank you for allowing me to participate in the care of this patient. Please do not hesitate to call me if you have any  questions.        ________________  Kary Mercado MD  [facility] [place] ([facility]  [phone]      30 minutes spent on total encounter; more than 50 % of the visit was  spent counseling about plan of care, compliance to diet/exercise and medication regimen and or  coordinating care by the attending physician.   At the present time, [facility] does not  provide outpatient followup, best to call the your insurance to find a participating provider.  This was explained to you at the time of the visit. Alternatively, if your insurance allows it, you can follow up with a neurologist  Dr. Anish Camacho([place]) [phone] or Dr. Michael Nissenbaum [phone]

## 2019-02-14 NOTE — PROGRESS NOTE ADULT - ASSESSMENT
77 y/o female, with a PmHx of COPD (on 2-3L O2 prn), paroxsymal a-fib (on pradaxa), HTN, HLD, and anxiety, presenting to the [facility] ED with right foot pressure ulcer pain, SOB, and CP, found to have Afib @102 bpm, H/H 8/24.7, mild interstitial pulmonary edema, proBNP 1374, guiac positive, BUN/Cr 36/1.68, admitted to telemetry for nonhealing pressure ulcer, Afib, CHF exacerbation, r/o ACS.

## 2019-02-15 ENCOUNTER — TRANSCRIPTION ENCOUNTER (OUTPATIENT)
Age: 79
End: 2019-02-15

## 2019-02-15 VITALS
DIASTOLIC BLOOD PRESSURE: 50 MMHG | OXYGEN SATURATION: 95 % | SYSTOLIC BLOOD PRESSURE: 90 MMHG | TEMPERATURE: 98 F | HEART RATE: 95 BPM | RESPIRATION RATE: 18 BRPM

## 2019-02-15 LAB
ANION GAP SERPL CALC-SCNC: 9 MMO/L — SIGNIFICANT CHANGE UP (ref 7–14)
BASOPHILS # BLD AUTO: 0.03 K/UL — SIGNIFICANT CHANGE UP (ref 0–0.2)
BASOPHILS NFR BLD AUTO: 0.4 % — SIGNIFICANT CHANGE UP (ref 0–2)
BUN SERPL-MCNC: 66 MG/DL — HIGH (ref 7–23)
CALCIUM SERPL-MCNC: 8.8 MG/DL — SIGNIFICANT CHANGE UP (ref 8.4–10.5)
CHLORIDE SERPL-SCNC: 95 MMOL/L — LOW (ref 98–107)
CO2 SERPL-SCNC: 34 MMOL/L — HIGH (ref 22–31)
CREAT SERPL-MCNC: 1.27 MG/DL — SIGNIFICANT CHANGE UP (ref 0.5–1.3)
EOSINOPHIL # BLD AUTO: 0.22 K/UL — SIGNIFICANT CHANGE UP (ref 0–0.5)
EOSINOPHIL NFR BLD AUTO: 2.8 % — SIGNIFICANT CHANGE UP (ref 0–6)
GLUCOSE SERPL-MCNC: 99 MG/DL — SIGNIFICANT CHANGE UP (ref 70–99)
HCT VFR BLD CALC: 30.6 % — LOW (ref 34.5–45)
HGB BLD-MCNC: 9.4 G/DL — LOW (ref 11.5–15.5)
IMM GRANULOCYTES NFR BLD AUTO: 0.4 % — SIGNIFICANT CHANGE UP (ref 0–1.5)
LYMPHOCYTES # BLD AUTO: 0.9 K/UL — LOW (ref 1–3.3)
LYMPHOCYTES # BLD AUTO: 11.5 % — LOW (ref 13–44)
MAGNESIUM SERPL-MCNC: 2.6 MG/DL — SIGNIFICANT CHANGE UP (ref 1.6–2.6)
MCHC RBC-ENTMCNC: 28 PG — SIGNIFICANT CHANGE UP (ref 27–34)
MCHC RBC-ENTMCNC: 30.7 % — LOW (ref 32–36)
MCV RBC AUTO: 91.1 FL — SIGNIFICANT CHANGE UP (ref 80–100)
MONOCYTES # BLD AUTO: 0.68 K/UL — SIGNIFICANT CHANGE UP (ref 0–0.9)
MONOCYTES NFR BLD AUTO: 8.7 % — SIGNIFICANT CHANGE UP (ref 2–14)
NEUTROPHILS # BLD AUTO: 5.98 K/UL — SIGNIFICANT CHANGE UP (ref 1.8–7.4)
NEUTROPHILS NFR BLD AUTO: 76.2 % — SIGNIFICANT CHANGE UP (ref 43–77)
NRBC # FLD: 0 K/UL — LOW (ref 25–125)
PLATELET # BLD AUTO: 196 K/UL — SIGNIFICANT CHANGE UP (ref 150–400)
PMV BLD: 12.1 FL — SIGNIFICANT CHANGE UP (ref 7–13)
POTASSIUM SERPL-MCNC: 4.9 MMOL/L — SIGNIFICANT CHANGE UP (ref 3.5–5.3)
POTASSIUM SERPL-SCNC: 4.9 MMOL/L — SIGNIFICANT CHANGE UP (ref 3.5–5.3)
RBC # BLD: 3.36 M/UL — LOW (ref 3.8–5.2)
RBC # FLD: 19.7 % — HIGH (ref 10.3–14.5)
SODIUM SERPL-SCNC: 138 MMOL/L — SIGNIFICANT CHANGE UP (ref 135–145)
WBC # BLD: 7.84 K/UL — SIGNIFICANT CHANGE UP (ref 3.8–10.5)
WBC # FLD AUTO: 7.84 K/UL — SIGNIFICANT CHANGE UP (ref 3.8–10.5)

## 2019-02-15 RX ORDER — ACETAMINOPHEN 500 MG
1 TABLET ORAL
Qty: 0 | Refills: 0 | COMMUNITY
Start: 2019-02-15

## 2019-02-15 RX ORDER — FUROSEMIDE 40 MG
1 TABLET ORAL
Qty: 0 | Refills: 0 | COMMUNITY
Start: 2019-02-15

## 2019-02-15 RX ORDER — SENNA PLUS 8.6 MG/1
1 TABLET ORAL
Qty: 0 | Refills: 0 | COMMUNITY

## 2019-02-15 RX ORDER — ACETAMINOPHEN 500 MG
2 TABLET ORAL
Qty: 0 | Refills: 0 | COMMUNITY
Start: 2019-02-15

## 2019-02-15 RX ORDER — MIDODRINE HYDROCHLORIDE 2.5 MG/1
1 TABLET ORAL
Qty: 0 | Refills: 0 | COMMUNITY
Start: 2019-02-15

## 2019-02-15 RX ORDER — NYSTATIN CREAM 100000 [USP'U]/G
1 CREAM TOPICAL
Qty: 0 | Refills: 0 | COMMUNITY
Start: 2019-02-15

## 2019-02-15 RX ORDER — BUDESONIDE AND FORMOTEROL FUMARATE DIHYDRATE 160; 4.5 UG/1; UG/1
2 AEROSOL RESPIRATORY (INHALATION)
Qty: 0 | Refills: 0 | COMMUNITY
Start: 2019-02-15

## 2019-02-15 RX ORDER — METOPROLOL TARTRATE 50 MG
1 TABLET ORAL
Qty: 0 | Refills: 0 | COMMUNITY
Start: 2019-02-15

## 2019-02-15 RX ORDER — TRAMADOL HYDROCHLORIDE 50 MG/1
0.5 TABLET ORAL
Qty: 0 | Refills: 0 | COMMUNITY
Start: 2019-02-15

## 2019-02-15 RX ORDER — ATORVASTATIN CALCIUM 80 MG/1
1 TABLET, FILM COATED ORAL
Qty: 0 | Refills: 0 | COMMUNITY
Start: 2019-02-15

## 2019-02-15 RX ORDER — TRAMADOL HYDROCHLORIDE 50 MG/1
1 TABLET ORAL
Qty: 0 | Refills: 0 | COMMUNITY
Start: 2019-02-15

## 2019-02-15 RX ORDER — LEVALBUTEROL 1.25 MG/.5ML
3 SOLUTION, CONCENTRATE RESPIRATORY (INHALATION)
Qty: 0 | Refills: 0 | COMMUNITY
Start: 2019-02-15

## 2019-02-15 RX ORDER — PANTOPRAZOLE SODIUM 20 MG/1
40 TABLET, DELAYED RELEASE ORAL
Qty: 0 | Refills: 0 | COMMUNITY
Start: 2019-02-15

## 2019-02-15 RX ORDER — LACTOBACILLUS ACIDOPHILUS 100MM CELL
1 CAPSULE ORAL
Qty: 0 | Refills: 0 | COMMUNITY
Start: 2019-02-15

## 2019-02-15 RX ORDER — CLOPIDOGREL BISULFATE 75 MG/1
1 TABLET, FILM COATED ORAL
Qty: 0 | Refills: 0 | COMMUNITY
Start: 2019-02-15

## 2019-02-15 RX ORDER — METOPROLOL TARTRATE 50 MG
75 TABLET ORAL
Qty: 0 | Refills: 0 | Status: DISCONTINUED | OUTPATIENT
Start: 2019-02-15 | End: 2019-02-15

## 2019-02-15 RX ORDER — THIAMINE MONONITRATE (VIT B1) 100 MG
1 TABLET ORAL
Qty: 0 | Refills: 0 | COMMUNITY
Start: 2019-02-15

## 2019-02-15 RX ORDER — COLLAGENASE CLOSTRIDIUM HIST. 250 UNIT/G
1 OINTMENT (GRAM) TOPICAL
Qty: 0 | Refills: 0 | COMMUNITY
Start: 2019-02-15

## 2019-02-15 RX ADMIN — BUDESONIDE AND FORMOTEROL FUMARATE DIHYDRATE 2 PUFF(S): 160; 4.5 AEROSOL RESPIRATORY (INHALATION) at 08:35

## 2019-02-15 RX ADMIN — Medication 50 MILLIGRAM(S): at 05:46

## 2019-02-15 RX ADMIN — DABIGATRAN ETEXILATE MESYLATE 75 MILLIGRAM(S): 150 CAPSULE ORAL at 05:47

## 2019-02-15 RX ADMIN — PANTOPRAZOLE SODIUM 40 MILLIGRAM(S): 20 TABLET, DELAYED RELEASE ORAL at 05:46

## 2019-02-15 RX ADMIN — Medication 0.5 MILLIGRAM(S): at 15:43

## 2019-02-15 RX ADMIN — CLOPIDOGREL BISULFATE 75 MILLIGRAM(S): 75 TABLET, FILM COATED ORAL at 12:20

## 2019-02-15 RX ADMIN — LEVALBUTEROL 0.63 MILLIGRAM(S): 1.25 SOLUTION, CONCENTRATE RESPIRATORY (INHALATION) at 10:41

## 2019-02-15 RX ADMIN — MIDODRINE HYDROCHLORIDE 5 MILLIGRAM(S): 2.5 TABLET ORAL at 12:19

## 2019-02-15 RX ADMIN — MIDODRINE HYDROCHLORIDE 5 MILLIGRAM(S): 2.5 TABLET ORAL at 05:46

## 2019-02-15 RX ADMIN — Medication 100 MILLIGRAM(S): at 12:19

## 2019-02-15 RX ADMIN — Medication 125 MILLIGRAM(S): at 12:19

## 2019-02-15 RX ADMIN — Medication 40 MILLIGRAM(S): at 05:46

## 2019-02-15 RX ADMIN — Medication 1 TABLET(S): at 08:35

## 2019-02-15 RX ADMIN — NYSTATIN CREAM 1 APPLICATION(S): 100000 CREAM TOPICAL at 05:46

## 2019-02-15 RX ADMIN — Medication 1 APPLICATION(S): at 05:46

## 2019-02-15 RX ADMIN — Medication 125 MILLIGRAM(S): at 05:46

## 2019-02-15 NOTE — DISCHARGE NOTE ADULT - HOSPITAL COURSE
79 y/o female, with a PmHx of COPD (on 2-3L O2 prn), paroxsymal a-fib (on pradaxa), CAD (s/p stent 11/2018), DCHF, HTN, HLD, and anxiety who presented with acute/chronic diastolic chf, chest pain r/o acs, GIB, non healing pressure ulcer. Hospital course complicated by +RVP and CDiff / uti.     1. Atypical chest pain, resolved  in the setting of acute CHF exacerbation, afib w/ RVR   cv stable,Echo with nl lv fxn  continue statin, bb, plavix     2. Acute/chronic diastolic chf  on supplemental O2. denies SOB, volume status is stable   ct chest revealed decreased bl pleural effusion, mutiple small nodular opacities secondary to impacted distal airways likely infectious etiology? pna LLL, aspiration    + rvp  repeat cxr with unchanged b/l pulm edema, no evidence of dypsnea  continue lasix 40 mg PO daily  cont bb  pulm f/u   echo with nl lv fxn    3. AFIB  dig on hold due to hx of pauses and bradycardia   continue with  midodrine 5 mg q 8 hrs for bp support   brief afib with RVR noted, overall rates improved, continue with lopressor to 50 mg BID  CHADS 3 - continue with pradaxa     4. CAD s/p PCI (11/2018)  cv stable, no evidence of acute ischemia   continue bb, statin, continue plavix given recent PCI      5. GIB   +GUIAC , +anemia s/p 1unit prbc,   continue to trend cbc  GI f/u      6. non healing foot ulcer  med f/u  ID f/u , vascular studies noted, pod f/u  STONEY/PVR poor, 0.60 on the effected limb with flat waveforms.  -  Vasc sx consult noted, planning RLE angio once c. diff clears    7. IGOR/CKD   creat improved, continue lasix daily per renal  renal f/u     8.Cdiff   po abx, id f/u    7. hypoxia    likely in the setting of resp infection ? aspiration   no decomp CHF on exam    + influenza s/p Tamiflu  ct chest revealed decreased bl pleural effusion, mutiple small nodular opacities secondary to impacted distal airways likely infectious etiology? pna LLL, aspiration    pulm f/u, s/p steroids   s/p speech and swallow eval     8. UTI    med f/u     Patient seen and evaluated, no acute somatic complaints. Medically cleared/stable for discharge as per Dr. Rodriguez with close outpatient follow up within 1-2 weeks of discharge. Patient understands and agrees with plan of care.

## 2019-02-15 NOTE — PROGRESS NOTE ADULT - SUBJECTIVE AND OBJECTIVE BOX
HPI:  77 y/o female, with a PmHx of COPD (on 2-3L O2 prn), paroxsymal a-fib (on pradaxa), CAD (s/p stent 11/2018), CHF, HTN, HLD, and anxiety, presenting to the [facility] ED with right foot pressure ulcer pain x several weeks. The patient has a dime-sized ulcer with scant serosanguinous drainage on her right calcaneous that she acquired from a shoe weeks ago. She reports that she woke up last night around 4am with excruciating, unrelenting right root pain with SOB x a few minutes followed by chest pain x several hours. The patient reports that the ulcer pain began to gradually worsen since her last hospital visit, where she was d/c with vancomycin. The foot pain is sharp, 10/10, and radiates up to her calf. The patient took two puffs of her Symbicort for the SOB with no relief, but it resolved spontaneously within a few minutes. The chest pain began gradually and was described as a midsternal, 8/10, tightness with no radiation and accompanied by palpitations. Of note, the patient reports chronic cough, b/l LE edema, JACKSON with walking 1/2 block, orthopnea x1 pillow, occasional PND, melena x weeks, easy bruising/bleeding, and 10 lbs weight loss over the past 6 months with no change in appetite. The patient denies fever, chills, SOB at rest, diaphoresis, dizziness, claudication, wheezing, changes in vision and hearing, abdominal pain, change in bowel and urinary habits, dysuria, hematuria. (17 Jan 2019 12:50)    Patient is a 78y old  Female who presents with a chief complaint of right foot pressure ulcer pain (21 Jan 2019 09:09)    Allergies    No Known Allergies    Intolerances      Vital Signs Last 24 Hrs  T(C): 36.8 (21 Jan 2019 05:34), Max: 36.8 (21 Jan 2019 05:34)  T(F): 98.3 (21 Jan 2019 05:34), Max: 98.3 (21 Jan 2019 05:34)  HR: 79 (21 Jan 2019 05:34) (56 - 79)  BP: 102/53 (21 Jan 2019 05:34) (102/53 - 118/69)  BP(mean): --  RR: 18 (21 Jan 2019 05:34) (18 - 18)  SpO2: 100% (21 Jan 2019 05:34) (95% - 100%)                        9.9    9.80  )-----------( 221      ( 21 Jan 2019 00:40 )             31.4     01-20    133<L>  |  93<L>  |  36<H>  ----------------------------<  103<H>  4.3   |  26  |  1.87<H>    Ca    8.3<L>      20 Jan 2019 05:50  Mg     1.8     01-20      CAPILLARY BLOOD GLUCOSE        MEDICATIONS  (STANDING):  atorvastatin 40 milliGRAM(s) Oral at bedtime  buDESOnide  80 MICROgram(s)/formoterol 4.5 MICROgram(s) Inhaler 2 Puff(s) Inhalation two times a day  clopidogrel Tablet 75 milliGRAM(s) Oral daily  digoxin     Tablet 0.125 milliGRAM(s) Oral daily  furosemide    Tablet 40 milliGRAM(s) Oral daily  heparin  Infusion.  Unit(s)/Hr (11 mL/Hr) IV Continuous <Continuous>  metoprolol tartrate 12.5 milliGRAM(s) Oral every 12 hours  pantoprazole  Injectable 40 milliGRAM(s) IV Push two times a day  senna 2 Tablet(s) Oral at bedtime    MEDICATIONS  (PRN):  acetaminophen   Tablet .. 650 milliGRAM(s) Oral every 6 hours PRN Mild Pain (1 - 3), Moderate Pain (4 - 6), Severe Pain (7 - 10)  heparin  Injectable 4500 Unit(s) IV Push every 6 hours PRN For aPTT less than 40  heparin  Injectable 2000 Unit(s) IV Push every 6 hours PRN For aPTT between 40 - 57  LORazepam     Tablet 1 milliGRAM(s) Oral two times a day PRN Anxiety  traMADol 50 milliGRAM(s) Oral every 8 hours PRN Severe Pain (7 - 10)    PAST MEDICAL & SURGICAL HISTORY:  CAD (coronary artery disease): s/p stent 2018  CHF (congestive heart failure)  COPD (chronic obstructive pulmonary disease): on 2-3L O2 at home prn  Anxiety  TIA (transient ischemic attack): many years ago  PAF (paroxysmal atrial fibrillation)  HLD (hyperlipidemia)  HTN (hypertension)  H/O total hysterectomy: 2014  History of cataract surgery: b/l 2015  History of repair of hip fracture: luisa placed in RLE 2015  H/O spinal fusion: c2-6 in 2017        MOISES:  Posterior heel ulceration, partial thickness granular base.    No local signs of infection. No probe to bone.  No drainage or malodor.  ++pain on exam  Pedal pulses non palp bilateral lower extremities  Sensation intact to b/l feet  No gross deformities

## 2019-02-15 NOTE — PROGRESS NOTE ADULT - SUBJECTIVE AND OBJECTIVE BOX
CARDIOLOGY FOLLOW UP - Dr. Vieyra    CC no cp or sob or palps       PHYSICAL EXAM:  T(C): 36.5 (02-15-19 @ 05:44), Max: 36.6 (02-14-19 @ 15:46)  HR: 91 (02-15-19 @ 10:41) (62 - 104)  BP: 114/82 (02-15-19 @ 05:44) (100/68 - 114/82)  RR: 16 (02-15-19 @ 05:44) (16 - 18)  SpO2: 96% (02-15-19 @ 10:41) (95% - 100%)  Wt(kg): --  I&O's Summary    14 Feb 2019 07:01  -  15 Feb 2019 07:00  --------------------------------------------------------  IN: 200 mL / OUT: 0 mL / NET: 200 mL        Appearance: Normal	  Cardiovascular: Normal S1 S2, irregular   Respiratory: Lungs clear to auscultation	  Gastrointestinal:  Soft, Non-tender, + BS	  Extremities: Normal range of motion, No clubbing, cyanosis or edema        MEDICATIONS  (STANDING):  atorvastatin 40 milliGRAM(s) Oral at bedtime  buDESOnide 160 MICROgram(s)/formoterol 4.5 MICROgram(s) Inhaler 2 Puff(s) Inhalation two times a day  clopidogrel Tablet 75 milliGRAM(s) Oral daily  collagenase Ointment 1 Application(s) Topical two times a day  dabigatran 75 milliGRAM(s) Oral every 12 hours  furosemide    Tablet 40 milliGRAM(s) Oral daily  lactobacillus acidophilus 1 Tablet(s) Oral two times a day with meals  levalbuterol Inhalation 0.63 milliGRAM(s) Inhalation every 6 hours  metoprolol tartrate 50 milliGRAM(s) Oral two times a day  midodrine 5 milliGRAM(s) Oral every 8 hours  nystatin Powder 1 Application(s) Topical every 12 hours  pantoprazole  Injectable 40 milliGRAM(s) IV Push two times a day  thiamine 100 milliGRAM(s) Oral daily  vancomycin    Solution 125 milliGRAM(s) Oral every 8 hours      TELEMETRY: afib   with intermittent episodes RVR - 130s 	    ECG:  	  RADIOLOGY:   DIAGNOSTIC TESTING:  [ ] Echocardiogram:  [ ]  Catheterization:  [ ] Stress Test:    OTHER: 	    LABS:	 	                                9.4    7.84  )-----------( 196      ( 15 Feb 2019 06:30 )             30.6     02-15    138  |  95<L>  |  66<H>  ----------------------------<  99  4.9   |  34<H>  |  1.27    Ca    8.8      15 Feb 2019 06:30  Mg     2.6     02-15

## 2019-02-15 NOTE — PROGRESS NOTE ADULT - REASON FOR ADMISSION
right foot pressure ulcer pain

## 2019-02-15 NOTE — PROGRESS NOTE ADULT - PROBLEM SELECTOR PROBLEM 2
CKD (chronic kidney disease) stage 3, GFR 30-59 ml/min
Acute on chronic systolic and diastolic heart failure, NYHA class 3
CKD (chronic kidney disease) stage 3, GFR 30-59 ml/min
Acute on chronic systolic and diastolic heart failure, NYHA class 3
CKD (chronic kidney disease) stage 3, GFR 30-59 ml/min
COPD (chronic obstructive pulmonary disease)
CKD (chronic kidney disease) stage 3, GFR 30-59 ml/min
COPD (chronic obstructive pulmonary disease)
CKD (chronic kidney disease) stage 3, GFR 30-59 ml/min
COPD (chronic obstructive pulmonary disease)

## 2019-02-15 NOTE — PROGRESS NOTE ADULT - PROVIDER SPECIALTY LIST ADULT
Cardiology
Family Medicine
Gastroenterology
Hospitalist
Infectious Disease
Nephrology
Neurology
Podiatry
Pulmonology
Vascular Surgery
Nephrology
Cardiology
Cardiology
Family Medicine
Family Medicine
Gastroenterology
Hospitalist
Nephrology
Podiatry
Infectious Disease
Nephrology
Pulmonology
Nephrology
Nephrology
Family Medicine
Family Medicine
Pulmonology
Nephrology
Pulmonology

## 2019-02-15 NOTE — PROGRESS NOTE ADULT - SUBJECTIVE AND OBJECTIVE BOX
BEVERLEY DEL RIO:[id number],   79yFemale followed for:  No Known Allergies    PAST MEDICAL & SURGICAL HISTORY:  CAD (coronary artery disease): s/p stent 2018  CHF (congestive heart failure)  COPD (chronic obstructive pulmonary disease): on 2-3L O2 at home prn  Anxiety  TIA (transient ischemic attack): many years ago  PAF (paroxysmal atrial fibrillation)  HLD (hyperlipidemia)  HTN (hypertension)  H/O total hysterectomy: 2014  History of cataract surgery: b/l 2015  History of repair of hip fracture: luisa placed in RLE 2015  H/O spinal fusion: c2-6 in 2017    FAMILY HISTORY:  No pertinent family history in first degree relatives    MEDICATIONS  (STANDING):  atorvastatin 40 milliGRAM(s) Oral at bedtime  buDESOnide 160 MICROgram(s)/formoterol 4.5 MICROgram(s) Inhaler 2 Puff(s) Inhalation two times a day  clopidogrel Tablet 75 milliGRAM(s) Oral daily  collagenase Ointment 1 Application(s) Topical two times a day  dabigatran 75 milliGRAM(s) Oral every 12 hours  furosemide    Tablet 40 milliGRAM(s) Oral daily  lactobacillus acidophilus 1 Tablet(s) Oral two times a day with meals  levalbuterol Inhalation 0.63 milliGRAM(s) Inhalation every 6 hours  metoprolol tartrate 50 milliGRAM(s) Oral two times a day  midodrine 5 milliGRAM(s) Oral every 8 hours  nystatin Powder 1 Application(s) Topical every 12 hours  pantoprazole  Injectable 40 milliGRAM(s) IV Push two times a day  thiamine 100 milliGRAM(s) Oral daily  vancomycin    Solution 125 milliGRAM(s) Oral every 8 hours    MEDICATIONS  (PRN):  acetaminophen   Tablet .. 650 milliGRAM(s) Oral every 6 hours PRN Temp greater or equal to 38C (100.4F), Mild Pain (1 - 3), Moderate Pain (4 - 6)  traMADol 25 milliGRAM(s) Oral every 8 hours PRN Severe Pain (7 - 10)      Vital Signs Last 24 Hrs  T(C): 36.5 (15 Feb 2019 05:44), Max: 36.6 (14 Feb 2019 15:46)  T(F): 97.7 (15 Feb 2019 05:44), Max: 97.9 (14 Feb 2019 15:46)  HR: 104 (15 Feb 2019 05:44) (62 - 104)  BP: 114/82 (15 Feb 2019 05:44) (100/68 - 114/82)  BP(mean): --  RR: 16 (15 Feb 2019 05:44) (16 - 18)  SpO2: 97% (15 Feb 2019 05:44) (95% - 100%)  nc/at  s1s2  cta  soft, nt, nd no guarding or rebound  no c/c/e    CBC Full  -  ( 15 Feb 2019 06:30 )  WBC Count : 7.84 K/uL  Hemoglobin : 9.4 g/dL  Hematocrit : 30.6 %  Platelet Count - Automated : 196 K/uL  Mean Cell Volume : 91.1 fL  Mean Cell Hemoglobin : 28.0 pg  Mean Cell Hemoglobin Concentration : 30.7 %  Auto Neutrophil # : 5.98 K/uL  Auto Lymphocyte # : 0.90 K/uL  Auto Monocyte # : 0.68 K/uL  Auto Eosinophil # : 0.22 K/uL  Auto Basophil # : 0.03 K/uL  Auto Neutrophil % : 76.2 %  Auto Lymphocyte % : 11.5 %  Auto Monocyte % : 8.7 %  Auto Eosinophil % : 2.8 %  Auto Basophil % : 0.4 %    02-15    138  |  95<L>  |  66<H>  ----------------------------<  99  4.9   |  34<H>  |  1.27    Ca    8.8      15 Feb 2019 06:30  Mg     2.6     02-15

## 2019-02-15 NOTE — DISCHARGE NOTE ADULT - ADDITIONAL INSTRUCTIONS
Follow up with PCP within 1-2 weeks of discharge.   Follow up with vascular within 1-2 weeks of discharge.   Follow up with podiatry within 1-2 weeks of discharge.   Follow up with cardiology within 1-2 weeks of discharge.   Follow up with pulmonology within 1-2 weeks of discharge.   Follow up with gastroenterology within 1-2 weeks of discharge.   Follow up with neurology within 1-2 weeks of discharge.

## 2019-02-15 NOTE — DISCHARGE NOTE ADULT - MEDICATION SUMMARY - MEDICATIONS TO STOP TAKING
I will STOP taking the medications listed below when I get home from the hospital:    Baciguent 500 units/g topical ointment  -- 1 application on skin 2 times a day    Ativan 0.5 mg oral tablet  -- 1 tab(s) by mouth 3 times a day, As Needed -for anxiety MDD:1.5mg

## 2019-02-15 NOTE — PROGRESS NOTE ADULT - PROBLEM SELECTOR PROBLEM 1
Acute kidney injury
COPD (chronic obstructive pulmonary disease)
Acute kidney injury
Arterial insufficiency with ischemic ulcer
COPD (chronic obstructive pulmonary disease)
Influenza
Acute kidney injury
Arterial insufficiency with ischemic ulcer
Acute kidney injury
Arterial insufficiency with ischemic ulcer
Acute kidney injury
Influenza
Acute kidney injury
Influenza

## 2019-02-15 NOTE — DISCHARGE NOTE ADULT - PLAN OF CARE
To relieve and prevent worsening symptoms associated with congestive heart failure, to improve quality of life, and to treat underlying conditions such as coronary heart disease, high blood pressure, or diabetes, and to maintain euvolemia. Low salt diet, fluid restriction to 1500 ml daily, monitor your fluid intake and weight daily, exercise as tolerated 30 minutes daily, and follow up with your physician within 7 days. Monitor. Resolved. RLE Angio gram upon completion of c.diff infection BI/PVR poor, 0.60 on the effected limb with flat waveforms.   Vasc sx consult noted, planning RLE angio once c. diff clears. To restore or maintain a normal heart rate and rhythm, to prevent blood clots, and decrease the risks of stroke CVA/TIA. Please take your medications as prescribed.  Continue to take your blood thinner as prescribed and follow with your physician to monitor your levels.  Low fat diet, reduce caffeine intake, and exercise at least 30 minutes daily. To maintain a normal blood pressure to prevent heart attack, stroke and renal failure. Low sodium and fat diet, continue anti-hypertensive medications, and follow up with primary care physician. To maintain normal cholesterol levels to prevent stroke, coronary artery disease, peripheral vascular disease and heart attacks. Low fat diet, exercise daily and continue current medications. Follow up with primary care physician and cardiologist for management. Complete taper for C DIFF. Complete Vanco taper as follows:    PO vanco 125 q qid for 10 days (Started 1/30 - 2/9)                   then 125 tid for a week (thru 2/16)                   then 125 bid for a week (thru 2/23)                   then 125 qd for a week (thru 3/2)                   then 125 q 48 h for a week (thru 3/9)                   then 125 every third day for a week (thru 3/16)   As per ID, you no longer need to be on contact precautions, as per infectious disease.

## 2019-02-15 NOTE — PROGRESS NOTE ADULT - PROBLEM SELECTOR PROBLEM 3
Benign hypertension with chronic kidney disease, stage III
GI bleed
Acute on chronic systolic and diastolic heart failure, NYHA class 3
Benign hypertension with chronic kidney disease, stage III
GI bleed
Benign hypertension with chronic kidney disease, stage III
Benign hypertension with chronic kidney disease, stage III
Acute on chronic systolic and diastolic heart failure, NYHA class 3
Benign hypertension with chronic kidney disease, stage III
Benign hypertension with chronic kidney disease, stage III
Acute on chronic systolic and diastolic heart failure, NYHA class 3

## 2019-02-15 NOTE — DISCHARGE NOTE ADULT - SECONDARY DIAGNOSIS.
Acute kidney injury Ulcer of heel, right, with fat layer exposed Paroxysmal atrial fibrillation HTN (hypertension) HLD (hyperlipidemia) Clostridium difficile diarrhea

## 2019-02-15 NOTE — DISCHARGE NOTE ADULT - PROVIDER TOKENS
PROVIDER:[TOKEN:[[id number]]],PROVIDER:[TOKEN:[[id number]]],PROVIDER:[TOKEN:[[id number]]],PROVIDER:[TOKEN:[[id number]]],PROVIDER:[TOKEN:[[id number]]],PROVIDER:[TOKEN:[[id number]]]

## 2019-02-15 NOTE — PROGRESS NOTE ADULT - PROBLEM SELECTOR PLAN 6
Resolved post repletion with 40meq KCl x two doses   Follow up potassium level
HR controlled: on pradaxa  1/28: on pradaxa  1/29:L stable!!  1/31: cont AC   1/30: cont AC!!  2/2 on AC. rate controlled  2/3 rate controlled. on  AC  2/4: HR contorlled: on AC!!  285: o n Full t sarah AC!!  2/7: stable  2/8: on Pradaxa  2/9 rate controlled. on AC
stable
HR controlled: on pradaxa  1/28: on pradaxa  1/29:L stable!!
HR controlled: on pradaxa  1/28: on pradaxa  1/29:L stable!!  1/30: cont AC!!
HR controlled: on pradaxa  1/28: on pradaxa  1/29:L stable!!  1/31: cont AC   1/30: cont AC!!
HR controlled: on pradaxa  1/28: on pradaxa  1/29:L stable!!  1/31: cont AC   1/30: cont AC!!
HR controlled: on pradaxa  1/28: on pradaxa  1/29:L stable!!  1/31: cont AC   1/30: cont AC!!  2/2 on AC. rate controlled
HR controlled: on pradaxa  1/28: on pradaxa  1/29:L stable!!  1/31: cont AC   1/30: cont AC!!  2/2 on AC. rate controlled  2/3 rate controlled. on  AC
HR controlled: on pradaxa  1/28: on pradaxa  1/29:L stable!!  1/31: cont AC   1/30: cont AC!!  2/2 on AC. rate controlled  2/3 rate controlled. on  AC  2/4: HR contorlled: on AC!!
HR controlled: on pradaxa  1/28: on pradaxa  1/29:L stable!!  1/31: cont AC   1/30: cont AC!!  2/2 on AC. rate controlled  2/3 rate controlled. on  AC  2/4: HR contorlled: on AC!!  285: o n Full t sarah AC!!
HR controlled: on pradaxa  1/28: on pradaxa  1/29:L stable!!  1/31: cont AC   1/30: cont AC!!  2/2 on AC. rate controlled  2/3 rate controlled. on  AC  2/4: HR contorlled: on AC!!  285: o n Full t sarah AC!!
HR controlled: on pradaxa  1/28: on pradaxa  1/29:L stable!!  1/31: cont AC   1/30: cont AC!!  2/2 on AC. rate controlled  2/3 rate controlled. on  AC  2/4: HR contorlled: on AC!!  285: o n Full t sarah AC!!  2/7: stable
HR controlled: on pradaxa  1/28: on pradaxa  1/29:L stable!!  1/31: cont AC   1/30: cont AC!!  2/2 on AC. rate controlled  2/3 rate controlled. on  AC  2/4: HR contorlled: on AC!!  285: o n Full t sarah AC!!  2/7: stable  2/8: on Pradaxa
HR controlled: on pradaxa  1/28: on pradaxa  1/29:L stable!!  1/31: cont AC   1/30: cont AC!!  2/2 on AC. rate controlled  2/3 rate controlled. on  AC  2/4: HR contorlled: on AC!!  285: o n Full t sarah AC!!  2/7: stable  2/8: on Pradaxa  2/9 rate controlled. on AC
HR controlled: on pradaxa  1/28: on pradaxa  1/29:L stable!!  1/31: cont AC   1/30: cont AC!!  2/2 on AC. rate controlled  2/3 rate controlled. on  AC  2/4: HR contorlled: on AC!!  285: o n Full t sarah AC!!  2/7: stable  2/8: on Pradaxa  2/9 rate controlled. on AC  2/11" Cont AC
HR controlled: on pradaxa  1/28: on pradaxa  1/29:L stable!!  1/31: cont AC   1/30: cont AC!!  2/2 on AC. rate controlled  2/3 rate controlled. on  AC  2/4: HR contorlled: on AC!!  285: o n Full t sarah AC!!  2/7: stable  2/8: on Pradaxa  2/9 rate controlled. on AC  2/11" Cont AC  2/12: on pradaxa
HR controlled: on pradaxa  1/28: on pradaxa  1/29:L stable!!  1/31: cont AC   1/30: cont AC!!  2/2 on AC. rate controlled  2/3 rate controlled. on  AC  2/4: HR contorlled: on AC!!  285: o n Full t sarah AC!!  2/7: stable  2/8: on Pradaxa  2/9 rate controlled. on AC  2/11" Cont AC  2/12: on pradaxa  2/13: Controlled: on pradaxa
HR controlled: on pradaxa  1/28: on pradaxa  1/29:L stable!!  1/31: cont AC   1/30: cont AC!!  2/2 on AC. rate controlled  2/3 rate controlled. on  AC  2/4: HR contorlled: on AC!!  285: o n Full t sarah AC!!  2/7: stable  2/8: on Pradaxa  2/9 rate controlled. on AC  2/11" Cont AC  2/12: on pradaxa  2/13: Controlled: on pradaxa  2/14: STable:: on Pradaxa:
Repleted with 40meq KCl x two doses   Follow up potassium level
stable
HR controlled: on pradaxa  1/28: on pradaxa  1/29:L stable!!  1/31: cont AC   1/30: cont AC!!  2/2 on AC. rate controlled  2/3 rate controlled. on  AC  2/4: HR contorlled: on AC!!  285: o n Full t sarah AC!!  2/7: stable  2/8: on Pradaxa  2/9 rate controlled. on AC  2/11" Cont AC  2/12: on pradaxa  2/13: Controlled: on pradaxa  2/14: STable:: on Pradaxa:  2/15: Cont AC

## 2019-02-15 NOTE — DISCHARGE NOTE ADULT - MEDICATION SUMMARY - MEDICATIONS TO CHANGE
I will SWITCH the dose or number of times a day I take the medications listed below when I get home from the hospital:    Lasix 20 mg oral tablet  -- 3 tab(s) by mouth 2 times a day   -- Avoid prolonged or excessive exposure to direct and/or artificial sunlight while taking this medication.  It is very important that you take or use this exactly as directed.  Do not skip doses or discontinue unless directed by your doctor.  It may be advisable to drink a full glass orange juice or eat a banana daily while taking this medication.    metoprolol tartrate 25 mg oral tablet  -- 0.5 tab(s) by mouth every 6 hours   -- It is very important that you take or use this exactly as directed.  Do not skip doses or discontinue unless directed by your doctor.  May cause drowsiness.  Alcohol may intensify this effect.  Use care when operating dangerous machinery.  Some non-prescription drugs may aggravate your condition.  Read all labels carefully.  If a warning appears, check with your doctor before taking.  Take with food or milk.  This drug may impair the ability to drive or operate machinery.  Use care until you become familiar with its effects.    vancomycin 125 mg oral capsule  -- 1 cap(s) by mouth 4 times a day  -- Finish all this medication unless otherwise directed by prescriber.

## 2019-02-15 NOTE — PROGRESS NOTE ADULT - PROBLEM SELECTOR PLAN 3
she has pleural effusion: on both sides: This has increased from ct abdomen done earlier this month: She has recent PCI done about two months ago: She has pleural effusions likely secondary to heart failure: She has good chances of bleeding with tap being on Plavix and Pradaxa ( it needs to be stopped anyway), but Plavix cant be stopped: Will try to manage conservatively: IF THE FLUID INCREASES: will tap at that time if she becomes symptomatic: She is not SOB at this time:  : cont Lasix: She is not SOB : her effusions are likely due to CHF and they have increased recently over past few weeks:  : cont lasix per cardiology  : she has bilateral pl effusions: She has small right sided partially loculated effusion on the right sided and minimal on the left side: on lasix  : Diuretics per primary cards team  : the effusion seems to be getting smaller: monitor:  : ct scan chest reviewed: SEEMS OT BE MORE LIKE MUCUS PLUGGIN/1; off antibiotics for now:   monitor I&O. diuretics  /3 diuretics.  : cont diuretics  : diuretics per primary cards team  : on oral diuretics:  : ct abdomen noteD:  : On Oral diuretics!!   monitor I&O, diuretics  2/10 diuretics
BP low in setting of tachycardia and diarrhea. Rate control with metoprolol as per cardiology as patient on concomitant midodrine to avoid hypotension. Monitor BP.
BP controlled. Rate control with metoprolol as per cardiology as patient on concomitant midodrine to avoid hypotension. Monitor BP.
BP low normal off of medications. Rate control as per cardiology. Monitor BP.
BP low normal off of medications. Rate control as per cardiology. Monitor BP.
per primary t eam
: cont Lasix: She is not SOB : her effusions are likely due to CHF and they have increased recently over past few weeks:  : cont lasix per cardiology  : she has bilateral pl effusions: She has small right sided partially loculated effusion on the right sided and minimal on the left side: on lasix  : Diuretics per primary cards team  : the effusion seems to be getting smaller: monitor:  : ct scan chest reviewed: SEEMS OT BE MORE LIKE MUCUS PLUGGIN/1; off antibiotics for now:   monitor I&O. diuretics  2/3 diuretics.  : cont diuretics  : diuretics per primary cards team  : on oral diuretics:  : ct abdomen noteD:  : On Oral diuretics!!   monitor I&O, diuretics  2/10 diuretics  : Cont IV diuresis  /; STable; cont diuresis  : Now compensated: on diuresis!!  : cont curetn rx:SHE ISNOT sob AT THIS TIME!
BP acceptable. Rate control as per cardiology. Monitor BP.
BP controlled. Rate control with metoprolol as per cardiology as patient on concomitant midodrine to avoid hypotension. Monitor BP.
BP low in setting of tachycardia and diarrhea. Rate control with metoprolol as per cardiology. Monitor BP.
BP low in setting of tachycardia. Rate control as per cardiology. Monitor BP.
BP low in setting of tachycardia. Rate control as per cardiology. Monitor BP.
BP low normal off of medications. Rate control as per cardiology. Monitor BP.
BP low normal off of medications. Rate control as per cardiology. Monitor BP.
BP low normal. Rate control with metoprolol as per cardiology as patient on concomitant midodrine to avoid hypotension. Monitor BP.
BP low normal. Rate control with metoprolol as per cardiology as patient on concomitant midodrine to avoid hypotension. Monitor BP.
per primary t eam
per primary team
per primary team
she has pleural effusion: on both sides: This has increased from ct abdomen done earlier this month: She has recent PCI done about two months ago: She has pleural effusions likely secondary to heart failure: She has good chances of bleeding with tap being on Plavix and Pradaxa ( it needs to be stopped anyway), but Plavix cant be stopped: Will try to manage conservatively: IF THE FLUID INCREASES: will tap at that time if she becomes symptomatic: She is not SOB at this time:  1/26: cont Lasix: She is not SOB : her effusions are likely due to CHF and they have increased recently over past few weeks:  1/27: cont lasix per cardiology  1/28: she has bilateral pl effusions: She has small right sided partially loculated effusion on the right sided and minimal on the left side: on lasix  1/29: Diuretics per primary cards team
she has pleural effusion: on both sides: This has increased from ct abdomen done earlier this month: She has recent PCI done about two months ago: She has pleural effusions likely secondary to heart failure: She has good chances of bleeding with tap being on Plavix and Pradaxa ( it needs to be stopped anyway), but Plavix cant be stopped: Will try to manage conservatively: IF THE FLUID INCREASES: will tap at that time if she becomes symptomatic: She is not SOB at this time:  1/26: cont Lasix: She is not SOB : her effusions are likely due to CHF and they have increased recently over past few weeks:  1/27: cont lasix per cardiology  1/28: she has bilateral pl effusions: She has small right sided partially loculated effusion on the right sided and minimal on the left side: on lasix  1/29: Diuretics per primary cards team  1/30: the effusion seems to be getting smaller: monitor:
she has pleural effusion: on both sides: This has increased from ct abdomen done earlier this month: She has recent PCI done about two months ago: She has pleural effusions likely secondary to heart failure: She has good chances of bleeding with tap being on Plavix and Pradaxa ( it needs to be stopped anyway), but Plavix cant be stopped: Will try to manage conservatively: IF THE FLUID INCREASES: will tap at that time if she becomes symptomatic: She is not SOB at this time:  1/26: cont Lasix: She is not SOB : her effusions are likely due to CHF and they have increased recently over past few weeks:  1/27: cont lasix per cardiology  1/28: she has bilateral pl effusions: She has small right sided partially loculated effusion on the right sided and minimal on the left side: on lasix  1/29: Diuretics per primary cards team  1/30: the effusion seems to be getting smaller: monitor:  1/31: ct scan chest reviewed: SEEMS OT BE MORE LIKE MUCUS PLUGGING:
she has pleural effusion: on both sides: This has increased from ct abdomen done earlier this month: She has recent PCI done about two months ago: She has pleural effusions likely secondary to heart failure: She has good chances of bleeding with tap being on Plavix and Pradaxa ( it needs to be stopped anyway), but Plavix cant be stopped: Will try to manage conservatively: IF THE FLUID INCREASES: will tap at that time if she becomes symptomatic: She is not SOB at this time:  : cont Lasix: She is not SOB : her effusions are likely due to CHF and they have increased recently over past few weeks:  : cont lasix per cardiology  : she has bilateral pl effusions: She has small right sided partially loculated effusion on the right sided and minimal on the left side: on lasix  : Diuretics per primary cards team  : the effusion seems to be getting smaller: monitor:  : ct scan chest reviewed: SEEMS OT BE MORE LIKE MUCUS PLUGGIN/1; off antibiotics for now:
she has pleural effusion: on both sides: This has increased from ct abdomen done earlier this month: She has recent PCI done about two months ago: She has pleural effusions likely secondary to heart failure: She has good chances of bleeding with tap being on Plavix and Pradaxa ( it needs to be stopped anyway), but Plavix cant be stopped: Will try to manage conservatively: IF THE FLUID INCREASES: will tap at that time if she becomes symptomatic: She is not SOB at this time:  : cont Lasix: She is not SOB : her effusions are likely due to CHF and they have increased recently over past few weeks:  : cont lasix per cardiology  : she has bilateral pl effusions: She has small right sided partially loculated effusion on the right sided and minimal on the left side: on lasix  : Diuretics per primary cards team  : the effusion seems to be getting smaller: monitor:  : ct scan chest reviewed: SEEMS OT BE MORE LIKE MUCUS PLUGGIN/1; off antibiotics for now:   monitor I&O. diuretics
she has pleural effusion: on both sides: This has increased from ct abdomen done earlier this month: She has recent PCI done about two months ago: She has pleural effusions likely secondary to heart failure: She has good chances of bleeding with tap being on Plavix and Pradaxa ( it needs to be stopped anyway), but Plavix cant be stopped: Will try to manage conservatively: IF THE FLUID INCREASES: will tap at that time if she becomes symptomatic: She is not SOB at this time:  : cont Lasix: She is not SOB : her effusions are likely due to CHF and they have increased recently over past few weeks:  : cont lasix per cardiology  : she has bilateral pl effusions: She has small right sided partially loculated effusion on the right sided and minimal on the left side: on lasix  : Diuretics per primary cards team  : the effusion seems to be getting smaller: monitor:  : ct scan chest reviewed: SEEMS OT BE MORE LIKE MUCUS PLUGGIN/1; off antibiotics for now:   monitor I&O. diuretics  /3 diuretics.  : cont diuretics  : diuretics per primary cards team  : on oral diuretics:
she has pleural effusion: on both sides: This has increased from ct abdomen done earlier this month: She has recent PCI done about two months ago: She has pleural effusions likely secondary to heart failure: She has good chances of bleeding with tap being on Plavix and Pradaxa ( it needs to be stopped anyway), but Plavix cant be stopped: Will try to manage conservatively: IF THE FLUID INCREASES: will tap at that time if she becomes symptomatic: She is not SOB at this time:  : cont Lasix: She is not SOB : her effusions are likely due to CHF and they have increased recently over past few weeks:  : cont lasix per cardiology  : she has bilateral pl effusions: She has small right sided partially loculated effusion on the right sided and minimal on the left side: on lasix  : Diuretics per primary cards team  : the effusion seems to be getting smaller: monitor:  : ct scan chest reviewed: SEEMS OT BE MORE LIKE MUCUS PLUGGIN/1; off antibiotics for now:   monitor I&O. diuretics  /3 diuretics.  : cont diuretics  : diuretics per primary cards team  : on oral diuretics:  : ct abdomen noteD:
she has pleural effusion: on both sides: This has increased from ct abdomen done earlier this month: She has recent PCI done about two months ago: She has pleural effusions likely secondary to heart failure: She has good chances of bleeding with tap being on Plavix and Pradaxa ( it needs to be stopped anyway), but Plavix cant be stopped: Will try to manage conservatively: IF THE FLUID INCREASES: will tap at that time if she becomes symptomatic: She is not SOB at this time:  : cont Lasix: She is not SOB : her effusions are likely due to CHF and they have increased recently over past few weeks:  : cont lasix per cardiology  : she has bilateral pl effusions: She has small right sided partially loculated effusion on the right sided and minimal on the left side: on lasix  : Diuretics per primary cards team  : the effusion seems to be getting smaller: monitor:  : ct scan chest reviewed: SEEMS OT BE MORE LIKE MUCUS PLUGGIN/1; off antibiotics for now:   monitor I&O. diuretics  /3 diuretics.  : cont diuretics  : diuretics per primary cards team  : on oral diuretics:  : ct abdomen noteD:  : On Oral diuretics!!
she has pleural effusion: on both sides: This has increased from ct abdomen done earlier this month: She has recent PCI done about two months ago: She has pleural effusions likely secondary to heart failure: She has good chances of bleeding with tap being on Plavix and Pradaxa ( it needs to be stopped anyway), but Plavix cant be stopped: Will try to manage conservatively: IF THE FLUID INCREASES: will tap at that time if she becomes symptomatic: She is not SOB at this time:  : cont Lasix: She is not SOB : her effusions are likely due to CHF and they have increased recently over past few weeks:  : cont lasix per cardiology  : she has bilateral pl effusions: She has small right sided partially loculated effusion on the right sided and minimal on the left side: on lasix  : Diuretics per primary cards team  : the effusion seems to be getting smaller: monitor:  : ct scan chest reviewed: SEEMS OT BE MORE LIKE MUCUS PLUGGIN/1; off antibiotics for now:   monitor I&O. diuretics  /3 diuretics.  : cont diuretics  : diuretics per primary cards team  : on oral diuretics:  : ct abdomen noteD:  : On Oral diuretics!!   monitor I&O, diuretics
she has pleural effusion: on both sides: This has increased from ct abdomen done earlier this month: She has recent PCI done about two months ago: She has pleural effusions likely secondary to heart failure: She has good chances of bleeding with tap being on Plavix and Pradaxa ( it needs to be stopped anyway), but Plavix cant be stopped: Will try to manage conservatively: IF THE FLUID INCREASES: will tap at that time if she becomes symptomatic: She is not SOB at this time:  : cont Lasix: She is not SOB : her effusions are likely due to CHF and they have increased recently over past few weeks:  : cont lasix per cardiology  : she has bilateral pl effusions: She has small right sided partially loculated effusion on the right sided and minimal on the left side: on lasix  : Diuretics per primary cards team  : the effusion seems to be getting smaller: monitor:  : ct scan chest reviewed: SEEMS OT BE MORE LIKE MUCUS PLUGGIN/1; off antibiotics for now:   monitor I&O. diuretics  /3 diuretics.  : cont diuretics  : diuretics per primary cards team  : on oral diuretics:  : ct abdomen noteD:  : On Oral diuretics!!   monitor I&O, diuretics  2/10 diuretics  : Cont IV diuresis
she has pleural effusion: on both sides: This has increased from ct abdomen done earlier this month: She has recent PCI done about two months ago: She has pleural effusions likely secondary to heart failure: She has good chances of bleeding with tap being on Plavix and Pradaxa ( it needs to be stopped anyway), but Plavix cant be stopped: Will try to manage conservatively: IF THE FLUID INCREASES: will tap at that time if she becomes symptomatic: She is not SOB at this time:  : cont Lasix: She is not SOB : her effusions are likely due to CHF and they have increased recently over past few weeks:  : cont lasix per cardiology  : she has bilateral pl effusions: She has small right sided partially loculated effusion on the right sided and minimal on the left side: on lasix  : Diuretics per primary cards team  : the effusion seems to be getting smaller: monitor:  : ct scan chest reviewed: SEEMS OT BE MORE LIKE MUCUS PLUGGIN/1; off antibiotics for now:   monitor I&O. diuretics  /3 diuretics.  : cont diuretics  : diuretics per primary cards team  : on oral diuretics:  : ct abdomen noteD:  : On Oral diuretics!!   monitor I&O, diuretics  2/10 diuretics  : Cont IV diuresis  /; STable; cont diuresis
she has pleural effusion: on both sides: This has increased from ct abdomen done earlier this month: She has recent PCI done about two months ago: She has pleural effusions likely secondary to heart failure: She has good chances of bleeding with tap being on Plavix and Pradaxa ( it needs to be stopped anyway), but Plavix cant be stopped: Will try to manage conservatively: IF THE FLUID INCREASES: will tap at that time if she becomes symptomatic: She is not SOB at this time:  : cont Lasix: She is not SOB : her effusions are likely due to CHF and they have increased recently over past few weeks:  : cont lasix per cardiology  : she has bilateral pl effusions: She has small right sided partially loculated effusion on the right sided and minimal on the left side: on lasix  : Diuretics per primary cards team  : the effusion seems to be getting smaller: monitor:  : ct scan chest reviewed: SEEMS OT BE MORE LIKE MUCUS PLUGGIN/1; off antibiotics for now:   monitor I&O. diuretics  /3 diuretics.  : cont diuretics  : diuretics per primary cards team  : on oral diuretics:  : ct abdomen noteD:  : On Oral diuretics!!   monitor I&O, diuretics  2/10 diuretics  : Cont IV diuresis  /; STable; cont diuresis  : Now compensated: on diuresis!!
she has pleural effusion: on both sides: This has increased from ct abdomen done earlier this month: She has recent PCI done about two months ago: She has pleural effusions likely secondary to heart failure: She has good chances of bleeding with tap being on Plavix and Pradaxa ( it needs to be stopped anyway), but Plavix cant be stopped: Will try to manage conservatively: IF THE FLUID INCREASES: will tap at that time if she becomes symptomatic: She is not SOB at this time:  : cont Lasix: She is not SOB : her effusions are likely due to CHF and they have increased recently over past few weeks:  : cont lasix per cardiology  : she has bilateral pl effusions: She has small right sided partially loculated effusion on the right sided and minimal on the left side: on lasix  : Diuretics per primary cards team  : the effusion seems to be getting smaller: monitor:  : ct scan chest reviewed: SEEMS OT BE MORE LIKE MUCUS PLUGGIN/1; off antibiotics for now:  2 monitor I&O. diuretics  2/3 diuretics.  : cont diuretics
she has pleural effusion: on both sides: This has increased from ct abdomen done earlier this month: She has recent PCI done about two months ago: She has pleural effusions likely secondary to heart failure: She has good chances of bleeding with tap being on Plavix and Pradaxa ( it needs to be stopped anyway), but Plavix cant be stopped: Will try to manage conservatively: IF THE FLUID INCREASES: will tap at that time if she becomes symptomatic: She is not SOB at this time:  : cont Lasix: She is not SOB : her effusions are likely due to CHF and they have increased recently over past few weeks:  : cont lasix per cardiology  : she has bilateral pl effusions: She has small right sided partially loculated effusion on the right sided and minimal on the left side: on lasix  : Diuretics per primary cards team  : the effusion seems to be getting smaller: monitor:  : ct scan chest reviewed: SEEMS OT BE MORE LIKE MUCUS PLUGGIN/1; off antibiotics for now:  2/2 monitor I&O. diuretics  2/3 diuretics.
she has pleural effusion: on both sides: This has increased from ct abdomen done earlier this month: She has recent PCI done about two months ago: She has pleural effusions likely secondary to heart failure: She has good chances of bleeding with tap being on Plavix and Pradaxa ( it needs to be stopped anyway), but Plavix cant be stopped: Will try to manage conservatively: IF THE FLUID INCREASES: will tap at that time if she becomes symptomatic: She is not SOB at this time:  : cont Lasix: She is not SOB : her effusions are likely due to CHF and they have increased recently over past few weeks:  : cont lasix per cardiology  : she has bilateral pl effusions: She has small right sided partially loculated effusion on the right sided and minimal on the left side: on lasix  : Diuretics per primary cards team  : the effusion seems to be getting smaller: monitor:  : ct scan chest reviewed: SEEMS OT BE MORE LIKE MUCUS PLUGGIN/1; off antibiotics for now:  22 monitor I&O. diuretics  2/3 diuretics.  : cont diuretics  : diuretics per primary cards team
BP controlled. Rate control with metoprolol as per cardiology as patient on concomitant midodrine to avoid hypotension. Monitor BP.
BP low normal off of medications. Rate control as per cardiology. Monitor BP.
: cont lasix per cardiology  : she has bilateral pl effusions: She has small right sided partially loculated effusion on the right sided and minimal on the left side: on lasix  : Diuretics per primary cards team  : the effusion seems to be getting smaller: monitor:  : ct scan chest reviewed: SEEMS OT BE MORE LIKE MUCUS PLUGGIN/1; off antibiotics for now:   monitor I&O. diuretics  2/3 diuretics.  : cont diuretics  : diuretics per primary cards team  : on oral diuretics:  : ct abdomen noteD:  : On Oral diuretics!!   monitor I&O, diuretics  2/10 diuretics  : Cont IV diuresis  /; STable; cont diuresis  : Now compensated: on diuresis!!  : cont curetn rx:SHE ISNOT sob AT THIS TIME!  2/15: Has small loculated effusion on her right sdie: cont current conservative treatment:
BP controlled. Rate control with metoprolol as per cardiology as patient on concomitant midodrine to avoid hypotension. Monitor BP.
BP acceptable. Rate control as per cardiology. Monitor BP.

## 2019-02-15 NOTE — PROGRESS NOTE ADULT - PROBLEM SELECTOR PLAN 2
Patient appears to have h/o CKD-3 with baseline Scr 1.3-1.4 as per prior records with mild proteinuria for which will defer inpatient CKD work up. Prior CT reviewed without post-obstructive process.
Patient appears to have h/o CKD-3 with baseline Scr 1.5-1.6 as per prior records. Check urinalysis and spot urine TP/CR. Prior CT reviewed without post-obstructive process.
Patient appears to have h/o CKD-3 with baseline Scr 1.5-1.6 as per prior records. Spot urine TP/CR 0.55. Will defer secondary w/u at this time. Prior CT reviewed without post-obstructive process.
Patient appears to have h/o age appropriate CKD-3 with mild proteinuria for which will defer inpatient CKD work up. Prior CT reviewed without post-obstructive process.
she has pleural effusion: on both sides: This has increased from ct abdomen done earlier this month: She has recent PCI doen about two months ago: She has pleural effusions likely secondary to heart failure: She has good chances of bleeding with tap being on Plavix and Pradaxa ( it needs to be stopped anyway), but Plavix cant be stopped: Will try to manage conservatively: IF THE FLUID INCREASES: will tap at that time if she becomes symptomatic: She is not SOB at this time:
Patient appears to have h/o CKD-3 with baseline Scr 1.1 as per prior records with mild proteinuria for which will defer inpatient CKD work up. Prior CT reviewed without post-obstructive process.
Patient appears to have h/o CKD-3 with baseline Scr 1.2 as per prior records with mild proteinuria for which will defer inpatient CKD work up. Prior CT reviewed without post-obstructive process.
Patient appears to have h/o CKD-3 with baseline Scr 1.2 as per prior records with mild proteinuria for which will defer inpatient CKD work up. Renal function below baseline as patient had been off of lasix. Prior CT reviewed without post-obstructive process.
Patient appears to have h/o CKD-3 with baseline Scr 1.2 as per prior records with mild proteinuria for which will defer inpatient CKD work up. Renal function below baseline as patient had been off of lasix. Prior CT reviewed without post-obstructive process.
Patient appears to have h/o CKD-3 with baseline Scr 1.3-1.4 as per prior records with mild proteinuria for which will defer inpatient CKD work up. Prior CT reviewed without post-obstructive process.
Patient appears to have h/o CKD-3 with baseline Scr 1.5 as per prior records with mild proteinuria for which will defer inpatient CKD work up. Prior CT reviewed without post-obstructive process.
Patient appears to have h/o CKD-3 with baseline Scr 1.5-1.6 as per prior records with mild proteinuria for which will defer inpatient CKD work up. Prior CT reviewed without post-obstructive process.
Patient appears to have h/o CKD-3 with baseline Scr 1.5-1.6 as per prior records with mild proteinuria for which will defer inpatient CKD work up. Prior CT reviewed without post-obstructive process.
Patient appears to have h/o CKD-3 with baseline Scr 1.5-1.6 as per prior records. Spot urine TP/CR 0.55. Will defer secondary w/u at this time. Prior CT reviewed without post-obstructive process.
Patient appears to have h/o age appropriate CKD-3 (baseline Scr 1.1-1.2) with mild proteinuria for which will defer inpatient CKD work up. Prior CT reviewed without post-obstructive process.
Patient appears to have h/o age appropriate CKD-3 with mild proteinuria for which will defer inpatient CKD work up. Prior CT reviewed without post-obstructive process.
She is coughing still and has low grade temp as well as high WBC count today : Will do chest x-ray as well as RVP: She may need steroids : being treated for c diff  1/29: new chest x0ray ored: ? picture of alveolar edema: would do ct chest non contrast: don't want to add empiric antibiotics given c-diff: And she has been afebrile for past three days
She is coughing still and has low grade temp as well as high WBC count today : Will do chest x-ray as well as RVP: She may need steroids : being treated for c diff  1/29: new chest x0ray ored: ? picture of alveolar edema: would do ct chest non contrast: don't want to add empiric antibiotics given c-diff: And she has been afebrile for past three days  1/30: as above: on steorids now: Try to wean oxygen down:
She is coughing still and has low grade temp as well as high WBC count today : Will do chest x-ray as well as RVP: She may need steroids : being treated for c diff  1/29: new chest x0ray ored: ? picture of alveolar edema: would do ct chest non contrast: don't want to add empiric antibiotics given c-diff: And she has been afebrile for past three days  1/30: as above: on steorids now: Try to wean oxygen down:  1/31: steroids to be cont today: wheezing is minimal: on 5 L of oxygen : try to wean off oxygen
She is coughing still and has low grade temp as well as high WBC count today : Will do chest x-ray as well as RVP: She may need steroids : being treated for c diff  1/29: new chest x0ray ored: ? picture of alveolar edema: would do ct chest non contrast: don't want to add empiric antibiotics given c-diff: And she has been afebrile for past three days  1/30: as above: on steorids now: Try to wean oxygen down:  1/31: steroids to be cont today: wheezing is minimal: on 5 L of oxygen : try to wean off oxygen  2/1: Doing much better: change to po steroids for a few days more: Taper oxygen to minimum required:
She is coughing still and has low grade temp as well as high WBC count today : Will do chest x-ray as well as RVP: She may need steroids : being treated for c diff  1/29: new chest x0ray ored: ? picture of alveolar edema: would do ct chest non contrast: don't want to add empiric antibiotics given c-diff: And she has been afebrile for past three days  1/30: as above: on steorids now: Try to wean oxygen down:  1/31: steroids to be cont today: wheezing is minimal: on 5 L of oxygen : try to wean off oxygen  2/1: Doing much better: change to po steroids for a few days more: Taper oxygen to minimum required:  2/2 stable. continue current management. wean supplemental oxygen to keep O2 sat greater than 88%
She is coughing still and has low grade temp as well as high WBC count today : Will do chest x-ray as well as RVP: She may need steroids : being treated for c diff  1/29: new chest x0ray ored: ? picture of alveolar edema: would do ct chest non contrast: don't want to add empiric antibiotics given c-diff: And she has been afebrile for past three days  1/30: as above: on steorids now: Try to wean oxygen down:  1/31: steroids to be cont today: wheezing is minimal: on 5 L of oxygen : try to wean off oxygen  2/1: Doing much better: change to po steroids for a few days more: Taper oxygen to minimum required:  2/2 stable. continue current management. wean supplemental oxygen to keep O2 sat greater than 88%  2/3 slightly wheezing today. no distress. continue current management
She is coughing still and has low grade temp as well as high WBC count today : Will do chest x-ray as well as RVP: She may need steroids : being treated for c diff  1/29: new chest x0ray ored: ? picture of alveolar edema: would do ct chest non contrast: don't want to add empiric antibiotics given c-diff: And she has been afebrile for past three days  1/30: as above: on steorids now: Try to wean oxygen down:  1/31: steroids to be cont today: wheezing is minimal: on 5 L of oxygen : try to wean off oxygen  2/1: Doing much better: change to po steroids for a few days more: Taper oxygen to minimum required:  2/2 stable. continue current management. wean supplemental oxygen to keep O2 sat greater than 88%  2/3 slightly wheezing today. no distress. continue current management  2/4: she is stil  on steroids: no wheezing: Her WBC is increasing now: but afebrile:
She is coughing still and has low grade temp as well as high WBC count today : Will do chest x-ray as well as RVP: She may need steroids : being treated for c diff  1/29: new chest x0ray ored: ? picture of alveolar edema: would do ct chest non contrast: don't want to add empiric antibiotics given c-diff: And she has been afebrile for past three days  1/30: as above: on steorids now: Try to wean oxygen down:  1/31: steroids to be cont today: wheezing is minimal: on 5 L of oxygen : try to wean off oxygen  2/1: Doing much better: change to po steroids for a few days more: Taper oxygen to minimum required:  2/2 stable. continue current management. wean supplemental oxygen to keep O2 sat greater than 88%  2/3 slightly wheezing today. no distress. continue current management  2/4: she is stil  on steroids: no wheezing: Her WBC is increasing now: but afebrile:  2/5: WBC high likely secondary to steroids: cont to monitor!
She is coughing still and has low grade temp as well as high WBC count today : Will do chest x-ray as well as RVP: She may need steroids : being treated for c diff  1/29: new chest x0ray ored: ? picture of alveolar edema: would do ct chest non contrast: don't want to add empiric antibiotics given c-diff: And she has been afebrile for past three days  1/30: as above: on steorids now: Try to wean oxygen down:  1/31: steroids to be cont today: wheezing is minimal: on 5 L of oxygen : try to wean off oxygen  2/1: Doing much better: change to po steroids for a few days more: Taper oxygen to minimum required:  2/2 stable. continue current management. wean supplemental oxygen to keep O2 sat greater than 88%  2/3 slightly wheezing today. no distress. continue current management  2/4: she is stil  on steroids: no wheezing: Her WBC is increasing now: but afebrile:  2/5: WBC high likely secondary to steroids: cont to monitor!  2/6: WBC still high : she has abdominal pain today: ct abdomen as well as chest x-ray ordered: CHANDA mason the floor: She finished her steroids days before:  Remains afebrile though:
She is coughing still and has low grade temp as well as high WBC count today : Will do chest x-ray as well as RVP: She may need steroids : being treated for c diff  1/29: new chest x0ray ored: ? picture of alveolar edema: would do ct chest non contrast: don't want to add empiric antibiotics given c-diff: And she has been afebrile for past three days  1/30: as above: on steorids now: Try to wean oxygen down:  1/31: steroids to be cont today: wheezing is minimal: on 5 L of oxygen : try to wean off oxygen  2/1: Doing much better: change to po steroids for a few days more: Taper oxygen to minimum required:  2/2 stable. continue current management. wean supplemental oxygen to keep O2 sat greater than 88%  2/3 slightly wheezing today. no distress. continue current management  2/4: she is stil  on steroids: no wheezing: Her WBC is increasing now: but afebrile:  2/5: WBC high likely secondary to steroids: cont to monitor!  2/6: WBC still high : she has abdominal pain today: ct abdomen as well as chest x-ray ordered: CHANDA mason the floor: She finished her steroids days before:  Remains afebrile though:  2/7: she has bilateral pleural effusion: on lasix daily: She does not appear to be in any resp diatress:
She is coughing still and has low grade temp as well as high WBC count today : Will do chest x-ray as well as RVP: She may need steroids : being treated for c diff  1/29: new chest x0ray ored: ? picture of alveolar edema: would do ct chest non contrast: don't want to add empiric antibiotics given c-diff: And she has been afebrile for past three days  1/30: as above: on steorids now: Try to wean oxygen down:  1/31: steroids to be cont today: wheezing is minimal: on 5 L of oxygen : try to wean off oxygen  2/1: Doing much better: change to po steroids for a few days more: Taper oxygen to minimum required:  2/2 stable. continue current management. wean supplemental oxygen to keep O2 sat greater than 88%  2/3 slightly wheezing today. no distress. continue current management  2/4: she is stil  on steroids: no wheezing: Her WBC is increasing now: but afebrile:  2/5: WBC high likely secondary to steroids: cont to monitor!  2/6: WBC still high : she has abdominal pain today: ct abdomen as well as chest x-ray ordered: CHANDA mason the floor: She finished her steroids days before:  Remains afebrile though:  2/7: she has bilateral pleural effusion: on lasix daily: She does not appear to be in any resp diatress:  2/8: Seems ot be doing good: no SOB : No significant wheezing: off steroids: Mantain aspiration precautions!
She is coughing still and has low grade temp as well as high WBC count today : Will do chest x-ray as well as RVP: She may need steroids : being treated for c diff  1/29: new chest x0ray ored: ? picture of alveolar edema: would do ct chest non contrast: don't want to add empiric antibiotics given c-diff: And she has been afebrile for past three days  1/30: as above: on steorids now: Try to wean oxygen down:  1/31: steroids to be cont today: wheezing is minimal: on 5 L of oxygen : try to wean off oxygen  2/1: Doing much better: change to po steroids for a few days more: Taper oxygen to minimum required:  2/2 stable. continue current management. wean supplemental oxygen to keep O2 sat greater than 88%  2/3 slightly wheezing today. no distress. continue current management  2/4: she is stil  on steroids: no wheezing: Her WBC is increasing now: but afebrile:  2/5: WBC high likely secondary to steroids: cont to monitor!  2/6: WBC still high : she has abdominal pain today: ct abdomen as well as chest x-ray ordered: CHANDA mason the floor: She finished her steroids days before:  Remains afebrile though:  2/7: she has bilateral pleural effusion: on lasix daily: She does not appear to be in any resp diatress:  2/8: Seems ot be doing good: no SOB : No significant wheezing: off steroids: Mantain aspiration precautions!  2/9 stable. continue BD
She is coughing still and has low grade temp as well as high WBC count today : Will do chest x-ray as well as RVP: She may need steroids : being treated for c diff  1/29: new chest x0ray ored: ? picture of alveolar edema: would do ct chest non contrast: don't want to add empiric antibiotics given c-diff: And she has been afebrile for past three days  1/30: as above: on steorids now: Try to wean oxygen down:  1/31: steroids to be cont today: wheezing is minimal: on 5 L of oxygen : try to wean off oxygen  2/1: Doing much better: change to po steroids for a few days more: Taper oxygen to minimum required:  2/2 stable. continue current management. wean supplemental oxygen to keep O2 sat greater than 88%  2/3 slightly wheezing today. no distress. continue current management  2/4: she is stil  on steroids: no wheezing: Her WBC is increasing now: but afebrile:  2/5: WBC high likely secondary to steroids: cont to monitor!  2/6: WBC still high : she has abdominal pain today: ct abdomen as well as chest x-ray ordered: CHANDA mason the floor: She finished her steroids days before:  Remains afebrile though:  2/7: she has bilateral pleural effusion: on lasix daily: She does not appear to be in any resp diatress:  2/8: Seems ot be doing good: no SOB : No significant wheezing: off steroids: Mantain aspiration precautions!  2/9 stable. continue BD  2/10 stable. no wheezing. continue current management  2/11: no wheezing: off steroids: seems to be dong ok
She is coughing still and has low grade temp as well as high WBC count today : Will do chest x-ray as well as RVP: She may need steroids : being treated for c diff  1/29: new chest x0ray ored: ? picture of alveolar edema: would do ct chest non contrast: don't want to add empiric antibiotics given c-diff: And she has been afebrile for past three days  1/30: as above: on steorids now: Try to wean oxygen down:  1/31: steroids to be cont today: wheezing is minimal: on 5 L of oxygen : try to wean off oxygen  2/1: Doing much better: change to po steroids for a few days more: Taper oxygen to minimum required:  2/2 stable. continue current management. wean supplemental oxygen to keep O2 sat greater than 88%  2/3 slightly wheezing today. no distress. continue current management  2/4: she is stil  on steroids: no wheezing: Her WBC is increasing now: but afebrile:  2/5: WBC high likely secondary to steroids: cont to monitor!  2/6: WBC still high : she has abdominal pain today: ct abdomen as well as chest x-ray ordered: CHANDA mason the floor: She finished her steroids days before:  Remains afebrile though:  2/7: she has bilateral pleural effusion: on lasix daily: She does not appear to be in any resp diatress:  2/8: Seems ot be doing good: no SOB : No significant wheezing: off steroids: Mantain aspiration precautions!  2/9 stable. continue BD  2/10 stable. no wheezing. continue current management  2/11: no wheezing: off steroids: seems to be dong ok  2/12; on 2 L of oxygen , doing great
She is coughing still and has low grade temp as well as high WBC count today : Will do chest x-ray as well as RVP: She may need steroids : being treated for c diff  1/29: new chest x0ray ored: ? picture of alveolar edema: would do ct chest non contrast: don't want to add empiric antibiotics given c-diff: And she has been afebrile for past three days  1/30: as above: on steorids now: Try to wean oxygen down:  1/31: steroids to be cont today: wheezing is minimal: on 5 L of oxygen : try to wean off oxygen  2/1: Doing much better: change to po steroids for a few days more: Taper oxygen to minimum required:  2/2 stable. continue current management. wean supplemental oxygen to keep O2 sat greater than 88%  2/3 slightly wheezing today. no distress. continue current management  2/4: she is stil  on steroids: no wheezing: Her WBC is increasing now: but afebrile:  2/5: WBC high likely secondary to steroids: cont to monitor!  2/6: WBC still high : she has abdominal pain today: ct abdomen as well as chest x-ray ordered: CHANDA mason the floor: She finished her steroids days before:  Remains afebrile though:  2/7: she has bilateral pleural effusion: on lasix daily: She does not appear to be in any resp diatress:  2/8: Seems ot be doing good: no SOB : No significant wheezing: off steroids: Mantain aspiration precautions!  2/9 stable. continue BD  2/10 stable. no wheezing. continue current management  2/11: no wheezing: off steroids: seems to be dong ok  2/12; on 2 L of oxygen , doing great  2/13: No wheezing: stable
She is coughing still and has low grade temp as well as high WBC count today : Will do chest x-ray as well as RVP: She may need steroids : being treated for c diff  1/29: new chest x0ray ored: ? picture of alveolar edema: would do ct chest non contrast: don't want to add empiric antibiotics given c-diff: And she has been afebrile for past three days  1/30: as above: on steorids now: Try to wean oxygen down:  1/31: steroids to be cont today: wheezing is minimal: on 5 L of oxygen : try to wean off oxygen  2/1: Doing much better: change to po steroids for a few days more: Taper oxygen to minimum required:  2/2 stable. continue current management. wean supplemental oxygen to keep O2 sat greater than 88%  2/3 slightly wheezing today. no distress. continue current management  2/4: she is stil  on steroids: no wheezing: Her WBC is increasing now: but afebrile:  2/5: WBC high likely secondary to steroids: cont to monitor!  2/6: WBC still high : she has abdominal pain today: ct abdomen as well as chest x-ray ordered: CHANDA mason the floor: She finished her steroids days before:  Remains afebrile though:  2/7: she has bilateral pleural effusion: on lasix daily: She does not appear to be in any resp diatress:  2/8: Seems ot be doing good: no SOB : No significant wheezing: off steroids: Mantain aspiration precautions!  2/9 stable. continue BD  2/10 stable. no wheezing. continue current management  2/11: no wheezing: off steroids: seems to be dong ok  2/12; on 2 L of oxygen , doing great  2/13: No wheezing: stable  2/14: resolved: not wheezing anymore:
she has pleural effusion: on both sides: This has increased from ct abdomen done earlier this month: She has recent PCI doen about two months ago: She has pleural effusions likely secondary to heart failure: She has good chances of bleeding with tap being on Plavix and Pradaxa ( it needs to be stopped anyway), but Plavix cant be stopped: Will try to manage conservatively: IF THE FLUID INCREASES: will tap at that time if she becomes symptomatic: She is not SOB at this time:  1/26: cont Lasix: She is not SOB : her effusions are likely due to CHF and they have increased recently over past few weeks:
she has pleural effusion: on both sides: This has increased from ct abdomen done earlier this month: She has recent PCI done about two months ago: She has pleural effusions likely secondary to heart failure: She has good chances of bleeding with tap being on Plavix and Pradaxa ( it needs to be stopped anyway), but Plavix cant be stopped: Will try to manage conservatively: IF THE FLUID INCREASES: will tap at that time if she becomes symptomatic: She is not SOB at this time:  1/26: cont Lasix: She is not SOB : her effusions are likely due to CHF and they have increased recently over past few weeks:  1/27: cont lasix per cardiology
she has pleural effusion: on both sides: This has increased from ct abdomen done earlier this month: She has recent PCI done about two months ago: She has pleural effusions likely secondary to heart failure: She has good chances of bleeding with tap being on Plavix and Pradaxa ( it needs to be stopped anyway), but Plavix cant be stopped: Will try to manage conservatively: IF THE FLUID INCREASES: will tap at that time if she becomes symptomatic: She is not SOB at this time:  1/26: cont Lasix: She is not SOB : her effusions are likely due to CHF and they have increased recently over past few weeks:  1/27: cont lasix per cardiology  1/28: she has bilateral pl effusions: She has small right sided partially loculated effusion on the right sided and minimal on the left side: on lasix
Patient appears to have h/o age appropriate CKD-3 (baseline Scr 1.1-1.2) with mild proteinuria for which will defer inpatient CKD work up. Prior CT reviewed without post-obstructive process.
Patient appears to have h/o CKD-3 with baseline Scr 1.5-1.6 as per prior records. Spot urine TP/CR 0.55. Will defer secondary w/u at this time. Prior CT reviewed without post-obstructive process.
Patient appears to have h/o age appropriate CKD-3 with mild proteinuria for which will defer inpatient CKD work up. Prior CT reviewed without post-obstructive process.
She is coughing still and has low grade temp as well as high WBC count today : Will do chest x-ray as well as RVP: She may need steroids : being treated for c diff  1/29: new chest x0ray ored: ? picture of alveolar edema: would do ct chest non contrast: don't want to add empiric antibiotics given c-diff: And she has been afebrile for past three days  1/30: as above: on steorids now: Try to wean oxygen down:  1/31: steroids to be cont today: wheezing is minimal: on 5 L of oxygen : try to wean off oxygen  2/1: Doing much better: change to po steroids for a few days more: Taper oxygen to minimum required:  2/2 stable. continue current management. wean supplemental oxygen to keep O2 sat greater than 88%  2/3 slightly wheezing today. no distress. continue current management  2/4: she is stil  on steroids: no wheezing: Her WBC is increasing now: but afebrile:  2/5: WBC high likely secondary to steroids: cont to monitor!  2/6: WBC still high : she has abdominal pain today: ct abdomen as well as chest x-ray ordered: CHANDA mason the floor: She finished her steroids days before:  Remains afebrile though:  2/7: she has bilateral pleural effusion: on lasix daily: She does not appear to be in any resp diatress:  2/8: Seems ot be doing good: no SOB : No significant wheezing: off steroids: Mantain aspiration precautions!  2/9 stable. continue BD  2/10 stable. no wheezing. continue current management  2/11: no wheezing: off steroids: seems to be dong ok  2/12; on 2 L of oxygen , doing great  2/13: No wheezing: stable  2/14: resolved: not wheezing anymore:  2/15: copd wise pretty stable: no wheezing
Patient appears to have h/o CKD-3 with baseline Scr 1.3-1.4 as per prior records with mild proteinuria for which will defer inpatient CKD work up. Prior CT reviewed without post-obstructive process.
She is coughing still and has low grade temp as well as high WBC count today : Will do chest x-ray as well as RVP: She may need steroids : being treated for c diff  1/29: new chest x0ray ored: ? picture of alveolar edema: would do ct chest non contrast: don't want to add empiric antibiotics given c-diff: And she has been afebrile for past three days  1/30: as above: on steorids now: Try to wean oxygen down:  1/31: steroids to be cont today: wheezing is minimal: on 5 L of oxygen : try to wean off oxygen  2/1: Doing much better: change to po steroids for a few days more: Taper oxygen to minimum required:  2/2 stable. continue current management. wean supplemental oxygen to keep O2 sat greater than 88%  2/3 slightly wheezing today. no distress. continue current management  2/4: she is stil  on steroids: no wheezing: Her WBC is increasing now: but afebrile:  2/5: WBC high likely secondary to steroids: cont to monitor!  2/6: WBC still high : she has abdominal pain today: ct abdomen as well as chest x-ray ordered: CHANDA mason the floor: She finished her steroids days before:  Remains afebrile though:  2/7: she has bilateral pleural effusion: on lasix daily: She does not appear to be in any resp diatress:  2/8: Seems ot be doing good: no SOB : No significant wheezing: off steroids: Mantain aspiration precautions!  2/9 stable. continue BD  2/10 stable. no wheezing. continue current management

## 2019-02-15 NOTE — PROGRESS NOTE ADULT - SUBJECTIVE AND OBJECTIVE BOX
[facility]        - Patient seen and examined.  - Today, patient is without complaints.         *****MEDICATIONS: Current medication reviewed and documented.    MEDICATIONS  (STANDING):  atorvastatin 40 milliGRAM(s) Oral at bedtime  buDESOnide 160 MICROgram(s)/formoterol 4.5 MICROgram(s) Inhaler 2 Puff(s) Inhalation two times a day  clopidogrel Tablet 75 milliGRAM(s) Oral daily  collagenase Ointment 1 Application(s) Topical two times a day  dabigatran 75 milliGRAM(s) Oral every 12 hours  furosemide    Tablet 40 milliGRAM(s) Oral daily  lactobacillus acidophilus 1 Tablet(s) Oral two times a day with meals  levalbuterol Inhalation 0.63 milliGRAM(s) Inhalation every 6 hours  metoprolol tartrate 75 milliGRAM(s) Oral two times a day  midodrine 5 milliGRAM(s) Oral every 8 hours  nystatin Powder 1 Application(s) Topical every 12 hours  pantoprazole  Injectable 40 milliGRAM(s) IV Push two times a day  thiamine 100 milliGRAM(s) Oral daily  vancomycin    Solution 125 milliGRAM(s) Oral every 8 hours    MEDICATIONS  (PRN):  acetaminophen   Tablet .. 650 milliGRAM(s) Oral every 6 hours PRN Temp greater or equal to 38C (100.4F), Mild Pain (1 - 3), Moderate Pain (4 - 6)  traMADol 25 milliGRAM(s) Oral every 8 hours PRN Severe Pain (7 - 10)           ***** REVIEW OF SYSTEM:  GEN: no fever, no chills, no pain  RESP: no SOB, no cough, no sputum  CVS: no chest pain, no palpitations, no edema  GI: no abdominal pain, no nausea, no vomiting, no constipation, no diarrhea  : no dysurea, no frequency  NEURO: no headache, no diziness  PSYCH: no depression, not anxious  Derm : no itching, no rash         ***** VITAL SIGNS:  T(F): 97.5 (02-15-19 @ 14:39), Max: 97.7 (02-15-19 @ 05:44)  HR: 93 (02-15-19 @ 14:39) (62 - 118)  BP: 101/62 (02-15-19 @ 14:39) (101/62 - 114/82)  RR: 18 (02-15-19 @ 14:39) (16 - 18)  SpO2: 92% (02-15-19 @ 14:39) (92% - 100%)  Wt(kg): --  ,   I&O's Summary    2019 07:01  -  15 Feb 2019 07:00  --------------------------------------------------------  IN: 200 mL / OUT: 0 mL / NET: 200 mL             *****PHYSICAL EXAM:   alert oriented x 2 attention comprehension are fair.  Able to name, repeat.   EOmi fundi not visualized   no nystagmus VFF to confrontation  Tongue is midline  Palate elevates symmetrically   Moving all 4 ext spontaneously no drift appreciated    Gait not assessed.            *****LAB AND IMAGIN.4    7.84  )-----------( 196      ( 15 Feb 2019 06:30 )             30.6               02-15    138  |  95<L>  |  66<H>  ----------------------------<  99  4.9   |  34<H>  |  1.27    Ca    8.8      15 Feb 2019 06:30  Mg     2.6     -15                           [All pertinent recent Imaging/Reports reviewed]           *****A S S E S S M E N T   A N D   P L A N :    79 y/o female, with a PmHx of COPD (on 2-3L O2 prn), paroxsymal a-fib (on pradaxa), CAD (s/p stent 2018), CHF, HTN, HLD, and anxiety, presenting to the [facility] ED with right foot pressure ulcer pain x several weeks. The patient has a dime-sized ulcer with scant serosanguinous drainage on her right calcaneous that she acquired from a shoe weeks ago. She reports that she woke up last night around 4am with excruciating, unrelenting right root pain with SOB x a few minutes followed by chest pain x several hours. The patient reports that the ulcer pain began to gradually worsen since her last hospital visit, where she was d/c with vancomycin. The foot pain is sharp, 10/10, and radiates up to her calf. The patient took two puffs of her Symbicort for the SOB with no relief, but it resolved spontaneously within a few minutes. The chest pain began gradually and was described as a midsternal, 8/10, tightness with no radiation and accompanied by palpitations. Of note, the patient reports chronic cough, b/l LE edema, JACKSON with walking 1/2 block, orthopnea x1 pillow, occasional PND, melena x weeks, easy bruising/bleeding, and 10 lbs weight loss over the past 6 months with no change in appetite. The patient denies fever, chills, SOB at rest, diaphoresis, dizziness, claudication, wheezing, changes in vision and hearing, abdominal pain, change in bowel and urinary habits, dysuria, hematuria.   Problem/Recommendations 1: toxic metabolic encephalopathy slightly worse today.   regulate sleep wake cycle/ avoid day time sleepiness.    b12/folate/tsh/wnl   thiamine 100 daily.   encouraged po intake.   Problem/Recommendations 2: diarrhea resolved.  Problem/Recommendations 3: non healing ulcer likely due to poor circulation   vascular surgery f/u as outpt   discharge planning         Thank you for allowing me to participate in the care of this patient. Please do not hesitate to call me if you have any  questions.        ________________  Kary Mercado MD  [facility] ([facility]  [phone]      30 minutes spent on total encounter; more than 50 % of the visit was  spent counseling about plan of care, compliance to diet/exercise and medication regimen and or  coordinating care by the attending physician.   At the present time, [facility] does not  provide outpatient followup, best to call the your insurance to find a participating provider.  This was explained to you at the time of the visit. Alternatively, if your insurance allows it, you can follow up with a neurologist  Dr. Anish Camacho([place]) [phone] or Dr. Michael Nissenbaum [phone]

## 2019-02-15 NOTE — PROGRESS NOTE ADULT - ASSESSMENT
chest xray 1/28/19:  revealed multifocal airspace disease, may reflect aveolar component of edema.    a/p   77 y/o female, with a PmHx of COPD (on 2-3L O2 prn), paroxsymal a-fib (on pradaxa), CAD (s/p stent 11/2018), DCHF, HTN, HLD, and anxiety, presenting with acute/chronic diastolic chf, chest pain r/o acs, GIB, non healing pressure ulcer. Hospital course now complicated ruling in + RVP and CDiff / uti.     1. Atypical chest pain, resolved  in the setting of acute CHF exacerbation, afib w/ RVR   cv stable,Echo with nl lv fxn  continue statin, bb, plavix     2. Acute/chronic diastolic chf  on supplemental O2. denies SOB, volume status is stable   ct chest revealed decreased bl pleural effusion, mutiple small nodular opacities secondary to impacted distal airways likely infectious etiology? pna LLL, aspiration    + rvp  repeat cxr with unchanged b/l pulm edema, no evidence of dypsnea  continue lasix 40 mg PO daily  cont bb  pulm f/u   echo with nl lv fxn    3. AFIB  dig on hold due to hx of pauses and bradycardia   brief afib with RVR noted, increase  lopressor to 75 mg BID  continue with midodrine 5 mg q 8 hrs for bp support, can uptitrate to 10 mg TID if evidence of low bp with increase BB dose   CHADS 3 - continue with pradaxa     4. CAD s/p PCI (11/2018)  cv stable, no evidence of acute ischemia   continue bb, statin, continue plavix given recent PCI      5. GIB   +GUIAC , +anemia s/p 1unit prbc,   continue to trend cbc  GI f/u      6. non healing foot ulcer  med f/u  ID f/u , vascular studies noted, pod f/u  STONEY/PVR poor, 0.60 on the effected limb with flat waveforms.  -  Vasc sx consult noted, planning RLE angio once c. diff clears    7. IGOR/CKD   creat improved, continue lasix daily per renal  renal f/u     8.Cdiff   po abx, id f/u    7. hypoxia    likely in the setting of resp infection ? aspiration   no decomp CHF on exam    + influenza s/p Tamiflu  ct chest revealed decreased bl pleural effusion, mutiple small nodular opacities secondary to impacted distal airways likely infectious etiology? pna LLL, aspiration    pulm f/u, s/p steroids   s/p speech and swallow eval     8. UTI    med f/u     dvt ppx chest xray 1/28/19:  revealed multifocal airspace disease, may reflect aveolar component of edema.    a/p   77 y/o female, with a PmHx of COPD (on 2-3L O2 prn), paroxsymal a-fib (on pradaxa), CAD (s/p stent 11/2018), DCHF, HTN, HLD, and anxiety, presenting with acute/chronic diastolic chf, chest pain r/o acs, GIB, non healing pressure ulcer. Hospital course now complicated ruling in + RVP and CDiff / uti.     1. Atypical chest pain, resolved  in the setting of acute CHF exacerbation, afib w/ RVR   cv stable,Echo with nl lv fxn  continue statin, bb, plavix     2. Acute/chronic diastolic chf  on supplemental O2. denies SOB, volume status is stable   ct chest revealed decreased bl pleural effusion, mutiple small nodular opacities secondary to impacted distal airways likely infectious etiology? pna LLL, aspiration    + rvp  repeat cxr with unchanged b/l pulm edema, no evidence of dypsnea  continue lasix 40 mg PO daily  cont bb  pulm f/u   echo with nl lv fxn    3. AFIB  dig on hold due to hx of pauses and bradycardia   brief afib with RVR noted, increase  lopressor to 75 mg BID  continue with midodrine 5 mg q 8 hrs for bp support, can uptitrate to 10 mg TID if evidence of low bp   CHADS 3 - continue with pradaxa     4. CAD s/p PCI (11/2018)  cv stable, no evidence of acute ischemia   continue bb, statin, continue plavix given recent PCI      5. GIB   +GUIAC , +anemia s/p 1unit prbc,   continue to trend cbc  GI f/u      6. non healing foot ulcer  med f/u  ID f/u , vascular studies noted, pod f/u  STONEY/PVR poor, 0.60 on the effected limb with flat waveforms.  -  Vasc sx consult noted, planning RLE angio once c. diff clears    7. IGOR/CKD   creat improved, continue lasix daily per renal  renal f/u     8.Cdiff   po abx, id f/u    7. hypoxia    likely in the setting of resp infection ? aspiration   no decomp CHF on exam    + influenza s/p Tamiflu  ct chest revealed decreased bl pleural effusion, mutiple small nodular opacities secondary to impacted distal airways likely infectious etiology? pna LLL, aspiration    pulm f/u, s/p steroids   s/p speech and swallow eval     8. UTI    med f/u     dvt ppx

## 2019-02-15 NOTE — DISCHARGE NOTE ADULT - PATIENT PORTAL LINK FT
You can access the [facility] Portal, offered by [facility], by registering with the following website: [email or web address]

## 2019-02-15 NOTE — PROGRESS NOTE ADULT - SUBJECTIVE AND OBJECTIVE BOX
Patient is a 79y old  Female who presents with a chief complaint of right foot pressure ulcer pain (15 Feb 2019 12:18)      Any change in ROS: Doing pretty good: No resp events overnight:  CHANDA PAIZ on the floor    MEDICATIONS  (STANDING):  atorvastatin 40 milliGRAM(s) Oral at bedtime  buDESOnide 160 MICROgram(s)/formoterol 4.5 MICROgram(s) Inhaler 2 Puff(s) Inhalation two times a day  clopidogrel Tablet 75 milliGRAM(s) Oral daily  collagenase Ointment 1 Application(s) Topical two times a day  dabigatran 75 milliGRAM(s) Oral every 12 hours  furosemide    Tablet 40 milliGRAM(s) Oral daily  lactobacillus acidophilus 1 Tablet(s) Oral two times a day with meals  levalbuterol Inhalation 0.63 milliGRAM(s) Inhalation every 6 hours  metoprolol tartrate 75 milliGRAM(s) Oral two times a day  midodrine 5 milliGRAM(s) Oral every 8 hours  nystatin Powder 1 Application(s) Topical every 12 hours  pantoprazole  Injectable 40 milliGRAM(s) IV Push two times a day  thiamine 100 milliGRAM(s) Oral daily  vancomycin    Solution 125 milliGRAM(s) Oral every 8 hours    MEDICATIONS  (PRN):  acetaminophen   Tablet .. 650 milliGRAM(s) Oral every 6 hours PRN Temp greater or equal to 38C (100.4F), Mild Pain (1 - 3), Moderate Pain (4 - 6)  traMADol 25 milliGRAM(s) Oral every 8 hours PRN Severe Pain (7 - 10)    Vital Signs Last 24 Hrs  T(C): 36.5 (15 Feb 2019 05:44), Max: 36.6 (14 Feb 2019 15:46)  T(F): 97.7 (15 Feb 2019 05:44), Max: 97.9 (14 Feb 2019 15:46)  HR: 91 (15 Feb 2019 10:41) (62 - 104)  BP: 114/82 (15 Feb 2019 05:44) (100/68 - 114/82)  BP(mean): --  RR: 16 (15 Feb 2019 05:44) (16 - 18)  SpO2: 96% (15 Feb 2019 10:41) (95% - 100%)    I&O's Summary    14 Feb 2019 07:01  -  15 Feb 2019 07:00  --------------------------------------------------------  IN: 200 mL / OUT: 0 mL / NET: 200 mL          Physical Exam:   GENERAL: NAD, well-groomed, well-developed  HEENT: COLEEN/   Atraumatic, Normocephalic  ENMT: No tonsillar erythema, exudates, or enlargement; Moist mucous membranes, Good dentition, No lesions  NECK: Supple, No JVD, Normal thyroid  CHEST/LUNG: NO wHEEZING+  CVS: Regular rate and rhythm; No murmurs, rubs, or gallops  GI: : Soft, Nontender, Nondistended; Bowel sounds present  NERVOUS SYSTEM:  Alert & Oriented X3  EXTREMITIES:  2+ Peripheral Pulses, No clubbing, cyanosis, or edema  LYMPH: No lymphadenopathy noted  SKIN: No rashes or lesions  ENDOCRINOLOGY: No Thyromegaly  PSYCH: Appropriate    Labs:  32                            9.4    7.84  )-----------( 196      ( 15 Feb 2019 06:30 )             30.6                         9.6    9.44  )-----------( 208      ( 14 Feb 2019 08:00 )             31.3                         9.2    9.20  )-----------( 235      ( 13 Feb 2019 07:19 )             29.8                         9.5    11.08 )-----------( 260      ( 12 Feb 2019 08:00 )             30.9     02-15    138  |  95<L>  |  66<H>  ----------------------------<  99  4.9   |  34<H>  |  1.27  02-14    136  |  91<L>  |  60<H>  ----------------------------<  119<H>  4.9   |  32<H>  |  1.26  02-13    138  |  94<L>  |  60<H>  ----------------------------<  102<H>  5.0   |  35<H>  |  1.21  02-12    137  |  95<L>  |  44<H>  ----------------------------<  96  4.2   |  33<H>  |  1.13    Ca    8.8      15 Feb 2019 06:30  Ca    8.8      14 Feb 2019 08:00  Mg     2.6     02-15  Mg     2.5     02-14      CAPILLARY BLOOD GLUCOSE            < from: Xray Chest 1 View- PORTABLE-Routine (02.11.19 @ 06:54) >  effusion    Bones and soft tissues: The bones and soft tissues are unchanged.    Impression:    Unchanged pulmonary edema and right pleural effusion                  ALEKSANDAR HARMON M.D., ATTENDING RADIOLOGIST  This document has been electronically signed. Feb 11 2019 10:10AM    < end of copied text >            RECENT CULTURES:        RESPIRATORY CULTURES:          Studies  Chest X-RAY  CT SCAN Chest   Venous Dopplers: LE:   CT Abdomen  Others

## 2019-02-15 NOTE — PROGRESS NOTE ADULT - PROBLEM SELECTOR PROBLEM 6
Hypokalemia
Chronic atrial fibrillation with rapid ventricular response
CKD (chronic kidney disease) stage 4, GFR 15-29 ml/min
Chronic atrial fibrillation with rapid ventricular response
Hypokalemia
Chronic atrial fibrillation with rapid ventricular response

## 2019-02-15 NOTE — DISCHARGE NOTE ADULT - CARE PROVIDERS DIRECT ADDRESSES
,[email or web address],DirectAddress_Unknown,DirectAddress_Unknown,DirectAddress_Unknown,DirectAddress_Unknown,[email or web address]

## 2019-02-15 NOTE — PROGRESS NOTE ADULT - PROBLEM SELECTOR PLAN 7
stable  2/2 Creatinine, Serum: 1.15 mg/dL (02.02.19 @ 05:50)  2/3 Creatinine, Serum: 1.04 mg/dL (02.03.19 @ 04:00)  2/4: stable\  2/6: recovered:  2/7: Seems to be doing ok ?: not much of SOB!!  2/8 Creatinine, Serum: 0.96 mg/dL (02.09.19 @ 05:00)
controlled
controlled  1/26: softer today : monitor closely:
controlled  1/26: softer today : monitor closely:  1/27: BP variable: cont to monitor:
controlled  1/26: softer today : monitor closely:  1/27: BP variable: cont to monitor:  1/28: BP low to normal: Is being treated for c diff: has cough: for chest x0ray today
stable
stable  2/2 Creatinine, Serum: 1.15 mg/dL (02.02.19 @ 05:50)
stable  2/2 Creatinine, Serum: 1.15 mg/dL (02.02.19 @ 05:50)  2/3 Creatinine, Serum: 1.04 mg/dL (02.03.19 @ 04:00)
stable  2/2 Creatinine, Serum: 1.15 mg/dL (02.02.19 @ 05:50)  2/3 Creatinine, Serum: 1.04 mg/dL (02.03.19 @ 04:00)  2/4: stable
stable  2/2 Creatinine, Serum: 1.15 mg/dL (02.02.19 @ 05:50)  2/3 Creatinine, Serum: 1.04 mg/dL (02.03.19 @ 04:00)  2/4: stable
stable  2/2 Creatinine, Serum: 1.15 mg/dL (02.02.19 @ 05:50)  2/3 Creatinine, Serum: 1.04 mg/dL (02.03.19 @ 04:00)  2/4: stable\  2/6: recovered:
stable  2/2 Creatinine, Serum: 1.15 mg/dL (02.02.19 @ 05:50)  2/3 Creatinine, Serum: 1.04 mg/dL (02.03.19 @ 04:00)  2/4: stable\  2/6: recovered:
stable  2/2 Creatinine, Serum: 1.15 mg/dL (02.02.19 @ 05:50)  2/3 Creatinine, Serum: 1.04 mg/dL (02.03.19 @ 04:00)  2/4: stable\  2/6: recovered:  2/7: Seems to be doing ok ?: not much of SOB!!
stable  2/2 Creatinine, Serum: 1.15 mg/dL (02.02.19 @ 05:50)  2/3 Creatinine, Serum: 1.04 mg/dL (02.03.19 @ 04:00)  2/4: stable\  2/6: recovered:  2/7: Seems to be doing ok ?: not much of SOB!!  2/8 Creatinine, Serum: 0.96 mg/dL (02.09.19 @ 05:00)
stable  2/2 Creatinine, Serum: 1.15 mg/dL (02.02.19 @ 05:50)  2/3 Creatinine, Serum: 1.04 mg/dL (02.03.19 @ 04:00)  2/4: stable\  2/6: recovered:  2/7: Seems to be doing ok ?: not much of SOB!!  2/8 Creatinine, Serum: 0.96 mg/dL (02.09.19 @ 05:00)  2/11: stable
stable  2/2 Creatinine, Serum: 1.15 mg/dL (02.02.19 @ 05:50)  2/3 Creatinine, Serum: 1.04 mg/dL (02.03.19 @ 04:00)  2/4: stable\  2/6: recovered:  2/7: Seems to be doing ok ?: not much of SOB!!  2/8 Creatinine, Serum: 0.96 mg/dL (02.09.19 @ 05:00)  2/11: stable
stable  2/2 Creatinine, Serum: 1.15 mg/dL (02.02.19 @ 05:50)  2/3 Creatinine, Serum: 1.04 mg/dL (02.03.19 @ 04:00)  2/4: stable\  2/6: recovered:  2/7: Seems to be doing ok ?: not much of SOB!!  2/8 Creatinine, Serum: 0.96 mg/dL (02.09.19 @ 05:00)  2/11: stable  2/13: recovered:
stable  2/2 Creatinine, Serum: 1.15 mg/dL (02.02.19 @ 05:50)  2/3 Creatinine, Serum: 1.04 mg/dL (02.03.19 @ 04:00)  2/4: stable\  2/6: recovered:  2/7: Seems to be doing ok ?: not much of SOB!!  2/8 Creatinine, Serum: 0.96 mg/dL (02.09.19 @ 05:00)  2/11: stable  2/13: recovered:  2/14: Stable!
stable  2/2 Creatinine, Serum: 1.15 mg/dL (02.02.19 @ 05:50)  2/3 Creatinine, Serum: 1.04 mg/dL (02.03.19 @ 04:00)  2/4: stable\  2/6: recovered:  2/7: Seems to be doing ok ?: not much of SOB!!  2/8 Creatinine, Serum: 0.96 mg/dL (02.09.19 @ 05:00)  2/11: stable  2/13: recovered:  2/14: Stable!  2/15: stable

## 2019-02-15 NOTE — PROGRESS NOTE ADULT - NSHPATTENDINGPLANDISCUSS_GEN_ALL_CORE
pulmonary and the primary team
PA
surg ho
CHENCHO aguilar
PA
Daughter and son
pt pa
Gideon
pt and family
NP
PA
PA: Alyssa
NP

## 2019-02-15 NOTE — PROGRESS NOTE ADULT - PROBLEM SELECTOR PLAN 4
Increase to 40 mg daily given CT and CXR findings above. F/U pulmonary regarding need for thoracentesis? Monitor UO.
Improved. Continue with lasix 40 mg PO daily. Follow up pulmonary evaluation regarding bilateral pleural effusions. Monitor UO.
per primary team  2/1: STable  2/2 stable. on PPI  2/3 stable. continue PPI  2/4: no active bleed  2/5: Stable  2/6: stable  2/8: stable!!  2/9 stable. GI is following  2/10 stable
Agree with decreasing lasix to 40 mg IV daily given IGOR today. Monitor UO.
For which lasix increased to 40 mg daily given CT and CXR findings above. No role for thoracentesis at this time as per pulmonary. Monitor UO.  Increase serum CO2- Check VBG ph. recc Diamox 500mg IV x1
Switched to Lasix 40mg PO daily today. Monitor UO.
per emma team
Continue with decreased dose of IV lasix to daily, can switch to PO. Monitor UO.
Continue with lasix 20 mg PO daily and can increase to 40 mg daily as needed. Monitor UO.
For which lasix increased to 40 mg daily given CT and CXR findings above. No role for thoracentesis at this time as per pulmonary. Monitor UO.
For which lasix increased to 40 mg daily given CT and CXR findings above. No role for thoracentesis at this time as per pulmonary. Monitor UO.  Serum CO2 improving s/p Diamox 500mg IV x1 on 2/9;  Check VBG ph.
Given CXR findings above, change lasix to 40 mg IV daily. No role for thoracentesis at this time as per pulmonary. Monitor UO. VBG reviewed and patient with mixed disorder (respiratory acidosis and metabolic alkalosis). NO need for further diamox at this time.
Given CXR findings, continue with lasix 40 mg IV daily. No role for thoracentesis at this time as per pulmonary. Monitor UO. VBG reviewed and patient with mixed disorder (respiratory acidosis and metabolic alkalosis). NO need for further diamox at this time.
Improving as per CT chest on lasix 40 mg PO daily. Monitor UO.
Improving for which would continue with lasix 40 mg PO daily. No role for thoracentesis at this time as per pulmonary. Monitor UO.
Improving for which would continue with lasix 40 mg PO daily. No role for thoracentesis at this time as per pulmonary. Monitor UO.
Improving. Continue with lasix 40 mg PO daily given bilateral pleural effusions. Defer more aggressive diuresis given diarrhea. Monitor UO.
Improving. Continue with lasix 40 mg PO daily given bilateral pleural effusions. Monitor UO.
Improving. Continue with lasix 40 mg PO daily. Follow up pulmonary evaluation regarding bilateral pleural effusions. Monitor UO.
Improving. Continue with lasix 40 mg PO daily. Follow up pulmonary evaluation regarding bilateral pleural effusions. Monitor UO.
Improving. Continue with lasix 40 mg PO daily. Monitor UO.
With lasix 40 mg PO daily held due to diarrhea and decreased PO intake and restarted at lower dose today (20 mg daily). Monitor UO.
With lasix 40 mg PO daily held due to diarrhea and decreased PO intake. Can restart as needed by cardiology once diarrhea improves. Monitor UO.
With lasix 40 mg PO daily held due to diarrhea and decreased PO intake. Can restart as needed by cardiology. Monitor UO.
With lasix 40 mg PO daily held due to diarrhea and decreased PO intake. Can restart as needed by cardiology. Monitor UO.
per primary team
per primary team  2/1: STable
per primary team  2/1: STable  2/2 stable. on PPI
per primary team  2/1: STable  2/2 stable. on PPI  2/3 stable. continue PPI
per primary team  2/1: STable  2/2 stable. on PPI  2/3 stable. continue PPI  2/4: no active bleed
per primary team  2/1: STable  2/2 stable. on PPI  2/3 stable. continue PPI  2/4: no active bleed  2/5: Stable
per primary team  2/1: STable  2/2 stable. on PPI  2/3 stable. continue PPI  2/4: no active bleed  2/5: Stable  2/6: stable
per primary team  2/1: STable  2/2 stable. on PPI  2/3 stable. continue PPI  2/4: no active bleed  2/5: Stable  2/6: stable
per primary team  2/1: STable  2/2 stable. on PPI  2/3 stable. continue PPI  2/4: no active bleed  2/5: Stable  2/6: stable  2/8: stable!!
per primary team  2/1: STable  2/2 stable. on PPI  2/3 stable. continue PPI  2/4: no active bleed  2/5: Stable  2/6: stable  2/8: stable!!  2/9 stable. GI is following
per primary team  2/1: STable  2/2 stable. on PPI  2/3 stable. continue PPI  2/4: no active bleed  2/5: Stable  2/6: stable  2/8: stable!!  2/9 stable. GI is following  2/10 stable
per primary team  2/1: STable  2/2 stable. on PPI  2/3 stable. continue PPI  2/4: no active bleed  2/5: Stable  2/6: stable  2/8: stable!!  2/9 stable. GI is following  2/10 stable  2/12: resolved
per primary team  2/1: STable  2/2 stable. on PPI  2/3 stable. continue PPI  2/4: no active bleed  2/5: Stable  2/6: stable  2/8: stable!!  2/9 stable. GI is following  2/10 stable  2/12: resolved
resolved
Improving on lasix 40 mg IV daily. Given rising BUN and serum bicarbonate, plan to change lasix to 40 mg PO daily. No role for thoracentesis at this time as per pulmonary. Monitor UO.
Continue with decreased dose of IV lasix to daily  given IGOR. Monitor UO.
resolved

## 2019-02-15 NOTE — PROGRESS NOTE ADULT - PROBLEM SELECTOR PROBLEM 5
Anemia due to blood loss
Anemia due to blood loss
Ulcer of heel, right, with fat layer exposed
Anemia due to blood loss
Chronic atrial fibrillation with rapid ventricular response
Anemia due to blood loss
Chronic atrial fibrillation with rapid ventricular response
Ulcer of heel, right, with fat layer exposed
Anemia due to blood loss
Anemia due to blood loss
Ulcer of heel, right, with fat layer exposed

## 2019-02-15 NOTE — PROGRESS NOTE ADULT - PROBLEM SELECTOR PROBLEM 4
Edema, lower extremity
GI bleed
Edema, lower extremity
Ulcer of heel, right, with fat layer exposed
Edema, lower extremity
GI bleed
Ulcer of heel, right, with fat layer exposed
Edema, lower extremity
GI bleed

## 2019-02-15 NOTE — PROGRESS NOTE ADULT - ATTENDING COMMENTS
[facility] NEPHROLOGY  Harish Valera M.D.  Kevin Cedeno D.O.  Rachele Mckeon M.D.  Christina Westfall, MSN, ANP-C    Telephone: [phone]  Facsimile: [phone]    [address] [place] [address].
Agree with above NP note.  cv stable  cont current meds
Rpt chest x-ray in AM: Copd wise she is stable: No wheezing: Cont Symbicort!!  2/11: The chest x-ray is reviewed: still with PVC;s and loculated effusion on the right sided:  2/12: stable  2/13: Seems ot be doing ok : copd stable!!  2/14: STable: no SOB  2/15: Overall patient has much improved: doing pretty good:
12/21/19 :      agree with above  cv stable  cont po lasix
Agree with above NP note.  Flu+  Cdiff+  hold diuretic  cont bb
Agree with above NP note.  cont PO Lasix   pt prefers Pradaxa for AC  resume per primary team  cont BB
Agree with above NP note.  cv stable   cont inc lasix as ordered  c diff per med   eventual le angio
Agree with above NP note.  cv stable   cont rate control, lasix as ordered
Agree with above NP note.  cv stable  cont bb
Agree with above NP note.  cv stable  cont current meds
Agree with above NP note.  cv stable  cont rate control  cont lasix as ordered
Agree with above NP note.  cv stable  continued dyspnea  diuretics as ordered  rate control   midodrine for bp support
Agree with above NP note.  cv stable  events noted  f/u ct  pulmo f/u for loc effusion  rate controlled   hold pradaxa if poss pulmo intervention planned
Agree with above NP note.  cv stable  v rates elevated  cont bb, inc dose  cont to watch   if needed will consider restart of dig if uptitration off bb does not control hr
agree with above NP note.  cv stable  rate better controlled  cont bb as orderded  cont a/c  diuretics on hold
Agree with above NP note.  cont PO Lasix   echo with nl lv fxn  prbcs as needed  hold digoxin for bradycardia
Agree with above NP note.  cv stable   cont rate control, lasix as ordered
Agree with above NP note.  cv stable  cont current meds
Agree with above NP note.  cv stable  cont rate control   midodrine as needed for bp support  lasix as ordered
Agree with above NP note.  events noted  cont BB  lasix PO  cv stable
[facility] NEPHROLOGY  Harish Valera M.D.  Kevin Cedeno D.O.  Rachele Mckeon M.D.  Christina Westfall, MSN, ANP-C    Telephone: [phone]  Facsimile: [phone]    [address] [place] [address].
I have personally  seen and examined the patient today and have noted the findings and formulated the plan of care.
agree with above NP note.  cv stable  rate better controlled  cont bb as orderded  cont a/c  diuretics on hold
I have seen and examined the patient today and agree with  the  evaluation, assessment and plan of the surgical house officer
I have seen and examined the patient today and agree with  the  evaluation, assessment and plan of the surgical house officer
[facility] NEPHROLOGY  Harish Valera M.D.  Kevin Cedeno D.O.  Rachele Mckeon M.D.  Christina Westfall, MSN, ANP-C    Telephone: [phone]  Facsimile: [phone]    [address] [place] [address].
[facility] NEPHROLOGY  Harish Valera M.D.  Kevin Cedeno D.O.  Rachele Mckeon M.D.  Christina Westfall, MSN, ANP-C    Telephone: [phone]  Facsimile: [phone]    [address] [place] [address].
[facility] NEPHROLOGY  Harish Valera M.D.  Kevin Cedeno D.O.  Rachele Mckeon M.D.  Christina Westfall, MSN, ANP-C    Telephone: [phone]  Facsimile: [phone]    [address] [place] [address].
[facility] NEPHROLOGY  Harish Valera M.D.  Kevin Cedeno D.O.  Rachele Mckeon M.D.  Christina Westfall, MSN, ANP-C    Telephone: [phone]  Facsimile: [phone]    [address] [place] [address].
[facility] NEPHROLOGY  Harish Valera M.D.  Kevin Cedeno D.O.  Rachele Mckeon M.D.  Christina Westfall, MSN, ANP-C    Telephone: [phone]  Facsimile: [phone]    [address] [place] [address].
[facility] NEPHROLOGY  Harish Valera M.D.  Kevin Cedeno D.O.  Rachele Mckeon M.D.  Christina Westfall, MSN, ANP-C    Telephone: [phone]  Facsimile: [phone]    [address] [place] [address].
[facility] NEPHROLOGY  Harish Valera M.D.  Kevin Cedeno D.O.  Rachele Mckeon M.D.  Christina Westfall, MSN, ANP-C    Telephone: [phone]  Facsimile: [phone]    [address] [place] [address].
[facility] NEPHROLOGY  Harish Valera M.D.  Kevin Cedeno D.O.  Rachele Mckeon M.D.  Christina Westfall, MSN, ANP-C    Telephone: [phone]  Facsimile: [phone]    [address] [place] [address].
[facility] NEPHROLOGY  Harish Valera M.D.  Kevin Cedeno D.O.  Rachele Mckeon M.D.  Christina Westfall, MSN, ANP-C    Telephone: [phone]  Facsimile: [phone]    [address] [place] [address].
[facility] NEPHROLOGY  Harish Valera M.D.  Kevin Cedeno D.O.  Rachele Mckeon M.D.  Christina Westfall, MSN, ANP-C    Telephone: [phone]  Facsimile: [phone]    [address] [place] [address].
[facility] NEPHROLOGY  Harish Valera M.D.  Kevin Cedeno D.O.  Rachele Mckeon M.D.  Christina Westfall, MSN, ANP-C    Telephone: [phone]  Facsimile: [phone]    [address] [place] [address].
[facility] NEPHROLOGY  Harish Valera M.D.  Kevin Cedeno D.O.  Rachele Mckeon M.D.  Christina Westfall, MSN, ANP-C    Telephone: [phone]  Facsimile: [phone]    [address] [place] [address].
[facility] NEPHROLOGY  Harish Valera M.D.  Kevin Cedeno D.O.  Rachele Mckeon M.D.  Christina Westfall, MSN, ANP-C    Telephone: [phone]  Facsimile: [phone]    [address] [place] [address].
[facility] NEPHROLOGY  Harish Valera M.D.  Kevin Cedeno D.O.  Rachele Mckeon M.D.  Christina Westfall, MSN, ANP-C    Telephone: [phone]  Facsimile: [phone]    [address] [place] [address].
[facility] NEPHROLOGY  Harish Valera M.D.  Kevin Cedeno D.O.  Rachele Mckeon M.D.  Christina Westfall, MSN, ANP-C    Telephone: [phone]  Facsimile: [phone]    [address] [place] [address].
[facility] NEPHROLOGY  Harish Valera M.D.  Kevin Cedeno D.O.  Rachele Mckeon M.D.  Christina Westfall, MSN, ANP-C    Telephone: [phone]  Facsimile: [phone]    [address] [place] [address].
[facility] NEPHROLOGY  Harish Valera M.D.  Kevin Cedeno D.O.  Rachele Mckeon M.D.  Christina Westfall, MSN, ANP-C    Telephone: [phone]  Facsimile: [phone]    [address] [place] [address].
[facility] NEPHROLOGY  Harish Valera M.D.  Kevin Cedeno D.O.  Rachele Mckeon M.D.  Christina Westfall, MSN, ANP-C    Telephone: [phone]  Facsimile: [phone]    [address] [place] [address].
[facility] NEPHROLOGY  Harish Valera M.D.  Kevin Cedeno D.O.  Rachele Mckeon M.D.  Christina Westfall, MSN, ANP-C    Telephone: [phone]  Facsimile: [phone]    [address] [place] [address].
[facility] NEPHROLOGY  Harish Valera M.D.  Kevin Cedeno D.O.  Rachele Mckeon M.D.  Christina Westfall, MSN, ANP-C    Telephone: [phone]  Facsimile: [phone]    [address] [place] [address].
[facility] NEPHROLOGY  Harish Valera M.D.  Kevin Cedeno D.O.  Rachele Mckeon M.D.  Christina Westfall, MSN, ANP-C    Telephone: [phone]  Facsimile: [phone]    [address] [place] [address].
[facility] NEPHROLOGY  Harish Valera M.D.  Kevin Cedeno D.O.  Rachele Mckeon M.D.  Christina Westfall, MSN, ANP-C    Telephone: [phone]  Facsimile: [phone]    [address] [place] [address].
[facility] NEPHROLOGY  Harish Valera M.D.  Kevin Cedeno D.O.  Rachele Mckeon M.D.  Christina Westfall, MSN, ANP-C    Telephone: [phone]  Facsimile: [phone]    [address] [place] [address].
[facility] NEPHROLOGY  Harish Valera M.D.  Kevin Cedeno D.O.  Rachele Mckeon M.D.  Christina Westfall, MSN, ANP-C    Telephone: [phone]  Facsimile: [phone]    [address] [place] [address].
[facility] NEPHROLOGY  Harish Valera M.D.  Kevin Cedeno D.O.  Rachele Mckeon M.D.  Christina Westfall, MSN, ANP-C    Telephone: [phone]  Facsimile: [phone]    [address] [place] [address].
[facility] NEPHROLOGY  Harish Valera M.D.  Kevin Cedeno D.O.  Rachele Mckeon M.D.  Christina Westfall, MSN, ANP-C    Telephone: [phone]  Facsimile: [phone]    [address] [place] [address].
[facility] NEPHROLOGY  Harish Valera M.D.  Kevin Cedeno D.O.  Rachele Mckeon M.D.  Christina Westfall, MSN, ANP-C    Telephone: [phone]  Facsimile: [phone]    [address] [place] [address].
pt is doing ok: ha some cough today : started duoneb q 6 hours:
pt is doing ok: ha some cough today : started duoneb q 6 hours:  1/28: still coughing: increased WBC: Low grade temp: 99.8: do chest x-ray as well as RVP!!
Rpt chest x-ray in AM: Copd wise she is stable: No wheezing: Cont Symbicort!!  2/11: The chest x-ray is reviewed: still with PVC;s and loculated effusion on the right sided:
Rpt chest x-ray in AM: Copd wise she is stable: No wheezing: Cont Symbicort!!  2/11: The chest x-ray is reviewed: still with PVC;s and loculated effusion on the right sided:  2/12: stable
Rpt chest x-ray in AM: Copd wise she is stable: No wheezing: Cont Symbicort!!  2/11: The chest x-ray is reviewed: still with PVC;s and loculated effusion on the right sided:  2/12: stable  2/13: Seems ot be doing ok : copd stable!!
Rpt chest x-ray in AM: Copd wise she is stable: No wheezing: Cont Symbicort!!  2/11: The chest x-ray is reviewed: still with PVC;s and loculated effusion on the right sided:  2/12: stable  2/13: Seems ot be doing ok : copd stable!!  2/14: STable: no SOB
pt is doing ok: ha some cough today : started duoneb q 6 hours:  1/28: still coughing: increased WBC: Low grade temp: 99.8: do chest x-ray as well as RVP!!  1/29: Hs influenza with bilateral opacities on chest radiograph: Await ct chest: she is afebrile and todays WBC is normal!!
pt is doing ok: ha some cough today : started duoneb q 6 hours:  1/28: still coughing: increased WBC: Low grade temp: 99.8: do chest x-ray as well as RVP!!  1/29: Hs influenza with bilateral opacities on chest radiograph: Await ct chest: she is afebrile and todays WBC is normal!!  1/30: WBC remains normal and she remains afebrile: There may be a suspicion of aspiration but she is afebrile as wellas with normal wbc count and she is fond to have new Influenza too: Check speech and swallow: Need to be observed closely as antibiotics may have to be changed to zosyn if she clinically starts doing worse with increasing resp distress and increasing oxyegn requirement:
pt is doing ok: ha some cough today : started duoneb q 6 hours:  1/28: still coughing: increased WBC: Low grade temp: 99.8: do chest x-ray as well as RVP!!  1/29: Hs influenza with bilateral opacities on chest radiograph: Await ct chest: she is afebrile and todays WBC is normal!!  1/30: WBC remains normal and she remains afebrile: There may be a suspicion of aspiration but she is afebrile as wellas with normal wbc count and she is fond to have new Influenza too: Check speech and swallow: Need to be observed closely as antibiotics may have to be changed to zosyn if she clinically starts doing worse with increasing resp distress and increasing oxygen requirement:  1/31: off antibiotics: seen by ID: pt clinically looks OK: her oxygen requirement has improved: Will cont steroids at similar dosages today and decrease it in AM :
pt is doing ok: ha some cough today : started duoneb q 6 hours:  1/28: still coughing: increased WBC: Low grade temp: 99.8: do chest x-ray as well as RVP!!  1/29: Hs influenza with bilateral opacities on chest radiograph: Await ct chest: she is afebrile and todays WBC is normal!!  1/30: WBC remains normal and she remains afebrile: There may be a suspicion of aspiration but she is afebrile as wellas with normal wbc count and she is fond to have new Influenza too: Check speech and swallow: Need to be observed closely as antibiotics may have to be changed to zosyn if she clinically starts doing worse with increasing resp distress and increasing oxygen requirement:  1/31: off antibiotics: seen by ID: pt clinically looks OK: her oxygen requirement has improved: Will cont steroids at similar dosages today and decrease it in AM :  2/1: Seems to be doing ok : wheezing is much better: She is much more alert and awake and requiring 4 L of oxygen: Cont to ta[per oxygen down:
pt seems to be doing pretty good: She is not wheezing now: Cont to titrate down FIO2 as tolerated:  2/4: titrate down fio2!! follow speech and swallow!!
pt seems to be doing pretty good: She is not wheezing now: Cont to titrate down FIO2 as tolerated:  2/4: titrate down fio2!! follow speech and swallow!!  2/5: CHANDA Shah: PA: check for s/s: HER CT SCAN IS SUGGESTIVE OF ASPIRATION: SHE REMaINS STABLE OFF ANTIBIOTICS:
pt seems to be doing pretty good: She is not wheezing now: Cont to titrate down FIO2 as tolerated:  2/4: titrate down fio2!! follow speech and swallow!!  2/5: CHANDA Shah: PA: check for s/s: HER CT SCAN IS SUGGESTIVE OF ASPIRATION: SHE Remains STABLE OFF ANTIBIOTICS:  2/6: FOR CT ABDOMEN and chest x-ray today : finished steorids!!
pt seems to be doing pretty good: She is not wheezing now: Cont to titrate down FIO2 as tolerated:  2/4: titrate down fio2!! follow speech and swallow!!  2/5: CHANDA Shah: PA: check for s/s: HER CT SCAN IS SUGGESTIVE OF ASPIRATION: SHE Remains STABLE OFF ANTIBIOTICS:  2/6: FOR CT ABDOMEN and chest x-ray today : finished steorids!!  2/7: She seems ot be stable from pulm perspective: cont lasix: Has mild loculation on the right side, but the loculation part is pretty small: Cont conservative treatment:
pt seems to be doing pretty good: She is not wheezing now: Cont to titrate down FIO2 as tolerated:  2/4: titrate down fio2!! follow speech and swallow!!  2/5: CHANDA Shah: PA: check for s/s: HER CT SCAN IS SUGGESTIVE OF ASPIRATION: SHE Remains STABLE OFF ANTIBIOTICS:  2/6: FOR CT ABDOMEN and chest x-ray today : finished steorids!!  2/7: She seems ot be stable from pulm perspective: cont lasix: Has mild loculation on the right side, but the loculation part is pretty small: Cont conservative treatment:  2/8: Pt seems to be doing ok : no wheezing: No resp distress: remains stable off antibticos:
Pt is doing ok: no wheezing: Breathing seems to be better:
pt is doing much better: no wheezing: on steroids:
pt seems to be doing pretty good: She is not wheezing now: Cont to titrate down FIO2 as tolerated:
Rpt chest x-ray in AM: Copd wise she is stable: No wheezing: Cont Symbicort!!

## 2019-02-15 NOTE — PROGRESS NOTE ADULT - SUBJECTIVE AND OBJECTIVE BOX
[facility] NEPHROLOGY- PROGRESS NOTE    78y Female with history of CHF, COPD presents with SOB found to have guaiac positive anemia and afib with RVR. Nephrology consulted for elevated Scr.    REVIEW OF SYSTEMS:  Gen: no changes in weight  Cards: no chest pain  Resp: + dyspnea - improving.   GI: no nausea or vomiting, + diarrhea improving  Vascular: no LE edema +R foot pain    No Known Allergies      Hospital Medications: Medications reviewed      VITALS:  T(F): 97.5 (02-15-19 @ 14:39), Max: 97.7 (02-15-19 @ 05:44)  HR: 93 (02-15-19 @ 14:39)  BP: 101/62 (02-15-19 @ 14:39)  RR: 18 (02-15-19 @ 14:39)  SpO2: 92% (02-15-19 @ 14:39)  Wt(kg): --    02-14 @ 07:01  -  02-15 @ 07:00  --------------------------------------------------------  IN: 200 mL / OUT: 0 mL / NET: 200 mL      PHYSICAL EXAM:    Gen: NAD, calm  Cards: Irregularly irregular and tachycardic, +S1/S2, no M/G/R  Resp: course BS, bibasilar rales R>L  GI: soft, NT/ND, NABS  Vascular: no LE edema B/L      LABS:  02-15    138  |  95<L>  |  66<H>  ----------------------------<  99  4.9   |  34<H>  |  1.27    Ca    8.8      15 Feb 2019 06:30  Mg     2.6     02-15      Creatinine Trend: 1.27 <--, 1.26 <--, 1.21 <--, 1.13 <--, 1.08 <--, 1.05 <--, 0.96 <--                        9.4    7.84  )-----------( 196      ( 15 Feb 2019 06:30 )             30.6     Urine Studies:

## 2019-02-15 NOTE — DISCHARGE NOTE ADULT - MEDICATION SUMMARY - MEDICATIONS TO TAKE
I will START or STAY ON the medications listed below when I get home from the hospital:    acetaminophen 325 mg oral tablet  -- 2 tab(s) by mouth every 6 hours, As needed, Temp greater or equal to 38C (100.4F), Mild Pain (1 - 3), Moderate Pain (4 - 6)  -- Indication: For Pain    traMADol 50 mg oral tablet  -- 0.5 tab(s) by mouth every 8 hours, As needed, Severe Pain (7 - 10)  -- Indication: For Pain    Pradaxa 75 mg oral capsule  -- 1 cap(s) by mouth 2 times a day  -- Indication: For Anticoagulation    atorvastatin 40 mg oral tablet  -- 1 tab(s) by mouth once a day (at bedtime)  -- Indication: For HLD (hyperlipidemia)    clopidogrel 75 mg oral tablet  -- 1 tab(s) by mouth once a day  -- Indication: For Antiplatelet    metoprolol tartrate 75 mg oral tablet  -- 1 tab(s) by mouth 2 times a day  -- Indication: For HTN (hypertension)    Symbicort 160 mcg-4.5 mcg/inh inhalation aerosol  -- 2 puff(s) inhaled 2 times a day  -- Indication: For COPD (chronic obstructive pulmonary disease)    levalbuterol 0.63 mg/3 mL inhalation solution  -- 3 milliliter(s) inhaled every 6 hours  -- Indication: For COPD (chronic obstructive pulmonary disease)    collagenase 250 units/g topical ointment  -- 1 application on skin 2 times a day  -- Indication: For derm    nystatin 100,000 units/g topical powder  -- 1 application on skin every 12 hours  -- Indication: For derm    furosemide 40 mg oral tablet  -- 1 tab(s) by mouth once a day  -- Indication: For HTN (hypertension)    vancomycin 25 mg/mL oral liquid  -- 125 milligram(s) by mouth every 8 hours  -- Indication: For Cdiff    midodrine 5 mg oral tablet  -- 1 tab(s) by mouth every 8 hours  -- Indication: For CArdiovascular - hypotension    lactobacillus acidophilus oral capsule  -- 1 cap(s) by mouth 2 times a day  -- Indication: For Probiotic    pantoprazole 40 mg intravenous injection  -- 40 milligram(s) intravenous 2 times a day  -- Indication: For Gerd    thiamine 100 mg oral tablet  -- 1 tab(s) by mouth once a day  -- Indication: For vitamin

## 2019-02-15 NOTE — PROGRESS NOTE ADULT - PROBLEM SELECTOR PLAN 8
controlled
controlled  1/26: softer today : monitor closely:  1/27: BP variable: cont to monitor:  1/29: borderline: cont to monitor:   1/28: BP low to normal: Is being treated for c diff: has cough: for chest x0ray today  1/30: Controlled  2/1: controlled  2/2 stable  2/3 stable  2/4: Hemodynamically stable  285: Controlled  2/6: stable  2/8: Low to normal..  monitor!!  2/9 stable
controlled
controlled  1/26: softer today : monitor closely:  1/27: BP variable: cont to monitor:  1/29: borderline: cont to monitor:   1/28: BP low to normal: Is being treated for c diff: has cough: for chest x0ray today
controlled  1/26: softer today : monitor closely:  1/27: BP variable: cont to monitor:  1/29: borderline: cont to monitor:   1/28: BP low to normal: Is being treated for c diff: has cough: for chest x0ray today  1/30: Controlled
controlled  1/26: softer today : monitor closely:  1/27: BP variable: cont to monitor:  1/29: borderline: cont to monitor:   1/28: BP low to normal: Is being treated for c diff: has cough: for chest x0ray today  1/30: Controlled
controlled  1/26: softer today : monitor closely:  1/27: BP variable: cont to monitor:  1/29: borderline: cont to monitor:   1/28: BP low to normal: Is being treated for c diff: has cough: for chest x0ray today  1/30: Controlled  2/1: controlled
controlled  1/26: softer today : monitor closely:  1/27: BP variable: cont to monitor:  1/29: borderline: cont to monitor:   1/28: BP low to normal: Is being treated for c diff: has cough: for chest x0ray today  1/30: Controlled  2/1: controlled  2/2 stable
controlled  1/26: softer today : monitor closely:  1/27: BP variable: cont to monitor:  1/29: borderline: cont to monitor:   1/28: BP low to normal: Is being treated for c diff: has cough: for chest x0ray today  1/30: Controlled  2/1: controlled  2/2 stable  2/3 stable
controlled  1/26: softer today : monitor closely:  1/27: BP variable: cont to monitor:  1/29: borderline: cont to monitor:   1/28: BP low to normal: Is being treated for c diff: has cough: for chest x0ray today  1/30: Controlled  2/1: controlled  2/2 stable  2/3 stable  2/4: Hemodynamically stable
controlled  1/26: softer today : monitor closely:  1/27: BP variable: cont to monitor:  1/29: borderline: cont to monitor:   1/28: BP low to normal: Is being treated for c diff: has cough: for chest x0ray today  1/30: Controlled  2/1: controlled  2/2 stable  2/3 stable  2/4: Hemodynamically stable  285: Controlled
controlled  1/26: softer today : monitor closely:  1/27: BP variable: cont to monitor:  1/29: borderline: cont to monitor:   1/28: BP low to normal: Is being treated for c diff: has cough: for chest x0ray today  1/30: Controlled  2/1: controlled  2/2 stable  2/3 stable  2/4: Hemodynamically stable  285: Controlled  2/6: stable
controlled  1/26: softer today : monitor closely:  1/27: BP variable: cont to monitor:  1/29: borderline: cont to monitor:   1/28: BP low to normal: Is being treated for c diff: has cough: for chest x0ray today  1/30: Controlled  2/1: controlled  2/2 stable  2/3 stable  2/4: Hemodynamically stable  285: Controlled  2/6: stable
controlled  1/26: softer today : monitor closely:  1/27: BP variable: cont to monitor:  1/29: borderline: cont to monitor:   1/28: BP low to normal: Is being treated for c diff: has cough: for chest x0ray today  1/30: Controlled  2/1: controlled  2/2 stable  2/3 stable  2/4: Hemodynamically stable  285: Controlled  2/6: stable  2/8: Low to normal..  monitor!!
controlled  1/26: softer today : monitor closely:  1/27: BP variable: cont to monitor:  1/29: borderline: cont to monitor:   1/28: BP low to normal: Is being treated for c diff: has cough: for chest x0ray today  1/30: Controlled  2/1: controlled  2/2 stable  2/3 stable  2/4: Hemodynamically stable  285: Controlled  2/6: stable  2/8: Low to normal..  monitor!!  2/9 stable
controlled  2/12: stable

## 2019-02-15 NOTE — PROGRESS NOTE ADULT - PROBLEM SELECTOR PROBLEM 7
CKD (chronic kidney disease) stage 4, GFR 15-29 ml/min
HTN (hypertension)
CKD (chronic kidney disease) stage 4, GFR 15-29 ml/min
HTN (hypertension)
CKD (chronic kidney disease) stage 4, GFR 15-29 ml/min

## 2019-02-15 NOTE — DISCHARGE NOTE ADULT - CARE PROVIDER_API CALL
Elliot Velazquez)  Vascular Surgery  [address], [address]  [place] [address]  Phone: [phone]  Fax: [phone]  Follow Up Time:     Miriam Agarwal (DPM)  Surgery  [address] [facility], [address] [place] [address]  Phone: [phone]  Fax: [phone]  Follow Up Time:     Baltazar Vieyra (MD)  Cardiovascular Disease; Internal Medicine; Interventional Cardiology; Nuclear Cardiology  [address] [facility], [facility] [address]  [place] [address]  Phone: [phone]  Fax: [phone]  Follow Up Time:     Quinn Sy)  Critical Care Medicine; Internal Medicine; Pulmonary Disease  [address] [place] [address]  Phone: [phone]  Fax: [phone]  Follow Up Time:     Kary Mercado)  Neurology; Vascular Neurology  [address] [place] [address]  Phone: [phone]  Fax: [phone]  Follow Up Time:     Nahun Flynn (DO)  Gastroenterology; Internal Medicine  [address], [address]  [place] [address]  Phone: [phone]  Fax: [phone]  Follow Up Time:

## 2019-02-15 NOTE — PROGRESS NOTE ADULT - PROBLEM SELECTOR PROBLEM 8
HTN (hypertension)

## 2019-02-15 NOTE — PROGRESS NOTE ADULT - PROBLEM SELECTOR PLAN 1
resolved and treated!  2/12: CT s can n oted: would need repeat ct scan chest in 6 weeks time to ensure ful resolution of nodules: She has viral infection on this admission  2/13: Resolved!  2/14: STable!  2/15: resolved

## 2019-02-15 NOTE — PROGRESS NOTE ADULT - PROBLEM SELECTOR PLAN 5
Improving s/p PRBC transfusion. Monitor Hb. F/U GI.
Hb stable s/p PRBC transfusion. Monitor Hb.    Patient low risk for ROSIE if RLE angiogram is to be performed. Patient educated on risks of ROSIE including need for RRT (although very unlikely). If RLE angiogram planned, would start mucomyst 1200 mg PO Q12 hours the evening prior to the procedure and hold lasix on day of procedure to minimize risk of ROSIE.
Hb stable s/p PRBC transfusion. Monitor Hb.
per primary team  2/2 wound care. Management per primary  2/3 defer to primary team  2/6: defer to primary  2/7 dressing C/D/I  2/10 dressing C/D/I
HR controlled: on pradaxa
Hb stable s/p PRBC transfusion. Monitor Hb.    Patient low risk for ROSIE if RLE angiogram is to be performed. Patient educated on risks of ROSIE including need for RRT (although very unlikely). If RLE angiogram planned, would start mucomyst 1200 mg PO Q12 hours the evening prior to the procedure and hold lasix on day of procedure to minimize risk of ROSIE.
Improving s/p PRBC transfusion. Monitor Hb. F/U GI.
Mild decrease in hgb. s/p PRBC transfusion. Monitor Hb. F/U GI.
HR controlled: on pradaxa
HR controlled: on pradaxa
HR controlled: on pradaxa  1/28: on pradaxa
Hb stable s/p PRBC transfusion. Monitor Hb.
Hb stable s/p PRBC transfusion. Monitor Hb.    Patient low risk for ROSIE if RLE angiogram is to be performed but would delay until Scr returns to baseline. Patient educated on risks of ROSIE including need for RRT (although very unlikely). If RLE angiogram planned, would start mucomyst 1200 mg PO Q12 hours the evening prior to the procedure and hold lasix on day of procedure to minimize risk of ROSIE.
Hb stable s/p PRBC transfusion. Monitor Hb.    Patient low risk for ROSIE if RLE angiogram is to be performed. Patient educated on risks of ROSIE including need for RRT (although very unlikely). If RLE angiogram planned, would start mucomyst 1200 mg PO Q12 hours the evening prior to the procedure and hold lasix on day of procedure to minimize risk of ROSIE.
Hb stable s/p PRBC transfusion. Monitor Hb. F/U GI.
Mild decrease in hgb. s/p PRBC transfusion. Monitor Hb. F/U GI.
per primary team
per primary team  2/2 wound care. Management per primary
per primary team  2/2 wound care. Management per primary  2/3 defer to primary team
per primary team  2/2 wound care. Management per primary  2/3 defer to primary team  2/6: defer to primary
per primary team  2/2 wound care. Management per primary  2/3 defer to primary team  2/6: defer to primary  2/7 dressing C/D/I
per primary team  2/2 wound care. Management per primary  2/3 defer to primary team  2/6: defer to primary  2/7 dressing C/D/I  2/10 dressing C/D/I
per primary team  2/2 wound care. Management per primary  2/3 defer to primary team  2/6: defer to primary  2/7 dressing C/D/I  2/10 dressing C/D/I  2/12: per Podiatry
Hb stable s/p PRBC transfusion. Monitor Hb.    Patient low risk for ROSIE if RLE angiogram is to be performed. Patient educated on risks of ROSIE including need for RRT (although very unlikely). If RLE angiogram planned, would start mucomyst 1200 mg PO Q12 hours the evening prior to the procedure and hold lasix on day of procedure to minimize risk of ROSIE.
per primary team  2/2 wound care. Management per primary  2/3 defer to primary team  2/6: defer to primary  2/7 dressing C/D/I  2/10 dressing C/D/I  2/12: per Podiatry

## 2019-02-15 NOTE — DISCHARGE NOTE ADULT - CARE PLAN
Principal Discharge DX:	CHF (congestive heart failure)  Goal:	To relieve and prevent worsening symptoms associated with congestive heart failure, to improve quality of life, and to treat underlying conditions such as coronary heart disease, high blood pressure, or diabetes, and to maintain euvolemia.  Assessment and plan of treatment:	Low salt diet, fluid restriction to 1500 ml daily, monitor your fluid intake and weight daily, exercise as tolerated 30 minutes daily, and follow up with your physician within 7 days.  Secondary Diagnosis:	Acute kidney injury  Goal:	Monitor.  Assessment and plan of treatment:	Resolved.  Secondary Diagnosis:	Ulcer of heel, right, with fat layer exposed  Goal:	RLE Angio gram upon completion of c.diff infection  Assessment and plan of treatment:	BI/PVR poor, 0.60 on the effected limb with flat waveforms.   Vasc sx consult noted, planning RLE angio once c. diff clears.  Secondary Diagnosis:	Paroxysmal atrial fibrillation  Goal:	To restore or maintain a normal heart rate and rhythm, to prevent blood clots, and decrease the risks of stroke CVA/TIA.  Assessment and plan of treatment:	Please take your medications as prescribed.  Continue to take your blood thinner as prescribed and follow with your physician to monitor your levels.  Low fat diet, reduce caffeine intake, and exercise at least 30 minutes daily.  Secondary Diagnosis:	HTN (hypertension)  Goal:	To maintain a normal blood pressure to prevent heart attack, stroke and renal failure.  Assessment and plan of treatment:	Low sodium and fat diet, continue anti-hypertensive medications, and follow up with primary care physician.  Secondary Diagnosis:	HLD (hyperlipidemia)  Goal:	To maintain normal cholesterol levels to prevent stroke, coronary artery disease, peripheral vascular disease and heart attacks.  Assessment and plan of treatment:	Low fat diet, exercise daily and continue current medications. Follow up with primary care physician and cardiologist for management.  Secondary Diagnosis:	Clostridium difficile diarrhea  Goal:	Complete taper for C DIFF.  Assessment and plan of treatment:	Complete Vanco taper as follows:    PO vanco 125 q qid for 10 days (Started 1/30 - 2/9)                   then 125 tid for a week (thru 2/16)                   then 125 bid for a week (thru 2/23)                   then 125 qd for a week (thru 3/2)                   then 125 q 48 h for a week (thru 3/9)                   then 125 every third day for a week (thru 3/16)   As per ID, you no longer need to be on contact precautions, as per infectious disease.

## 2019-03-22 PROBLEM — I50.9 HEART FAILURE, UNSPECIFIED: Chronic | Status: ACTIVE | Noted: 2019-01-17

## 2019-03-22 PROBLEM — J44.9 CHRONIC OBSTRUCTIVE PULMONARY DISEASE, UNSPECIFIED: Chronic | Status: ACTIVE | Noted: 2019-01-17

## 2019-03-25 ENCOUNTER — APPOINTMENT (OUTPATIENT)
Dept: CV DIAGNOSITCS | Facility: HOSPITAL | Age: 79
End: 2019-03-25

## 2019-03-29 ENCOUNTER — APPOINTMENT (OUTPATIENT)
Dept: VASCULAR SURGERY | Facility: CLINIC | Age: 79
End: 2019-03-29

## 2019-04-02 ENCOUNTER — INPATIENT (INPATIENT)
Facility: HOSPITAL | Age: 79
LOS: 13 days | End: 2019-04-16
Attending: INTERNAL MEDICINE | Admitting: INTERNAL MEDICINE
Payer: MEDICARE

## 2019-04-02 VITALS — HEART RATE: 100 BPM | DIASTOLIC BLOOD PRESSURE: 64 MMHG | RESPIRATION RATE: 18 BRPM | SYSTOLIC BLOOD PRESSURE: 104 MMHG

## 2019-04-02 DIAGNOSIS — Z98.890 OTHER SPECIFIED POSTPROCEDURAL STATES: Chronic | ICD-10-CM

## 2019-04-02 DIAGNOSIS — Z90.710 ACQUIRED ABSENCE OF BOTH CERVIX AND UTERUS: Chronic | ICD-10-CM

## 2019-04-02 DIAGNOSIS — I48.0 PAROXYSMAL ATRIAL FIBRILLATION: ICD-10-CM

## 2019-04-02 DIAGNOSIS — N39.0 URINARY TRACT INFECTION, SITE NOT SPECIFIED: ICD-10-CM

## 2019-04-02 DIAGNOSIS — I95.9 HYPOTENSION, UNSPECIFIED: ICD-10-CM

## 2019-04-02 DIAGNOSIS — Z79.899 OTHER LONG TERM (CURRENT) DRUG THERAPY: ICD-10-CM

## 2019-04-02 DIAGNOSIS — Z98.1 ARTHRODESIS STATUS: Chronic | ICD-10-CM

## 2019-04-02 DIAGNOSIS — Z98.49 CATARACT EXTRACTION STATUS, UNSPECIFIED EYE: Chronic | ICD-10-CM

## 2019-04-02 DIAGNOSIS — J44.9 CHRONIC OBSTRUCTIVE PULMONARY DISEASE, UNSPECIFIED: ICD-10-CM

## 2019-04-02 DIAGNOSIS — N17.9 ACUTE KIDNEY FAILURE, UNSPECIFIED: ICD-10-CM

## 2019-04-02 DIAGNOSIS — L97.319 NON-PRESSURE CHRONIC ULCER OF RIGHT ANKLE WITH UNSPECIFIED SEVERITY: ICD-10-CM

## 2019-04-02 LAB
ALBUMIN SERPL ELPH-MCNC: 3.1 G/DL — LOW (ref 3.3–5)
ALP SERPL-CCNC: 136 U/L — HIGH (ref 40–120)
ALT FLD-CCNC: 12 U/L — SIGNIFICANT CHANGE UP (ref 4–33)
ANION GAP SERPL CALC-SCNC: 16 MMO/L — HIGH (ref 7–14)
APPEARANCE UR: SIGNIFICANT CHANGE UP
AST SERPL-CCNC: 21 U/L — SIGNIFICANT CHANGE UP (ref 4–32)
BACTERIA # UR AUTO: SIGNIFICANT CHANGE UP
BASOPHILS # BLD AUTO: 0.06 K/UL — SIGNIFICANT CHANGE UP (ref 0–0.2)
BASOPHILS NFR BLD AUTO: 0.7 % — SIGNIFICANT CHANGE UP (ref 0–2)
BILIRUB SERPL-MCNC: 0.7 MG/DL — SIGNIFICANT CHANGE UP (ref 0.2–1.2)
BILIRUB UR-MCNC: NEGATIVE — SIGNIFICANT CHANGE UP
BLOOD UR QL VISUAL: SIGNIFICANT CHANGE UP
BUN SERPL-MCNC: 56 MG/DL — HIGH (ref 7–23)
CALCIUM SERPL-MCNC: 9.2 MG/DL — SIGNIFICANT CHANGE UP (ref 8.4–10.5)
CHLORIDE SERPL-SCNC: 98 MMOL/L — SIGNIFICANT CHANGE UP (ref 98–107)
CO2 SERPL-SCNC: 26 MMOL/L — SIGNIFICANT CHANGE UP (ref 22–31)
COLOR SPEC: YELLOW — SIGNIFICANT CHANGE UP
CREAT SERPL-MCNC: 1.6 MG/DL — HIGH (ref 0.5–1.3)
EOSINOPHIL # BLD AUTO: 0.16 K/UL — SIGNIFICANT CHANGE UP (ref 0–0.5)
EOSINOPHIL NFR BLD AUTO: 1.9 % — SIGNIFICANT CHANGE UP (ref 0–6)
GLUCOSE SERPL-MCNC: 94 MG/DL — SIGNIFICANT CHANGE UP (ref 70–99)
GLUCOSE UR-MCNC: NEGATIVE — SIGNIFICANT CHANGE UP
HCT VFR BLD CALC: 38 % — SIGNIFICANT CHANGE UP (ref 34.5–45)
HGB BLD-MCNC: 11.2 G/DL — LOW (ref 11.5–15.5)
IMM GRANULOCYTES NFR BLD AUTO: 0.4 % — SIGNIFICANT CHANGE UP (ref 0–1.5)
KETONES UR-MCNC: NEGATIVE — SIGNIFICANT CHANGE UP
LEUKOCYTE ESTERASE UR-ACNC: HIGH
LYMPHOCYTES # BLD AUTO: 0.78 K/UL — LOW (ref 1–3.3)
LYMPHOCYTES # BLD AUTO: 9.2 % — LOW (ref 13–44)
MCHC RBC-ENTMCNC: 26 PG — LOW (ref 27–34)
MCHC RBC-ENTMCNC: 29.5 % — LOW (ref 32–36)
MCV RBC AUTO: 88.2 FL — SIGNIFICANT CHANGE UP (ref 80–100)
MONOCYTES # BLD AUTO: 0.74 K/UL — SIGNIFICANT CHANGE UP (ref 0–0.9)
MONOCYTES NFR BLD AUTO: 8.7 % — SIGNIFICANT CHANGE UP (ref 2–14)
NEUTROPHILS # BLD AUTO: 6.71 K/UL — SIGNIFICANT CHANGE UP (ref 1.8–7.4)
NEUTROPHILS NFR BLD AUTO: 79.1 % — HIGH (ref 43–77)
NITRITE UR-MCNC: NEGATIVE — SIGNIFICANT CHANGE UP
NRBC # FLD: 0.02 K/UL — SIGNIFICANT CHANGE UP (ref 0–0)
PH UR: 5.5 — SIGNIFICANT CHANGE UP (ref 5–8)
PLATELET # BLD AUTO: 213 K/UL — SIGNIFICANT CHANGE UP (ref 150–400)
PMV BLD: 10.3 FL — SIGNIFICANT CHANGE UP (ref 7–13)
POTASSIUM SERPL-MCNC: 4.3 MMOL/L — SIGNIFICANT CHANGE UP (ref 3.5–5.3)
POTASSIUM SERPL-SCNC: 4.3 MMOL/L — SIGNIFICANT CHANGE UP (ref 3.5–5.3)
PROT SERPL-MCNC: 7 G/DL — SIGNIFICANT CHANGE UP (ref 6–8.3)
PROT UR-MCNC: 30 — SIGNIFICANT CHANGE UP
RBC # BLD: 4.31 M/UL — SIGNIFICANT CHANGE UP (ref 3.8–5.2)
RBC # FLD: 22.5 % — HIGH (ref 10.3–14.5)
RBC CASTS # UR COMP ASSIST: HIGH (ref 0–?)
SODIUM SERPL-SCNC: 140 MMOL/L — SIGNIFICANT CHANGE UP (ref 135–145)
SP GR SPEC: 1.02 — SIGNIFICANT CHANGE UP (ref 1–1.04)
UROBILINOGEN FLD QL: 0.2 — SIGNIFICANT CHANGE UP
WBC # BLD: 8.48 K/UL — SIGNIFICANT CHANGE UP (ref 3.8–10.5)
WBC # FLD AUTO: 8.48 K/UL — SIGNIFICANT CHANGE UP (ref 3.8–10.5)
WBC UR QL: >50 — HIGH (ref 0–?)
YEAST BUDDING # UR COMP ASSIST: SIGNIFICANT CHANGE UP

## 2019-04-02 PROCEDURE — 99223 1ST HOSP IP/OBS HIGH 75: CPT | Mod: GC

## 2019-04-02 PROCEDURE — 73630 X-RAY EXAM OF FOOT: CPT | Mod: 26,RT

## 2019-04-02 PROCEDURE — 99223 1ST HOSP IP/OBS HIGH 75: CPT

## 2019-04-02 PROCEDURE — 73610 X-RAY EXAM OF ANKLE: CPT | Mod: 26,RT

## 2019-04-02 PROCEDURE — 93971 EXTREMITY STUDY: CPT | Mod: 26

## 2019-04-02 RX ORDER — ACETAMINOPHEN 500 MG
650 TABLET ORAL ONCE
Qty: 0 | Refills: 0 | Status: COMPLETED | OUTPATIENT
Start: 2019-04-02 | End: 2019-04-04

## 2019-04-02 RX ORDER — ATORVASTATIN CALCIUM 80 MG/1
40 TABLET, FILM COATED ORAL AT BEDTIME
Qty: 0 | Refills: 0 | Status: DISCONTINUED | OUTPATIENT
Start: 2019-04-02 | End: 2019-04-14

## 2019-04-02 RX ORDER — CLOPIDOGREL BISULFATE 75 MG/1
75 TABLET, FILM COATED ORAL DAILY
Qty: 0 | Refills: 0 | Status: DISCONTINUED | OUTPATIENT
Start: 2019-04-02 | End: 2019-04-09

## 2019-04-02 RX ORDER — THIAMINE MONONITRATE (VIT B1) 100 MG
100 TABLET ORAL DAILY
Qty: 0 | Refills: 0 | Status: DISCONTINUED | OUTPATIENT
Start: 2019-04-02 | End: 2019-04-16

## 2019-04-02 RX ORDER — DABIGATRAN ETEXILATE MESYLATE 150 MG/1
75 CAPSULE ORAL EVERY 12 HOURS
Qty: 0 | Refills: 0 | Status: DISCONTINUED | OUTPATIENT
Start: 2019-04-02 | End: 2019-04-09

## 2019-04-02 RX ORDER — LEVALBUTEROL 1.25 MG/.5ML
0.63 SOLUTION, CONCENTRATE RESPIRATORY (INHALATION) EVERY 6 HOURS
Qty: 0 | Refills: 0 | Status: DISCONTINUED | OUTPATIENT
Start: 2019-04-02 | End: 2019-04-15

## 2019-04-02 RX ORDER — ERYTHROMYCIN BASE 5 MG/GRAM
1 OINTMENT (GRAM) OPHTHALMIC (EYE) THREE TIMES A DAY
Qty: 0 | Refills: 0 | Status: COMPLETED | OUTPATIENT
Start: 2019-04-02 | End: 2019-04-05

## 2019-04-02 RX ORDER — BUDESONIDE AND FORMOTEROL FUMARATE DIHYDRATE 160; 4.5 UG/1; UG/1
2 AEROSOL RESPIRATORY (INHALATION)
Qty: 0 | Refills: 0 | Status: DISCONTINUED | OUTPATIENT
Start: 2019-04-02 | End: 2019-04-15

## 2019-04-02 RX ORDER — CEPHALEXIN 500 MG
500 CAPSULE ORAL EVERY 12 HOURS
Qty: 0 | Refills: 0 | Status: DISCONTINUED | OUTPATIENT
Start: 2019-04-02 | End: 2019-04-02

## 2019-04-02 RX ORDER — SODIUM CHLORIDE 9 MG/ML
1000 INJECTION INTRAMUSCULAR; INTRAVENOUS; SUBCUTANEOUS
Qty: 0 | Refills: 0 | Status: DISCONTINUED | OUTPATIENT
Start: 2019-04-02 | End: 2019-04-04

## 2019-04-02 RX ORDER — FUROSEMIDE 40 MG
40 TABLET ORAL DAILY
Qty: 0 | Refills: 0 | Status: DISCONTINUED | OUTPATIENT
Start: 2019-04-02 | End: 2019-04-03

## 2019-04-02 RX ORDER — METOPROLOL TARTRATE 50 MG
75 TABLET ORAL
Qty: 0 | Refills: 0 | Status: DISCONTINUED | OUTPATIENT
Start: 2019-04-02 | End: 2019-04-08

## 2019-04-02 RX ORDER — OXYCODONE AND ACETAMINOPHEN 5; 325 MG/1; MG/1
1 TABLET ORAL ONCE
Qty: 0 | Refills: 0 | Status: DISCONTINUED | OUTPATIENT
Start: 2019-04-02 | End: 2019-04-02

## 2019-04-02 RX ORDER — PANTOPRAZOLE SODIUM 20 MG/1
40 TABLET, DELAYED RELEASE ORAL
Qty: 0 | Refills: 0 | Status: DISCONTINUED | OUTPATIENT
Start: 2019-04-02 | End: 2019-04-08

## 2019-04-02 RX ORDER — MIDODRINE HYDROCHLORIDE 2.5 MG/1
5 TABLET ORAL EVERY 8 HOURS
Qty: 0 | Refills: 0 | Status: DISCONTINUED | OUTPATIENT
Start: 2019-04-02 | End: 2019-04-08

## 2019-04-02 RX ORDER — TRAMADOL HYDROCHLORIDE 50 MG/1
25 TABLET ORAL EVERY 8 HOURS
Qty: 0 | Refills: 0 | Status: DISCONTINUED | OUTPATIENT
Start: 2019-04-02 | End: 2019-04-04

## 2019-04-02 RX ORDER — CEFTRIAXONE 500 MG/1
1 INJECTION, POWDER, FOR SOLUTION INTRAMUSCULAR; INTRAVENOUS EVERY 24 HOURS
Qty: 0 | Refills: 0 | Status: DISCONTINUED | OUTPATIENT
Start: 2019-04-03 | End: 2019-04-04

## 2019-04-02 RX ORDER — PIPERACILLIN AND TAZOBACTAM 4; .5 G/20ML; G/20ML
3.38 INJECTION, POWDER, LYOPHILIZED, FOR SOLUTION INTRAVENOUS ONCE
Qty: 0 | Refills: 0 | Status: COMPLETED | OUTPATIENT
Start: 2019-04-02 | End: 2019-04-02

## 2019-04-02 RX ORDER — MUPIROCIN 20 MG/G
1 OINTMENT TOPICAL
Qty: 0 | Refills: 0 | Status: DISCONTINUED | OUTPATIENT
Start: 2019-04-02 | End: 2019-04-16

## 2019-04-02 RX ADMIN — SODIUM CHLORIDE 100 MILLILITER(S): 9 INJECTION INTRAMUSCULAR; INTRAVENOUS; SUBCUTANEOUS at 22:30

## 2019-04-02 RX ADMIN — OXYCODONE AND ACETAMINOPHEN 1 TABLET(S): 5; 325 TABLET ORAL at 14:46

## 2019-04-02 RX ADMIN — OXYCODONE AND ACETAMINOPHEN 1 TABLET(S): 5; 325 TABLET ORAL at 15:57

## 2019-04-02 RX ADMIN — Medication 500 MILLIGRAM(S): at 16:00

## 2019-04-02 RX ADMIN — TRAMADOL HYDROCHLORIDE 25 MILLIGRAM(S): 50 TABLET ORAL at 22:39

## 2019-04-02 RX ADMIN — TRAMADOL HYDROCHLORIDE 25 MILLIGRAM(S): 50 TABLET ORAL at 23:39

## 2019-04-02 RX ADMIN — ATORVASTATIN CALCIUM 40 MILLIGRAM(S): 80 TABLET, FILM COATED ORAL at 22:39

## 2019-04-02 RX ADMIN — PIPERACILLIN AND TAZOBACTAM 3.38 GRAM(S): 4; .5 INJECTION, POWDER, LYOPHILIZED, FOR SOLUTION INTRAVENOUS at 14:46

## 2019-04-02 RX ADMIN — PIPERACILLIN AND TAZOBACTAM 200 GRAM(S): 4; .5 INJECTION, POWDER, LYOPHILIZED, FOR SOLUTION INTRAVENOUS at 12:31

## 2019-04-02 NOTE — CONSULT NOTE ADULT - SUBJECTIVE AND OBJECTIVE BOX
CC: 79y old Female admitted with a chief complaint of right foot pain , now    HPI: Patient is a 80 y/o female, with a PmHx of COPD (on 2-3L O2 prn), paroxsymal a-fib (on pradaxa), CAD (s/p stent 2018), CHF, HTN, HLD, and anxiety, presenting to the [facility] ED with worsening right ankle pressure ulcer pain for 1 week. The patient has a posterior ankle ulcer down to tendon with scant serosanguinous drainage. She has had this ulcer since her last admission in January.  STONEY/PVR were performed at the time and were significant for 0.6 on the right side.  Patient was being treated for cdiff and CHF exacerbation at the time and plan was to follow up as OP for an angiogram.  Patient was discharged to [facility] and never followed up in the office.  She left [facility] only 1 week ago and has had persistent pain in the RLE.  Family is unsure if it is worsening because she has been away at rehab for so long.  Patient states the pain has been the same since she left the hospital last time.  She usually can ambulate with a walker but has not been able to walk all week.  She has been taking tramadol for pain which relieves the symptoms.  She denies fevers, chill, N/V.          PMHx: CAD (coronary artery disease)  CHF (congestive heart failure)  COPD (chronic obstructive pulmonary disease)  MI, old  Anxiety  TIA (transient ischemic attack)  PAF (paroxysmal atrial fibrillation)  HLD (hyperlipidemia)  HTN (hypertension)    PSHx: H/O total hysterectomy  History of cataract surgery  History of repair of hip fracture  H/O spinal fusion    Medications (inpatient): cephalexin 500 milliGRAM(s) Oral every 12 hours      Allergies: No Known Allergies    Family Hx: No pertinent family history in first degree relatives      Physical Exam  T(C): 36.6  HR: 85 (85 - 100)  BP: 113/72 (104/64 - 113/72)  RR: 20 (18 - 20)  SpO2: 96% (96% - 96%)  Tmax: T(C): , Max: 36.6 (19 @ 11:44)    General: well developed, well nourished, NAD  Neuro: alert and oriented, no focal deficits, moves all extremities spontaneously  HEENT: NCAT, EOMI, anicteric, mucosa moist  Respiratory: airway patent, respirations unlabored  CVS: regular rate and rhythm  Abdomen: soft, nontender, nondistended  Extremities: RLE posterior ankle ulcer down to tendon with serous drainage, no purulence, palpable femoral and pop, dopplerable DP signal, All pulses palp on LLE    Labs:                        11.2   8.48  )-----------( 213      ( 2019 11:36 )             38.0       04    140  |  98  |  56<H>  ----------------------------<  94  4.3   |  26  |  1.60<H>    Ca    9.2      2019 11:36    TPro  7.0  /  Alb  3.1<L>  /  TBili  0.7  /  DBili  x   /  AST  21  /  ALT  12  /  AlkPhos  136<H>  04-    Urinalysis Basic - ( 2019 12:30 )    Color: YELLOW / Appearance: TURBID / S.025 / pH: 5.5  Gluc: NEGATIVE / Ketone: NEGATIVE  / Bili: NEGATIVE / Urobili: 0.2   Blood: LARGE / Protein: 30 / Nitrite: NEGATIVE   Leuk Esterase: LARGE / RBC: 11-25 / WBC >50   Sq Epi: x / Non Sq Epi: x / Bacteria: FEW

## 2019-04-02 NOTE — ED PROVIDER NOTE - PROGRESS NOTE DETAILS
rani nguyen - + uti will treat with bactrim.  discussed care with podiatry - no immediate intervention required, wound clean with achillis visible.  pt must keep extremity elevated.  podiatry - will follow if patient admitted.  awaiting x rays and duplex.  will consult vascular, prior admission   -  consulted. rani nguyen - duplex negative for dvt awaiting vascular consult and x rays rani nguyen - vascular consulted would like to perform cta once creatinine trends down for possible intervention.  will admit to dr gonzalez - medicine, vascular and podiatry will follow

## 2019-04-02 NOTE — H&P ADULT - NSICDXPASTMEDICALHX_GEN_ALL_CORE_FT
PAST MEDICAL HISTORY:  Anxiety     CAD (coronary artery disease) s/p stent 2018    CHF (congestive heart failure)     COPD (chronic obstructive pulmonary disease) on 2-3L O2 at home prn    HLD (hyperlipidemia)     HTN (hypertension)     PAF (paroxysmal atrial fibrillation)     TIA (transient ischemic attack) many years ago

## 2019-04-02 NOTE — H&P ADULT - NSICDXPASTSURGICALHX_GEN_ALL_CORE_FT
PAST SURGICAL HISTORY:  H/O spinal fusion c2-6 in 2017    H/O total hysterectomy 2014    History of cataract surgery b/l 2015    History of repair of hip fracture luisa placed in RLE 2015

## 2019-04-02 NOTE — CONSULT NOTE ADULT - SUBJECTIVE AND OBJECTIVE BOX
Podiatry pager #: [phone]    Patient is a 79y old  Female who presents with a chief complaint of     HPI: 79 year old female pmhx chf, bri, paroxysmal a fib, cva/tia, htn, hld, c diff, copd, non healing ulcer RLE.  presents today with few day hx of worsening RLE pain, swelling and erythema.  pt was discharged from [facility] on 2/15 and had been sent to [place] for rehab.  during hospital course followed by podiatry and vascular.  found to have poor ingrid/pvr and rec outpatient angio      PAST MEDICAL & SURGICAL HISTORY:  CAD (coronary artery disease): s/p stent 2018  CHF (congestive heart failure)  COPD (chronic obstructive pulmonary disease): on 2-3L O2 at home prn  Anxiety  TIA (transient ischemic attack): many years ago  PAF (paroxysmal atrial fibrillation)  HLD (hyperlipidemia)  HTN (hypertension)  H/O total hysterectomy: 2014  History of cataract surgery: b/l 2015  History of repair of hip fracture: luisa placed in RLE 2015  H/O spinal fusion: c2-6 in 2017      MEDICATIONS  (STANDING):    MEDICATIONS  (PRN):      Allergies    No Known Allergies    Intolerances        VITALS:    Vital Signs Last 24 Hrs  T(C): 36.6 (02 Apr 2019 11:44), Max: 36.6 (02 Apr 2019 11:44)  T(F): 97.9 (02 Apr 2019 11:44), Max: 97.9 (02 Apr 2019 11:44)  HR: 85 (02 Apr 2019 11:44) (85 - 100)  BP: 113/72 (02 Apr 2019 11:44) (104/64 - 113/72)  BP(mean): --  RR: 20 (02 Apr 2019 11:44) (18 - 20)  SpO2: 96% (02 Apr 2019 11:44) (96% - 96%)    LABS:                          11.2   8.48  )-----------( 213      ( 02 Apr 2019 11:36 )             38.0       04-02    140  |  98  |  56<H>  ----------------------------<  94  4.3   |  26  |  1.60<H>    Ca    9.2      02 Apr 2019 11:36    TPro  7.0  /  Alb  3.1<L>  /  TBili  0.7  /  DBili  x   /  AST  21  /  ALT  12  /  AlkPhos  136<H>  04-02      CAPILLARY BLOOD GLUCOSE              LOWER EXTREMITY PHYSICAL EXAM:  R Posterior heel ulceration, with exposed achilles tenon.   No local signs of infection. No probe to bone.  No drainage or malodor.  ++pain on exam  Pedal pulses non palp bilateral lower extremities  Sensation intact to b/l feet  No gross deformities  R foot +2 pitting edema.       RADIOLOGY & ADDITIONAL STUDIES:    XR pending

## 2019-04-02 NOTE — H&P ADULT - NSHPREVIEWOFSYSTEMS_GEN_ALL_CORE
Review of Systems:   CONSTITUTIONAL: No fever, weight loss, or fatigue  EYES: No eye pain, visual disturbances, or discharge  ENMT:  No difficulty hearing, tinnitus, vertigo; No sinus or throat pain  NECK: No pain or stiffness  RESPIRATORY: No cough, wheezing, chills or hemoptysis; No shortness of breath  CARDIOVASCULAR: No chest pain, palpitations, dizziness, or leg swelling  GASTROINTESTINAL: No abdominal or epigastric pain. No nausea, vomiting, or hematemesis; No diarrhea or constipation. No melena or hematochezia.  GENITOURINARY: No dysuria, frequency, hematuria, or incontinence  NEUROLOGICAL: No headaches, memory loss, loss of strength, numbness, or tremors  SKIN: No itching, burning, rashes, or lesions   LYMPH NODES: No enlarged glands  ENDOCRINE: No heat or cold intolerance; No hair loss  MUSCULOSKELETAL: right leg pain

## 2019-04-02 NOTE — CONSULT NOTE ADULT - EXTREMITIES COMMENTS
moderate art insuff w mod trophic skin changes  rt achilles tendon area wound/ulcer 4mm diam w limited granulation tissue

## 2019-04-02 NOTE — H&P ADULT - NSHPPHYSICALEXAM_GEN_ALL_CORE
PHYSICAL EXAM:      Constitutional: NAD, well-groomed, well-developed  HEENT:  EOMI, Normal Hearing, left periorbital ecchymosis  Neck: No LAD, No JVD  Back: Normal spine flexure, No CVA tenderness  Respiratory: CTAB  Cardiovascular: S1 and S2, RRR  Gastrointestinal: BS+, soft, NT/ND  Extremities: right leg pittting edema  Vascular: 2+ peripheral pulses RA  Neurological: A/O x 2, lucid  Psychiatric: Normal mood, normal affect  Musculoskeletal: 5/5 strength b/l upper and lower extremities save fro right ankle/dorsi flexion limited by pain  Skin: No rashes

## 2019-04-02 NOTE — ED PROVIDER NOTE - OBJECTIVE STATEMENT
79 year old female pmhx chf, bri, paroxysmal a fib, cva/tia, htn, hld, c diff, copd, non healing ulcer RLE.  presents today with few day hx of worsening RLE pain, swelling and erythema.  pt was discharged from [facility] on 2/15 and had been sent to [place] for rehab.  during hospital course followed by podiatry and vascular.  found to have poor ingrid/pvr and rec outpatient angio 79 year old female pmhx chf, bri, paroxysmal a fib, cva/tia, htn, hld, c diff, copd, non healing ulcer RLE.  presents today with few day hx of worsening RLE pain, swelling and erythema.  poor historian.  pt was discharged from [facility] on 2/15 and had been sent to [place] for rehab.  during hospital course followed by podiatry and vascular.  found to have poor ingrid/pvr and rec outpatient angio.  discharged from [place] in the last week and states pain and swelling has worsened over last few days.  admits to mona ANTONIO over bedside.  denies drainage or foul odor from wound. 79 year old female pmhx chf, bri, paroxysmal a fib, cva/tia, htn, hld, c diff, copd, non healing ulcer RLE.  presents today with few day hx of worsening RLE pain, swelling, erythema and non healing ulcer.  poor historian.  pt was discharged from [facility] on 2/15 and had been sent to [place] for rehab.  during hospital course followed by podiatry and vascular.  found to have poor results on ingrid/pvr and rec outpatient angio.  discharged from [place] in the last week and states pain and swelling has worsened over last few days.  admits to mona ANTONIO over bedside.  denies drainage or foul odor from wound. unable to ambulate on RLE.  denies fever, chills, nausea, vomiting, worsening sob, cp.  home o2 3-4 L

## 2019-04-02 NOTE — ED PROVIDER NOTE - ATTENDING CONTRIBUTION TO CARE
Pt was seen and evaluated by me. Pt is a 78 y/o female with PMHx CHF, renal insufficiency, paroxysmal A- fib, CVA, HTN, HLD, copd, and chronic ulcer RLE who presented to the ED for pain, swelling, and redness to right LE. Pt states while being home after [place] for rehab has been having increased pain and swelling to right foot. Pt denies any fever, chills, nausea, vomiting, SOB, chest pain, or abd pain. Lungs CTA b/l. RRR. Abd soft, non-tender. Noted to have chronic ulcer to posterior right ankle. + distal pulses with noted swelling to foot. No calf tenderness. + distal pulses.

## 2019-04-02 NOTE — ED ADULT TRIAGE NOTE - CHIEF COMPLAINT QUOTE
Patient brought to ER by EMS from home for right foot/heel ulcer and has been c/o increasing pain and swelling.

## 2019-04-02 NOTE — PATIENT PROFILE ADULT - LIVES WITH
adult child(becki) (daughter), private home, 20 steps-to-enter/exit home with Unilateral Handrail, no stairs in the home/adult child(becki)

## 2019-04-02 NOTE — ED ADULT NURSE NOTE - OBJECTIVE STATEMENT
78 y/o female, a&ox3, coming in with pain from right heel ulcer (stage 2 ulcer 3x2 cm ). right foot swollen, pedal pulses present. pt also c/o 4 days of constipation, however moderate amount of formed stool in diaper upon assessment. pt on blood thinner for hx of afib. Bruising noted to bilateral forearms, left orbital area that pt states is from scratching eye. Pt on 3-4 liters oxygen, nasal cannula,  at home for COPD. pt former smoker, has not smoked within last year. pt cleaned and changed.  Pt denies any fevers, cough, chills, weakness, dizziness, n/v/d. 20 g IV inserted in right AC/ labs drawn as sent. 80 y/o female, a&ox3, coming in with pain from right heel ulcer (stage 2 ulcer 3x2 cm ). right foot swollen, pedal pulses present. pt also c/o 4 days of constipation, however moderate amount of formed stool in diaper upon assessment. pt on blood thinner for hx of afib. Bruising noted to bilateral forearms, left orbital area that pt states is from scratching eye. reddish/purple skin noted to entire sacral and buttocks area. 12x8cm hard mass to inner right thigh. Pt on 3-4 liters oxygen, nasal cannula,  at home for COPD. pt former smoker, has not smoked within last year. pt cleaned and changed.  Pt denies any fevers, cough, chills, weakness, dizziness, n/v/d. 20 g IV inserted in right AC/ labs drawn as sent.

## 2019-04-02 NOTE — CONSULT NOTE ADULT - ASSESSMENT
78 year old female with right  posterior heel ulceration (stable); RLE pain with non-palp pulses  - Pt seen and evaluated in ED   - wound is stable, achilles tendon exposed, no ascending cellulitis or purulent drainage noted.   - RF and ankle XR pending   - Previous STONEY/PVR poor, 0.60 on the effected limb with flat waveforms. Last admission had rec outpatient angio.   - No local signs of infection - would recommend monitoring off antibiotics.  Wound is unchanged.   -Z floats at all times  - Rec local wound care with right foot ulceration with santyl and AILEEN  -d/w attending 78 year old female with right  posterior heel ulceration (stable); RLE pain with non-palp pulses  - Pt seen and evaluated in ED   - wound is stable, achilles tendon exposed, no ascending cellulitis or purulent drainage noted.   - RF and ankle XR pending   - Previous STONEY/PVR poor, 0.60 on the effected limb with flat waveforms. Last admission had rec outpatient angio. Rec vasc evaluation   - No local signs of infection - would recommend monitoring off antibiotics.  Wound is unchanged stable for discharge and follow up as outpatient for local wound care w/ Dr. Rosa [phone]  -Z floats at all times  - Rec local wound care with right foot ulceration with santyl and AILEEN  -d/w attending

## 2019-04-02 NOTE — H&P ADULT - PROBLEM SELECTOR PLAN 2
-based on February weight, Pt with CrCl approx 25 comapred -likely with prerenal component in setting of decreased fluid intake, and concomitant lasix use. Albumin mildly lower at 3.1. Pt denies change in voiding habits Order nutrition eval. will place on IVF, continue lasix  -based on February weight, Pt with CrCl approx 26 compared to 33 when Scr 1.26.; Pt denies weight loss

## 2019-04-02 NOTE — H&P ADULT - HISTORY OF PRESENT ILLNESS
80 y/o female, with a PmHx of COPD (currently on 3L O2 24 hours), paroxsymal a-fib (on pradaxa), lsited h/o CAD (s/p stent 11/2018), CHF (although recent TTE with normal biventricular function, with no mention of diastolic dysfunction; + severely dilated left atrium), HTN, HLD, and anxiety, presenting to the [facility] ED with chronic right ankle pressure ulcer pain for 1 week. The patient has a posterior ankle ulcer down to tendon with scant serosanguinous drainage. She has had this ulcer since her last admission in January.  STONEY/PVR were performed at the time and were significant for 0.6 on the right side.  Pt reports no changes in symptoms; daughters' boyfriend at bedside and  has been taking care of pt at home since discharge from [place] last week. Daughters boyfriend notes that pt was unable to keep her appointment with vascular doctor recently as ambulette was unequipped for oxygen requirements. Came to ed as a result of expediting vascular eval, as well as running low on pain meds. Found to have UA strongly suggestive of UTI although pt denies change in voiding habits, fever, abdominal or flank pain. Mild bri noted BUN/Cr  56/1.6, last SCr 1.26 Feb 15, but pt has had SCr close to 2.0 in january. Pt endorses decreased po intake; reports being told in [place] to reduce po fluid intake. Pt on lasix, as stated above recent TTE  with normal biventricular function, with no mention of diastolic dysfunction; + severely dilated left atrium 80 y/o female, with a PmHx of COPD (currently on 3L O2 24 hours), paroxsymal a-fib (on pradaxa), lsited h/o CAD (s/p stent 11/2018), CHF (although recent TTE with normal biventricular function, with no mention of diastolic dysfunction; + severely dilated left atrium), HTN, HLD, and anxiety, presenting to the [facility] ED with chronic right ankle pressure ulcer pain for 1 week. The patient has a posterior ankle ulcer down to tendon with scant serosanguinous drainage. She has had this ulcer since her last admission in January.  STONEY/PVR were performed at the time and were significant for 0.6 on the right side.  Pt reports no changes in symptoms; daughters' boyfriend at bedside and  has been taking care of pt at home since discharge from [place] last week. Daughters boyfriend notes that pt was unable to keep her appointment with vascular doctor recently as ambulette was unequipped for oxygen requirements. Came to ed as a result of expediting vascular eval, as well as running low on pain meds. Found to have UA strongly suggestive of UTI although pt denies change in voiding habits, fever, abdominal or flank pain. Mild bri noted BUN/Cr  56/1.6, last SCr 1.26 Feb 15, but pt has had SCr close to 2.0 in january. Pt endorses decreased po intake; reports being told in [place] to reduce po fluid intake. Pt on lasix; as stated above recent TTE  with normal biventricular function, with no mention of diastolic dysfunction; + severely dilated left atrium  Seen by podiatry abd vascular , no need for abx. Vacular with plan for angiogram once kidney function improves 80 y/o female, with a PmHx of COPD (currently on 3L O2 24 hours), paroxsymal a-fib (on pradaxa), lsited h/o CAD (s/p stent 11/2018), CHF (although recent TTE with normal biventricular function, with no mention of diastolic dysfunction; + severely dilated left atrium), HTN, HLD, and anxiety, presenting to the [facility] ED with chronic right ankle pressure ulcer pain for 1 week. The patient has a posterior ankle ulcer down to tendon with scant serosanguinous drainage. She has had this ulcer since her last admission in January.  STONEY/PVR were performed at the time and were significant for 0.6 on the right side.  Pt reports no changes in symptoms; daughters' boyfriend at bedside and  has been taking care of pt at home since discharge from [place] last week. Daughters boyfriend notes that pt was unable to keep her appointment with vascular doctor recently as ambulette was unequipped for oxygen requirements. Came to ed as a result of expediting vascular eval, as well as running low on pain meds. Found to have UA strongly suggestive of UTI although pt denies change in voiding habits, fever, abdominal or flank pain. Mild bri noted BUN/Cr  56/1.6, last SCr 1.26 Feb 15, but pt has had SCr close to 2.0 in january. Pt endorses decreased po intake; reports being told in [place] to reduce po fluid intake. Pt on lasix; as stated above recent TTE  with normal biventricular function, with no mention of diastolic dysfunction; + severely dilated left atrium  Seen by podiatry and vascular , no need for abx. Vacular with plan for angiogram once kidney function improves

## 2019-04-02 NOTE — ED PROVIDER NOTE - OBJECTIVE STATEMENT
Claudy ENG MD PGY1: 79 y/o female, with a PmHx of COPD (on 2-3L O2 prn), paroxsymal a-fib (on pradaxa), CAD (s/p stent 11/2018), DCHF, HTN, HLD, and anxiety

## 2019-04-02 NOTE — ED PROVIDER NOTE - CARE PLAN
Principal Discharge DX:	UTI (urinary tract infection)  Secondary Diagnosis:	Ulcer of ankle  Secondary Diagnosis:	IGOR (acute kidney injury)

## 2019-04-02 NOTE — ED PROVIDER NOTE - LOCATION
ankle/posterior ankle wound, no discharge, or surrounding erythema. + distal pulses. Mild foot swelling. No calf tenderness.

## 2019-04-02 NOTE — CONSULT NOTE ADULT - ASSESSMENT
79F w/ nonhealing R posterior ankle ulcer and arterial insuff  - STONEY/PVR from Jan 2019 0.6 on right  - admit to medicine for IGOR, medical optimization  - will plan for angio once kidney function improves  - discussed with Dr. Caroline Harrell PGY2  [id number] 79F w/ nonhealing R posterior ankle ulcer and arterial insuff      - STONEY/PVR from Jan 2019 0.6 on right  - admit to medicine for IGOR, medical optimization  - will plan for angio once kidney function improves  - discussed with Dr. Caroline Harrell PGY2  [phone]

## 2019-04-02 NOTE — H&P ADULT - PROBLEM SELECTOR PLAN 1
-Seen by podiatry and vascular , no need for abx. Vacular with plan for angiogram once kidney function improves  -right duplex negative for dvt  -wound care ordered  -pain meds prn

## 2019-04-02 NOTE — H&P ADULT - NSHPLABSRESULTS_GEN_ALL_CORE
Vital Signs Last 24 Hrs  T(C): 36.4 (2019 18:12), Max: 36.6 (2019 11:44)  T(F): 97.6 (2019 18:12), Max: 97.9 (2019 11:44)  HR: 100 (2019 18:12) (62 - 100)  BP: 117/86 (2019 18:12) (104/64 - 117/86)  BP(mean): --  RR: 18 (2019 18:12) (18 - 20)  SpO2: 100% (2019 18:12) (92% - 100%)                              11.2   8.48  )-----------( 213      ( 2019 11:36 )             38.0     04    140  |  98  |  56<H>  ----------------------------<  94  4.3   |  26  |  1.60<H>    Ca    9.2      2019 11:36    TPro  7.0  /  Alb  3.1<L>  /  TBili  0.7  /  DBili  x   /  AST  21  /  ALT  12  /  AlkPhos  136<H>  04-02    CAPILLARY BLOOD GLUCOSE          Urinalysis Basic - ( 2019 12:30 )    Color: YELLOW / Appearance: TURBID / S.025 / pH: 5.5  Gluc: NEGATIVE / Ketone: NEGATIVE  / Bili: NEGATIVE / Urobili: 0.2   Blood: LARGE / Protein: 30 / Nitrite: NEGATIVE   Leuk Esterase: LARGE / RBC: 11-25 / WBC >50   Sq Epi: x / Non Sq Epi: x / Bacteria: FEW

## 2019-04-02 NOTE — H&P ADULT - PROBLEM SELECTOR PLAN 3
- pseudomonal UTI in [place] resistant to quinolones; sensitive to 3rd gen cephalosporin. Will place on ceftriaxone, follow  culture

## 2019-04-02 NOTE — ED PROVIDER NOTE - CLINICAL SUMMARY MEDICAL DECISION MAKING FREE TEXT BOX
79 year old female pmhx chf, bri, paroxysmal a fib, cva/tia, htn, hld, c diff, copd, non healing ulcer RLE who presented for worsening RLE pain, swelling and erythema. Concern for infection. Labs, X-ray, Antibiotics, Podiatry.

## 2019-04-03 ENCOUNTER — APPOINTMENT (OUTPATIENT)
Dept: WOUND CARE | Facility: CLINIC | Age: 79
End: 2019-04-03

## 2019-04-03 DIAGNOSIS — I77.1 STRICTURE OF ARTERY: ICD-10-CM

## 2019-04-03 LAB
ALBUMIN SERPL ELPH-MCNC: 3 G/DL — LOW (ref 3.3–5)
ALP SERPL-CCNC: 126 U/L — HIGH (ref 40–120)
ALT FLD-CCNC: 9 U/L — SIGNIFICANT CHANGE UP (ref 4–33)
ANION GAP SERPL CALC-SCNC: 13 MMO/L — SIGNIFICANT CHANGE UP (ref 7–14)
APTT BLD: 85.3 SEC — HIGH (ref 27.5–36.3)
AST SERPL-CCNC: 15 U/L — SIGNIFICANT CHANGE UP (ref 4–32)
BACTERIA UR CULT: SIGNIFICANT CHANGE UP
BILIRUB SERPL-MCNC: 0.5 MG/DL — SIGNIFICANT CHANGE UP (ref 0.2–1.2)
BUN SERPL-MCNC: 56 MG/DL — HIGH (ref 7–23)
CALCIUM SERPL-MCNC: 8.9 MG/DL — SIGNIFICANT CHANGE UP (ref 8.4–10.5)
CHLORIDE SERPL-SCNC: 101 MMOL/L — SIGNIFICANT CHANGE UP (ref 98–107)
CO2 SERPL-SCNC: 26 MMOL/L — SIGNIFICANT CHANGE UP (ref 22–31)
CREAT ?TM UR-MCNC: 41.2 MG/DL — SIGNIFICANT CHANGE UP
CREAT SERPL-MCNC: 1.52 MG/DL — HIGH (ref 0.5–1.3)
GLUCOSE SERPL-MCNC: 95 MG/DL — SIGNIFICANT CHANGE UP (ref 70–99)
HCT VFR BLD CALC: 37.4 % — SIGNIFICANT CHANGE UP (ref 34.5–45)
HGB BLD-MCNC: 10.9 G/DL — LOW (ref 11.5–15.5)
INR BLD: 1.74 — HIGH (ref 0.88–1.17)
MCHC RBC-ENTMCNC: 25.8 PG — LOW (ref 27–34)
MCHC RBC-ENTMCNC: 29.1 % — LOW (ref 32–36)
MCV RBC AUTO: 88.4 FL — SIGNIFICANT CHANGE UP (ref 80–100)
NRBC # FLD: 0 K/UL — SIGNIFICANT CHANGE UP (ref 0–0)
PLATELET # BLD AUTO: 185 K/UL — SIGNIFICANT CHANGE UP (ref 150–400)
PMV BLD: 10.6 FL — SIGNIFICANT CHANGE UP (ref 7–13)
POTASSIUM SERPL-MCNC: 4.1 MMOL/L — SIGNIFICANT CHANGE UP (ref 3.5–5.3)
POTASSIUM SERPL-SCNC: 4.1 MMOL/L — SIGNIFICANT CHANGE UP (ref 3.5–5.3)
PROT SERPL-MCNC: 6.7 G/DL — SIGNIFICANT CHANGE UP (ref 6–8.3)
PROTHROM AB SERPL-ACNC: 20.2 SEC — HIGH (ref 9.8–13.1)
RBC # BLD: 4.23 M/UL — SIGNIFICANT CHANGE UP (ref 3.8–5.2)
RBC # FLD: 22.9 % — HIGH (ref 10.3–14.5)
SODIUM SERPL-SCNC: 140 MMOL/L — SIGNIFICANT CHANGE UP (ref 135–145)
SPECIMEN SOURCE: SIGNIFICANT CHANGE UP
UUN UR-MCNC: 497.1 MG/DL — SIGNIFICANT CHANGE UP
WBC # BLD: 7.26 K/UL — SIGNIFICANT CHANGE UP (ref 3.8–10.5)
WBC # FLD AUTO: 7.26 K/UL — SIGNIFICANT CHANGE UP (ref 3.8–10.5)

## 2019-04-03 PROCEDURE — 99232 SBSQ HOSP IP/OBS MODERATE 35: CPT

## 2019-04-03 PROCEDURE — 71045 X-RAY EXAM CHEST 1 VIEW: CPT | Mod: 26

## 2019-04-03 RX ORDER — VANCOMYCIN HCL 1 G
125 VIAL (EA) INTRAVENOUS
Qty: 0 | Refills: 0 | COMMUNITY

## 2019-04-03 RX ORDER — ESCITALOPRAM OXALATE 10 MG/1
1 TABLET, FILM COATED ORAL
Qty: 0 | Refills: 0 | COMMUNITY

## 2019-04-03 RX ORDER — FOLIC ACID 0.8 MG
1 TABLET ORAL
Qty: 0 | Refills: 0 | COMMUNITY

## 2019-04-03 RX ORDER — LANOLIN ALCOHOL/MO/W.PET/CERES
1 CREAM (GRAM) TOPICAL
Qty: 0 | Refills: 0 | COMMUNITY

## 2019-04-03 RX ORDER — FERROUS SULFATE 325(65) MG
1 TABLET ORAL
Qty: 0 | Refills: 0 | COMMUNITY

## 2019-04-03 RX ORDER — ACETAMINOPHEN 500 MG
1000 TABLET ORAL ONCE
Qty: 0 | Refills: 0 | Status: COMPLETED | OUTPATIENT
Start: 2019-04-03 | End: 2019-04-03

## 2019-04-03 RX ORDER — FAMOTIDINE 10 MG/ML
1 INJECTION INTRAVENOUS
Qty: 0 | Refills: 0 | COMMUNITY

## 2019-04-03 RX ORDER — COLLAGENASE CLOSTRIDIUM HIST. 250 UNIT/G
1 OINTMENT (GRAM) TOPICAL DAILY
Qty: 0 | Refills: 0 | Status: DISCONTINUED | OUTPATIENT
Start: 2019-04-03 | End: 2019-04-16

## 2019-04-03 RX ORDER — ALPRAZOLAM 0.25 MG
1 TABLET ORAL
Qty: 0 | Refills: 0 | COMMUNITY

## 2019-04-03 RX ORDER — POLYETHYLENE GLYCOL 3350 17 G/17G
0 POWDER, FOR SOLUTION ORAL
Qty: 0 | Refills: 0 | COMMUNITY

## 2019-04-03 RX ORDER — OXYCODONE HYDROCHLORIDE 5 MG/1
5 TABLET ORAL ONCE
Qty: 0 | Refills: 0 | Status: DISCONTINUED | OUTPATIENT
Start: 2019-04-03 | End: 2019-04-03

## 2019-04-03 RX ADMIN — CLOPIDOGREL BISULFATE 75 MILLIGRAM(S): 75 TABLET, FILM COATED ORAL at 11:45

## 2019-04-03 RX ADMIN — TRAMADOL HYDROCHLORIDE 25 MILLIGRAM(S): 50 TABLET ORAL at 22:56

## 2019-04-03 RX ADMIN — Medication 1000 MILLIGRAM(S): at 13:16

## 2019-04-03 RX ADMIN — Medication 1 APPLICATION(S): at 14:43

## 2019-04-03 RX ADMIN — MIDODRINE HYDROCHLORIDE 5 MILLIGRAM(S): 2.5 TABLET ORAL at 06:36

## 2019-04-03 RX ADMIN — CEFTRIAXONE 100 GRAM(S): 500 INJECTION, POWDER, FOR SOLUTION INTRAMUSCULAR; INTRAVENOUS at 02:12

## 2019-04-03 RX ADMIN — SODIUM CHLORIDE 50 MILLILITER(S): 9 INJECTION INTRAMUSCULAR; INTRAVENOUS; SUBCUTANEOUS at 10:41

## 2019-04-03 RX ADMIN — Medication 1 APPLICATION(S): at 15:18

## 2019-04-03 RX ADMIN — TRAMADOL HYDROCHLORIDE 25 MILLIGRAM(S): 50 TABLET ORAL at 14:42

## 2019-04-03 RX ADMIN — TRAMADOL HYDROCHLORIDE 25 MILLIGRAM(S): 50 TABLET ORAL at 22:06

## 2019-04-03 RX ADMIN — Medication 75 MILLIGRAM(S): at 17:12

## 2019-04-03 RX ADMIN — DABIGATRAN ETEXILATE MESYLATE 75 MILLIGRAM(S): 150 CAPSULE ORAL at 07:03

## 2019-04-03 RX ADMIN — SODIUM CHLORIDE 100 MILLILITER(S): 9 INJECTION INTRAMUSCULAR; INTRAVENOUS; SUBCUTANEOUS at 06:50

## 2019-04-03 RX ADMIN — Medication 1 APPLICATION(S): at 21:10

## 2019-04-03 RX ADMIN — Medication 40 MILLIGRAM(S): at 06:36

## 2019-04-03 RX ADMIN — BUDESONIDE AND FORMOTEROL FUMARATE DIHYDRATE 2 PUFF(S): 160; 4.5 AEROSOL RESPIRATORY (INHALATION) at 21:10

## 2019-04-03 RX ADMIN — OXYCODONE HYDROCHLORIDE 5 MILLIGRAM(S): 5 TABLET ORAL at 02:12

## 2019-04-03 RX ADMIN — TRAMADOL HYDROCHLORIDE 25 MILLIGRAM(S): 50 TABLET ORAL at 15:40

## 2019-04-03 RX ADMIN — Medication 400 MILLIGRAM(S): at 19:11

## 2019-04-03 RX ADMIN — Medication 75 MILLIGRAM(S): at 06:36

## 2019-04-03 RX ADMIN — PANTOPRAZOLE SODIUM 40 MILLIGRAM(S): 20 TABLET, DELAYED RELEASE ORAL at 17:11

## 2019-04-03 RX ADMIN — MIDODRINE HYDROCHLORIDE 5 MILLIGRAM(S): 2.5 TABLET ORAL at 15:18

## 2019-04-03 RX ADMIN — Medication 1 APPLICATION(S): at 06:36

## 2019-04-03 RX ADMIN — PANTOPRAZOLE SODIUM 40 MILLIGRAM(S): 20 TABLET, DELAYED RELEASE ORAL at 06:50

## 2019-04-03 RX ADMIN — MIDODRINE HYDROCHLORIDE 5 MILLIGRAM(S): 2.5 TABLET ORAL at 21:10

## 2019-04-03 RX ADMIN — TRAMADOL HYDROCHLORIDE 25 MILLIGRAM(S): 50 TABLET ORAL at 07:40

## 2019-04-03 RX ADMIN — BUDESONIDE AND FORMOTEROL FUMARATE DIHYDRATE 2 PUFF(S): 160; 4.5 AEROSOL RESPIRATORY (INHALATION) at 11:45

## 2019-04-03 RX ADMIN — MUPIROCIN 1 APPLICATION(S): 20 OINTMENT TOPICAL at 07:53

## 2019-04-03 RX ADMIN — Medication 400 MILLIGRAM(S): at 12:38

## 2019-04-03 RX ADMIN — OXYCODONE HYDROCHLORIDE 5 MILLIGRAM(S): 5 TABLET ORAL at 03:12

## 2019-04-03 RX ADMIN — Medication 100 MILLIGRAM(S): at 11:45

## 2019-04-03 RX ADMIN — Medication 1000 MILLIGRAM(S): at 19:34

## 2019-04-03 RX ADMIN — ATORVASTATIN CALCIUM 40 MILLIGRAM(S): 80 TABLET, FILM COATED ORAL at 21:10

## 2019-04-03 RX ADMIN — DABIGATRAN ETEXILATE MESYLATE 75 MILLIGRAM(S): 150 CAPSULE ORAL at 17:12

## 2019-04-03 RX ADMIN — TRAMADOL HYDROCHLORIDE 25 MILLIGRAM(S): 50 TABLET ORAL at 07:03

## 2019-04-03 NOTE — PROGRESS NOTE ADULT - ASSESSMENT
79F w/ nonhealing R posterior ankle ulcer and arterial insuff      - STONEY/PVR from Jan 2019 0.6 on right  - admit to medicine for IGOR, medical optimization  -d/w pt rle angio indications risks and benefits including renal insuff risks  pt is not willing to accept renal insuff progression risk  recommend renal optimazation prior to rle angio  will follow

## 2019-04-03 NOTE — CONSULT NOTE ADULT - ASSESSMENT
78 y/o female, with a PmHx of COPD, paroxsymal a-fib (on pradaxa), CAD (s/p stent 11/2018), DCHF, pulm htn, HTN, HLD, and anxiety presenting with nonhealing R posterior ankle ulcer and UTI         1. Chronic diastolic chf  appears dry on exam., IGOR on labs   hold lasix  cont bb  recent echo with nl lv fx     2.  PAFIB  check ekg  rate controlled, c/w  lopressor to 75 mg BID  continue with midodrine 5 mg q 8 hrs for bp support  CHADS 3 - continue with pradaxa     3. CAD s/p PCI (11/2018)  cv stable, no cp or sob   continue bb, statin, plavix     4. UTI   abx per med     5.  R posterior ankle ulcer   vascular f/u   plan for eventual RLE angio, pending optimization of renal fx

## 2019-04-03 NOTE — PHYSICAL THERAPY INITIAL EVALUATION ADULT - LEVEL OF INDEPENDENCE: SUPINE/SIT, REHAB EVAL
not assessed --> pt. deferred secondary to fatigue and Right foot pain; (+) support & encouragement provided, and ROGERIO camilo

## 2019-04-03 NOTE — PROGRESS NOTE ADULT - SUBJECTIVE AND OBJECTIVE BOX
Patient is a 79y old  Female who presents with a chief complaint of right leg wound/ swelling (2019 11:49)      INTERVAL HPI/OVERNIGHT EVENTS:  T(C): 36.6 (19 @ 14:21), Max: 36.9 (19 @ 00:02)  HR: 92 (19 @ 17:10) (62 - 92)  BP: 112/76 (19 @ 17:10) (112/76 - 128/75)  RR: 17 (19 @ 17:10) (16 - 18)  SpO2: 97% (19 @ 17:10) (97% - 98%)  Wt(kg): --  I&O's Summary      LABS:                        10.9   7.26  )-----------( 185      ( 2019 05:44 )             37.4         140  |  101  |  56<H>  ----------------------------<  95  4.1   |  26  |  1.52<H>    Ca    8.9      2019 05:44    TPro  6.7  /  Alb  3.0<L>  /  TBili  0.5  /  DBili  x   /  AST  15  /  ALT  9   /  AlkPhos  126<H>  04-03    PT/INR - ( 2019 05:44 )   PT: 20.2 SEC;   INR: 1.74          PTT - ( 2019 05:44 )  PTT:85.3 SEC  Urinalysis Basic - ( 2019 12:30 )    Color: YELLOW / Appearance: TURBID / S.025 / pH: 5.5  Gluc: NEGATIVE / Ketone: NEGATIVE  / Bili: NEGATIVE / Urobili: 0.2   Blood: LARGE / Protein: 30 / Nitrite: NEGATIVE   Leuk Esterase: LARGE / RBC: 11-25 / WBC >50   Sq Epi: x / Non Sq Epi: x / Bacteria: FEW      CAPILLARY BLOOD GLUCOSE            Urinalysis Basic - ( 2019 12:30 )    Color: YELLOW / Appearance: TURBID / S.025 / pH: 5.5  Gluc: NEGATIVE / Ketone: NEGATIVE  / Bili: NEGATIVE / Urobili: 0.2   Blood: LARGE / Protein: 30 / Nitrite: NEGATIVE   Leuk Esterase: LARGE / RBC: 11-25 / WBC >50   Sq Epi: x / Non Sq Epi: x / Bacteria: FEW        MEDICATIONS  (STANDING):  acetaminophen  IVPB .. 1000 milliGRAM(s) IV Intermittent once  atorvastatin 40 milliGRAM(s) Oral at bedtime  buDESOnide 160 MICROgram(s)/formoterol 4.5 MICROgram(s) Inhaler 2 Puff(s) Inhalation two times a day  cefTRIAXone   IVPB 1 Gram(s) IV Intermittent every 24 hours  clopidogrel Tablet 75 milliGRAM(s) Oral daily  collagenase Ointment 1 Application(s) Topical daily  dabigatran 75 milliGRAM(s) Oral every 12 hours  erythromycin   Ointment 1 Application(s) Left EYE three times a day  metoprolol tartrate 75 milliGRAM(s) Oral two times a day  midodrine 5 milliGRAM(s) Oral every 8 hours  mupirocin 2% Ointment 1 Application(s) Topical <User Schedule>  pantoprazole  Injectable 40 milliGRAM(s) IV Push two times a day  sodium chloride 0.9%. 1000 milliLiter(s) (50 mL/Hr) IV Continuous <Continuous>  thiamine 100 milliGRAM(s) Oral daily    MEDICATIONS  (PRN):  acetaminophen   Tablet .. 650 milliGRAM(s) Oral once PRN Moderate Pain (4 - 6)  levalbuterol Inhalation 0.63 milliGRAM(s) Inhalation every 6 hours PRN SOB  traMADol 25 milliGRAM(s) Oral every 8 hours PRN Severe Pain (7 - 10)          PHYSICAL EXAM:  GENERAL: NAD, well-groomed, well-developed  HEAD:  Atraumatic, Normocephalic  CHEST/LUNG: Clear to percussion bilaterally; No rales, rhonchi, wheezing, or rubs  HEART: Regular rate and rhythm; No murmurs, rubs, or gallops  ABDOMEN: Soft, Nontender, Nondistended; Bowel sounds present  EXTREMITIES:  2+ Peripheral Pulses, No clubbing, cyanosis, or edema  LYMPH: No lymphadenopathy noted  SKIN: No rashes or lesions    Care Discussed with Consultants/Other Providers [ ] YES  [ ] NO

## 2019-04-03 NOTE — PROGRESS NOTE ADULT - SUBJECTIVE AND OBJECTIVE BOX
Patient is a 79y old  Female who presents with a chief complaint of right leg wound/ swelling (03 Apr 2019 07:57)      Vascular Surgery Attending Progress Note    Interval HPI: pt c/o ongoing rt achilles tendon area pain     Medications:  acetaminophen   Tablet .. 650 milliGRAM(s) Oral once PRN  atorvastatin 40 milliGRAM(s) Oral at bedtime  buDESOnide 160 MICROgram(s)/formoterol 4.5 MICROgram(s) Inhaler 2 Puff(s) Inhalation two times a day  cefTRIAXone   IVPB 1 Gram(s) IV Intermittent every 24 hours  clopidogrel Tablet 75 milliGRAM(s) Oral daily  dabigatran 75 milliGRAM(s) Oral every 12 hours  erythromycin   Ointment 1 Application(s) Left EYE three times a day  furosemide    Tablet 40 milliGRAM(s) Oral daily  levalbuterol Inhalation 0.63 milliGRAM(s) Inhalation every 6 hours PRN  metoprolol tartrate 75 milliGRAM(s) Oral two times a day  midodrine 5 milliGRAM(s) Oral every 8 hours  mupirocin 2% Ointment 1 Application(s) Topical <User Schedule>  pantoprazole  Injectable 40 milliGRAM(s) IV Push two times a day  sodium chloride 0.9%. 1000 milliLiter(s) IV Continuous <Continuous>  thiamine 100 milliGRAM(s) Oral daily  traMADol 25 milliGRAM(s) Oral every 8 hours PRN      Vital Signs Last 24 Hrs  T(C): 36.6 (03 Apr 2019 05:53), Max: 36.9 (03 Apr 2019 00:02)  T(F): 97.8 (03 Apr 2019 05:53), Max: 98.4 (03 Apr 2019 00:02)  HR: 79 (03 Apr 2019 05:53) (62 - 100)  BP: 118/83 (03 Apr 2019 05:53) (104/64 - 120/72)  BP(mean): --  RR: 18 (03 Apr 2019 05:53) (16 - 20)  SpO2: 98% (03 Apr 2019 05:53) (92% - 100%)  I&O's Summary      Physical Exam:  Neuro  A&Ox3 VSS  Vascular:  rt achilles tendon area wound stable     LABS:                        10.9   7.26  )-----------( 185      ( 03 Apr 2019 05:44 )             37.4     04-03    140  |  101  |  56<H>  ----------------------------<  95  4.1   |  26  |  1.52<H>    Ca    8.9      03 Apr 2019 05:44    TPro  6.7  /  Alb  3.0<L>  /  TBili  0.5  /  DBili  x   /  AST  15  /  ALT  9   /  AlkPhos  126<H>  04-03    PT/INR - ( 03 Apr 2019 05:44 )   PT: 20.2 SEC;   INR: 1.74          PTT - ( 03 Apr 2019 05:44 )  PTT:85.3 SEC    GARCÍA HUANG MD  [phone]

## 2019-04-03 NOTE — CONSULT NOTE ADULT - ASSESSMENT
79y Female with history of CHF, COPD presents with RLE pain. Nephrology consulted for elevated Scr.    1) IGOR: in setting of infection and diuretic use. Check urine urea and urine creatinine. Defer renal imaging for now. Agree with IVF but would decrease rate to 50 ml/hour given h/o pleural effusions and CHF. Would hold lasix for now. Would hold off on CT with IV contrast until Scr returns to baseline. Avoid nephrotoxins.  2) CKD-3: Patient appears to have h/o age appropriate CKD-3 (baseline Scr 1.1-1.2) with mild proteinuria for which will defer inpatient CKD work up. Prior CT reviewed.  3) Orthostatic hypotension: BP acceptable. Continue with midodrine 5 mg TID. Monitor BP.  4) LE edema: Hold lasix while patient on IVF but given bibasilar rales, would check CXR to assess pleural effusions. Monitor UO.  5) Pyuria/Microscopic hematuria: Urine culture negative. Reasonable to treat empirically with CTX for 3 days.

## 2019-04-03 NOTE — CONSULT NOTE ADULT - SUBJECTIVE AND OBJECTIVE BOX
[facility]), [facility]        Patient is a 79y old  Female who presents with a chief complaint of right leg wound/ swelling (2019 19:01)      HPI:  78 y/o female, with a PmHx of COPD (currently on 3L O2 24 hours), paroxsymal a-fib (on pradaxa), lsited h/o CAD (s/p stent 2018), CHF (although recent TTE with normal biventricular function, with no mention of diastolic dysfunction; + severely dilated left atrium), HTN, HLD, and anxiety, presenting to the [facility] ED with chronic right ankle pressure ulcer pain for 1 week. The patient has a posterior ankle ulcer down to tendon with scant serosanguinous drainage. She has had this ulcer since her last admission in January.  STONEY/PVR were performed at the time and were significant for 0.6 on the right side.  Pt reports no changes in symptoms; daughters' boyfriend at bedside and  has been taking care of pt at home since discharge from [place] last week. Daughters boyfriend notes that pt was unable to keep her appointment with vascular doctor recently as ambulette was unequipped for oxygen requirements. Came to ed as a result of expediting vascular eval, as well as running low on pain meds. Found to have UA strongly suggestive of UTI although pt denies change in voiding habits, fever, abdominal or flank pain. Mild bri noted BUN/Cr  56/1.6, last SCr 1.26 Feb 15, but pt has had SCr close to 2.0 in january. Pt endorses decreased po intake; reports being told in [place] to reduce po fluid intake. Pt on lasix; as stated above recent TTE  with normal biventricular function, with no mention of diastolic dysfunction; + severely dilated left atrium   reports severe pain in the RIght ankle.         *****PAST MEDICAL / Surgical  HISTORY:  PAST MEDICAL & SURGICAL HISTORY:  CAD (coronary artery disease): s/p stent   CHF (congestive heart failure)  COPD (chronic obstructive pulmonary disease): on 2-3L O2 at home prn  Anxiety  TIA (transient ischemic attack): many years ago  PAF (paroxysmal atrial fibrillation)  HLD (hyperlipidemia)  HTN (hypertension)  H/O total hysterectomy:   History of cataract surgery: b/l   History of repair of hip fracture: luisa placed in RLE   H/O spinal fusion: c2-6 in 2017           *****FAMILY HISTORY:  FAMILY HISTORY:  No pertinent family history in first degree relatives           *****SOCIAL HISTORY:  Alcohol: None  Smoking: None         *****ALLERGIES:   Allergies    No Known Allergies    Intolerances             *****MEDICATIONS: current medication reviewed and documented.   MEDICATIONS  (STANDING):  atorvastatin 40 milliGRAM(s) Oral at bedtime  buDESOnide 160 MICROgram(s)/formoterol 4.5 MICROgram(s) Inhaler 2 Puff(s) Inhalation two times a day  cefTRIAXone   IVPB 1 Gram(s) IV Intermittent every 24 hours  clopidogrel Tablet 75 milliGRAM(s) Oral daily  collagenase Ointment 1 Application(s) Topical daily  dabigatran 75 milliGRAM(s) Oral every 12 hours  erythromycin   Ointment 1 Application(s) Left EYE three times a day  metoprolol tartrate 75 milliGRAM(s) Oral two times a day  midodrine 5 milliGRAM(s) Oral every 8 hours  mupirocin 2% Ointment 1 Application(s) Topical <User Schedule>  pantoprazole  Injectable 40 milliGRAM(s) IV Push two times a day  sodium chloride 0.9%. 1000 milliLiter(s) (50 mL/Hr) IV Continuous <Continuous>  thiamine 100 milliGRAM(s) Oral daily    MEDICATIONS  (PRN):  levalbuterol Inhalation 0.63 milliGRAM(s) Inhalation every 6 hours PRN SOB  traMADol 25 milliGRAM(s) Oral every 8 hours PRN Severe Pain (7 - 10)           *****REVIEW OF SYSTEM:  GEN: no fever, no chills, no pain  RESP: no SOB, no cough, no sputum  CVS: no chest pain, no palpitations, no edema  GI: no abdominal pain, no nausea, no vomiting, no constipation, no diarrhea  : no dysurea, no frequency, no hematurea  Neuro: no headache, no dizziness  PSYCH: no anxiety, no depression  Derm : no itching, no rash         *****VITAL SIGNS:  T(C): 36.4 (19 @ 06:00), Max: 36.6 (19 @ 14:21)  HR: 80 (19 @ 06:00) (68 - 92)  BP: 116/76 (19 @ 06:00) (103/80 - 128/75)  RR: 17 (19 @ 06:00) (17 - 17)  SpO2: 98% (19 @ 06:00) (96% - 98%)  Wt(kg): --           *****PHYSICAL EXAM:   Alert oriented x2  Attention comprehension are fair. Able to name, repeat,  without any difficulty.   Able to follow 1-2  step commands.     EOMI fundi not visualized,     No facial asymmetry   Tongue is midline   Palate elevates symmetrically   Moving all 4 ext symmetrically no pronator drift         Gait : not assessed.  B/L down going toes               *****LAB AND IMAGING:                          10.5   7.13  )-----------( 188      ( 2019 06:37 )             36.1                   140  |  101  |  56<H>  ----------------------------<  95  4.1   |  26  |  1.52<H>    Ca    8.9      2019 05:44    TPro  6.7  /  Alb  3.0<L>  /  TBili  0.5  /  DBili  x   /  AST  15  /  ALT  9   /  AlkPhos  126<H>  04-    PT/INR - ( 2019 05:44 )   PT: 20.2 SEC;   INR: 1.74          PTT - ( 2019 05:44 )  PTT:85.3 SEC                        Urinalysis Basic - ( 2019 12:30 )    Color: YELLOW / Appearance: TURBID / S.025 / pH: 5.5  Gluc: NEGATIVE / Ketone: NEGATIVE  / Bili: NEGATIVE / Urobili: 0.2   Blood: LARGE / Protein: 30 / Nitrite: NEGATIVE   Leuk Esterase: LARGE / RBC: 11-25 / WBC >50   Sq Epi: x / Non Sq Epi: x / Bacteria: FEW        [All pertinent recent Imaging reports reviewed]         *****A S S E S S M E N T   A N D   P L A N :        78 y/o female, with a PmHx of COPD (currently on 3L O2 24 hours), paroxsymal a-fib (on pradaxa), lsited h/o CAD (s/p stent 2018), CHF (although recent TTE with normal biventricular function, with no mention of diastolic dysfunction; + severely dilated left atrium), HTN, HLD, and anxiety, presenting to the [facility] ED with chronic right ankle pressure ulcer pain for 1 week. The patient has a posterior ankle ulcer down to tendon with scant serosanguinous drainage. She has had this ulcer since her last admission in January.  STONEY/PVR were performed at the time and were significant for 0.6 on the right side.  Pt reports no changes in symptoms; daughters' boyfriend at bedside and  has been taking care of pt at home since discharge from [place] last week. Daughters boyfriend notes that pt was unable to keep her appointment with vascular doctor recently as ambulette was unequipped for oxygen requirements. Came to ed as a result of expediting vascular eval, as well as running low on pain meds. Found to have UA strongly suggestive of UTI although pt denies change in voiding habits, fever, abdominal or flank pain. Mild bri noted BUN/Cr  56/1.6, last SCr 1.26 Feb 15, but pt has had SCr close to 2.0 in january. Pt endorses decreased po intake; reports being told in [place] to reduce po fluid intake. Pt on lasix; as stated above recent TTE  with normal biventricular function, with no mention of diastolic dysfunction; + severely dilated left atrium   reports severe pain in the RIght ankle.    Problem/Recommendations 1: ams due to pain/uti   pain control   uti being treated.   will continue to monitor       ___________________________  Will follow with you.  Thank you,  Kary Mercado MD  Diplomate of the American Board of Neurology and Psychiatry.  Diplomate of the American Board of Vascular Neurology.   [facility] ([facility]   Ph: [phone]    Differential diagnosis and plan of care discussed with patient after the evaluation.   Advanced care planning options discussed.   Pain assessed and judicious use of narcotics when appropriate was discussed.  Importance of Fall prevention discussed.  Counseling on Smoking and Alcohol cessation was offered when appropriate.  Counseling on Diet, exercise, and medication compliance was done.     62 minutes spent on the total encounter;  more than 50 % of the visit was spent on counseling  and or coordinating care by the attending physician.    Thank you for allowing me to participate in the care of this odin patient. Please do not hesitate to call me if you have any questions.     This and subsequent notes were partially created using voice recognition software and will  inherently be subject to errors including those of syntax and sound alike substitutions which may escape proofreading. In such instances original meaning may be extrapolated by contextual derivation.

## 2019-04-03 NOTE — PHYSICAL THERAPY INITIAL EVALUATION ADULT - ADDITIONAL COMMENTS
Pt. reports being non-ambulatory x > 6 months.  Pt. reports she is mostly bedbound but will occasionally transfer from bed <--> chair with 1-2 person assist.  Home Health Aide services 7 days x 8 hours.    Pt. left in bed post-PT in NAD with all lines/tubes intact & call bell within reach.  RN Jerry camilo.

## 2019-04-03 NOTE — PROVIDER CONTACT NOTE (MEDICATION) - BACKGROUND
Pt with R heel wound. Medicated with IV tylenol at 1238, complaining of pain again, not due again for tramadol until 1503

## 2019-04-03 NOTE — PHYSICAL THERAPY INITIAL EVALUATION ADULT - ACTIVE RANGE OF MOTION EXAMINATION, REHAB EVAL
deficits as listed below/WNL's except between 3/4-full range Left shoulder flexion/extension, ~ 1/2 range Left hip/knee and Right ankle, between 1/4-1/2 range Right hip/knee

## 2019-04-03 NOTE — PHYSICAL THERAPY INITIAL EVALUATION ADULT - DISCHARGE DISPOSITION, PT EVAL
anticipated discharge home --> formal recommendation to be made upon functional mobility assessment; PT to attempt to see pt. again tomorrow, if time permits, for functional mobility assessment

## 2019-04-03 NOTE — PROGRESS NOTE ADULT - SUBJECTIVE AND OBJECTIVE BOX
Podiatry pager #: [phone]    Patient is a 79y old  Female who presents with a chief complaint of     HPI: 79 year old female pmhx chf, bri, paroxysmal a fib, cva/tia, htn, hld, c diff, copd, non healing ulcer RLE.  presents today with few day hx of worsening RLE pain, swelling and erythema.  pt was discharged from [facility] on 2/15 and had been sent to [facility] for rehab.  during hospital course followed by podiatry and vascular.  found to have poor ingrid/pvr and rec outpatient angio      PAST MEDICAL & SURGICAL HISTORY:  CAD (coronary artery disease): s/p stent 2018  CHF (congestive heart failure)  COPD (chronic obstructive pulmonary disease): on 2-3L O2 at home prn  Anxiety  TIA (transient ischemic attack): many years ago  PAF (paroxysmal atrial fibrillation)  HLD (hyperlipidemia)  HTN (hypertension)  H/O total hysterectomy: 2014  History of cataract surgery: b/l 2015  History of repair of hip fracture: luisa placed in RLE 2015  H/O spinal fusion: c2-6 in 2017      MEDICATIONS  (STANDING):    MEDICATIONS  (PRN):      Allergies    No Known Allergies    Intolerances        VITALS:    Vital Signs Last 24 Hrs  T(C): 36.6 (02 Apr 2019 11:44), Max: 36.6 (02 Apr 2019 11:44)  T(F): 97.9 (02 Apr 2019 11:44), Max: 97.9 (02 Apr 2019 11:44)  HR: 85 (02 Apr 2019 11:44) (85 - 100)  BP: 113/72 (02 Apr 2019 11:44) (104/64 - 113/72)  BP(mean): --  RR: 20 (02 Apr 2019 11:44) (18 - 20)  SpO2: 96% (02 Apr 2019 11:44) (96% - 96%)    LABS:                          11.2   8.48  )-----------( 213      ( 02 Apr 2019 11:36 )             38.0       04-02    140  |  98  |  56<H>  ----------------------------<  94  4.3   |  26  |  1.60<H>    Ca    9.2      02 Apr 2019 11:36    TPro  7.0  /  Alb  3.1<L>  /  TBili  0.7  /  DBili  x   /  AST  21  /  ALT  12  /  AlkPhos  136<H>  04-02      CAPILLARY BLOOD GLUCOSE              LOWER EXTREMITY PHYSICAL EXAM:  R Posterior heel ulceration, with exposed achilles tenon.   No local signs of infection. No probe to bone.  No drainage or malodor.  ++pain on exam  Pedal pulses non palp bilateral lower extremities  Sensation intact to b/l feet  No gross deformities  R foot +2 pitting edema.       RADIOLOGY & ADDITIONAL STUDIES:    XR pending

## 2019-04-03 NOTE — PROGRESS NOTE ADULT - ASSESSMENT
80 yo f with PVD, right leg requiring revascularization. Mild igor, possible UTI      Problem/Plan - 1:  ·  Problem: Lower limb ulcer, ankle, right, with unspecified severity.  Plan: -Seen by podiatry and vascular , no need for abx. Vacular with plan for angiogram once kidney function improves  -right duplex negative for dvt  -wound care ordered  -pain meds prn.     Problem/Plan - 2:  ·  Problem: IGOR (acute kidney injury).  Plan: -likely with prerenal component in setting of decreased fluid intake  renal fu  monitor cr    Problem/Plan - 3:  ·  Problem: Urinary tract infection with hematuria, site unspecified.  Plan: - pseudomonal UTI in [place] resistant to quinolones; sensitive to 3rd gen cephalosporin. Will place on ceftriaxone, follow  culture.     Problem/Plan - 4:  ·  Problem: Paroxysmal atrial fibrillation.  Plan: c/w Pradaxa, BB.     Problem/Plan - 5:  ·  Problem: Chronic obstructive pulmonary disease, unspecified COPD type.  Plan: c/w ics/laba, O2

## 2019-04-03 NOTE — CONSULT NOTE ADULT - CONSULT REASON
cardiac management   hx COPD, paroxsymal a-fib (on pradaxa), CAD (s/p stent 11/2018), D CHF, pulm htn, HTN, HLD, and anxiety

## 2019-04-03 NOTE — PHYSICAL THERAPY INITIAL EVALUATION ADULT - PERTINENT HX OF CURRENT PROBLEM, REHAB EVAL
Pt. is a 78 y/o female admitted to [facility] on 04/02/19 with a dx of UTI, Right leg infection/wound, and swelling.  PT consult request secondary to deconditioning.  H/O COPD on home O2, 24/7.  Recent discharge from rehab.   CXR = increasing pleural effusion.  (-) Right LE ultrasound.  (-) Right ankle/foot X-ray.

## 2019-04-03 NOTE — PHYSICAL THERAPY INITIAL EVALUATION ADULT - LIVES WITH, PROFILE
(daughter), private home, 20 steps-to-enter/exit home with Unilateral Handrail, no stairs in the home/children

## 2019-04-03 NOTE — CONSULT NOTE ADULT - SUBJECTIVE AND OBJECTIVE BOX
[facility] NEPHROLOGY- CONSULTATION NOTE    79y Female with history of below presents with RLE pain. Nephrology consulted for elevated Scr.    Patient well known from prior admissions where she was noted to have IGOR on CKD-3 in setting of diuresis for pleural effusions. Baseline Scr 1.1-1.2. Patient on lasix 40 mg PO daily and midodrine 5 mg TID as an outpatient.    REVIEW OF SYSTEMS:  Gen: no changes in weight  HEENT: no rhinorrhea  Neck: no sore throat  Cards: no chest pain  Resp: + dyspnea (chronic)  GI: no nausea or vomiting or diarrhea  : no dysuria or hematuria  Vascular: + RLE edema/pain  Derm: no rashes  Neuro: no numbness/tingling    No Known Allergies      Home Medications Reviewed  Hospital Medications:   MEDICATIONS  (STANDING):  atorvastatin 40 milliGRAM(s) Oral at bedtime  buDESOnide 160 MICROgram(s)/formoterol 4.5 MICROgram(s) Inhaler 2 Puff(s) Inhalation two times a day  cefTRIAXone   IVPB 1 Gram(s) IV Intermittent every 24 hours  clopidogrel Tablet 75 milliGRAM(s) Oral daily  dabigatran 75 milliGRAM(s) Oral every 12 hours  erythromycin   Ointment 1 Application(s) Left EYE three times a day  furosemide    Tablet 40 milliGRAM(s) Oral daily  metoprolol tartrate 75 milliGRAM(s) Oral two times a day  midodrine 5 milliGRAM(s) Oral every 8 hours  mupirocin 2% Ointment 1 Application(s) Topical <User Schedule>  pantoprazole  Injectable 40 milliGRAM(s) IV Push two times a day  sodium chloride 0.9%. 1000 milliLiter(s) (100 mL/Hr) IV Continuous <Continuous>  thiamine 100 milliGRAM(s) Oral daily      PAST MEDICAL & SURGICAL HISTORY:  CAD (coronary artery disease): s/p stent   CHF (congestive heart failure)  COPD (chronic obstructive pulmonary disease): on 2-3L O2 at home prn  Anxiety  TIA (transient ischemic attack): many years ago  PAF (paroxysmal atrial fibrillation)  HLD (hyperlipidemia)  HTN (hypertension)  H/O total hysterectomy:   History of cataract surgery: b/l   History of repair of hip fracture: luisa placed in RLE   H/O spinal fusion: c2-6 in 2017      FAMILY HISTORY:  No pertinent family history in first degree relatives      SOCIAL HISTORY:  Denies toxic substance use     VITALS:  T(F): 97.8 (19 @ 05:53), Max: 98.4 (19 @ 00:02)  HR: 79 (19 @ 05:53)  BP: 118/83 (19 @ 05:53)  RR: 18 (19 @ 05:53)  SpO2: 98% (19 @ 05:53)  Wt(kg): --    Height (cm): 157.48 ( @ 00:02)  Weight (kg): 56.2 ( @ 00:02)  BMI (kg/m2): 22.7 ( @ 00:02)  BSA (m2): 1.56 ( @ 00:02)    PHYSICAL EXAM:  Gen: NAD, calm  HEENT: MMM  Neck: no JVD  Cards: Irregularly irregular, +S1/S2, no M/G/R  Resp: Bibasilar rales  GI: soft, NT/ND, NABS  : no CVA tenderness  Vascular: + RLE edema/TTP  Derm: no rashes  Neuro: non-focal    LABS:      140  |  101  |  56<H>  ----------------------------<  95  4.1   |  26  |  1.52<H>    Ca    8.9      2019 05:44    TPro  6.7  /  Alb  3.0<L>  /  TBili  0.5  /  DBili      /  AST  15  /  ALT  9   /  AlkPhos  126<H>      Creatinine Trend: 1.52 <--, 1.60 <--                        10.9   7.26  )-----------( 185      ( 2019 05:44 )             37.4     Urine Studies:  Urinalysis Basic - ( 2019 12:30 )    Color: YELLOW / Appearance: TURBID / S.025 / pH: 5.5  Gluc: NEGATIVE / Ketone: NEGATIVE  / Bili: NEGATIVE / Urobili: 0.2   Blood: LARGE / Protein: 30 / Nitrite: NEGATIVE   Leuk Esterase: LARGE / RBC: 11-25 / WBC >50   Sq Epi:  / Non Sq Epi:  / Bacteria: FEW          RADIOLOGY & ADDITIONAL STUDIES:  < from: US Duplex Venous Lower Ext Ltd, Right (19 @ 13:38) >  IMPRESSION:     No evidence of right lower extremity deep venous thrombosis.    < end of copied text >      < from: Xray Foot AP + Lateral + Oblique, Right (19 @ 15:33) >  IMPRESSION:  Dorsal soft tissue swelling. Posterior ankle region skin surface rounded   soft tissue defect/ulceration. No tracking soft tissue gas collections,   radiopaque foreign bodies, or gross radiographic evidence for   osteomyelitis.    No fractures or dislocations.    Congruent ankle mortise with smooth and intact talar dome.    Tarsometatarsal alignment maintained without evidence for a Lisfranc   injury.    Preserved remaining visualized joint spaces. No joint margin erosions or   intra-articular or periarticular calcifications.    Slightly hypertrophied distal 1st metatarsal dorsomedial eminence with   associated small bunion.     Posterosuperior calcaneal Benoit deformity. Otherwise no calcaneal   spurring and unremarkable appearing Achilles tendon shadow.    Generalized osteopenia otherwise no discrete lytic or blastic lesions.     < end of copied text >

## 2019-04-03 NOTE — PROGRESS NOTE ADULT - ASSESSMENT
78 year old female with right  posterior heel ulceration (stable); RLE pain with non-palp pulses  - Pt seen and evaluated   - wound is stable, achilles tendon exposed, no ascending cellulitis or purulent drainage noted.   - Previous STONEY/PVR poor, 0.60 on the effected limb with flat waveforms. Last admission had rec outpatient angio. f/u vasc evaluation/plan  - No local signs of infection - would recommend monitoring off antibiotics.    -Z floats at all times  - Rec local wound care with right foot ulceration with santyl and DSD

## 2019-04-03 NOTE — CONSULT NOTE ADULT - SUBJECTIVE AND OBJECTIVE BOX
CARDIOLOGY CONSULT - Dr. Vieyra     CHIEF COMPLAINT:    HPI:  78 y/o female, with a PmHx as mentioned below presenting to the [facility] ED with chronic right ankle pressure ulcer pain for 1 week. patient is known from previous admission and being evaluated today for cardiac management. Cardiac hx includes  paroxsymal a-fib (on pradaxa), CAD (s/p stent 11/2018), DCHF, TIA and HTN. Echo from 017/19 with mild to mod MR, EF 60%, normal Lv sys fx, mod pulm htn. No recent cp, sob, or dizziness reported. Denies orthopnea, le edema, fever, chills, n / v. ROS otherwise negative.       PAST MEDICAL & SURGICAL HISTORY:  CAD (coronary artery disease): s/p stent 2018  CHF (congestive heart failure)  COPD (chronic obstructive pulmonary disease): on 2-3L O2 at home prn  Anxiety  TIA (transient ischemic attack): many years ago  PAF (paroxysmal atrial fibrillation)  HLD (hyperlipidemia)  HTN (hypertension)  H/O total hysterectomy: 2014  History of cataract surgery: b/l 2015  History of repair of hip fracture: luisa placed in RLE 2015  H/O spinal fusion: c2-6 in 2017        PREVIOUS DIAGNOSTIC TESTING:    [x] Echocardiogram:  < from: Transthoracic Echocardiogram (01.17.19 @ 17:11) >  Ejection Fraction (Teicholtz): 60 %  ------------------------------------------------------------------------  OBSERVATIONS:  Mitral Valve: Mitral annular calcification, otherwise  normal mitral valve. Mild-moderate mitral regurgitation.  Aortic Root: Normal aortic root.  Aortic Valve: Calcified trileaflet aortic valve with normal  opening. Mild aortic regurgitation.  Left Atrium: Severely dilated left atrium.  LA volume index  = 50 cc/m2.  Left Ventricle: Normal left ventricular systolic function.  No segmental wall motion abnormalities. Normal left  ventricular internal dimensions and wall thicknesses.  Right Heart: Moderate right atrial enlargement. Normal  right ventricular size and function. Normal tricuspid  valve.  Moderate tricuspid regurgitation. Normal pulmonic  valve.  Mild pulmonic regurgitation.  Pericardium/PleuraNormal pericardium with no pericardial  effusion.  Hemodynamic: Estimated right ventricular systolic pressure  equals 53 mm Hg, assuming right atrial pressure equals 10  mm Hg, consistent with moderate pulmonary hypertension.  ------------------------------------------------------------------------  CONCLUSIONS:  1. Mitral annular calcification, otherwise normal mitral  valve. Mild-moderate mitral regurgitation.  2. Calcified trileaflet aortic valve with normal opening.  Mild aortic regurgitation.  3. Severely dilated left atrium.  LA volume index = 50  cc/m2.  4. Normal left ventricular internal dimensions and wall  thicknesses.  5. Normal left ventricular systolic function. No segmental  wall motion abnormalities.  6. Moderate right atrial enlargement.  7. Normal right ventricular size and function.  8. Estimated right ventricular systolic pressure equals 53  mm Hg, assuming right atrial pressure equals 10 mm Hg,  consistent with moderate pulmonary hypertension.  *** Compared with echocardiogram of 1/31/2018, no  significant changes noted.  ------------------------------------------------------------------------  Confirmed on  1/17/2019 - 18:08:23 by Baltazar Orosco M.D.  ------------------------------------------------------------------------    < end of copied text >    [ ]  Catheterization:    [ ] Stress Test:  	    MEDICATIONS:  MEDICATIONS  (STANDING):  acetaminophen  IVPB .. 1000 milliGRAM(s) IV Intermittent once  atorvastatin 40 milliGRAM(s) Oral at bedtime  buDESOnide 160 MICROgram(s)/formoterol 4.5 MICROgram(s) Inhaler 2 Puff(s) Inhalation two times a day  cefTRIAXone   IVPB 1 Gram(s) IV Intermittent every 24 hours  clopidogrel Tablet 75 milliGRAM(s) Oral daily  dabigatran 75 milliGRAM(s) Oral every 12 hours  erythromycin   Ointment 1 Application(s) Left EYE three times a day  metoprolol tartrate 75 milliGRAM(s) Oral two times a day  midodrine 5 milliGRAM(s) Oral every 8 hours  mupirocin 2% Ointment 1 Application(s) Topical <User Schedule>  pantoprazole  Injectable 40 milliGRAM(s) IV Push two times a day  sodium chloride 0.9%. 1000 milliLiter(s) (50 mL/Hr) IV Continuous <Continuous>  thiamine 100 milliGRAM(s) Oral daily      FAMILY HISTORY:  No pertinent family history in first degree relatives      SOCIAL HISTORY:    [x ] Non-smoker  [ ] Smoker  [ ] Alcohol    Allergies    No Known Allergies    Intolerances    	    REVIEW OF SYSTEMS:  CONSTITUTIONAL: wound to RLE   EYES: No eye pain, visual disturbances, or discharge  ENMT:  No difficulty hearing, tinnitus, vertigo; No sinus or throat pain  NECK: No pain or stiffness  RESPIRATORY: No cough, wheezing, chills or hemoptysis; No Shortness of Breath  CARDIOVASCULAR: No chest pain, palpitations, passing out, dizziness, or leg swelling  GASTROINTESTINAL: No abdominal or epigastric pain. No nausea, vomiting, or hematemesis; No diarrhea or constipation. No melena or hematochezia.  GENITOURINARY: No dysuria, frequency, hematuria, or incontinence  NEUROLOGICAL: No headaches, memory loss, loss of strength, numbness, or tremors  SKIN: No itching, burning, rashes, or lesions   	    [x ] All others negative	  [ ] Unable to obtain    PHYSICAL EXAM:  T(C): 36.6 (04-03-19 @ 05:53), Max: 36.9 (04-03-19 @ 00:02)  HR: 79 (04-03-19 @ 05:53) (62 - 100)  BP: 118/83 (04-03-19 @ 05:53) (113/80 - 120/72)  RR: 18 (04-03-19 @ 05:53) (16 - 18)  SpO2: 98% (04-03-19 @ 05:53) (92% - 100%)  Wt(kg): --  I&O's Summary      Appearance: Normal	  Psychiatry: A & O x 3, Mood & affect appropriate  HEENT:   Normal oral mucosa, PERRL, EOMI	  Lymphatic: No lymphadenopathy  Cardiovascular: Normal S1 S2,RRR, No JVD, No murmurs  Respiratory: Lungs clear to auscultation	  Gastrointestinal:  Soft, Non-tender, + BS	  Skin: No rashes, No ecchymoses, No cyanosis	  Neurologic: Non-focal  Extremities: LE dressing CDI     TELEMETRY: 	    ECG:  	  RADIOLOGY:  < from: Xray Foot AP + Lateral + Oblique, Right (04.02.19 @ 15:33) >  IMPRESSION:  Dorsal soft tissue swelling. Posterior ankle region skin surface rounded   soft tissue defect/ulceration. No tracking soft tissue gas collections,   radiopaque foreign bodies, or gross radiographic evidence for   osteomyelitis.    No fractures or dislocations.    Congruent ankle mortise with smooth and intact talar dome.    Tarsometatarsal alignment maintained without evidence for a Lisfranc   injury.    Preserved remaining visualized joint spaces. No joint margin erosions or   intra-articular or periarticular calcifications.    Slightly hypertrophied distal 1st metatarsal dorsomedial eminence with   associated small bunion.     Posterosuperior calcaneal Benoit deformity. Otherwise no calcaneal   spurring and unremarkable appearing Achilles tendon shadow.    Generalized osteopenia otherwise no discrete lytic or blastic lesions.     --------------------------------------------------------------------------------------------------------------------------    	   from: US Duplex Venous Lower Ext Ltd, Right (04.02.19 @ 13:38) >  IMPRESSION:     No evidence of right lower extremity deep venous thrombosis.        < end of copied text >    LABS:	 	    CARDIAC MARKERS:                                  10.9   7.26  )-----------( 185      ( 03 Apr 2019 05:44 )             37.4     04-03    140  |  101  |  56<H>  ----------------------------<  95  4.1   |  26  |  1.52<H>    Ca    8.9      03 Apr 2019 05:44    TPro  6.7  /  Alb  3.0<L>  /  TBili  0.5  /  DBili  x   /  AST  15  /  ALT  9   /  AlkPhos  126<H>  04-03    PT/INR - ( 03 Apr 2019 05:44 )   PT: 20.2 SEC;   INR: 1.74          PTT - ( 03 Apr 2019 05:44 )  PTT:85.3 SEC  proBNP:   Lipid Profile:   HgA1c:   TSH:

## 2019-04-03 NOTE — PHYSICAL THERAPY INITIAL EVALUATION ADULT - MANUAL MUSCLE TESTING RESULTS, REHAB EVAL
at least 3/5 throughout except at least 3-/5 Left shoulder, at least 2+/5 Left hip/knee and Right ankle, and at least 2/5 Right hip/knee

## 2019-04-04 LAB
ALBUMIN SERPL ELPH-MCNC: 3 G/DL — LOW (ref 3.3–5)
ALP SERPL-CCNC: 115 U/L — SIGNIFICANT CHANGE UP (ref 40–120)
ALT FLD-CCNC: 10 U/L — SIGNIFICANT CHANGE UP (ref 4–33)
ANION GAP SERPL CALC-SCNC: 15 MMO/L — HIGH (ref 7–14)
AST SERPL-CCNC: 12 U/L — SIGNIFICANT CHANGE UP (ref 4–32)
BILIRUB SERPL-MCNC: 0.5 MG/DL — SIGNIFICANT CHANGE UP (ref 0.2–1.2)
BUN SERPL-MCNC: 54 MG/DL — HIGH (ref 7–23)
CALCIUM SERPL-MCNC: 8.7 MG/DL — SIGNIFICANT CHANGE UP (ref 8.4–10.5)
CHLORIDE SERPL-SCNC: 99 MMOL/L — SIGNIFICANT CHANGE UP (ref 98–107)
CO2 SERPL-SCNC: 25 MMOL/L — SIGNIFICANT CHANGE UP (ref 22–31)
CREAT SERPL-MCNC: 1.67 MG/DL — HIGH (ref 0.5–1.3)
GLUCOSE SERPL-MCNC: 98 MG/DL — SIGNIFICANT CHANGE UP (ref 70–99)
HCT VFR BLD CALC: 36.1 % — SIGNIFICANT CHANGE UP (ref 34.5–45)
HGB BLD-MCNC: 10.5 G/DL — LOW (ref 11.5–15.5)
MCHC RBC-ENTMCNC: 26.6 PG — LOW (ref 27–34)
MCHC RBC-ENTMCNC: 29.1 % — LOW (ref 32–36)
MCV RBC AUTO: 91.6 FL — SIGNIFICANT CHANGE UP (ref 80–100)
NRBC # FLD: 0 K/UL — SIGNIFICANT CHANGE UP (ref 0–0)
PLATELET # BLD AUTO: 188 K/UL — SIGNIFICANT CHANGE UP (ref 150–400)
PMV BLD: 10.6 FL — SIGNIFICANT CHANGE UP (ref 7–13)
POTASSIUM SERPL-MCNC: 3.9 MMOL/L — SIGNIFICANT CHANGE UP (ref 3.5–5.3)
POTASSIUM SERPL-SCNC: 3.9 MMOL/L — SIGNIFICANT CHANGE UP (ref 3.5–5.3)
PROT SERPL-MCNC: 6.5 G/DL — SIGNIFICANT CHANGE UP (ref 6–8.3)
RBC # BLD: 3.94 M/UL — SIGNIFICANT CHANGE UP (ref 3.8–5.2)
RBC # FLD: 22.8 % — HIGH (ref 10.3–14.5)
SODIUM SERPL-SCNC: 139 MMOL/L — SIGNIFICANT CHANGE UP (ref 135–145)
WBC # BLD: 7.13 K/UL — SIGNIFICANT CHANGE UP (ref 3.8–10.5)
WBC # FLD AUTO: 7.13 K/UL — SIGNIFICANT CHANGE UP (ref 3.8–10.5)

## 2019-04-04 PROCEDURE — 99232 SBSQ HOSP IP/OBS MODERATE 35: CPT

## 2019-04-04 PROCEDURE — 76770 US EXAM ABDO BACK WALL COMP: CPT | Mod: 26

## 2019-04-04 RX ORDER — OXYCODONE AND ACETAMINOPHEN 5; 325 MG/1; MG/1
1 TABLET ORAL EVERY 6 HOURS
Qty: 0 | Refills: 0 | Status: DISCONTINUED | OUTPATIENT
Start: 2019-04-04 | End: 2019-04-11

## 2019-04-04 RX ORDER — OXYCODONE AND ACETAMINOPHEN 5; 325 MG/1; MG/1
1 TABLET ORAL ONCE
Qty: 0 | Refills: 0 | Status: DISCONTINUED | OUTPATIENT
Start: 2019-04-04 | End: 2019-04-04

## 2019-04-04 RX ADMIN — MIDODRINE HYDROCHLORIDE 5 MILLIGRAM(S): 2.5 TABLET ORAL at 22:33

## 2019-04-04 RX ADMIN — Medication 100 MILLIGRAM(S): at 12:33

## 2019-04-04 RX ADMIN — MIDODRINE HYDROCHLORIDE 5 MILLIGRAM(S): 2.5 TABLET ORAL at 15:56

## 2019-04-04 RX ADMIN — OXYCODONE AND ACETAMINOPHEN 1 TABLET(S): 5; 325 TABLET ORAL at 23:45

## 2019-04-04 RX ADMIN — Medication 1 APPLICATION(S): at 22:33

## 2019-04-04 RX ADMIN — OXYCODONE AND ACETAMINOPHEN 1 TABLET(S): 5; 325 TABLET ORAL at 03:40

## 2019-04-04 RX ADMIN — OXYCODONE AND ACETAMINOPHEN 1 TABLET(S): 5; 325 TABLET ORAL at 09:03

## 2019-04-04 RX ADMIN — CEFTRIAXONE 100 GRAM(S): 500 INJECTION, POWDER, FOR SOLUTION INTRAMUSCULAR; INTRAVENOUS at 01:47

## 2019-04-04 RX ADMIN — BUDESONIDE AND FORMOTEROL FUMARATE DIHYDRATE 2 PUFF(S): 160; 4.5 AEROSOL RESPIRATORY (INHALATION) at 12:32

## 2019-04-04 RX ADMIN — BUDESONIDE AND FORMOTEROL FUMARATE DIHYDRATE 2 PUFF(S): 160; 4.5 AEROSOL RESPIRATORY (INHALATION) at 22:33

## 2019-04-04 RX ADMIN — Medication 1 APPLICATION(S): at 06:02

## 2019-04-04 RX ADMIN — ATORVASTATIN CALCIUM 40 MILLIGRAM(S): 80 TABLET, FILM COATED ORAL at 22:33

## 2019-04-04 RX ADMIN — Medication 75 MILLIGRAM(S): at 06:02

## 2019-04-04 RX ADMIN — Medication 650 MILLIGRAM(S): at 00:58

## 2019-04-04 RX ADMIN — OXYCODONE AND ACETAMINOPHEN 1 TABLET(S): 5; 325 TABLET ORAL at 09:43

## 2019-04-04 RX ADMIN — Medication 650 MILLIGRAM(S): at 01:51

## 2019-04-04 RX ADMIN — CLOPIDOGREL BISULFATE 75 MILLIGRAM(S): 75 TABLET, FILM COATED ORAL at 12:33

## 2019-04-04 RX ADMIN — Medication 75 MILLIGRAM(S): at 18:03

## 2019-04-04 RX ADMIN — OXYCODONE AND ACETAMINOPHEN 1 TABLET(S): 5; 325 TABLET ORAL at 16:06

## 2019-04-04 RX ADMIN — DABIGATRAN ETEXILATE MESYLATE 75 MILLIGRAM(S): 150 CAPSULE ORAL at 06:01

## 2019-04-04 RX ADMIN — OXYCODONE AND ACETAMINOPHEN 1 TABLET(S): 5; 325 TABLET ORAL at 04:30

## 2019-04-04 RX ADMIN — DABIGATRAN ETEXILATE MESYLATE 75 MILLIGRAM(S): 150 CAPSULE ORAL at 18:03

## 2019-04-04 RX ADMIN — PANTOPRAZOLE SODIUM 40 MILLIGRAM(S): 20 TABLET, DELAYED RELEASE ORAL at 18:03

## 2019-04-04 RX ADMIN — Medication 1 APPLICATION(S): at 15:57

## 2019-04-04 RX ADMIN — PANTOPRAZOLE SODIUM 40 MILLIGRAM(S): 20 TABLET, DELAYED RELEASE ORAL at 06:02

## 2019-04-04 RX ADMIN — MUPIROCIN 1 APPLICATION(S): 20 OINTMENT TOPICAL at 12:33

## 2019-04-04 RX ADMIN — OXYCODONE AND ACETAMINOPHEN 1 TABLET(S): 5; 325 TABLET ORAL at 16:42

## 2019-04-04 RX ADMIN — Medication 1 APPLICATION(S): at 12:33

## 2019-04-04 RX ADMIN — MIDODRINE HYDROCHLORIDE 5 MILLIGRAM(S): 2.5 TABLET ORAL at 06:02

## 2019-04-04 NOTE — DIETITIAN INITIAL EVALUATION ADULT. - PHYSICAL APPEARANCE
Nutrition focused physical exam conducted - found signs of malnutrition [ ]absent [ X]present Subcutaneous fat loss: [Severe] Orbital fat pads region, [unable-no teeth ]Buccal fat region, [Severe]Triceps region, Muscle wasting: [Severe]Temples region, [Severe]Clavicle region, [Severe ]Shoulder region, [ Severe]Interosseous region

## 2019-04-04 NOTE — PROGRESS NOTE ADULT - ASSESSMENT
79y Female with history of CHF, COPD presents with RLE pain. Nephrology consulted for elevated Scr.    1) IGOR: in setting of infection and diuretic use with indeterminate FeNa. Check renal US given lack of improvement with IVF and off of diuretic therapy. Hold further IVF given CXR findings. Given plans for CT with contrast, would hold lasix for now and resume after imaging if renal function remains stable. Avoid nephrotoxins.  2) CKD-3: Patient appears to have h/o age appropriate CKD-3 (baseline Scr 1.1-1.2) with mild proteinuria for which will defer inpatient CKD work up. Monitor electrolytes.  3) Orthostatic hypotension: BP acceptable. Continue with midodrine 5 mg TID. Rate control with metoprolol. Monitor BP.  4) LE edema: CXR reviewed with pleural effusions and pulmonary edema. Plan to resume lasix post-CT if renal function remains stable. Monitor UO.  5) Pyuria/Microscopic hematuria: Urine culture negative. Reasonable to treat empirically with CTX for 3 days.

## 2019-04-04 NOTE — DIETITIAN INITIAL EVALUATION ADULT. - ORAL INTAKE PTA
Patient usually eats well at breakfast, <75% intake at lunch and dinner depending on how she feels./fair

## 2019-04-04 NOTE — DIETITIAN INITIAL EVALUATION ADULT. - FACTORS AFF FOOD INTAKE
difficulty chewing/[place]ai/ethnic/cultural/personal food preferences/edentulous, left dentures at home. Son informed to bring as per patient. Denies need for mechanical soft food at this time when offered.

## 2019-04-04 NOTE — DIETITIAN INITIAL EVALUATION ADULT. - ETIOLOGY
related to demand for wound healing patient meets criteria for severe malnutrition in the context of chronic illness

## 2019-04-04 NOTE — PROGRESS NOTE ADULT - ASSESSMENT
78 year old female with right  posterior heel ulceration (stable); RLE pain with non-palp pulses  - Pt seen and evaluated   - wound is stable, achilles tendon exposed, no ascending cellulitis or purulent drainage noted.   - Previous STONEY/PVR poor, 0.60 on the effected limb with flat waveforms. Last admission had rec outpatient angio. renal optimization for angio planning  - No local signs of infection - would recommend monitoring off antibiotics.    -Z floats at all times  - Cont local wound care with right foot ulceration with santyl and DSD

## 2019-04-04 NOTE — PROGRESS NOTE ADULT - SUBJECTIVE AND OBJECTIVE BOX
Podiatry pager #: [phone]    Patient is a 79y old  Female who presents with a chief complaint of     HPI: 79 year old female pmhx chf, bri, paroxysmal a fib, cva/tia, htn, hld, c diff, copd, non healing ulcer RLE.  presents today with few day hx of worsening RLE pain, swelling and erythema.  pt was discharged from [facility] on 2/15 and had been sent to [place] for rehab.  during hospital course followed by podiatry and vascular.  found to have poor ingrid/pvr and rec outpatient angio      PAST MEDICAL & SURGICAL HISTORY:  CAD (coronary artery disease): s/p stent 2018  CHF (congestive heart failure)  COPD (chronic obstructive pulmonary disease): on 2-3L O2 at home prn  Anxiety  TIA (transient ischemic attack): many years ago  PAF (paroxysmal atrial fibrillation)  HLD (hyperlipidemia)  HTN (hypertension)  H/O total hysterectomy: 2014  History of cataract surgery: b/l 2015  History of repair of hip fracture: luisa placed in RLE 2015  H/O spinal fusion: c2-6 in 2017      MEDICATIONS  (STANDING):    MEDICATIONS  (PRN):      Allergies    No Known Allergies    Intolerances        VITALS:    Vital Signs Last 24 Hrs  T(C): 36.6 (02 Apr 2019 11:44), Max: 36.6 (02 Apr 2019 11:44)  T(F): 97.9 (02 Apr 2019 11:44), Max: 97.9 (02 Apr 2019 11:44)  HR: 85 (02 Apr 2019 11:44) (85 - 100)  BP: 113/72 (02 Apr 2019 11:44) (104/64 - 113/72)  BP(mean): --  RR: 20 (02 Apr 2019 11:44) (18 - 20)  SpO2: 96% (02 Apr 2019 11:44) (96% - 96%)    LABS:                          11.2   8.48  )-----------( 213      ( 02 Apr 2019 11:36 )             38.0       04-02    140  |  98  |  56<H>  ----------------------------<  94  4.3   |  26  |  1.60<H>    Ca    9.2      02 Apr 2019 11:36    TPro  7.0  /  Alb  3.1<L>  /  TBili  0.7  /  DBili  x   /  AST  21  /  ALT  12  /  AlkPhos  136<H>  04-02      CAPILLARY BLOOD GLUCOSE              LOWER EXTREMITY PHYSICAL EXAM:  R Posterior heel ulceration, with exposed achilles tenon.   No local signs of infection. No probe to bone.  No drainage or malodor.  ++pain on exam  Pedal pulses non palp bilateral lower extremities  Sensation intact to b/l feet  No gross deformities  R foot +2 pitting edema.       RADIOLOGY & ADDITIONAL STUDIES:    XR pending

## 2019-04-04 NOTE — DIETITIAN INITIAL EVALUATION ADULT. - OTHER INFO
Nutrition consult received for assessment, calorie count, pressure ulcer stage 2. Despite indication for calorie count and pressure ulcer stage 2, no calorie count to assess, and with right leg wound but no pressure injury per nursing flowsheet and as confirmed with RN. Patient ate well at breakfast, likes to have oatmeal x2 and hardboiled eggs. She refused to eat lunch. Encouragement provided and food preferences obtained. Patient weighs 56.2kg this admission 4/3/19 with no edema per flowsheet.

## 2019-04-04 NOTE — PROGRESS NOTE ADULT - ASSESSMENT
79F w/ nonhealing R posterior ankle ulcer and arterial insuff      -d/w pt rle angio indications risks and benefits including renal insuff risks  pt is not willing to accept renal insuff progression risk  renal optimazation prior to rle angio  Dr Cali or Dr Hyde will f/u for rle angio planning once renal optimazation completed  will follow

## 2019-04-04 NOTE — PROGRESS NOTE ADULT - ATTENDING COMMENTS
[facility] NEPHROLOGY  Harish Valera M.D.  Kevin Cedeno D.O.  Rachele Mckeon M.D.  Christina Westfall, MSN, ANP-C    Telephone: [phone]  Facsimile: [phone]    [address] [place] [address]

## 2019-04-04 NOTE — DIETITIAN INITIAL EVALUATION ADULT. - ENERGY NEEDS
Height (cm): 157.48 (03 Apr 2019 00:02) Weight (kg): 56.2 (03 Apr 2019 00:02)  BMI (kg/m2): 22.7 (03 Apr 2019 00:02)  IBW: 110lbs (+/-10%) %IBW: 111%  no edema. Right leg wound per flowsheet.

## 2019-04-04 NOTE — DIETITIAN INITIAL EVALUATION ADULT. - PERTINENT MEDS FT
MEDICATIONS  (STANDING):  atorvastatin 40 milliGRAM(s) Oral at bedtime  buDESOnide 160 MICROgram(s)/formoterol 4.5 MICROgram(s) Inhaler 2 Puff(s) Inhalation two times a day  cefTRIAXone   IVPB 1 Gram(s) IV Intermittent every 24 hours  clopidogrel Tablet 75 milliGRAM(s) Oral daily  collagenase Ointment 1 Application(s) Topical daily  dabigatran 75 milliGRAM(s) Oral every 12 hours  erythromycin   Ointment 1 Application(s) Left EYE three times a day  metoprolol tartrate 75 milliGRAM(s) Oral two times a day  midodrine 5 milliGRAM(s) Oral every 8 hours  mupirocin 2% Ointment 1 Application(s) Topical <User Schedule>  pantoprazole  Injectable 40 milliGRAM(s) IV Push two times a day  thiamine 100 milliGRAM(s) Oral daily    MEDICATIONS  (PRN):  levalbuterol Inhalation 0.63 milliGRAM(s) Inhalation every 6 hours PRN SOB  oxyCODONE    5 mG/acetaminophen 325 mG 1 Tablet(s) Oral every 6 hours PRN Moderate Pain and severe pain

## 2019-04-04 NOTE — DIETITIAN INITIAL EVALUATION ADULT. - PROBLEM SELECTOR PLAN 2
-likely with prerenal component in setting of decreased fluid intake, and concomitant lasix use. Albumin mildly lower at 3.1. Pt denies change in voiding habits Order nutrition eval. will place on IVF, continue lasix  -based on February weight, Pt with CrCl approx 26 compared to 33 when Scr 1.26.; Pt denies weight loss

## 2019-04-04 NOTE — PROGRESS NOTE ADULT - ASSESSMENT
78 y/o female, with a PmHx of COPD, paroxsymal a-fib (on pradaxa), CAD (s/p stent 11/2018), DCHF, pulm htn, HTN, HLD, and anxiety presenting with nonhealing R posterior ankle ulcer and UTI     1. Chronic diastolic chf  not overloaded  hold lasix  cont bb  recent echo with nl lv fx     2.  Paroxysmal Atrial Fibrillation  rate controlled, c/w  lopressor to 75 mg BID  continue with midodrine 5 mg q 8 hrs for bp support  CHADS 3 - off pradaxa for possible angio     3. CAD s/p PCI (11/2018)  cv stable, no cp or sob   continue bb, statin, plavix     4. UTI   abx per med     5.  R posterior ankle ulcer   vascular f/u   plan for eventual RLE angio, pending optimization of renal fx

## 2019-04-04 NOTE — DIETITIAN INITIAL EVALUATION ADULT. - NS FNS WEIGHT CHANGE REASON
patient w/ fluctuating weight noted due to fluid retention at previous admission per RDN note 1/19/19. Weight of 136lbs, +3 b/l LE edema note.

## 2019-04-04 NOTE — PROGRESS NOTE ADULT - SUBJECTIVE AND OBJECTIVE BOX
Patient is a 79y old  Female who presents with a chief complaint of right leg wound/ swelling (04 Apr 2019 08:50)      Vascular Surgery Attending Progress Note    Interval HPI: pt c/o ongoing rt achilles tendon area wound discomfort     Medications:  atorvastatin 40 milliGRAM(s) Oral at bedtime  buDESOnide 160 MICROgram(s)/formoterol 4.5 MICROgram(s) Inhaler 2 Puff(s) Inhalation two times a day  cefTRIAXone   IVPB 1 Gram(s) IV Intermittent every 24 hours  clopidogrel Tablet 75 milliGRAM(s) Oral daily  collagenase Ointment 1 Application(s) Topical daily  dabigatran 75 milliGRAM(s) Oral every 12 hours  erythromycin   Ointment 1 Application(s) Left EYE three times a day  levalbuterol Inhalation 0.63 milliGRAM(s) Inhalation every 6 hours PRN  metoprolol tartrate 75 milliGRAM(s) Oral two times a day  midodrine 5 milliGRAM(s) Oral every 8 hours  mupirocin 2% Ointment 1 Application(s) Topical <User Schedule>  oxyCODONE    5 mG/acetaminophen 325 mG 1 Tablet(s) Oral every 6 hours PRN  pantoprazole  Injectable 40 milliGRAM(s) IV Push two times a day  thiamine 100 milliGRAM(s) Oral daily      Vital Signs Last 24 Hrs  T(C): 36.4 (04 Apr 2019 06:00), Max: 36.6 (03 Apr 2019 14:21)  T(F): 97.5 (04 Apr 2019 06:00), Max: 97.9 (03 Apr 2019 14:21)  HR: 80 (04 Apr 2019 06:00) (68 - 92)  BP: 116/76 (04 Apr 2019 06:00) (103/80 - 128/75)  BP(mean): --  RR: 17 (04 Apr 2019 06:00) (17 - 17)  SpO2: 98% (04 Apr 2019 06:00) (96% - 98%)  I&O's Summary      Physical Exam:  Neuro  A&Ox3 VSS  Vascular:  rt achilles tendon wound stable  w limited healing     LABS:                        10.5   7.13  )-----------( 188      ( 04 Apr 2019 06:37 )             36.1     04-04    139  |  99  |  54<H>  ----------------------------<  98  3.9   |  25  |  1.67<H>    Ca    8.7      04 Apr 2019 06:37    TPro  6.5  /  Alb  3.0<L>  /  TBili  0.5  /  DBili  x   /  AST  12  /  ALT  10  /  AlkPhos  115  04-04    PT/INR - ( 03 Apr 2019 05:44 )   PT: 20.2 SEC;   INR: 1.74          PTT - ( 03 Apr 2019 05:44 )  PTT:85.3 SEC    GARCÍA HUANG MD  [phone]

## 2019-04-04 NOTE — CHART NOTE - NSCHARTNOTEFT_GEN_A_CORE
NUTRITION SERVICES                                                                                  MALNUTRITION ALERT     Attention Health Care Provider: Upon nutritional assessment by the Registered Dietitian your patient was determined to meet criteria / has evidence of the following diagnosis/diagnoses:    [ ] Mild Protein Calorie Malnutrition   [ ] Moderate Protein Calorie Malnutrition   [ X] Severe Protein Calorie Malnutrition   [ ] Unspecified Protein Calorie Malnutrition   [ ] Underweight / BMI <19  [ ] Morbid Obesity / BMI >40          PLAN OF CARE: Refer to Initial Dietitian Evaluation or Nutrition Follow-Up Documentation for Nutritional Recommendations.

## 2019-04-04 NOTE — DIETITIAN INITIAL EVALUATION ADULT. - NS AS NUTRI INTERV VITAMIN3
Consider Multivitamin with minerals + vitamin C to micronutrient coverage and wound healing./Multivitamin/mineral

## 2019-04-04 NOTE — PROGRESS NOTE ADULT - SUBJECTIVE AND OBJECTIVE BOX
[facility] NEPHROLOGY- PROGRESS NOTE    79y Female with history of CHF, COPD presents with RLE pain. Nephrology consulted for elevated Scr.    REVIEW OF SYSTEMS:  Gen: no changes in weight  Cards: no chest pain  Resp: + dyspnea (chronic)  GI: no nausea or vomiting or diarrhea  Vascular: RLE edema/pain    No Known Allergies      Hospital Medications: Medications reviewed    VITALS:  T(F): 97.5 (19 @ 06:00), Max: 97.9 (19 @ 14:21)  HR: 80 (19 @ 06:00)  BP: 116/76 (19 @ 06:00)  RR: 17 (19 @ 06:00)  SpO2: 98% (19 @ 06:00)  Wt(kg): --  Height (cm): 157.48 ( @ 00:02)  Weight (kg): 56.2 ( @ 00:02)  BMI (kg/m2): 22.7 ( @ 00:02)  BSA (m2): 1.56 ( @ 00:02)      PHYSICAL EXAM:    Gen: NAD, calm  Cards: Irregularly irregular, +S1/S2, no M/G/R  Resp: Bibasilar rales  GI: soft, NT/ND, NABS  Vascular: + RLE edema only    LABS:      139  |  99  |  54<H>  ----------------------------<  98  3.9   |  25  |  1.67<H>    Ca    8.7      2019 06:37    TPro  6.5  /  Alb  3.0<L>  /  TBili  0.5  /  DBili      /  AST  12  /  ALT  10  /  AlkPhos  115      Creatinine Trend: 1.67 <--, 1.52 <--, 1.60 <--                        10.5   7.13  )-----------( 188      ( 2019 06:37 )             36.1     Urine Studies:  Urinalysis Basic - ( 2019 12:30 )    Color: YELLOW / Appearance: TURBID / S.025 / pH: 5.5  Gluc: NEGATIVE / Ketone: NEGATIVE  / Bili: NEGATIVE / Urobili: 0.2   Blood: LARGE / Protein: 30 / Nitrite: NEGATIVE   Leuk Esterase: LARGE / RBC: 11-25 / WBC >50   Sq Epi:  / Non Sq Epi:  / Bacteria: FEW      Creatinine, Random Urine: 41.20 mg/dL ( @ 17:03)      RADIOLOGY & ADDITIONAL STUDIES:  < from: Xray Chest 1 View- PORTABLE-Routine (19 @ 11:41) >  IMPRESSION:  Loculated small right pleural effusion appears to have increased in size.   Small left pleural effusion with left lung base atelectasis. Pulmonary   edema.  Bilateral subsegmental atelectasis.  The cardiomediastinal silhouette is enlarged.  Status post cervical spine surgery with hardware.    < end of copied text >

## 2019-04-04 NOTE — DIETITIAN INITIAL EVALUATION ADULT. - PERTINENT LABORATORY DATA
04-04 Na139 mmol/L Glu 98 mg/dL K+ 3.9 mmol/L Cr  1.67 mg/dL<H> BUN 54 mg/dL<H> 04-04 Alb 3.0 g/dL<L>

## 2019-04-04 NOTE — PROGRESS NOTE ADULT - SUBJECTIVE AND OBJECTIVE BOX
CARDIOLOGY FOLLOW UP NOTE - DR. MARTINEZ    Subjective:    no chest pain, sob      PHYSICAL EXAM:  T(C): 36.4 (04-04-19 @ 06:00), Max: 36.6 (04-03-19 @ 14:21)  HR: 80 (04-04-19 @ 06:00) (68 - 92)  BP: 116/76 (04-04-19 @ 06:00) (103/80 - 128/75)  RR: 17 (04-04-19 @ 06:00) (17 - 17)  SpO2: 98% (04-04-19 @ 06:00) (96% - 98%)  Wt(kg): --  I&O's Summary      Appearance: Normal	  Cardiovascular: Normal S1 S2,RRR, No JVD, No murmurs  Respiratory: Lungs clear to auscultation	  Gastrointestinal:  Soft, Non-tender, + BS	  Extremities: Normal range of motion, rt foot bandaged    MEDICATIONS  (STANDING):  atorvastatin 40 milliGRAM(s) Oral at bedtime  buDESOnide 160 MICROgram(s)/formoterol 4.5 MICROgram(s) Inhaler 2 Puff(s) Inhalation two times a day  cefTRIAXone   IVPB 1 Gram(s) IV Intermittent every 24 hours  clopidogrel Tablet 75 milliGRAM(s) Oral daily  collagenase Ointment 1 Application(s) Topical daily  dabigatran 75 milliGRAM(s) Oral every 12 hours  erythromycin   Ointment 1 Application(s) Left EYE three times a day  metoprolol tartrate 75 milliGRAM(s) Oral two times a day  midodrine 5 milliGRAM(s) Oral every 8 hours  mupirocin 2% Ointment 1 Application(s) Topical <User Schedule>  pantoprazole  Injectable 40 milliGRAM(s) IV Push two times a day  thiamine 100 milliGRAM(s) Oral daily      TELEMETRY: 	    ECG:  	  RADIOLOGY:   DIAGNOSTIC TESTING:  [ ] Echocardiogram:  [ ] Catheterization:  [ ] Stress Test:    OTHER: 	    LABS:	 	    CARDIAC MARKERS:                                10.5   7.13  )-----------( 188      ( 04 Apr 2019 06:37 )             36.1     04-04    139  |  99  |  54<H>  ----------------------------<  98  3.9   |  25  |  1.67<H>    Ca    8.7      04 Apr 2019 06:37    TPro  6.5  /  Alb  3.0<L>  /  TBili  0.5  /  DBili  x   /  AST  12  /  ALT  10  /  AlkPhos  115  04-04    proBNP:   PT/INR - ( 03 Apr 2019 05:44 )   PT: 20.2 SEC;   INR: 1.74          PTT - ( 03 Apr 2019 05:44 )  PTT:85.3 SEC  Lipid Profile:   HgA1c:

## 2019-04-04 NOTE — PROGRESS NOTE ADULT - SUBJECTIVE AND OBJECTIVE BOX
Patient is a 79y old  Female who presents with a chief complaint of right leg wound/ swelling (04 Apr 2019 14:09)      INTERVAL HPI/OVERNIGHT EVENTS:  T(C): 36.7 (04-04-19 @ 14:25), Max: 36.7 (04-04-19 @ 14:25)  HR: 70 (04-04-19 @ 14:25) (70 - 92)  BP: 121/80 (04-04-19 @ 14:25) (103/80 - 121/80)  RR: 16 (04-04-19 @ 14:25) (16 - 17)  SpO2: 99% (04-04-19 @ 14:25) (96% - 99%)  Wt(kg): --  I&O's Summary      LABS:                        10.5   7.13  )-----------( 188      ( 04 Apr 2019 06:37 )             36.1     04-04    139  |  99  |  54<H>  ----------------------------<  98  3.9   |  25  |  1.67<H>    Ca    8.7      04 Apr 2019 06:37    TPro  6.5  /  Alb  3.0<L>  /  TBili  0.5  /  DBili  x   /  AST  12  /  ALT  10  /  AlkPhos  115  04-04    PT/INR - ( 03 Apr 2019 05:44 )   PT: 20.2 SEC;   INR: 1.74          PTT - ( 03 Apr 2019 05:44 )  PTT:85.3 SEC    CAPILLARY BLOOD GLUCOSE                MEDICATIONS  (STANDING):  atorvastatin 40 milliGRAM(s) Oral at bedtime  buDESOnide 160 MICROgram(s)/formoterol 4.5 MICROgram(s) Inhaler 2 Puff(s) Inhalation two times a day  cefTRIAXone   IVPB 1 Gram(s) IV Intermittent every 24 hours  clopidogrel Tablet 75 milliGRAM(s) Oral daily  collagenase Ointment 1 Application(s) Topical daily  dabigatran 75 milliGRAM(s) Oral every 12 hours  erythromycin   Ointment 1 Application(s) Left EYE three times a day  metoprolol tartrate 75 milliGRAM(s) Oral two times a day  midodrine 5 milliGRAM(s) Oral every 8 hours  mupirocin 2% Ointment 1 Application(s) Topical <User Schedule>  pantoprazole  Injectable 40 milliGRAM(s) IV Push two times a day  thiamine 100 milliGRAM(s) Oral daily    MEDICATIONS  (PRN):  levalbuterol Inhalation 0.63 milliGRAM(s) Inhalation every 6 hours PRN SOB  oxyCODONE    5 mG/acetaminophen 325 mG 1 Tablet(s) Oral every 6 hours PRN Moderate Pain and severe pain          PHYSICAL EXAM:  GENERAL: NAD, well-groomed, well-developed  HEAD:  Atraumatic, Normocephalic  CHEST/LUNG: Clear to percussion bilaterally; No rales, rhonchi, wheezing, or rubs  HEART: Regular rate and rhythm; No murmurs, rubs, or gallops  ABDOMEN: Soft, Nontender, Nondistended; Bowel sounds present  EXTREMITIES:  2+ Peripheral Pulses, No clubbing, cyanosis, or edema  LYMPH: No lymphadenopathy noted  SKIN: No rashes or lesions    Care Discussed with Consultants/Other Providers [ ] YES  [ ] NO

## 2019-04-05 LAB
ALBUMIN SERPL ELPH-MCNC: 3.2 G/DL — LOW (ref 3.3–5)
ALP SERPL-CCNC: 123 U/L — HIGH (ref 40–120)
ALT FLD-CCNC: 9 U/L — SIGNIFICANT CHANGE UP (ref 4–33)
ANION GAP SERPL CALC-SCNC: 12 MMO/L — SIGNIFICANT CHANGE UP (ref 7–14)
AST SERPL-CCNC: 14 U/L — SIGNIFICANT CHANGE UP (ref 4–32)
BILIRUB SERPL-MCNC: 0.5 MG/DL — SIGNIFICANT CHANGE UP (ref 0.2–1.2)
BUN SERPL-MCNC: 51 MG/DL — HIGH (ref 7–23)
CALCIUM SERPL-MCNC: 9 MG/DL — SIGNIFICANT CHANGE UP (ref 8.4–10.5)
CHLORIDE SERPL-SCNC: 100 MMOL/L — SIGNIFICANT CHANGE UP (ref 98–107)
CO2 SERPL-SCNC: 25 MMOL/L — SIGNIFICANT CHANGE UP (ref 22–31)
CREAT SERPL-MCNC: 1.62 MG/DL — HIGH (ref 0.5–1.3)
GLUCOSE SERPL-MCNC: 105 MG/DL — HIGH (ref 70–99)
HCT VFR BLD CALC: 37.6 % — SIGNIFICANT CHANGE UP (ref 34.5–45)
HGB BLD-MCNC: 11.1 G/DL — LOW (ref 11.5–15.5)
MCHC RBC-ENTMCNC: 26.3 PG — LOW (ref 27–34)
MCHC RBC-ENTMCNC: 29.5 % — LOW (ref 32–36)
MCV RBC AUTO: 89.1 FL — SIGNIFICANT CHANGE UP (ref 80–100)
NRBC # FLD: 0 K/UL — SIGNIFICANT CHANGE UP (ref 0–0)
PLATELET # BLD AUTO: 205 K/UL — SIGNIFICANT CHANGE UP (ref 150–400)
PMV BLD: 10.8 FL — SIGNIFICANT CHANGE UP (ref 7–13)
POTASSIUM SERPL-MCNC: 4.1 MMOL/L — SIGNIFICANT CHANGE UP (ref 3.5–5.3)
POTASSIUM SERPL-SCNC: 4.1 MMOL/L — SIGNIFICANT CHANGE UP (ref 3.5–5.3)
PROT SERPL-MCNC: 6.8 G/DL — SIGNIFICANT CHANGE UP (ref 6–8.3)
RBC # BLD: 4.22 M/UL — SIGNIFICANT CHANGE UP (ref 3.8–5.2)
RBC # FLD: 23 % — HIGH (ref 10.3–14.5)
SODIUM SERPL-SCNC: 137 MMOL/L — SIGNIFICANT CHANGE UP (ref 135–145)
WBC # BLD: 9.52 K/UL — SIGNIFICANT CHANGE UP (ref 3.8–10.5)
WBC # FLD AUTO: 9.52 K/UL — SIGNIFICANT CHANGE UP (ref 3.8–10.5)

## 2019-04-05 PROCEDURE — 99222 1ST HOSP IP/OBS MODERATE 55: CPT

## 2019-04-05 RX ORDER — SENNA PLUS 8.6 MG/1
2 TABLET ORAL AT BEDTIME
Qty: 0 | Refills: 0 | Status: DISCONTINUED | OUTPATIENT
Start: 2019-04-05 | End: 2019-04-10

## 2019-04-05 RX ORDER — DOCUSATE SODIUM 100 MG
100 CAPSULE ORAL THREE TIMES A DAY
Qty: 0 | Refills: 0 | Status: DISCONTINUED | OUTPATIENT
Start: 2019-04-05 | End: 2019-04-10

## 2019-04-05 RX ORDER — POLYETHYLENE GLYCOL 3350 17 G/17G
17 POWDER, FOR SOLUTION ORAL DAILY
Qty: 0 | Refills: 0 | Status: DISCONTINUED | OUTPATIENT
Start: 2019-04-05 | End: 2019-04-10

## 2019-04-05 RX ORDER — ACETAMINOPHEN 500 MG
1000 TABLET ORAL ONCE
Qty: 0 | Refills: 0 | Status: COMPLETED | OUTPATIENT
Start: 2019-04-05 | End: 2019-04-05

## 2019-04-05 RX ADMIN — POLYETHYLENE GLYCOL 3350 17 GRAM(S): 17 POWDER, FOR SOLUTION ORAL at 14:51

## 2019-04-05 RX ADMIN — OXYCODONE AND ACETAMINOPHEN 1 TABLET(S): 5; 325 TABLET ORAL at 19:50

## 2019-04-05 RX ADMIN — OXYCODONE AND ACETAMINOPHEN 1 TABLET(S): 5; 325 TABLET ORAL at 04:46

## 2019-04-05 RX ADMIN — MUPIROCIN 1 APPLICATION(S): 20 OINTMENT TOPICAL at 14:47

## 2019-04-05 RX ADMIN — Medication 1 APPLICATION(S): at 12:35

## 2019-04-05 RX ADMIN — Medication 100 MILLIGRAM(S): at 12:33

## 2019-04-05 RX ADMIN — BUDESONIDE AND FORMOTEROL FUMARATE DIHYDRATE 2 PUFF(S): 160; 4.5 AEROSOL RESPIRATORY (INHALATION) at 21:55

## 2019-04-05 RX ADMIN — Medication 1000 MILLIGRAM(S): at 22:10

## 2019-04-05 RX ADMIN — MIDODRINE HYDROCHLORIDE 5 MILLIGRAM(S): 2.5 TABLET ORAL at 14:47

## 2019-04-05 RX ADMIN — Medication 1 APPLICATION(S): at 05:05

## 2019-04-05 RX ADMIN — OXYCODONE AND ACETAMINOPHEN 1 TABLET(S): 5; 325 TABLET ORAL at 00:42

## 2019-04-05 RX ADMIN — PANTOPRAZOLE SODIUM 40 MILLIGRAM(S): 20 TABLET, DELAYED RELEASE ORAL at 17:49

## 2019-04-05 RX ADMIN — ATORVASTATIN CALCIUM 40 MILLIGRAM(S): 80 TABLET, FILM COATED ORAL at 21:55

## 2019-04-05 RX ADMIN — MIDODRINE HYDROCHLORIDE 5 MILLIGRAM(S): 2.5 TABLET ORAL at 21:55

## 2019-04-05 RX ADMIN — Medication 75 MILLIGRAM(S): at 17:49

## 2019-04-05 RX ADMIN — Medication 400 MILLIGRAM(S): at 21:55

## 2019-04-05 RX ADMIN — CLOPIDOGREL BISULFATE 75 MILLIGRAM(S): 75 TABLET, FILM COATED ORAL at 12:33

## 2019-04-05 RX ADMIN — DABIGATRAN ETEXILATE MESYLATE 75 MILLIGRAM(S): 150 CAPSULE ORAL at 17:49

## 2019-04-05 RX ADMIN — BUDESONIDE AND FORMOTEROL FUMARATE DIHYDRATE 2 PUFF(S): 160; 4.5 AEROSOL RESPIRATORY (INHALATION) at 09:44

## 2019-04-05 RX ADMIN — MIDODRINE HYDROCHLORIDE 5 MILLIGRAM(S): 2.5 TABLET ORAL at 05:05

## 2019-04-05 RX ADMIN — OXYCODONE AND ACETAMINOPHEN 1 TABLET(S): 5; 325 TABLET ORAL at 05:40

## 2019-04-05 RX ADMIN — Medication 75 MILLIGRAM(S): at 05:05

## 2019-04-05 RX ADMIN — OXYCODONE AND ACETAMINOPHEN 1 TABLET(S): 5; 325 TABLET ORAL at 18:51

## 2019-04-05 RX ADMIN — DABIGATRAN ETEXILATE MESYLATE 75 MILLIGRAM(S): 150 CAPSULE ORAL at 05:05

## 2019-04-05 RX ADMIN — Medication 1 APPLICATION(S): at 14:47

## 2019-04-05 RX ADMIN — PANTOPRAZOLE SODIUM 40 MILLIGRAM(S): 20 TABLET, DELAYED RELEASE ORAL at 05:04

## 2019-04-05 NOTE — PROGRESS NOTE ADULT - SUBJECTIVE AND OBJECTIVE BOX
Podiatry pager #: [phone]    Patient is a 79y old  Female who presents with a chief complaint of     HPI: 79 year old female pmhx chf, bri, paroxysmal a fib, cva/tia, htn, hld, c diff, copd, non healing ulcer RLE.  presents today with few day hx of worsening RLE pain, swelling and erythema.  pt was discharged from [facility] on 2/15 and had been sent to [facility] for rehab.  during hospital course followed by podiatry and vascular.  found to have poor ingrid/pvr and rec outpatient angio      PAST MEDICAL & SURGICAL HISTORY:  CAD (coronary artery disease): s/p stent 2018  CHF (congestive heart failure)  COPD (chronic obstructive pulmonary disease): on 2-3L O2 at home prn  Anxiety  TIA (transient ischemic attack): many years ago  PAF (paroxysmal atrial fibrillation)  HLD (hyperlipidemia)  HTN (hypertension)  H/O total hysterectomy: 2014  History of cataract surgery: b/l 2015  History of repair of hip fracture: luisa placed in RLE 2015  H/O spinal fusion: c2-6 in 2017      MEDICATIONS  (STANDING):    MEDICATIONS  (PRN):      Allergies    No Known Allergies    Intolerances        VITALS:    Vital Signs Last 24 Hrs  T(C): 36.6 (02 Apr 2019 11:44), Max: 36.6 (02 Apr 2019 11:44)  T(F): 97.9 (02 Apr 2019 11:44), Max: 97.9 (02 Apr 2019 11:44)  HR: 85 (02 Apr 2019 11:44) (85 - 100)  BP: 113/72 (02 Apr 2019 11:44) (104/64 - 113/72)  BP(mean): --  RR: 20 (02 Apr 2019 11:44) (18 - 20)  SpO2: 96% (02 Apr 2019 11:44) (96% - 96%)    LABS:                          11.2   8.48  )-----------( 213      ( 02 Apr 2019 11:36 )             38.0       04-02    140  |  98  |  56<H>  ----------------------------<  94  4.3   |  26  |  1.60<H>    Ca    9.2      02 Apr 2019 11:36    TPro  7.0  /  Alb  3.1<L>  /  TBili  0.7  /  DBili  x   /  AST  21  /  ALT  12  /  AlkPhos  136<H>  04-02      CAPILLARY BLOOD GLUCOSE              LOWER EXTREMITY PHYSICAL EXAM:  R Posterior heel ulceration, with exposed achilles tenon.   No local signs of infection. No probe to bone.  No drainage or malodor.  ++pain on exam  Pedal pulses non palp bilateral lower extremities  Sensation intact to b/l feet  No gross deformities  R foot +2 pitting edema.       RADIOLOGY & ADDITIONAL STUDIES:    XR pending

## 2019-04-05 NOTE — PROGRESS NOTE ADULT - ASSESSMENT
78 y/o female, with a PmHx of COPD, paroxsymal a-fib (on pradaxa), CAD (s/p stent 11/2018), DCHF, pulm htn, HTN, HLD, and anxiety presenting with nonhealing R posterior ankle ulcer and UTI     1. Chronic diastolic chf  no SOB   chest xray with increase right pleural effussion,.small left pleural effusion, pulm edema  creat noted,  given x ray findings , consider resuming lasix today if no plans for CT with contrast   cont bb  recent echo with nl lv fx     2.  Paroxysmal Atrial Fibrillation  rate controlled, c/w  lopressor to 75 mg BID  continue with midodrine 5 mg q 8 hrs for bp support  CHADS 3 - off pradaxa for possible angio     3. CAD s/p PCI (11/2018)  cv stable, no cp or sob   continue bb, statin, plavix     4. UTI   abx per med     5.  R posterior ankle ulcer   vascular f/u   plan for eventual RLE angio, pending optimization of renal fx     6. bri on ckd  renal f/u

## 2019-04-05 NOTE — PROGRESS NOTE ADULT - ASSESSMENT
79F w/ nonhealing R posterior ankle ulcer and arterial insuff    - planning for angiogram next Tuesday (04/09)  - please have nephrology document clearance for lower extremity angiogram  - Please document cardiac risk stratification and optimization if deemed necessary

## 2019-04-05 NOTE — PROGRESS NOTE ADULT - SUBJECTIVE AND OBJECTIVE BOX
Vascular Surgery Team Daily Progress Note    SUBJECTIVE: Patient seen and examined during the morning round, no over night event, no acute complaint.     OBJECTIVE:   Vital Signs Last 24 Hrs  T(C): 36.7 (05 Apr 2019 14:16), Max: 36.8 (05 Apr 2019 04:45)  T(F): 98 (05 Apr 2019 14:16), Max: 98.2 (05 Apr 2019 04:45)  HR: 80 (05 Apr 2019 17:52) (68 - 84)  BP: 120/77 (05 Apr 2019 17:52) (111/88 - 138/77)  BP(mean): --  RR: 20 (05 Apr 2019 14:16) (17 - 20)  SpO2: 99% (05 Apr 2019 14:16) (96% - 99%)    PHYSICAL EXAM:  Constitutional: resting in bed with no acute distress  Respiratory:  unlabored breathing  Extremities: RLE posterior ankle ulcer down to tendon with serous drainage, no purulence, palpable femoral and pop, dopplerable DP signal, All pulses palp on LLE    RADIOLOGY & ADDITIONAL STUDIES: no new studies performed

## 2019-04-05 NOTE — PROGRESS NOTE ADULT - ASSESSMENT
ED Provider Note  CHIEF COMPLAINT  Chief Complaint   Patient presents with   • Shortness of Breath   • Cough       HPI  Serenity Howe is a 69 y.o. female who presents to the emergency department with complaint of shortness of breath, cough and fever ×2 days. She's had increasing symptoms, nonproductive cough. She was recently diagnosed with pneumonia approximately 2 months prior and hospitalized for this. The patient states she has pain in the right upper posterior chest wall is well comes with coughing only. She is having exquisite shortness of breath especially with ambulating. Denies swelling of her lower extremities, chest pain, nausea, vomiting, does complain of lightheadedness with gambling as well. She does have a history of asthma and states she has been taking her Advair Diskus without help.    REVIEW OF SYSTEMS  Positives as above. Pertinent negatives include nausea, vomiting, chest pain, swelling her lower extremities  All other review of systems are negative    PAST MEDICAL HISTORY  Past Medical History:   Diagnosis Date   • ASTHMA    • Breast cancer (CMS-HCC)    • History of breast cancer 7/29/2015   • Hypertension    • Shoulder pain, right 7/29/2015   • Tremor 7/29/2015       FAMILY HISTORY  Noncontributory    SOCIAL HISTORY  Social History     Social History   • Marital status:      Spouse name: N/A   • Number of children: N/A   • Years of education: N/A     Social History Main Topics   • Smoking status: Never Smoker   • Smokeless tobacco: Never Used   • Alcohol use No   • Drug use: No   • Sexual activity: Not Currently     Other Topics Concern   • Not on file     Social History Narrative   • No narrative on file       SURGICAL HISTORY  Past Surgical History:   Procedure Laterality Date   • GYN SURGERY      cs   • MASTECTOMY      right        CURRENT MEDICATIONS  Home Medications     Reviewed by Trista Kline (Pharmacy Tech) on 03/03/18 at 1521  Med List Status: Complete  "  Medication Last Dose Status    verapamil ER (VERELAN) 120 MG CAPSULE SR 24 HR 3/2/2018 Active   ADVAIR DISKUS 250-50 MCG/DOSE AEROSOL POWDER, BREATH ACTIVATED 3/3/2018 Active   albuterol (PROVENTIL) 2.5mg/3ml Nebu Soln solution for nebulization 3/3/2018 Active   albuterol 108 (90 Base) MCG/ACT Aero Soln inhalation aerosol 3/3/2018 Active   aspirin (ASA) 81 MG CHEW 3/3/2018 Active   Calcium Carbonate-Vit D-Min (CALTRATE PLUS PO) 3/3/2018 Active   guaifenesin LA (MUCINEX) 600 MG TABLET SR 12 HR 3/3/2018 Active   meclizine (ANTIVERT) 25 MG Tab 3/2/2018 Active   valsartan-hydrochlorothiazide (DIOVAN-HCT) 80-12.5 MG per tablet 3/3/2018 Active                ALLERGIES  Allergies   Allergen Reactions   • Nkda [No Known Drug Allergy]        PHYSICAL EXAM  VITAL SIGNS: BP (!) 162/62   Pulse (!) 101   Temp (!) 38.2 °C (100.7 °F)   Resp 16   Ht 1.575 m (5' 2\")   Wt 77.1 kg (170 lb)   SpO2 94%   BMI 31.09 kg/m²    Constitutional: Well developed, Well nourished, distressed distress, Non-toxic appearance.   Eyes: PERRLA, EOMI, Conjunctiva normal, No discharge.   Cardiovascular: Tachycardic, Normal rhythm, No murmurs, No rubs, No gallops, and intact distal pulses.   Thorax & Lungs:  No respiratory distress, no rales, no rhonchi, No wheezing, No chest wall tenderness.   Abdomen: Bowel sounds normal, Soft, No tenderness, No guarding, No rebound, No pulsatile masses.   Skin: Warm, Dry, No erythema, No rash.   Extremities: Full range of motion, no deformity, no pedal edema bilaterally.  Neurologic: Alert & oriented x 3, No focal deficits noted, acting appropriately on exam.  Psychiatric: Affect normal for clinical presentation.      RADIOLOGY/PROCEDURES  CT-CTA CHEST PULMONARY ARTERY W/ RECONS   Final Result      1.  No CT evidence for pulmonary emboli.   2.  Clusters of ill-defined nodules in the RIGHT middle lobe and superior segment RIGHT lower lobe which may be inflammatory or neoplastic.   3.  Mild bilateral dependent " 78 yo f with PVD, right leg requiring revascularization. Mild igor, possible UTI      Problem/Plan - 1:  ·  Problem: Lower limb ulcer, ankle, right, with unspecified severity.  Plan: -Seen by podiatry and vascular , no need for abx. Vacular with plan for angiogram once kidney function improves  -right duplex negative for dvt  -wound care ordered  -pain meds prn.     Problem/Plan - 2:  ·  Problem: IGOR (acute kidney injury).  Plan: -likely with prerenal component in setting of decreased fluid intake  renal fumonitor cr    Problem/Plan - 3:  ·  Problem: Urinary tract infection with hematuria, site unspecified.  Plan: - pseudomonal UTI in [place] resistant to quinolones; sensitive to 3rd gen cephalosporin. Will place on ceftriaxone, follow  culture.     Problem/Plan - 4:  ·  Problem: Paroxysmal atrial fibrillation.  Plan: c/w Pradaxa, BB.     Problem/Plan - 5:  ·  Problem: Chronic obstructive pulmonary disease, unspecified COPD type.  Plan: c/w ics/laba, O2 atelectasis.   4.  Stable RIGHT lower lobe nodule.   5.  Stable low-density lesion in the lateral segment LEFT lobe liver.               DX-CHEST-PORTABLE (1 VIEW)   Final Result      No acute cardiopulmonary disease.        Results for orders placed or performed during the hospital encounter of 03/03/18   LACTIC ACID   Result Value Ref Range    Lactic Acid 2.8 (H) 0.5 - 2.0 mmol/L   LACTIC ACID   Result Value Ref Range    Lactic Acid 1.9 0.5 - 2.0 mmol/L   CBC WITH DIFFERENTIAL   Result Value Ref Range    WBC 10.5 4.8 - 10.8 K/uL    RBC 4.54 4.20 - 5.40 M/uL    Hemoglobin 14.9 12.0 - 16.0 g/dL    Hematocrit 43.7 37.0 - 47.0 %    MCV 96.3 81.4 - 97.8 fL    MCH 32.8 27.0 - 33.0 pg    MCHC 34.1 33.6 - 35.0 g/dL    RDW 45.5 35.9 - 50.0 fL    Platelet Count 191 164 - 446 K/uL    MPV 9.6 9.0 - 12.9 fL    Neutrophils-Polys 72.70 (H) 44.00 - 72.00 %    Lymphocytes 10.50 (L) 22.00 - 41.00 %    Monocytes 15.60 (H) 0.00 - 13.40 %    Eosinophils 0.40 0.00 - 6.90 %    Basophils 0.50 0.00 - 1.80 %    Immature Granulocytes 0.30 0.00 - 0.90 %    Nucleated RBC 0.00 /100 WBC    Neutrophils (Absolute) 7.62 (H) 2.00 - 7.15 K/uL    Lymphs (Absolute) 1.10 1.00 - 4.80 K/uL    Monos (Absolute) 1.63 (H) 0.00 - 0.85 K/uL    Eos (Absolute) 0.04 0.00 - 0.51 K/uL    Baso (Absolute) 0.05 0.00 - 0.12 K/uL    Immature Granulocytes (abs) 0.03 0.00 - 0.11 K/uL    NRBC (Absolute) 0.00 K/uL   COMP METABOLIC PANEL   Result Value Ref Range    Bun 12 8 - 22 mg/dL    Sodium 137 135 - 145 mmol/L    Potassium 4.5 3.6 - 5.5 mmol/L    Chloride 106 96 - 112 mmol/L    Co2 18 (L) 20 - 33 mmol/L    Anion Gap 13.0 (H) 0.0 - 11.9    Glucose 86 65 - 99 mg/dL    Creatinine 0.78 0.50 - 1.40 mg/dL    Calcium 8.6 8.5 - 10.5 mg/dL    AST(SGOT) 24 12 - 45 U/L    ALT(SGPT) 16 2 - 50 U/L    Alkaline Phosphatase 55 30 - 99 U/L    Total Bilirubin 0.4 0.1 - 1.5 mg/dL    Albumin 3.8 3.2 - 4.9 g/dL    Total Protein 7.6 6.0 - 8.2 g/dL    Globulin 3.8 (H) 1.9 - 3.5 g/dL    A-G  Ratio 1.0 g/dL   URINALYSIS   Result Value Ref Range    Color Yellow     Character Clear     Specific Gravity 1.028 <1.035    Ph 6.5 5.0 - 8.0    Glucose Negative Negative mg/dL    Ketones Negative Negative mg/dL    Protein Negative Negative mg/dL    Bilirubin Negative Negative    Urobilinogen, Urine 0.2 Negative    Nitrite Negative Negative    Leukocyte Esterase Moderate (A) Negative    Occult Blood Negative Negative    Micro Urine Req Microscopic    Influenza By PCR, A/B   Result Value Ref Range    Influenza virus A RNA Negative Negative    Influenza virus B, PCR Negative Negative   BNP   Result Value Ref Range    B Natriuretic Peptide 109 (H) 0 - 100 pg/mL   TROPONIN   Result Value Ref Range    Troponin I <0.01 0.00 - 0.04 ng/mL   ESTIMATED GFR   Result Value Ref Range    GFR If African American >60 >60 mL/min/1.73 m 2    GFR If Non African American >60 >60 mL/min/1.73 m 2   URINE MICROSCOPIC (W/UA)   Result Value Ref Range    WBC 10-20 (A) /hpf    RBC 0-2 /hpf    Bacteria Negative None /hpf    Epithelial Cells Moderate (A) /hpf    Hyaline Cast 0-2 /lpf   EKG (ER)   Result Value Ref Range    Report       [facility] Emergency Dept.    Test Date:  2018  Pt Name:    STEPHEN BETANCUR                Department: ER  MRN:        [id number]                      Room:       [facility]  Gender:     Female                       Technician: [id number]  :        [id number]                   Requested By:ANIKA FORD  Order #:    [id number]                    Reading MD: ANIKA FORD, DO    Measurements  Intervals                                Axis  Rate:       97                           P:          56  RI:         148                          QRS:        54  QRSD:       88                           T:          56  QT:         344  QTc:        437    Interpretive Statements  SINUS RHYTHM  CONSIDER RIGHT ATRIAL ABNORMALITY  BORDERLINE ST DEPRESSION, ANTEROLATERAL LEADS  Compared to ECG 2018  11:25:13  ST (T wave) deviation now present    Electronically Signed On 3-3-2018 19:02:20 PST by ANIKA GONZALES, DO       IV 1 L normal saline fluid boluses admitted secondary to patient's dry mucous membranes, lactic acidosis and tachycardia. On reevaluation, she responded appropriately to IV fluids.    COURSE & MEDICAL DECISION MAKING  Pertinent Labs & Imaging studies reviewed. (See chart for details)  This is a 69-year-old female that presents with shortness of breath, cough. She has notes of overt heart failure with a BNP of 100, EKG shows no significant change and troponin is negative. X-rays negative and for this reason I'm CT scan pulmonary and that was completed secondary to recent hospitalization and her tachypnea and shortness of breath. CTA was negative for pulmonary and a gram although she does have evidence of interstitial prominences [place] infectious in etiology although mass cannot be excluded. The patient did receive cefepime as well as vancomycin for hospital acquired pneumonia and she does have a slight lactic acidosis. The patient does not have hypotension requiring vasopressors, she has no evidence of impending respiratory arrest requiring intubation. I discussed the patient Dr. Taylor for further evaluation and management and hospitalization.    New Prescriptions    No medications on file       FINAL IMPRESSION  Pneumonia  Sepsis         Electronically signed by: Anika Gonzales, 3/3/2018 2:40 PM

## 2019-04-05 NOTE — CONSULT NOTE ADULT - SUBJECTIVE AND OBJECTIVE BOX
Rica Cotton - [phone]    Patient is a 79y old  Female who presents with a chief complaint of right leg wound/ swelling (05 Apr 2019 13:30)    HPI:  79F w COPD (currently on 3L O2 24 hours), paroxsymal a-fib (on pradaxa), h/o CAD (s/p stent 11/2018), CHF, HTN, HLD, and anxiety - admitted 4/2/19 to expedite vascular work up for chronic R leg wound exposing tendon.  MRI 12/2018 did not show OM.  Last admission 2/2019 +cdiff and asymptomatic bacteriuria that was not treated.  This admission, urinalysis with pyuria.  UC > 3 organisms.  Also, asymptomatic.  Antibiotics for 3 days - now off.    ID asked to help management.    prior hospital charts reviewed [x  ]  primary team notes reviewed [ x]  other consultant notes reviewed [x  ]    PAST MEDICAL & SURGICAL HISTORY:  CAD (coronary artery disease): s/p stent 2018  CHF (congestive heart failure)  COPD (chronic obstructive pulmonary disease): on 2-3L O2 at home prn  Anxiety  TIA (transient ischemic attack): many years ago  PAF (paroxysmal atrial fibrillation)  HLD (hyperlipidemia)  HTN (hypertension)  H/O total hysterectomy: 2014  History of cataract surgery: b/l 2015  History of repair of hip fracture: luisa placed in RLE 2015  H/O spinal fusion: c2-6 in 2017    Allergies  No Known Allergies    ANTIMICROBIALS:    piperacillin/tazobactam IVPB (04-02-19 @ 12:31)  cephalexin x1 4/2  cefTRIAXone   IVPB (4/3-4)    MEDICATIONS  (STANDING):  atorvastatin 40 at bedtime  buDESOnide 160 MICROgram(s)/formoterol 4.5 MICROgram(s) Inhaler 2 two times a day  clopidogrel Tablet 75 daily  dabigatran 75 every 12 hours  docusate sodium 100 three times a day  metoprolol tartrate 75 two times a day  midodrine 5 every 8 hours  pantoprazole  Injectable 40 two times a day  polyethylene glycol 3350 17 daily  senna 2 at bedtime    SOCIAL HISTORY:  Past smoker. No EtOH. Lives with daughter.     FAMILY HISTORY:  DM in mother    REVIEW OF SYSTEMS  [  ] ROS unobtainable because:    [  ] All other systems negative except as noted below:	    Constitutional:  [ ] fever [ ] chills  [ ] weight loss  [ ] weakness  Skin:  [ ] rash [ ] phlebitis	  Eyes: [ ] icterus [ ] pain  [ ] discharge	  ENMT: [ ] sore throat  [ ] thrush [ ] ulcers [ ] exudates  Respiratory: [ ] dyspnea [ ] hemoptysis [ ] cough [ ] sputum	  Cardiovascular:  [ ] chest pain [ ] palpitations [ ] edema	  Gastrointestinal:  [ ] nausea [ ] vomiting [ ] diarrhea [ ] constipation [ ] pain	  Genitourinary:  [ ] dysuria [ ] frequency [ ] hematuria [ ] discharge [ ] flank pain  [ ] incontinence  Musculoskeletal:  [ ] myalgias [ ] arthralgias [ ] arthritis  [ ] back pain  Neurological:  [ ] headache [ ] seizures  [ ] confusion/altered mental status  Psychiatric:  [ ] anxiety [ ] depression	  Hematology/Lymphatics:  [ ] lymphadenopathy  Endocrine:  [ ] adrenal [ ] thyroid  Allergic/Immunologic:	 [ ] transplant [ ] seasonal    Vital Signs Last 24 Hrs  T(F): 98 (04-05-19 @ 14:16), Max: 98.4 (04-03-19 @ 00:02)    Vital Signs Last 24 Hrs  HR: 84 (04-05-19 @ 14:16) (68 - 84)  BP: 138/77 (04-05-19 @ 14:16) (111/88 - 138/77)  RR: 20 (04-05-19 @ 14:16)  SpO2: 99% (04-05-19 @ 14:16) (96% - 99%)  Wt(kg): --                            11.1   9.52  )-----------( 205      ( 05 Apr 2019 07:15 )             37.6     137  |  100  |  51<H>  ----------------------------<  105<H>  4.1   |  25  |  1.62<H>    Ca    9.0      05 Apr 2019 07:15    TPro  6.8  /  Alb  3.2<L>  /  TBili  0.5  /  DBili  x   /  AST  14  /  ALT  9   /  AlkPhos  123<H>  04-05    Urinalysis (04.02.19 @ 12:30)    Color: YELLOW    Urine Appearance: TURBID    Glucose: NEGATIVE    Bilirubin: NEGATIVE    Ketone - Urine: NEGATIVE    Specific Gravity: 1.025    Blood: LARGE    pH - Urine: 5.5    Protein, Urine: 30    Urobilinogen: 0.2    Nitrite: NEGATIVE    Leukocyte Esterase Concentration: LARGE    Red Blood Cell - Urine: 11-25    White Blood Cell - Urine: >50    Bacteria: FEW    Budding Yeast: FEW    MICROBIOLOGY:  Culture - Urine (collected 02 Apr 2019 13:02)  Source: URINE MIDSTREAM  Final Report (03 Apr 2019 08:50):    Culture grew 3 or more types of organisms which indicate    collection contamination; consider recollection only if    clinically indicated.    Clostridium difficile Toxin by PCR: DETECTED: ***** CRITICAL RESULT *****  PERSON CALLED / READ-BACK: FLYNN JC RN/Y  DATE / TIME CALLED: 01/28/19 0601  C. difficile toxin B gene (tcdb) detected  (01.28.19 @ 05:05)    RADIOLOGY:  imaging below personally reviewed    US Kidney and Bladder (04.04.19 @ 10:25) >  IMPRESSION: Renal parenchymal disease without hydronephrosis. Patient unable to void. Small free fluid in the abdomen and pelvis.    Xray Chest 1 View- PORTABLE-Routine (04.03.19 @ 11:41) >  IMPRESSION:  Loculated small right pleural effusion appears to have increased in size. Small left pleural effusion with left lung base atelectasis. Pulmonary edema.  Bilateral subsegmental atelectasis.  The cardiomediastinal silhouette is enlarged.  Status post cervical spine surgery with hardware.    Xray Foot AP + Lateral + Oblique, Right (04.02.19 @ 15:33) >  IMPRESSION:  Dorsal soft tissue swelling. Posterior ankle region skin surface rounded soft tissue defect/ulceration. No tracking soft tissue gas collections, radiopaque foreign bodies, or gross radiographic evidence for osteomyelitis.    US Duplex Venous Lower Ext Ltd, Right (04.02.19 @ 13:38) >  IMPRESSION:  No evidence of right lower extremity deep venous thrombosis.    MR Ankle w/wo IV Cont, Right (12.21.18 @ 22:15) >  Impression:   No osteomyelitis or abscess. Rica Cotton - [phone]    Patient is a 79y old  Female who presents with a chief complaint of right leg wound/ swelling (05 Apr 2019 13:30)    HPI:  79F w COPD (currently on 3L O2 24 hours), paroxsymal a-fib (on pradaxa), h/o CAD (s/p stent 11/2018), CHF, HTN, HLD, and anxiety - admitted 4/2/19 to expedite vascular work up for chronic R leg wound exposing tendon.  MRI 12/2018 did not show OM.  Last admission 2/2019 +cdiff and asymptomatic bacteriuria that was not treated.  This admission, urinalysis with pyuria.  UC > 3 organisms.  Also, asymptomatic.  Antibiotics for 3 days - now off.    ID asked to help management.    prior hospital charts reviewed [x  ]  primary team notes reviewed [ x]  other consultant notes reviewed [x  ]    PAST MEDICAL & SURGICAL HISTORY:  CAD (coronary artery disease): s/p stent 2018  CHF (congestive heart failure)  COPD (chronic obstructive pulmonary disease): on 2-3L O2 at home prn  Anxiety  TIA (transient ischemic attack): many years ago  PAF (paroxysmal atrial fibrillation)  HLD (hyperlipidemia)  HTN (hypertension)  H/O total hysterectomy: 2014  History of cataract surgery: b/l 2015  History of repair of hip fracture: luisa placed in RLE 2015  H/O spinal fusion: c2-6 in 2017    Allergies  No Known Allergies    ANTIMICROBIALS:    piperacillin/tazobactam IVPB (04-02-19 @ 12:31)  cephalexin x1 4/2  cefTRIAXone   IVPB (4/3-4)    MEDICATIONS  (STANDING):  atorvastatin 40 at bedtime  buDESOnide 160 MICROgram(s)/formoterol 4.5 MICROgram(s) Inhaler 2 two times a day  clopidogrel Tablet 75 daily  dabigatran 75 every 12 hours  docusate sodium 100 three times a day  metoprolol tartrate 75 two times a day  midodrine 5 every 8 hours  pantoprazole  Injectable 40 two times a day  polyethylene glycol 3350 17 daily  senna 2 at bedtime    SOCIAL HISTORY:  Past smoker. No EtOH. Lives with daughter.     FAMILY HISTORY:  DM in mother    REVIEW OF SYSTEMS  [  ] ROS unobtainable because:    [  ] All other systems negative except as noted below:	    Constitutional:  [ ] fever [ ] chills  [ ] weight loss  [ ] weakness  Skin:  [ ] rash [ ] phlebitis	  Eyes: [ ] icterus [ ] pain  [ ] discharge	  ENMT: [ ] sore throat  [ ] thrush [ ] ulcers [ ] exudates  Respiratory: [ ] dyspnea [ ] hemoptysis [ ] cough [ ] sputum	  Cardiovascular:  [ ] chest pain [ ] palpitations [ ] edema	  Gastrointestinal:  [ ] nausea [ ] vomiting [ ] diarrhea [ x] constipation [ ] pain	  Genitourinary:  [ ] dysuria [ ] frequency [ ] hematuria [ ] discharge [ ] flank pain  [ ] incontinence  Musculoskeletal:  [ ] myalgias [ ] arthralgias [ ] arthritis  [ x] right foot pain  Neurological:  [ ] headache [ ] seizures  [ ] confusion/altered mental status  Psychiatric:  [ ] anxiety [ ] depression	  Hematology/Lymphatics:  [ ] lymphadenopathy  Endocrine:  [ ] adrenal [ ] thyroid  Allergic/Immunologic:	 [ ] transplant [ ] seasonal    Vital Signs Last 24 Hrs  T(F): 98 (04-05-19 @ 14:16), Max: 98.4 (04-03-19 @ 00:02)    Vital Signs Last 24 Hrs  HR: 84 (04-05-19 @ 14:16) (68 - 84)  BP: 138/77 (04-05-19 @ 14:16) (111/88 - 138/77)  RR: 20 (04-05-19 @ 14:16)  SpO2: 99% (04-05-19 @ 14:16) (96% - 99%)  Wt(kg): --    PHYSICAL EXAM:  General: non-toxic  HEAD/EYES: anicteric  ENT:  supple; no thrush; edentulous  Cardiovascular:   S1, S2  Respiratory:  clear bilaterally  GI:  soft, non-tender, normal bowel sounds but distended  :  no barney  Musculoskeletal:  R heel achilles exposed; no cellulitis; no pus  Neurologic:  grossly non-focal  Skin:  no rash  Psychiatric:  appropriate affect  Vascular:  no phlebitis                        11.1   9.52  )-----------( 205      ( 05 Apr 2019 07:15 )             37.6     137  |  100  |  51<H>  ----------------------------<  105<H>  4.1   |  25  |  1.62<H>    Ca    9.0      05 Apr 2019 07:15    TPro  6.8  /  Alb  3.2<L>  /  TBili  0.5  /  DBili  x   /  AST  14  /  ALT  9   /  AlkPhos  123<H>  04-05    Urinalysis (04.02.19 @ 12:30)    Color: YELLOW    Urine Appearance: TURBID    Glucose: NEGATIVE    Bilirubin: NEGATIVE    Ketone - Urine: NEGATIVE    Specific Gravity: 1.025    Blood: LARGE    pH - Urine: 5.5    Protein, Urine: 30    Urobilinogen: 0.2    Nitrite: NEGATIVE    Leukocyte Esterase Concentration: LARGE    Red Blood Cell - Urine: 11-25    White Blood Cell - Urine: >50    Bacteria: FEW    Budding Yeast: FEW    MICROBIOLOGY:  Culture - Urine (collected 02 Apr 2019 13:02)  Source: URINE MIDSTREAM  Final Report (03 Apr 2019 08:50):    Culture grew 3 or more types of organisms which indicate    collection contamination; consider recollection only if    clinically indicated.    Clostridium difficile Toxin by PCR: DETECTED: ***** CRITICAL RESULT *****  PERSON CALLED / READ-BACK: FLYNN JC RN/Y  DATE / TIME CALLED: 01/28/19 0601  C. difficile toxin B gene (tcdb) detected  (01.28.19 @ 05:05)    RADIOLOGY:  imaging below personally reviewed    US Kidney and Bladder (04.04.19 @ 10:25) >  IMPRESSION: Renal parenchymal disease without hydronephrosis. Patient unable to void. Small free fluid in the abdomen and pelvis.    Xray Chest 1 View- PORTABLE-Routine (04.03.19 @ 11:41) >  IMPRESSION:  Loculated small right pleural effusion appears to have increased in size. Small left pleural effusion with left lung base atelectasis. Pulmonary edema.  Bilateral subsegmental atelectasis.  The cardiomediastinal silhouette is enlarged.  Status post cervical spine surgery with hardware.    Xray Foot AP + Lateral + Oblique, Right (04.02.19 @ 15:33) >  IMPRESSION:  Dorsal soft tissue swelling. Posterior ankle region skin surface rounded soft tissue defect/ulceration. No tracking soft tissue gas collections, radiopaque foreign bodies, or gross radiographic evidence for osteomyelitis.    US Duplex Venous Lower Ext Ltd, Right (04.02.19 @ 13:38) >  IMPRESSION:  No evidence of right lower extremity deep venous thrombosis.    MR Ankle w/wo IV Cont, Right (12.21.18 @ 22:15) >  Impression:   No osteomyelitis or abscess.

## 2019-04-05 NOTE — PROGRESS NOTE ADULT - ASSESSMENT
79y Female with history of CHF, COPD presents with RLE pain. Nephrology consulted for elevated Scr.    1) IGOR: in setting of infection and diuretic use with component of urinary retention given renal US results? Check bladder scan today and if > 250 ml, would straight cath. Patient educated on risks of ROSIE as planned to have angiogram next week. Would minimize dye load to reduce risk of ROSIE and hold diuretics on day of procedure. Avoid nephrotoxins.  2) CKD-3: Patient appears to have h/o age appropriate CKD-3 (baseline Scr 1.1-1.2) with mild proteinuria for which will defer inpatient CKD work up. Monitor electrolytes.  3) Orthostatic hypotension: BP acceptable. Continue with midodrine 5 mg TID. Rate control with metoprolol as per cardiology. Monitor BP.  4) LE edema: CXR reviewed with pleural effusions and pulmonary edema. Given stable dyspnea with plans for angiogram next week, can continue to hold lasix and restart if patient becomes symptomatic. Monitor UO.  5) Pyuria/Microscopic hematuria: Urine culture negative s/p CTX. Follow up ID recommendations.

## 2019-04-05 NOTE — PROGRESS NOTE ADULT - SUBJECTIVE AND OBJECTIVE BOX
[facility]      Seen earlier today, and examined.  - Today, patient is without complaints.           *****MEDICATIONS: Current medication reviewed and documented.    MEDICATIONS  (STANDING):  atorvastatin 40 milliGRAM(s) Oral at bedtime  buDESOnide 160 MICROgram(s)/formoterol 4.5 MICROgram(s) Inhaler 2 Puff(s) Inhalation two times a day  clopidogrel Tablet 75 milliGRAM(s) Oral daily  collagenase Ointment 1 Application(s) Topical daily  dabigatran 75 milliGRAM(s) Oral every 12 hours  docusate sodium 100 milliGRAM(s) Oral three times a day  metoprolol tartrate 75 milliGRAM(s) Oral two times a day  midodrine 5 milliGRAM(s) Oral every 8 hours  mupirocin 2% Ointment 1 Application(s) Topical <User Schedule>  pantoprazole  Injectable 40 milliGRAM(s) IV Push two times a day  polyethylene glycol 3350 17 Gram(s) Oral daily  senna 2 Tablet(s) Oral at bedtime  thiamine 100 milliGRAM(s) Oral daily    MEDICATIONS  (PRN):  levalbuterol Inhalation 0.63 milliGRAM(s) Inhalation every 6 hours PRN SOB  oxyCODONE    5 mG/acetaminophen 325 mG 1 Tablet(s) Oral every 6 hours PRN Moderate Pain and severe pain          ***** VITAL SIGNS:  T(F): 98 (19 @ 14:16), Max: 98.2 (19 @ 04:45)  HR: 84 (19 @ 14:16) (68 - 84)  BP: 138/77 (19 @ 14:16) (111/88 - 138/77)  RR: 20 (19 @ 14:16) (17 - 20)  SpO2: 99% (19 @ 14:16) (96% - 99%)  Wt(kg): --  ,   I&O's Summary           *****PHYSICAL EXAM:Alert oriented x2  Attention comprehension are fair. Able to name, repeat,  without any difficulty.   Able to follow 1-2  step commands.     EOMI fundi not visualized,     No facial asymmetry   Tongue is midline   Palate elevates symmetrically   Moving all 4 ext symmetrically no pronator drift         Gait : not assessed.  B/L down going toes          *****LAB AND IMAGIN.1   9.52  )-----------( 205      ( 2019 07:15 )             37.6               04-05    137  |  100  |  51<H>  ----------------------------<  105<H>  4.1   |  25  |  1.62<H>    Ca    9.0      2019 07:15    TPro  6.8  /  Alb  3.2<L>  /  TBili  0.5  /  DBili  x   /  AST  14  /  ALT  9   /  AlkPhos  123<H>  04-                         [All pertinent recent Imaging/Reports reviewed]           *****A S S E S S M E N T   A N D   P L A N :     80 y/o female, with a PmHx of COPD (currently on 3L O2 24 hours), paroxsymal a-fib (on pradaxa), lsited h/o CAD (s/p stent 2018), CHF (although recent TTE with normal biventricular function, with no mention of diastolic dysfunction; + severely dilated left atrium), HTN, HLD, and anxiety, presenting to the [facility] ED with chronic right ankle pressure ulcer pain for 1 week. The patient has a posterior ankle ulcer down to tendon with scant serosanguinous drainag    Problem/Recommendations 1: ams due to pain/uti   pain control   uti being treated.   will continue to monitor    ID on board         Thank you for allowing me to participate in the care of this patient. Please do not hesitate to call me if you have any  questions.        ________________  Kary Mercado MD  [facility] ([facility]  [phone]      30 minutes spent on total encounter; more than 50 % of the visit was  spent counseling about plan of care, compliance to diet/exercise and medication regimen and or  coordinating care by the attending physician.      It is advised that s stroke patients follow up with [facility] @ [phone] in 1- 2 weeks.   Others please follow up with Dr. Michael Nissenbaum [phone]

## 2019-04-05 NOTE — PROGRESS NOTE ADULT - SUBJECTIVE AND OBJECTIVE BOX
CARDIOLOGY FOLLOW UP - Dr. Vieyra    CC no cp or sob       PHYSICAL EXAM:  T(C): 36.8 (04-05-19 @ 04:45), Max: 36.8 (04-05-19 @ 04:45)  HR: 68 (04-05-19 @ 04:45) (68 - 71)  BP: 116/68 (04-05-19 @ 04:45) (111/88 - 131/89)  RR: 18 (04-05-19 @ 04:45) (16 - 18)  SpO2: 96% (04-05-19 @ 04:45) (96% - 99%)  Wt(kg): --  I&O's Summary      Appearance: Normal	  Cardiovascular: Normal S1 S2,RRR, No JVD, No murmurs  Respiratory: crackles r>L  Gastrointestinal:  Soft, Non-tender, + BS	  Extremities: le dressing cdi        MEDICATIONS  (STANDING):  atorvastatin 40 milliGRAM(s) Oral at bedtime  buDESOnide 160 MICROgram(s)/formoterol 4.5 MICROgram(s) Inhaler 2 Puff(s) Inhalation two times a day  clopidogrel Tablet 75 milliGRAM(s) Oral daily  collagenase Ointment 1 Application(s) Topical daily  dabigatran 75 milliGRAM(s) Oral every 12 hours  erythromycin   Ointment 1 Application(s) Left EYE three times a day  metoprolol tartrate 75 milliGRAM(s) Oral two times a day  midodrine 5 milliGRAM(s) Oral every 8 hours  mupirocin 2% Ointment 1 Application(s) Topical <User Schedule>  pantoprazole  Injectable 40 milliGRAM(s) IV Push two times a day  thiamine 100 milliGRAM(s) Oral daily      TELEMETRY: 	    ECG:  	  RADIOLOGY:   DIAGNOSTIC TESTING:  [ ] Echocardiogram:  [ ]  Catheterization:  [ ] Stress Test:    OTHER: 	  < from: Xray Chest 1 View- PORTABLE-Routine (04.03.19 @ 11:41) >    IMPRESSION:  Loculated small right pleural effusion appears to have increased in size.   Small left pleural effusion with left lung base atelectasis. Pulmonary   edema.  Bilateral subsegmental atelectasis.  The cardiomediastinal silhouette is enlarged.  Status post cervical spine surgery with hardware.        < end of copied text >    LABS:	 	                                11.1   9.52  )-----------( 205      ( 05 Apr 2019 07:15 )             37.6     04-05    137  |  100  |  51<H>  ----------------------------<  105<H>  4.1   |  25  |  1.62<H>    Ca    9.0      05 Apr 2019 07:15    TPro  6.8  /  Alb  3.2<L>  /  TBili  0.5  /  DBili  x   /  AST  14  /  ALT  9   /  AlkPhos  123<H>  04-05

## 2019-04-05 NOTE — CONSULT NOTE ADULT - ASSESSMENT
79F with copd on O2, afib, cad/stent, chf, htn, chol, anxiety with recent history of Cdiff and chronic right heel ulcer and exposed tendon.  Prior MRI did not show abscess or underlying OM.  Pyuria and UC with >3 organisms.  She is asymptomatic.  Would observe her off antibiotics (ree lack of sx and recent cdiff).    Suggest:  - no antibiotics    above d/w medicine    [id number]    Please call the ID service [phone] with questions or concerns over the weekend

## 2019-04-05 NOTE — PROGRESS NOTE ADULT - SUBJECTIVE AND OBJECTIVE BOX
[facility] NEPHROLOGY- PROGRESS NOTE    79y Female with history of CHF, COPD presents with RLE pain. Nephrology consulted for elevated Scr.    REVIEW OF SYSTEMS:  Gen: no changes in weight  Cards: no chest pain  Resp: + dyspnea (chronic)  GI: + nausea and vomiting this morning, no diarrhea  Vascular: RLE edema/pain    No Known Allergies      Hospital Medications: Medications reviewed      VITALS:  T(F): 98.2 (19 @ 04:45), Max: 98.2 (19 @ 04:45)  HR: 68 (19 @ 04:45)  BP: 116/68 (19 @ 04:45)  RR: 18 (19 @ 04:45)  SpO2: 96% (19 @ 04:45)  Wt(kg): --      PHYSICAL EXAM:    Gen: NAD, calm  Cards: Irregularly irregular, +S1/S2, no M/G/R  Resp: Bibasilar rales  GI: soft, NT, + ab distention, NABS  Vascular: + RLE edema only        LABS:      137  |  100  |  51<H>  ----------------------------<  105<H>  4.1   |  25  |  1.62<H>    Ca    9.0      2019 07:15    TPro  6.8  /  Alb  3.2<L>  /  TBili  0.5  /  DBili      /  AST  14  /  ALT  9   /  AlkPhos  123<H>      Creatinine Trend: 1.62 <--, 1.67 <--, 1.52 <--, 1.60 <--                        11.1   9.52  )-----------( 205      ( 2019 07:15 )             37.6     Urine Studies:  Urinalysis Basic - ( 2019 12:30 )    Color: YELLOW / Appearance: TURBID / S.025 / pH: 5.5  Gluc: NEGATIVE / Ketone: NEGATIVE  / Bili: NEGATIVE / Urobili: 0.2   Blood: LARGE / Protein: 30 / Nitrite: NEGATIVE   Leuk Esterase: LARGE / RBC: 11-25 / WBC >50   Sq Epi:  / Non Sq Epi:  / Bacteria: FEW      Creatinine, Random Urine: 41.20 mg/dL ( @ 17:03)      < from: US Kidney and Bladder (19 @ 10:25) >  IMPRESSION:     Renal parenchymal disease without hydronephrosis.  Patient unable to void.   Small free fluid in the abdomen and pelvis.    < end of copied text >

## 2019-04-05 NOTE — PROGRESS NOTE ADULT - ATTENDING COMMENTS
[facility] NEPHROLOGY  Harish Valera M.D.  Kevin Cedeno D.O.  Rachele Mckeon M.D.  Christina Westfall, MSN, ANP-C    Telephone: [phone]  Facsimile: [phone]    [address] [place] [address]

## 2019-04-05 NOTE — PROGRESS NOTE ADULT - SUBJECTIVE AND OBJECTIVE BOX
Patient is a 79y old  Female who presents with a chief complaint of right leg wound/ swelling (05 Apr 2019 15:01)      INTERVAL HPI/OVERNIGHT EVENTS:  T(C): 36.7 (04-05-19 @ 14:16), Max: 36.8 (04-05-19 @ 04:45)  HR: 80 (04-05-19 @ 17:52) (68 - 84)  BP: 120/77 (04-05-19 @ 17:52) (111/88 - 138/77)  RR: 20 (04-05-19 @ 14:16) (17 - 20)  SpO2: 99% (04-05-19 @ 14:16) (96% - 99%)  Wt(kg): --  I&O's Summary      LABS:                        11.1   9.52  )-----------( 205      ( 05 Apr 2019 07:15 )             37.6     04-05    137  |  100  |  51<H>  ----------------------------<  105<H>  4.1   |  25  |  1.62<H>    Ca    9.0      05 Apr 2019 07:15    TPro  6.8  /  Alb  3.2<L>  /  TBili  0.5  /  DBili  x   /  AST  14  /  ALT  9   /  AlkPhos  123<H>  04-05        CAPILLARY BLOOD GLUCOSE                MEDICATIONS  (STANDING):  atorvastatin 40 milliGRAM(s) Oral at bedtime  buDESOnide 160 MICROgram(s)/formoterol 4.5 MICROgram(s) Inhaler 2 Puff(s) Inhalation two times a day  clopidogrel Tablet 75 milliGRAM(s) Oral daily  collagenase Ointment 1 Application(s) Topical daily  dabigatran 75 milliGRAM(s) Oral every 12 hours  docusate sodium 100 milliGRAM(s) Oral three times a day  metoprolol tartrate 75 milliGRAM(s) Oral two times a day  midodrine 5 milliGRAM(s) Oral every 8 hours  mupirocin 2% Ointment 1 Application(s) Topical <User Schedule>  pantoprazole  Injectable 40 milliGRAM(s) IV Push two times a day  polyethylene glycol 3350 17 Gram(s) Oral daily  senna 2 Tablet(s) Oral at bedtime  thiamine 100 milliGRAM(s) Oral daily    MEDICATIONS  (PRN):  levalbuterol Inhalation 0.63 milliGRAM(s) Inhalation every 6 hours PRN SOB  oxyCODONE    5 mG/acetaminophen 325 mG 1 Tablet(s) Oral every 6 hours PRN Moderate Pain and severe pain          PHYSICAL EXAM:  GENERAL: NAD, well-groomed, well-developed  HEAD:  Atraumatic, Normocephalic  CHEST/LUNG: Clear to percussion bilaterally; No rales, rhonchi, wheezing, or rubs  HEART: Regular rate and rhythm; No murmurs, rubs, or gallops  ABDOMEN: Soft, Nontender, Nondistended; Bowel sounds present  EXTREMITIES:  2+ Peripheral Pulses, No clubbing, cyanosis, or edema  LYMPH: No lymphadenopathy noted  SKIN: No rashes or lesions    Care Discussed with Consultants/Other Providers [ ] YES  [ ] NO

## 2019-04-06 LAB
ALBUMIN SERPL ELPH-MCNC: 2.9 G/DL — LOW (ref 3.3–5)
ALP SERPL-CCNC: 122 U/L — HIGH (ref 40–120)
ALT FLD-CCNC: 12 U/L — SIGNIFICANT CHANGE UP (ref 4–33)
ANION GAP SERPL CALC-SCNC: 17 MMO/L — HIGH (ref 7–14)
AST SERPL-CCNC: 27 U/L — SIGNIFICANT CHANGE UP (ref 4–32)
BASOPHILS # BLD AUTO: 0.04 K/UL — SIGNIFICANT CHANGE UP (ref 0–0.2)
BASOPHILS NFR BLD AUTO: 0.4 % — SIGNIFICANT CHANGE UP (ref 0–2)
BILIRUB SERPL-MCNC: 0.7 MG/DL — SIGNIFICANT CHANGE UP (ref 0.2–1.2)
BUN SERPL-MCNC: 49 MG/DL — HIGH (ref 7–23)
CALCIUM SERPL-MCNC: 8.9 MG/DL — SIGNIFICANT CHANGE UP (ref 8.4–10.5)
CHLORIDE SERPL-SCNC: 103 MMOL/L — SIGNIFICANT CHANGE UP (ref 98–107)
CO2 SERPL-SCNC: 19 MMOL/L — LOW (ref 22–31)
CREAT SERPL-MCNC: 1.57 MG/DL — HIGH (ref 0.5–1.3)
EOSINOPHIL # BLD AUTO: 0.19 K/UL — SIGNIFICANT CHANGE UP (ref 0–0.5)
EOSINOPHIL NFR BLD AUTO: 1.9 % — SIGNIFICANT CHANGE UP (ref 0–6)
GLUCOSE SERPL-MCNC: 75 MG/DL — SIGNIFICANT CHANGE UP (ref 70–99)
HCT VFR BLD CALC: 37.2 % — SIGNIFICANT CHANGE UP (ref 34.5–45)
HGB BLD-MCNC: 11 G/DL — LOW (ref 11.5–15.5)
IMM GRANULOCYTES NFR BLD AUTO: 0.3 % — SIGNIFICANT CHANGE UP (ref 0–1.5)
LYMPHOCYTES # BLD AUTO: 0.55 K/UL — LOW (ref 1–3.3)
LYMPHOCYTES # BLD AUTO: 5.5 % — LOW (ref 13–44)
MAGNESIUM SERPL-MCNC: 2 MG/DL — SIGNIFICANT CHANGE UP (ref 1.6–2.6)
MCHC RBC-ENTMCNC: 26.3 PG — LOW (ref 27–34)
MCHC RBC-ENTMCNC: 29.6 % — LOW (ref 32–36)
MCV RBC AUTO: 89 FL — SIGNIFICANT CHANGE UP (ref 80–100)
MONOCYTES # BLD AUTO: 0.86 K/UL — SIGNIFICANT CHANGE UP (ref 0–0.9)
MONOCYTES NFR BLD AUTO: 8.6 % — SIGNIFICANT CHANGE UP (ref 2–14)
NEUTROPHILS # BLD AUTO: 8.28 K/UL — HIGH (ref 1.8–7.4)
NEUTROPHILS NFR BLD AUTO: 83.3 % — HIGH (ref 43–77)
NRBC # FLD: 0 K/UL — SIGNIFICANT CHANGE UP (ref 0–0)
PHOSPHATE SERPL-MCNC: 4.8 MG/DL — HIGH (ref 2.5–4.5)
PLATELET # BLD AUTO: 196 K/UL — SIGNIFICANT CHANGE UP (ref 150–400)
PMV BLD: 10.7 FL — SIGNIFICANT CHANGE UP (ref 7–13)
POTASSIUM SERPL-MCNC: 4.6 MMOL/L — SIGNIFICANT CHANGE UP (ref 3.5–5.3)
POTASSIUM SERPL-SCNC: 4.6 MMOL/L — SIGNIFICANT CHANGE UP (ref 3.5–5.3)
PROT SERPL-MCNC: 6.9 G/DL — SIGNIFICANT CHANGE UP (ref 6–8.3)
RBC # BLD: 4.18 M/UL — SIGNIFICANT CHANGE UP (ref 3.8–5.2)
RBC # FLD: 22.9 % — HIGH (ref 10.3–14.5)
SODIUM SERPL-SCNC: 139 MMOL/L — SIGNIFICANT CHANGE UP (ref 135–145)
WBC # BLD: 9.95 K/UL — SIGNIFICANT CHANGE UP (ref 3.8–10.5)
WBC # FLD AUTO: 9.95 K/UL — SIGNIFICANT CHANGE UP (ref 3.8–10.5)

## 2019-04-06 PROCEDURE — 93010 ELECTROCARDIOGRAM REPORT: CPT | Mod: 76

## 2019-04-06 RX ORDER — ACETAMINOPHEN 500 MG
650 TABLET ORAL ONCE
Qty: 0 | Refills: 0 | Status: COMPLETED | OUTPATIENT
Start: 2019-04-06 | End: 2019-04-06

## 2019-04-06 RX ORDER — METOCLOPRAMIDE HCL 10 MG
5 TABLET ORAL ONCE
Qty: 0 | Refills: 0 | Status: COMPLETED | OUTPATIENT
Start: 2019-04-06 | End: 2019-04-06

## 2019-04-06 RX ADMIN — DABIGATRAN ETEXILATE MESYLATE 75 MILLIGRAM(S): 150 CAPSULE ORAL at 17:36

## 2019-04-06 RX ADMIN — PANTOPRAZOLE SODIUM 40 MILLIGRAM(S): 20 TABLET, DELAYED RELEASE ORAL at 17:36

## 2019-04-06 RX ADMIN — Medication 100 MILLIGRAM(S): at 11:32

## 2019-04-06 RX ADMIN — BUDESONIDE AND FORMOTEROL FUMARATE DIHYDRATE 2 PUFF(S): 160; 4.5 AEROSOL RESPIRATORY (INHALATION) at 21:50

## 2019-04-06 RX ADMIN — Medication 5 MILLIGRAM(S): at 23:15

## 2019-04-06 RX ADMIN — OXYCODONE AND ACETAMINOPHEN 1 TABLET(S): 5; 325 TABLET ORAL at 02:07

## 2019-04-06 RX ADMIN — Medication 1 APPLICATION(S): at 11:32

## 2019-04-06 RX ADMIN — OXYCODONE AND ACETAMINOPHEN 1 TABLET(S): 5; 325 TABLET ORAL at 16:24

## 2019-04-06 RX ADMIN — MUPIROCIN 1 APPLICATION(S): 20 OINTMENT TOPICAL at 10:29

## 2019-04-06 RX ADMIN — OXYCODONE AND ACETAMINOPHEN 1 TABLET(S): 5; 325 TABLET ORAL at 07:41

## 2019-04-06 RX ADMIN — OXYCODONE AND ACETAMINOPHEN 1 TABLET(S): 5; 325 TABLET ORAL at 21:49

## 2019-04-06 RX ADMIN — Medication 75 MILLIGRAM(S): at 05:54

## 2019-04-06 RX ADMIN — OXYCODONE AND ACETAMINOPHEN 1 TABLET(S): 5; 325 TABLET ORAL at 01:07

## 2019-04-06 RX ADMIN — OXYCODONE AND ACETAMINOPHEN 1 TABLET(S): 5; 325 TABLET ORAL at 22:19

## 2019-04-06 RX ADMIN — Medication 100 MILLIGRAM(S): at 15:03

## 2019-04-06 RX ADMIN — PANTOPRAZOLE SODIUM 40 MILLIGRAM(S): 20 TABLET, DELAYED RELEASE ORAL at 05:54

## 2019-04-06 RX ADMIN — OXYCODONE AND ACETAMINOPHEN 1 TABLET(S): 5; 325 TABLET ORAL at 08:30

## 2019-04-06 RX ADMIN — MIDODRINE HYDROCHLORIDE 5 MILLIGRAM(S): 2.5 TABLET ORAL at 15:03

## 2019-04-06 RX ADMIN — POLYETHYLENE GLYCOL 3350 17 GRAM(S): 17 POWDER, FOR SOLUTION ORAL at 11:37

## 2019-04-06 RX ADMIN — CLOPIDOGREL BISULFATE 75 MILLIGRAM(S): 75 TABLET, FILM COATED ORAL at 11:32

## 2019-04-06 RX ADMIN — Medication 75 MILLIGRAM(S): at 17:36

## 2019-04-06 RX ADMIN — BUDESONIDE AND FORMOTEROL FUMARATE DIHYDRATE 2 PUFF(S): 160; 4.5 AEROSOL RESPIRATORY (INHALATION) at 10:29

## 2019-04-06 RX ADMIN — MIDODRINE HYDROCHLORIDE 5 MILLIGRAM(S): 2.5 TABLET ORAL at 21:49

## 2019-04-06 RX ADMIN — DABIGATRAN ETEXILATE MESYLATE 75 MILLIGRAM(S): 150 CAPSULE ORAL at 05:54

## 2019-04-06 RX ADMIN — MIDODRINE HYDROCHLORIDE 5 MILLIGRAM(S): 2.5 TABLET ORAL at 05:54

## 2019-04-06 RX ADMIN — Medication 650 MILLIGRAM(S): at 05:53

## 2019-04-06 RX ADMIN — OXYCODONE AND ACETAMINOPHEN 1 TABLET(S): 5; 325 TABLET ORAL at 15:33

## 2019-04-06 RX ADMIN — ATORVASTATIN CALCIUM 40 MILLIGRAM(S): 80 TABLET, FILM COATED ORAL at 21:49

## 2019-04-06 NOTE — PROGRESS NOTE ADULT - SUBJECTIVE AND OBJECTIVE BOX
Patient is a 79y old  Female who presents with a chief complaint of right leg wound/ swelling (06 Apr 2019 10:58)      INTERVAL HPI/OVERNIGHT EVENTS:  T(C): 36.3 (04-06-19 @ 15:00), Max: 36.7 (04-06-19 @ 05:56)  HR: 77 (04-06-19 @ 17:32) (62 - 79)  BP: 112/68 (04-06-19 @ 17:32) (111/63 - 120/81)  RR: 20 (04-06-19 @ 15:00) (20 - 20)  SpO2: 100% (04-06-19 @ 15:00) (96% - 100%)  Wt(kg): --  I&O's Summary    05 Apr 2019 07:01  -  06 Apr 2019 07:00  --------------------------------------------------------  IN: 350 mL / OUT: 200 mL / NET: 150 mL    06 Apr 2019 07:01  -  06 Apr 2019 19:09  --------------------------------------------------------  IN: 240 mL / OUT: 700 mL / NET: -460 mL        LABS:                        11.0   9.95  )-----------( 196      ( 06 Apr 2019 06:15 )             37.2     04-06    139  |  103  |  49<H>  ----------------------------<  75  4.6   |  19<L>  |  1.57<H>    Ca    8.9      06 Apr 2019 06:15  Phos  4.8     04-06  Mg     2.0     04-06    TPro  6.9  /  Alb  2.9<L>  /  TBili  0.7  /  DBili  x   /  AST  27  /  ALT  12  /  AlkPhos  122<H>  04-06        CAPILLARY BLOOD GLUCOSE                MEDICATIONS  (STANDING):  atorvastatin 40 milliGRAM(s) Oral at bedtime  buDESOnide 160 MICROgram(s)/formoterol 4.5 MICROgram(s) Inhaler 2 Puff(s) Inhalation two times a day  clopidogrel Tablet 75 milliGRAM(s) Oral daily  collagenase Ointment 1 Application(s) Topical daily  dabigatran 75 milliGRAM(s) Oral every 12 hours  docusate sodium 100 milliGRAM(s) Oral three times a day  metoprolol tartrate 75 milliGRAM(s) Oral two times a day  midodrine 5 milliGRAM(s) Oral every 8 hours  mupirocin 2% Ointment 1 Application(s) Topical <User Schedule>  pantoprazole  Injectable 40 milliGRAM(s) IV Push two times a day  polyethylene glycol 3350 17 Gram(s) Oral daily  senna 2 Tablet(s) Oral at bedtime  thiamine 100 milliGRAM(s) Oral daily    MEDICATIONS  (PRN):  levalbuterol Inhalation 0.63 milliGRAM(s) Inhalation every 6 hours PRN SOB  oxyCODONE    5 mG/acetaminophen 325 mG 1 Tablet(s) Oral every 6 hours PRN Moderate Pain and severe pain          PHYSICAL EXAM:  GENERAL: NAD, well-groomed, well-developed  HEAD:  Atraumatic, Normocephalic  CHEST/LUNG: Clear to percussion bilaterally; No rales, rhonchi, wheezing, or rubs  HEART: Regular rate and rhythm; No murmurs, rubs, or gallops  ABDOMEN: Soft, Nontender, Nondistended; Bowel sounds present  EXTREMITIES:  2+ Peripheral Pulses, No clubbing, cyanosis, or edema  LYMPH: No lymphadenopathy noted  SKIN: No rashes or lesions    Care Discussed with Consultants/Other Providers [ ] YES  [ ] NO

## 2019-04-06 NOTE — PROGRESS NOTE ADULT - ATTENDING COMMENTS
[facility] NEPHROLOGY  Harish Valera M.D.  Kevin Cedeno D.O.  Rachele Mckeon M.D.  Christina Westfall, MSN, ANP-C    Telephone: [phone]  Facsimile: [phone]    [address] [place] [address]

## 2019-04-06 NOTE — PROGRESS NOTE ADULT - SUBJECTIVE AND OBJECTIVE BOX
[facility] NEPHROLOGY- PROGRESS NOTE    79y Female with history of CHF, COPD presents with RLE pain. Nephrology consulted for elevated Scr.    REVIEW OF SYSTEMS:  Gen: no changes in weight  Cards: no chest pain  Resp: + dyspnea (chronic)  GI: + nausea and vomiting resolved, no diarrhea  Vascular: RLE edema/pain    No Known Allergies      Hospital Medications: Medications reviewed      VITALS:  T(F): 98 (19 @ 05:56), Max: 98 (19 @ 14:16)  HR: 62 (19 @ 05:56)  BP: 111/63 (19 @ 05:56)  RR: 20 (19 @ 05:56)  SpO2: 98% (19 @ 05:56)  Wt(kg): --     @ 07:01  -   @ 07:00  --------------------------------------------------------  IN: 350 mL / OUT: 200 mL / NET: 150 mL     @ 07:01  -   @ 10:01  --------------------------------------------------------  IN: 0 mL / OUT: 200 mL / NET: -200 mL      PHYSICAL EXAM:    Gen: NAD, calm  Cards: Irregularly irregular, +S1/S2, no M/G/R  Resp: Bibasilar rales  GI: soft, NT, + ab distention, NABS  Vascular: + RLE edema only      LABS:      139  |  103  |  49<H>  ----------------------------<  75  4.6   |  19<L>  |  1.57<H>    Ca    8.9      2019 06:15  Phos  4.8     04-06  Mg     2.0     04-06    TPro  6.9  /  Alb  2.9<L>  /  TBili  0.7  /  DBili      /  AST  27  /  ALT  12  /  AlkPhos  122<H>  -    Creatinine Trend: 1.57 <--, 1.62 <--, 1.67 <--, 1.52 <--, 1.60 <--                        11.0   9.95  )-----------( 196      ( 2019 06:15 )             37.2     Urine Studies:  Urinalysis Basic - ( 2019 12:30 )    Color: YELLOW / Appearance: TURBID / S.025 / pH: 5.5  Gluc: NEGATIVE / Ketone: NEGATIVE  / Bili: NEGATIVE / Urobili: 0.2   Blood: LARGE / Protein: 30 / Nitrite: NEGATIVE   Leuk Esterase: LARGE / RBC: 11-25 / WBC >50   Sq Epi:  / Non Sq Epi:  / Bacteria: FEW      Creatinine, Random Urine: 41.20 mg/dL ( @ 17:03)

## 2019-04-06 NOTE — PROVIDER CONTACT NOTE (OTHER) - SITUATION
451ml residual urine per bladder scan. Straight cath output = 100ml. Repeat bladder scan showed 536ml. Straight cath repeated with minimal output ~ 20ml.

## 2019-04-06 NOTE — PROGRESS NOTE ADULT - ASSESSMENT
79y Female with history of CHF, COPD presents with RLE pain. Nephrology consulted for elevated Scr.    1) IGOR: in setting of infection and diuretic use with component of urinary retention given renal US results? Bladder scan today with 350 ml of PVR. Would initiate straight cath protocol. Patient educated on risks of ROSIE as planned to have angiogram next week. Would minimize dye load to reduce risk of ROSIE and hold diuretics on day of procedure. Avoid nephrotoxins.  2) CKD-3: Patient appears to have h/o age appropriate CKD-3 (baseline Scr 1.1-1.2) with mild proteinuria for which will defer inpatient CKD work up. Monitor electrolytes.  3) Orthostatic hypotension: BP acceptable. Continue with midodrine 5 mg TID. Rate control with metoprolol as per cardiology. Monitor BP.  4) LE edema: CXR reviewed with pleural effusions and pulmonary edema. Given stable dyspnea with plans for angiogram next week, can continue to hold lasix and restart if patient becomes symptomatic. Monitor UO.  5) Pyuria/Microscopic hematuria: Urine culture negative s/p CTX. As per ID.

## 2019-04-06 NOTE — PROGRESS NOTE ADULT - ASSESSMENT
80 yo f with PVD, right leg requiring revascularization. Mild igor, possible UTI      Problem/Plan - 1:  ·  Problem: Lower limb ulcer, ankle, right, with unspecified severity.  Plan: -Seen by podiatry and vascular , no need for abx. Vacular with plan for angiogram once kidney function improves  -right duplex negative for dvt  -wound care ordered  -pain meds prn.     Problem/Plan - 2:·  Problem: IGOR (acute kidney injury).  Plan: -likely with prerenal component in setting of decreased fluid intake  renal fumonitor cr    Problem/Plan - 3:  ·  Problem: Urinary tract infection with hematuria, site unspecified.  Plan: - pseudomonal UTI in [place] resistant to quinolones; sensitive to 3rd gen cephalosporin. Will place on ceftriaxone, follow  culture.     Problem/Plan - 4:  ·  Problem: Paroxysmal atrial fibrillation.  Plan: c/w Pradaxa, BB.     Problem/Plan - 5:  ·  Problem: Chronic obstructive pulmonary disease, unspecified COPD type.  Plan: c/w ics/laba, O2

## 2019-04-06 NOTE — PROGRESS NOTE ADULT - ASSESSMENT
78 y/o female, with a PmHx of COPD, paroxsymal a-fib (on pradaxa), CAD (s/p stent 11/2018), DCHF, pulm htn, HTN, HLD, and anxiety presenting with nonhealing R posterior ankle ulcer and UTI     1. Chronic diastolic chf  no SOB   chest xray with increase right pleural effussion,.small left pleural effusion, pulm edema  creat noted, plans for angiogram next week, continue to hold lasix and restart if patient becomes symptomatic.  cont bb  recent echo with nl lv fx     2.  Paroxysmal Atrial Fibrillation  rate controlled, c/w  lopressor to 75 mg BID  continue with midodrine 5 mg q 8 hrs for bp support  CHADS 3 - off pradaxa for possible angio     3. CAD s/p PCI (11/2018)  cv stable, no cp or sob   continue bb, statin, plavix     4. UTI   abx per med     5.  R posterior ankle ulcer   vascular f/u   plan for eventual RLE angio, pending optimization of renal fx, likely next week     6. bri on ckd  renal f/u

## 2019-04-07 LAB
ALBUMIN SERPL ELPH-MCNC: 3 G/DL — LOW (ref 3.3–5)
ALP SERPL-CCNC: 120 U/L — SIGNIFICANT CHANGE UP (ref 40–120)
ALT FLD-CCNC: 8 U/L — SIGNIFICANT CHANGE UP (ref 4–33)
ANION GAP SERPL CALC-SCNC: 15 MMO/L — HIGH (ref 7–14)
ANISOCYTOSIS BLD QL: SIGNIFICANT CHANGE UP
AST SERPL-CCNC: 19 U/L — SIGNIFICANT CHANGE UP (ref 4–32)
BASOPHILS # BLD AUTO: 0.04 K/UL — SIGNIFICANT CHANGE UP (ref 0–0.2)
BASOPHILS NFR BLD AUTO: 0.5 % — SIGNIFICANT CHANGE UP (ref 0–2)
BASOPHILS NFR SPEC: 1.7 % — SIGNIFICANT CHANGE UP (ref 0–2)
BILIRUB SERPL-MCNC: 0.6 MG/DL — SIGNIFICANT CHANGE UP (ref 0.2–1.2)
BLASTS # FLD: 0 % — SIGNIFICANT CHANGE UP (ref 0–0)
BUN SERPL-MCNC: 52 MG/DL — HIGH (ref 7–23)
CALCIUM SERPL-MCNC: 8.8 MG/DL — SIGNIFICANT CHANGE UP (ref 8.4–10.5)
CHLORIDE SERPL-SCNC: 99 MMOL/L — SIGNIFICANT CHANGE UP (ref 98–107)
CO2 SERPL-SCNC: 21 MMOL/L — LOW (ref 22–31)
CREAT SERPL-MCNC: 1.81 MG/DL — HIGH (ref 0.5–1.3)
ELLIPTOCYTES BLD QL SMEAR: SLIGHT — SIGNIFICANT CHANGE UP
EOSINOPHIL # BLD AUTO: 0.21 K/UL — SIGNIFICANT CHANGE UP (ref 0–0.5)
EOSINOPHIL NFR BLD AUTO: 2.7 % — SIGNIFICANT CHANGE UP (ref 0–6)
EOSINOPHIL NFR FLD: 0 % — SIGNIFICANT CHANGE UP (ref 0–6)
GIANT PLATELETS BLD QL SMEAR: PRESENT — SIGNIFICANT CHANGE UP
GLUCOSE SERPL-MCNC: 91 MG/DL — SIGNIFICANT CHANGE UP (ref 70–99)
HCT VFR BLD CALC: 37 % — SIGNIFICANT CHANGE UP (ref 34.5–45)
HGB BLD-MCNC: 10.8 G/DL — LOW (ref 11.5–15.5)
HYPOCHROMIA BLD QL: SLIGHT — SIGNIFICANT CHANGE UP
IMM GRANULOCYTES NFR BLD AUTO: 0.3 % — SIGNIFICANT CHANGE UP (ref 0–1.5)
LYMPHOCYTES # BLD AUTO: 0.43 K/UL — LOW (ref 1–3.3)
LYMPHOCYTES # BLD AUTO: 5.5 % — LOW (ref 13–44)
LYMPHOCYTES NFR SPEC AUTO: 3.5 % — LOW (ref 13–44)
MACROCYTES BLD QL: SLIGHT — SIGNIFICANT CHANGE UP
MAGNESIUM SERPL-MCNC: 2.1 MG/DL — SIGNIFICANT CHANGE UP (ref 1.6–2.6)
MCHC RBC-ENTMCNC: 26 PG — LOW (ref 27–34)
MCHC RBC-ENTMCNC: 29.2 % — LOW (ref 32–36)
MCV RBC AUTO: 88.9 FL — SIGNIFICANT CHANGE UP (ref 80–100)
METAMYELOCYTES # FLD: 0 % — SIGNIFICANT CHANGE UP (ref 0–1)
MICROCYTES BLD QL: SLIGHT — SIGNIFICANT CHANGE UP
MONOCYTES # BLD AUTO: 0.73 K/UL — SIGNIFICANT CHANGE UP (ref 0–0.9)
MONOCYTES NFR BLD AUTO: 9.4 % — SIGNIFICANT CHANGE UP (ref 2–14)
MONOCYTES NFR BLD: 4.4 % — SIGNIFICANT CHANGE UP (ref 2–9)
MYELOCYTES NFR BLD: 0 % — SIGNIFICANT CHANGE UP (ref 0–0)
NEUTROPHIL AB SER-ACNC: 87.8 % — HIGH (ref 43–77)
NEUTROPHILS # BLD AUTO: 6.32 K/UL — SIGNIFICANT CHANGE UP (ref 1.8–7.4)
NEUTROPHILS NFR BLD AUTO: 81.6 % — HIGH (ref 43–77)
NEUTS BAND # BLD: 0.9 % — SIGNIFICANT CHANGE UP (ref 0–6)
NRBC # FLD: 0.04 K/UL — SIGNIFICANT CHANGE UP (ref 0–0)
OTHER - HEMATOLOGY %: 0 — SIGNIFICANT CHANGE UP
PHOSPHATE SERPL-MCNC: 4.9 MG/DL — HIGH (ref 2.5–4.5)
PLATELET # BLD AUTO: 186 K/UL — SIGNIFICANT CHANGE UP (ref 150–400)
PLATELET COUNT - ESTIMATE: NORMAL — SIGNIFICANT CHANGE UP
PMV BLD: 10.6 FL — SIGNIFICANT CHANGE UP (ref 7–13)
POIKILOCYTOSIS BLD QL AUTO: SLIGHT — SIGNIFICANT CHANGE UP
POLYCHROMASIA BLD QL SMEAR: SLIGHT — SIGNIFICANT CHANGE UP
POTASSIUM SERPL-MCNC: 4 MMOL/L — SIGNIFICANT CHANGE UP (ref 3.5–5.3)
POTASSIUM SERPL-SCNC: 4 MMOL/L — SIGNIFICANT CHANGE UP (ref 3.5–5.3)
PROMYELOCYTES # FLD: 0 % — SIGNIFICANT CHANGE UP (ref 0–0)
PROT SERPL-MCNC: 6.7 G/DL — SIGNIFICANT CHANGE UP (ref 6–8.3)
RBC # BLD: 4.16 M/UL — SIGNIFICANT CHANGE UP (ref 3.8–5.2)
RBC # FLD: 23 % — HIGH (ref 10.3–14.5)
SODIUM SERPL-SCNC: 135 MMOL/L — SIGNIFICANT CHANGE UP (ref 135–145)
VARIANT LYMPHS # BLD: 1.7 % — SIGNIFICANT CHANGE UP
WBC # BLD: 7.75 K/UL — SIGNIFICANT CHANGE UP (ref 3.8–10.5)
WBC # FLD AUTO: 7.75 K/UL — SIGNIFICANT CHANGE UP (ref 3.8–10.5)

## 2019-04-07 RX ORDER — ALBUMIN HUMAN 25 %
50 VIAL (ML) INTRAVENOUS EVERY 6 HOURS
Qty: 0 | Refills: 0 | Status: DISCONTINUED | OUTPATIENT
Start: 2019-04-07 | End: 2019-04-09

## 2019-04-07 RX ORDER — OXYCODONE AND ACETAMINOPHEN 5; 325 MG/1; MG/1
1 TABLET ORAL ONCE
Qty: 0 | Refills: 0 | Status: DISCONTINUED | OUTPATIENT
Start: 2019-04-07 | End: 2019-04-07

## 2019-04-07 RX ORDER — ONDANSETRON 8 MG/1
4 TABLET, FILM COATED ORAL ONCE
Qty: 0 | Refills: 0 | Status: COMPLETED | OUTPATIENT
Start: 2019-04-07 | End: 2019-04-07

## 2019-04-07 RX ADMIN — Medication 75 MILLIGRAM(S): at 05:31

## 2019-04-07 RX ADMIN — OXYCODONE AND ACETAMINOPHEN 1 TABLET(S): 5; 325 TABLET ORAL at 01:50

## 2019-04-07 RX ADMIN — ONDANSETRON 4 MILLIGRAM(S): 8 TABLET, FILM COATED ORAL at 11:07

## 2019-04-07 RX ADMIN — BUDESONIDE AND FORMOTEROL FUMARATE DIHYDRATE 2 PUFF(S): 160; 4.5 AEROSOL RESPIRATORY (INHALATION) at 22:05

## 2019-04-07 RX ADMIN — MIDODRINE HYDROCHLORIDE 5 MILLIGRAM(S): 2.5 TABLET ORAL at 05:31

## 2019-04-07 RX ADMIN — OXYCODONE AND ACETAMINOPHEN 1 TABLET(S): 5; 325 TABLET ORAL at 22:06

## 2019-04-07 RX ADMIN — Medication 1 APPLICATION(S): at 11:08

## 2019-04-07 RX ADMIN — Medication 100 MILLIGRAM(S): at 11:07

## 2019-04-07 RX ADMIN — MIDODRINE HYDROCHLORIDE 5 MILLIGRAM(S): 2.5 TABLET ORAL at 22:05

## 2019-04-07 RX ADMIN — BUDESONIDE AND FORMOTEROL FUMARATE DIHYDRATE 2 PUFF(S): 160; 4.5 AEROSOL RESPIRATORY (INHALATION) at 09:27

## 2019-04-07 RX ADMIN — MIDODRINE HYDROCHLORIDE 5 MILLIGRAM(S): 2.5 TABLET ORAL at 15:38

## 2019-04-07 RX ADMIN — Medication 50 MILLILITER(S): at 22:05

## 2019-04-07 RX ADMIN — OXYCODONE AND ACETAMINOPHEN 1 TABLET(S): 5; 325 TABLET ORAL at 14:28

## 2019-04-07 RX ADMIN — DABIGATRAN ETEXILATE MESYLATE 75 MILLIGRAM(S): 150 CAPSULE ORAL at 17:32

## 2019-04-07 RX ADMIN — ATORVASTATIN CALCIUM 40 MILLIGRAM(S): 80 TABLET, FILM COATED ORAL at 22:05

## 2019-04-07 RX ADMIN — MUPIROCIN 1 APPLICATION(S): 20 OINTMENT TOPICAL at 09:29

## 2019-04-07 RX ADMIN — Medication 50 MILLILITER(S): at 14:33

## 2019-04-07 RX ADMIN — PANTOPRAZOLE SODIUM 40 MILLIGRAM(S): 20 TABLET, DELAYED RELEASE ORAL at 17:32

## 2019-04-07 RX ADMIN — PANTOPRAZOLE SODIUM 40 MILLIGRAM(S): 20 TABLET, DELAYED RELEASE ORAL at 05:31

## 2019-04-07 RX ADMIN — OXYCODONE AND ACETAMINOPHEN 1 TABLET(S): 5; 325 TABLET ORAL at 02:20

## 2019-04-07 RX ADMIN — CLOPIDOGREL BISULFATE 75 MILLIGRAM(S): 75 TABLET, FILM COATED ORAL at 11:07

## 2019-04-07 RX ADMIN — OXYCODONE AND ACETAMINOPHEN 1 TABLET(S): 5; 325 TABLET ORAL at 22:36

## 2019-04-07 RX ADMIN — OXYCODONE AND ACETAMINOPHEN 1 TABLET(S): 5; 325 TABLET ORAL at 15:00

## 2019-04-07 RX ADMIN — DABIGATRAN ETEXILATE MESYLATE 75 MILLIGRAM(S): 150 CAPSULE ORAL at 05:31

## 2019-04-07 NOTE — PROGRESS NOTE ADULT - SUBJECTIVE AND OBJECTIVE BOX
Vascular Surgery Team Daily Progress Note    SUBJECTIVE: Patient seen and examined during the morning round, no over night event, no acute complaint.     OBJECTIVE:   Vital Signs Last 24 Hrs  T(C): 36.3 (07 Apr 2019 05:29), Max: 36.3 (06 Apr 2019 15:00)  T(F): 97.3 (07 Apr 2019 05:29), Max: 97.3 (06 Apr 2019 15:00)  HR: 81 (07 Apr 2019 05:29) (63 - 81)  BP: 111/62 (07 Apr 2019 05:29) (111/62 - 125/76)  BP(mean): --  RR: 19 (07 Apr 2019 05:29) (19 - 20)  SpO2: 100% (07 Apr 2019 05:29) (100% - 100%)    PHYSICAL EXAM:  Constitutional: resting in bed with no acute distress  Respiratory:  unlabored breathing  Extremities: RLE posterior ankle ulcer down to tendon with serous drainage, no purulence, palpable femoral and pop, dopplerable DP signal, All pulses palp on LLE    RADIOLOGY & ADDITIONAL STUDIES: no new studies performed

## 2019-04-07 NOTE — PROGRESS NOTE ADULT - ASSESSMENT
79F w/ nonhealing R posterior ankle ulcer and arterial insuff, STONEY 0.6 in 01/2019    - planning for angiogram next Tuesday (04/09)  - please have nephrology document clearance for lower extremity angiogram  - Please document cardiac risk stratification and optimization if deemed necessary

## 2019-04-07 NOTE — PROGRESS NOTE ADULT - ATTENDING COMMENTS
[facility] NEPHROLOGY  Harish Valera M.D.  Kevin Cedeno D.O.  Rachele Mckeon M.D.  Christina Westfall, MSN, ANP-C    Telephone: [phone]  Facsimile: [phone]    [address] [place] [address]

## 2019-04-07 NOTE — PROGRESS NOTE ADULT - ASSESSMENT
80 y/o female, with a PmHx of COPD, paroxsymal a-fib (on pradaxa), CAD (s/p stent 11/2018), DCHF, pulm htn, HTN, HLD, and anxiety presenting with nonhealing R posterior ankle ulcer and UTI     1. Chronic diastolic chf  no SOB   chest xray with increase right pleural effussion,.small left pleural effusion, pulm edema  creat noted, plans for angiogram next week  continue to hold lasix and restart if patient becomes symptomatic, hypoxic   cont bb  recent echo with nl lv fx     2.  Paroxysmal Atrial Fibrillation  rate controlled, c/w  lopressor to 75 mg BID  continue with midodrine 5 mg q 8 hrs for bp support  CHADS 3 - off pradaxa for possible angio     3. CAD s/p PCI (11/2018)  cv stable, no cp or sob   continue bb, statin, plavix     4. UTI   abx per med     5.  R posterior ankle ulcer   vascular f/u   plan for eventual RLE angio, pending optimization of renal fx    6. rbi on ckd  renal f/u

## 2019-04-07 NOTE — PROGRESS NOTE ADULT - ASSESSMENT
79y Female with history of CHF, COPD presents with RLE pain. Nephrology consulted for elevated Scr.    1) IGOR: in setting of infection and diuretic use with component of urinary retention given renal US results? s/p straight cath yesterday with most recent bladder scan negative. Given rise in Scr, recheck UA with urine sodium and urine creatinine. Start IV albumin to increase renal perfusion and ensure given poor PO intake. Avoid nephrotoxins.  2) CKD-3: Patient appears to have h/o age appropriate CKD-3 (baseline Scr 1.1-1.2) with mild proteinuria for which will defer inpatient CKD work up. Monitor electrolytes.  3) Orthostatic hypotension: BP acceptable. Continue with midodrine 5 mg TID. Rate control with metoprolol as per cardiology. Monitor BP.  4) LE edema: CXR reviewed with pleural effusions and pulmonary edema. Prior TTE with mild to moderate MR with normal LVSF. Given stable dyspnea with plans for angiogram next week, can continue to hold lasix and restart if patient becomes symptomatic. Monitor UO.  5) Pyuria/Microscopic hematuria: Urine culture negative s/p CTX. No further abx as per ID.

## 2019-04-07 NOTE — PROGRESS NOTE ADULT - SUBJECTIVE AND OBJECTIVE BOX
CARDIOLOGY FOLLOW UP NOTE - DR. MARTINEZ    Subjective:  no new complaints    PHYSICAL EXAM:  T(C): 36.3 (04-07-19 @ 05:29), Max: 36.3 (04-06-19 @ 15:00)  HR: 81 (04-07-19 @ 05:29) (63 - 81)  BP: 111/62 (04-07-19 @ 05:29) (111/62 - 125/76)  RR: 19 (04-07-19 @ 05:29) (19 - 20)  SpO2: 100% (04-07-19 @ 05:29) (100% - 100%)  Wt(kg): --  I&O's Summary    06 Apr 2019 07:01  -  07 Apr 2019 07:00  --------------------------------------------------------  IN: 240 mL / OUT: 800 mL / NET: -560 mL        Appearance: Normal	  Cardiovascular: Normal S1 S2,RRR, No JVD, No murmurs  Respiratory: Lungs clear to auscultation	  Gastrointestinal:  Soft, Non-tender, + BS	  Extremities: Normal range of motion, edema    MEDICATIONS  (STANDING):  atorvastatin 40 milliGRAM(s) Oral at bedtime  buDESOnide 160 MICROgram(s)/formoterol 4.5 MICROgram(s) Inhaler 2 Puff(s) Inhalation two times a day  clopidogrel Tablet 75 milliGRAM(s) Oral daily  collagenase Ointment 1 Application(s) Topical daily  dabigatran 75 milliGRAM(s) Oral every 12 hours  docusate sodium 100 milliGRAM(s) Oral three times a day  metoprolol tartrate 75 milliGRAM(s) Oral two times a day  midodrine 5 milliGRAM(s) Oral every 8 hours  mupirocin 2% Ointment 1 Application(s) Topical <User Schedule>  pantoprazole  Injectable 40 milliGRAM(s) IV Push two times a day  polyethylene glycol 3350 17 Gram(s) Oral daily  senna 2 Tablet(s) Oral at bedtime  thiamine 100 milliGRAM(s) Oral daily      TELEMETRY: 	    ECG:  	  RADIOLOGY:   DIAGNOSTIC TESTING:  [ ] Echocardiogram:  [ ] Catheterization:  [ ] Stress Test:    OTHER: 	    LABS:	 	    CARDIAC MARKERS:                                10.8   7.75  )-----------( 186      ( 07 Apr 2019 06:30 )             37.0     04-07    135  |  99  |  52<H>  ----------------------------<  91  4.0   |  21<L>  |  1.81<H>    Ca    8.8      07 Apr 2019 06:30  Phos  4.9     04-07  Mg     2.1     04-07    TPro  6.7  /  Alb  3.0<L>  /  TBili  0.6  /  DBili  x   /  AST  19  /  ALT  8   /  AlkPhos  120  04-07    proBNP:     Lipid Profile:   HgA1c:

## 2019-04-07 NOTE — PROGRESS NOTE ADULT - SUBJECTIVE AND OBJECTIVE BOX
[facility] NEPHROLOGY- PROGRESS NOTE    79y Female with history of CHF, COPD presents with RLE pain. Nephrology consulted for elevated Scr.    REVIEW OF SYSTEMS:  Gen: no changes in weight  Cards: no chest pain  Resp: + dyspnea (chronic)  GI: + nausea and vomiting yesterday with poor PO intake, no diarrhea  Vascular: RLE edema/pain    No Known Allergies      Hospital Medications: Medications reviewed      VITALS:  T(F): 97.3 (19 @ 05:29), Max: 97.3 (19 @ 15:00)  HR: 81 (19 @ 05:29)  BP: 111/62 (19 @ 05:29)  RR: 19 (19 @ 05:29)  SpO2: 100% (19 @ 05:29)  Wt(kg): --     @ 07:01  -   @ 07:00  --------------------------------------------------------  IN: 240 mL / OUT: 800 mL / NET: -560 mL      PHYSICAL EXAM:    Gen: NAD, calm  Cards: Irregularly irregular, +S1/S2, no M/G/R  Resp: Bibasilar rales  GI: soft, NT, + ab distention, NABS  Vascular: 1+ LE edema B/L      LABS:      135  |  99  |  52<H>  ----------------------------<  91  4.0   |  21<L>  |  1.81<H>    Ca    8.8      2019 06:30  Phos  4.9       Mg     2.1         TPro  6.7  /  Alb  3.0<L>  /  TBili  0.6  /  DBili      /  AST  19  /  ALT  8   /  AlkPhos  120      Creatinine Trend: 1.81 <--, 1.57 <--, 1.62 <--, 1.67 <--, 1.52 <--, 1.60 <--                        10.8   7.75  )-----------( 186      ( 2019 06:30 )             37.0     Urine Studies:  Urinalysis Basic - ( 2019 12:30 )    Color: YELLOW / Appearance: TURBID / S.025 / pH: 5.5  Gluc: NEGATIVE / Ketone: NEGATIVE  / Bili: NEGATIVE / Urobili: 0.2   Blood: LARGE / Protein: 30 / Nitrite: NEGATIVE   Leuk Esterase: LARGE / RBC: 11-25 / WBC >50   Sq Epi:  / Non Sq Epi:  / Bacteria: FEW      Creatinine, Random Urine: 41.20 mg/dL ( @ 17:03)

## 2019-04-07 NOTE — PROGRESS NOTE ADULT - ASSESSMENT
Problem/Plan - 1:  ·  Problem: Lower limb ulcer, ankle, right, with unspecified severity.  Plan: -Seen by podiatry and vascular , no need for abx. Vacular with plan for angiogram once kidney function improves  -right duplex negative for dvt  -wound care fu  -pain meds prn.     Problem/Plan - 2:·  Problem: IGOR (acute kidney injury).  Plan: -likely with prerenal component in setting of decreased fluid intake  renal fumonitor cr    Problem/Plan - 3:  ·  Problem: Urinary tract infection with hematuria, site unspecified.  Plan: - pseudomonal UTI in Jan resistant to quinolones; sensitive to 3rd gen cephalosporin. Will place on ceftriaxone, follow  culture.     Problem/Plan - 4:  ·  Problem: Paroxysmal atrial fibrillation.  Plan: c/w Pradaxa, BB.     Problem/Plan - 5:  ·  Problem: Chronic obstructive pulmonary disease, unspecified COPD type.  Plan: c/w ics/laba, O2

## 2019-04-07 NOTE — PROGRESS NOTE ADULT - SUBJECTIVE AND OBJECTIVE BOX
Patient is a 79y old  Female who presents with a chief complaint of right leg wound/ swelling (07 Apr 2019 12:25)      INTERVAL HPI/OVERNIGHT EVENTS:  T(C): 36.3 (04-07-19 @ 05:29), Max: 36.3 (04-06-19 @ 15:00)  HR: 81 (04-07-19 @ 05:29) (63 - 81)  BP: 111/62 (04-07-19 @ 05:29) (111/62 - 125/76)  RR: 19 (04-07-19 @ 05:29) (19 - 20)  SpO2: 100% (04-07-19 @ 05:29) (100% - 100%)  Wt(kg): --  I&O's Summary    06 Apr 2019 07:01  -  07 Apr 2019 07:00  --------------------------------------------------------  IN: 240 mL / OUT: 800 mL / NET: -560 mL        LABS:                        10.8   7.75  )-----------( 186      ( 07 Apr 2019 06:30 )             37.0     04-07    135  |  99  |  52<H>  ----------------------------<  91  4.0   |  21<L>  |  1.81<H>    Ca    8.8      07 Apr 2019 06:30  Phos  4.9     04-07  Mg     2.1     04-07    TPro  6.7  /  Alb  3.0<L>  /  TBili  0.6  /  DBili  x   /  AST  19  /  ALT  8   /  AlkPhos  120  04-07        CAPILLARY BLOOD GLUCOSE                MEDICATIONS  (STANDING):  albumin human 25% IVPB 50 milliLiter(s) IV Intermittent every 6 hours  atorvastatin 40 milliGRAM(s) Oral at bedtime  buDESOnide 160 MICROgram(s)/formoterol 4.5 MICROgram(s) Inhaler 2 Puff(s) Inhalation two times a day  clopidogrel Tablet 75 milliGRAM(s) Oral daily  collagenase Ointment 1 Application(s) Topical daily  dabigatran 75 milliGRAM(s) Oral every 12 hours  docusate sodium 100 milliGRAM(s) Oral three times a day  metoprolol tartrate 75 milliGRAM(s) Oral two times a day  midodrine 5 milliGRAM(s) Oral every 8 hours  mupirocin 2% Ointment 1 Application(s) Topical <User Schedule>  pantoprazole  Injectable 40 milliGRAM(s) IV Push two times a day  polyethylene glycol 3350 17 Gram(s) Oral daily  senna 2 Tablet(s) Oral at bedtime  thiamine 100 milliGRAM(s) Oral daily    MEDICATIONS  (PRN):  levalbuterol Inhalation 0.63 milliGRAM(s) Inhalation every 6 hours PRN SOB  oxyCODONE    5 mG/acetaminophen 325 mG 1 Tablet(s) Oral every 6 hours PRN Moderate Pain and severe pain          PHYSICAL EXAM:  GENERAL: NAD, well-groomed, well-developed  HEAD:  Atraumatic, Normocephalic  CHEST/LUNG: Clear to percussion bilaterally; No rales, rhonchi, wheezing, or rubs  HEART: Regular rate and rhythm; No murmurs, rubs, or gallops  ABDOMEN: Soft, Nontender, Nondistended; Bowel sounds present  EXTREMITIES:  2+ Peripheral Pulses, No clubbing, cyanosis, or edema  LYMPH: No lymphadenopathy noted  SKIN: No rashes or lesions    Care Discussed with Consultants/Other Providers [ ] YES  [ ] NO

## 2019-04-08 LAB
ALBUMIN SERPL ELPH-MCNC: 3.9 G/DL — SIGNIFICANT CHANGE UP (ref 3.3–5)
ALP SERPL-CCNC: 107 U/L — SIGNIFICANT CHANGE UP (ref 40–120)
ALT FLD-CCNC: 7 U/L — SIGNIFICANT CHANGE UP (ref 4–33)
ANION GAP SERPL CALC-SCNC: 15 MMO/L — HIGH (ref 7–14)
APPEARANCE UR: SIGNIFICANT CHANGE UP
AST SERPL-CCNC: 11 U/L — SIGNIFICANT CHANGE UP (ref 4–32)
BACTERIA # UR AUTO: NEGATIVE — SIGNIFICANT CHANGE UP
BASOPHILS # BLD AUTO: 0.03 K/UL — SIGNIFICANT CHANGE UP (ref 0–0.2)
BASOPHILS NFR BLD AUTO: 0.5 % — SIGNIFICANT CHANGE UP (ref 0–2)
BILIRUB SERPL-MCNC: 0.7 MG/DL — SIGNIFICANT CHANGE UP (ref 0.2–1.2)
BILIRUB UR-MCNC: NEGATIVE — SIGNIFICANT CHANGE UP
BLOOD UR QL VISUAL: SIGNIFICANT CHANGE UP
BUN SERPL-MCNC: 56 MG/DL — HIGH (ref 7–23)
CALCIUM SERPL-MCNC: 9.1 MG/DL — SIGNIFICANT CHANGE UP (ref 8.4–10.5)
CHLORIDE SERPL-SCNC: 100 MMOL/L — SIGNIFICANT CHANGE UP (ref 98–107)
CO2 SERPL-SCNC: 25 MMOL/L — SIGNIFICANT CHANGE UP (ref 22–31)
COLOR SPEC: YELLOW — SIGNIFICANT CHANGE UP
CREAT ?TM UR-MCNC: 101.8 MG/DL — SIGNIFICANT CHANGE UP
CREAT SERPL-MCNC: 2.21 MG/DL — HIGH (ref 0.5–1.3)
EOSINOPHIL # BLD AUTO: 0.19 K/UL — SIGNIFICANT CHANGE UP (ref 0–0.5)
EOSINOPHIL NFR BLD AUTO: 2.9 % — SIGNIFICANT CHANGE UP (ref 0–6)
EOSINOPHIL NFR URNS MANUAL: SIGNIFICANT CHANGE UP (ref 0–0)
GLUCOSE SERPL-MCNC: 98 MG/DL — SIGNIFICANT CHANGE UP (ref 70–99)
GLUCOSE UR-MCNC: NEGATIVE — SIGNIFICANT CHANGE UP
HCT VFR BLD CALC: 37.1 % — SIGNIFICANT CHANGE UP (ref 34.5–45)
HGB BLD-MCNC: 10.8 G/DL — LOW (ref 11.5–15.5)
HYALINE CASTS # UR AUTO: HIGH
IMM GRANULOCYTES NFR BLD AUTO: 0.2 % — SIGNIFICANT CHANGE UP (ref 0–1.5)
KETONES UR-MCNC: NEGATIVE — SIGNIFICANT CHANGE UP
LEUKOCYTE ESTERASE UR-ACNC: SIGNIFICANT CHANGE UP
LYMPHOCYTES # BLD AUTO: 0.72 K/UL — LOW (ref 1–3.3)
LYMPHOCYTES # BLD AUTO: 11.1 % — LOW (ref 13–44)
MAGNESIUM SERPL-MCNC: 2.2 MG/DL — SIGNIFICANT CHANGE UP (ref 1.6–2.6)
MCHC RBC-ENTMCNC: 26.2 PG — LOW (ref 27–34)
MCHC RBC-ENTMCNC: 29.1 % — LOW (ref 32–36)
MCV RBC AUTO: 90 FL — SIGNIFICANT CHANGE UP (ref 80–100)
MONOCYTES # BLD AUTO: 1.02 K/UL — HIGH (ref 0–0.9)
MONOCYTES NFR BLD AUTO: 15.7 % — HIGH (ref 2–14)
NEUTROPHILS # BLD AUTO: 4.54 K/UL — SIGNIFICANT CHANGE UP (ref 1.8–7.4)
NEUTROPHILS NFR BLD AUTO: 69.6 % — SIGNIFICANT CHANGE UP (ref 43–77)
NITRITE UR-MCNC: NEGATIVE — SIGNIFICANT CHANGE UP
NRBC # FLD: 0 K/UL — SIGNIFICANT CHANGE UP (ref 0–0)
PH UR: 5.5 — SIGNIFICANT CHANGE UP (ref 5–8)
PHOSPHATE SERPL-MCNC: 5.5 MG/DL — HIGH (ref 2.5–4.5)
PLATELET # BLD AUTO: 197 K/UL — SIGNIFICANT CHANGE UP (ref 150–400)
PMV BLD: 10.7 FL — SIGNIFICANT CHANGE UP (ref 7–13)
POTASSIUM SERPL-MCNC: 4.4 MMOL/L — SIGNIFICANT CHANGE UP (ref 3.5–5.3)
POTASSIUM SERPL-SCNC: 4.4 MMOL/L — SIGNIFICANT CHANGE UP (ref 3.5–5.3)
PROT SERPL-MCNC: 7.1 G/DL — SIGNIFICANT CHANGE UP (ref 6–8.3)
PROT UR-MCNC: 30 — SIGNIFICANT CHANGE UP
RBC # BLD: 4.12 M/UL — SIGNIFICANT CHANGE UP (ref 3.8–5.2)
RBC # FLD: 22.7 % — HIGH (ref 10.3–14.5)
RBC CASTS # UR COMP ASSIST: HIGH (ref 0–?)
SODIUM SERPL-SCNC: 140 MMOL/L — SIGNIFICANT CHANGE UP (ref 135–145)
SODIUM UR-SCNC: < 20 MMOL/L — SIGNIFICANT CHANGE UP
SP GR SPEC: 1.02 — SIGNIFICANT CHANGE UP (ref 1–1.04)
SQUAMOUS # UR AUTO: SIGNIFICANT CHANGE UP
UROBILINOGEN FLD QL: NORMAL — SIGNIFICANT CHANGE UP
WBC # BLD: 6.51 K/UL — SIGNIFICANT CHANGE UP (ref 3.8–10.5)
WBC # FLD AUTO: 6.51 K/UL — SIGNIFICANT CHANGE UP (ref 3.8–10.5)
WBC UR QL: >50 — HIGH (ref 0–?)

## 2019-04-08 PROCEDURE — 99232 SBSQ HOSP IP/OBS MODERATE 35: CPT

## 2019-04-08 RX ORDER — MIDODRINE HYDROCHLORIDE 2.5 MG/1
10 TABLET ORAL EVERY 8 HOURS
Qty: 0 | Refills: 0 | Status: DISCONTINUED | OUTPATIENT
Start: 2019-04-08 | End: 2019-04-10

## 2019-04-08 RX ORDER — FUROSEMIDE 40 MG
20 TABLET ORAL ONCE
Qty: 0 | Refills: 0 | Status: COMPLETED | OUTPATIENT
Start: 2019-04-08 | End: 2019-04-08

## 2019-04-08 RX ORDER — METOPROLOL TARTRATE 50 MG
50 TABLET ORAL
Qty: 0 | Refills: 0 | Status: DISCONTINUED | OUTPATIENT
Start: 2019-04-08 | End: 2019-04-10

## 2019-04-08 RX ORDER — TRAMADOL HYDROCHLORIDE 50 MG/1
25 TABLET ORAL ONCE
Qty: 0 | Refills: 0 | Status: DISCONTINUED | OUTPATIENT
Start: 2019-04-08 | End: 2019-04-08

## 2019-04-08 RX ADMIN — TRAMADOL HYDROCHLORIDE 25 MILLIGRAM(S): 50 TABLET ORAL at 22:16

## 2019-04-08 RX ADMIN — OXYCODONE AND ACETAMINOPHEN 1 TABLET(S): 5; 325 TABLET ORAL at 04:42

## 2019-04-08 RX ADMIN — MUPIROCIN 1 APPLICATION(S): 20 OINTMENT TOPICAL at 11:00

## 2019-04-08 RX ADMIN — CLOPIDOGREL BISULFATE 75 MILLIGRAM(S): 75 TABLET, FILM COATED ORAL at 12:27

## 2019-04-08 RX ADMIN — Medication 50 MILLILITER(S): at 04:13

## 2019-04-08 RX ADMIN — MIDODRINE HYDROCHLORIDE 10 MILLIGRAM(S): 2.5 TABLET ORAL at 21:58

## 2019-04-08 RX ADMIN — DABIGATRAN ETEXILATE MESYLATE 75 MILLIGRAM(S): 150 CAPSULE ORAL at 17:42

## 2019-04-08 RX ADMIN — OXYCODONE AND ACETAMINOPHEN 1 TABLET(S): 5; 325 TABLET ORAL at 20:35

## 2019-04-08 RX ADMIN — ATORVASTATIN CALCIUM 40 MILLIGRAM(S): 80 TABLET, FILM COATED ORAL at 21:31

## 2019-04-08 RX ADMIN — OXYCODONE AND ACETAMINOPHEN 1 TABLET(S): 5; 325 TABLET ORAL at 04:12

## 2019-04-08 RX ADMIN — Medication 50 MILLILITER(S): at 10:58

## 2019-04-08 RX ADMIN — Medication 50 MILLILITER(S): at 22:57

## 2019-04-08 RX ADMIN — BUDESONIDE AND FORMOTEROL FUMARATE DIHYDRATE 2 PUFF(S): 160; 4.5 AEROSOL RESPIRATORY (INHALATION) at 10:59

## 2019-04-08 RX ADMIN — OXYCODONE AND ACETAMINOPHEN 1 TABLET(S): 5; 325 TABLET ORAL at 11:14

## 2019-04-08 RX ADMIN — Medication 20 MILLIGRAM(S): at 12:42

## 2019-04-08 RX ADMIN — TRAMADOL HYDROCHLORIDE 25 MILLIGRAM(S): 50 TABLET ORAL at 22:56

## 2019-04-08 RX ADMIN — Medication 100 MILLIGRAM(S): at 11:16

## 2019-04-08 RX ADMIN — BUDESONIDE AND FORMOTEROL FUMARATE DIHYDRATE 2 PUFF(S): 160; 4.5 AEROSOL RESPIRATORY (INHALATION) at 21:31

## 2019-04-08 RX ADMIN — OXYCODONE AND ACETAMINOPHEN 1 TABLET(S): 5; 325 TABLET ORAL at 19:37

## 2019-04-08 RX ADMIN — DABIGATRAN ETEXILATE MESYLATE 75 MILLIGRAM(S): 150 CAPSULE ORAL at 05:35

## 2019-04-08 RX ADMIN — PANTOPRAZOLE SODIUM 40 MILLIGRAM(S): 20 TABLET, DELAYED RELEASE ORAL at 05:35

## 2019-04-08 RX ADMIN — MIDODRINE HYDROCHLORIDE 10 MILLIGRAM(S): 2.5 TABLET ORAL at 17:42

## 2019-04-08 RX ADMIN — Medication 75 MILLIGRAM(S): at 05:35

## 2019-04-08 RX ADMIN — Medication 50 MILLILITER(S): at 17:41

## 2019-04-08 RX ADMIN — OXYCODONE AND ACETAMINOPHEN 1 TABLET(S): 5; 325 TABLET ORAL at 12:14

## 2019-04-08 RX ADMIN — Medication 1 APPLICATION(S): at 12:27

## 2019-04-08 RX ADMIN — MIDODRINE HYDROCHLORIDE 5 MILLIGRAM(S): 2.5 TABLET ORAL at 05:35

## 2019-04-08 NOTE — PROGRESS NOTE ADULT - ASSESSMENT
79F with copd on O2, afib, cad/stent, chf, htn, chol, anxiety with recent history of Cdiff and chronic right heel ulcer and exposed tendon.  Prior MRI did not show abscess or underlying OM.  Pyuria and UC with >3 organisms.  She is asymptomatic.  Would observe her off antibiotics (ree lack of sx and recent cdiff).  Retention over the weekend and straight catheterized.  No dysuria.      Suggest:  - continue off antibiotics    Please call Infectious Diseases if there is a change in status.  Thank you.  [phone].

## 2019-04-08 NOTE — PROGRESS NOTE ADULT - SUBJECTIVE AND OBJECTIVE BOX
CARDIOLOGY FOLLOW UP NOTE - DR. MARTINEZ    Subjective:  no cp, sob      PHYSICAL EXAM:  T(C): 36.2 (04-08-19 @ 05:31), Max: 36.3 (04-07-19 @ 15:33)  HR: 80 (04-08-19 @ 12:42) (64 - 80)  BP: 101/56 (04-08-19 @ 12:42) (82/58 - 125/73)  RR: 20 (04-08-19 @ 05:31) (20 - 20)  SpO2: 97% (04-08-19 @ 05:31) (97% - 100%)  Wt(kg): --  I&O's Summary    07 Apr 2019 07:01  -  08 Apr 2019 07:00  --------------------------------------------------------  IN: 530 mL / OUT: 260 mL / NET: 270 mL        Appearance: Normal	  Cardiovascular: Normal S1 S2,RRR, No JVD, No murmurs  Respiratory: Lungs clear to auscultation	  Gastrointestinal:  Soft, Non-tender, + BS	  Extremities: Normal range of motion, edema    MEDICATIONS  (STANDING):  albumin human 25% IVPB 50 milliLiter(s) IV Intermittent every 6 hours  atorvastatin 40 milliGRAM(s) Oral at bedtime  buDESOnide 160 MICROgram(s)/formoterol 4.5 MICROgram(s) Inhaler 2 Puff(s) Inhalation two times a day  clopidogrel Tablet 75 milliGRAM(s) Oral daily  collagenase Ointment 1 Application(s) Topical daily  dabigatran 75 milliGRAM(s) Oral every 12 hours  docusate sodium 100 milliGRAM(s) Oral three times a day  metoprolol tartrate 50 milliGRAM(s) Oral two times a day  midodrine 10 milliGRAM(s) Oral every 8 hours  mupirocin 2% Ointment 1 Application(s) Topical <User Schedule>  polyethylene glycol 3350 17 Gram(s) Oral daily  senna 2 Tablet(s) Oral at bedtime  thiamine 100 milliGRAM(s) Oral daily      TELEMETRY: 	    ECG:  	  RADIOLOGY:   DIAGNOSTIC TESTING:  [ ] Echocardiogram:  [ ] Catheterization:  [ ] Stress Test:    OTHER: 	    LABS:	 	    CARDIAC MARKERS:                                10.8   6.51  )-----------( 197      ( 08 Apr 2019 05:37 )             37.1     04-08    140  |  100  |  56<H>  ----------------------------<  98  4.4   |  25  |  2.21<H>    Ca    9.1      08 Apr 2019 05:37  Phos  5.5     04-08  Mg     2.2     04-08    TPro  7.1  /  Alb  3.9  /  TBili  0.7  /  DBili  x   /  AST  11  /  ALT  7   /  AlkPhos  107  04-08    proBNP:     Lipid Profile:   HgA1c:

## 2019-04-08 NOTE — PROGRESS NOTE ADULT - ATTENDING COMMENTS
[facility] NEPHROLOGY  Harish Valera M.D.  Kevin Cedeno D.O.  Rachele Mckeon M.D.  Christina Westfall, MSN, ANP-C    Telephone: [phone]  Facsimile: [phone]    [address] [place] [address]

## 2019-04-08 NOTE — PROGRESS NOTE ADULT - SUBJECTIVE AND OBJECTIVE BOX
Patient is a 79y old  Female who presents with a chief complaint of right leg wound/ swelling (2019 18:53)      INTERVAL HPI/OVERNIGHT EVENTS:  T(C): 36.7 (19 @ 14:44), Max: 36.7 (19 @ 14:44)  HR: 73 (19 @ 14:44) (68 - 80)  BP: 96/60 (19 @ 14:44) (82/58 - 106/75)  RR: 20 (19 @ 14:44) (20 - 20)  SpO2: 99% (19 @ 14:44) (97% - 100%)  Wt(kg): --  I&O's Summary    2019 07:01  -  2019 07:00  --------------------------------------------------------  IN: 530 mL / OUT: 260 mL / NET: 270 mL        LABS:                        10.8   6.51  )-----------( 197      ( 2019 05:37 )             37.1     04-08    140  |  100  |  56<H>  ----------------------------<  98  4.4   |  25  |  2.21<H>    Ca    9.1      2019 05:37  Phos  5.5     -  Mg     2.2         TPro  7.1  /  Alb  3.9  /  TBili  0.7  /  DBili  x   /  AST  11  /  ALT  7   /  AlkPhos  107  04-08      Urinalysis Basic - ( 2019 00:05 )    Color: YELLOW / Appearance: Lt TURBID / S.021 / pH: 5.5  Gluc: NEGATIVE / Ketone: NEGATIVE  / Bili: NEGATIVE / Urobili: NORMAL   Blood: SMALL / Protein: 30 / Nitrite: NEGATIVE   Leuk Esterase: LARGE / RBC: 11-25 / WBC >50   Sq Epi: MODERATE / Non Sq Epi: x / Bacteria: NEGATIVE      CAPILLARY BLOOD GLUCOSE            Urinalysis Basic - ( 2019 00:05 )    Color: YELLOW / Appearance: Lt TURBID / S.021 / pH: 5.5  Gluc: NEGATIVE / Ketone: NEGATIVE  / Bili: NEGATIVE / Urobili: NORMAL   Blood: SMALL / Protein: 30 / Nitrite: NEGATIVE   Leuk Esterase: LARGE / RBC: 11-25 / WBC >50   Sq Epi: MODERATE / Non Sq Epi: x / Bacteria: NEGATIVE        MEDICATIONS  (STANDING):  albumin human 25% IVPB 50 milliLiter(s) IV Intermittent every 6 hours  atorvastatin 40 milliGRAM(s) Oral at bedtime  buDESOnide 160 MICROgram(s)/formoterol 4.5 MICROgram(s) Inhaler 2 Puff(s) Inhalation two times a day  clopidogrel Tablet 75 milliGRAM(s) Oral daily  collagenase Ointment 1 Application(s) Topical daily  dabigatran 75 milliGRAM(s) Oral every 12 hours  docusate sodium 100 milliGRAM(s) Oral three times a day  metoprolol tartrate 50 milliGRAM(s) Oral two times a day  midodrine 10 milliGRAM(s) Oral every 8 hours  mupirocin 2% Ointment 1 Application(s) Topical <User Schedule>  polyethylene glycol 3350 17 Gram(s) Oral daily  senna 2 Tablet(s) Oral at bedtime  thiamine 100 milliGRAM(s) Oral daily    MEDICATIONS  (PRN):  levalbuterol Inhalation 0.63 milliGRAM(s) Inhalation every 6 hours PRN SOB  oxyCODONE    5 mG/acetaminophen 325 mG 1 Tablet(s) Oral every 6 hours PRN Moderate Pain and severe pain          PHYSICAL EXAM:  GENERAL: NAD, well-groomed, well-developed  HEAD:  Atraumatic, Normocephalic  CHEST/LUNG: Clear to percussion bilaterally; No rales, rhonchi, wheezing, or rubs  HEART: Regular rate and rhythm; No murmurs, rubs, or gallops  ABDOMEN: Soft, Nontender, Nondistended; Bowel sounds present  EXTREMITIES:  2+ Peripheral Pulses, No clubbing, cyanosis, or edema  LYMPH: No lymphadenopathy noted  SKIN: No rashes or lesions    Care Discussed with Consultants/Other Providers [ ] YES  [ ] NO

## 2019-04-08 NOTE — PROGRESS NOTE ADULT - ASSESSMENT
79y Female with history of CHF, COPD presents with RLE pain. Nephrology consulted for elevated Scr.    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 versus underlying GN? Please discontinue PPI as known cause of AIN. Check GN serologies. Continue IV albumin to increase renal perfusion given low FeNa suggestive of decrease in renal perfusion. Check bladder scan given prior urinary retention. Avoid nephrotoxins.  2) CKD-3: Patient appears to have h/o age appropriate CKD-3 (baseline Scr 1.1-1.2) with mild proteinuria for which will defer inpatient CKD work up. Monitor electrolytes.  3) Orthostatic hypotension: BP low. Increase midodrine to 10 mg TID. Rate control with metoprolol as per cardiology. Monitor BP.  4) LE edema: CXR reviewed with pleural effusions and pulmonary edema. Prior TTE with mild to moderate MR with normal LVSF. Given worsening edema, would given lasix 20 mg IV X 1 dose thhis morning once BP improves. Monitor UO.  5) Pyuria/Microscopic hematuria: Urine culture negative s/p CTX. IGOR work up as above.

## 2019-04-08 NOTE — PROGRESS NOTE ADULT - SUBJECTIVE AND OBJECTIVE BOX
Patient is a 79y old  Female who presents with a chief complaint of right leg wound/ swelling (05 Apr 2019 13:30)    f/u +UC    Interval History/ROS:  no fever over the weekend.  required straight cath for increased PVR on bladder scan.  no dysuria.  still with pain of the R achilles.  Remainder of ROS otherwise negative.    PAST MEDICAL & SURGICAL HISTORY:  CAD (coronary artery disease): s/p stent 2018  CHF (congestive heart failure)  COPD (chronic obstructive pulmonary disease): on 2-3L O2 at home prn  Anxiety  TIA (transient ischemic attack): many years ago  PAF (paroxysmal atrial fibrillation)  HLD (hyperlipidemia)  HTN (hypertension)  H/O total hysterectomy: 2014  History of cataract surgery: b/l 2015  History of repair of hip fracture: luisa placed in RLE 2015  H/O spinal fusion: c2-6 in 2017    Allergies  No Known Allergies    ANTIMICROBIALS:    piperacillin/tazobactam IVPB (04-02-19 @ 12:31)  cephalexin x1 4/2  cefTRIAXone   IVPB (4/3-4)    MEDICATIONS  (STANDING):  atorvastatin 40 at bedtime  buDESOnide 160 MICROgram(s)/formoterol 4.5 MICROgram(s) Inhaler 2 two times a day  clopidogrel Tablet 75 daily  dabigatran 75 every 12 hours  docusate sodium 100 three times a day  furosemide   Injectable 20 once  metoprolol tartrate 75 two times a day  midodrine 5 every 8 hours  pantoprazole  Injectable 40 two times a day  polyethylene glycol 3350 17 daily  senna 2 at bedtime    Vital Signs Last 24 Hrs  T(F): 97.2 (04-08-19 @ 05:31), Max: 97.3 (04-07-19 @ 15:33)  HR: 72 (04-08-19 @ 05:31)  BP: 82/58 (04-08-19 @ 10:50)  RR: 20 (04-08-19 @ 05:31)  SpO2: 97% (04-08-19 @ 05:31) (97% - 100%)    PHYSICAL EXAM:  General: non-toxic  HEAD/EYES: anicteric  ENT:  supple; no thrush; edentulous  Cardiovascular:   S1, S2  Respiratory:  clear bilaterally  GI:  soft, non-tender, normal bowel sounds but distended  :  no barney  Musculoskeletal:  R heel achilles - did not undo dressing; no warmth of the foot or cellulitis  Neurologic:  grossly non-focal  Skin:  no rash  Psychiatric:  appropriate affect  Vascular:  no phlebitis                            10.8   6.51  )-----------( 197      ( 08 Apr 2019 05:37 )             37.1 04-08    140  |  100  |  56  ----------------------------<  98  4.4   |  25  |  2.21  Ca    9.1      08 Apr 2019 05:37Phos  5.5     04-08Mg     2.2     04-08  TPro  7.1  /  Alb  3.9  /  TBili  0.7  /  DBili  x   /  AST  11  /  ALT  7   /  AlkPhos  107  04-08    Urinalysis (04.02.19 @ 12:30)    Color: YELLOW    Urine Appearance: TURBID    Glucose: NEGATIVE    Bilirubin: NEGATIVE    Ketone - Urine: NEGATIVE    Specific Gravity: 1.025    Blood: LARGE    pH - Urine: 5.5    Protein, Urine: 30    Urobilinogen: 0.2    Nitrite: NEGATIVE    Leukocyte Esterase Concentration: LARGE    Red Blood Cell - Urine: 11-25    White Blood Cell - Urine: >50    Bacteria: FEW    Budding Yeast: FEW    MICROBIOLOGY:  Culture - Urine (collected 02 Apr 2019 13:02)  Source: URINE MIDSTREAM  Final Report (03 Apr 2019 08:50):    Culture grew 3 or more types of organisms which indicate    collection contamination; consider recollection only if    clinically indicated.    Clostridium difficile Toxin by PCR: DETECTED: ***** CRITICAL RESULT *****  PERSON CALLED / READ-BACK: FLYNN JC RN/Y  DATE / TIME CALLED: 01/28/19 0601  C. difficile toxin B gene (tcdb) detected  (01.28.19 @ 05:05)    RADIOLOGY:  imaging below personally reviewed    US Kidney and Bladder (04.04.19 @ 10:25) >  IMPRESSION: Renal parenchymal disease without hydronephrosis. Patient unable to void. Small free fluid in the abdomen and pelvis.    Xray Chest 1 View- PORTABLE-Routine (04.03.19 @ 11:41) >  IMPRESSION:  Loculated small right pleural effusion appears to have increased in size. Small left pleural effusion with left lung base atelectasis. Pulmonary edema.  Bilateral subsegmental atelectasis.  The cardiomediastinal silhouette is enlarged.  Status post cervical spine surgery with hardware.    Xray Foot AP + Lateral + Oblique, Right (04.02.19 @ 15:33) >  IMPRESSION:  Dorsal soft tissue swelling. Posterior ankle region skin surface rounded soft tissue defect/ulceration. No tracking soft tissue gas collections, radiopaque foreign bodies, or gross radiographic evidence for osteomyelitis.    US Duplex Venous Lower Ext Ltd, Right (04.02.19 @ 13:38) >  IMPRESSION:  No evidence of right lower extremity deep venous thrombosis.    MR Ankle w/wo IV Cont, Right (12.21.18 @ 22:15) >  Impression:   No osteomyelitis or abscess.

## 2019-04-08 NOTE — PROGRESS NOTE ADULT - SUBJECTIVE AND OBJECTIVE BOX
Podiatry pager #: [phone]    Patient is a 79y old  Female who presents with a chief complaint of     HPI: 79 year old female pmhx chf, bri, paroxysmal a fib, cva/tia, htn, hld, c diff, copd, non healing ulcer RLE.  presents today with few day hx of worsening RLE pain, swelling and erythema.  pt was discharged from [facility] on 2/15 and had been sent to [place] for rehab.  during hospital course followed by podiatry and vascular.  found to have poor ingrid/pvr and rec outpatient angio      PAST MEDICAL & SURGICAL HISTORY:  CAD (coronary artery disease): s/p stent 2018  CHF (congestive heart failure)  COPD (chronic obstructive pulmonary disease): on 2-3L O2 at home prn  Anxiety  TIA (transient ischemic attack): many years ago  PAF (paroxysmal atrial fibrillation)  HLD (hyperlipidemia)  HTN (hypertension)  H/O total hysterectomy: 2014  History of cataract surgery: b/l 2015  History of repair of hip fracture: luisa placed in RLE 2015  H/O spinal fusion: c2-6 in 2017      MEDICATIONS  (STANDING):    MEDICATIONS  (PRN):      Allergies    No Known Allergies    Intolerances        VITALS:    Vital Signs Last 24 Hrs  T(C): 36.6 (02 Apr 2019 11:44), Max: 36.6 (02 Apr 2019 11:44)  T(F): 97.9 (02 Apr 2019 11:44), Max: 97.9 (02 Apr 2019 11:44)  HR: 85 (02 Apr 2019 11:44) (85 - 100)  BP: 113/72 (02 Apr 2019 11:44) (104/64 - 113/72)  BP(mean): --  RR: 20 (02 Apr 2019 11:44) (18 - 20)  SpO2: 96% (02 Apr 2019 11:44) (96% - 96%)    LABS:                          11.2   8.48  )-----------( 213      ( 02 Apr 2019 11:36 )             38.0       04-02    140  |  98  |  56<H>  ----------------------------<  94  4.3   |  26  |  1.60<H>    Ca    9.2      02 Apr 2019 11:36    TPro  7.0  /  Alb  3.1<L>  /  TBili  0.7  /  DBili  x   /  AST  21  /  ALT  12  /  AlkPhos  136<H>  04-02      CAPILLARY BLOOD GLUCOSE              LOWER EXTREMITY PHYSICAL EXAM:  R Posterior heel ulceration, with exposed achilles tenon.   No local signs of infection. No probe to bone.  No drainage or malodor.  ++pain on exam  Pedal pulses non palp bilateral lower extremities  Sensation intact to b/l feet  No gross deformities  R foot +2 pitting edema.       RADIOLOGY & ADDITIONAL STUDIES:    XR pending

## 2019-04-08 NOTE — PROGRESS NOTE ADULT - ASSESSMENT
78 year old female with right  posterior heel ulceration (stable); RLE pain with non-palp pulses  - Pt seen and evaluated   - wound is stable, achilles tendon exposed, no ascending cellulitis or purulent drainage noted.   - Previous STONEY/PVR poor, 0.60 on the effected limb with flat waveforms. Last admission had rec outpatient angio. renal optimization for angio planning tomorrow  - No local signs of infection - would recommend monitoring off antibiotics.    -Z floats at all times  - Cont local wound care with right foot ulceration with santyl and DSD

## 2019-04-08 NOTE — PROGRESS NOTE ADULT - ASSESSMENT
78 y/o female, with a PmHx of COPD, paroxsymal a-fib (on pradaxa), CAD (s/p stent 11/2018), DCHF, pulm htn, HTN, HLD, and anxiety presenting with nonhealing R posterior ankle ulcer and UTI     1. Chronic diastolic chf  no SOB, inc volume status   chest xray with increase right pleural effussion,.small left pleural effusion, pulm edema  resume lasix to help improve hemodynamics and creat  cont bb, dec to 50 mg to inc SBP  recent echo with nl lv fx   repeat echo for lv fxn    2.  Paroxysmal Atrial Fibrillation  rate controlled, c/w  lopressor, dec to 50 mg BID  inc midodrine to 10 mg q 8 hrs for bp support  CHADS 3 - off pradaxa for possible angio     3. CAD s/p PCI (11/2018)  cv stable, no cp or sob   continue bb, statin, plavix     4. UTI   abx per med     5.  R posterior ankle ulcer   vascular f/u   plan for eventual RLE angio, pending optimization of renal fx    6. bri on ckd  renal f/u  inc sbp  improve volume status

## 2019-04-08 NOTE — PROGRESS NOTE ADULT - SUBJECTIVE AND OBJECTIVE BOX
[facility]      Seen earlier today, and examined.  - Today, patient is without complaints.           *****MEDICATIONS: Current medication reviewed and documented.    MEDICATIONS  (STANDING):  albumin human 25% IVPB 50 milliLiter(s) IV Intermittent every 6 hours  atorvastatin 40 milliGRAM(s) Oral at bedtime  buDESOnide 160 MICROgram(s)/formoterol 4.5 MICROgram(s) Inhaler 2 Puff(s) Inhalation two times a day  clopidogrel Tablet 75 milliGRAM(s) Oral daily  collagenase Ointment 1 Application(s) Topical daily  dabigatran 75 milliGRAM(s) Oral every 12 hours  docusate sodium 100 milliGRAM(s) Oral three times a day  metoprolol tartrate 50 milliGRAM(s) Oral two times a day  midodrine 10 milliGRAM(s) Oral every 8 hours  mupirocin 2% Ointment 1 Application(s) Topical <User Schedule>  polyethylene glycol 3350 17 Gram(s) Oral daily  senna 2 Tablet(s) Oral at bedtime  thiamine 100 milliGRAM(s) Oral daily    MEDICATIONS  (PRN):  levalbuterol Inhalation 0.63 milliGRAM(s) Inhalation every 6 hours PRN SOB  oxyCODONE    5 mG/acetaminophen 325 mG 1 Tablet(s) Oral every 6 hours PRN Moderate Pain and severe pain          ***** VITAL SIGNS:  T(F): 98 (19 @ 14:44), Max: 98 (19 @ 14:44)  HR: 73 (19 @ 14:44) (68 - 80)  BP: 96/60 (19 @ 14:44) (82/58 - 106/75)  RR: 20 (19 @ 14:44) (20 - 20)  SpO2: 99% (19 @ 14:44) (97% - 100%)  Wt(kg): --  ,   I&O's Summary    2019 07:01  -  2019 07:00  --------------------------------------------------------  IN: 530 mL / OUT: 260 mL / NET: 270 mL             *****PHYSICAL EXAM:    Alert oriented x2  Attention comprehension are fair. Able to name, repeat,  without any difficulty.   Able to follow 1-2  step commands.     EOMI fundi not visualized,     No facial asymmetry   Tongue is midline   Palate elevates symmetrically   Moving all 4 ext symmetrically no pronator drift         Gait : not assessed.  B/L down going toes            *****LAB AND IMAGING:                        10.8   6.51  )-----------( 197      ( 2019 05:37 )             37.1               04-08    140  |  100  |  56<H>  ----------------------------<  98  4.4   |  25  |  2.21<H>    Ca    9.1      2019 05:37  Phos  5.5     04-08  Mg     2.2     04-08    TPro  7.1  /  Alb  3.9  /  TBili  0.7  /  DBili  x   /  AST  11  /  ALT  7   /  AlkPhos  107  04-08                       Urinalysis Basic - ( 2019 00:05 )    Color: YELLOW / Appearance: Lt TURBID / S.021 / pH: 5.5  Gluc: NEGATIVE / Ketone: NEGATIVE  / Bili: NEGATIVE / Urobili: NORMAL   Blood: SMALL / Protein: 30 / Nitrite: NEGATIVE   Leuk Esterase: LARGE / RBC: 11-25 / WBC >50   Sq Epi: MODERATE / Non Sq Epi: x / Bacteria: NEGATIVE      [All pertinent recent Imaging/Reports reviewed]           *****A S S E S S M E N T   A N D   P L A N :      78 y/o female, with a PmHx of COPD (currently on 3L O2 24 hours), paroxsymal a-fib (on pradaxa), lsited h/o CAD (s/p stent 2018), CHF (although recent TTE with normal biventricular function, with no mention of diastolic dysfunction; + severely dilated left atrium), HTN, HLD, and anxiety, presenting to the [facility] ED with chronic right ankle pressure ulcer pain for 1 week. The patient has a posterior ankle ulcer down to tendon with scant serosanguinous drainag    Problem/Recommendations 1: ams due to pain/uti   pain control   uti being treated.   will continue to monitor    ID on board       Thank you for allowing me to participate in the care of this patient. Please do not hesitate to call me if you have any  questions.        ________________  Kary Mercado MD  [facility] ([facility]  [phone]      30 minutes spent on total encounter; more than 50 % of the visit was  spent counseling about plan of care, compliance to diet/exercise and medication regimen and or  coordinating care by the attending physician.      It is advised that s stroke patients follow up with [facility] @ [phone] in 1- 2 weeks.   Others please follow up with Dr. Michael Nissenbaum [phone]

## 2019-04-08 NOTE — PROGRESS NOTE ADULT - SUBJECTIVE AND OBJECTIVE BOX
[facility] NEPHROLOGY- PROGRESS NOTE    79y Female with history of CHF, COPD presents with RLE pain. Nephrology consulted for elevated Scr.    REVIEW OF SYSTEMS:  Gen: no changes in weight  Cards: no chest pain  Resp: + dyspnea (chronic)  GI: no nausea or vomiting, no diarrhea  Vascular: RLE edema/pain    No Known Allergies      Hospital Medications: Medications reviewed      VITALS:  T(F): 97.2 (19 @ 05:31), Max: 97.3 (19 @ 15:33)  HR: 72 (19 @ 05:31)  BP: 82/58 (19 @ 10:50)  RR: 20 (19 @ 05:31)  SpO2: 97% (19 @ 05:31)  Wt(kg): --     @ 07:01  -   @ 07:00  --------------------------------------------------------  IN: 530 mL / OUT: 260 mL / NET: 270 mL      PHYSICAL EXAM:    Gen: NAD, calm  Cards: Irregularly irregular, +S1/S2, no M/G/R  Resp: Bibasilar rales  GI: soft, NT, + ab distention, NABS  Vascular: 2+ LE edema B/L      LABS:      140  |  100  |  56<H>  ----------------------------<  98  4.4   |  25  |  2.21<H>    Ca    9.1      2019 05:37  Phos  5.5     08  Mg     2.2         TPro  7.1  /  Alb  3.9  /  TBili  0.7  /  DBili      /  AST  11  /  ALT  7   /  AlkPhos  107      Creatinine Trend: 2.21 <--, 1.81 <--, 1.57 <--, 1.62 <--, 1.67 <--, 1.52 <--, 1.60 <--                        10.8   6.51  )-----------( 197      ( 2019 05:37 )             37.1     Urine Studies:  Urinalysis Basic - ( 2019 00:05 )    Color: YELLOW / Appearance: Lt TURBID / S.021 / pH: 5.5  Gluc: NEGATIVE / Ketone: NEGATIVE  / Bili: NEGATIVE / Urobili: NORMAL   Blood: SMALL / Protein: 30 / Nitrite: NEGATIVE   Leuk Esterase: LARGE / RBC: 11-25 / WBC >50   Sq Epi: MODERATE / Non Sq Epi:  / Bacteria: NEGATIVE      Sodium, Random Urine: < 20 mmol/L ( @ 00:05)  Creatinine, Random Urine: 101.80 mg/dL ( @ 00:05)  Creatinine, Random Urine: 41.20 mg/dL ( @ 17:03)

## 2019-04-09 DIAGNOSIS — R14.0 ABDOMINAL DISTENSION (GASEOUS): ICD-10-CM

## 2019-04-09 DIAGNOSIS — L97.309 NON-PRESSURE CHRONIC ULCER OF UNSPECIFIED ANKLE WITH UNSPECIFIED SEVERITY: ICD-10-CM

## 2019-04-09 LAB
ANION GAP SERPL CALC-SCNC: 13 MMO/L — SIGNIFICANT CHANGE UP (ref 7–14)
APTT BLD: 70 SEC — HIGH (ref 27.5–36.3)
APTT BLD: > 200 SEC — CRITICAL HIGH (ref 27.5–36.3)
ASO AB SER QL: 160.7 IU/ML — SIGNIFICANT CHANGE UP
BASE EXCESS BLDA CALC-SCNC: -0.1 MMOL/L — SIGNIFICANT CHANGE UP
BLD GP AB SCN SERPL QL: NEGATIVE — SIGNIFICANT CHANGE UP
BUN SERPL-MCNC: 60 MG/DL — HIGH (ref 7–23)
C3 SERPL-MCNC: 61.2 MG/DL — LOW (ref 90–180)
C4 SERPL-MCNC: 8.8 MG/DL — LOW (ref 10–40)
CALCIUM SERPL-MCNC: 9.2 MG/DL — SIGNIFICANT CHANGE UP (ref 8.4–10.5)
CHLORIDE SERPL-SCNC: 99 MMOL/L — SIGNIFICANT CHANGE UP (ref 98–107)
CO2 SERPL-SCNC: 26 MMOL/L — SIGNIFICANT CHANGE UP (ref 22–31)
CREAT ?TM UR-MCNC: 119.5 MG/DL — SIGNIFICANT CHANGE UP
CREAT SERPL-MCNC: 2.46 MG/DL — HIGH (ref 0.5–1.3)
GLUCOSE BLDA-MCNC: 97 MG/DL — SIGNIFICANT CHANGE UP (ref 70–99)
GLUCOSE SERPL-MCNC: 87 MG/DL — SIGNIFICANT CHANGE UP (ref 70–99)
HBV SURFACE AG SER-ACNC: NEGATIVE — SIGNIFICANT CHANGE UP
HCO3 BLDA-SCNC: 23 MMOL/L — SIGNIFICANT CHANGE UP (ref 22–26)
HCT VFR BLD CALC: 34 % — LOW (ref 34.5–45)
HCT VFR BLD CALC: 37.2 % — SIGNIFICANT CHANGE UP (ref 34.5–45)
HCT VFR BLDA CALC: 34.1 % — LOW (ref 34.5–46.5)
HGB BLD-MCNC: 10.1 G/DL — LOW (ref 11.5–15.5)
HGB BLD-MCNC: 10.8 G/DL — LOW (ref 11.5–15.5)
HGB BLDA-MCNC: 11.1 G/DL — LOW (ref 11.5–15.5)
INR BLD: 2.04 — HIGH (ref 0.88–1.17)
MAGNESIUM SERPL-MCNC: 2.2 MG/DL — SIGNIFICANT CHANGE UP (ref 1.6–2.6)
MCHC RBC-ENTMCNC: 26.2 PG — LOW (ref 27–34)
MCHC RBC-ENTMCNC: 26.2 PG — LOW (ref 27–34)
MCHC RBC-ENTMCNC: 29 % — LOW (ref 32–36)
MCHC RBC-ENTMCNC: 29.7 % — LOW (ref 32–36)
MCV RBC AUTO: 88.1 FL — SIGNIFICANT CHANGE UP (ref 80–100)
MCV RBC AUTO: 90.3 FL — SIGNIFICANT CHANGE UP (ref 80–100)
NRBC # FLD: 0 K/UL — SIGNIFICANT CHANGE UP (ref 0–0)
NRBC # FLD: 0.02 K/UL — SIGNIFICANT CHANGE UP (ref 0–0)
PCO2 BLDA: 47 MMHG — SIGNIFICANT CHANGE UP (ref 32–48)
PH BLDA: 7.34 PH — LOW (ref 7.35–7.45)
PHOSPHATE SERPL-MCNC: 5 MG/DL — HIGH (ref 2.5–4.5)
PLATELET # BLD AUTO: 184 K/UL — SIGNIFICANT CHANGE UP (ref 150–400)
PLATELET # BLD AUTO: 201 K/UL — SIGNIFICANT CHANGE UP (ref 150–400)
PMV BLD: 10.7 FL — SIGNIFICANT CHANGE UP (ref 7–13)
PMV BLD: 10.8 FL — SIGNIFICANT CHANGE UP (ref 7–13)
PO2 BLDA: 21 MMHG — CRITICAL LOW (ref 83–108)
POTASSIUM BLDA-SCNC: 4.5 MMOL/L — SIGNIFICANT CHANGE UP (ref 3.4–4.5)
POTASSIUM SERPL-MCNC: 4.4 MMOL/L — SIGNIFICANT CHANGE UP (ref 3.5–5.3)
POTASSIUM SERPL-SCNC: 4.4 MMOL/L — SIGNIFICANT CHANGE UP (ref 3.5–5.3)
PROT SERPL-MCNC: 6.7 G/DL — SIGNIFICANT CHANGE UP (ref 6–8.3)
PROT UR-MCNC: 43.3 MG/DL — SIGNIFICANT CHANGE UP
PROTHROM AB SERPL-ACNC: 23.1 SEC — HIGH (ref 9.8–13.1)
RBC # BLD: 3.86 M/UL — SIGNIFICANT CHANGE UP (ref 3.8–5.2)
RBC # BLD: 4.12 M/UL — SIGNIFICANT CHANGE UP (ref 3.8–5.2)
RBC # FLD: 22.4 % — HIGH (ref 10.3–14.5)
RBC # FLD: 22.4 % — HIGH (ref 10.3–14.5)
RH IG SCN BLD-IMP: POSITIVE — SIGNIFICANT CHANGE UP
SAO2 % BLDA: 32.2 % — LOW (ref 95–99)
SODIUM BLDA-SCNC: 132 MMOL/L — LOW (ref 136–146)
SODIUM SERPL-SCNC: 138 MMOL/L — SIGNIFICANT CHANGE UP (ref 135–145)
WBC # BLD: 15.55 K/UL — HIGH (ref 3.8–10.5)
WBC # BLD: 7.49 K/UL — SIGNIFICANT CHANGE UP (ref 3.8–10.5)
WBC # FLD AUTO: 15.55 K/UL — HIGH (ref 3.8–10.5)
WBC # FLD AUTO: 7.49 K/UL — SIGNIFICANT CHANGE UP (ref 3.8–10.5)

## 2019-04-09 PROCEDURE — 86334 IMMUNOFIX E-PHORESIS SERUM: CPT | Mod: 26

## 2019-04-09 PROCEDURE — 84165 PROTEIN E-PHORESIS SERUM: CPT | Mod: 26

## 2019-04-09 PROCEDURE — 71045 X-RAY EXAM CHEST 1 VIEW: CPT | Mod: 26

## 2019-04-09 PROCEDURE — 99232 SBSQ HOSP IP/OBS MODERATE 35: CPT

## 2019-04-09 PROCEDURE — 93010 ELECTROCARDIOGRAM REPORT: CPT

## 2019-04-09 RX ORDER — HEPARIN SODIUM 5000 [USP'U]/ML
INJECTION INTRAVENOUS; SUBCUTANEOUS
Qty: 25000 | Refills: 0 | Status: DISCONTINUED | OUTPATIENT
Start: 2019-04-09 | End: 2019-04-14

## 2019-04-09 RX ORDER — ALBUMIN HUMAN 25 %
50 VIAL (ML) INTRAVENOUS EVERY 6 HOURS
Qty: 0 | Refills: 0 | Status: COMPLETED | OUTPATIENT
Start: 2019-04-09 | End: 2019-04-11

## 2019-04-09 RX ORDER — ACETAMINOPHEN 500 MG
650 TABLET ORAL ONCE
Qty: 0 | Refills: 0 | Status: COMPLETED | OUTPATIENT
Start: 2019-04-09 | End: 2019-04-09

## 2019-04-09 RX ORDER — DIGOXIN 250 MCG
0.5 TABLET ORAL ONCE
Qty: 0 | Refills: 0 | Status: COMPLETED | OUTPATIENT
Start: 2019-04-09 | End: 2019-04-09

## 2019-04-09 RX ORDER — HEPARIN SODIUM 5000 [USP'U]/ML
2000 INJECTION INTRAVENOUS; SUBCUTANEOUS EVERY 6 HOURS
Qty: 0 | Refills: 0 | Status: DISCONTINUED | OUTPATIENT
Start: 2019-04-09 | End: 2019-04-14

## 2019-04-09 RX ORDER — METOPROLOL TARTRATE 50 MG
5 TABLET ORAL ONCE
Qty: 0 | Refills: 0 | Status: DISCONTINUED | OUTPATIENT
Start: 2019-04-09 | End: 2019-04-09

## 2019-04-09 RX ORDER — AMIODARONE HYDROCHLORIDE 400 MG/1
TABLET ORAL
Qty: 0 | Refills: 0 | Status: DISCONTINUED | OUTPATIENT
Start: 2019-04-09 | End: 2019-04-14

## 2019-04-09 RX ORDER — HEPARIN SODIUM 5000 [USP'U]/ML
4500 INJECTION INTRAVENOUS; SUBCUTANEOUS EVERY 6 HOURS
Qty: 0 | Refills: 0 | Status: DISCONTINUED | OUTPATIENT
Start: 2019-04-09 | End: 2019-04-14

## 2019-04-09 RX ORDER — HEPARIN SODIUM 5000 [USP'U]/ML
4500 INJECTION INTRAVENOUS; SUBCUTANEOUS ONCE
Qty: 0 | Refills: 0 | Status: COMPLETED | OUTPATIENT
Start: 2019-04-09 | End: 2019-04-09

## 2019-04-09 RX ORDER — METOPROLOL TARTRATE 50 MG
2.5 TABLET ORAL ONCE
Qty: 0 | Refills: 0 | Status: COMPLETED | OUTPATIENT
Start: 2019-04-09 | End: 2019-04-09

## 2019-04-09 RX ORDER — AMIODARONE HYDROCHLORIDE 400 MG/1
200 TABLET ORAL DAILY
Qty: 0 | Refills: 0 | Status: DISCONTINUED | OUTPATIENT
Start: 2019-04-13 | End: 2019-04-14

## 2019-04-09 RX ORDER — AMIODARONE HYDROCHLORIDE 400 MG/1
400 TABLET ORAL EVERY 8 HOURS
Qty: 0 | Refills: 0 | Status: COMPLETED | OUTPATIENT
Start: 2019-04-09 | End: 2019-04-13

## 2019-04-09 RX ADMIN — Medication 50 MILLILITER(S): at 06:18

## 2019-04-09 RX ADMIN — MIDODRINE HYDROCHLORIDE 10 MILLIGRAM(S): 2.5 TABLET ORAL at 15:35

## 2019-04-09 RX ADMIN — HEPARIN SODIUM 1100 UNIT(S)/HR: 5000 INJECTION INTRAVENOUS; SUBCUTANEOUS at 15:36

## 2019-04-09 RX ADMIN — Medication 0.5 MILLIGRAM(S): at 10:39

## 2019-04-09 RX ADMIN — OXYCODONE AND ACETAMINOPHEN 1 TABLET(S): 5; 325 TABLET ORAL at 13:25

## 2019-04-09 RX ADMIN — Medication 2.5 MILLIGRAM(S): at 10:18

## 2019-04-09 RX ADMIN — Medication 1 APPLICATION(S): at 18:20

## 2019-04-09 RX ADMIN — MUPIROCIN 1 APPLICATION(S): 20 OINTMENT TOPICAL at 18:20

## 2019-04-09 RX ADMIN — AMIODARONE HYDROCHLORIDE 400 MILLIGRAM(S): 400 TABLET ORAL at 18:21

## 2019-04-09 RX ADMIN — ATORVASTATIN CALCIUM 40 MILLIGRAM(S): 80 TABLET, FILM COATED ORAL at 21:50

## 2019-04-09 RX ADMIN — Medication 650 MILLIGRAM(S): at 23:59

## 2019-04-09 RX ADMIN — Medication 50 MILLILITER(S): at 10:08

## 2019-04-09 RX ADMIN — SENNA PLUS 2 TABLET(S): 8.6 TABLET ORAL at 21:50

## 2019-04-09 RX ADMIN — MIDODRINE HYDROCHLORIDE 10 MILLIGRAM(S): 2.5 TABLET ORAL at 21:50

## 2019-04-09 RX ADMIN — Medication 50 MILLIGRAM(S): at 08:11

## 2019-04-09 RX ADMIN — Medication 100 MILLIGRAM(S): at 12:46

## 2019-04-09 RX ADMIN — HEPARIN SODIUM 4500 UNIT(S): 5000 INJECTION INTRAVENOUS; SUBCUTANEOUS at 15:36

## 2019-04-09 RX ADMIN — MIDODRINE HYDROCHLORIDE 10 MILLIGRAM(S): 2.5 TABLET ORAL at 06:55

## 2019-04-09 RX ADMIN — HEPARIN SODIUM 0 UNIT(S)/HR: 5000 INJECTION INTRAVENOUS; SUBCUTANEOUS at 23:01

## 2019-04-09 RX ADMIN — OXYCODONE AND ACETAMINOPHEN 1 TABLET(S): 5; 325 TABLET ORAL at 12:45

## 2019-04-09 RX ADMIN — Medication 50 MILLILITER(S): at 18:19

## 2019-04-09 RX ADMIN — BUDESONIDE AND FORMOTEROL FUMARATE DIHYDRATE 2 PUFF(S): 160; 4.5 AEROSOL RESPIRATORY (INHALATION) at 21:50

## 2019-04-09 NOTE — PROGRESS NOTE ADULT - ATTENDING COMMENTS
events noted  afib w RVR  s/p IV dig w modest response  hypotension precluding more BB use  start po amio load(pt has been on AC)  start heparin gtt events noted  afib w RVR  s/p IV dig w modest response  HR improving  cont dig Q48 hrs, monitor levels  if HR elevates again start po amio load(pt has been on AC)  start heparin gtt

## 2019-04-09 NOTE — CONSULT NOTE ADULT - ASSESSMENT
78 y/o female, with a PmHx of COPD (currently on 3L O2 24 hours), paroxsymal a-fib (on pradaxa), lsited h/o CAD (s/p stent 11/2018), CHF (although recent TTE with normal biventricular function, with no mention of diastolic dysfunction; + severely dilated left atrium), HTN, HLD, and anxiety, presenting to the [facility] ED with chronic right ankle pressure ulcer pain. GI consulted for abd distention

## 2019-04-09 NOTE — PROGRESS NOTE ADULT - SUBJECTIVE AND OBJECTIVE BOX
Podiatry pager #: [phone]    Patient is a 79y old  Female who presents with a chief complaint of     HPI: 79 year old female pmhx chf, bri, paroxysmal a fib, cva/tia, htn, hld, c diff, copd, non healing ulcer RLE.  presents today with few day hx of worsening RLE pain, swelling and erythema.  pt was discharged from [facility] on 2/15 and had been sent to [place] for rehab.  during hospital course followed by podiatry and vascular.  found to have poor ingrid/pvr and rec outpatient angio      PAST MEDICAL & SURGICAL HISTORY:  CAD (coronary artery disease): s/p stent 2018  CHF (congestive heart failure)  COPD (chronic obstructive pulmonary disease): on 2-3L O2 at home prn  Anxiety  TIA (transient ischemic attack): many years ago  PAF (paroxysmal atrial fibrillation)  HLD (hyperlipidemia)  HTN (hypertension)  H/O total hysterectomy: 2014  History of cataract surgery: b/l 2015  History of repair of hip fracture: luisa placed in RLE 2015  H/O spinal fusion: c2-6 in 2017      MEDICATIONS  (STANDING):    MEDICATIONS  (PRN):      Allergies    No Known Allergies    Intolerances        VITALS:    Vital Signs Last 24 Hrs  T(C): 36.6 (02 Apr 2019 11:44), Max: 36.6 (02 Apr 2019 11:44)  T(F): 97.9 (02 Apr 2019 11:44), Max: 97.9 (02 Apr 2019 11:44)  HR: 85 (02 Apr 2019 11:44) (85 - 100)  BP: 113/72 (02 Apr 2019 11:44) (104/64 - 113/72)  BP(mean): --  RR: 20 (02 Apr 2019 11:44) (18 - 20)  SpO2: 96% (02 Apr 2019 11:44) (96% - 96%)    LABS:                          11.2   8.48  )-----------( 213      ( 02 Apr 2019 11:36 )             38.0       04-02    140  |  98  |  56<H>  ----------------------------<  94  4.3   |  26  |  1.60<H>    Ca    9.2      02 Apr 2019 11:36    TPro  7.0  /  Alb  3.1<L>  /  TBili  0.7  /  DBili  x   /  AST  21  /  ALT  12  /  AlkPhos  136<H>  04-02      CAPILLARY BLOOD GLUCOSE              LOWER EXTREMITY PHYSICAL EXAM:  R Posterior heel ulceration, with exposed achilles tenon.   No local signs of infection. No probe to bone.  No drainage or malodor.  ++pain on exam  Pedal pulses non palp bilateral lower extremities  Sensation intact to b/l feet  No gross deformities  R foot +2 pitting edema.       RADIOLOGY & ADDITIONAL STUDIES:    XR pending

## 2019-04-09 NOTE — CONSULT NOTE ADULT - PROBLEM SELECTOR RECOMMENDATION 2
- rate control   - cont a/c per cardiology   - keep on gi ppx while on a/c with ppi  - repeat TTE pending

## 2019-04-09 NOTE — PROGRESS NOTE ADULT - SUBJECTIVE AND OBJECTIVE BOX
CARDIOLOGY FOLLOW UP - Dr. Vieyra    CC s/p RRT for RAFIB w/ RVR, hypoxemia   She was also given IV Lopressor 2.5mg x1 w/ no improvement in HR (BP remained 90s/60s). Patient was then given IV Digoxin 0.5mg x1 with ultimate improvement in HR to 80s-110. Her BP also improved to 110s/70s.   Pt. seen prior/during RRT  reassessed after RRT completed, pt. resting comfortably, HR improved however still tachy 110-130s, pending tele bed   AC also stopped yesterday for poss renal biopsy     PHYSICAL EXAM:  T(C): 36.2 (04-09-19 @ 06:15), Max: 36.7 (04-08-19 @ 14:44)  HR: 88 (04-09-19 @ 12:40) (67 - 89)  BP: 116/77 (04-09-19 @ 12:40) (91/60 - 116/77)  RR: 20 (04-09-19 @ 12:40) (18 - 20)  SpO2: 98% (04-09-19 @ 06:15) (93% - 99%)  Wt(kg): --  I&O's Summary    08 Apr 2019 07:01  -  09 Apr 2019 07:00  --------------------------------------------------------  IN: 100 mL / OUT: 350 mL / NET: -250 mL        Appearance: Normal	  Cardiovascular: Normal S1 S2,irregular, No JVD, No murmurs  Respiratory: crackles   Gastrointestinal:  Soft, Non-tender, + BS, abd distention   Extremities: Normal range of motion, No clubbing, cyanosis or edema        MEDICATIONS  (STANDING):  albumin human 25% IVPB 50 milliLiter(s) IV Intermittent every 6 hours  atorvastatin 40 milliGRAM(s) Oral at bedtime  buDESOnide 160 MICROgram(s)/formoterol 4.5 MICROgram(s) Inhaler 2 Puff(s) Inhalation two times a day  collagenase Ointment 1 Application(s) Topical daily  docusate sodium 100 milliGRAM(s) Oral three times a day  metoprolol tartrate 50 milliGRAM(s) Oral two times a day  midodrine 10 milliGRAM(s) Oral every 8 hours  mupirocin 2% Ointment 1 Application(s) Topical <User Schedule>  polyethylene glycol 3350 17 Gram(s) Oral daily  senna 2 Tablet(s) Oral at bedtime  thiamine 100 milliGRAM(s) Oral daily      TELEMETRY: AFIB 110-130	    ECG:  	  RADIOLOGY:   DIAGNOSTIC TESTING:  [ ] Echocardiogram:  [ ]  Catheterization:  [ ] Stress Test:    OTHER: 	  < from: Xray Chest 1 View-PORTABLE IMMEDIATE (04.09.19 @ 10:11) >  INTERPRETATION:     Heart size and the mediastinum cannot be accurately evaluated on this   projection. There is a calcified tortuous thoracic aorta.  Mild interstitial edema is not significantly changed. A small loculated  right pleural effusion appears slightly redistributed. A small left   pleural effusion is not significantly changed.  There is continued elevation of the left hemidiaphragm.  There is increased right basilar opacity and continued left basilar   opacity.   No pneumothorax is seen.    < end of copied text >    LABS:	 	                                10.8   7.49  )-----------( 201      ( 09 Apr 2019 05:11 )             37.2     04-09    138  |  99  |  60<H>  ----------------------------<  87  4.4   |  26  |  2.46<H>    Ca    9.2      09 Apr 2019 05:15  Phos  5.0     04-09  Mg     2.2     04-09    TPro  6.7  /  Alb  x   /  TBili  x   /  DBili  x   /  AST  x   /  ALT  x   /  AlkPhos  x   04-09    PT/INR - ( 09 Apr 2019 05:15 )   PT: 23.1 SEC;   INR: 2.04          PTT - ( 09 Apr 2019 05:15 )  PTT:70.0 SEC CARDIOLOGY FOLLOW UP - Dr. Vieyra    CC s/p RRT for RAFIB w/ RVR, hypoxemia   She was given IV Lopressor 2.5mg x1 w/ no improvement in HR (BP remained 90s/60s). Patient was then given IV Digoxin 0.5mg x1 with initial improvement in HR to 80s-110.   Pt. seen prior/during RRT  reassessed after RRT completed, pt. resting comfortably, HR improved however still tachy 110-130s, pending tele bed   AC also stopped yesterday for poss renal biopsy.  Pt. also with significant increase in abd distention - abd u/s pending.     PHYSICAL EXAM:  T(C): 36.2 (04-09-19 @ 06:15), Max: 36.7 (04-08-19 @ 14:44)  HR: 88 (04-09-19 @ 12:40) (67 - 89)  BP: 116/77 (04-09-19 @ 12:40) (91/60 - 116/77)  RR: 20 (04-09-19 @ 12:40) (18 - 20)  SpO2: 98% (04-09-19 @ 06:15) (93% - 99%)  Wt(kg): --  I&O's Summary    08 Apr 2019 07:01  -  09 Apr 2019 07:00  --------------------------------------------------------  IN: 100 mL / OUT: 350 mL / NET: -250 mL        Appearance: Normal	  Cardiovascular: Normal S1 S2,irregular, No JVD, No murmurs  Respiratory: crackles   Gastrointestinal:  Soft, Non-tender, + BS, abd distention   Extremities: Normal range of motion, No clubbing, cyanosis or edema        MEDICATIONS  (STANDING):  albumin human 25% IVPB 50 milliLiter(s) IV Intermittent every 6 hours  atorvastatin 40 milliGRAM(s) Oral at bedtime  buDESOnide 160 MICROgram(s)/formoterol 4.5 MICROgram(s) Inhaler 2 Puff(s) Inhalation two times a day  collagenase Ointment 1 Application(s) Topical daily  docusate sodium 100 milliGRAM(s) Oral three times a day  metoprolol tartrate 50 milliGRAM(s) Oral two times a day  midodrine 10 milliGRAM(s) Oral every 8 hours  mupirocin 2% Ointment 1 Application(s) Topical <User Schedule>  polyethylene glycol 3350 17 Gram(s) Oral daily  senna 2 Tablet(s) Oral at bedtime  thiamine 100 milliGRAM(s) Oral daily      TELEMETRY: AFIB 110-130	    ECG:  	  RADIOLOGY:   DIAGNOSTIC TESTING:  [ ] Echocardiogram:  [ ]  Catheterization:  [ ] Stress Test:    OTHER: 	  < from: Xray Chest 1 View-PORTABLE IMMEDIATE (04.09.19 @ 10:11) >  INTERPRETATION:     Heart size and the mediastinum cannot be accurately evaluated on this   projection. There is a calcified tortuous thoracic aorta.  Mild interstitial edema is not significantly changed. A small loculated  right pleural effusion appears slightly redistributed. A small left   pleural effusion is not significantly changed.  There is continued elevation of the left hemidiaphragm.  There is increased right basilar opacity and continued left basilar   opacity.   No pneumothorax is seen.    < end of copied text >    LABS:	 	                                10.8   7.49  )-----------( 201      ( 09 Apr 2019 05:11 )             37.2     04-09    138  |  99  |  60<H>  ----------------------------<  87  4.4   |  26  |  2.46<H>    Ca    9.2      09 Apr 2019 05:15  Phos  5.0     04-09  Mg     2.2     04-09    TPro  6.7  /  Alb  x   /  TBili  x   /  DBili  x   /  AST  x   /  ALT  x   /  AlkPhos  x   04-09    PT/INR - ( 09 Apr 2019 05:15 )   PT: 23.1 SEC;   INR: 2.04          PTT - ( 09 Apr 2019 05:15 )  PTT:70.0 SEC

## 2019-04-09 NOTE — PROGRESS NOTE ADULT - ASSESSMENT
80 y/o female, with a PmHx of COPD, paroxsymal a-fib (on pradaxa), CAD (s/p stent 11/2018), DCHF, pulm htn, HTN, HLD, and anxiety presenting with nonhealing R posterior ankle ulcer and UTI     1. Chronic diastolic chf  no SOB, inc volume status   chest xray with mild interstitial edema, sml loculated right pleural effusion, sml left pleural effusion unchanged, increased in Right opacity  pt. appears mildly fluid overloaded on exam however resting comfortably, BP also noted to be low  +abd distention noted - pending abd u/s   cont bb, given PRN lasix for increased JACKSON   recent echo with nl lv fx   repeat echo for lv fxn    2.  Paroxysmal Atrial Fibrillation  s/p RRT this am for AFIB with RVR 170s   s/p iv metoprolol, digoxin with minimal improvement   Rates currently 110-130s  continue bb   start amiodarone load  continue midodrine 10 mg q 8 hrs for bp support  CHADS 3 - off pradaxa for possible angio, start heparin gtt     3. CAD s/p PCI (11/2018)  cv stable, no cp or sob   continue bb, statin, plavix     4. UTI   abx per med     5.  R posterior ankle ulcer   vascular f/u   RLE agio aborted today 2/2 to tachycardia   plan for eventual RLE angio  given clinical status, will re-evaluate for cardiac clearance when HR improves      6. bri on ckd  renal f/u  inc sbp  improve volume status 78 y/o female, with a PmHx of COPD, paroxsymal a-fib (on pradaxa), CAD (s/p stent 11/2018), DCHF, pulm htn, HTN, HLD, and anxiety presenting with nonhealing R posterior ankle ulcer and UTI     1. Chronic diastolic chf  no SOB, inc volume status   chest xray with mild interstitial edema, sml loculated right pleural effusion, sml left pleural effusion unchanged, increased in Right opacity  pt. appears mildly fluid overloaded on exam however resting comfortably, BP also noted to be low  +abd distention noted - pending abd u/s   cont bb, given PRN lasix for increased JACKSON   recent echo with nl lv fx   repeat echo for lv fxn    2.  Paroxysmal Atrial Fibrillation  s/p RRT this am for AFIB with RVR 170s   s/p iv metoprolol, digoxin with minimal improvement   Rates currently 110-130s  continue bb   start amiodarone load  continue midodrine 10 mg q 8 hrs for bp support  CHADS 3 - off pradaxa for possible angio, start heparin gtt     3. CAD s/p PCI (11/2018)  cv stable, no cp or sob   continue bb, statin, plavix     4. UTI   abx per med     5.  R posterior ankle ulcer   vascular f/u   RLE agio aborted today 2/2 to tachycardia   plan for eventual RLE angio  given clinical status, will re-evaluate for cardiac clearance when HR improves      6. bri on ckd  renal f/u  inc sbp  improve volume status     discussed plan with Jone PAIZ. 80 y/o female, with a PmHx of COPD, paroxsymal a-fib (on pradaxa), CAD (s/p stent 11/2018), DCHF, pulm htn, HTN, HLD, and anxiety presenting with nonhealing R posterior ankle ulcer and UTI     1. Chronic diastolic chf  no SOB, inc volume status   chest xray with mild interstitial edema, sml loculated right pleural effusion, sml left pleural effusion unchanged, increased in Right opacity  pt. appears mildly fluid overloaded on exam however resting comfortably, BP also noted to be low  +abd distention noted - pending abd u/s   cont bb, given PRN lasix for increased JACKSON   recent echo with nl lv fx   repeat echo for lv fxn    2.  Paroxysmal Atrial Fibrillation  s/p RRT this am for AFIB with RVR 170s   s/p iv metoprolol, digoxin   Rates overall improved   continue bb  continue digoxin load  if HR remains elevated with dig, can start amiodarone load  continue midodrine 10 mg q 8 hrs for bp support  CHADS 3 - off pradaxa for possible angio, start heparin gtt     3. CAD s/p PCI (11/2018)  cv stable, no cp or sob   continue bb, statin, plavix     4. UTI   abx per med     5.  R posterior ankle ulcer   vascular f/u   RLE agio aborted today 2/2 to tachycardia   plan for eventual RLE angio  given clinical status, will re-evaluate for cardiac clearance when HR improves      6. bri on ckd  renal f/u  inc sbp  improve volume status     addendum 4/9 14:33: pt. transferred to tele floor, HR improved in 90s, spoke with tele PA, continue digoxin load, start hep gtt  start amio only if remains tachy on dig.

## 2019-04-09 NOTE — PROGRESS NOTE ADULT - SUBJECTIVE AND OBJECTIVE BOX
Patient received Pradaxa last night. It needs to be held prior to angiogram.   INR 2. PTT 70. Creat rising to 2.5 (GFR 18). Tachycardic in holding area to 150s (a fib).  Patient needs optimization prior to angiography. Please hold Pradaxa. Will tentatively reschedule procedure for tomorrow.

## 2019-04-09 NOTE — CHART NOTE - NSCHARTNOTEFT_GEN_A_CORE
RRT called at ~ 9:45AM tachycardia and hypoxemia. In brief, patient is a 80 y/o F w/ a PMHx of paroxysmal A-fib (on Pradaxa), CVA/TIA, HTN, HLD, and and COPD (on home O2) who was initially admitted for a non-healing RLE ulcer and UTI. During hospitalization, patient completed 3-days of Ceftriaxone for a UTI. Her Cr has been progressively worsening during her hospital course which is concerning for possible AIN vs MPGN per Renal. Over the past 2 days, patient's BP has been noted to decrease for which she was started on Midodrine 10mg TID and her Lopressor was decreased to 50mg BID. This AM, patient's scheduled angiogram was aborted 2/2 new-onset tachycardia in the pre-op area. While back in her room, patient was found to be tachycardic to the 140-150s and her O2 Sat was 70s-80s (but poor wave form). Patient's RN became concerned and so an RRT was subsequently called.    Upon arrival of the rapid response team, patient was seen laying in bed. She appeared slightly uncomfortable at first, but this quickly improved. Patient reported that she felt short of breath this AM, but she felt better during the RRT. No complaints of chest pain or palpitations. She mentated well throughout the RRT. On exam, patient was afebrile, BP = 90s/60s (patient's BP has been around this over the past 2 days), and HR = 130-170. SpO2 initially showed 80%; however, once a proper wave form was obtained, the SpO2 was ~ 95% on NC. Patient did not appear in respiratory distress. Lung sounds were decreased at the bases. + B/L LE edema. An EKG was obtained which showed A-fib w/ RVR. A CXR was obtained which was grossly unchanged compared to prior. An ABG was attempted, but the blood that was drawn appeared venous. For patient's tachycardia, she was given her scheduled IV Albumin ~ 1.5 hours early. She was also given IV Lopressor 2.5mg x1 w/ no improvement in HR (BP remained 90s/60s). Patient was then given IV Digoxin 0.5mg x1 with ultimate improvement in HR to 80s-110. Her BP also improved to 110s/70s. Primary team at bedside informed Cardiology of events during RRT and Cardiology team to assess patient later today. Since patient was clinically improved and her VS were better, the RRT was ended.    For follow-up:  - Transfer patient to telemetry (bed board notified)  - Follow-up with Cardiology regarding further management of A-fib. Consider starting maintenance Digoxin doses (renally-dosed) if Cardiology prefers to continue Digoxin  - Monitor respiratory status closely (Of note, wave form is improved with pulse ox placed on ear)  - Management of IGOR per Renal team    Plan d/w patient, patient's RN, and primary team NP    Adam Craig, PGY3  Pager: #[phone]

## 2019-04-09 NOTE — PROGRESS NOTE ADULT - ASSESSMENT
Problem/Plan - 1:  ·  Problem: Lower limb ulcer, ankle, right, with unspecified severity.  Plan: -Seen by podiatry and vascular , no need for abx. Vacular with plan for angiogram once kidney function improves  -right duplex negative for dvt  -wound care fu  -pain meds prn.     Problem/Plan - 2:·  Problem: IGOR (acute kidney injury).  Plan: -likely with prerenal component in setting of decreased fluid intake  renal fumonitor cr    Problem/Plan - 3:  ·  Problem: Urinary tract infection with hematuria, site unspecified.  Plan: - pseudomonal UTI in Jan resistant to quinolones; sensitive to 3rd gen cephalosporin. Will place on ceftriaxone, follow  culture.     Problem/Plan - 4:  ·  Problem: Rapid atrial fibrillation.  Plan: c/w hep gtt  started digoxin    Problem/Plan - 5:  ·  Problem: Chronic obstructive pulmonary disease, unspecified COPD type.  Plan: c/w ics/laba, O2

## 2019-04-09 NOTE — CHART NOTE - NSCHARTNOTEFT_GEN_A_CORE
Plan for angio tomorrow if cleared for OR.  Please make pt NPO past mn and draw repeat preop labs at 3am.

## 2019-04-09 NOTE — PROGRESS NOTE ADULT - SUBJECTIVE AND OBJECTIVE BOX
[facility]- PROGRESS NOTE    79y Female with history of CHF, COPD presents with RLE pain. Nephrology consulted for elevated Scr.    REVIEW OF SYSTEMS:  Gen: no changes in weight  Cards: no chest pain  Resp: + dyspnea (chronic)  GI: no nausea or vomiting, no diarrhea  Vascular: RLE edema/pain    No Known Allergies      Hospital Medications: Medications reviewed      VITALS:  T(F): 97.1 (19 @ 06:15), Max: 98 (19 @ 14:44)  HR: 89 (19 @ 06:15)  BP: 104/66 (19 @ 06:15)  RR: 18 (19 @ 02:20)  SpO2: 98% (19 @ 06:15)  Wt(kg): --     @ 07:01  -   @ 07:00  --------------------------------------------------------  IN: 100 mL / OUT: 350 mL / NET: -250 mL      PHYSICAL EXAM:    Gen: NAD, calm  Cards: Irregularly irregular, +S1/S2, no M/G/R  Resp: Bibasilar rales  GI: soft, NT, + ab distention, NABS  Vascular: 2+ LE edema B/L      LABS:      138  |  99  |  60<H>  ----------------------------<  87  4.4   |  26  |  2.46<H>    Ca    9.2      2019 05:15  Phos  5.0       Mg     2.2         TPro  7.1  /  Alb  3.9  /  TBili  0.7  /  DBili      /  AST  11  /  ALT  7   /  AlkPhos  107      Creatinine Trend: 2.46 <--, 2.21 <--, 1.81 <--, 1.57 <--, 1.62 <--, 1.67 <--, 1.52 <--, 1.60 <--                        10.8   7.49  )-----------( 201      ( 2019 05:11 )             37.2     Urine Studies:  Urinalysis Basic - ( 2019 00:05 )    Color: YELLOW / Appearance: Lt TURBID / S.021 / pH: 5.5  Gluc: NEGATIVE / Ketone: NEGATIVE  / Bili: NEGATIVE / Urobili: NORMAL   Blood: SMALL / Protein: 30 / Nitrite: NEGATIVE   Leuk Esterase: LARGE / RBC: 11-25 / WBC >50   Sq Epi: MODERATE / Non Sq Epi:  / Bacteria: NEGATIVE      Sodium, Random Urine: < 20 mmol/L ( @ 00:05)  Creatinine, Random Urine: 101.80 mg/dL ( @ 00:05)  Creatinine, Random Urine: 41.20 mg/dL ( @ 17:03) [facility] NEPHROLOGY- PROGRESS NOTE    79y Female with history of CHF, COPD presents with RLE pain. Nephrology consulted for elevated Scr.    Angiogram cancelled today secondary to tachycardia and elevated PT/INR.    REVIEW OF SYSTEMS:  Gen: no changes in weight  Cards: no chest pain  Resp: + dyspnea (chronic)  GI: no nausea or vomiting, no diarrhea  Vascular: RLE edema/pain    No Known Allergies      Hospital Medications: Medications reviewed      VITALS:  T(F): 97.1 (19 @ 06:15), Max: 98 (19 @ 14:44)  HR: 89 (19 @ 06:15)  BP: 104/66 (19 @ 06:15)  RR: 18 (19 @ 02:20)  SpO2: 98% (19 @ 06:15)  Wt(kg): --     @ 07:01  -   @ 07:00  --------------------------------------------------------  IN: 100 mL / OUT: 350 mL / NET: -250 mL      PHYSICAL EXAM:    Gen: NAD, calm  Cards: Irregularly irregular, +S1/S2, no M/G/R  Resp: Bibasilar rales  GI: soft, NT, + ab distention, NABS  Vascular: 2+ LE edema B/L      LABS:      138  |  99  |  60<H>  ----------------------------<  87  4.4   |  26  |  2.46<H>    Ca    9.2      2019 05:15  Phos  5.0       Mg     2.2         TPro  7.1  /  Alb  3.9  /  TBili  0.7  /  DBili      /  AST  11  /  ALT  7   /  AlkPhos  107      Creatinine Trend: 2.46 <--, 2.21 <--, 1.81 <--, 1.57 <--, 1.62 <--, 1.67 <--, 1.52 <--, 1.60 <--                        10.8   7.49  )-----------( 201      ( 2019 05:11 )             37.2     Urine Studies:  Urinalysis Basic - ( 2019 00:05 )    Color: YELLOW / Appearance: Lt TURBID / S.021 / pH: 5.5  Gluc: NEGATIVE / Ketone: NEGATIVE  / Bili: NEGATIVE / Urobili: NORMAL   Blood: SMALL / Protein: 30 / Nitrite: NEGATIVE   Leuk Esterase: LARGE / RBC: 11-25 / WBC >50   Sq Epi: MODERATE / Non Sq Epi:  / Bacteria: NEGATIVE      Sodium, Random Urine: < 20 mmol/L ( @ 00:05)  Creatinine, Random Urine: 101.80 mg/dL ( @ 00:05)  Creatinine, Random Urine: 41.20 mg/dL ( @ 17:03)

## 2019-04-09 NOTE — PROGRESS NOTE ADULT - SUBJECTIVE AND OBJECTIVE BOX
Patient is a 79y old  Female who presents with a chief complaint of right leg wound/ swelling (2019 14:09)      INTERVAL HPI/OVERNIGHT EVENTS:  T(C): 37.1 (19 @ 15:30), Max: 37.1 (19 @ 15:30)  HR: 105 (19 @ 15:30) (67 - 105)  BP: 133/77 (19 @ 15:30) (91/60 - 133/77)  RR: 22 (19 @ 15:30) (18 - 22)  SpO2: 97% (19 @ 13:20) (93% - 98%)  Wt(kg): --  I&O's Summary    2019 07:01  -  2019 07:00  --------------------------------------------------------  IN: 100 mL / OUT: 350 mL / NET: -250 mL        LABS:                        10.8   7.49  )-----------( 201      ( 2019 05:11 )             37.2         138  |  99  |  60<H>  ----------------------------<  87  4.4   |  26  |  2.46<H>    Ca    9.2      2019 05:15  Phos  5.0       Mg     2.2         TPro  6.7  /  Alb  x   /  TBili  x   /  DBili  x   /  AST  x   /  ALT  x   /  AlkPhos  x       PT/INR - ( 2019 05:15 )   PT: 23.1 SEC;   INR: 2.04          PTT - ( 2019 05:15 )  PTT:70.0 SEC  Urinalysis Basic - ( 2019 00:05 )    Color: YELLOW / Appearance: Lt TURBID / S.021 / pH: 5.5  Gluc: NEGATIVE / Ketone: NEGATIVE  / Bili: NEGATIVE / Urobili: NORMAL   Blood: SMALL / Protein: 30 / Nitrite: NEGATIVE   Leuk Esterase: LARGE / RBC: 11-25 / WBC >50   Sq Epi: MODERATE / Non Sq Epi: x / Bacteria: NEGATIVE      CAPILLARY BLOOD GLUCOSE        ABG - ( 2019 10:10 )  pH, Arterial: 7.34  pH, Blood: x     /  pCO2: 47    /  pO2: 21    / HCO3: 23    / Base Excess: -0.1  /  SaO2: 32.2                Urinalysis Basic - ( 2019 00:05 )    Color: YELLOW / Appearance: Lt TURBID / S.021 / pH: 5.5  Gluc: NEGATIVE / Ketone: NEGATIVE  / Bili: NEGATIVE / Urobili: NORMAL   Blood: SMALL / Protein: 30 / Nitrite: NEGATIVE   Leuk Esterase: LARGE / RBC: 11-25 / WBC >50   Sq Epi: MODERATE / Non Sq Epi: x / Bacteria: NEGATIVE        MEDICATIONS  (STANDING):  albumin human 25% IVPB 50 milliLiter(s) IV Intermittent every 6 hours  amiodarone    Tablet   Oral   atorvastatin 40 milliGRAM(s) Oral at bedtime  buDESOnide 160 MICROgram(s)/formoterol 4.5 MICROgram(s) Inhaler 2 Puff(s) Inhalation two times a day  collagenase Ointment 1 Application(s) Topical daily  docusate sodium 100 milliGRAM(s) Oral three times a day  heparin  Infusion.  Unit(s)/Hr (11 mL/Hr) IV Continuous <Continuous>  metoprolol tartrate 50 milliGRAM(s) Oral two times a day  midodrine 10 milliGRAM(s) Oral every 8 hours  mupirocin 2% Ointment 1 Application(s) Topical <User Schedule>  polyethylene glycol 3350 17 Gram(s) Oral daily  senna 2 Tablet(s) Oral at bedtime  thiamine 100 milliGRAM(s) Oral daily    MEDICATIONS  (PRN):  heparin  Injectable 4500 Unit(s) IV Push every 6 hours PRN For aPTT less than 40  heparin  Injectable 2000 Unit(s) IV Push every 6 hours PRN For aPTT between 40 - 57  levalbuterol Inhalation 0.63 milliGRAM(s) Inhalation every 6 hours PRN SOB  oxyCODONE    5 mG/acetaminophen 325 mG 1 Tablet(s) Oral every 6 hours PRN Moderate Pain and severe pain          PHYSICAL EXAM:  GENERAL: NAD, well-groomed, well-developed  HEAD:  Atraumatic, Normocephalic  CHEST/LUNG: Clear to percussion bilaterally; No rales, rhonchi, wheezing, or rubs  HEART: Regular rate and rhythm; No murmurs, rubs, or gallops  ABDOMEN: Soft, Nontender, Nondistended; Bowel sounds present  EXTREMITIES:  edema+    Care Discussed with Consultants/Other Providers [ +] YES  [ ] NO

## 2019-04-09 NOTE — PROGRESS NOTE ADULT - ASSESSMENT
79y Female with history of CHF, COPD presents with RLE pain. Nephrology consulted for elevated Scr.    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 versus underlying MPGN? given low C3/C4. Continue IV albumin to increase renal perfusion given low FeNa. Follow up pending serologies. Patient will need a kidney biopsy if agreeable for which will need to hold anti-coagulation (will discuss with cardiology). Avoid nephrotoxins.  2) CKD-3: Patient appears to have h/o age appropriate CKD-3 (baseline Scr 1.1-1.2) with mild proteinuria for which will defer inpatient CKD work up. Monitor electrolytes.  3) Orthostatic hypotension: BP low. Continue with midodrine 10 mg TID. Rate control with metoprolol as per cardiology. Monitor BP.  4) LE edema: CXR reviewed with pleural effusions and pulmonary edema. Prior TTE with mild to moderate MR with normal LVSF. Given worsening edema, patient s/p IV lasix on 4/8. Holding further lasix given low BP. Monitor UO.  5) Pyuria/Microscopic hematuria: Urine culture negative s/p CTX. IGOR work up as above. 79y Female with history of CHF, COPD presents with RLE pain. Nephrology consulted for elevated Scr.    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 versus underlying MPGN? given low C3/C4. Continue IV albumin to increase renal perfusion given low FeNa. Follow up pending serologies. Patient will need a kidney biopsy if agreeable for which will need to hold anti-coagulation (will discuss with cardiology). Avoid nephrotoxins.  2) CKD-3: Patient appears to have h/o age appropriate CKD-3 (baseline Scr 1.1-1.2) with mild proteinuria for which will defer inpatient CKD work up. Monitor electrolytes.  3) Orthostatic hypotension: BP low. Continue with midodrine 10 mg TID. Rate control with metoprolol as per cardiology. Monitor BP.  4) LE edema: CXR reviewed with pleural effusions and pulmonary edema. Prior TTE with mild to moderate MR with normal LVSF. Given worsening edema, patient s/p IV lasix on 4/8. Holding further lasix given low BP. Monitor UO.  5) Pyuria/Microscopic hematuria: Urine culture negative s/p CTX. IGOR work up as above.    Patient high risk for angiogram and subsequent ROSIE given IGOR with likely underlying glomerular disease. If angiogram urgent, can proceed as patient already educated on risks of ROSIE and would attempt to minimize dye load during the procedure.

## 2019-04-09 NOTE — CONSULT NOTE ADULT - SUBJECTIVE AND OBJECTIVE BOX
Chief Complaint:  Patient is a 79y old  Female who presents with a chief complaint of right leg wound/ swelling (2019 13:17)    CAD (coronary artery disease)  CHF (congestive heart failure)  COPD (chronic obstructive pulmonary disease)  MI, old  Anxiety  TIA (transient ischemic attack)  PAF (paroxysmal atrial fibrillation)  HLD (hyperlipidemia)  HTN (hypertension)  H/O total hysterectomy  History of cataract surgery  History of repair of hip fracture  H/O spinal fusion     HPI:  78 y/o female, with a PmHx of COPD (currently on 3L O2 24 hours), paroxsymal a-fib (on pradaxa), lsited h/o CAD (s/p stent 2018), CHF (although recent TTE with normal biventricular function, with no mention of diastolic dysfunction; + severely dilated left atrium), HTN, HLD, and anxiety, presenting to the [facility] ED with chronic right ankle pressure ulcer pain for 1 week. The patient has a posterior ankle ulcer down to tendon with scant serosanguinous drainage. She has had this ulcer since her last admission in January.  STONEY/PVR were performed at the time and were significant for 0.6 on the right side.  Pt reports no changes in symptoms; daughters' boyfriend at bedside and  has been taking care of pt at home since discharge from [place] last week. Daughters boyfriend notes that pt was unable to keep her appointment with vascular doctor recently as ambulette was unequipped for oxygen requirements. Came to ed as a result of expediting vascular eval, as well as running low on pain meds. Found to have UA strongly suggestive of UTI although pt denies change in voiding habits, fever, abdominal or flank pain. Mild bri noted BUN/Cr  56/1.6, last SCr 1.26 Feb 15, but pt has had SCr close to 2.0 in january. Pt endorses decreased po intake; reports being told in [place] to reduce po fluid intake. Pt on lasix; as stated above recent TTE  with normal biventricular function, with no mention of diastolic dysfunction; + severely dilated left atrium. Seen by podiatry and vascular , no need for abx. Vacular with plan for angiogram once kidney function improves   GI consulted for abdominal distention. The patient is seen s/p RRT for Afib. The patient reports that yesterday she noted       No Known Allergies      albumin human 25% IVPB 50 milliLiter(s) IV Intermittent every 6 hours  atorvastatin 40 milliGRAM(s) Oral at bedtime  buDESOnide 160 MICROgram(s)/formoterol 4.5 MICROgram(s) Inhaler 2 Puff(s) Inhalation two times a day  collagenase Ointment 1 Application(s) Topical daily  docusate sodium 100 milliGRAM(s) Oral three times a day  heparin  Infusion.  Unit(s)/Hr IV Continuous <Continuous>  heparin  Injectable 4500 Unit(s) IV Push once  heparin  Injectable 4500 Unit(s) IV Push every 6 hours PRN  heparin  Injectable 2000 Unit(s) IV Push every 6 hours PRN  levalbuterol Inhalation 0.63 milliGRAM(s) Inhalation every 6 hours PRN  metoprolol tartrate 50 milliGRAM(s) Oral two times a day  midodrine 10 milliGRAM(s) Oral every 8 hours  mupirocin 2% Ointment 1 Application(s) Topical <User Schedule>  oxyCODONE    5 mG/acetaminophen 325 mG 1 Tablet(s) Oral every 6 hours PRN  polyethylene glycol 3350 17 Gram(s) Oral daily  senna 2 Tablet(s) Oral at bedtime  thiamine 100 milliGRAM(s) Oral daily        FAMILY HISTORY:  No pertinent family history in first degree relatives        Review of Systems:    General:  No wt loss, fevers, chills, night sweats, fatigue  Eyes:  Good vision, no reported pain  ENT:  No sore throat, pain, runny nose, dysphagia  CV:  No pain, palpitations, no lightheadedness  Resp:  No dyspnea, cough, tachypnea, wheezing  GI: (+) distention but "i think its my usual"; denies n/v/d/c, abdominal pain, melena or brbpr   :  No pain, bleeding, incontinence, nocturia  Muscle:  No pain, weakness  Neuro:  No weakness, tingling, memory problems  Psych:  No fatigue, insomnia, mood problems, depression  Endocrine:  No polyuria, polydypsia, cold/heat intolerance  Heme:  No petechiae, ecchymosis, easy bruisability  Skin:  No rash, tattoos, scars, edema    Relevant Family History:   n/c    Relevant Social History: n/c      Physical Exam:    Vital Signs:  Vital Signs Last 24 Hrs  T(C): 36.4 (2019 13:20), Max: 36.7 (2019 14:44)  T(F): 97.6 (2019 13:20), Max: 98 (2019 14:44)  HR: 93 (2019 13:20) (67 - 93)  BP: 111/76 (2019 13:20) (91/60 - 116/77)  BP(mean): --  RR: 22 (2019 13:20) (18 - 22)  SpO2: 97% (2019 13:20) (93% - 99%)  Daily     Daily     General:  Appears stated age, well-groomed, nad  HEENT:  NC/AT,  conjunctivae clear and pink, no thyromegaly, nodules, adenopathy, no JVD  Chest:  Full & symmetric excursion, no increased effort, breath sounds clear  Cardiovascular:  Regular rhythm, S1, S2, no murmur/rub/S3/S4, no abdominal bruit, no edema  Abdomen:  Soft, non-tender, +distended, normoactive bowel sounds,  no masses ,no hepatosplenomegaly no signs of chronic liver disease  Extremities:  no cyanosis, clubbing or edema  Skin:  No rash/erythema/ecchymoses/petechiae/wounds/abscess/warm/dry  Neuro/Psych:  A&Ox3, no asterixis, no tremor, no encephalopathy    Laboratory:                            10.8   7.49  )-----------( 201      ( 2019 05:11 )             37.2     04-09    138  |  99  |  60<H>  ----------------------------<  87  4.4   |  26  |  2.46<H>    Ca    9.2      2019 05:15  Phos  5.0     04-09  Mg     2.2     04-09    TPro  6.7  /  Alb  x   /  TBili  x   /  DBili  x   /  AST  x   /  ALT  x   /  AlkPhos  x   04-09    LIVER FUNCTIONS - ( 2019 12:00 )  Alb: x     / Pro: 6.7 g/dL / ALK PHOS: x     / ALT: x     / AST: x     / GGT: x           PT/INR - ( 2019 05:15 )   PT: 23.1 SEC;   INR: 2.04          PTT - ( 2019 05:15 )  PTT:70.0 SEC  Urinalysis Basic - ( 2019 00:05 )    Color: YELLOW / Appearance: Lt TURBID / S.021 / pH: 5.5  Gluc: NEGATIVE / Ketone: NEGATIVE  / Bili: NEGATIVE / Urobili: NORMAL   Blood: SMALL / Protein: 30 / Nitrite: NEGATIVE   Leuk Esterase: LARGE / RBC: 11-25 / WBC >50   Sq Epi: MODERATE / Non Sq Epi: x / Bacteria: NEGATIVE        Imaging: Chief Complaint:  Patient is a 79y old  Female who presents with a chief complaint of right leg wound/ swelling (2019 13:17)    CAD (coronary artery disease)  CHF (congestive heart failure)  COPD (chronic obstructive pulmonary disease)  MI, old  Anxiety  TIA (transient ischemic attack)  PAF (paroxysmal atrial fibrillation)  HLD (hyperlipidemia)  HTN (hypertension)  H/O total hysterectomy  History of cataract surgery  History of repair of hip fracture  H/O spinal fusion     HPI:  80 y/o female, with a PmHx of COPD (currently on 3L O2 24 hours), paroxsymal a-fib (on pradaxa), lsited h/o CAD (s/p stent 2018), CHF (although recent TTE with normal biventricular function, with no mention of diastolic dysfunction; + severely dilated left atrium), HTN, HLD, and anxiety, presenting to the [facility] ED with chronic right ankle pressure ulcer pain for 1 week. The patient has a posterior ankle ulcer down to tendon with scant serosanguinous drainage. She has had this ulcer since her last admission in January.  STONEY/PVR were performed at the time and were significant for 0.6 on the right side.  Pt reports no changes in symptoms; daughters' boyfriend at bedside and  has been taking care of pt at home since discharge from [place] last week. Daughters boyfriend notes that pt was unable to keep her appointment with vascular doctor recently as ambulette was unequipped for oxygen requirements. Came to ed as a result of expediting vascular eval, as well as running low on pain meds. Found to have UA strongly suggestive of UTI although pt denies change in voiding habits, fever, abdominal or flank pain. Mild bri noted BUN/Cr  56/1.6, last SCr 1.26 Feb 15, but pt has had SCr close to 2.0 in january. Pt endorses decreased po intake; reports being told in [place] to reduce po fluid intake. Pt on lasix; as stated above recent TTE  with normal biventricular function, with no mention of diastolic dysfunction; + severely dilated left atrium. Seen by podiatry and vascular , no need for abx. Vacular with plan for angiogram once kidney function improves   GI consulted for abdominal distention. The patient is seen s/p RRT for Afib. The patient reports that yesterday she was with some abd discomfort in her pelvic region and had very little urine output, she was straight cath'd with 150cc output. This afternoon she reports that her abd is distended but also states "this looks normal to me". She denied any abdominal pain. She had a bm earlier this morning, believes it was brown in color. She admits to some nausea but no vomiting, po intake plays no role in her nausea.       No Known Allergies      albumin human 25% IVPB 50 milliLiter(s) IV Intermittent every 6 hours  atorvastatin 40 milliGRAM(s) Oral at bedtime  buDESOnide 160 MICROgram(s)/formoterol 4.5 MICROgram(s) Inhaler 2 Puff(s) Inhalation two times a day  collagenase Ointment 1 Application(s) Topical daily  docusate sodium 100 milliGRAM(s) Oral three times a day  heparin  Infusion.  Unit(s)/Hr IV Continuous <Continuous>  heparin  Injectable 4500 Unit(s) IV Push once  heparin  Injectable 4500 Unit(s) IV Push every 6 hours PRN  heparin  Injectable 2000 Unit(s) IV Push every 6 hours PRN  levalbuterol Inhalation 0.63 milliGRAM(s) Inhalation every 6 hours PRN  metoprolol tartrate 50 milliGRAM(s) Oral two times a day  midodrine 10 milliGRAM(s) Oral every 8 hours  mupirocin 2% Ointment 1 Application(s) Topical <User Schedule>  oxyCODONE    5 mG/acetaminophen 325 mG 1 Tablet(s) Oral every 6 hours PRN  polyethylene glycol 3350 17 Gram(s) Oral daily  senna 2 Tablet(s) Oral at bedtime  thiamine 100 milliGRAM(s) Oral daily        FAMILY HISTORY:  No pertinent family history in first degree relatives        Review of Systems:    General:  No wt loss, fevers, chills, night sweats, fatigue  Eyes:  Good vision, no reported pain  ENT:  No sore throat, pain, runny nose, dysphagia  CV:  No pain, palpitations, no lightheadedness  Resp:  No dyspnea, cough, tachypnea, wheezing  GI: (+) distention but "i think its my usual"; denies n/v/d/c, abdominal pain, melena or brbpr   :  No pain, bleeding, incontinence, nocturia  Muscle:  No pain, weakness  Neuro:  No weakness, tingling, memory problems  Psych:  No fatigue, insomnia, mood problems, depression  Endocrine:  No polyuria, polydypsia, cold/heat intolerance  Heme:  No petechiae, ecchymosis, easy bruisability  Skin:  No rash, tattoos, scars, edema    Relevant Family History:   n/c    Relevant Social History: n/c      Physical Exam:    Vital Signs:  Vital Signs Last 24 Hrs  T(C): 36.4 (2019 13:20), Max: 36.7 (2019 14:44)  T(F): 97.6 (2019 13:20), Max: 98 (2019 14:44)  HR: 93 (2019 13:20) (67 - 93)  BP: 111/76 (2019 13:20) (91/60 - 116/77)  BP(mean): --  RR: 22 (2019 13:20) (18 - 22)  SpO2: 97% (2019 13:20) (93% - 99%)  Daily     Daily     General:  Appears stated age, well-groomed, nad  HEENT:  NC/AT,  conjunctivae clear and pink, no thyromegaly, nodules, adenopathy, no JVD  Chest:  Full & symmetric excursion, no increased effort, breath sounds clear  Cardiovascular:  Regular rhythm, S1, S2, no murmur/rub/S3/S4, no abdominal bruit, no edema  Abdomen:  Soft, non-tender, +distended, normoactive bowel sounds,  no masses ,no hepatosplenomegaly no signs of chronic liver disease  Extremities:  no cyanosis, clubbing or edema  Skin:  No rash/erythema/ecchymoses/petechiae/wounds/abscess/warm/dry  Neuro/Psych:  A&Ox3, no asterixis, no tremor, no encephalopathy    Laboratory:                            10.8   7.49  )-----------( 201      ( 2019 05:11 )             37.2     04-09    138  |  99  |  60<H>  ----------------------------<  87  4.4   |  26  |  2.46<H>    Ca    9.2      2019 05:15  Phos  5.0     04-09  Mg     2.2     04-09    TPro  6.7  /  Alb  x   /  TBili  x   /  DBili  x   /  AST  x   /  ALT  x   /  AlkPhos  x   -09    LIVER FUNCTIONS - ( 2019 12:00 )  Alb: x     / Pro: 6.7 g/dL / ALK PHOS: x     / ALT: x     / AST: x     / GGT: x           PT/INR - ( 2019 05:15 )   PT: 23.1 SEC;   INR: 2.04          PTT - ( 2019 05:15 )  PTT:70.0 SEC  Urinalysis Basic - ( 2019 00:05 )    Color: YELLOW / Appearance: Lt TURBID / S.021 / pH: 5.5  Gluc: NEGATIVE / Ketone: NEGATIVE  / Bili: NEGATIVE / Urobili: NORMAL   Blood: SMALL / Protein: 30 / Nitrite: NEGATIVE   Leuk Esterase: LARGE / RBC: 11-25 / WBC >50   Sq Epi: MODERATE / Non Sq Epi: x / Bacteria: NEGATIVE        Imaging:

## 2019-04-09 NOTE — PROGRESS NOTE ADULT - ATTENDING COMMENTS
[facility] NEPHROLOGY  Harish Valera M.D.  Kevin Cedeno D.O.  Rachele Mckeon M.D.  Christina Westfall, MSN, ANP-C    Telephone: [phone]  Facsimile: [phone]    [address] [place] [address]

## 2019-04-09 NOTE — PROGRESS NOTE ADULT - ASSESSMENT
79F w/ nonhealing R posterior ankle ulcer and arterial insuff, STONEY 0.6 in 01/2019. Planned angiogram for 4/9 aborted 2/2 elevated INR/PTT and new onset tachycardia in pre-op area.     Plan:  - Hold Pradaxa  - Continued increase in creatinine; please document nephrology continued clearance for angio  - Patient with known, paroxysmal Afib; please document optimization given RVR episode on 4/9  - Rescheduled angio for 4/10 pending nephrology and cardiology   - Patient may eat regular diet on 4/9; NPO @ [facility]    Deandre Méndez, PGY2  [id number]

## 2019-04-09 NOTE — CONSULT NOTE ADULT - PROBLEM SELECTOR RECOMMENDATION 9
- unclear etiology; suspect in setting of CHF given moderate pulm htn on tte 1/2019  - cont to diurese per cardiology   - rec simethicone q6h   - bowel regimen with senna qhs, colace tid and miralax bid prn; hold for diarrhea  - elevated INR   - US Abd pending   - further recommendations pending above
I performed a history and physical exam of the patient and discussed  the findings and plan with the house officer. I reviewed the resident note and agree with the findings and plan

## 2019-04-09 NOTE — PROGRESS NOTE ADULT - SUBJECTIVE AND OBJECTIVE BOX
C Team - Vascular Surgery Consult - Daily Progress Note    SUBJECTIVE:  Doing well.   No overnight events.   Patient with significant elevation of PTT and INR this morning, so angiogram cancelled.  Also significant tachycardia (150's) in pre-op area.     OBJECTIVE:     ** VITAL SIGNS / I&O's **    T(C): 36.2 (04-09-19 @ 06:15), Max: 36.7 (04-08-19 @ 14:44)  T(F): 97.1 (04-09-19 @ 06:15), Max: 98 (04-08-19 @ 14:44)  HR: 89 (04-09-19 @ 06:15) (67 - 89)  BP: 104/66 (04-09-19 @ 06:15) (82/58 - 104/66)  RR: 18 (04-09-19 @ 02:20) (18 - 20)  SpO2: 98% (04-09-19 @ 06:15) (93% - 99%)      08 Apr 2019 07:01  -  09 Apr 2019 07:00  --------------------------------------------------------  IN:    IV PiggyBack: 100 mL  Total IN: 100 mL    OUT:    Intermittent Catheterization - Urethral: 350 mL  Total OUT: 350 mL    Total NET: -250 mL    ** PHYSICAL EXAM **    -- CONSTITUTIONAL: AOx3. NAD.   -- CARDIOVASCULAR: normotensive, regular rate during initial exam (tachycardia in holding)  -- RESPIRATORY: breathing comfortably   -- EXTREMITIES: RLE posterior ankle ulcer down to tendon with serous drainage, no purulence, palpable femoral and pop, DP signal, All pulses palp on LLE    ** LABS **                 10.8   7.49   )----------(  201       ( 09 Apr 2019 05:11 )               37.2      138    |  99     |  60     ----------------------------<  87         ( 09 Apr 2019 05:15 )  4.4     |  26     |  2.46     Ca    9.2        ( 09 Apr 2019 05:15 )  Phos  5.0       ( 09 Apr 2019 05:15 )  Mg     2.2       ( 09 Apr 2019 05:15 )      PT/INR -  23.1 SEC / 2.04    ( 09 Apr 2019 05:15 )       PTT -  70.0 SEC   ( 09 Apr 2019 05:15 )  CAPILLARY BLOOD GLUCOSE

## 2019-04-09 NOTE — PROGRESS NOTE ADULT - ASSESSMENT
78 year old female with right  posterior heel ulceration (stable); RLE pain with non-palp pulses    - wound is stable, achilles tendon exposed, no ascending cellulitis or purulent drainage noted.  +pain   - Previous STONEY/PVR poor, 0.60 on the effected limb with flat waveforms. Angio today per vasc  - No local signs of infection - would recommend monitoring off antibiotics.    -Z floats at all times  - Cont local wound care with right foot ulceration with santyl and DSD

## 2019-04-10 DIAGNOSIS — R19.7 DIARRHEA, UNSPECIFIED: ICD-10-CM

## 2019-04-10 LAB
ANION GAP SERPL CALC-SCNC: 17 MMO/L — HIGH (ref 7–14)
APTT BLD: 143.8 SEC — CRITICAL HIGH (ref 27.5–36.3)
APTT BLD: > 200 SEC — CRITICAL HIGH (ref 27.5–36.3)
BUN SERPL-MCNC: 61 MG/DL — HIGH (ref 7–23)
CALCIUM SERPL-MCNC: 8.5 MG/DL — SIGNIFICANT CHANGE UP (ref 8.4–10.5)
CHLORIDE SERPL-SCNC: 100 MMOL/L — SIGNIFICANT CHANGE UP (ref 98–107)
CO2 SERPL-SCNC: 21 MMOL/L — LOW (ref 22–31)
CREAT SERPL-MCNC: 2.49 MG/DL — HIGH (ref 0.5–1.3)
GLUCOSE SERPL-MCNC: 77 MG/DL — SIGNIFICANT CHANGE UP (ref 70–99)
HCT VFR BLD CALC: 35 % — SIGNIFICANT CHANGE UP (ref 34.5–45)
HCV AB S/CO SERPL IA: 0.05 S/CO — SIGNIFICANT CHANGE UP (ref 0–0.99)
HCV AB SERPL-IMP: SIGNIFICANT CHANGE UP
HGB BLD-MCNC: 10.2 G/DL — LOW (ref 11.5–15.5)
KAPPA FREE LIGHT CHAINS, SERUM: 18.25 MG/DL — HIGH (ref 0.33–1.94)
LAMBDA FREE LIGHT CHAINS, SERUM: 11.62 MG/DL — HIGH (ref 0.57–2.63)
MAGNESIUM SERPL-MCNC: 1.9 MG/DL — SIGNIFICANT CHANGE UP (ref 1.6–2.6)
MCHC RBC-ENTMCNC: 25.8 PG — LOW (ref 27–34)
MCHC RBC-ENTMCNC: 29.1 % — LOW (ref 32–36)
MCV RBC AUTO: 88.4 FL — SIGNIFICANT CHANGE UP (ref 80–100)
MYELOPEROXIDASE AB SER-ACNC: <5 UNITS — SIGNIFICANT CHANGE UP
NRBC # FLD: 0.03 K/UL — SIGNIFICANT CHANGE UP (ref 0–0)
PHOSPHATE SERPL-MCNC: 4.5 MG/DL — SIGNIFICANT CHANGE UP (ref 2.5–4.5)
PLATELET # BLD AUTO: 173 K/UL — SIGNIFICANT CHANGE UP (ref 150–400)
PMV BLD: 10.3 FL — SIGNIFICANT CHANGE UP (ref 7–13)
POTASSIUM SERPL-MCNC: 4 MMOL/L — SIGNIFICANT CHANGE UP (ref 3.5–5.3)
POTASSIUM SERPL-SCNC: 4 MMOL/L — SIGNIFICANT CHANGE UP (ref 3.5–5.3)
PROTEINASE3 AB FLD-ACNC: <5 UNITS — SIGNIFICANT CHANGE UP
RBC # BLD: 3.96 M/UL — SIGNIFICANT CHANGE UP (ref 3.8–5.2)
RBC # FLD: 21.9 % — HIGH (ref 10.3–14.5)
SODIUM SERPL-SCNC: 138 MMOL/L — SIGNIFICANT CHANGE UP (ref 135–145)
WBC # BLD: 14.65 K/UL — HIGH (ref 3.8–10.5)
WBC # FLD AUTO: 14.65 K/UL — HIGH (ref 3.8–10.5)

## 2019-04-10 PROCEDURE — 99223 1ST HOSP IP/OBS HIGH 75: CPT | Mod: GC

## 2019-04-10 RX ORDER — MIDODRINE HYDROCHLORIDE 2.5 MG/1
5 TABLET ORAL ONCE
Qty: 0 | Refills: 0 | Status: COMPLETED | OUTPATIENT
Start: 2019-04-10 | End: 2019-04-10

## 2019-04-10 RX ORDER — SODIUM CHLORIDE 9 MG/ML
250 INJECTION INTRAMUSCULAR; INTRAVENOUS; SUBCUTANEOUS ONCE
Qty: 0 | Refills: 0 | Status: COMPLETED | OUTPATIENT
Start: 2019-04-10 | End: 2019-04-10

## 2019-04-10 RX ORDER — MIDODRINE HYDROCHLORIDE 2.5 MG/1
20 TABLET ORAL THREE TIMES A DAY
Qty: 0 | Refills: 0 | Status: DISCONTINUED | OUTPATIENT
Start: 2019-04-10 | End: 2019-04-16

## 2019-04-10 RX ORDER — MIDODRINE HYDROCHLORIDE 2.5 MG/1
15 TABLET ORAL THREE TIMES A DAY
Qty: 0 | Refills: 0 | Status: DISCONTINUED | OUTPATIENT
Start: 2019-04-10 | End: 2019-04-10

## 2019-04-10 RX ORDER — ACETAMINOPHEN 500 MG
650 TABLET ORAL EVERY 6 HOURS
Qty: 0 | Refills: 0 | Status: COMPLETED | OUTPATIENT
Start: 2019-04-10 | End: 2019-04-10

## 2019-04-10 RX ORDER — ACETAMINOPHEN 500 MG
325 TABLET ORAL ONCE
Qty: 0 | Refills: 0 | Status: COMPLETED | OUTPATIENT
Start: 2019-04-10 | End: 2019-04-10

## 2019-04-10 RX ADMIN — HEPARIN SODIUM 500 UNIT(S)/HR: 5000 INJECTION INTRAVENOUS; SUBCUTANEOUS at 17:57

## 2019-04-10 RX ADMIN — Medication 50 MILLILITER(S): at 12:31

## 2019-04-10 RX ADMIN — Medication 325 MILLIGRAM(S): at 02:50

## 2019-04-10 RX ADMIN — AMIODARONE HYDROCHLORIDE 400 MILLIGRAM(S): 400 TABLET ORAL at 01:58

## 2019-04-10 RX ADMIN — Medication 260 MILLIGRAM(S): at 15:23

## 2019-04-10 RX ADMIN — BUDESONIDE AND FORMOTEROL FUMARATE DIHYDRATE 2 PUFF(S): 160; 4.5 AEROSOL RESPIRATORY (INHALATION) at 09:55

## 2019-04-10 RX ADMIN — Medication 50 MILLILITER(S): at 00:01

## 2019-04-10 RX ADMIN — Medication 650 MILLIGRAM(S): at 15:43

## 2019-04-10 RX ADMIN — Medication 50 MILLILITER(S): at 18:55

## 2019-04-10 RX ADMIN — Medication 325 MILLIGRAM(S): at 03:30

## 2019-04-10 RX ADMIN — MIDODRINE HYDROCHLORIDE 20 MILLIGRAM(S): 2.5 TABLET ORAL at 22:12

## 2019-04-10 RX ADMIN — SODIUM CHLORIDE 500 MILLILITER(S): 9 INJECTION INTRAMUSCULAR; INTRAVENOUS; SUBCUTANEOUS at 03:43

## 2019-04-10 RX ADMIN — MIDODRINE HYDROCHLORIDE 5 MILLIGRAM(S): 2.5 TABLET ORAL at 00:23

## 2019-04-10 RX ADMIN — MUPIROCIN 1 APPLICATION(S): 20 OINTMENT TOPICAL at 09:55

## 2019-04-10 RX ADMIN — HEPARIN SODIUM 700 UNIT(S)/HR: 5000 INJECTION INTRAVENOUS; SUBCUTANEOUS at 09:14

## 2019-04-10 RX ADMIN — Medication 50 MILLILITER(S): at 23:35

## 2019-04-10 RX ADMIN — Medication 50 MILLILITER(S): at 06:19

## 2019-04-10 RX ADMIN — SODIUM CHLORIDE 500 MILLILITER(S): 9 INJECTION INTRAMUSCULAR; INTRAVENOUS; SUBCUTANEOUS at 10:07

## 2019-04-10 RX ADMIN — AMIODARONE HYDROCHLORIDE 400 MILLIGRAM(S): 400 TABLET ORAL at 12:27

## 2019-04-10 RX ADMIN — Medication 100 MILLIGRAM(S): at 12:27

## 2019-04-10 RX ADMIN — HEPARIN SODIUM 900 UNIT(S)/HR: 5000 INJECTION INTRAVENOUS; SUBCUTANEOUS at 00:07

## 2019-04-10 RX ADMIN — MIDODRINE HYDROCHLORIDE 20 MILLIGRAM(S): 2.5 TABLET ORAL at 13:20

## 2019-04-10 RX ADMIN — Medication 650 MILLIGRAM(S): at 00:45

## 2019-04-10 RX ADMIN — BUDESONIDE AND FORMOTEROL FUMARATE DIHYDRATE 2 PUFF(S): 160; 4.5 AEROSOL RESPIRATORY (INHALATION) at 22:13

## 2019-04-10 RX ADMIN — HEPARIN SODIUM 0 UNIT(S)/HR: 5000 INJECTION INTRAVENOUS; SUBCUTANEOUS at 08:12

## 2019-04-10 RX ADMIN — ATORVASTATIN CALCIUM 40 MILLIGRAM(S): 80 TABLET, FILM COATED ORAL at 22:13

## 2019-04-10 RX ADMIN — Medication 1 APPLICATION(S): at 12:27

## 2019-04-10 RX ADMIN — AMIODARONE HYDROCHLORIDE 400 MILLIGRAM(S): 400 TABLET ORAL at 22:11

## 2019-04-10 RX ADMIN — MIDODRINE HYDROCHLORIDE 5 MILLIGRAM(S): 2.5 TABLET ORAL at 10:17

## 2019-04-10 RX ADMIN — MIDODRINE HYDROCHLORIDE 5 MILLIGRAM(S): 2.5 TABLET ORAL at 04:11

## 2019-04-10 RX ADMIN — HEPARIN SODIUM 0 UNIT(S)/HR: 5000 INJECTION INTRAVENOUS; SUBCUTANEOUS at 16:42

## 2019-04-10 RX ADMIN — SODIUM CHLORIDE 500 MILLILITER(S): 9 INJECTION INTRAMUSCULAR; INTRAVENOUS; SUBCUTANEOUS at 00:14

## 2019-04-10 RX ADMIN — MIDODRINE HYDROCHLORIDE 10 MILLIGRAM(S): 2.5 TABLET ORAL at 06:20

## 2019-04-10 NOTE — PROVIDER CONTACT NOTE (OTHER) - ACTION/TREATMENT ORDERED:
Awaiting orders. Will continue to monitor.
will monitor closely
4mg Zofran IVP. Will continue to monitor.
MICU consulted, bolus, midodrine
Straight cath for residual urine.
increased midodrine dose

## 2019-04-10 NOTE — CHART NOTE - NSCHARTNOTEFT_GEN_A_CORE
Notified by Rhea BEATTY on 4N pt c/o Right lower extremity pain 2/2 PAD, ulcers,  requesting Percocet for pain   Pt's BP was on low side 91/67 mmHg  pt was asymptomatic - repeat BP was 80/58 mmHg - pt on Midodrine 10 mg PO q 8 hr for BP support.   Pt's last echo Jan 2019 - LVEF 60% results as below   pt was given Tylenol 650 mg PO for pain, Midodrine 5 mg PO x 1 dose & NS 0.9% 250 ccs IVF for hypotension - repeat BP improved to 99/60 mmHg    Patient was still c/o pain - repeat vital signs showed BP of 85/55 mmHg - pt given additional Midodrine 5 mg PO x 1 dose & NS 0.9% 250 ccs IVF for hypotension & Tylenol 325 mg PO x 1 dose for pain. right lower extremity elevation, alternate cold / hot packs to extremity for comfort  Patient awaiting Right lower extremity angiogram today for PVD   pt stable remains asymptomatic will continue to monitor     < from: Transthoracic Echocardiogram (01.17.19 @ 17:11) >    Mitral annular calcification, otherwise normal mitral  valve. Mild-moderate mitral regurgitation.  2. Calcified trileaflet aortic valve with normal opening.  Mild aortic regurgitation.  3. Severely dilated left atrium.  LA volume index = 50  cc/m2.  4. Normal left ventricular internal dimensions and wall  thicknesses.  5. Normal left ventricular systolic function. No segmental  wall motion abnormalities.  6. Moderate right atrial enlargement.  7. Normal right ventricular size and function.  8. Estimated right ventricular systolic pressure equals 53  mm Hg, assuming right atrial pressure equals 10 mm Hg,  consistent with moderate pulmonary hypertension.  *** Compared with echocardiogram of 1/31/2018, no  significant changes noted.    < end of copied text >          Vital Signs Last 24 Hrs  T(C): 36.7 (10 Apr 2019 02:39), Max: 37.1 (09 Apr 2019 15:30)  T(F): 98 (10 Apr 2019 02:39), Max: 98.8 (09 Apr 2019 15:30)  HR: 69 (10 Apr 2019 02:39) (67 - 115)  BP: 85/54 (10 Apr 2019 02:39) (80/58 - 133/77)  BP(mean): --  RR: 16 (10 Apr 2019 02:39) (16 - 22)  SpO2: 95% (10 Apr 2019 02:39) (94% - 98%)    MEDICATIONS  (STANDING):  albumin human 25% IVPB 50 milliLiter(s) IV Intermittent every 6 hours  amiodarone    Tablet   Oral   amiodarone    Tablet 400 milliGRAM(s) Oral every 8 hours  atorvastatin 40 milliGRAM(s) Oral at bedtime  buDESOnide 160 MICROgram(s)/formoterol 4.5 MICROgram(s) Inhaler 2 Puff(s) Inhalation two times a day  collagenase Ointment 1 Application(s) Topical daily  docusate sodium 100 milliGRAM(s) Oral three times a day  heparin  Infusion.  Unit(s)/Hr (11 mL/Hr) IV Continuous <Continuous>  metoprolol tartrate 50 milliGRAM(s) Oral two times a day  midodrine 10 milliGRAM(s) Oral every 8 hours  midodrine 5 milliGRAM(s) Oral once  mupirocin 2% Ointment 1 Application(s) Topical <User Schedule>  polyethylene glycol 3350 17 Gram(s) Oral daily  senna 2 Tablet(s) Oral at bedtime  sodium chloride 0.9% Bolus 250 milliLiter(s) IV Bolus once  thiamine 100 milliGRAM(s) Oral daily    MEDICATIONS  (PRN):  heparin  Injectable 4500 Unit(s) IV Push every 6 hours PRN For aPTT less than 40  heparin  Injectable 2000 Unit(s) IV Push every 6 hours PRN For aPTT between 40 - 57  levalbuterol Inhalation 0.63 milliGRAM(s) Inhalation every 6 hours PRN SOB  oxyCODONE    5 mG/acetaminophen 325 mG 1 Tablet(s) Oral every 6 hours PRN Moderate Pain and severe pain Notified by Rhea RN on 4N pt c/o Right lower extremity pain 2/2 PAD, ulcers,  requesting Percocet for pain   Pt's BP was on low side 91/67 mmHg  pt was asymptomatic - repeat BP was 80/58 mmHg - pt on Midodrine 10 mg PO q 8 hr for BP support.   Pt's last echo Jan 2019 - LVEF 60% results as below   pt was given Tylenol 650 mg PO for pain, Midodrine 5 mg PO x 1 dose & NS 0.9% 250 ccs IVF for hypotension - repeat BP improved to 99/60 mmHg    Patient was still c/o pain - repeat vital signs showed BP of 85/55 mmHg - pt given additional Midodrine 5 mg PO x 1 dose & NS 0.9% 250 ccs IVF for hypotension & Tylenol 325 mg PO x 1 dose for pain. right lower extremity elevation, alternate cold / hot packs to extremity for comfort  Patient awaiting Right lower extremity angiogram today for PVD   pt stable remains asymptomatic will continue to monitor       Addendum Note:   Repeat vitals BP remains low 82 /55 mmHg pt arousable A & O x 1-2 lethargic  MICU eval called s/w MICU attdg Dr Renee - awaiting recs  pt's condition guarded will continue to monitor     < from: Transthoracic Echocardiogram (01.17.19 @ 17:11) >    Mitral annular calcification, otherwise normal mitral  valve. Mild-moderate mitral regurgitation.  2. Calcified trileaflet aortic valve with normal opening.  Mild aortic regurgitation.  3. Severely dilated left atrium.  LA volume index = 50  cc/m2.  4. Normal left ventricular internal dimensions and wall  thicknesses.  5. Normal left ventricular systolic function. No segmental  wall motion abnormalities.  6. Moderate right atrial enlargement.  7. Normal right ventricular size and function.  8. Estimated right ventricular systolic pressure equals 53  mm Hg, assuming right atrial pressure equals 10 mm Hg,  consistent with moderate pulmonary hypertension.  *** Compared with echocardiogram of 1/31/2018, no  significant changes noted.    < end of copied text >              Vital Signs Last 24 Hrs  T(C): 36.7 (10 Apr 2019 02:39), Max: 37.1 (09 Apr 2019 15:30)  T(F): 98 (10 Apr 2019 02:39), Max: 98.8 (09 Apr 2019 15:30)  HR: 69 (10 Apr 2019 02:39) (67 - 115)  BP: 85/54 (10 Apr 2019 02:39) (80/58 - 133/77)  BP(mean): --  RR: 16 (10 Apr 2019 02:39) (16 - 22)  SpO2: 95% (10 Apr 2019 02:39) (94% - 98%)    MEDICATIONS  (STANDING):  albumin human 25% IVPB 50 milliLiter(s) IV Intermittent every 6 hours  amiodarone    Tablet   Oral   amiodarone    Tablet 400 milliGRAM(s) Oral every 8 hours  atorvastatin 40 milliGRAM(s) Oral at bedtime  buDESOnide 160 MICROgram(s)/formoterol 4.5 MICROgram(s) Inhaler 2 Puff(s) Inhalation two times a day  collagenase Ointment 1 Application(s) Topical daily  docusate sodium 100 milliGRAM(s) Oral three times a day  heparin  Infusion.  Unit(s)/Hr (11 mL/Hr) IV Continuous <Continuous>  metoprolol tartrate 50 milliGRAM(s) Oral two times a day  midodrine 10 milliGRAM(s) Oral every 8 hours  midodrine 5 milliGRAM(s) Oral once  mupirocin 2% Ointment 1 Application(s) Topical <User Schedule>  polyethylene glycol 3350 17 Gram(s) Oral daily  senna 2 Tablet(s) Oral at bedtime  sodium chloride 0.9% Bolus 250 milliLiter(s) IV Bolus once  thiamine 100 milliGRAM(s) Oral daily    MEDICATIONS  (PRN):  heparin  Injectable 4500 Unit(s) IV Push every 6 hours PRN For aPTT less than 40  heparin  Injectable 2000 Unit(s) IV Push every 6 hours PRN For aPTT between 40 - 57  levalbuterol Inhalation 0.63 milliGRAM(s) Inhalation every 6 hours PRN SOB  oxyCODONE    5 mG/acetaminophen 325 mG 1 Tablet(s) Oral every 6 hours PRN Moderate Pain and severe pain

## 2019-04-10 NOTE — PROGRESS NOTE ADULT - SUBJECTIVE AND OBJECTIVE BOX
CARDIOLOGY FOLLOW UP - Dr. Jarrell GEORGE bp remains low   pt. awake/alert   denies cp      PHYSICAL EXAM:  T(C): 36.4 (04-10-19 @ 12:08), Max: 37.1 (04-09-19 @ 15:30)  HR: 76 (04-10-19 @ 12:08) (67 - 115)  BP: 78/44 (04-10-19 @ 12:08) (78/44 - 133/77)  RR: 18 (04-10-19 @ 12:08) (16 - 22)  SpO2: 96% (04-10-19 @ 12:08) (94% - 97%)  Wt(kg): --  I&O's Summary      Appearance: Normal	  Cardiovascular: Normal S1 S2,irreg, No JVD, No murmurs  Respiratory: coarse   Gastrointestinal:  Soft, Non-tender, + BS	  Extremities: Normal range of motion, No clubbing, +++b/l le edema         MEDICATIONS  (STANDING):  albumin human 25% IVPB 50 milliLiter(s) IV Intermittent every 6 hours  amiodarone    Tablet   Oral   amiodarone    Tablet 400 milliGRAM(s) Oral every 8 hours  atorvastatin 40 milliGRAM(s) Oral at bedtime  buDESOnide 160 MICROgram(s)/formoterol 4.5 MICROgram(s) Inhaler 2 Puff(s) Inhalation two times a day  collagenase Ointment 1 Application(s) Topical daily  heparin  Infusion.  Unit(s)/Hr (11 mL/Hr) IV Continuous <Continuous>  midodrine 20 milliGRAM(s) Oral three times a day  mupirocin 2% Ointment 1 Application(s) Topical <User Schedule>  thiamine 100 milliGRAM(s) Oral daily      TELEMETRY: AFIB, 60s	    ECG:  	  RADIOLOGY:   DIAGNOSTIC TESTING:  [ ] Echocardiogram:  [ ]  Catheterization:  [ ] Stress Test:    OTHER: 	    LABS:	 	                                10.2   14.65 )-----------( 173      ( 10 Apr 2019 06:00 )             35.0     04-10    138  |  100  |  61<H>  ----------------------------<  77  4.0   |  21<L>  |  2.49<H>    Ca    8.5      10 Apr 2019 06:00  Phos  4.5     04-10  Mg     1.9     04-10    TPro  6.7  /  Alb  x   /  TBili  x   /  DBili  x   /  AST  x   /  ALT  x   /  AlkPhos  x   04-09    PT/INR - ( 09 Apr 2019 05:15 )   PT: 23.1 SEC;   INR: 2.04          PTT - ( 10 Apr 2019 06:00 )  PTT:> 200.0 SEC

## 2019-04-10 NOTE — CHART NOTE - NSCHARTNOTEFT_GEN_A_CORE
Pre-operative Note    - Pre-operative Diagnosis: RLE Wounds    - Procedure: RLE Angiogram, Possible Angioplasty, Possible Stent    - Labs:                        10.1   15.55 )-----------( 184      ( 09 Apr 2019 21:54 )             34.0     04-09    138  |  99  |  60<H>  ----------------------------<  87  4.4   |  26  |  2.46<H>    Ca    9.2      09 Apr 2019 05:15  Phos  5.0     04-09  Mg     2.2     04-09    TPro  6.7  /  Alb  x   /  TBili  x   /  DBili  x   /  AST  x   /  ALT  x   /  AlkPhos  x   04-09    PT/INR - ( 09 Apr 2019 05:15 )   PT: 23.1 SEC;   INR: 2.04          PTT - ( 09 Apr 2019 21:54 )  PTT:> 200.0 SEC    Type & Screen #1: 1/17/2019  Type & Screen #2: 4/9/2019    - Xray Chest 1 View-PORTABLE IMMEDIATE (04.09.19 @ 10:11)     Mild interstitial edema, not significantly changed.    Small loculated right pleural effusion appears slightly redistributed.   Small left pleural effusion is not significantly changed.    Increased right basilar opacity and continued left basilar opacity,   findings which could be due to atelectasis and/or pneumonia.    - 12 Lead ECG (04.06.19 @ 16:49) >    Ventricular Rate 114 BPM    Atrial Rate 202 BPM    QRS Duration 90 ms    Q-T Interval 318 ms    QTC Calculation(Bezet) 438 ms    R Axis 29 degrees    T Axis 182 degrees    Diagnosis Line Atrial fibrillation with rapid ventricular response  Septal infarct , age undetermined  ST & T wave abnormality, consider inferolateral ischemia or digitalis effect  Abnormal ECG    - AICD/Pacemaker Interrogation Date: N/a    - Blood: Not needed.     - Pregnancy Test: N/a    - Orders:  > NPO since midnight   > IVF, albumin (also getting fluid in the form of heparin gtt)  > Perioperative antibiotics not needed.  > Morning Labs: CBC, BMP, coags pending collection    - Permits:  > Consent in chart.  > Case scheduled with cath lab.  > Nephrology cleared as high risk (due to IGOR and likely underlying glomerular disease) because of urgency of angiogram  > Cardiology clearance has NOT been documented; will need documentation prior to procedure    Deandre Méndez PGY2  [id number]

## 2019-04-10 NOTE — PROGRESS NOTE ADULT - ASSESSMENT
78 y/o female, with a PmHx of COPD (currently on 3L O2 24 hours), paroxsymal a-fib (on pradaxa), lsited h/o CAD (s/p stent 11/2018), CHF (although recent TTE with normal biventricular function, with no mention of diastolic dysfunction; + severely dilated left atrium), HTN, HLD, and anxiety, presenting to the [facility] ED with chronic right ankle pressure ulcer pain. GI consulted for abd distention

## 2019-04-10 NOTE — CHART NOTE - NSCHARTNOTEFT_GEN_A_CORE
Nurse contacted provider regarding patient's bp being 80/40. Patient is A&O x2. Ordered  ccs and midodrine 5mg. Attending was contacted and made aware and stated he will see the patient shortly. Information could not be obtained

## 2019-04-10 NOTE — PROVIDER CONTACT NOTE (OTHER) - RECOMMENDATIONS
straight catheterized per proticol 150cc ritika urine returned
MICU consult
MICU consult, bolus
Straight cath
ZOfran IVP

## 2019-04-10 NOTE — PROGRESS NOTE ADULT - SUBJECTIVE AND OBJECTIVE BOX
INTERVAL HPI/OVERNIGHT EVENTS:    pt seen this morning  pt without abd pain   abd appears less distended than previous day  (+) multiple loose stools as per covering RN    MEDICATIONS  (STANDING):  albumin human 25% IVPB 50 milliLiter(s) IV Intermittent every 6 hours  amiodarone    Tablet   Oral   amiodarone    Tablet 400 milliGRAM(s) Oral every 8 hours  atorvastatin 40 milliGRAM(s) Oral at bedtime  buDESOnide 160 MICROgram(s)/formoterol 4.5 MICROgram(s) Inhaler 2 Puff(s) Inhalation two times a day  collagenase Ointment 1 Application(s) Topical daily  heparin  Infusion.  Unit(s)/Hr (11 mL/Hr) IV Continuous <Continuous>  midodrine 15 milliGRAM(s) Oral three times a day  mupirocin 2% Ointment 1 Application(s) Topical <User Schedule>  thiamine 100 milliGRAM(s) Oral daily    MEDICATIONS  (PRN):  heparin  Injectable 4500 Unit(s) IV Push every 6 hours PRN For aPTT less than 40  heparin  Injectable 2000 Unit(s) IV Push every 6 hours PRN For aPTT between 40 - 57  levalbuterol Inhalation 0.63 milliGRAM(s) Inhalation every 6 hours PRN SOB  oxyCODONE    5 mG/acetaminophen 325 mG 1 Tablet(s) Oral every 6 hours PRN Moderate Pain and severe pain      Allergies    No Known Allergies    Intolerances        Review of Systems:    General:  No wt loss, fevers, chills, night sweats, fatigue   Eyes:  Good vision, no reported pain  ENT:  No sore throat, pain, runny nose, dysphagia  CV:  No pain, palpitations, hypo/hypertension  Resp:  No dyspnea, cough, tachypnea, wheezing  GI:  No pain, No nausea, No vomiting, +diarrhea, No constipation, No weight loss, No fever, No pruritis, No rectal bleeding, No melena, No dysphagia  :  No pain, bleeding, incontinence, nocturia  Muscle:  No pain, weakness  Neuro:  No weakness, tingling, memory problems  Psych:  No fatigue, insomnia, mood problems, depression  Endocrine:  No polyuria, polydypsia, cold/heat intolerance  Heme:  No petechiae, ecchymosis, easy bruisability  Skin:  No rash, tattoos, scars, edema      Vital Signs Last 24 Hrs  T(C): 36.7 (10 Apr 2019 09:40), Max: 37.1 (09 Apr 2019 15:30)  T(F): 98 (10 Apr 2019 09:40), Max: 98.8 (09 Apr 2019 15:30)  HR: 72 (10 Apr 2019 09:40) (67 - 115)  BP: 80/48 (10 Apr 2019 09:40) (80/48 - 133/77)  BP(mean): --  RR: 18 (10 Apr 2019 09:40) (16 - 22)  SpO2: 95% (10 Apr 2019 09:40) (94% - 97%)    PHYSICAL EXAM:    Constitutional: NAD  HEENT: EOMI, throat clear  Neck: No LAD, supple  Respiratory: CTA and P  Cardiovascular: S1 and S2, RRR, no M  Gastrointestinal: BS+, soft, NT/ND, neg HSM,  Extremities: No peripheral edema, neg clubbing, cyanosis  Vascular: 2+ peripheral pulses  Neurological: A/O x 3, no focal deficits  Psychiatric: Normal mood, normal affect  Skin: No rashes      LABS:                        10.2   14.65 )-----------( 173      ( 10 Apr 2019 06:00 )             35.0     04-10    138  |  100  |  61<H>  ----------------------------<  77  4.0   |  21<L>  |  2.49<H>    Ca    8.5      10 Apr 2019 06:00  Phos  4.5     04-10  Mg     1.9     04-10    TPro  6.7  /  Alb  x   /  TBili  x   /  DBili  x   /  AST  x   /  ALT  x   /  AlkPhos  x   04-09    PT/INR - ( 09 Apr 2019 05:15 )   PT: 23.1 SEC;   INR: 2.04          PTT - ( 10 Apr 2019 06:00 )  PTT:> 200.0 SEC      RADIOLOGY & ADDITIONAL TESTS:

## 2019-04-10 NOTE — PROGRESS NOTE ADULT - ASSESSMENT
Problem/Plan - 1:  ·  Problem: Lower limb ulcer, ankle, right, with unspecified severity.  Plan: -Seen by podiatry and vascular , no need for abx. Vacular with plan for angiogram once kidney function improves  -right duplex negative for dvt  -wound care fu  -pain meds prn.     Problem/Plan - 2:·  Problem: IGOR (acute kidney injury).  Plan: -likely with prerenal component in setting of decreased fluid intake  renal fumonitor cr    Problem/Plan - 3:  ·  Problem: Urinary tract infection with hematuria, site unspecified.  Plan: - off antibiotics    Problem/Plan - 4:  ·  Problem: Rapid atrial fibrillation.  Plan: c/w hep gtt  management as per cards    Problem/Plan - 5:  ·  Problem: Chronic obstructive pulmonary disease, unspecified COPD type.  Plan: c/w ics/laba, O2

## 2019-04-10 NOTE — PROGRESS NOTE ADULT - ATTENDING COMMENTS
[facility] NEPHROLOGY  Harish Valera M.D.  Kevin Cedeno D.O.  Rachele Mckeon M.D.  Christina Westfall, MSN, ANP-C    Telephone: [phone]  Facsimile: [phone]    [address] [place] [address]

## 2019-04-10 NOTE — CONSULT NOTE ADULT - SUBJECTIVE AND OBJECTIVE BOX
CHIEF COMPLAINT: RLE pain    HPI: 78 y/o female, with a PmHx of COPD (currently on 3L O2 24 hours), paroxsymal a-fib (on pradaxa), h/o CAD (s/p stent 11/2018), CHF (although recent TTE with normal biventricular function, with no mention of diastolic dysfunction; + severely dilated left atrium), HTN, HLD, and anxiety, presenting to the [facility] ED with chronic right ankle pressure ulcer pain for 1 week. Patient had previously had STONEY/TVR done as an outpatient showing severe PAD 0.6 in the RLE. Patient was seen by ID and patient's RLE ulcer was thought to not be infected and due to vascular insufficiency and she was observed off antibiotics. Vascular surgery planned to do a RLE angiogram, however this has been delayed due to patient's worsening renal function- at baseline has CKD Stage III, now with creatinine increased to 2.46 today. Nephrology was consulted and plan to do a renal biopsy in the future, with possible differentials for patient's IGOR on CKD including AIN versus MPGN. Yesterday the patient developed Afib with RVR to the 170s and an RRT was called. Patient's rates improved with IV Lopressor and she was initially started on digoxin, which has now been changed to amiodarone. BP during the RRT remained in the 90s, which has been her baseline for the last 3 days. Rates overnight have been stable in the 60s-80s. Patient was planned to go for RLE angiogram today, however it has now been placed on hold awaiting cardiology clearance prior to the procedure.     MICU consult this morning was called for hypotension into the 80s.    PAST MEDICAL & SURGICAL HISTORY:  CAD (coronary artery disease): s/p stent 2018  CHF (congestive heart failure)  COPD (chronic obstructive pulmonary disease): on 2-3L O2 at home prn  Anxiety  TIA (transient ischemic attack): many years ago  PAF (paroxysmal atrial fibrillation)  HLD (hyperlipidemia)  HTN (hypertension)  H/O total hysterectomy: 2014  History of cataract surgery: b/l 2015  History of repair of hip fracture: luisa placed in RLE 2015  H/O spinal fusion: c2-6 in 2017      FAMILY HISTORY:  No pertinent family history in first degree relatives      SOCIAL HISTORY:  Smoking: [ ] Never Smoked [ ] Former Smoker (__ packs x ___ years) [ ] Current Smoker  (__ packs x ___ years)  Substance Use: [ ] Never Used [ ] Used ____  EtOH Use:  Marital Status: [ ] Single [ ]  [ ]  [ ]   Sexual History:   Occupation:  Recent Travel:  Country of Birth:  Advance Directives:    Allergies    No Known Allergies    Intolerances        HOME MEDICATIONS:    REVIEW OF SYSTEMS:  Constitutional: [ ] negative [ ] fevers [ ] chills [ ] weight loss [ ] weight gain  HEENT: [ ] negative [ ] dry eyes [ ] eye irritation [ ] postnasal drip [ ] nasal congestion  CV: [ ] negative  [ ] chest pain [ ] orthopnea [ ] palpitations [ ] murmur  Resp: [ ] negative [ ] cough [ ] shortness of breath [ ] dyspnea [ ] wheezing [ ] sputum [ ] hemoptysis  GI: [ ] negative [ ] nausea [ ] vomiting [ ] diarrhea [ ] constipation [ ] abd pain [ ] dysphagia   : [ ] negative [ ] dysuria [ ] nocturia [ ] hematuria [ ] increased urinary frequency  Musculoskeletal: [ ] negative [ ] back pain [ ] myalgias [ ] arthralgias [ ] fracture  Skin: [ ] negative [ ] rash [ ] itch  Neurological: [ ] negative [ ] headache [ ] dizziness [ ] syncope [ ] weakness [ ] numbness  Psychiatric: [ ] negative [ ] anxiety [ ] depression  Endocrine: [ ] negative [ ] diabetes [ ] thyroid problem  Hematologic/Lymphatic: [ ] negative [ ] anemia [ ] bleeding problem  Allergic/Immunologic: [ ] negative [ ] itchy eyes [ ] nasal discharge [ ] hives [ ] angioedema  [ ] All other systems negative  [ ] Unable to assess ROS because ________    OBJECTIVE:  ICU Vital Signs Last 24 Hrs  T(C): 36.6 (10 Apr 2019 06:17), Max: 37.1 (09 Apr 2019 15:30)  T(F): 97.8 (10 Apr 2019 06:17), Max: 98.8 (09 Apr 2019 15:30)  HR: 80 (10 Apr 2019 06:17) (67 - 115)  BP: 82/55 (10 Apr 2019 06:17) (80/58 - 133/77)  BP(mean): --  ABP: --  ABP(mean): --  RR: 16 (10 Apr 2019 06:17) (16 - 22)  SpO2: 95% (10 Apr 2019 06:17) (94% - 97%)        CAPILLARY BLOOD GLUCOSE          PHYSICAL EXAM:  General:   HEENT:   Lymph Nodes:  Neck:   Respiratory:   Cardiovascular:   Abdomen:   Extremities:   Skin:   Neurological:  Psychiatry:    LINES:     HOSPITAL MEDICATIONS:  heparin  Infusion.  Unit(s)/Hr IV Continuous <Continuous>      amiodarone    Tablet   Oral   amiodarone    Tablet 400 milliGRAM(s) Oral every 8 hours  metoprolol tartrate 50 milliGRAM(s) Oral two times a day  midodrine 10 milliGRAM(s) Oral every 8 hours    atorvastatin 40 milliGRAM(s) Oral at bedtime    buDESOnide 160 MICROgram(s)/formoterol 4.5 MICROgram(s) Inhaler 2 Puff(s) Inhalation two times a day  levalbuterol Inhalation 0.63 milliGRAM(s) Inhalation every 6 hours PRN    oxyCODONE    5 mG/acetaminophen 325 mG 1 Tablet(s) Oral every 6 hours PRN    docusate sodium 100 milliGRAM(s) Oral three times a day  polyethylene glycol 3350 17 Gram(s) Oral daily  senna 2 Tablet(s) Oral at bedtime        albumin human 25% IVPB 50 milliLiter(s) IV Intermittent every 6 hours  thiamine 100 milliGRAM(s) Oral daily      collagenase Ointment 1 Application(s) Topical daily  mupirocin 2% Ointment 1 Application(s) Topical <User Schedule>        LABS:                        10.2   14.65 )-----------( 173      ( 10 Apr 2019 06:00 )             35.0     Hgb Trend: 10.2<--, 10.1<--, 10.8<--, 10.8<--, 10.8<--  04-10    138  |  100  |  61<H>  ----------------------------<  77  4.0   |  21<L>  |  2.49<H>    Ca    8.5      10 Apr 2019 06:00  Phos  4.5     04-10  Mg     1.9     04-10    TPro  6.7  /  Alb  x   /  TBili  x   /  DBili  x   /  AST  x   /  ALT  x   /  AlkPhos  x   04-09    Creatinine Trend: 2.49<--, 2.46<--, 2.21<--, 1.81<--, 1.57<--, 1.62<--  PT/INR - ( 09 Apr 2019 05:15 )   PT: 23.1 SEC;   INR: 2.04          PTT - ( 10 Apr 2019 06:00 )  PTT:> 200.0 SEC    Arterial Blood Gas:  04-09 @ 10:10  7.34/47/21/23/32.2/-0.1  ABG lactate: --        MICROBIOLOGY:     RADIOLOGY:  [ ] Reviewed and interpreted by me    EKG: CHIEF COMPLAINT: RLE pain    HPI: 78 y/o female, with a PmHx of COPD (currently on 3L O2 24 hours), paroxsymal a-fib (on pradaxa), h/o CAD (s/p stent 11/2018), CHF (although recent TTE with normal biventricular function, with no mention of diastolic dysfunction; + severely dilated left atrium), HTN, HLD, and anxiety, presenting to the [facility] ED with chronic right ankle pressure ulcer pain for 1 week. Patient had previously had STONEY/TVR done as an outpatient showing severe PAD 0.6 in the RLE. Patient was seen by ID and patient's RLE ulcer was thought to not be infected and due to vascular insufficiency and she was observed off antibiotics. Vascular surgery planned to do a RLE angiogram, however this has been delayed due to patient's worsening renal function- at baseline has CKD Stage III, now with creatinine increased to 2.46 today. Nephrology was consulted and plan to do a renal biopsy in the future, with possible differentials for patient's IGOR on CKD including AIN versus MPGN. Yesterday the patient developed Afib with RVR to the 170s and an RRT was called. Patient's rates improved with IV Lopressor and she was initially started on digoxin, which has now been changed to amiodarone. BP during the RRT remained in the 90s, which has been her baseline for the last 3 days. Rates overnight have been stable in the 60s-80s. Patient was planned to go for RLE angiogram today, however it has now been placed on hold awaiting cardiology clearance prior to the procedure.     MICU consult this morning was called for hypotension into the 80s. Patient currently on metoprolol 50mg BID and midodrine 10mg TID. Remains in Afib overnight, rate controlled in the 60s-80s. Patient seen and examined at bedside, endorses chronic SOB at baseline and dizziness. Also has 7/10 RLE pain. Denies chest pain or palpitations.     PAST MEDICAL & SURGICAL HISTORY:  CAD (coronary artery disease): s/p stent 2018  CHF (congestive heart failure)  COPD (chronic obstructive pulmonary disease): on 2-3L O2 at home prn  Anxiety  TIA (transient ischemic attack): many years ago  PAF (paroxysmal atrial fibrillation)  HLD (hyperlipidemia)  HTN (hypertension)  H/O total hysterectomy: 2014  History of cataract surgery: b/l 2015  History of repair of hip fracture: luisa placed in RLE 2015  H/O spinal fusion: c2-6 in 2017      FAMILY HISTORY:  No pertinent family history in first degree relatives      SOCIAL HISTORY:  Smoking: [ ] Never Smoked [ ] Former Smoker (__ packs x ___ years) [ ] Current Smoker  (__ packs x ___ years)  Substance Use: [ ] Never Used [ ] Used ____  EtOH Use:  Marital Status: [ ] Single [ ]  [ ]  [ ]   Sexual History:   Occupation:  Recent Travel:  Country of Birth:  Advance Directives:    Allergies    No Known Allergies    Intolerances        HOME MEDICATIONS:    REVIEW OF SYSTEMS:  Constitutional: [ ] negative [ ] fevers [ ] chills [ ] weight loss [ ] weight gain  HEENT: [ ] negative [ ] dry eyes [ ] eye irritation [ ] postnasal drip [ ] nasal congestion  CV: [ ] negative  [ ] chest pain [ ] orthopnea [ ] palpitations [ ] murmur  Resp: [ ] negative [ ] cough [ ] shortness of breath [ ] dyspnea [ ] wheezing [ ] sputum [ ] hemoptysis  GI: [ ] negative [ ] nausea [ ] vomiting [ ] diarrhea [ ] constipation [ ] abd pain [ ] dysphagia   : [ ] negative [ ] dysuria [ ] nocturia [ ] hematuria [ ] increased urinary frequency  Musculoskeletal: [ ] negative [ ] back pain [ ] myalgias [ ] arthralgias [ ] fracture  Skin: [ ] negative [ ] rash [ ] itch  Neurological: [ ] negative [ ] headache [ ] dizziness [ ] syncope [ ] weakness [ ] numbness  Psychiatric: [ ] negative [ ] anxiety [ ] depression  Endocrine: [ ] negative [ ] diabetes [ ] thyroid problem  Hematologic/Lymphatic: [ ] negative [ ] anemia [ ] bleeding problem  Allergic/Immunologic: [ ] negative [ ] itchy eyes [ ] nasal discharge [ ] hives [ ] angioedema  [ ] All other systems negative  [ ] Unable to assess ROS because ________    OBJECTIVE:  ICU Vital Signs Last 24 Hrs  T(C): 36.6 (10 Apr 2019 06:17), Max: 37.1 (09 Apr 2019 15:30)  T(F): 97.8 (10 Apr 2019 06:17), Max: 98.8 (09 Apr 2019 15:30)  HR: 80 (10 Apr 2019 06:17) (67 - 115)  BP: 82/55 (10 Apr 2019 06:17) (80/58 - 133/77)  BP(mean): --  ABP: --  ABP(mean): --  RR: 16 (10 Apr 2019 06:17) (16 - 22)  SpO2: 95% (10 Apr 2019 06:17) (94% - 97%)        CAPILLARY BLOOD GLUCOSE          PHYSICAL EXAM:  General:   HEENT:   Lymph Nodes:  Neck:   Respiratory:   Cardiovascular:   Abdomen:   Extremities:   Skin:   Neurological:  Psychiatry:    LINES:     HOSPITAL MEDICATIONS:  heparin  Infusion.  Unit(s)/Hr IV Continuous <Continuous>      amiodarone    Tablet   Oral   amiodarone    Tablet 400 milliGRAM(s) Oral every 8 hours  metoprolol tartrate 50 milliGRAM(s) Oral two times a day  midodrine 10 milliGRAM(s) Oral every 8 hours    atorvastatin 40 milliGRAM(s) Oral at bedtime    buDESOnide 160 MICROgram(s)/formoterol 4.5 MICROgram(s) Inhaler 2 Puff(s) Inhalation two times a day  levalbuterol Inhalation 0.63 milliGRAM(s) Inhalation every 6 hours PRN    oxyCODONE    5 mG/acetaminophen 325 mG 1 Tablet(s) Oral every 6 hours PRN    docusate sodium 100 milliGRAM(s) Oral three times a day  polyethylene glycol 3350 17 Gram(s) Oral daily  senna 2 Tablet(s) Oral at bedtime        albumin human 25% IVPB 50 milliLiter(s) IV Intermittent every 6 hours  thiamine 100 milliGRAM(s) Oral daily      collagenase Ointment 1 Application(s) Topical daily  mupirocin 2% Ointment 1 Application(s) Topical <User Schedule>        LABS:                        10.2   14.65 )-----------( 173      ( 10 Apr 2019 06:00 )             35.0     Hgb Trend: 10.2<--, 10.1<--, 10.8<--, 10.8<--, 10.8<--  04-10    138  |  100  |  61<H>  ----------------------------<  77  4.0   |  21<L>  |  2.49<H>    Ca    8.5      10 Apr 2019 06:00  Phos  4.5     04-10  Mg     1.9     04-10    TPro  6.7  /  Alb  x   /  TBili  x   /  DBili  x   /  AST  x   /  ALT  x   /  AlkPhos  x   04-09    Creatinine Trend: 2.49<--, 2.46<--, 2.21<--, 1.81<--, 1.57<--, 1.62<--  PT/INR - ( 09 Apr 2019 05:15 )   PT: 23.1 SEC;   INR: 2.04          PTT - ( 10 Apr 2019 06:00 )  PTT:> 200.0 SEC    Arterial Blood Gas:  04-09 @ 10:10  7.34/47/21/23/32.2/-0.1  ABG lactate: --        MICROBIOLOGY:     RADIOLOGY:  [ ] Reviewed and interpreted by me    EKG: CHIEF COMPLAINT: RLE pain    HPI: 80 y/o female, with a PmHx of COPD (currently on 3L O2 24 hours), paroxsymal a-fib (on pradaxa), h/o CAD (s/p stent 11/2018), CHF (although recent TTE with normal biventricular function, with no mention of diastolic dysfunction; + severely dilated left atrium), HTN, HLD, and anxiety, presenting to the [facility] ED with chronic right ankle pressure ulcer pain for 1 week. Patient had previously had STONEY/TVR done as an outpatient showing severe PAD 0.6 in the RLE. Patient was seen by ID and patient's RLE ulcer was thought to not be infected and due to vascular insufficiency and she was observed off antibiotics. Vascular surgery planned to do a RLE angiogram, however this has been delayed due to patient's worsening renal function- at baseline has CKD Stage III, now with creatinine increased to 2.46 today. Nephrology was consulted and plan to do a renal biopsy in the future, with possible differentials for patient's IGOR on CKD including AIN versus MPGN. Yesterday the patient developed Afib with RVR to the 170s and an RRT was called. Patient's rates improved with IV Lopressor and she was initially started on digoxin, which has now been changed to amiodarone. BP during the RRT remained in the 90s, which has been her baseline for the last 3 days. Rates overnight have been stable in the 60s-80s. Patient was planned to go for RLE angiogram today, however it has now been placed on hold awaiting cardiology clearance prior to the procedure.     MICU consult this morning was called for hypotension into the 80s. Patient currently on metoprolol 50mg BID and midodrine 10mg TID. Remains in Afib overnight, rate controlled in the 60s-80s. Patient seen and examined at bedside, endorses chronic SOB at baseline and dizziness. Also has 7/10 RLE pain. Denies chest pain or palpitations.     PAST MEDICAL & SURGICAL HISTORY:  CAD (coronary artery disease): s/p stent 2018  CHF (congestive heart failure)  COPD (chronic obstructive pulmonary disease): on 2-3L O2 at home prn  Anxiety  TIA (transient ischemic attack): many years ago  PAF (paroxysmal atrial fibrillation)  HLD (hyperlipidemia)  HTN (hypertension)  H/O total hysterectomy: 2014  History of cataract surgery: b/l 2015  History of repair of hip fracture: luisa placed in RLE 2015  H/O spinal fusion: c2-6 in 2017      FAMILY HISTORY:  No pertinent family history in first degree relatives      SOCIAL HISTORY:  Smoking: [ ] Never Smoked [ ] Former Smoker (__ packs x ___ years) [ ] Current Smoker  (__ packs x ___ years)  Substance Use: [ ] Never Used [ ] Used ____  EtOH Use:  Marital Status: [ ] Single [ ]  [ ]  [ ]   Sexual History:   Occupation:  Recent Travel:  Country of Birth:  Advance Directives:    Allergies    No Known Allergies    Intolerances        HOME MEDICATIONS: As per EMR    REVIEW OF SYSTEMS:  Constitutional: [ ] negative [ ] fevers [ ] chills [ ] weight loss [ ] weight gain  HEENT: [ ] negative [ ] dry eyes [ ] eye irritation [ ] postnasal drip [ ] nasal congestion  CV: [ ] negative  [ ] chest pain [ ] orthopnea [ ] palpitations [ ] murmur  Resp: [ ] negative [ ] cough [X] shortness of breath [ ] dyspnea [ ] wheezing [ ] sputum [ ] hemoptysis  GI: [ ] negative [ ] nausea [ ] vomiting [ ] diarrhea [ ] constipation [ ] abd pain [ ] dysphagia   : [ ] negative [ ] dysuria [ ] nocturia [ ] hematuria [ ] increased urinary frequency  Musculoskeletal: [ ] negative [ ] back pain [ ] myalgias [X] arthralgias [ ] fracture  Skin: [ ] negative [ ] rash [ ] itch  Neurological: [ ] negative [ ] headache [ ] dizziness [ ] syncope [ ] weakness [ ] numbness  Psychiatric: [ ] negative [ ] anxiety [ ] depression  Endocrine: [ ] negative [ ] diabetes [ ] thyroid problem  Hematologic/Lymphatic: [ ] negative [ ] anemia [ ] bleeding problem  Allergic/Immunologic: [ ] negative [ ] itchy eyes [ ] nasal discharge [ ] hives [ ] angioedema  [X] All other systems negative  [ ] Unable to assess ROS because ________    OBJECTIVE:  ICU Vital Signs Last 24 Hrs  T(C): 36.6 (10 Apr 2019 06:17), Max: 37.1 (09 Apr 2019 15:30)  T(F): 97.8 (10 Apr 2019 06:17), Max: 98.8 (09 Apr 2019 15:30)  HR: 80 (10 Apr 2019 06:17) (67 - 115)  BP: 82/55 (10 Apr 2019 06:17) (80/58 - 133/77)  BP(mean): --  ABP: --  ABP(mean): --  RR: 16 (10 Apr 2019 06:17) (16 - 22)  SpO2: 95% (10 Apr 2019 06:17) (94% - 97%)        CAPILLARY BLOOD GLUCOSE    PHYSICAL EXAM:  General: Resting in bed comfortably, on nasal cannula, NAD  HEENT: PERRL  Lymph Nodes: No lymphadenopathy  Neck: Supple  Respiratory: Crackles in b/l lower bases  Cardiovascular: Irregular rate and rhythm, +systolic murmur, no rubs or gallops  Abdomen: Soft, distended, no tenderness, bowel sounds present  Extremities: Patient refused to have RLE examined- foot wrapped in Kerlix, no bleeding or drainage noted on visual inspection, able to wiggle toes and dorsi/plantarflex  Skin: No edema of LLE  Neurological: AAOx3, follows commands, no focal deficits  Psychiatry: Calm    LINES:     HOSPITAL MEDICATIONS:  heparin  Infusion.  Unit(s)/Hr IV Continuous <Continuous>  amiodarone    Tablet   Oral   amiodarone    Tablet 400 milliGRAM(s) Oral every 8 hours  metoprolol tartrate 50 milliGRAM(s) Oral two times a day  midodrine 10 milliGRAM(s) Oral every 8 hours  atorvastatin 40 milliGRAM(s) Oral at bedtime  buDESOnide 160 MICROgram(s)/formoterol 4.5 MICROgram(s) Inhaler 2 Puff(s) Inhalation two times a day  levalbuterol Inhalation 0.63 milliGRAM(s) Inhalation every 6 hours PRN  oxyCODONE    5 mG/acetaminophen 325 mG 1 Tablet(s) Oral every 6 hours PRN  docusate sodium 100 milliGRAM(s) Oral three times a day  polyethylene glycol 3350 17 Gram(s) Oral daily  senna 2 Tablet(s) Oral at bedtime  albumin human 25% IVPB 50 milliLiter(s) IV Intermittent every 6 hours  thiamine 100 milliGRAM(s) Oral daily  collagenase Ointment 1 Application(s) Topical daily  mupirocin 2% Ointment 1 Application(s) Topical <User Schedule>        LABS:                        10.2   14.65 )-----------( 173      ( 10 Apr 2019 06:00 )             35.0     Hgb Trend: 10.2<--, 10.1<--, 10.8<--, 10.8<--, 10.8<--  04-10    138  |  100  |  61<H>  ----------------------------<  77  4.0   |  21<L>  |  2.49<H>    Ca    8.5      10 Apr 2019 06:00  Phos  4.5     04-10  Mg     1.9     04-10    TPro  6.7  /  Alb  x   /  TBili  x   /  DBili  x   /  AST  x   /  ALT  x   /  AlkPhos  x   04-09    Creatinine Trend: 2.49<--, 2.46<--, 2.21<--, 1.81<--, 1.57<--, 1.62<--  PT/INR - ( 09 Apr 2019 05:15 )   PT: 23.1 SEC;   INR: 2.04          PTT - ( 10 Apr 2019 06:00 )  PTT:> 200.0 SEC    Arterial Blood Gas:  04-09 @ 10:10  7.34/47/21/23/32.2/-0.1  ABG lactate: --        MICROBIOLOGY:     RADIOLOGY:  [ ] Reviewed and interpreted by me    EKG: CHIEF COMPLAINT: RLE pain    HPI: 80 y/o female, with a PmHx of COPD (currently on 3L O2 24 hours), paroxsymal a-fib (on pradaxa), h/o CAD (s/p stent 11/2018), CHF (although recent TTE with normal biventricular function, with no mention of diastolic dysfunction; + severely dilated left atrium), HTN, HLD, and anxiety, presenting to the [facility] ED with chronic right ankle pressure ulcer pain for 1 week. Patient had previously had STONEY/TVR done as an outpatient showing severe PAD 0.6 in the RLE. Patient was seen by ID and patient's RLE ulcer was thought to not be infected and due to vascular insufficiency and she was observed off antibiotics. Vascular surgery planned to do a RLE angiogram, however this has been delayed due to patient's worsening renal function- at baseline has CKD Stage III, now with creatinine increased to 2.46 today. Nephrology was consulted and plan to do a renal biopsy in the future, with possible differentials for patient's IGOR on CKD including AIN versus MPGN. Yesterday the patient developed Afib with RVR to the 170s and an RRT was called. Patient's rates improved with IV Lopressor and she was initially started on digoxin, which has now been changed to amiodarone. BP during the RRT remained in the 90s, which has been her baseline for the last 3 days. Rates overnight have been stable in the 60s-80s. Patient was planned to go for RLE angiogram today, however it has now been placed on hold awaiting cardiology clearance prior to the procedure.     MICU consult this morning was called for hypotension into the 80s. Patient currently on metoprolol 50mg BID and midodrine 10mg TID. Remains in Afib overnight, rate controlled in the 60s-80s. Patient seen and examined at bedside, endorses chronic SOB at baseline and dizziness. Also has 7/10 RLE pain. Denies chest pain or palpitations.     PAST MEDICAL & SURGICAL HISTORY:  CAD (coronary artery disease): s/p stent 2018  CHF (congestive heart failure)  COPD (chronic obstructive pulmonary disease): on 2-3L O2 at home prn  Anxiety  TIA (transient ischemic attack): many years ago  PAF (paroxysmal atrial fibrillation)  HLD (hyperlipidemia)  HTN (hypertension)  H/O total hysterectomy: 2014  History of cataract surgery: b/l 2015  History of repair of hip fracture: luisa placed in RLE 2015  H/O spinal fusion: c2-6 in 2017      FAMILY HISTORY:  No pertinent family history in first degree relatives      SOCIAL HISTORY:  Smoking: [ ] Never Smoked [ ] Former Smoker (__ packs x ___ years) [ ] Current Smoker  (__ packs x ___ years)  Substance Use: [ ] Never Used [ ] Used ____  EtOH Use:  Marital Status: [ ] Single [ ]  [ ]  [ ]   Sexual History:   Occupation:  Recent Travel:  Country of Birth:  Advance Directives:    Allergies    No Known Allergies    Intolerances        HOME MEDICATIONS: As per EMR    REVIEW OF SYSTEMS:  Constitutional: [ ] negative [ ] fevers [ ] chills [ ] weight loss [ ] weight gain  HEENT: [ ] negative [ ] dry eyes [ ] eye irritation [ ] postnasal drip [ ] nasal congestion  CV: [ ] negative  [ ] chest pain [ ] orthopnea [ ] palpitations [ ] murmur  Resp: [ ] negative [ ] cough [X] shortness of breath [ ] dyspnea [ ] wheezing [ ] sputum [ ] hemoptysis  GI: [ ] negative [ ] nausea [ ] vomiting [ ] diarrhea [ ] constipation [ ] abd pain [ ] dysphagia   : [ ] negative [ ] dysuria [ ] nocturia [ ] hematuria [ ] increased urinary frequency  Musculoskeletal: [ ] negative [ ] back pain [ ] myalgias [X] arthralgias [ ] fracture  Skin: [ ] negative [ ] rash [ ] itch  Neurological: [ ] negative [ ] headache [ ] dizziness [ ] syncope [ ] weakness [ ] numbness  Psychiatric: [ ] negative [ ] anxiety [ ] depression  Endocrine: [ ] negative [ ] diabetes [ ] thyroid problem  Hematologic/Lymphatic: [ ] negative [ ] anemia [ ] bleeding problem  Allergic/Immunologic: [ ] negative [ ] itchy eyes [ ] nasal discharge [ ] hives [ ] angioedema  [X] All other systems negative  [ ] Unable to assess ROS because ________    OBJECTIVE:  ICU Vital Signs Last 24 Hrs  T(C): 36.6 (10 Apr 2019 06:17), Max: 37.1 (09 Apr 2019 15:30)  T(F): 97.8 (10 Apr 2019 06:17), Max: 98.8 (09 Apr 2019 15:30)  HR: 80 (10 Apr 2019 06:17) (67 - 115)  BP: 82/55 (10 Apr 2019 06:17) (80/58 - 133/77)  BP(mean): --  ABP: --  ABP(mean): --  RR: 16 (10 Apr 2019 06:17) (16 - 22)  SpO2: 95% (10 Apr 2019 06:17) (94% - 97%)        CAPILLARY BLOOD GLUCOSE    PHYSICAL EXAM:  General: Resting in bed comfortably, on nasal cannula, NAD  HEENT: PERRL  Lymph Nodes: No lymphadenopathy  Neck: Supple  Respiratory: Crackles in b/l lower bases  Cardiovascular: Irregular rate and rhythm, +systolic murmur, no rubs or gallops  Abdomen: Soft, distended, no tenderness, bowel sounds present  Extremities: Patient refused to have RLE examined- foot wrapped in Kerlix, no bleeding or drainage noted on visual inspection, able to wiggle toes and dorsi/plantarflex  Skin: No edema of LLE  Neurological: AAOx3, follows commands, no focal deficits  Psychiatry: Calm    LINES: Peripheral IVs    HOSPITAL MEDICATIONS:  heparin  Infusion.  Unit(s)/Hr IV Continuous <Continuous>  amiodarone    Tablet   Oral   amiodarone    Tablet 400 milliGRAM(s) Oral every 8 hours  metoprolol tartrate 50 milliGRAM(s) Oral two times a day  midodrine 10 milliGRAM(s) Oral every 8 hours  atorvastatin 40 milliGRAM(s) Oral at bedtime  buDESOnide 160 MICROgram(s)/formoterol 4.5 MICROgram(s) Inhaler 2 Puff(s) Inhalation two times a day  levalbuterol Inhalation 0.63 milliGRAM(s) Inhalation every 6 hours PRN  oxyCODONE    5 mG/acetaminophen 325 mG 1 Tablet(s) Oral every 6 hours PRN  docusate sodium 100 milliGRAM(s) Oral three times a day  polyethylene glycol 3350 17 Gram(s) Oral daily  senna 2 Tablet(s) Oral at bedtime  albumin human 25% IVPB 50 milliLiter(s) IV Intermittent every 6 hours  thiamine 100 milliGRAM(s) Oral daily  collagenase Ointment 1 Application(s) Topical daily  mupirocin 2% Ointment 1 Application(s) Topical <User Schedule>        LABS:                        10.2   14.65 )-----------( 173      ( 10 Apr 2019 06:00 )             35.0     Hgb Trend: 10.2<--, 10.1<--, 10.8<--, 10.8<--, 10.8<--  04-10    138  |  100  |  61<H>  ----------------------------<  77  4.0   |  21<L>  |  2.49<H>    Ca    8.5      10 Apr 2019 06:00  Phos  4.5     04-10  Mg     1.9     04-10    TPro  6.7  /  Alb  x   /  TBili  x   /  DBili  x   /  AST  x   /  ALT  x   /  AlkPhos  x   04-09    Creatinine Trend: 2.49<--, 2.46<--, 2.21<--, 1.81<--, 1.57<--, 1.62<--  PT/INR - ( 09 Apr 2019 05:15 )   PT: 23.1 SEC;   INR: 2.04          PTT - ( 10 Apr 2019 06:00 )  PTT:> 200.0 SEC    Arterial Blood Gas:  04-09 @ 10:10  7.34/47/21/23/32.2/-0.1  ABG lactate: --        MICROBIOLOGY: Urine culture growing 3 or more organisms, likely contaminated    RADIOLOGY:  [X] Reviewed and interpreted by me    Tele: Afib, 60s-80s overnight

## 2019-04-10 NOTE — CONSULT NOTE ADULT - REASON FOR ADMISSION
right leg wound/ swelling

## 2019-04-10 NOTE — PROGRESS NOTE ADULT - ASSESSMENT
79y Female with history of CHF, COPD presents with RLE pain. Nephrology consulted for elevated Scr.    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 versus underlying MPGN? given low C3/C4. Continue IV albumin to increase renal perfusion given low FeNa. Follow up pending serologies (LORA, dsDNA, anti-GBM ab, ANCA, paraproteinemia work up). Patient will need a kidney biopsy and now agreeable. Would contact IR to discuss timing of biopsy given recently discontinued Plavix. Avoid nephrotoxins.  2) CKD-3: Patient appears to have h/o age appropriate CKD-3 (baseline Scr 1.1-1.2) with mild proteinuria. Monitor electrolytes.  3) Orthostatic hypotension: BP low. Increase midodrine to 15 mg TID. Rate control as per cardiology. Monitor BP.  4) LE edema: CXR reviewed with pleural effusions and pulmonary edema. Prior TTE with mild to moderate MR with normal LVSF. Given worsening edema, patient s/p IV lasix on 4/8. Holding further lasix given low BP. Monitor UO.  5) Pyuria/Microscopic hematuria: Urine culture negative s/p CTX. IGOR work up as above.    Patient high risk for angiogram and subsequent ROSIE given IGOR with likely underlying glomerular disease. If angiogram urgent, can proceed as patient already educated on risks of ROSIE and would attempt to minimize dye load during the procedure.

## 2019-04-10 NOTE — PROGRESS NOTE ADULT - SUBJECTIVE AND OBJECTIVE BOX
[facility] NEPHROLOGY- PROGRESS NOTE    79y Female with history of CHF, COPD presents with RLE pain. Nephrology consulted for elevated Scr.    Events on 19 reviewed.    REVIEW OF SYSTEMS:  Gen: no changes in weight  Cards: no chest pain  Resp: + dyspnea (chronic)  GI: no nausea or vomiting, no diarrhea  Vascular: RLE edema/pain    No Known Allergies      Hospital Medications: Medications reviewed      VITALS:  T(F): 97.8 (04-10-19 @ 06:17), Max: 98.8 (19 @ 15:30)  HR: 80 (04-10-19 @ 06:17)  BP: 82/55 (04-10-19 @ 06:17)  RR: 16 (04-10-19 @ 06:17)  SpO2: 95% (04-10-19 @ 06:17)  Wt(kg): --      PHYSICAL EXAM:    Gen: NAD, lethargic but easily arousable  Cards: Irregularly irregular, +S1/S2, no M/G/R  Resp: Bibasilar rales  GI: soft, NT, + ab distention, NABS  Vascular: 1+ LE edema B/L      LABS:  04-10    138  |  100  |  61<H>  ----------------------------<  77  4.0   |  21<L>  |  2.49<H>    Ca    8.5      10 Apr 2019 06:00  Phos  4.5     04-10  Mg     1.9     04-10    TPro  6.7  /  Alb      /  TBili      /  DBili      /  AST      /  ALT      /  AlkPhos          Creatinine Trend: 2.49 <--, 2.46 <--, 2.21 <--, 1.81 <--, 1.57 <--, 1.62 <--, 1.67 <--                        10.2   14.65 )-----------( 173      ( 10 Apr 2019 06:00 )             35.0     Urine Studies:  Urinalysis Basic - ( 2019 00:05 )    Color: YELLOW / Appearance: Lt TURBID / S.021 / pH: 5.5  Gluc: NEGATIVE / Ketone: NEGATIVE  / Bili: NEGATIVE / Urobili: NORMAL   Blood: SMALL / Protein: 30 / Nitrite: NEGATIVE   Leuk Esterase: LARGE / RBC: 11-25 / WBC >50   Sq Epi: MODERATE / Non Sq Epi:  / Bacteria: NEGATIVE      Protein, Random Urine: 43.3 mg/dL ( @ 12:48)  Creatinine, Random Urine: 119.50 mg/dL ( @ 12:48)  Sodium, Random Urine: < 20 mmol/L ( @ 00:05)  Creatinine, Random Urine: 101.80 mg/dL ( @ 00:05)  Creatinine, Random Urine: 41.20 mg/dL ( @ 17:03)

## 2019-04-10 NOTE — CONSULT NOTE ADULT - ASSESSMENT
80 y/o female, with a PMHx of COPD (currently on 3L O2 24 hours), paroxsymal a-fib (on pradaxa), h/o CAD (s/p stent 11/2018), CHF (although recent TTE with normal biventricular function, with no mention of diastolic dysfunction; + severely dilated left atrium), HTN, HLD, and anxiety, presenting to the [facility] ED with chronic right ankle pressure ulcer pain for 1 week. Admitted for further workup of severe PAD diagnosed as outpatient with patient pending RLE angiogram. Hospital course c/b worsening IGOR and Afib with RVR.    #Hypotension  - Unlikely to be 2/2 sepsis as wound does not appear to be infected per consultant's notes and patient has remained afebrile. Leukocytosis today may be reactive 2/2 stress from Afib with RVR yesterday  - Would increase midodrine to 20mg TID  - If patient spikes fever, send cultures  - On telemetry overnight appears to be in Afib with good rate control, may need to discuss with cardiology if it is possible to further decrease dose of metoprolol  - Monitor vitals q4h  - Check AM cortisol    #Afib with RVR  - Continue with heparin drip  - Completing amio oral load, remains on metoprolol for rate control  - Follow up with cardiology    #IGOR  - Creatinine worsening today and over the past few days  - Nephrology following, planning for eventual renal biopsy  - Continue to trend daily, consider giving small amount of IVF if patient not eating/drinking well  - Continue with albumin    Will discuss with attending    #PAD 78 y/o female, with a PMHx of COPD (currently on 3L O2 24 hours), paroxsymal a-fib (on pradaxa), h/o CAD (s/p stent 11/2018), CHF (although recent TTE with normal biventricular function, with no mention of diastolic dysfunction; + severely dilated left atrium), HTN, HLD, and anxiety, presenting to the [facility] ED with chronic right ankle pressure ulcer pain for 1 week. Admitted for further workup of severe PAD diagnosed as outpatient with patient pending RLE angiogram. Hospital course c/b worsening IGOR and Afib with RVR.    #Hypotension  - Unlikely to be 2/2 sepsis as wound does not appear to be infected per consultant's notes and patient has remained afebrile. Leukocytosis today may be reactive 2/2 stress from Afib with RVR yesterday  - Would increase midodrine to 20mg TID  - If patient spikes fever, send cultures  - On telemetry overnight appears to be in Afib with good rate control, may need to discuss with cardiology if it is possible to further decrease dose of metoprolol  - Monitor vitals q4h  - Check AM cortisol    #Afib with RVR  - Continue with heparin drip  - Completing amio oral load, remains on metoprolol for rate control  - Follow up with cardiology    #IGOR  - Creatinine worsening today and over the past few days  - Nephrology following, planning for eventual renal biopsy  - Continue to trend daily, consider giving small amount of IVF if patient not eating/drinking well  - Continue with albumin    #PAD  - Management as per vascular surgery, currently waiting to undergo RLE angiogram    Will discuss with attending 80 y/o female, with a PMHx of COPD (currently on 3L O2 24 hours), paroxsymal a-fib (on pradaxa), h/o CAD (s/p stent 11/2018), CHF (although recent TTE with normal biventricular function, with no mention of diastolic dysfunction; + severely dilated left atrium), HTN, HLD, and anxiety, presenting to the [facility] ED with chronic right ankle pressure ulcer pain for 1 week. Admitted for further workup of severe PAD diagnosed as outpatient with patient pending RLE angiogram. Hospital course c/b worsening IGOR and Afib with RVR.    #Hypotension  - Unlikely to be 2/2 sepsis as wound does not appear to be infected per consultant's notes and patient has remained afebrile. Leukocytosis today may be reactive 2/2 stress from Afib with RVR yesterday. May also be 2/2 hypovolemia, as patient appears dry on exam.  - Would increase midodrine to 20mg TID  - If patient spikes fever, send cultures  - Give 1L NS bolus and recheck BP  - On telemetry overnight appears to be in Afib with good rate control, please discuss with cardiology if it is possible to further decrease dose of metoprolol  - Monitor vitals q4h  - Check AM cortisol    #Afib with RVR  - Continue with heparin drip  - Completing amio oral load, remains on metoprolol for rate control  - Follow up with cardiology    #IGOR  - Creatinine worsening today and over the past few days  - Nephrology following, planning for eventual renal biopsy  - Continue to trend daily, consider giving small amount of IVF if patient not eating/drinking well  - Continue with albumin    #PAD  - Management as per vascular surgery, currently waiting to undergo RLE angiogram    Patient is not a candidate for MICU at this time. Please call [phone] with questions or changes in clinical status.    Discussed with primary CHENCHO oS.

## 2019-04-10 NOTE — PROGRESS NOTE ADULT - ATTENDING COMMENTS
Patient seen and examined, agree with the above assessment and plan by SUMMER Chappell.  Pt remains hypotensive w improved rate control  increase midodrine  caution the use of large IVF boluses  hold BB  MICU followup  Eventual BX when hemodynamics improve

## 2019-04-10 NOTE — CONSULT NOTE ADULT - ATTENDING COMMENTS
[facility] NEPHROLOGY  Harish Valera M.D.  Kevin Cedeno D.O.  Rachele Mckeon M.D.  Christina Westfall, MSN, ANP-C    Telephone: [phone]  Facsimile: [phone]    [address] [place] [address]
Agree with above NP note.  patient with history of cad, pci, chronic acevedo HF adm with sob, acute on chronic acevedo hf, low bp, adm w RLE wound and UTI  CV stable  hold lasix for IGOR  pt optimized for angiography pending optimization of renal function
Agree with above. Called for hypotension. Patient with lower extremity ulcer, afib, heart failure with preserved ejection fraction and severe peripheral vascular disease. Suggest fluid bolus, stop lopressor and reculture. Not a MICU candidate at this time. Please reconsult as needed.
I performed a history and physical exam of the patient and discussed  the findings and plan with the house officer. I reviewed the resident note and agree with the findings and plan

## 2019-04-10 NOTE — CHART NOTE - NSCHARTNOTEFT_GEN_A_CORE
Dr. Cedeno advised that kidney biopsy would be needed at some point and likely would have to be off plavix for 5 days; advised to speak with IR. Patient has been off plavix for 2 days. IR contacted and stated they would have to be off plavix for 5 days and since had recent stent placement in November 2018, should get a cardiology note regarding when to stop and restart plavix. Spoke with Cardiology and they stated they would write a note regarding medications.

## 2019-04-10 NOTE — PROGRESS NOTE ADULT - SUBJECTIVE AND OBJECTIVE BOX
Patient is a 79y old  Female who presents with a chief complaint of right leg wound/ swelling (10 Apr 2019 12:20)      INTERVAL HPI/OVERNIGHT EVENTS:  T(C): 36.7 (04-10-19 @ 15:22), Max: 36.9 (04-09-19 @ 21:33)  HR: 68 (04-10-19 @ 15:22) (67 - 89)  BP: 88/47 (04-10-19 @ 15:22) (78/44 - 99/60)  RR: 18 (04-10-19 @ 15:22) (16 - 18)  SpO2: 97% (04-10-19 @ 15:22) (95% - 97%)  Wt(kg): --  I&O's Summary      LABS:                        10.2   14.65 )-----------( 173      ( 10 Apr 2019 06:00 )             35.0     04-10    138  |  100  |  61<H>  ----------------------------<  77  4.0   |  21<L>  |  2.49<H>    Ca    8.5      10 Apr 2019 06:00  Phos  4.5     04-10  Mg     1.9     04-10    TPro  6.7  /  Alb  x   /  TBili  x   /  DBili  x   /  AST  x   /  ALT  x   /  AlkPhos  x   04-09    PT/INR - ( 09 Apr 2019 05:15 )   PT: 23.1 SEC;   INR: 2.04          PTT - ( 10 Apr 2019 15:37 )  PTT:143.8 SEC    CAPILLARY BLOOD GLUCOSE        ABG - ( 09 Apr 2019 10:10 )  pH, Arterial: 7.34  pH, Blood: x     /  pCO2: 47    /  pO2: 21    / HCO3: 23    / Base Excess: -0.1  /  SaO2: 32.2                    MEDICATIONS  (STANDING):  albumin human 25% IVPB 50 milliLiter(s) IV Intermittent every 6 hours  amiodarone    Tablet   Oral   amiodarone    Tablet 400 milliGRAM(s) Oral every 8 hours  atorvastatin 40 milliGRAM(s) Oral at bedtime  buDESOnide 160 MICROgram(s)/formoterol 4.5 MICROgram(s) Inhaler 2 Puff(s) Inhalation two times a day  collagenase Ointment 1 Application(s) Topical daily  heparin  Infusion.  Unit(s)/Hr (11 mL/Hr) IV Continuous <Continuous>  midodrine 20 milliGRAM(s) Oral three times a day  mupirocin 2% Ointment 1 Application(s) Topical <User Schedule>  thiamine 100 milliGRAM(s) Oral daily    MEDICATIONS  (PRN):  heparin  Injectable 4500 Unit(s) IV Push every 6 hours PRN For aPTT less than 40  heparin  Injectable 2000 Unit(s) IV Push every 6 hours PRN For aPTT between 40 - 57  levalbuterol Inhalation 0.63 milliGRAM(s) Inhalation every 6 hours PRN SOB  oxyCODONE    5 mG/acetaminophen 325 mG 1 Tablet(s) Oral every 6 hours PRN Moderate Pain and severe pain          PHYSICAL EXAM:  GENERAL: NAD, well-groomed, well-developed  HEAD:  Atraumatic, Normocephalic  CHEST/LUNG: Clear to percussion bilaterally; No rales, rhonchi, wheezing, or rubs  HEART: Regular rate and rhythm; No murmurs, rubs, or gallops  ABDOMEN: Soft, Nontender, Nondistended; Bowel sounds present  EXTREMITIES:  2+ Peripheral Pulses, No clubbing, cyanosis, or edema  LYMPH: No lymphadenopathy noted  SKIN: No rashes or lesions    Care Discussed with Consultants/Other Providers [ ] YES  [ ] NO

## 2019-04-10 NOTE — PROGRESS NOTE ADULT - ASSESSMENT
80 y/o female, with a PmHx of COPD, paroxsymal a-fib (on pradaxa), CAD (s/p stent 11/2018), DCHF, pulm htn, HTN, HLD, and anxiety presenting with nonhealing R posterior ankle ulcer and UTI.     1. Chronic diastolic chf  chest xray with mild interstitial edema, sml loculated right pleural effusion, sml left pleural effusion unchanged, increased in Right opacity  pt. appears mildly fluid overloaded on exam, ++LE edema, +orthopnea, BP also noted to be low  hold bb for hypotension, Increase midodrine to 20mg TID   avoid aggressive IV resuscitation   recent echo with nl lv fx   repeat echo for lv fxn    2.  Paroxysmal Atrial Fibrillation  Rates improved   s/p iv metoprolol, digoxin   continue amio   dc bb in setting of hypotenstion   increase midodrine to 20 mg q 8 hrs for bp support  CHADS 3 - off pradaxa for possible angio, continue heparin gtt     3. CAD s/p PCI (11/2018)  cv stable, no cp or sob   continue bb, statin, plavix on hold for upcoming procedure     4. UTI   abx per med     5.  R posterior ankle ulcer   vascular f/u   plan for eventual RLE angio  given clinical status, would defer RLE agio until more hemodynamically stable.     6. bri on ckd  renal f/u  plan for eventual renal biopsy   hold plavix 5 day prior to Biopsy, given recent stent, resume asap after surgery

## 2019-04-11 LAB
ANA PAT FLD IF-IMP: SIGNIFICANT CHANGE UP
ANA TITR SER: SIGNIFICANT CHANGE UP
ANION GAP SERPL CALC-SCNC: 19 MMO/L — HIGH (ref 7–14)
APTT BLD: 83.2 SEC — HIGH (ref 27.5–36.3)
APTT BLD: 86.3 SEC — HIGH (ref 27.5–36.3)
APTT BLD: 89.2 SEC — HIGH (ref 27.5–36.3)
BUN SERPL-MCNC: 65 MG/DL — HIGH (ref 7–23)
C-ANCA SER-ACNC: NEGATIVE — SIGNIFICANT CHANGE UP
CALCIUM SERPL-MCNC: 8.3 MG/DL — LOW (ref 8.4–10.5)
CHLORIDE SERPL-SCNC: 98 MMOL/L — SIGNIFICANT CHANGE UP (ref 98–107)
CO2 SERPL-SCNC: 17 MMOL/L — LOW (ref 22–31)
CREAT SERPL-MCNC: 2.66 MG/DL — HIGH (ref 0.5–1.3)
GAS PNL BLDMV: SIGNIFICANT CHANGE UP
GI PCR PANEL, STOOL: SIGNIFICANT CHANGE UP
GLUCOSE SERPL-MCNC: 60 MG/DL — LOW (ref 70–99)
HCT VFR BLD CALC: 33.4 % — LOW (ref 34.5–45)
HGB BLD-MCNC: 10 G/DL — LOW (ref 11.5–15.5)
MAGNESIUM SERPL-MCNC: 1.9 MG/DL — SIGNIFICANT CHANGE UP (ref 1.6–2.6)
MCHC RBC-ENTMCNC: 26.2 PG — LOW (ref 27–34)
MCHC RBC-ENTMCNC: 29.9 % — LOW (ref 32–36)
MCV RBC AUTO: 87.4 FL — SIGNIFICANT CHANGE UP (ref 80–100)
NRBC # FLD: 0 K/UL — SIGNIFICANT CHANGE UP (ref 0–0)
P-ANCA SER-ACNC: NEGATIVE — SIGNIFICANT CHANGE UP
PHOSPHATE SERPL-MCNC: 4.5 MG/DL — SIGNIFICANT CHANGE UP (ref 2.5–4.5)
PLATELET # BLD AUTO: 166 K/UL — SIGNIFICANT CHANGE UP (ref 150–400)
PMV BLD: 10.7 FL — SIGNIFICANT CHANGE UP (ref 7–13)
POTASSIUM SERPL-MCNC: 4 MMOL/L — SIGNIFICANT CHANGE UP (ref 3.5–5.3)
POTASSIUM SERPL-SCNC: 4 MMOL/L — SIGNIFICANT CHANGE UP (ref 3.5–5.3)
PROCALCITONIN SERPL-MCNC: 0.92 NG/ML — HIGH (ref 0.02–0.1)
RBC # BLD: 3.82 M/UL — SIGNIFICANT CHANGE UP (ref 3.8–5.2)
RBC # FLD: 22 % — HIGH (ref 10.3–14.5)
SODIUM SERPL-SCNC: 134 MMOL/L — LOW (ref 135–145)
SPECIMEN SOURCE: SIGNIFICANT CHANGE UP
WBC # BLD: 11.63 K/UL — HIGH (ref 3.8–10.5)
WBC # FLD AUTO: 11.63 K/UL — HIGH (ref 3.8–10.5)

## 2019-04-11 PROCEDURE — 76705 ECHO EXAM OF ABDOMEN: CPT | Mod: 26

## 2019-04-11 RX ORDER — SODIUM BICARBONATE 1 MEQ/ML
650 SYRINGE (ML) INTRAVENOUS THREE TIMES A DAY
Qty: 0 | Refills: 0 | Status: DISCONTINUED | OUTPATIENT
Start: 2019-04-11 | End: 2019-04-16

## 2019-04-11 RX ORDER — ACETAMINOPHEN 500 MG
650 TABLET ORAL ONCE
Qty: 0 | Refills: 0 | Status: COMPLETED | OUTPATIENT
Start: 2019-04-11 | End: 2019-04-11

## 2019-04-11 RX ORDER — ASPIRIN/CALCIUM CARB/MAGNESIUM 324 MG
81 TABLET ORAL DAILY
Qty: 0 | Refills: 0 | Status: DISCONTINUED | OUTPATIENT
Start: 2019-04-11 | End: 2019-04-14

## 2019-04-11 RX ORDER — ACETAMINOPHEN 500 MG
650 TABLET ORAL EVERY 6 HOURS
Qty: 0 | Refills: 0 | Status: DISCONTINUED | OUTPATIENT
Start: 2019-04-11 | End: 2019-04-11

## 2019-04-11 RX ORDER — ALBUMIN HUMAN 25 %
50 VIAL (ML) INTRAVENOUS EVERY 6 HOURS
Qty: 0 | Refills: 0 | Status: COMPLETED | OUTPATIENT
Start: 2019-04-11 | End: 2019-04-13

## 2019-04-11 RX ADMIN — Medication 100 MILLIGRAM(S): at 14:10

## 2019-04-11 RX ADMIN — OXYCODONE AND ACETAMINOPHEN 1 TABLET(S): 5; 325 TABLET ORAL at 09:05

## 2019-04-11 RX ADMIN — MIDODRINE HYDROCHLORIDE 20 MILLIGRAM(S): 2.5 TABLET ORAL at 14:10

## 2019-04-11 RX ADMIN — Medication 50 MILLILITER(S): at 05:47

## 2019-04-11 RX ADMIN — OXYCODONE AND ACETAMINOPHEN 1 TABLET(S): 5; 325 TABLET ORAL at 23:20

## 2019-04-11 RX ADMIN — HEPARIN SODIUM 500 UNIT(S)/HR: 5000 INJECTION INTRAVENOUS; SUBCUTANEOUS at 07:37

## 2019-04-11 RX ADMIN — AMIODARONE HYDROCHLORIDE 400 MILLIGRAM(S): 400 TABLET ORAL at 05:47

## 2019-04-11 RX ADMIN — Medication 50 MILLILITER(S): at 12:19

## 2019-04-11 RX ADMIN — Medication 81 MILLIGRAM(S): at 22:21

## 2019-04-11 RX ADMIN — HEPARIN SODIUM 500 UNIT(S)/HR: 5000 INJECTION INTRAVENOUS; SUBCUTANEOUS at 12:20

## 2019-04-11 RX ADMIN — Medication 650 MILLIGRAM(S): at 22:22

## 2019-04-11 RX ADMIN — HEPARIN SODIUM 500 UNIT(S)/HR: 5000 INJECTION INTRAVENOUS; SUBCUTANEOUS at 01:05

## 2019-04-11 RX ADMIN — Medication 50 MILLILITER(S): at 17:44

## 2019-04-11 RX ADMIN — Medication 650 MILLIGRAM(S): at 06:25

## 2019-04-11 RX ADMIN — ATORVASTATIN CALCIUM 40 MILLIGRAM(S): 80 TABLET, FILM COATED ORAL at 22:20

## 2019-04-11 RX ADMIN — AMIODARONE HYDROCHLORIDE 400 MILLIGRAM(S): 400 TABLET ORAL at 22:21

## 2019-04-11 RX ADMIN — OXYCODONE AND ACETAMINOPHEN 1 TABLET(S): 5; 325 TABLET ORAL at 22:34

## 2019-04-11 RX ADMIN — MIDODRINE HYDROCHLORIDE 20 MILLIGRAM(S): 2.5 TABLET ORAL at 22:20

## 2019-04-11 RX ADMIN — Medication 1 APPLICATION(S): at 17:44

## 2019-04-11 RX ADMIN — OXYCODONE AND ACETAMINOPHEN 1 TABLET(S): 5; 325 TABLET ORAL at 15:17

## 2019-04-11 RX ADMIN — MIDODRINE HYDROCHLORIDE 20 MILLIGRAM(S): 2.5 TABLET ORAL at 05:47

## 2019-04-11 RX ADMIN — AMIODARONE HYDROCHLORIDE 400 MILLIGRAM(S): 400 TABLET ORAL at 14:10

## 2019-04-11 RX ADMIN — OXYCODONE AND ACETAMINOPHEN 1 TABLET(S): 5; 325 TABLET ORAL at 15:39

## 2019-04-11 RX ADMIN — Medication 260 MILLIGRAM(S): at 06:00

## 2019-04-11 RX ADMIN — BUDESONIDE AND FORMOTEROL FUMARATE DIHYDRATE 2 PUFF(S): 160; 4.5 AEROSOL RESPIRATORY (INHALATION) at 09:06

## 2019-04-11 RX ADMIN — OXYCODONE AND ACETAMINOPHEN 1 TABLET(S): 5; 325 TABLET ORAL at 10:27

## 2019-04-11 NOTE — PROGRESS NOTE ADULT - SUBJECTIVE AND OBJECTIVE BOX
[facility] NEPHROLOGY- PROGRESS NOTE    79y Female with history of CHF, COPD presents with RLE pain. Nephrology consulted for elevated Scr.    Patient hypotensive overnight s/p IVF bolus and midodrine.    REVIEW OF SYSTEMS:  Gen: no changes in weight  Cards: no chest pain  Resp: + dyspnea (chronic)  GI: no nausea or vomiting, no diarrhea  Vascular: RLE edema/pain    No Known Allergies      Hospital Medications: Medications reviewed      VITALS:  T(F): 97.8 (19 @ 05:46), Max: 98.4 (19 @ 02:19)  HR: 85 (19 @ 05:46)  BP: 106/63 (19 @ 05:46)  RR: 16 (19 @ 05:46)  SpO2: 100% (19 @ 05:46)  Wt(kg): --      PHYSICAL EXAM:    Gen: NAD, calm  Cards: Irregularly irregular, +S1/S2, no M/G/R  Resp: Bibasilar rales  GI: soft, NT, + ab distention, NABS  Vascular: 2+ RLE edema, 1+ LLE edema      LABS:      134<L>  |  98  |  65<H>  ----------------------------<  60<L>  4.0   |  17<L>  |  2.66<H>    Ca    8.3<L>      2019 06:30  Phos  4.5       Mg     1.9         TPro  6.7  /  Alb      /  TBili      /  DBili      /  AST      /  ALT      /  AlkPhos          Creatinine Trend: 2.66 <--, 2.49 <--, 2.46 <--, 2.21 <--, 1.81 <--, 1.57 <--, 1.62 <--                        10.0   11.63 )-----------( 166      ( 2019 06:30 )             33.4     Urine Studies:  Urinalysis Basic - ( 2019 00:05 )    Color: YELLOW / Appearance: Lt TURBID / S.021 / pH: 5.5  Gluc: NEGATIVE / Ketone: NEGATIVE  / Bili: NEGATIVE / Urobili: NORMAL   Blood: SMALL / Protein: 30 / Nitrite: NEGATIVE   Leuk Esterase: LARGE / RBC: 11-25 / WBC >50   Sq Epi: MODERATE / Non Sq Epi:  / Bacteria: NEGATIVE      Protein, Random Urine: 43.3 mg/dL ( @ 12:48)  Creatinine, Random Urine: 119.50 mg/dL ( @ 12:48)  Sodium, Random Urine: < 20 mmol/L ( @ 00:05)  Creatinine, Random Urine: 101.80 mg/dL ( @ 00:05)

## 2019-04-11 NOTE — PROGRESS NOTE ADULT - ASSESSMENT
79y Female with history of CHF, COPD presents with RLE pain. Nephrology consulted for elevated Scr.    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 versus underlying MPGN? given low C3/C4. Continue IV albumin to increase renal perfusion given low FeNa. Follow up pending serologies (LORA, dsDNA, anti-GBM ab, ANCA, SPEP, MICKEY). Patient will need a kidney biopsy and now agreeable. Patient will need to be off of Plavix for 5 days (last dose on 4/8). Follow up cardiology note regarding need for ASA? Avoid nephrotoxins.  2) CKD-3: Patient appears to have h/o age appropriate CKD-3 (baseline Scr 1.1-1.2) with mild proteinuria. Monitor electrolytes.  3) Orthostatic hypotension: BP low. Continue with midodrine 20 mg TID. Rate control as per cardiology. Monitor BP.  4) LE edema: CXR reviewed with pleural effusions and pulmonary edema. Prior TTE with mild to moderate MR with normal LVSF. Given worsening edema, patient s/p IV lasix on 4/8. Holding further lasix given low BP. Monitor UO.  5) Pyuria/Microscopic hematuria: Urine culture negative s/p CTX. IGOR work up as above.  6) Metabolic acidosis: Start sodium bicarbonate 650 mg TID. Check AM VBG with lactate.    Patient high risk for angiogram and subsequent ROSIE given IGOR with likely underlying glomerular disease. If angiogram urgent, can proceed as patient already educated on risks of ROSIE and would attempt to minimize dye load during the procedure.

## 2019-04-11 NOTE — PROGRESS NOTE ADULT - SUBJECTIVE AND OBJECTIVE BOX
Patient is a 79y old  Female who presents with a chief complaint of right leg wound/ swelling (11 Apr 2019 11:52)      INTERVAL HPI/OVERNIGHT EVENTS:  T(C): 36.7 (04-11-19 @ 18:00), Max: 36.9 (04-11-19 @ 02:19)  HR: 83 (04-11-19 @ 18:00) (71 - 90)  BP: 102/69 (04-11-19 @ 18:00) (96/54 - 106/63)  RR: 18 (04-11-19 @ 18:00) (16 - 18)  SpO2: 100% (04-11-19 @ 18:00) (95% - 100%)  Wt(kg): --  I&O's Summary      LABS:                        10.0   11.63 )-----------( 166      ( 11 Apr 2019 06:30 )             33.4     04-11    134<L>  |  98  |  65<H>  ----------------------------<  60<L>  4.0   |  17<L>  |  2.66<H>    Ca    8.3<L>      11 Apr 2019 06:30  Phos  4.5     04-11  Mg     1.9     04-11      PTT - ( 11 Apr 2019 07:22 )  PTT:89.2 SEC    CAPILLARY BLOOD GLUCOSE                MEDICATIONS  (STANDING):  albumin human 25% IVPB 50 milliLiter(s) IV Intermittent every 6 hours  amiodarone    Tablet   Oral   amiodarone    Tablet 400 milliGRAM(s) Oral every 8 hours  aspirin enteric coated 81 milliGRAM(s) Oral daily  atorvastatin 40 milliGRAM(s) Oral at bedtime  buDESOnide 160 MICROgram(s)/formoterol 4.5 MICROgram(s) Inhaler 2 Puff(s) Inhalation two times a day  collagenase Ointment 1 Application(s) Topical daily  heparin  Infusion.  Unit(s)/Hr (11 mL/Hr) IV Continuous <Continuous>  midodrine 20 milliGRAM(s) Oral three times a day  mupirocin 2% Ointment 1 Application(s) Topical <User Schedule>  sodium bicarbonate 650 milliGRAM(s) Oral three times a day  thiamine 100 milliGRAM(s) Oral daily    MEDICATIONS  (PRN):  heparin  Injectable 4500 Unit(s) IV Push every 6 hours PRN For aPTT less than 40  heparin  Injectable 2000 Unit(s) IV Push every 6 hours PRN For aPTT between 40 - 57  levalbuterol Inhalation 0.63 milliGRAM(s) Inhalation every 6 hours PRN SOB  oxyCODONE    5 mG/acetaminophen 325 mG 1 Tablet(s) Oral every 6 hours PRN Moderate Pain and severe pain          PHYSICAL EXAM:  GENERAL: frail  CHEST/LUNG: Clear to percussion bilaterally; No rales, rhonchi, wheezing, or rubs  HEART: Regular rate and rhythm; No murmurs, rubs, or gallops  ABDOMEN: distended  EXTREMITIES:  edema+    Care Discussed with Consultants/Other Providers [ ] YES  [ ] NO

## 2019-04-11 NOTE — PROGRESS NOTE ADULT - ASSESSMENT
80 y/o female, with a PmHx of COPD (currently on 3L O2 24 hours), paroxsymal a-fib (on pradaxa), lsited h/o CAD (s/p stent 11/2018), CHF (although recent TTE with normal biventricular function, with no mention of diastolic dysfunction; + severely dilated left atrium), HTN, HLD, and anxiety, presenting to the [facility] ED with chronic right ankle pressure ulcer pain. GI consulted for abd distention

## 2019-04-11 NOTE — PROGRESS NOTE ADULT - ASSESSMENT
78 y/o female, with a PmHx of COPD, paroxsymal a-fib (on pradaxa), CAD (s/p stent 11/2018), DCHF, pulm htn, HTN, HLD, and anxiety presenting with nonhealing R posterior ankle ulcer and UTI.     1. Chronic diastolic chf   chest xray with mild interstitial edema, sml loculated right pleural effusion, sml left pleural effusion unchanged, increased in Right opacity  pt. appears mildly fluid overloaded on exam, ++LE edema, +orthopnea, BP also noted to be low  hold bb for hypotension, continue midodrine to 20mg TID   avoid aggressive IV resuscitation   recent echo with nl lv fx   repeat echo for lv fxn     2.  Paroxysmal Atrial Fibrillation  Rates improved   continue amio   continue to hold bb   continue midodrine to 20 mg q 8 hrs for bp support  CHADS 3 - off pradaxa for possible angio, continue heparin gtt     3. CAD s/p PCI (11/2018)  cv stable, no cp or sob   continue bb, statin, plavix on hold for upcoming procedure     4. UTI   abx per med     5.  R posterior ankle ulcer   vascular f/u   plan for eventual RLE angio  hemodynamically improved, pt. can proceed with vascular angio with moderate but acceptable cv risk    6. bri on ckd  renal f/u  plan for eventual renal biopsy   hold plavix 5 day prior to Biopsy,   start asa 81mg today, given recent PCI, will not hold asa for biopsy.

## 2019-04-11 NOTE — PROGRESS NOTE ADULT - SUBJECTIVE AND OBJECTIVE BOX
INTERVAL HPI/OVERNIGHT EVENTS:    still with loose bowel movements; (+) loose this am   denying abd pain   c/o r. leg pain    MEDICATIONS  (STANDING):  albumin human 25% IVPB 50 milliLiter(s) IV Intermittent every 6 hours  amiodarone    Tablet   Oral   amiodarone    Tablet 400 milliGRAM(s) Oral every 8 hours  aspirin enteric coated 81 milliGRAM(s) Oral daily  atorvastatin 40 milliGRAM(s) Oral at bedtime  buDESOnide 160 MICROgram(s)/formoterol 4.5 MICROgram(s) Inhaler 2 Puff(s) Inhalation two times a day  collagenase Ointment 1 Application(s) Topical daily  heparin  Infusion.  Unit(s)/Hr (11 mL/Hr) IV Continuous <Continuous>  midodrine 20 milliGRAM(s) Oral three times a day  mupirocin 2% Ointment 1 Application(s) Topical <User Schedule>  thiamine 100 milliGRAM(s) Oral daily    MEDICATIONS  (PRN):  heparin  Injectable 4500 Unit(s) IV Push every 6 hours PRN For aPTT less than 40  heparin  Injectable 2000 Unit(s) IV Push every 6 hours PRN For aPTT between 40 - 57  levalbuterol Inhalation 0.63 milliGRAM(s) Inhalation every 6 hours PRN SOB  oxyCODONE    5 mG/acetaminophen 325 mG 1 Tablet(s) Oral every 6 hours PRN Moderate Pain and severe pain      Allergies    No Known Allergies    Intolerances        Review of Systems:    General:  No wt loss, fevers, chills, night sweats, fatigue   Eyes:  Good vision, no reported pain  ENT:  No sore throat, pain, runny nose, dysphagia  CV:  No pain, palpitations, hypo/hypertension  Resp:  No dyspnea, cough, tachypnea, wheezing  GI:  No pain, No nausea, No vomiting, No diarrhea, No constipation, No weight loss, No fever, No pruritis, No rectal bleeding, No melena, No dysphagia  :  No pain, bleeding, incontinence, nocturia  Muscle:  No  weakness +pain  Neuro:  No weakness, tingling, memory problems  Psych:  No fatigue, insomnia, mood problems, depression  Endocrine:  No polyuria, polydypsia, cold/heat intolerance  Heme:  No petechiae, ecchymosis, easy bruisability  Skin:  No rash, tattoos, scars, edema      Vital Signs Last 24 Hrs  T(C): 36.6 (11 Apr 2019 05:46), Max: 36.9 (11 Apr 2019 02:19)  T(F): 97.8 (11 Apr 2019 05:46), Max: 98.4 (11 Apr 2019 02:19)  HR: 85 (11 Apr 2019 05:46) (68 - 90)  BP: 106/63 (11 Apr 2019 05:46) (78/44 - 106/63)  BP(mean): --  RR: 16 (11 Apr 2019 05:46) (16 - 18)  SpO2: 100% (11 Apr 2019 05:46) (95% - 100%)    PHYSICAL EXAM:    Constitutional: NAD  HEENT: EOMI, throat clear  Neck: No LAD, supple  Respiratory: CTA and P  Cardiovascular: S1 and S2, RRR, no M  Gastrointestinal: BS+, soft, NT/ND, neg HSM,  Extremities: No peripheral edema, neg clubbing, cyanosis  Vascular: 2+ peripheral pulses  Neurological: A/O x 3, no focal deficits  Psychiatric: Normal mood, normal affect  Skin: No rashes      LABS:                        10.0   11.63 )-----------( 166      ( 11 Apr 2019 06:30 )             33.4     04-11    134<L>  |  98  |  65<H>  ----------------------------<  60<L>  4.0   |  17<L>  |  2.66<H>    Ca    8.3<L>      11 Apr 2019 06:30  Phos  4.5     04-11  Mg     1.9     04-11    TPro  6.7  /  Alb  x   /  TBili  x   /  DBili  x   /  AST  x   /  ALT  x   /  AlkPhos  x   04-09    PTT - ( 11 Apr 2019 07:22 )  PTT:89.2 SEC      RADIOLOGY & ADDITIONAL TESTS:

## 2019-04-11 NOTE — CHART NOTE - NSCHARTNOTEFT_GEN_A_CORE
Patient scheduled for kidney biopsy with interventional radiology on 4/15/19. As per Interventional Radiology, okay to keep patient on aspirin. Will continue to hold Plavix until procedure. Hold heparin drip 4 hours prior to procedure.

## 2019-04-11 NOTE — PROGRESS NOTE ADULT - PROBLEM SELECTOR PLAN 2
- stool softeners/laxatives held  - bacid tid   - GI PCR testing  - check C.diff with next loose stool when lab allows us to check! - stool softeners/laxatives held  - bacid tid   - GI PCR negative  - check C.diff with next loose stool when lab allows us to check!

## 2019-04-11 NOTE — PROGRESS NOTE ADULT - ATTENDING COMMENTS
[facility] NEPHROLOGY  Harish Valera M.D.  Kevin Cedeno D.O.  Rachele Mckeon M.D.  Christina Westfall, MSN, ANP-C    Telephone: [phone]  Facsimile: [phone]    [address] [place] [address]

## 2019-04-11 NOTE — PROGRESS NOTE ADULT - ASSESSMENT
Problem/Plan - 1:  ·  Problem: Lower limb ulcer, ankle, right, with unspecified severity.  Plan: -Spodiatry and vascular fu -wound care fu  -pain meds prn.     Problem/Plan - 2:·  Problem: IGOR (acute kidney injury).  Plan: -likely with prerenal component in setting of decreased fluid intake  renal fumonitor cr    Problem/Plan - 3:  ·  Problem: Urinary tract infection with hematuria, site unspecified.  Plan: - off antibiotics    Problem/Plan - 4:  ·  Problem: Rapid atrial fibrillation.  Plan: c/w hep gtt  management as per cards    Problem/Plan - 5:  ·  Problem: Chronic obstructive pulmonary disease, unspecified COPD type.  Plan: c/w ics/laba, O2

## 2019-04-11 NOTE — PROGRESS NOTE ADULT - SUBJECTIVE AND OBJECTIVE BOX
CARDIOLOGY FOLLOW UP - Dr. Vieyra    CC pt. improving today   bp improved   c/o right leg pain       PHYSICAL EXAM:  T(C): 36.6 (04-11-19 @ 05:46), Max: 36.9 (04-11-19 @ 02:19)  HR: 85 (04-11-19 @ 05:46) (68 - 90)  BP: 106/63 (04-11-19 @ 05:46) (78/44 - 106/63)  RR: 16 (04-11-19 @ 05:46) (16 - 18)  SpO2: 100% (04-11-19 @ 05:46) (95% - 100%)  Wt(kg): --  I&O's Summary      Appearance: Normal	  Cardiovascular: Normal S1 S2,irreg, No JVD, No murmurs  Respiratory: coarse   Gastrointestinal:  Soft, Non-tender, + BS	  Extremities: Normal range of motion, No clubbing, ++LE edema, Right lower extremity dsg         MEDICATIONS  (STANDING):  albumin human 25% IVPB 50 milliLiter(s) IV Intermittent every 6 hours  amiodarone    Tablet   Oral   amiodarone    Tablet 400 milliGRAM(s) Oral every 8 hours  atorvastatin 40 milliGRAM(s) Oral at bedtime  buDESOnide 160 MICROgram(s)/formoterol 4.5 MICROgram(s) Inhaler 2 Puff(s) Inhalation two times a day  collagenase Ointment 1 Application(s) Topical daily  heparin  Infusion.  Unit(s)/Hr (11 mL/Hr) IV Continuous <Continuous>  midodrine 20 milliGRAM(s) Oral three times a day  mupirocin 2% Ointment 1 Application(s) Topical <User Schedule>  thiamine 100 milliGRAM(s) Oral daily      TELEMETRY: ELTON 90s	    ECG:  	  RADIOLOGY:   DIAGNOSTIC TESTING:  [ ] Echocardiogram:  [ ]  Catheterization:  [ ] Stress Test:    OTHER: 	    LABS:	 	                                10.0   11.63 )-----------( 166      ( 11 Apr 2019 06:30 )             33.4     04-11    134<L>  |  98  |  65<H>  ----------------------------<  60<L>  4.0   |  17<L>  |  2.66<H>    Ca    8.3<L>      11 Apr 2019 06:30  Phos  4.5     04-11  Mg     1.9     04-11    TPro  6.7  /  Alb  x   /  TBili  x   /  DBili  x   /  AST  x   /  ALT  x   /  AlkPhos  x   04-09    PTT - ( 11 Apr 2019 07:22 )  PTT:89.2 SEC

## 2019-04-11 NOTE — PROGRESS NOTE ADULT - ATTENDING COMMENTS
Agree with above NP note.  cv stable and improved  on ov heparin, off noac as awaits renal bx  off plavix for biopsy  asa 81mg daily for cad/pci hx  holding asa to minimize bleed risk does not outweight benefit of reducing potential stent thrombosis with ASA

## 2019-04-12 LAB
ANION GAP SERPL CALC-SCNC: 15 MMO/L — HIGH (ref 7–14)
APTT BLD: 107.5 SEC — HIGH (ref 27.5–36.3)
BASE EXCESS BLDV CALC-SCNC: 0.7 MMOL/L — SIGNIFICANT CHANGE UP
BASOPHILS # BLD AUTO: 0.02 K/UL — SIGNIFICANT CHANGE UP (ref 0–0.2)
BASOPHILS NFR BLD AUTO: 0.3 % — SIGNIFICANT CHANGE UP (ref 0–2)
BUN SERPL-MCNC: 71 MG/DL — HIGH (ref 7–23)
C DIFF TOX GENS STL QL NAA+PROBE: DETECTED — HIGH
CALCIUM SERPL-MCNC: 8.4 MG/DL — SIGNIFICANT CHANGE UP (ref 8.4–10.5)
CHLORIDE SERPL-SCNC: 99 MMOL/L — SIGNIFICANT CHANGE UP (ref 98–107)
CO2 SERPL-SCNC: 24 MMOL/L — SIGNIFICANT CHANGE UP (ref 22–31)
CREAT SERPL-MCNC: 2.84 MG/DL — HIGH (ref 0.5–1.3)
DSDNA AB SER-ACNC: 164 IU/ML — HIGH
EOSINOPHIL # BLD AUTO: 0.05 K/UL — SIGNIFICANT CHANGE UP (ref 0–0.5)
EOSINOPHIL NFR BLD AUTO: 0.6 % — SIGNIFICANT CHANGE UP (ref 0–6)
GAS PNL BLDMV: SIGNIFICANT CHANGE UP
GAS PNL BLDV: 135 MMOL/L — LOW (ref 136–146)
GBM IGG SER-ACNC: <1 — SIGNIFICANT CHANGE UP
GLUCOSE BLDV-MCNC: 111 — HIGH (ref 70–99)
GLUCOSE SERPL-MCNC: 106 MG/DL — HIGH (ref 70–99)
HCO3 BLDV-SCNC: 24 MMOL/L — SIGNIFICANT CHANGE UP (ref 20–27)
HCT VFR BLD CALC: 33.3 % — LOW (ref 34.5–45)
HCT VFR BLDV CALC: 31 % — LOW (ref 34.5–45)
HGB BLD-MCNC: 10 G/DL — LOW (ref 11.5–15.5)
HGB BLDV-MCNC: 10 G/DL — LOW (ref 11.5–15.5)
IMM GRANULOCYTES NFR BLD AUTO: 0.6 % — SIGNIFICANT CHANGE UP (ref 0–1.5)
KAPPA/LAMBDA FREE LIGHT CHAIN RATIO, SERUM: 1.57 — SIGNIFICANT CHANGE UP
LACTATE SERPL-SCNC: 1.3 MMOL/L — SIGNIFICANT CHANGE UP (ref 0.5–2)
LYMPHOCYTES # BLD AUTO: 0.24 K/UL — LOW (ref 1–3.3)
LYMPHOCYTES # BLD AUTO: 3 % — LOW (ref 13–44)
MAGNESIUM SERPL-MCNC: 1.9 MG/DL — SIGNIFICANT CHANGE UP (ref 1.6–2.6)
MCHC RBC-ENTMCNC: 25.3 PG — LOW (ref 27–34)
MCHC RBC-ENTMCNC: 30 % — LOW (ref 32–36)
MCV RBC AUTO: 84.3 FL — SIGNIFICANT CHANGE UP (ref 80–100)
MONOCYTES # BLD AUTO: 0.88 K/UL — SIGNIFICANT CHANGE UP (ref 0–0.9)
MONOCYTES NFR BLD AUTO: 11 % — SIGNIFICANT CHANGE UP (ref 2–14)
NEUTROPHILS # BLD AUTO: 6.75 K/UL — SIGNIFICANT CHANGE UP (ref 1.8–7.4)
NEUTROPHILS NFR BLD AUTO: 84.5 % — HIGH (ref 43–77)
NRBC # FLD: 0 K/UL — SIGNIFICANT CHANGE UP (ref 0–0)
PCO2 BLDV: 53 MMHG — HIGH (ref 41–51)
PH BLDV: 7.31 PH — LOW (ref 7.32–7.43)
PHOSPHATE SERPL-MCNC: 4.2 MG/DL — SIGNIFICANT CHANGE UP (ref 2.5–4.5)
PLATELET # BLD AUTO: 169 K/UL — SIGNIFICANT CHANGE UP (ref 150–400)
PMV BLD: 9.7 FL — SIGNIFICANT CHANGE UP (ref 7–13)
PO2 BLDV: 25 MMHG — LOW (ref 35–40)
POTASSIUM BLDV-SCNC: 3.2 MMOL/L — LOW (ref 3.4–4.5)
POTASSIUM SERPL-MCNC: 3.6 MMOL/L — SIGNIFICANT CHANGE UP (ref 3.5–5.3)
POTASSIUM SERPL-SCNC: 3.6 MMOL/L — SIGNIFICANT CHANGE UP (ref 3.5–5.3)
RBC # BLD: 3.95 M/UL — SIGNIFICANT CHANGE UP (ref 3.8–5.2)
RBC # FLD: 21.2 % — HIGH (ref 10.3–14.5)
SAO2 % BLDV: 33.5 % — LOW (ref 60–85)
SODIUM SERPL-SCNC: 138 MMOL/L — SIGNIFICANT CHANGE UP (ref 135–145)
WBC # BLD: 7.99 K/UL — SIGNIFICANT CHANGE UP (ref 3.8–10.5)
WBC # FLD AUTO: 7.99 K/UL — SIGNIFICANT CHANGE UP (ref 3.8–10.5)

## 2019-04-12 RX ORDER — TRAMADOL HYDROCHLORIDE 50 MG/1
25 TABLET ORAL ONCE
Qty: 0 | Refills: 0 | Status: DISCONTINUED | OUTPATIENT
Start: 2019-04-12 | End: 2019-04-12

## 2019-04-12 RX ORDER — OXYCODONE AND ACETAMINOPHEN 5; 325 MG/1; MG/1
1 TABLET ORAL EVERY 6 HOURS
Qty: 0 | Refills: 0 | Status: DISCONTINUED | OUTPATIENT
Start: 2019-04-12 | End: 2019-04-16

## 2019-04-12 RX ORDER — MORPHINE SULFATE 50 MG/1
0.5 CAPSULE, EXTENDED RELEASE ORAL ONCE
Qty: 0 | Refills: 0 | Status: DISCONTINUED | OUTPATIENT
Start: 2019-04-12 | End: 2019-04-12

## 2019-04-12 RX ORDER — VANCOMYCIN HCL 1 G
125 VIAL (EA) INTRAVENOUS EVERY 6 HOURS
Qty: 0 | Refills: 0 | Status: DISCONTINUED | OUTPATIENT
Start: 2019-04-12 | End: 2019-04-14

## 2019-04-12 RX ORDER — POTASSIUM CHLORIDE 20 MEQ
40 PACKET (EA) ORAL ONCE
Qty: 0 | Refills: 0 | Status: COMPLETED | OUTPATIENT
Start: 2019-04-12 | End: 2019-04-12

## 2019-04-12 RX ORDER — MAGNESIUM OXIDE 400 MG ORAL TABLET 241.3 MG
400 TABLET ORAL ONCE
Qty: 0 | Refills: 0 | Status: COMPLETED | OUTPATIENT
Start: 2019-04-12 | End: 2019-04-12

## 2019-04-12 RX ADMIN — Medication 650 MILLIGRAM(S): at 22:02

## 2019-04-12 RX ADMIN — Medication 50 MILLILITER(S): at 12:10

## 2019-04-12 RX ADMIN — OXYCODONE AND ACETAMINOPHEN 1 TABLET(S): 5; 325 TABLET ORAL at 07:21

## 2019-04-12 RX ADMIN — Medication 125 MILLIGRAM(S): at 17:17

## 2019-04-12 RX ADMIN — OXYCODONE AND ACETAMINOPHEN 1 TABLET(S): 5; 325 TABLET ORAL at 20:30

## 2019-04-12 RX ADMIN — TRAMADOL HYDROCHLORIDE 25 MILLIGRAM(S): 50 TABLET ORAL at 01:44

## 2019-04-12 RX ADMIN — Medication 1 APPLICATION(S): at 13:48

## 2019-04-12 RX ADMIN — OXYCODONE AND ACETAMINOPHEN 1 TABLET(S): 5; 325 TABLET ORAL at 19:42

## 2019-04-12 RX ADMIN — TRAMADOL HYDROCHLORIDE 25 MILLIGRAM(S): 50 TABLET ORAL at 02:30

## 2019-04-12 RX ADMIN — OXYCODONE AND ACETAMINOPHEN 1 TABLET(S): 5; 325 TABLET ORAL at 06:57

## 2019-04-12 RX ADMIN — AMIODARONE HYDROCHLORIDE 400 MILLIGRAM(S): 400 TABLET ORAL at 13:49

## 2019-04-12 RX ADMIN — MIDODRINE HYDROCHLORIDE 20 MILLIGRAM(S): 2.5 TABLET ORAL at 13:50

## 2019-04-12 RX ADMIN — Medication 40 MILLIEQUIVALENT(S): at 13:49

## 2019-04-12 RX ADMIN — ATORVASTATIN CALCIUM 40 MILLIGRAM(S): 80 TABLET, FILM COATED ORAL at 22:02

## 2019-04-12 RX ADMIN — MIDODRINE HYDROCHLORIDE 20 MILLIGRAM(S): 2.5 TABLET ORAL at 06:57

## 2019-04-12 RX ADMIN — MIDODRINE HYDROCHLORIDE 20 MILLIGRAM(S): 2.5 TABLET ORAL at 22:02

## 2019-04-12 RX ADMIN — Medication 100 MILLIGRAM(S): at 12:10

## 2019-04-12 RX ADMIN — Medication 650 MILLIGRAM(S): at 06:57

## 2019-04-12 RX ADMIN — Medication 81 MILLIGRAM(S): at 12:09

## 2019-04-12 RX ADMIN — Medication 125 MILLIGRAM(S): at 12:08

## 2019-04-12 RX ADMIN — Medication 650 MILLIGRAM(S): at 12:09

## 2019-04-12 RX ADMIN — BUDESONIDE AND FORMOTEROL FUMARATE DIHYDRATE 2 PUFF(S): 160; 4.5 AEROSOL RESPIRATORY (INHALATION) at 12:09

## 2019-04-12 RX ADMIN — Medication 50 MILLILITER(S): at 06:57

## 2019-04-12 RX ADMIN — AMIODARONE HYDROCHLORIDE 400 MILLIGRAM(S): 400 TABLET ORAL at 06:57

## 2019-04-12 RX ADMIN — MAGNESIUM OXIDE 400 MG ORAL TABLET 400 MILLIGRAM(S): 241.3 TABLET ORAL at 12:08

## 2019-04-12 RX ADMIN — Medication 50 MILLILITER(S): at 01:43

## 2019-04-12 RX ADMIN — AMIODARONE HYDROCHLORIDE 400 MILLIGRAM(S): 400 TABLET ORAL at 22:02

## 2019-04-12 RX ADMIN — BUDESONIDE AND FORMOTEROL FUMARATE DIHYDRATE 2 PUFF(S): 160; 4.5 AEROSOL RESPIRATORY (INHALATION) at 22:03

## 2019-04-12 RX ADMIN — Medication 50 MILLILITER(S): at 17:17

## 2019-04-12 RX ADMIN — MUPIROCIN 1 APPLICATION(S): 20 OINTMENT TOPICAL at 13:48

## 2019-04-12 NOTE — PROGRESS NOTE ADULT - ASSESSMENT
Echo 1/17/19:EF 60%, mild to mod MR, mild AR, nl lv sys fx, mod pulm htn     a/p  78 y/o female, with a PmHx of COPD, paroxsymal a-fib (on pradaxa), CAD (s/p stent 11/2018), DCHF, pulm htn, HTN, HLD, and anxiety presenting with nonhealing R posterior ankle ulcer and UTI.     1. Chronic diastolic chf   chest xray with mild interstitial edema, sml loculated right pleural effusion, sml left pleural effusion unchanged, increased in Right opacity  remains mildly fluid overloaded on exam, reports SOB improving today, ++LE edema  hold bb for hypotension, continue midodrine to 20mg TID   avoid aggressive IV resuscitation   recent echo with nl lv fx   repeat echo for lv fxn  on albumin IVPB     2.  Paroxysmal Atrial Fibrillation  Rates improving  continue amio   continue to hold bb   continue midodrine to 20 mg q 8 hrs for bp support  CHADS 3 - off pradaxa for possible angio, continue heparin gtt     3. CAD s/p PCI (11/2018)  cv stable, no cp or sob   continue bb, statin, plavix on hold for upcoming procedure     4. UTI   s/p abx per med   + Cdiff, on vanco     5.  R posterior ankle ulcer   vascular f/u   plan for eventual RLE angio  hemodynamically improved, pt. can proceed with vascular angio with moderate but acceptable cv risk    6. bri on ckd  renal f/u  plan for eventual renal biopsy   hold plavix 5 day prior to Biopsy,   c/w asa 81mg, given recent PCI, will not hold asa for biopsy.

## 2019-04-12 NOTE — PROGRESS NOTE ADULT - SUBJECTIVE AND OBJECTIVE BOX
[facility] NEPHROLOGY- PROGRESS NOTE    79y Female with history of CHF, COPD presents with RLE pain. Nephrology consulted for elevated Scr.    Patient now C. diff positive.    REVIEW OF SYSTEMS:  Gen: no changes in weight  Cards: no chest pain  Resp: + dyspnea (chronic)  GI: no nausea or vomiting, + diarrhea  Vascular: RLE edema/pain    No Known Allergies      Hospital Medications: Medications reviewed      VITALS:  T(F): 98.4 (19 @ 06:55), Max: 98.4 (19 @ 06:55)  HR: 97 (19 @ 06:55)  BP: 133/62 (19 @ 06:55)  RR: 18 (19 @ 06:55)  SpO2: 96% (19 @ 06:55)  Wt(kg): --      PHYSICAL EXAM:    Gen: NAD, calm  Cards: Irregularly irregular, +S1/S2, no M/G/R  Resp: Bibasilar rales  GI: soft, NT, + ab distention, NABS  Vascular: 2+ RLE edema, 1+ LLE edema      LABS:      138  |  99  |  71<H>  ----------------------------<  106<H>  3.6   |  24  |  2.84<H>    Ca    8.4      2019 05:10  Phos  4.2     -12  Mg     1.9     -12      Creatinine Trend: 2.84 <--, 2.66 <--, 2.49 <--, 2.46 <--, 2.21 <--, 1.81 <--, 1.57 <--                        10.0   7.99  )-----------( 169      ( 2019 05:10 )             33.3     Urine Studies:  Urinalysis Basic - ( 2019 00:05 )    Color: YELLOW / Appearance: Lt TURBID / S.021 / pH: 5.5  Gluc: NEGATIVE / Ketone: NEGATIVE  / Bili: NEGATIVE / Urobili: NORMAL   Blood: SMALL / Protein: 30 / Nitrite: NEGATIVE   Leuk Esterase: LARGE / RBC: 11-25 / WBC >50   Sq Epi: MODERATE / Non Sq Epi:  / Bacteria: NEGATIVE      Protein, Random Urine: 43.3 mg/dL ( @ 12:48)  Creatinine, Random Urine: 119.50 mg/dL ( @ 12:48)  Sodium, Random Urine: < 20 mmol/L ( @ 00:05)  Creatinine, Random Urine: 101.80 mg/dL ( @ 00:05)

## 2019-04-12 NOTE — PROGRESS NOTE ADULT - ATTENDING COMMENTS
[facility] NEPHROLOGY  Harish Valera M.D.  Kevin Cedeno D.O.  Rachele Mckeon M.D.  Christina Westfall, MSN, ANP-C    Telephone: [phone]  Facsimile: [phone]    [address] [place] [address]

## 2019-04-12 NOTE — PROGRESS NOTE ADULT - SUBJECTIVE AND OBJECTIVE BOX
CARDIOLOGY FOLLOW UP - Dr. Vieyra    CC no cp or sob         PHYSICAL EXAM:  T(C): 36.9 (04-12-19 @ 10:55), Max: 36.9 (04-12-19 @ 06:55)  HR: 98 (04-12-19 @ 10:55) (83 - 98)  BP: 96/57 (04-12-19 @ 10:55) (96/57 - 133/62)  RR: 18 (04-12-19 @ 10:55) (18 - 18)  SpO2: 98% (04-12-19 @ 10:55) (94% - 100%)  Wt(kg): --  I&O's Summary      Appearance: Normal	  Cardiovascular: Normal S1 S2,irregular, tachycardia   Respiratory: crackles   Gastrointestinal:  Soft, Non-tender, + BS	  Extremities: bl dressing , bl le + edema         MEDICATIONS  (STANDING):  albumin human 25% IVPB 50 milliLiter(s) IV Intermittent every 6 hours  amiodarone    Tablet   Oral   amiodarone    Tablet 400 milliGRAM(s) Oral every 8 hours  aspirin enteric coated 81 milliGRAM(s) Oral daily  atorvastatin 40 milliGRAM(s) Oral at bedtime  buDESOnide 160 MICROgram(s)/formoterol 4.5 MICROgram(s) Inhaler 2 Puff(s) Inhalation two times a day  collagenase Ointment 1 Application(s) Topical daily  heparin  Infusion.  Unit(s)/Hr (11 mL/Hr) IV Continuous <Continuous>  magnesium oxide 400 milliGRAM(s) Oral once  midodrine 20 milliGRAM(s) Oral three times a day  mupirocin 2% Ointment 1 Application(s) Topical <User Schedule>  potassium chloride    Tablet ER 40 milliEquivalent(s) Oral once  sodium bicarbonate 650 milliGRAM(s) Oral three times a day  thiamine 100 milliGRAM(s) Oral daily  vancomycin    Solution 125 milliGRAM(s) Oral every 6 hours      TELEMETRY: aflutter, hr 90 -120s	    ECG:  	  RADIOLOGY:   DIAGNOSTIC TESTING:  [ ] Echocardiogram:  [ ]  Catheterization:  [ ] Stress Test:    OTHER: 	    LABS:	 	                                10.0   7.99  )-----------( 169      ( 12 Apr 2019 05:10 )             33.3     04-12    138  |  99  |  71<H>  ----------------------------<  106<H>  3.6   |  24  |  2.84<H>    Ca    8.4      12 Apr 2019 05:10  Phos  4.2     04-12  Mg     1.9     04-12      PTT - ( 12 Apr 2019 05:10 )  PTT:107.5 SEC

## 2019-04-12 NOTE — PROGRESS NOTE ADULT - ASSESSMENT
80 y/o female, with a PmHx of COPD (currently on 3L O2 24 hours), paroxsymal a-fib (on pradaxa), lsited h/o CAD (s/p stent 11/2018), CHF (although recent TTE with normal biventricular function, with no mention of diastolic dysfunction; + severely dilated left atrium), HTN, HLD, and anxiety, presenting to the [facility] ED with chronic right ankle pressure ulcer pain. GI consulted for abd distention

## 2019-04-12 NOTE — PROGRESS NOTE ADULT - SUBJECTIVE AND OBJECTIVE BOX
INTERVAL HPI/OVERNIGHT EVENTS:    pt without abdominal pain or shortness of breath   (+) loose stool earlier this morning: (+) cdiff    MEDICATIONS  (STANDING):  albumin human 25% IVPB 50 milliLiter(s) IV Intermittent every 6 hours  amiodarone    Tablet   Oral   amiodarone    Tablet 400 milliGRAM(s) Oral every 8 hours  aspirin enteric coated 81 milliGRAM(s) Oral daily  atorvastatin 40 milliGRAM(s) Oral at bedtime  buDESOnide 160 MICROgram(s)/formoterol 4.5 MICROgram(s) Inhaler 2 Puff(s) Inhalation two times a day  collagenase Ointment 1 Application(s) Topical daily  heparin  Infusion.  Unit(s)/Hr (11 mL/Hr) IV Continuous <Continuous>  magnesium oxide 400 milliGRAM(s) Oral once  midodrine 20 milliGRAM(s) Oral three times a day  mupirocin 2% Ointment 1 Application(s) Topical <User Schedule>  potassium chloride    Tablet ER 40 milliEquivalent(s) Oral once  sodium bicarbonate 650 milliGRAM(s) Oral three times a day  thiamine 100 milliGRAM(s) Oral daily  vancomycin    Solution 125 milliGRAM(s) Oral every 6 hours    MEDICATIONS  (PRN):  heparin  Injectable 4500 Unit(s) IV Push every 6 hours PRN For aPTT less than 40  heparin  Injectable 2000 Unit(s) IV Push every 6 hours PRN For aPTT between 40 - 57  levalbuterol Inhalation 0.63 milliGRAM(s) Inhalation every 6 hours PRN SOB  oxyCODONE    5 mG/acetaminophen 325 mG 1 Tablet(s) Oral every 6 hours PRN Moderate Pain and severe pain      Allergies    No Known Allergies    Intolerances        Review of Systems:    General:  No wt loss, fevers, chills, night sweats, fatigue   Eyes:  Good vision, no reported pain  ENT:  No sore throat, pain, runny nose, dysphagia  CV:  No pain, palpitations, hypo/hypertension  Resp:  No dyspnea, cough, tachypnea, wheezing  GI:  No pain, No nausea, No vomiting, +diarrhea, No constipation, No weight loss, No fever, No pruritis, No rectal bleeding, No melena, No dysphagia  :  No pain, bleeding, incontinence, nocturia  Muscle:  No pain, weakness  Neuro:  No weakness, tingling, memory problems  Psych:  No fatigue, insomnia, mood problems, depression  Endocrine:  No polyuria, polydypsia, cold/heat intolerance  Heme:  No petechiae, ecchymosis, easy bruisability  Skin:  No rash, tattoos, scars, edema      Vital Signs Last 24 Hrs  T(C): 36.9 (12 Apr 2019 06:55), Max: 36.9 (12 Apr 2019 06:55)  T(F): 98.4 (12 Apr 2019 06:55), Max: 98.4 (12 Apr 2019 06:55)  HR: 97 (12 Apr 2019 06:55) (83 - 97)  BP: 133/62 (12 Apr 2019 06:55) (97/57 - 133/62)  BP(mean): --  RR: 18 (12 Apr 2019 06:55) (18 - 18)  SpO2: 96% (12 Apr 2019 06:55) (94% - 100%)    PHYSICAL EXAM:    Constitutional: NAD  HEENT: EOMI, throat clear  Neck: No LAD, supple  Respiratory: CTA and P  Cardiovascular: S1 and S2, RRR, no M  Gastrointestinal: BS+, soft, NT/ND, neg HSM,  Extremities: No peripheral edema, neg clubbing, cyanosis  Vascular: 2+ peripheral pulses  Neurological: A/O x 3, no focal deficits  Psychiatric: Normal mood, normal affect  Skin: No rashes      LABS:                        10.0   7.99  )-----------( 169      ( 12 Apr 2019 05:10 )             33.3     04-12    138  |  99  |  71<H>  ----------------------------<  106<H>  3.6   |  24  |  2.84<H>    Ca    8.4      12 Apr 2019 05:10  Phos  4.2     04-12  Mg     1.9     04-12      PTT - ( 12 Apr 2019 05:10 )  PTT:107.5 SEC      RADIOLOGY & ADDITIONAL TESTS:

## 2019-04-12 NOTE — PROGRESS NOTE ADULT - ASSESSMENT
79y Female with history of CHF, COPD presents with RLE pain. Nephrology consulted for elevated Scr.    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 versus underlying MPGN? given low C3/C4. Continue IV albumin to increase renal perfusion given low FeNa. Follow up pending serologies (LORA, dsDNA, anti-GBM ab, MICKEY). Patient will need a kidney biopsy now planned for 4/15. Patient will need to be off of Plavix for 5 days (last dose on 4/8). Avoid nephrotoxins.  2) CKD-3: Patient appears to have h/o age appropriate CKD-3 (baseline Scr 1.1-1.2) with mild proteinuria. Monitor electrolytes.  3) Orthostatic hypotension: BP low. Continue with midodrine 20 mg TID. Rate control as per cardiology. Monitor BP.  4) LE edema: CXR reviewed with pleural effusions and pulmonary edema. Prior TTE with mild to moderate MR with normal LVSF. Given worsening edema, patient s/p IV lasix on 4/8. Holding further lasix given low BP and progressive renal failure unless patient symptomatic. Monitor UO.  5) Pyuria/Microscopic hematuria: Urine culture negative s/p CTX. IGOR work up as above.  6) Metabolic acidosis: Improving. Continue with sodium bicarbonate 650 mg TID. Monitor serum CO2.    Patient high risk for angiogram and subsequent ROSIE given IGOR with likely underlying glomerular disease. If angiogram urgent, can proceed as patient already educated on risks of ROSIE and would attempt to minimize dye load during the procedure.

## 2019-04-12 NOTE — PROGRESS NOTE ADULT - ASSESSMENT
78 year old female with right  posterior heel ulceration (stable); RLE pain with non-palp pulses    - wound is stable, achilles tendon exposed, no ascending cellulitis or purulent drainage noted.  +pain   - Previous STONEY/PVR poor, 0.60 on the effected limb with flat waveforms.   - No local signs of infection - would recommend monitoring off antibiotics.    -Z floats at all times  - Cont local wound care with right foot ulceration with santyl and DSD

## 2019-04-12 NOTE — PROGRESS NOTE ADULT - SUBJECTIVE AND OBJECTIVE BOX
Patient is a 79y old  Female who presents with a chief complaint of right leg wound/ swelling (11 Apr 2019 18:38)       INTERVAL HPI/OVERNIGHT EVENTS:  Patient seen and evaluated at bedside.  Pt is resting comfortable in NAD. Denies N/V/F/C.     Allergies    No Known Allergies    Intolerances        Vital Signs Last 24 Hrs  T(C): 36.9 (12 Apr 2019 06:55), Max: 36.9 (12 Apr 2019 06:55)  T(F): 98.4 (12 Apr 2019 06:55), Max: 98.4 (12 Apr 2019 06:55)  HR: 97 (12 Apr 2019 06:55) (83 - 97)  BP: 133/62 (12 Apr 2019 06:55) (97/57 - 133/62)  BP(mean): --  RR: 18 (12 Apr 2019 06:55) (18 - 18)  SpO2: 96% (12 Apr 2019 06:55) (94% - 100%)    LABS:                        10.0   7.99  )-----------( 169      ( 12 Apr 2019 05:10 )             33.3     04-12    138  |  99  |  71<H>  ----------------------------<  106<H>  3.6   |  24  |  2.84<H>    Ca    8.4      12 Apr 2019 05:10  Phos  4.2     04-12  Mg     1.9     04-12      PTT - ( 12 Apr 2019 05:10 )  PTT:107.5 SEC    CAPILLARY BLOOD GLUCOSE          Lower Extremity Physical Exam:  R Posterior heel ulceration, with exposed achilles tenon.   No local signs of infection. No probe to bone.  No drainage or malodor.  ++pain on exam  Pedal pulses non palp bilateral lower extremities  Sensation intact to b/l feet  No gross deformities  R foot +2 pitting edema.     RADIOLOGY & ADDITIONAL TESTS:

## 2019-04-12 NOTE — PROGRESS NOTE ADULT - SUBJECTIVE AND OBJECTIVE BOX
Patient is a 79y old  Female who presents with a chief complaint of right leg wound/ swelling (12 Apr 2019 12:11)      INTERVAL HPI/OVERNIGHT EVENTS:  T(C): 36.9 (04-12-19 @ 13:47), Max: 36.9 (04-12-19 @ 06:55)  HR: 88 (04-12-19 @ 17:15) (88 - 98)  BP: 99/67 (04-12-19 @ 17:15) (94/59 - 133/62)  RR: 18 (04-12-19 @ 17:15) (18 - 18)  SpO2: 100% (04-12-19 @ 17:15) (94% - 100%)  Wt(kg): --  I&O's Summary      LABS:                        10.0   7.99  )-----------( 169      ( 12 Apr 2019 05:10 )             33.3     04-12    138  |  99  |  71<H>  ----------------------------<  106<H>  3.6   |  24  |  2.84<H>    Ca    8.4      12 Apr 2019 05:10  Phos  4.2     04-12  Mg     1.9     04-12      PTT - ( 12 Apr 2019 05:10 )  PTT:107.5 SEC    CAPILLARY BLOOD GLUCOSE                MEDICATIONS  (STANDING):  albumin human 25% IVPB 50 milliLiter(s) IV Intermittent every 6 hours  amiodarone    Tablet   Oral   amiodarone    Tablet 400 milliGRAM(s) Oral every 8 hours  aspirin enteric coated 81 milliGRAM(s) Oral daily  atorvastatin 40 milliGRAM(s) Oral at bedtime  buDESOnide 160 MICROgram(s)/formoterol 4.5 MICROgram(s) Inhaler 2 Puff(s) Inhalation two times a day  collagenase Ointment 1 Application(s) Topical daily  heparin  Infusion.  Unit(s)/Hr (11 mL/Hr) IV Continuous <Continuous>  midodrine 20 milliGRAM(s) Oral three times a day  mupirocin 2% Ointment 1 Application(s) Topical <User Schedule>  sodium bicarbonate 650 milliGRAM(s) Oral three times a day  thiamine 100 milliGRAM(s) Oral daily  vancomycin    Solution 125 milliGRAM(s) Oral every 6 hours    MEDICATIONS  (PRN):  heparin  Injectable 4500 Unit(s) IV Push every 6 hours PRN For aPTT less than 40  heparin  Injectable 2000 Unit(s) IV Push every 6 hours PRN For aPTT between 40 - 57  levalbuterol Inhalation 0.63 milliGRAM(s) Inhalation every 6 hours PRN SOB  oxyCODONE    5 mG/acetaminophen 325 mG 1 Tablet(s) Oral every 6 hours PRN Moderate Pain and severe pain          PHYSICAL EXAM:  GENERAL: NAD, well-groomed, well-developed  HEAD:  Atraumatic, Normocephalic  CHEST/LUNG: Clear to percussion bilaterally; No rales, rhonchi, wheezing, or rubs  HEART: Regular rate and rhythm; No murmurs, rubs, or gallops  ABDOMEN: Soft, Nontender, Nondistended; Bowel sounds present  EXTREMITIES:  2+ Peripheral Pulses, No clubbing, cyanosis, or edema  LYMPH: No lymphadenopathy noted  SKIN: No rashes or lesions    Care Discussed with Consultants/Other Providers [ ] YES  [ ] NO

## 2019-04-12 NOTE — PROGRESS NOTE ADULT - PROBLEM SELECTOR PLAN 3
- rate control   - cont a/c per cardiology   - keep on gi ppx while on a/c with ppi - rate control   - cont a/c per cardiology   - no ppi per renal

## 2019-04-12 NOTE — PROGRESS NOTE ADULT - ATTENDING COMMENTS
Agree with above NP note.  cv stable and improved  on IV heparin, off noac as awaits renal bx  off plavix for biopsy  asa 81mg daily for cad/pci hx  holding asa to minimize bleed risk does not outweight benefit of reducing potential stent thrombosis with ASA

## 2019-04-12 NOTE — PROGRESS NOTE ADULT - ASSESSMENT
Problem/Plan - 1:  ·  Problem: Lower limb ulcer, ankle, right, with unspecified severity.  Plan: -Spodiatry and vascular fu -wound care fu  -pain meds prn.     Problem/Plan - 2:·  Problem: IGOR (acute kidney injury).  Plan: -likely with prerenal component in setting of decreased fluid intake  renal fumonitor cr    Problem/Plan - 3:  ·  Problem: Urinary tract infection with hematuria, site unspecified.  Plan: - off antibiotics    Problem/Plan - 4:  ·  Problem: Rapid atrial fibrillation.  Plan: c/w hep gttmanagement as per cards    Problem/Plan - 5:  ·  Problem: Chronic obstructive pulmonary disease, unspecified COPD type.  Plan: c/w ics/laba, O2

## 2019-04-13 LAB
ANION GAP SERPL CALC-SCNC: 15 MMO/L — HIGH (ref 7–14)
APTT BLD: 78.2 SEC — HIGH (ref 27.5–36.3)
APTT BLD: 80.5 SEC — HIGH (ref 27.5–36.3)
BUN SERPL-MCNC: 80 MG/DL — HIGH (ref 7–23)
CALCIUM SERPL-MCNC: 9 MG/DL — SIGNIFICANT CHANGE UP (ref 8.4–10.5)
CHLORIDE SERPL-SCNC: 100 MMOL/L — SIGNIFICANT CHANGE UP (ref 98–107)
CO2 SERPL-SCNC: 21 MMOL/L — LOW (ref 22–31)
CREAT SERPL-MCNC: 3.03 MG/DL — HIGH (ref 0.5–1.3)
GLUCOSE SERPL-MCNC: 105 MG/DL — HIGH (ref 70–99)
HCT VFR BLD CALC: 31.8 % — LOW (ref 34.5–45)
HGB BLD-MCNC: 9.4 G/DL — LOW (ref 11.5–15.5)
MAGNESIUM SERPL-MCNC: 1.9 MG/DL — SIGNIFICANT CHANGE UP (ref 1.6–2.6)
MCHC RBC-ENTMCNC: 25.5 PG — LOW (ref 27–34)
MCHC RBC-ENTMCNC: 29.6 % — LOW (ref 32–36)
MCV RBC AUTO: 86.4 FL — SIGNIFICANT CHANGE UP (ref 80–100)
NRBC # FLD: 0 K/UL — SIGNIFICANT CHANGE UP (ref 0–0)
PHOSPHATE SERPL-MCNC: 3.3 MG/DL — SIGNIFICANT CHANGE UP (ref 2.5–4.5)
PLATELET # BLD AUTO: 140 K/UL — LOW (ref 150–400)
PMV BLD: 10.4 FL — SIGNIFICANT CHANGE UP (ref 7–13)
POTASSIUM SERPL-MCNC: 4.1 MMOL/L — SIGNIFICANT CHANGE UP (ref 3.5–5.3)
POTASSIUM SERPL-SCNC: 4.1 MMOL/L — SIGNIFICANT CHANGE UP (ref 3.5–5.3)
RBC # BLD: 3.68 M/UL — LOW (ref 3.8–5.2)
RBC # FLD: 21.2 % — HIGH (ref 10.3–14.5)
SODIUM SERPL-SCNC: 136 MMOL/L — SIGNIFICANT CHANGE UP (ref 135–145)
WBC # BLD: 8.69 K/UL — SIGNIFICANT CHANGE UP (ref 3.8–10.5)
WBC # FLD AUTO: 8.69 K/UL — SIGNIFICANT CHANGE UP (ref 3.8–10.5)

## 2019-04-13 RX ORDER — MORPHINE SULFATE 50 MG/1
1 CAPSULE, EXTENDED RELEASE ORAL ONCE
Qty: 0 | Refills: 0 | Status: DISCONTINUED | OUTPATIENT
Start: 2019-04-13 | End: 2019-04-13

## 2019-04-13 RX ORDER — TRAMADOL HYDROCHLORIDE 50 MG/1
25 TABLET ORAL ONCE
Qty: 0 | Refills: 0 | Status: DISCONTINUED | OUTPATIENT
Start: 2019-04-13 | End: 2019-04-13

## 2019-04-13 RX ADMIN — OXYCODONE AND ACETAMINOPHEN 1 TABLET(S): 5; 325 TABLET ORAL at 11:45

## 2019-04-13 RX ADMIN — AMIODARONE HYDROCHLORIDE 400 MILLIGRAM(S): 400 TABLET ORAL at 06:41

## 2019-04-13 RX ADMIN — HEPARIN SODIUM 500 UNIT(S)/HR: 5000 INJECTION INTRAVENOUS; SUBCUTANEOUS at 07:58

## 2019-04-13 RX ADMIN — MIDODRINE HYDROCHLORIDE 20 MILLIGRAM(S): 2.5 TABLET ORAL at 22:08

## 2019-04-13 RX ADMIN — MORPHINE SULFATE 1 MILLIGRAM(S): 50 CAPSULE, EXTENDED RELEASE ORAL at 14:34

## 2019-04-13 RX ADMIN — Medication 650 MILLIGRAM(S): at 22:08

## 2019-04-13 RX ADMIN — Medication 81 MILLIGRAM(S): at 13:32

## 2019-04-13 RX ADMIN — BUDESONIDE AND FORMOTEROL FUMARATE DIHYDRATE 2 PUFF(S): 160; 4.5 AEROSOL RESPIRATORY (INHALATION) at 08:44

## 2019-04-13 RX ADMIN — Medication 1 APPLICATION(S): at 18:12

## 2019-04-13 RX ADMIN — Medication 125 MILLIGRAM(S): at 18:11

## 2019-04-13 RX ADMIN — Medication 125 MILLIGRAM(S): at 13:32

## 2019-04-13 RX ADMIN — OXYCODONE AND ACETAMINOPHEN 1 TABLET(S): 5; 325 TABLET ORAL at 23:00

## 2019-04-13 RX ADMIN — ATORVASTATIN CALCIUM 40 MILLIGRAM(S): 80 TABLET, FILM COATED ORAL at 22:08

## 2019-04-13 RX ADMIN — Medication 125 MILLIGRAM(S): at 01:14

## 2019-04-13 RX ADMIN — TRAMADOL HYDROCHLORIDE 25 MILLIGRAM(S): 50 TABLET ORAL at 09:38

## 2019-04-13 RX ADMIN — OXYCODONE AND ACETAMINOPHEN 1 TABLET(S): 5; 325 TABLET ORAL at 04:36

## 2019-04-13 RX ADMIN — BUDESONIDE AND FORMOTEROL FUMARATE DIHYDRATE 2 PUFF(S): 160; 4.5 AEROSOL RESPIRATORY (INHALATION) at 22:07

## 2019-04-13 RX ADMIN — OXYCODONE AND ACETAMINOPHEN 1 TABLET(S): 5; 325 TABLET ORAL at 05:30

## 2019-04-13 RX ADMIN — MUPIROCIN 1 APPLICATION(S): 20 OINTMENT TOPICAL at 08:47

## 2019-04-13 RX ADMIN — OXYCODONE AND ACETAMINOPHEN 1 TABLET(S): 5; 325 TABLET ORAL at 10:46

## 2019-04-13 RX ADMIN — TRAMADOL HYDROCHLORIDE 25 MILLIGRAM(S): 50 TABLET ORAL at 08:38

## 2019-04-13 RX ADMIN — MIDODRINE HYDROCHLORIDE 20 MILLIGRAM(S): 2.5 TABLET ORAL at 06:41

## 2019-04-13 RX ADMIN — Medication 650 MILLIGRAM(S): at 06:42

## 2019-04-13 RX ADMIN — MIDODRINE HYDROCHLORIDE 20 MILLIGRAM(S): 2.5 TABLET ORAL at 13:34

## 2019-04-13 RX ADMIN — HEPARIN SODIUM 500 UNIT(S)/HR: 5000 INJECTION INTRAVENOUS; SUBCUTANEOUS at 01:14

## 2019-04-13 RX ADMIN — OXYCODONE AND ACETAMINOPHEN 1 TABLET(S): 5; 325 TABLET ORAL at 22:09

## 2019-04-13 RX ADMIN — MORPHINE SULFATE 1 MILLIGRAM(S): 50 CAPSULE, EXTENDED RELEASE ORAL at 14:50

## 2019-04-13 RX ADMIN — AMIODARONE HYDROCHLORIDE 400 MILLIGRAM(S): 400 TABLET ORAL at 13:33

## 2019-04-13 RX ADMIN — Medication 50 MILLILITER(S): at 01:13

## 2019-04-13 RX ADMIN — Medication 100 MILLIGRAM(S): at 13:33

## 2019-04-13 RX ADMIN — Medication 650 MILLIGRAM(S): at 13:33

## 2019-04-13 RX ADMIN — Medication 125 MILLIGRAM(S): at 08:45

## 2019-04-13 NOTE — PROGRESS NOTE ADULT - ATTENDING COMMENTS
[facility] NEPHROLOGY  Harish Valera M.D.  Kevin Cedeno D.O.  Rachele Mckeon M.D.  Christina Westfall, MSN, ANP-C    Telephone: [phone]  Facsimile: [phone]    [address] [place] [address]

## 2019-04-13 NOTE — PROGRESS NOTE ADULT - SUBJECTIVE AND OBJECTIVE BOX
CC: no new complaints    TELEMETRY:  AF     PHYSICAL EXAM:    T(C): 36.7 (04-13-19 @ 08:38), Max: 36.9 (04-12-19 @ 10:55)  HR: 98 (04-13-19 @ 08:38) (88 - 110)  BP: 101/61 (04-13-19 @ 08:38) (94/59 - 114/77)  RR: 19 (04-13-19 @ 08:38) (18 - 19)  SpO2: 97% (04-13-19 @ 08:38) (94% - 100%)  Wt(kg): --  I&O's Summary      Appearance: Normal	  Cardiovascular: irreg S1 S2,RRR, No JVD, No murmurs  Respiratory: Lungs clear to auscultation	  Gastrointestinal:  Soft, Non-tender, + BS	  Extremities: Normal range of motion, No clubbing, cyanosis or edema  Vascular: Peripheral pulses palpable 2+ bilaterally     LABS:	 	                          9.4    8.69  )-----------( 140      ( 13 Apr 2019 07:00 )             31.8     04-13    136  |  100  |  80<H>  ----------------------------<  105<H>  4.1   |  21<L>  |  3.03<H>    Ca    9.0      13 Apr 2019 07:00  Phos  3.3     04-13  Mg     1.9     04-13        PTT - ( 13 Apr 2019 07:00 )  PTT:80.5 SEC    CARDIAC MARKERS:        MEDICATIONS  (STANDING):  amiodarone    Tablet   Oral   amiodarone    Tablet 400 milliGRAM(s) Oral every 8 hours  amiodarone    Tablet 200 milliGRAM(s) Oral daily  aspirin enteric coated 81 milliGRAM(s) Oral daily  atorvastatin 40 milliGRAM(s) Oral at bedtime  buDESOnide 160 MICROgram(s)/formoterol 4.5 MICROgram(s) Inhaler 2 Puff(s) Inhalation two times a day  collagenase Ointment 1 Application(s) Topical daily  heparin  Infusion.  Unit(s)/Hr (11 mL/Hr) IV Continuous <Continuous>  midodrine 20 milliGRAM(s) Oral three times a day  mupirocin 2% Ointment 1 Application(s) Topical <User Schedule>  sodium bicarbonate 650 milliGRAM(s) Oral three times a day  thiamine 100 milliGRAM(s) Oral daily  vancomycin    Solution 125 milliGRAM(s) Oral every 6 hours

## 2019-04-13 NOTE — PROGRESS NOTE ADULT - PROBLEM SELECTOR PLAN 4
- daily wound care  - angiogram with vascular

## 2019-04-13 NOTE — PROGRESS NOTE ADULT - ASSESSMENT
78 y/o female, with a PmHx of COPD (currently on 3L O2 24 hours), paroxsymal a-fib (on pradaxa), lsited h/o CAD (s/p stent 11/2018), CHF (although recent TTE with normal biventricular function, with no mention of diastolic dysfunction; + severely dilated left atrium), HTN, HLD, and anxiety, presenting to the [facility] ED with chronic right ankle pressure ulcer pain. GI consulted for abd distention

## 2019-04-13 NOTE — PROGRESS NOTE ADULT - PROBLEM SELECTOR PROBLEM 1
Abdominal distension
Arterial insufficiency with ischemic ulcer
Arterial insufficiency with ischemic ulcer

## 2019-04-13 NOTE — PROGRESS NOTE ADULT - ASSESSMENT
Echo 1/17/19:EF 60%, mild to mod MR, mild AR, nl lv sys fx, mod pulm htn     a/p  80 y/o female, with a PmHx of COPD, paroxsymal a-fib (on pradaxa), CAD (s/p stent 11/2018), DCHF, pulm htn, HTN, HLD, and anxiety presenting with nonhealing R posterior ankle ulcer and UTI.     1. Chronic diastolic chf   chest xray with mild interstitial edema, sml loculated right pleural effusion, sml left pleural effusion unchanged, increased in Right opacity  remains mildly fluid overloaded on exam, reports SOB improving today, ++LE edema  hold bb for hypotension, continue midodrine to 20mg TID   avoid aggressive IV resuscitation   recent echo with nl lv fx   repeat echo for lv fxn  on albumin IVPB     2.  Paroxysmal Atrial Fibrillation  Rates improving  continue amio   continue to hold bb   continue midodrine to 20 mg q 8 hrs for bp support  CHADS 3 - off pradaxa for possible angio, continue heparin gtt     3. CAD s/p PCI (11/2018)  cv stable, no cp or sob   continue bb, statin, plavix on hold for upcoming procedure     4. UTI   s/p abx per med   + Cdiff, on vanco     5.  R posterior ankle ulcer   vascular f/u   plan for eventual RLE angio  hemodynamically improved, pt. can proceed with vascular angio with moderate but acceptable cv risk    6. bri on ckd  renal f/u  plan for eventual renal biopsy   hold plavix 5 day prior to Biopsy,   c/w asa 81mg, given recent PCI, will not hold asa for biopsy.

## 2019-04-13 NOTE — PROGRESS NOTE ADULT - SUBJECTIVE AND OBJECTIVE BOX
Patient is a 79y old  Female who presents with a chief complaint of right leg wound/ swelling (13 Apr 2019 13:50)      INTERVAL HPI/OVERNIGHT EVENTS:  T(C): 36.6 (04-13-19 @ 18:26), Max: 36.9 (04-12-19 @ 22:01)  HR: 105 (04-13-19 @ 18:26) (88 - 110)  BP: 98/68 (04-13-19 @ 18:26) (94/65 - 119/68)  RR: 17 (04-13-19 @ 18:26) (17 - 19)  SpO2: 96% (04-13-19 @ 18:26) (94% - 97%)  Wt(kg): --  I&O's Summary    13 Apr 2019 07:01  -  13 Apr 2019 19:51  --------------------------------------------------------  IN: 285 mL / OUT: 0 mL / NET: 285 mL        LABS:                        9.4    8.69  )-----------( 140      ( 13 Apr 2019 07:00 )             31.8     04-13    136  |  100  |  80<H>  ----------------------------<  105<H>  4.1   |  21<L>  |  3.03<H>    Ca    9.0      13 Apr 2019 07:00  Phos  3.3     04-13  Mg     1.9     04-13      PTT - ( 13 Apr 2019 07:00 )  PTT:80.5 SEC    CAPILLARY BLOOD GLUCOSE                MEDICATIONS  (STANDING):  amiodarone    Tablet   Oral   amiodarone    Tablet 200 milliGRAM(s) Oral daily  aspirin enteric coated 81 milliGRAM(s) Oral daily  atorvastatin 40 milliGRAM(s) Oral at bedtime  buDESOnide 160 MICROgram(s)/formoterol 4.5 MICROgram(s) Inhaler 2 Puff(s) Inhalation two times a day  collagenase Ointment 1 Application(s) Topical daily  heparin  Infusion.  Unit(s)/Hr (11 mL/Hr) IV Continuous <Continuous>  midodrine 20 milliGRAM(s) Oral three times a day  mupirocin 2% Ointment 1 Application(s) Topical <User Schedule>  sodium bicarbonate 650 milliGRAM(s) Oral three times a day  thiamine 100 milliGRAM(s) Oral daily  vancomycin    Solution 125 milliGRAM(s) Oral every 6 hours    MEDICATIONS  (PRN):  heparin  Injectable 4500 Unit(s) IV Push every 6 hours PRN For aPTT less than 40  heparin  Injectable 2000 Unit(s) IV Push every 6 hours PRN For aPTT between 40 - 57  levalbuterol Inhalation 0.63 milliGRAM(s) Inhalation every 6 hours PRN SOB  oxyCODONE    5 mG/acetaminophen 325 mG 1 Tablet(s) Oral every 6 hours PRN Moderate Pain and severe pain          PHYSICAL EXAM:  GENERAL: NAD, well-groomed, well-developed  HEAD:  Atraumatic, Normocephalic  CHEST/LUNG: Clear to percussion bilaterally; No rales, rhonchi, wheezing, or rubs  HEART: Regular rate and rhythm; No murmurs, rubs, or gallops  ABDOMEN: Soft, Nontender, Nondistended; Bowel sounds present  EXTREMITIES:  2+ Peripheral Pulses, No clubbing, cyanosis, or edema  LYMPH: No lymphadenopathy noted  SKIN: No rashes or lesions    Care Discussed with Consultants/Other Providers [ ] YES  [ ] NO

## 2019-04-13 NOTE — PROGRESS NOTE ADULT - PROBLEM SELECTOR PLAN 1
- unclear etiology; suspect in setting of CHF given moderate pulm htn on tte 1/2019  - cont to diurese per cardiology   - simethicone q6h   - elevated INR   - US Abd pending   - further gi recommendations pending above
- US Abd w/Cirrhosis with moderate ascites   - suspect in setting of CHF given moderate pulm htn on tte 1/2019  - avoid hepatotoxins  - cont to diurese per cardiology   - given asymptomatic will defer paracentesis at this time; will continue to monitor
- US Abd w/Cirrhosis with moderate ascites   - suspect in setting of CHF given moderate pulm htn on tte 1/2019  - avoid hepatotoxins  - cont to diurese per cardiology   - given asymptomatic will defer paracentesis at this time; will continue to monitor
- unclear etiology; suspect in setting of CHF given moderate pulm htn on tte 1/2019  - cont to diurese per cardiology   - simethicone q6h   - elevated INR   - US Abd pending   - further gi recommendations pending above
I have personally  seen and examined the patient today and have noted the findings and formulated the plan of care.
I have personally  seen and examined the patient today and have noted the findings and formulated the plan of care.

## 2019-04-13 NOTE — PROGRESS NOTE ADULT - SUBJECTIVE AND OBJECTIVE BOX
[facility] NEPHROLOGY- PROGRESS NOTE    79y Female with history of CHF, COPD presents with RLE pain. Nephrology consulted for elevated Scr.    Patient now C. diff positive.  +malaise.  +RLE pain.    REVIEW OF SYSTEMS:  Gen: +malaise   Cards: no chest pain  Resp: + dyspnea (chronic)  GI: no nausea or vomiting, + diarrhea  Vascular: +RLE edema/pain    No Known Allergies      Hospital Medications: Medications reviewed      VITALS:  T(F): 97.9 (19 @ 18:26), Max: 98.5 (19 @ 22:01)  HR: 105 (19 @ 18:26)  BP: 98/68 (19 @ 18:26)  RR: 17 (19 @ 18:26)  SpO2: 96% (19 @ 18:26)  Wt(kg): --     @ 07:01  -   @ 21:07  --------------------------------------------------------  IN: 285 mL / OUT: 0 mL / NET: 285 mL        PHYSICAL EXAM:  Gen: NAD, calm  Cards: Irregularly irregular, +S1/S2, no M/G/R  Resp: Bibasilar rales  GI: soft, NT, + ab distention, +ascites, dull to percussion, NABS  Vascular: 2+ RLE edema, 1+ LLE edema      LABS:      136  |  100  |  80<H>  ----------------------------<  105<H>  4.1   |  21<L>  |  3.03<H>    Ca    9.0      2019 07:00  Phos  3.3       Mg     1.9           Creatinine Trend: 3.03 <--, 2.84 <--, 2.66 <--, 2.49 <--, 2.46 <--, 2.21 <--, 1.81 <--                        9.4    8.69  )-----------( 140      ( 2019 07:00 )             31.8     Urine Studies:  Urinalysis Basic - ( 2019 00:05 )    Color: YELLOW / Appearance: Lt TURBID / S.021 / pH: 5.5  Gluc: NEGATIVE / Ketone: NEGATIVE  / Bili: NEGATIVE / Urobili: NORMAL   Blood: SMALL / Protein: 30 / Nitrite: NEGATIVE   Leuk Esterase: LARGE / RBC: 11-25 / WBC >50   Sq Epi: MODERATE / Non Sq Epi:  / Bacteria: NEGATIVE      Protein, Random Urine: 43.3 mg/dL ( @ 12:48)  Creatinine, Random Urine: 119.50 mg/dL ( @ 12:48)  Sodium, Random Urine: < 20 mmol/L ( @ 00:05)  Creatinine, Random Urine: 101.80 mg/dL ( @ 00:05)

## 2019-04-14 LAB
ALBUMIN SERPL ELPH-MCNC: 2.9 G/DL — LOW (ref 3.3–5)
ALP SERPL-CCNC: 68 U/L — SIGNIFICANT CHANGE UP (ref 40–120)
ALT FLD-CCNC: 8 U/L — SIGNIFICANT CHANGE UP (ref 4–33)
ANION GAP SERPL CALC-SCNC: 15 MMO/L — HIGH (ref 7–14)
ANION GAP SERPL CALC-SCNC: 19 MMO/L — HIGH (ref 7–14)
ANISOCYTOSIS BLD QL: SLIGHT — SIGNIFICANT CHANGE UP
APTT BLD: 65.7 SEC — HIGH (ref 27.5–36.3)
APTT BLD: 81.4 SEC — HIGH (ref 27.5–36.3)
AST SERPL-CCNC: 13 U/L — SIGNIFICANT CHANGE UP (ref 4–32)
BASOPHILS # BLD AUTO: 0.03 K/UL — SIGNIFICANT CHANGE UP (ref 0–0.2)
BASOPHILS NFR BLD AUTO: 0.1 % — SIGNIFICANT CHANGE UP (ref 0–2)
BASOPHILS NFR SPEC: 0 % — SIGNIFICANT CHANGE UP (ref 0–2)
BILIRUB SERPL-MCNC: 1.5 MG/DL — HIGH (ref 0.2–1.2)
BLASTS # FLD: 0 % — SIGNIFICANT CHANGE UP (ref 0–0)
BLD GP AB SCN SERPL QL: NEGATIVE — SIGNIFICANT CHANGE UP
BUN SERPL-MCNC: 115 MG/DL — HIGH (ref 7–23)
BUN SERPL-MCNC: 95 MG/DL — HIGH (ref 7–23)
CALCIUM SERPL-MCNC: 8.7 MG/DL — SIGNIFICANT CHANGE UP (ref 8.4–10.5)
CALCIUM SERPL-MCNC: 9.1 MG/DL — SIGNIFICANT CHANGE UP (ref 8.4–10.5)
CHLORIDE SERPL-SCNC: 98 MMOL/L — SIGNIFICANT CHANGE UP (ref 98–107)
CHLORIDE SERPL-SCNC: 99 MMOL/L — SIGNIFICANT CHANGE UP (ref 98–107)
CO2 SERPL-SCNC: 20 MMOL/L — LOW (ref 22–31)
CO2 SERPL-SCNC: 23 MMOL/L — SIGNIFICANT CHANGE UP (ref 22–31)
CREAT SERPL-MCNC: 3.01 MG/DL — HIGH (ref 0.5–1.3)
CREAT SERPL-MCNC: 3.36 MG/DL — HIGH (ref 0.5–1.3)
ELLIPTOCYTES BLD QL SMEAR: SLIGHT — SIGNIFICANT CHANGE UP
EOSINOPHIL # BLD AUTO: 0 K/UL — SIGNIFICANT CHANGE UP (ref 0–0.5)
EOSINOPHIL NFR BLD AUTO: 0 % — SIGNIFICANT CHANGE UP (ref 0–6)
EOSINOPHIL NFR FLD: 0 % — SIGNIFICANT CHANGE UP (ref 0–6)
GIANT PLATELETS BLD QL SMEAR: PRESENT — SIGNIFICANT CHANGE UP
GLUCOSE SERPL-MCNC: 101 MG/DL — HIGH (ref 70–99)
GLUCOSE SERPL-MCNC: 105 MG/DL — HIGH (ref 70–99)
HCT VFR BLD CALC: 23.4 % — LOW (ref 34.5–45)
HCT VFR BLD CALC: 31.7 % — LOW (ref 34.5–45)
HGB BLD-MCNC: 6.9 G/DL — CRITICAL LOW (ref 11.5–15.5)
HGB BLD-MCNC: 9.6 G/DL — LOW (ref 11.5–15.5)
IMM GRANULOCYTES NFR BLD AUTO: 1.6 % — HIGH (ref 0–1.5)
INR BLD: 2.85 — HIGH (ref 0.88–1.17)
LYMPHOCYTES # BLD AUTO: 0.66 K/UL — LOW (ref 1–3.3)
LYMPHOCYTES # BLD AUTO: 3.2 % — LOW (ref 13–44)
LYMPHOCYTES NFR SPEC AUTO: 1.8 % — LOW (ref 13–44)
MACROCYTES BLD QL: SLIGHT — SIGNIFICANT CHANGE UP
MAGNESIUM SERPL-MCNC: 1.9 MG/DL — SIGNIFICANT CHANGE UP (ref 1.6–2.6)
MAGNESIUM SERPL-MCNC: 2 MG/DL — SIGNIFICANT CHANGE UP (ref 1.6–2.6)
MCHC RBC-ENTMCNC: 25.7 PG — LOW (ref 27–34)
MCHC RBC-ENTMCNC: 26.2 PG — LOW (ref 27–34)
MCHC RBC-ENTMCNC: 29.5 % — LOW (ref 32–36)
MCHC RBC-ENTMCNC: 30.3 % — LOW (ref 32–36)
MCV RBC AUTO: 85 FL — SIGNIFICANT CHANGE UP (ref 80–100)
MCV RBC AUTO: 89 FL — SIGNIFICANT CHANGE UP (ref 80–100)
METAMYELOCYTES # FLD: 0 % — SIGNIFICANT CHANGE UP (ref 0–1)
MICROCYTES BLD QL: SLIGHT — SIGNIFICANT CHANGE UP
MONOCYTES # BLD AUTO: 2.34 K/UL — HIGH (ref 0–0.9)
MONOCYTES NFR BLD AUTO: 11.3 % — SIGNIFICANT CHANGE UP (ref 2–14)
MONOCYTES NFR BLD: 5.3 % — SIGNIFICANT CHANGE UP (ref 2–9)
MYELOCYTES NFR BLD: 0 % — SIGNIFICANT CHANGE UP (ref 0–0)
NEUTROPHIL AB SER-ACNC: 91.1 % — HIGH (ref 43–77)
NEUTROPHILS # BLD AUTO: 17.44 K/UL — HIGH (ref 1.8–7.4)
NEUTROPHILS NFR BLD AUTO: 83.8 % — HIGH (ref 43–77)
NEUTS BAND # BLD: 0.9 % — SIGNIFICANT CHANGE UP (ref 0–6)
NRBC # FLD: 0 K/UL — SIGNIFICANT CHANGE UP (ref 0–0)
NRBC # FLD: 0.02 K/UL — SIGNIFICANT CHANGE UP (ref 0–0)
OTHER - HEMATOLOGY %: 0 — SIGNIFICANT CHANGE UP
OVALOCYTES BLD QL SMEAR: SLIGHT — SIGNIFICANT CHANGE UP
PHOSPHATE SERPL-MCNC: 3.1 MG/DL — SIGNIFICANT CHANGE UP (ref 2.5–4.5)
PHOSPHATE SERPL-MCNC: 3.2 MG/DL — SIGNIFICANT CHANGE UP (ref 2.5–4.5)
PLATELET # BLD AUTO: 137 K/UL — LOW (ref 150–400)
PLATELET # BLD AUTO: 146 K/UL — LOW (ref 150–400)
PLATELET COUNT - ESTIMATE: SIGNIFICANT CHANGE UP
PMV BLD: 10.7 FL — SIGNIFICANT CHANGE UP (ref 7–13)
PMV BLD: 11.1 FL — SIGNIFICANT CHANGE UP (ref 7–13)
POIKILOCYTOSIS BLD QL AUTO: SLIGHT — SIGNIFICANT CHANGE UP
POTASSIUM SERPL-MCNC: 3.9 MMOL/L — SIGNIFICANT CHANGE UP (ref 3.5–5.3)
POTASSIUM SERPL-MCNC: 4.5 MMOL/L — SIGNIFICANT CHANGE UP (ref 3.5–5.3)
POTASSIUM SERPL-SCNC: 3.9 MMOL/L — SIGNIFICANT CHANGE UP (ref 3.5–5.3)
POTASSIUM SERPL-SCNC: 4.5 MMOL/L — SIGNIFICANT CHANGE UP (ref 3.5–5.3)
PROMYELOCYTES # FLD: 0 % — SIGNIFICANT CHANGE UP (ref 0–0)
PROT SERPL-MCNC: 4.8 G/DL — LOW (ref 6–8.3)
PROTHROM AB SERPL-ACNC: 32.7 SEC — HIGH (ref 9.8–13.1)
RBC # BLD: 2.63 M/UL — LOW (ref 3.8–5.2)
RBC # BLD: 3.73 M/UL — LOW (ref 3.8–5.2)
RBC # FLD: 21.1 % — HIGH (ref 10.3–14.5)
RBC # FLD: 21.2 % — HIGH (ref 10.3–14.5)
RH IG SCN BLD-IMP: POSITIVE — SIGNIFICANT CHANGE UP
SCHISTOCYTES BLD QL AUTO: SLIGHT — SIGNIFICANT CHANGE UP
SODIUM SERPL-SCNC: 136 MMOL/L — SIGNIFICANT CHANGE UP (ref 135–145)
SODIUM SERPL-SCNC: 138 MMOL/L — SIGNIFICANT CHANGE UP (ref 135–145)
VARIANT LYMPHS # BLD: 0.9 % — SIGNIFICANT CHANGE UP
WBC # BLD: 11.07 K/UL — HIGH (ref 3.8–10.5)
WBC # BLD: 20.8 K/UL — HIGH (ref 3.8–10.5)
WBC # FLD AUTO: 11.07 K/UL — HIGH (ref 3.8–10.5)
WBC # FLD AUTO: 20.8 K/UL — HIGH (ref 3.8–10.5)

## 2019-04-14 PROCEDURE — 71045 X-RAY EXAM CHEST 1 VIEW: CPT | Mod: 26

## 2019-04-14 PROCEDURE — 93306 TTE W/DOPPLER COMPLETE: CPT | Mod: 26

## 2019-04-14 RX ORDER — METRONIDAZOLE 500 MG
500 TABLET ORAL ONCE
Qty: 0 | Refills: 0 | Status: COMPLETED | OUTPATIENT
Start: 2019-04-14 | End: 2019-04-14

## 2019-04-14 RX ORDER — PHENYLEPHRINE HYDROCHLORIDE 10 MG/ML
1 INJECTION INTRAVENOUS
Qty: 40 | Refills: 0 | Status: DISCONTINUED | OUTPATIENT
Start: 2019-04-14 | End: 2019-04-15

## 2019-04-14 RX ORDER — METRONIDAZOLE 500 MG
TABLET ORAL
Qty: 0 | Refills: 0 | Status: DISCONTINUED | OUTPATIENT
Start: 2019-04-14 | End: 2019-04-15

## 2019-04-14 RX ORDER — AMIODARONE HYDROCHLORIDE 400 MG/1
1 TABLET ORAL
Qty: 450 | Refills: 0 | Status: DISCONTINUED | OUTPATIENT
Start: 2019-04-14 | End: 2019-04-14

## 2019-04-14 RX ORDER — DILTIAZEM HCL 120 MG
20 CAPSULE, EXT RELEASE 24 HR ORAL ONCE
Qty: 0 | Refills: 0 | Status: COMPLETED | OUTPATIENT
Start: 2019-04-14 | End: 2019-04-14

## 2019-04-14 RX ORDER — ALPRAZOLAM 0.25 MG
0.25 TABLET ORAL ONCE
Qty: 0 | Refills: 0 | Status: DISCONTINUED | OUTPATIENT
Start: 2019-04-14 | End: 2019-04-14

## 2019-04-14 RX ORDER — DIGOXIN 250 MCG
0.5 TABLET ORAL ONCE
Qty: 0 | Refills: 0 | Status: COMPLETED | OUTPATIENT
Start: 2019-04-14 | End: 2019-04-14

## 2019-04-14 RX ORDER — PANTOPRAZOLE SODIUM 20 MG/1
40 TABLET, DELAYED RELEASE ORAL
Qty: 0 | Refills: 0 | Status: DISCONTINUED | OUTPATIENT
Start: 2019-04-14 | End: 2019-04-14

## 2019-04-14 RX ORDER — VANCOMYCIN HCL 1 G
500 VIAL (EA) INTRAVENOUS EVERY 6 HOURS
Qty: 0 | Refills: 0 | Status: DISCONTINUED | OUTPATIENT
Start: 2019-04-14 | End: 2019-04-16

## 2019-04-14 RX ORDER — PANTOPRAZOLE SODIUM 20 MG/1
8 TABLET, DELAYED RELEASE ORAL
Qty: 80 | Refills: 0 | Status: DISCONTINUED | OUTPATIENT
Start: 2019-04-14 | End: 2019-04-16

## 2019-04-14 RX ORDER — DILTIAZEM HCL 120 MG
5 CAPSULE, EXT RELEASE 24 HR ORAL
Qty: 125 | Refills: 0 | Status: DISCONTINUED | OUTPATIENT
Start: 2019-04-14 | End: 2019-04-15

## 2019-04-14 RX ORDER — DIGOXIN 250 MCG
0.12 TABLET ORAL EVERY OTHER DAY
Qty: 0 | Refills: 0 | Status: DISCONTINUED | OUTPATIENT
Start: 2019-04-16 | End: 2019-04-16

## 2019-04-14 RX ORDER — PHENYLEPHRINE HYDROCHLORIDE 10 MG/ML
0.5 INJECTION INTRAVENOUS
Qty: 40 | Refills: 0 | Status: DISCONTINUED | OUTPATIENT
Start: 2019-04-14 | End: 2019-04-14

## 2019-04-14 RX ORDER — METRONIDAZOLE 500 MG
500 TABLET ORAL EVERY 8 HOURS
Qty: 0 | Refills: 0 | Status: DISCONTINUED | OUTPATIENT
Start: 2019-04-15 | End: 2019-04-15

## 2019-04-14 RX ORDER — MORPHINE SULFATE 50 MG/1
1 CAPSULE, EXTENDED RELEASE ORAL ONCE
Qty: 0 | Refills: 0 | Status: DISCONTINUED | OUTPATIENT
Start: 2019-04-14 | End: 2019-04-14

## 2019-04-14 RX ORDER — AMIODARONE HYDROCHLORIDE 400 MG/1
150 TABLET ORAL ONCE
Qty: 0 | Refills: 0 | Status: COMPLETED | OUTPATIENT
Start: 2019-04-14 | End: 2019-04-14

## 2019-04-14 RX ORDER — SODIUM CHLORIDE 9 MG/ML
500 INJECTION INTRAMUSCULAR; INTRAVENOUS; SUBCUTANEOUS ONCE
Qty: 0 | Refills: 0 | Status: COMPLETED | OUTPATIENT
Start: 2019-04-14 | End: 2019-04-14

## 2019-04-14 RX ADMIN — AMIODARONE HYDROCHLORIDE 618 MILLIGRAM(S): 400 TABLET ORAL at 19:34

## 2019-04-14 RX ADMIN — OXYCODONE AND ACETAMINOPHEN 1 TABLET(S): 5; 325 TABLET ORAL at 09:41

## 2019-04-14 RX ADMIN — AMIODARONE HYDROCHLORIDE 33.33 MG/MIN: 400 TABLET ORAL at 21:51

## 2019-04-14 RX ADMIN — Medication 0.25 MILLIGRAM(S): at 02:48

## 2019-04-14 RX ADMIN — Medication 650 MILLIGRAM(S): at 11:21

## 2019-04-14 RX ADMIN — HEPARIN SODIUM 500 UNIT(S)/HR: 5000 INJECTION INTRAVENOUS; SUBCUTANEOUS at 11:28

## 2019-04-14 RX ADMIN — Medication 650 MILLIGRAM(S): at 06:42

## 2019-04-14 RX ADMIN — Medication 20 MILLIGRAM(S): at 22:04

## 2019-04-14 RX ADMIN — Medication 125 MILLIGRAM(S): at 11:21

## 2019-04-14 RX ADMIN — OXYCODONE AND ACETAMINOPHEN 1 TABLET(S): 5; 325 TABLET ORAL at 11:30

## 2019-04-14 RX ADMIN — LEVALBUTEROL 0.63 MILLIGRAM(S): 1.25 SOLUTION, CONCENTRATE RESPIRATORY (INHALATION) at 08:38

## 2019-04-14 RX ADMIN — Medication 100 MILLIGRAM(S): at 21:52

## 2019-04-14 RX ADMIN — MIDODRINE HYDROCHLORIDE 20 MILLIGRAM(S): 2.5 TABLET ORAL at 06:42

## 2019-04-14 RX ADMIN — Medication 0.5 MILLIGRAM(S): at 16:10

## 2019-04-14 RX ADMIN — SODIUM CHLORIDE 250 MILLILITER(S): 9 INJECTION INTRAMUSCULAR; INTRAVENOUS; SUBCUTANEOUS at 19:32

## 2019-04-14 RX ADMIN — Medication 125 MILLIGRAM(S): at 17:27

## 2019-04-14 RX ADMIN — MIDODRINE HYDROCHLORIDE 20 MILLIGRAM(S): 2.5 TABLET ORAL at 15:41

## 2019-04-14 RX ADMIN — Medication 5 MG/HR: at 22:45

## 2019-04-14 RX ADMIN — MIDODRINE HYDROCHLORIDE 20 MILLIGRAM(S): 2.5 TABLET ORAL at 23:49

## 2019-04-14 RX ADMIN — PHENYLEPHRINE HYDROCHLORIDE 10.54 MICROGRAM(S)/KG/MIN: 10 INJECTION INTRAVENOUS at 22:03

## 2019-04-14 RX ADMIN — Medication 650 MILLIGRAM(S): at 23:49

## 2019-04-14 RX ADMIN — Medication 81 MILLIGRAM(S): at 11:21

## 2019-04-14 RX ADMIN — PANTOPRAZOLE SODIUM 10 MG/HR: 20 TABLET, DELAYED RELEASE ORAL at 21:52

## 2019-04-14 RX ADMIN — MUPIROCIN 1 APPLICATION(S): 20 OINTMENT TOPICAL at 11:21

## 2019-04-14 RX ADMIN — Medication 125 MILLIGRAM(S): at 00:09

## 2019-04-14 RX ADMIN — AMIODARONE HYDROCHLORIDE 200 MILLIGRAM(S): 400 TABLET ORAL at 06:41

## 2019-04-14 RX ADMIN — Medication 125 MILLIGRAM(S): at 06:42

## 2019-04-14 RX ADMIN — Medication 1 APPLICATION(S): at 11:21

## 2019-04-14 RX ADMIN — Medication 100 MILLIGRAM(S): at 11:21

## 2019-04-14 RX ADMIN — BUDESONIDE AND FORMOTEROL FUMARATE DIHYDRATE 2 PUFF(S): 160; 4.5 AEROSOL RESPIRATORY (INHALATION) at 11:29

## 2019-04-14 NOTE — CHART NOTE - NSCHARTNOTEFT_GEN_A_CORE
Patient with HR in 130's at times. Patient seen and examined. She has diarrhea from C. diff and just had a BM . She is laying in bed arousable to verbal stimuli c/o feeling tired. Patient on amiodarone, SBP running on the low normal side. Upon physical exam abdomen distended soft not tender. Lungs with rales. Case discsussed with Dr Vieyra will give digoxin 0.5 then start 0.125 q other day.

## 2019-04-14 NOTE — CHART NOTE - NSCHARTNOTEFT_GEN_A_CORE
Patient provisionally on schedule for angiogram on 4/16. However, increasing creatinine and current C.diff infection complicated plan for OR.     Plan:  - Please obtain Type & Screen with 4/15 AM labs  - Please check INR in addition to PTT with next check (last INR was elevated)  - Appreciate ongoing documentation of clearance by nephrology and cardiology  - Will proceed to angiogram on 4/16 if able pending improvement/worsening of overall clinical picture    Deandre Méndez, PGY2  [id number]

## 2019-04-14 NOTE — PROGRESS NOTE ADULT - ATTENDING COMMENTS
[facility] NEPHROLOGY  Harish Valera M.D.  Kevin Cedeno D.O.  Rachele Mckeon M.D.  Christina Westfall, MSN, ANP-C    Telephone: [phone]  Facsimile: [phone]    [address] [place] [address]

## 2019-04-14 NOTE — PROGRESS NOTE ADULT - ASSESSMENT
Problem/Plan - 1:  ·  Problem: Lower limb ulcer, ankle, right, with unspecified severity.  Plan: -Spodiatry and vascular fu -wound care fu  -pain meds prn.     Problem/Plan - 2:·  Problem: IGOR (acute kidney injury).  Plan: -likely with prerenal component in setting of decreased fluid intake  renal fumonitor cr    Problem/Plan - 3:  ·  Problem: Urinary tract infection with hematuria, site unspecified.  Plan: - off antibiotics    Problem/Plan - 4:  ·  Problem: Rapid atrial fibrillation.  Plan: c/w hep gtt  management as per cards  started digoxin    Problem/Plan - 5:  ·  Problem: C diff colitis  Plan: c/w PO vanco

## 2019-04-14 NOTE — PROGRESS NOTE ADULT - SUBJECTIVE AND OBJECTIVE BOX
[facility] NEPHROLOGY- PROGRESS NOTE    79y Female with history of CHF, COPD presents with RLE pain. Nephrology consulted for elevated Scr.    Patient now C. diff positive.  +malaise.  +RLE pain.    REVIEW OF SYSTEMS:  Gen: +malaise   Cards: no chest pain  Resp: + dyspnea (chronic)  GI: no nausea or vomiting, + diarrhea, +mild pain  Vascular: +RLE edema/pain    No Known Allergies      Hospital Medications: Medications reviewed      VITALS:  T(F): 98.2 (19 @ 06:36), Max: 98.4 (19 @ 02:00)  HR: 118 (19 @ 06:36)  BP: 95/55 (19 @ 06:36)  RR: 18 (19 @ 06:36)  SpO2: 94% (19 @ 06:36)  Wt(kg): --     @ 07:01  -   @ 07:00  --------------------------------------------------------  IN: 285 mL / OUT: 0 mL / NET: 285 mL      PHYSICAL EXAM:  Gen: NAD, calm  Cards: Irregularly irregular, +S1/S2, no M/G/R  Resp: Bibasilar rales  GI: soft, NT, + ab distention, +ascites, dull to percussion, NABS  Vascular: 2+ RLE edema, 1+ LLE edema        LABS: Pending today.          136  |  100  |  80<H>  ----------------------------<  105<H>  4.1   |  21<L>  |  3.03<H>    Ca    9.0      2019 07:00  Phos  3.3       Mg     1.9           Creatinine Trend: 3.03 <--, 2.84 <--, 2.66 <--, 2.49 <--, 2.46 <--, 2.21 <--                        9.4    8.69  )-----------( 140      ( 2019 07:00 )             31.8     Urine Studies:  Urinalysis Basic - ( 2019 00:05 )    Color: YELLOW / Appearance: Lt TURBID / S.021 / pH: 5.5  Gluc: NEGATIVE / Ketone: NEGATIVE  / Bili: NEGATIVE / Urobili: NORMAL   Blood: SMALL / Protein: 30 / Nitrite: NEGATIVE   Leuk Esterase: LARGE / RBC: 11-25 / WBC >50   Sq Epi: MODERATE / Non Sq Epi:  / Bacteria: NEGATIVE      Protein, Random Urine: 43.3 mg/dL ( @ 12:48)  Creatinine, Random Urine: 119.50 mg/dL ( @ 12:48)  Sodium, Random Urine: < 20 mmol/L ( @ 00:05)  Creatinine, Random Urine: 101.80 mg/dL ( @ 00:05)

## 2019-04-14 NOTE — PROGRESS NOTE ADULT - SUBJECTIVE AND OBJECTIVE BOX
Patient is a 79y old  Female who presents with a chief complaint of right leg wound/ swelling (14 Apr 2019 13:31)      INTERVAL HPI/OVERNIGHT EVENTS:  T(C): 36.6 (04-14-19 @ 11:22), Max: 36.9 (04-14-19 @ 02:00)  HR: 151 (04-14-19 @ 15:56) (88 - 151)  BP: 100/52 (04-14-19 @ 15:56) (95/55 - 112/61)  RR: 18 (04-14-19 @ 15:56) (17 - 18)  SpO2: 92% (04-14-19 @ 15:56) (91% - 97%)  Wt(kg): --  I&O's Summary    13 Apr 2019 07:01  -  14 Apr 2019 07:00  --------------------------------------------------------  IN: 285 mL / OUT: 0 mL / NET: 285 mL        LABS:                        9.6    11.07 )-----------( 146      ( 14 Apr 2019 06:25 )             31.7     04-14    136  |  98  |  95<H>  ----------------------------<  101<H>  3.9   |  23  |  3.01<H>    Ca    9.1      14 Apr 2019 06:25  Phos  3.2     04-14  Mg     2.0     04-14      PTT - ( 14 Apr 2019 06:25 )  PTT:81.4 SEC    CAPILLARY BLOOD GLUCOSE                MEDICATIONS  (STANDING):  amiodarone    Tablet   Oral   amiodarone    Tablet 200 milliGRAM(s) Oral daily  aspirin enteric coated 81 milliGRAM(s) Oral daily  atorvastatin 40 milliGRAM(s) Oral at bedtime  buDESOnide 160 MICROgram(s)/formoterol 4.5 MICROgram(s) Inhaler 2 Puff(s) Inhalation two times a day  collagenase Ointment 1 Application(s) Topical daily  heparin  Infusion.  Unit(s)/Hr (11 mL/Hr) IV Continuous <Continuous>  midodrine 20 milliGRAM(s) Oral three times a day  mupirocin 2% Ointment 1 Application(s) Topical <User Schedule>  sodium bicarbonate 650 milliGRAM(s) Oral three times a day  thiamine 100 milliGRAM(s) Oral daily  vancomycin    Solution 125 milliGRAM(s) Oral every 6 hours    MEDICATIONS  (PRN):  heparin  Injectable 4500 Unit(s) IV Push every 6 hours PRN For aPTT less than 40  heparin  Injectable 2000 Unit(s) IV Push every 6 hours PRN For aPTT between 40 - 57  levalbuterol Inhalation 0.63 milliGRAM(s) Inhalation every 6 hours PRN SOB  oxyCODONE    5 mG/acetaminophen 325 mG 1 Tablet(s) Oral every 6 hours PRN Moderate Pain and severe pain          PHYSICAL EXAM:  GENERAL: NAD, well-groomed, well-developed  HEAD:  Atraumatic, Normocephalic  CHEST/LUNG: Clear to percussion bilaterally; No rales, rhonchi, wheezing, or rubs  HEART: Regular rate and rhythm; No murmurs, rubs, or gallops  ABDOMEN: Soft, Nontender, Nondistended; Bowel sounds present  EXTREMITIES:  2+ Peripheral Pulses, No clubbing, cyanosis, or edema  LYMPH: No lymphadenopathy noted  SKIN: No rashes or lesions    Care Discussed with Consultants/Other Providers [ ] YES  [ ] NO

## 2019-04-14 NOTE — CHART NOTE - NSCHARTNOTEFT_GEN_A_CORE
CHIEF COMPLAINT:    HPI:  79F h/o HTN, COPD (currently on 3L O2 24 hours), paroxsymal a-fib (on pradaxa), CAD (s/p stent 11/2018), CHF (RV dysfucnction), anxiety, PVD and RLE ulcer, presenting to the [facility] ED with chronic right ankle pressure ulcer pain for 1 week.     Pt reports no changes in symptoms; daughters' boyfriend at bedside and  has been taking care of pt at home since discharge from [place] last week. Daughters boyfriend notes that pt was unable to keep her appointment with vascular doctor recently as ambulette was unequipped for oxygen requirements. Came to ed as a result of expediting vascular eval, as well as running low on pain meds. Found to have UA strongly suggestive of UTI although pt denies change in voiding habits, fever, abdominal or flank pain. Mild igor noted BUN/Cr  56/1.6, last SCr 1.26 Feb 15, but pt has had SCr close to 2.0 in january. Pt endorses decreased po intake; reports being told in [place] to reduce po fluid intake. Seen by podiatry and vascular , no need for abx. Vacular with plan for angiogram once kidney function improves.    Admitted for management of LE ulcer, pseudomonas UTI. Course c/b poor PO intake, IGOR, afib w/ RVR. S/p PO amio load. Remained hypotensive and MICU consulted on 4/10. Recommended to give fluids, stop metoprolol and midodrine. Patient found to have cdiff and started on PO vanc. MICU called to reassess patient on 4/14 as hypotensive and in rapid afib. S/p PO dig but remains tachy to 150-180, BP 80s/50s. Given 500 cc bolus and 150 mg amio with improvement in HR to 100-130s. /50. However, patient with tachypnea, inc WOB, and encephelopathic. Transferred to MICU.       PAST MEDICAL & SURGICAL HISTORY:  CAD (coronary artery disease): s/p stent 2018  CHF (congestive heart failure)  COPD (chronic obstructive pulmonary disease): on 2-3L O2 at home prn  Anxiety  TIA (transient ischemic attack): many years ago  PAF (paroxysmal atrial fibrillation)  HLD (hyperlipidemia)  HTN (hypertension)  H/O total hysterectomy: 2014  History of cataract surgery: b/l 2015  History of repair of hip fracture: luisa placed in RLE 2015  H/O spinal fusion: c2-6 in 2017    FAMILY HISTORY:  No pertinent family history in first degree relatives    Allergies  No Known Allergies    Home Medications:  acetaminophen 325 mg oral tablet: 1 tab(s) orally every 8 hours, As Needed  (03 Apr 2019 11:30)  atorvastatin 40 mg oral tablet: 1 tab(s) orally once a day (at bedtime) (03 Apr 2019 11:30)  clopidogrel 75 mg oral tablet: 1 tab(s) orally once a day (03 Apr 2019 11:30)  famotidine 20 mg oral tablet: 1 tab(s) orally once a day (at bedtime) (03 Apr 2019 11:30)  ferrous sulfate 325 mg (65 mg elemental iron) oral delayed release tablet: 1 tab(s) orally once a day (03 Apr 2019 11:30)  folic acid 1 mg oral tablet: 1 tab(s) orally once a day (03 Apr 2019 11:30)  furosemide 40 mg oral tablet: 1 tab(s) orally once a day (03 Apr 2019 11:30)  levalbuterol 0.63 mg/3 mL inhalation solution: 3 milliliter(s) inhaled every 6 hours, As Needed (03 Apr 2019 11:30)  Lexapro 5 mg oral tablet: 1 tab(s) orally once a day (03 Apr 2019 11:30)  Melatonin 3 mg oral tablet: 1 tab(s) orally once (at bedtime) (03 Apr 2019 11:30)  metoprolol tartrate 75 mg oral tablet: 1 tab(s) orally 2 times a day (03 Apr 2019 11:30)  midodrine 10 mg oral tablet: 1 tab(s) orally 3 times a day (03 Apr 2019 11:30)  MiraLax oral powder for reconstitution: 17 grams mixed with 8 oz of water once daily as needed  (03 Apr 2019 11:30)  Symbicort 160 mcg-4.5 mcg/inh inhalation aerosol: 2 puff(s) inhaled 2 times a day (03 Apr 2019 11:30)  thiamine 100 mg oral tablet: 1 tab(s) orally once a day (03 Apr 2019 11:30)  traMADol 50 mg oral tablet: 1 tab(s) orally every 6 hours, As Needed  (03 Apr 2019 11:30)  Xanax 0.25 mg oral tablet: 1 tab(s) orally once a day (at bedtime), As Needed (03 Apr 2019 11:30)      HOSPITAL MEDICATIONS:  aspirin enteric coated 81 milliGRAM(s) Oral daily  vancomycin    Solution 125 milliGRAM(s) Oral every 6 hours  amiodarone    Tablet   Oral   amiodarone    Tablet 200 milliGRAM(s) Oral daily  midodrine 20 milliGRAM(s) Oral three times a day  atorvastatin 40 milliGRAM(s) Oral at bedtime  buDESOnide 160 MICROgram(s)/formoterol 4.5 MICROgram(s) Inhaler 2 Puff(s) Inhalation two times a day  levalbuterol Inhalation 0.63 milliGRAM(s) Inhalation every 6 hours PRN  oxyCODONE    5 mG/acetaminophen 325 mG 1 Tablet(s) Oral every 6 hours PRN    sodium bicarbonate 650 milliGRAM(s) Oral three times a day  thiamine 100 milliGRAM(s) Oral daily    collagenase Ointment 1 Application(s) Topical daily  mupirocin 2% Ointment 1 Application(s) Topical <User Schedule>    REVIEW OF SYSTEMS:  [x ] Unable to assess ROS because AMS    OBJECTIVE:  ICU Vital Signs Last 24 Hrs  T(C): 36.7 (14 Apr 2019 19:45), Max: 36.9 (14 Apr 2019 02:00)  T(F): 98 (14 Apr 2019 19:45), Max: 98.4 (14 Apr 2019 02:00)  HR: 129 (14 Apr 2019 19:52) (88 - 160)  BP: 103/55 (14 Apr 2019 19:52) (73/39 - 112/61)  BP(mean): --  ABP: --  ABP(mean): --  RR: 16 (14 Apr 2019 19:52) (16 - 18)  SpO2: 90% (14 Apr 2019 19:52) (90% - 97%)      04-13 @ 07:01  -  04-14 @ 07:00  --------------------------------------------------------  IN: 285 mL / OUT: 0 mL / NET: 285 mL    PHYSICAL EXAM:  General: NAD, AAOx0 altered  HEENT: MMM, EOMI, PERRL, sclera anicteric   Neck: supple  Respiratory: CTABL  Cardiovascular:  RRR, nl S1+S2, no MRG  Abdomen: soft, NT/ND, no hepatosplenomegaly  Extremities:  no LE edema   Skin: no rash  Neurological: non-focal  Psychiatry: nl mood and affect      LABS:                        9.6    11.07 )-----------( 146      ( 14 Apr 2019 06:25 )             31.7     Hgb Trend: 9.6<--, 9.4<--, 10.0<--, 10.0<--, 10.2<--  04-14    136  |  98  |  95<H>  ----------------------------<  101<H>  3.9   |  23  |  3.01<H>    Ca    9.1      14 Apr 2019 06:25  Phos  3.2     04-14  Mg     2.0     04-14      Creatinine Trend: 3.01<--, 3.03<--, 2.84<--, 2.66<--, 2.49<--, 2.46<--  PTT - ( 14 Apr 2019 06:25 )  PTT:81.4 SEC    MICROBIOLOGY:     RADIOLOGY:    EKG: afib with rvr CHIEF COMPLAINT:    HPI:  79F h/o HTN, COPD (currently on 3L O2 24 hours), paroxsymal a-fib (on pradaxa), CAD (s/p stent 11/2018), CHF (RV dysfucnction), anxiety, PVD and RLE ulcer, presenting to the [facility] ED with chronic right ankle pressure ulcer pain for 1 week.     Pt reports no changes in symptoms; daughters' boyfriend at bedside and  has been taking care of pt at home since discharge from [place] last week. Daughters boyfriend notes that pt was unable to keep her appointment with vascular doctor recently as ambulette was unequipped for oxygen requirements. Came to ed as a result of expediting vascular eval, as well as running low on pain meds. Found to have UA strongly suggestive of UTI although pt denies change in voiding habits, fever, abdominal or flank pain. Mild igor noted BUN/Cr  56/1.6, last SCr 1.26 Feb 15, but pt has had SCr close to 2.0 in january. Pt endorses decreased po intake; reports being told in [place] to reduce po fluid intake. Seen by podiatry and vascular , no need for abx. Vacular with plan for angiogram once kidney function improves.    Admitted for management of LE ulcer, pseudomonas UTI. Course c/b poor PO intake, IGOR, afib w/ RVR. S/p PO amio load. Remained hypotensive and MICU consulted on 4/10. Recommended to give fluids, stop metoprolol and midodrine. Patient found to have cdiff and started on PO vanc. MICU called to reassess patient on 4/14 as hypotensive and in rapid afib. S/p PO dig but remains tachy to 150-180, BP 80s/50s. Given 500 cc bolus and 150 mg amio with improvement in HR to 100-130s. /50. However, patient with tachypnea, inc WOB, and encephelopathic. Transferred to MICU.       PAST MEDICAL & SURGICAL HISTORY:  CAD (coronary artery disease): s/p stent 2018  CHF (congestive heart failure)  COPD (chronic obstructive pulmonary disease): on 2-3L O2 at home prn  Anxiety  TIA (transient ischemic attack): many years ago  PAF (paroxysmal atrial fibrillation)  HLD (hyperlipidemia)  HTN (hypertension)  H/O total hysterectomy: 2014  History of cataract surgery: b/l 2015  History of repair of hip fracture: luisa placed in RLE 2015  H/O spinal fusion: c2-6 in 2017    FAMILY HISTORY:  No pertinent family history in first degree relatives    Allergies  No Known Allergies    Home Medications:  acetaminophen 325 mg oral tablet: 1 tab(s) orally every 8 hours, As Needed  (03 Apr 2019 11:30)  atorvastatin 40 mg oral tablet: 1 tab(s) orally once a day (at bedtime) (03 Apr 2019 11:30)  clopidogrel 75 mg oral tablet: 1 tab(s) orally once a day (03 Apr 2019 11:30)  famotidine 20 mg oral tablet: 1 tab(s) orally once a day (at bedtime) (03 Apr 2019 11:30)  ferrous sulfate 325 mg (65 mg elemental iron) oral delayed release tablet: 1 tab(s) orally once a day (03 Apr 2019 11:30)  folic acid 1 mg oral tablet: 1 tab(s) orally once a day (03 Apr 2019 11:30)  furosemide 40 mg oral tablet: 1 tab(s) orally once a day (03 Apr 2019 11:30)  levalbuterol 0.63 mg/3 mL inhalation solution: 3 milliliter(s) inhaled every 6 hours, As Needed (03 Apr 2019 11:30)  Lexapro 5 mg oral tablet: 1 tab(s) orally once a day (03 Apr 2019 11:30)  Melatonin 3 mg oral tablet: 1 tab(s) orally once (at bedtime) (03 Apr 2019 11:30)  metoprolol tartrate 75 mg oral tablet: 1 tab(s) orally 2 times a day (03 Apr 2019 11:30)  midodrine 10 mg oral tablet: 1 tab(s) orally 3 times a day (03 Apr 2019 11:30)  MiraLax oral powder for reconstitution: 17 grams mixed with 8 oz of water once daily as needed  (03 Apr 2019 11:30)  Symbicort 160 mcg-4.5 mcg/inh inhalation aerosol: 2 puff(s) inhaled 2 times a day (03 Apr 2019 11:30)  thiamine 100 mg oral tablet: 1 tab(s) orally once a day (03 Apr 2019 11:30)  traMADol 50 mg oral tablet: 1 tab(s) orally every 6 hours, As Needed  (03 Apr 2019 11:30)  Xanax 0.25 mg oral tablet: 1 tab(s) orally once a day (at bedtime), As Needed (03 Apr 2019 11:30)      HOSPITAL MEDICATIONS:  aspirin enteric coated 81 milliGRAM(s) Oral daily  vancomycin    Solution 125 milliGRAM(s) Oral every 6 hours  amiodarone    Tablet   Oral   amiodarone    Tablet 200 milliGRAM(s) Oral daily  midodrine 20 milliGRAM(s) Oral three times a day  atorvastatin 40 milliGRAM(s) Oral at bedtime  buDESOnide 160 MICROgram(s)/formoterol 4.5 MICROgram(s) Inhaler 2 Puff(s) Inhalation two times a day  levalbuterol Inhalation 0.63 milliGRAM(s) Inhalation every 6 hours PRN  oxyCODONE    5 mG/acetaminophen 325 mG 1 Tablet(s) Oral every 6 hours PRN    sodium bicarbonate 650 milliGRAM(s) Oral three times a day  thiamine 100 milliGRAM(s) Oral daily    collagenase Ointment 1 Application(s) Topical daily  mupirocin 2% Ointment 1 Application(s) Topical <User Schedule>    REVIEW OF SYSTEMS:  [x ] Unable to assess ROS because AMS    OBJECTIVE:  ICU Vital Signs Last 24 Hrs  T(C): 36.7 (14 Apr 2019 19:45), Max: 36.9 (14 Apr 2019 02:00)  T(F): 98 (14 Apr 2019 19:45), Max: 98.4 (14 Apr 2019 02:00)  HR: 129 (14 Apr 2019 19:52) (88 - 160)  BP: 103/55 (14 Apr 2019 19:52) (73/39 - 112/61)  BP(mean): --  ABP: --  ABP(mean): --  RR: 16 (14 Apr 2019 19:52) (16 - 18)  SpO2: 90% (14 Apr 2019 19:52) (90% - 97%)      04-13 @ 07:01  -  04-14 @ 07:00  --------------------------------------------------------  IN: 285 mL / OUT: 0 mL / NET: 285 mL    PHYSICAL EXAM:  General: NAD, AAOx0 altered  HEENT: MMM, EOMI, PERRL, sclera anicteric   Neck: supple  Respiratory: CTABL  Cardiovascular:  RRR, nl S1+S2, no MRG  Abdomen: soft, NT/ND, no hepatosplenomegaly  Extremities:  no LE edema   Skin: no rash  Neurological: non-focal  Psychiatry: nl mood and affect      LABS:                        9.6    11.07 )-----------( 146      ( 14 Apr 2019 06:25 )             31.7     Hgb Trend: 9.6<--, 9.4<--, 10.0<--, 10.0<--, 10.2<--  04-14    136  |  98  |  95<H>  ----------------------------<  101<H>  3.9   |  23  |  3.01<H>    Ca    9.1      14 Apr 2019 06:25  Phos  3.2     04-14  Mg     2.0     04-14      Creatinine Trend: 3.01<--, 3.03<--, 2.84<--, 2.66<--, 2.49<--, 2.46<--  PTT - ( 14 Apr 2019 06:25 )  PTT:81.4 SEC    MICROBIOLOGY:   cdiff positive     RADIOLOGY:  < from: Transthoracic Echocardiogram (04.14.19 @ 10:02) >    CONCLUSIONS:  1. Normal left ventricular internal dimensions and wall  thicknesses.  2. Normal left ventricular systolic function. No segmental  wall motion abnormalities.  3. Moderate right atrial enlargement.  4. Right ventricular enlargement with decreased right  ventricular systolic function.  5. Estimated pulmonary artery systolic pressure equals 62  mm Hg, assuming right atrial pressure equals 10  mm Hg,  consistent with severe pulmonary hypertension.  *** Compared with echocardiogram of 1/17/2019, there is RV  dysfunction with increased PASP.    < end of copied text >    EKG: afib with rvr  79F h/o HTN, COPD (currently on 3L O2 24 hours), paroxsymal a-fib (on pradaxa), CAD (s/p stent 11/2018), CHF (RV dysfucnction), cirrhosis (2/2 CHF?) anxiety, PVD and RLE ulcer, presenting to the [facility] ED with chronic right ankle pressure ulcer pain for 1 week. Admitted for management of LE ulcer, UTI. Course c/b poor PO intake, sepsis 2/2 cdiff, IGOR, afib w/ RVR.     #neuro  -encephalopathic 2/2 sepsis 2/2 cdiff, low bp given afib with rvr, dehydartion  -c/w treatment cdiff, afib     #cards  -septic (cdiff) vs cardiogenic (afib w/ rvr) shock on midodrine  -will c/w IV amio to control HR and improve BP, may require cardioversion in unit   -hold heparin gtt for afib now given coffee ground emesis suctioned   -known RV dysfunction, preserved LV function, appears hypovolemic but will hold off on further IVF given hypoxia and patient likely with pulm edema 2/2 afib w/ RVR  -PVD- c/w wound care mgmt of RLE. eventually will need RLE angio when medically optimized  -resume ASA for CAD when GIB resolved    #pulm  -sating 95 on NRB, hypoxic respiratory failure likely 2/2 pulm edema    -optimize HR and BP, and diuresis as needed   -COPD on home O2, can wean to NC as tolerated, c/w COPD meds     #GI   -coffee groudn emesis. holding a/c. start protonix gtt. stat cbc, tx hgb<7. GI following   -also with cirrhosis, d/w GI   -NPO for now     #ID  -sepsis 2/2 cdiff  -inc to vanc  q6 and IV flagyl      #renal   IGOR in setting of sepsis 2/2 cdiff, afib with rvr. likely prerenal and intrinsic component. c/w trend Cr. renal on board    #ppx   -SCDs     #GOC  GOC with son and he is unsure if patient would want to be intubated.  Son will discuss with his sister.     Alberto Gray PGY3 CHIEF COMPLAINT:    HPI:  79F h/o HTN, COPD (currently on 3L O2 24 hours), paroxsymal a-fib (on pradaxa), CAD (s/p stent 11/2018), CHF (RV dysfucnction), anxiety, PVD and RLE ulcer, presenting to the [facility] ED with chronic right ankle pressure ulcer pain for 1 week.     Pt reports no changes in symptoms; daughters' boyfriend at bedside and  has been taking care of pt at home since discharge from [place] last week. Daughters boyfriend notes that pt was unable to keep her appointment with vascular doctor recently as ambulette was unequipped for oxygen requirements. Came to ed as a result of expediting vascular eval, as well as running low on pain meds. Found to have UA strongly suggestive of UTI although pt denies change in voiding habits, fever, abdominal or flank pain. Mild igor noted BUN/Cr  56/1.6, last SCr 1.26 Feb 15, but pt has had SCr close to 2.0 in january. Pt endorses decreased po intake; reports being told in [place] to reduce po fluid intake. Seen by podiatry and vascular , no need for abx. Vacular with plan for angiogram once kidney function improves.    Admitted for management of LE ulcer, pseudomonas UTI. Course c/b poor PO intake, IGOR, afib w/ RVR. S/p PO amio load. Remained hypotensive and MICU consulted on 4/10. Recommended to give fluids, stop metoprolol and midodrine. Patient found to have cdiff and started on PO vanc. MICU called to reassess patient on 4/14 as hypotensive and in rapid afib. S/p PO dig but remains tachy to 150-180, BP 80s/50s. Given 500 cc bolus and 150 mg amio with improvement in HR to 100-130s. /50. However, patient with tachypnea, inc WOB, and encephelopathic. Transferred to MICU.       PAST MEDICAL & SURGICAL HISTORY:  CAD (coronary artery disease): s/p stent 2018  CHF (congestive heart failure)  COPD (chronic obstructive pulmonary disease): on 2-3L O2 at home prn  Anxiety  TIA (transient ischemic attack): many years ago  PAF (paroxysmal atrial fibrillation)  HLD (hyperlipidemia)  HTN (hypertension)  H/O total hysterectomy: 2014  History of cataract surgery: b/l 2015  History of repair of hip fracture: luisa placed in RLE 2015  H/O spinal fusion: c2-6 in 2017    FAMILY HISTORY:  No pertinent family history in first degree relatives    Allergies  No Known Allergies    Home Medications:  acetaminophen 325 mg oral tablet: 1 tab(s) orally every 8 hours, As Needed  (03 Apr 2019 11:30)  atorvastatin 40 mg oral tablet: 1 tab(s) orally once a day (at bedtime) (03 Apr 2019 11:30)  clopidogrel 75 mg oral tablet: 1 tab(s) orally once a day (03 Apr 2019 11:30)  famotidine 20 mg oral tablet: 1 tab(s) orally once a day (at bedtime) (03 Apr 2019 11:30)  ferrous sulfate 325 mg (65 mg elemental iron) oral delayed release tablet: 1 tab(s) orally once a day (03 Apr 2019 11:30)  folic acid 1 mg oral tablet: 1 tab(s) orally once a day (03 Apr 2019 11:30)  furosemide 40 mg oral tablet: 1 tab(s) orally once a day (03 Apr 2019 11:30)  levalbuterol 0.63 mg/3 mL inhalation solution: 3 milliliter(s) inhaled every 6 hours, As Needed (03 Apr 2019 11:30)  Lexapro 5 mg oral tablet: 1 tab(s) orally once a day (03 Apr 2019 11:30)  Melatonin 3 mg oral tablet: 1 tab(s) orally once (at bedtime) (03 Apr 2019 11:30)  metoprolol tartrate 75 mg oral tablet: 1 tab(s) orally 2 times a day (03 Apr 2019 11:30)  midodrine 10 mg oral tablet: 1 tab(s) orally 3 times a day (03 Apr 2019 11:30)  MiraLax oral powder for reconstitution: 17 grams mixed with 8 oz of water once daily as needed  (03 Apr 2019 11:30)  Symbicort 160 mcg-4.5 mcg/inh inhalation aerosol: 2 puff(s) inhaled 2 times a day (03 Apr 2019 11:30)  thiamine 100 mg oral tablet: 1 tab(s) orally once a day (03 Apr 2019 11:30)  traMADol 50 mg oral tablet: 1 tab(s) orally every 6 hours, As Needed  (03 Apr 2019 11:30)  Xanax 0.25 mg oral tablet: 1 tab(s) orally once a day (at bedtime), As Needed (03 Apr 2019 11:30)      HOSPITAL MEDICATIONS:  aspirin enteric coated 81 milliGRAM(s) Oral daily  vancomycin    Solution 125 milliGRAM(s) Oral every 6 hours  amiodarone    Tablet   Oral   amiodarone    Tablet 200 milliGRAM(s) Oral daily  midodrine 20 milliGRAM(s) Oral three times a day  atorvastatin 40 milliGRAM(s) Oral at bedtime  buDESOnide 160 MICROgram(s)/formoterol 4.5 MICROgram(s) Inhaler 2 Puff(s) Inhalation two times a day  levalbuterol Inhalation 0.63 milliGRAM(s) Inhalation every 6 hours PRN  oxyCODONE    5 mG/acetaminophen 325 mG 1 Tablet(s) Oral every 6 hours PRN    sodium bicarbonate 650 milliGRAM(s) Oral three times a day  thiamine 100 milliGRAM(s) Oral daily    collagenase Ointment 1 Application(s) Topical daily  mupirocin 2% Ointment 1 Application(s) Topical <User Schedule>    REVIEW OF SYSTEMS:  [x ] Unable to assess ROS because AMS    OBJECTIVE:  ICU Vital Signs Last 24 Hrs  T(C): 36.7 (14 Apr 2019 19:45), Max: 36.9 (14 Apr 2019 02:00)  T(F): 98 (14 Apr 2019 19:45), Max: 98.4 (14 Apr 2019 02:00)  HR: 129 (14 Apr 2019 19:52) (88 - 160)  BP: 103/55 (14 Apr 2019 19:52) (73/39 - 112/61)  BP(mean): --  ABP: --  ABP(mean): --  RR: 16 (14 Apr 2019 19:52) (16 - 18)  SpO2: 90% (14 Apr 2019 19:52) (90% - 97%)      04-13 @ 07:01  -  04-14 @ 07:00  --------------------------------------------------------  IN: 285 mL / OUT: 0 mL / NET: 285 mL    PHYSICAL EXAM:  General: NAD, AAOx0 altered  HEENT: MMM, EOMI, PERRL, sclera anicteric   Neck: supple  Respiratory: CTABL  Cardiovascular:  RRR, nl S1+S2, no MRG  Abdomen: soft, NT/ND, no hepatosplenomegaly  Extremities:  no LE edema   Skin: no rash  Neurological: non-focal  Psychiatry: nl mood and affect      LABS:                        9.6    11.07 )-----------( 146      ( 14 Apr 2019 06:25 )             31.7     Hgb Trend: 9.6<--, 9.4<--, 10.0<--, 10.0<--, 10.2<--  04-14    136  |  98  |  95<H>  ----------------------------<  101<H>  3.9   |  23  |  3.01<H>    Ca    9.1      14 Apr 2019 06:25  Phos  3.2     04-14  Mg     2.0     04-14      Creatinine Trend: 3.01<--, 3.03<--, 2.84<--, 2.66<--, 2.49<--, 2.46<--  PTT - ( 14 Apr 2019 06:25 )  PTT:81.4 SEC    MICROBIOLOGY:   cdiff positive     RADIOLOGY:  < from: Transthoracic Echocardiogram (04.14.19 @ 10:02) >    CONCLUSIONS:  1. Normal left ventricular internal dimensions and wall  thicknesses.  2. Normal left ventricular systolic function. No segmental  wall motion abnormalities.  3. Moderate right atrial enlargement.  4. Right ventricular enlargement with decreased right  ventricular systolic function.  5. Estimated pulmonary artery systolic pressure equals 62  mm Hg, assuming right atrial pressure equals 10  mm Hg,  consistent with severe pulmonary hypertension.  *** Compared with echocardiogram of 1/17/2019, there is RV  dysfunction with increased PASP.    < end of copied text >    EKG: afib with rvr  79F h/o HTN, COPD (currently on 3L O2 24 hours), paroxsymal a-fib (on pradaxa), CAD (s/p stent 11/2018), CHF (RV dysfucnction), cirrhosis (2/2 CHF?) anxiety, PVD and RLE ulcer, presenting to the [facility] ED with chronic right ankle pressure ulcer pain for 1 week. Admitted for management of LE ulcer, UTI. Course c/b poor PO intake, sepsis 2/2 cdiff, IGOR, afib w/ RVR.     #neuro  -encephalopathic 2/2 sepsis 2/2 cdiff, low bp given afib with rvr, dehydartion  -c/w treatment cdiff, afib     #cards  -septic (cdiff) vs cardiogenic (afib w/ rvr) shock on midodrine  -will c/w IV amio to control HR and improve BP, may require cardioversion in unit   -hold heparin gtt for afib now given coffee ground emesis suctioned   -known RV dysfunction, preserved LV function, appears hypovolemic but will hold off on further IVF given hypoxia and patient likely with pulm edema 2/2 afib w/ RVR  -PVD- c/w wound care mgmt of RLE. eventually will need RLE angio when medically optimized  -resume ASA for CAD when GIB resolved    #pulm  -sating 95 on NRB, hypoxic respiratory failure likely 2/2 pulm edema    -optimize HR and BP, and diuresis as needed   -COPD on home O2, can wean to NC as tolerated, c/w COPD meds     #GI   -coffee groudn emesis. holding a/c. start protonix gtt. stat cbc, tx hgb<7. GI following   -also with cirrhosis, d/w GI   -NPO for now     #ID  -sepsis 2/2 cdiff  -inc to vanc  q6 and IV flagyl      #renal   IGOR in setting of sepsis 2/2 cdiff, afib with rvr. likely prerenal and intrinsic component. c/w trend Cr. renal on board    #ppx   -SCDs     #GOC  GOC with son and he is unsure if patient would want to be intubated.  Son will discuss with his sister.     Alberto Gray PGY3      pt seen and examined agree with above   Kaz Sr MD  critical care time 45 min

## 2019-04-14 NOTE — PROGRESS NOTE ADULT - SUBJECTIVE AND OBJECTIVE BOX
CC: no acute events    TELEMETRY:     PHYSICAL EXAM:    T(C): 36.6 (04-14-19 @ 11:22), Max: 36.9 (04-14-19 @ 02:00)  HR: 112 (04-14-19 @ 11:22) (88 - 121)  BP: 100/53 (04-14-19 @ 11:22) (95/55 - 118/64)  RR: 17 (04-14-19 @ 11:22) (17 - 18)  SpO2: 91% (04-14-19 @ 11:22) (91% - 97%)  Wt(kg): --  I&O's Summary    13 Apr 2019 07:01  -  14 Apr 2019 07:00  --------------------------------------------------------  IN: 285 mL / OUT: 0 mL / NET: 285 mL        Appearance: Normal	  Cardiovascular: Normal S1 S2,RRR, No JVD, No murmurs  Respiratory: Lungs clear to auscultation	  Gastrointestinal:  Soft, Non-tender, + BS	  Extremities: Normal range of motion, No clubbing, cyanosis or edema  Vascular: Peripheral pulses palpable 2+ bilaterally     LABS:	 	                          9.6    11.07 )-----------( 146      ( 14 Apr 2019 06:25 )             31.7     04-14    136  |  98  |  95<H>  ----------------------------<  101<H>  3.9   |  23  |  3.01<H>    Ca    9.1      14 Apr 2019 06:25  Phos  3.2     04-14  Mg     2.0     04-14        PTT - ( 14 Apr 2019 06:25 )  PTT:81.4 SEC    CARDIAC MARKERS:

## 2019-04-14 NOTE — CHART NOTE - NSCHARTNOTEFT_GEN_A_CORE
Patient with SBP of 81, patient tachy with atrial fib and feels very tired. gave digoxin and HR WENT 'S. She has C.DIFF  and is dehydrated. Ordered 500cc bolus ns and MICU evaluation for tachycardia and hypotension.

## 2019-04-15 LAB
ALBUMIN SERPL ELPH-MCNC: 3 G/DL — LOW (ref 3.3–5)
ALP SERPL-CCNC: 68 U/L — SIGNIFICANT CHANGE UP (ref 40–120)
ALT FLD-CCNC: 11 U/L — SIGNIFICANT CHANGE UP (ref 4–33)
ANION GAP SERPL CALC-SCNC: 16 MMO/L — HIGH (ref 7–14)
AST SERPL-CCNC: 22 U/L — SIGNIFICANT CHANGE UP (ref 4–32)
BILIRUB SERPL-MCNC: 1.7 MG/DL — HIGH (ref 0.2–1.2)
BUN SERPL-MCNC: 120 MG/DL — HIGH (ref 7–23)
CALCIUM SERPL-MCNC: 8.7 MG/DL — SIGNIFICANT CHANGE UP (ref 8.4–10.5)
CHLORIDE SERPL-SCNC: 102 MMOL/L — SIGNIFICANT CHANGE UP (ref 98–107)
CO2 SERPL-SCNC: 20 MMOL/L — LOW (ref 22–31)
CREAT SERPL-MCNC: 3.26 MG/DL — HIGH (ref 0.5–1.3)
GLUCOSE SERPL-MCNC: 127 MG/DL — HIGH (ref 70–99)
HCT VFR BLD CALC: 28.4 % — LOW (ref 34.5–45)
HGB BLD-MCNC: 9 G/DL — LOW (ref 11.5–15.5)
MAGNESIUM SERPL-MCNC: 1.8 MG/DL — SIGNIFICANT CHANGE UP (ref 1.6–2.6)
MCHC RBC-ENTMCNC: 27.8 PG — SIGNIFICANT CHANGE UP (ref 27–34)
MCHC RBC-ENTMCNC: 31.7 % — LOW (ref 32–36)
MCV RBC AUTO: 87.7 FL — SIGNIFICANT CHANGE UP (ref 80–100)
NRBC # FLD: 0.04 K/UL — SIGNIFICANT CHANGE UP (ref 0–0)
PHOSPHATE SERPL-MCNC: 3.3 MG/DL — SIGNIFICANT CHANGE UP (ref 2.5–4.5)
PLATELET # BLD AUTO: 95 K/UL — LOW (ref 150–400)
PMV BLD: 11 FL — SIGNIFICANT CHANGE UP (ref 7–13)
POTASSIUM SERPL-MCNC: 4.3 MMOL/L — SIGNIFICANT CHANGE UP (ref 3.5–5.3)
POTASSIUM SERPL-SCNC: 4.3 MMOL/L — SIGNIFICANT CHANGE UP (ref 3.5–5.3)
PROT SERPL-MCNC: 5.1 G/DL — LOW (ref 6–8.3)
RBC # BLD: 3.24 M/UL — LOW (ref 3.8–5.2)
RBC # FLD: 18 % — HIGH (ref 10.3–14.5)
SODIUM SERPL-SCNC: 138 MMOL/L — SIGNIFICANT CHANGE UP (ref 135–145)
WBC # BLD: 22.74 K/UL — HIGH (ref 3.8–10.5)
WBC # FLD AUTO: 22.74 K/UL — HIGH (ref 3.8–10.5)

## 2019-04-15 PROCEDURE — 99291 CRITICAL CARE FIRST HOUR: CPT

## 2019-04-15 RX ORDER — DILTIAZEM HCL 120 MG
2.5 CAPSULE, EXT RELEASE 24 HR ORAL
Qty: 125 | Refills: 0 | Status: DISCONTINUED | OUTPATIENT
Start: 2019-04-15 | End: 2019-04-16

## 2019-04-15 RX ORDER — NOREPINEPHRINE BITARTRATE/D5W 8 MG/250ML
0.05 PLASTIC BAG, INJECTION (ML) INTRAVENOUS
Qty: 8 | Refills: 0 | Status: DISCONTINUED | OUTPATIENT
Start: 2019-04-15 | End: 2019-04-15

## 2019-04-15 RX ORDER — BUMETANIDE 0.25 MG/ML
4 INJECTION INTRAMUSCULAR; INTRAVENOUS ONCE
Qty: 0 | Refills: 0 | Status: DISCONTINUED | OUTPATIENT
Start: 2019-04-15 | End: 2019-04-15

## 2019-04-15 RX ORDER — METRONIDAZOLE 500 MG
TABLET ORAL
Qty: 0 | Refills: 0 | Status: DISCONTINUED | OUTPATIENT
Start: 2019-04-15 | End: 2019-04-16

## 2019-04-15 RX ORDER — MORPHINE SULFATE 50 MG/1
1 CAPSULE, EXTENDED RELEASE ORAL EVERY 4 HOURS
Qty: 0 | Refills: 0 | Status: DISCONTINUED | OUTPATIENT
Start: 2019-04-15 | End: 2019-04-16

## 2019-04-15 RX ORDER — PHENYLEPHRINE HYDROCHLORIDE 10 MG/ML
6.2 INJECTION INTRAVENOUS
Qty: 160 | Refills: 0 | Status: DISCONTINUED | OUTPATIENT
Start: 2019-04-15 | End: 2019-04-16

## 2019-04-15 RX ORDER — PHYTONADIONE (VIT K1) 5 MG
5 TABLET ORAL ONCE
Qty: 0 | Refills: 0 | Status: COMPLETED | OUTPATIENT
Start: 2019-04-15 | End: 2019-04-15

## 2019-04-15 RX ORDER — BUMETANIDE 0.25 MG/ML
2 INJECTION INTRAMUSCULAR; INTRAVENOUS ONCE
Qty: 0 | Refills: 0 | Status: COMPLETED | OUTPATIENT
Start: 2019-04-15 | End: 2019-04-15

## 2019-04-15 RX ORDER — METRONIDAZOLE 500 MG
500 TABLET ORAL EVERY 8 HOURS
Qty: 0 | Refills: 0 | Status: DISCONTINUED | OUTPATIENT
Start: 2019-04-15 | End: 2019-04-16

## 2019-04-15 RX ORDER — PIPERACILLIN AND TAZOBACTAM 4; .5 G/20ML; G/20ML
3.38 INJECTION, POWDER, LYOPHILIZED, FOR SOLUTION INTRAVENOUS ONCE
Qty: 0 | Refills: 0 | Status: COMPLETED | OUTPATIENT
Start: 2019-04-15 | End: 2019-04-15

## 2019-04-15 RX ORDER — PIPERACILLIN AND TAZOBACTAM 4; .5 G/20ML; G/20ML
3.38 INJECTION, POWDER, LYOPHILIZED, FOR SOLUTION INTRAVENOUS EVERY 12 HOURS
Qty: 0 | Refills: 0 | Status: DISCONTINUED | OUTPATIENT
Start: 2019-04-15 | End: 2019-04-16

## 2019-04-15 RX ORDER — MORPHINE SULFATE 50 MG/1
2 CAPSULE, EXTENDED RELEASE ORAL ONCE
Qty: 0 | Refills: 0 | Status: DISCONTINUED | OUTPATIENT
Start: 2019-04-15 | End: 2019-04-15

## 2019-04-15 RX ORDER — METRONIDAZOLE 500 MG
500 TABLET ORAL ONCE
Qty: 0 | Refills: 0 | Status: COMPLETED | OUTPATIENT
Start: 2019-04-15 | End: 2019-04-15

## 2019-04-15 RX ORDER — CHLORHEXIDINE GLUCONATE 213 G/1000ML
1 SOLUTION TOPICAL
Qty: 0 | Refills: 0 | Status: DISCONTINUED | OUTPATIENT
Start: 2019-04-15 | End: 2019-04-16

## 2019-04-15 RX ADMIN — PHENYLEPHRINE HYDROCHLORIDE 21.07 MICROGRAM(S)/KG/MIN: 10 INJECTION INTRAVENOUS at 08:27

## 2019-04-15 RX ADMIN — OXYCODONE AND ACETAMINOPHEN 1 TABLET(S): 5; 325 TABLET ORAL at 13:34

## 2019-04-15 RX ADMIN — MIDODRINE HYDROCHLORIDE 20 MILLIGRAM(S): 2.5 TABLET ORAL at 05:51

## 2019-04-15 RX ADMIN — MIDODRINE HYDROCHLORIDE 20 MILLIGRAM(S): 2.5 TABLET ORAL at 21:01

## 2019-04-15 RX ADMIN — MORPHINE SULFATE 2 MILLIGRAM(S): 50 CAPSULE, EXTENDED RELEASE ORAL at 23:52

## 2019-04-15 RX ADMIN — MIDODRINE HYDROCHLORIDE 20 MILLIGRAM(S): 2.5 TABLET ORAL at 13:34

## 2019-04-15 RX ADMIN — BUMETANIDE 2 MILLIGRAM(S): 0.25 INJECTION INTRAMUSCULAR; INTRAVENOUS at 12:30

## 2019-04-15 RX ADMIN — CHLORHEXIDINE GLUCONATE 1 APPLICATION(S): 213 SOLUTION TOPICAL at 12:29

## 2019-04-15 RX ADMIN — PANTOPRAZOLE SODIUM 10 MG/HR: 20 TABLET, DELAYED RELEASE ORAL at 08:28

## 2019-04-15 RX ADMIN — Medication 500 MILLIGRAM(S): at 01:02

## 2019-04-15 RX ADMIN — Medication 650 MILLIGRAM(S): at 13:34

## 2019-04-15 RX ADMIN — PANTOPRAZOLE SODIUM 10 MG/HR: 20 TABLET, DELAYED RELEASE ORAL at 19:46

## 2019-04-15 RX ADMIN — MORPHINE SULFATE 1 MILLIGRAM(S): 50 CAPSULE, EXTENDED RELEASE ORAL at 21:18

## 2019-04-15 RX ADMIN — Medication 650 MILLIGRAM(S): at 21:01

## 2019-04-15 RX ADMIN — Medication 1 APPLICATION(S): at 12:30

## 2019-04-15 RX ADMIN — MUPIROCIN 1 APPLICATION(S): 20 OINTMENT TOPICAL at 12:28

## 2019-04-15 RX ADMIN — Medication 100 MILLIGRAM(S): at 12:28

## 2019-04-15 RX ADMIN — Medication 100 MILLIGRAM(S): at 05:51

## 2019-04-15 RX ADMIN — Medication 101 MILLIGRAM(S): at 03:59

## 2019-04-15 RX ADMIN — PIPERACILLIN AND TAZOBACTAM 200 GRAM(S): 4; .5 INJECTION, POWDER, LYOPHILIZED, FOR SOLUTION INTRAVENOUS at 08:40

## 2019-04-15 RX ADMIN — Medication 500 MILLIGRAM(S): at 18:08

## 2019-04-15 RX ADMIN — Medication 5 MG/HR: at 19:47

## 2019-04-15 RX ADMIN — Medication 100 MILLIGRAM(S): at 12:30

## 2019-04-15 RX ADMIN — Medication 100 MILLIGRAM(S): at 21:01

## 2019-04-15 RX ADMIN — Medication 650 MILLIGRAM(S): at 05:51

## 2019-04-15 RX ADMIN — PHENYLEPHRINE HYDROCHLORIDE 65.33 MICROGRAM(S)/KG/MIN: 10 INJECTION INTRAVENOUS at 19:46

## 2019-04-15 RX ADMIN — BUMETANIDE 2 MILLIGRAM(S): 0.25 INJECTION INTRAMUSCULAR; INTRAVENOUS at 12:27

## 2019-04-15 RX ADMIN — Medication 500 MILLIGRAM(S): at 18:05

## 2019-04-15 RX ADMIN — Medication 500 MILLIGRAM(S): at 05:51

## 2019-04-15 RX ADMIN — OXYCODONE AND ACETAMINOPHEN 1 TABLET(S): 5; 325 TABLET ORAL at 13:50

## 2019-04-15 RX ADMIN — PIPERACILLIN AND TAZOBACTAM 25 GRAM(S): 4; .5 INJECTION, POWDER, LYOPHILIZED, FOR SOLUTION INTRAVENOUS at 18:04

## 2019-04-15 RX ADMIN — Medication 5 MG/HR: at 13:35

## 2019-04-15 NOTE — PROGRESS NOTE ADULT - SUBJECTIVE AND OBJECTIVE BOX
[facility] NEPHROLOGY- PROGRESS NOTE    79y Female with history of CHF, COPD presents with RLE pain. Nephrology consulted for elevated Scr.    Patient transferred to MICU given hypotension and hypoxia.    REVIEW OF SYSTEMS:  Gen: no changes in weight  Cards: no chest pain  Resp: + dyspnea  GI: no nausea or vomiting, + diarrhea  Vascular: RLE edema/pain    No Known Allergies      Hospital Medications: Medications reviewed      VITALS:  T(F): 97 (04-15-19 @ 12:00), Max: 98.3 (04-15-19 @ 04:00)  HR: 84 (04-15-19 @ 13:00)  BP: 93/42 (04-15-19 @ 13:00)  RR: 22 (04-15-19 @ 13:00)  SpO2: 100% (04-15-19 @ 13:00)  Wt(kg): --    04-14 @ 07:01  -  04-15 @ 07:00  --------------------------------------------------------  IN: 2268 mL / OUT: 0 mL / NET: 2268 mL    04-15 @ 07:01  -  04-15 @ 14:27  --------------------------------------------------------  IN: 553 mL / OUT: 100 mL / NET: 453 mL          PHYSICAL EXAM:    Gen: Ill appearing on high flow  Cards: Irregularly irregular, +S1/S2, no M/G/R  Resp: Bibasilar rales  GI: soft, NT, + ab distention, NABS  Vascular: 2+ RLE edema, 1+ LLE edema      LABS:  04-15    138  |  102  |  120<H>  ----------------------------<  127<H>  4.3   |  20<L>  |  3.26<H>    Ca    8.7      15 Apr 2019 05:45  Phos  3.3     04-15  Mg     1.8     04-15    TPro  5.1<L>  /  Alb  3.0<L>  /  TBili  1.7<H>  /  DBili      /  AST  22  /  ALT  11  /  AlkPhos  68  04-15    Creatinine Trend: 3.26 <--, 3.36 <--, 3.01 <--, 3.03 <--, 2.84 <--, 2.66 <--, 2.49 <--, 2.46 <--                        9.0    22.74 )-----------( 95       ( 15 Apr 2019 05:45 )             28.4     Urine Studies:    Protein, Random Urine: 43.3 mg/dL (04-09 @ 12:48)  Creatinine, Random Urine: 119.50 mg/dL (04-09 @ 12:48)

## 2019-04-15 NOTE — PROGRESS NOTE ADULT - ASSESSMENT
79F w/ nonhealing R posterior ankle ulcer and arterial insuff, STONEY 0.6 in 01/2019. Hospitalization complicated by UTI, C. diff colitis, IGOR, and encephalopathy. Also concern for aspiration pneumonia and GIB.    Plan:  - Given severity of other issues, will defer angiogram at this time  - Please reconsult with improved clinical status    Deandre Méndez, PGY2  [id number]

## 2019-04-15 NOTE — PROGRESS NOTE ADULT - SUBJECTIVE AND OBJECTIVE BOX
CHIEF COMPLAINT: RLE pain    HPI: 80 y/o female, with a PmHx of COPD (currently on 3L O2 24 hours), paroxsymal a-fib (on pradaxa), h/o CAD (s/p stent 11/2018), CHF (although recent TTE with normal biventricular function, with no mention of diastolic dysfunction; + severely dilated left atrium), HTN, HLD, and anxiety, presenting to the [facility] ED with chronic right ankle pressure ulcer pain for 1 week. Patient had previously had STONEY/TVR done as an outpatient showing severe PAD 0.6 in the RLE. Patient was seen by ID and patient's RLE ulcer was thought to not be infected and due to vascular insufficiency and she was observed off antibiotics. Vascular surgery planned to do a RLE angiogram, however this has been delayed due to patient's worsening renal function- at baseline has CKD Stage III, now with creatinine increased to 2.46 today. Nephrology was consulted and plan to do a renal biopsy in the future, with possible differentials for patient's IGOR on CKD including AIN versus MPGN. Yesterday the patient developed Afib with RVR to the 170s and an RRT was called. Patient's rates improved with IV Lopressor and she was initially started on digoxin, which has now been changed to amiodarone. BP during the RRT remained in the 90s, which has been her baseline for the last 3 days. Rates overnight have been stable in the 60s-80s. Patient was planned to go for RLE angiogram today, however it has now been placed on hold awaiting cardiology clearance prior to the procedure.     MICU consult this morning was called for hypotension into the 80s. Patient currently on metoprolol 50mg BID and midodrine 10mg TID. Remains in Afib overnight, rate controlled in the 60s-80s. Patient seen and examined at bedside, endorses chronic SOB at baseline and dizziness. Also has 7/10 RLE pain. Denies chest pain or palpitations.     PAST MEDICAL & SURGICAL HISTORY:  CAD (coronary artery disease): s/p stent 2018  CHF (congestive heart failure)  COPD (chronic obstructive pulmonary disease): on 2-3L O2 at home prn  Anxiety  TIA (transient ischemic attack): many years ago  PAF (paroxysmal atrial fibrillation)  HLD (hyperlipidemia)  HTN (hypertension)  H/O total hysterectomy: 2014  History of cataract surgery: b/l 2015  History of repair of hip fracture: luisa placed in RLE 2015  H/O spinal fusion: c2-6 in 2017      FAMILY HISTORY:  No pertinent family history in first degree relatives      SOCIAL HISTORY:  Smoking: [ ] Never Smoked [ ] Former Smoker (__ packs x ___ years) [ ] Current Smoker  (__ packs x ___ years)  Substance Use: [ ] Never Used [ ] Used ____  EtOH Use:  Marital Status: [ ] Single [ ]  [ ]  [ ]   Sexual History:   Occupation:  Recent Travel:  Country of Birth:  Advance Directives:    Allergies    No Known Allergies    Intolerances        HOME MEDICATIONS: As per EMR    REVIEW OF SYSTEMS:  Constitutional: [ ] negative [ ] fevers [ ] chills [ ] weight loss [ ] weight gain  HEENT: [ ] negative [ ] dry eyes [ ] eye irritation [ ] postnasal drip [ ] nasal congestion  CV: [ ] negative  [ ] chest pain [ ] orthopnea [ ] palpitations [ ] murmur  Resp: [ ] negative [ ] cough [X] shortness of breath [ ] dyspnea [ ] wheezing [ ] sputum [ ] hemoptysis  GI: [ ] negative [ ] nausea [ ] vomiting [ ] diarrhea [ ] constipation [ ] abd pain [ ] dysphagia   : [ ] negative [ ] dysuria [ ] nocturia [ ] hematuria [ ] increased urinary frequency  Musculoskeletal: [ ] negative [ ] back pain [ ] myalgias [X] arthralgias [ ] fracture  Skin: [ ] negative [ ] rash [ ] itch  Neurological: [ ] negative [ ] headache [ ] dizziness [ ] syncope [ ] weakness [ ] numbness  Psychiatric: [ ] negative [ ] anxiety [ ] depression  Endocrine: [ ] negative [ ] diabetes [ ] thyroid problem  Hematologic/Lymphatic: [ ] negative [ ] anemia [ ] bleeding problem  Allergic/Immunologic: [ ] negative [ ] itchy eyes [ ] nasal discharge [ ] hives [ ] angioedema  [X] All other systems negative  [ ] Unable to assess ROS because ________    OBJECTIVE:  ICU Vital Signs Last 24 Hrs  T(C): 36.6 (10 Apr 2019 06:17), Max: 37.1 (09 Apr 2019 15:30)  T(F): 97.8 (10 Apr 2019 06:17), Max: 98.8 (09 Apr 2019 15:30)  HR: 80 (10 Apr 2019 06:17) (67 - 115)  BP: 82/55 (10 Apr 2019 06:17) (80/58 - 133/77)  BP(mean): --  ABP: --  ABP(mean): --  RR: 16 (10 Apr 2019 06:17) (16 - 22)  SpO2: 95% (10 Apr 2019 06:17) (94% - 97%)        CAPILLARY BLOOD GLUCOSE    PHYSICAL EXAM:  General: Resting in bed comfortably, on nasal cannula, NAD  HEENT: PERRL  Lymph Nodes: No lymphadenopathy  Neck: Supple  Respiratory: Crackles in b/l lower bases  Cardiovascular: Irregular rate and rhythm, +systolic murmur, no rubs or gallops  Abdomen: Soft, distended, no tenderness, bowel sounds present  Extremities: Patient refused to have RLE examined- foot wrapped in Kerlix, no bleeding or drainage noted on visual inspection, able to wiggle toes and dorsi/plantarflex  Skin: No edema of LLE  Neurological: AAOx3, follows commands, no focal deficits  Psychiatry: Calm    LINES: Peripheral IVs    HOSPITAL MEDICATIONS:  heparin  Infusion.  Unit(s)/Hr IV Continuous <Continuous>  amiodarone    Tablet   Oral   amiodarone    Tablet 400 milliGRAM(s) Oral every 8 hours  metoprolol tartrate 50 milliGRAM(s) Oral two times a day  midodrine 10 milliGRAM(s) Oral every 8 hours  atorvastatin 40 milliGRAM(s) Oral at bedtime  buDESOnide 160 MICROgram(s)/formoterol 4.5 MICROgram(s) Inhaler 2 Puff(s) Inhalation two times a day  levalbuterol Inhalation 0.63 milliGRAM(s) Inhalation every 6 hours PRN  oxyCODONE    5 mG/acetaminophen 325 mG 1 Tablet(s) Oral every 6 hours PRN  docusate sodium 100 milliGRAM(s) Oral three times a day  polyethylene glycol 3350 17 Gram(s) Oral daily  senna 2 Tablet(s) Oral at bedtime  albumin human 25% IVPB 50 milliLiter(s) IV Intermittent every 6 hours  thiamine 100 milliGRAM(s) Oral daily  collagenase Ointment 1 Application(s) Topical daily  mupirocin 2% Ointment 1 Application(s) Topical <User Schedule>        LABS:                        10.2   14.65 )-----------( 173      ( 10 Apr 2019 06:00 )             35.0     Hgb Trend: 10.2<--, 10.1<--, 10.8<--, 10.8<--, 10.8<--  04-10    138  |  100  |  61<H>  ----------------------------<  77  4.0   |  21<L>  |  2.49<H>    Ca    8.5      10 Apr 2019 06:00  Phos  4.5     04-10  Mg     1.9     04-10    TPro  6.7  /  Alb  x   /  TBili  x   /  DBili  x   /  AST  x   /  ALT  x   /  AlkPhos  x   04-09    Creatinine Trend: 2.49<--, 2.46<--, 2.21<--, 1.81<--, 1.57<--, 1.62<--  PT/INR - ( 09 Apr 2019 05:15 )   PT: 23.1 SEC;   INR: 2.04          PTT - ( 10 Apr 2019 06:00 )  PTT:> 200.0 SEC    Arterial Blood Gas:  04-09 @ 10:10  7.34/47/21/23/32.2/-0.1  ABG lactate: --        MICROBIOLOGY: Urine culture growing 3 or more organisms, likely contaminated    RADIOLOGY:  [X] Reviewed and interpreted by me    Tele: Afib, 60s-80s overnight CHIEF COMPLAINT: RLE pain    HPI: 78 y/o female, with a PmHx of COPD (currently on 3L O2 24 hours), paroxsymal a-fib (on pradaxa), h/o CAD (s/p stent 11/2018), CHF (although recent TTE with normal biventricular function, with no mention of diastolic dysfunction; + severely dilated left atrium), HTN, HLD, and anxiety, presenting to the [facility] ED with chronic right ankle pressure ulcer pain for 1 week. Patient had previously had STONEY/TVR done as an outpatient showing severe PAD 0.6 in the RLE. Patient was seen by ID and patient's RLE ulcer was thought to not be infected and due to vascular insufficiency and she was observed off antibiotics. Vascular surgery planned to do a RLE angiogram, however this has been delayed due to patient's worsening renal function- at baseline has CKD Stage III, now with creatinine increased to 2.46 today. Nephrology was consulted and plan to do a renal biopsy in the future, with possible differentials for patient's IGOR on CKD including AIN versus MPGN. Yesterday the patient developed Afib with RVR to the 170s and an RRT was called. Patient's rates improved with IV Lopressor and she was initially started on digoxin, which has now been changed to amiodarone. BP during the RRT remained in the 90s, which has been her baseline for the last 3 days. Rates overnight have been stable in the 60s-80s. Patient was planned to go for RLE angiogram today, however it has now been placed on hold awaiting cardiology clearance prior to the procedure.     MICU consult this morning was called for hypotension into the 80s. Patient currently on metoprolol 50mg BID and midodrine 10mg TID. Remains in Afib overnight, rate controlled in the 60s-80s. Patient seen and examined at bedside, endorses chronic SOB at baseline and dizziness. Also has 7/10 RLE pain. Denies chest pain or palpitations.     PAST MEDICAL & SURGICAL HISTORY:  CAD (coronary artery disease): s/p stent 2018  CHF (congestive heart failure)  COPD (chronic obstructive pulmonary disease): on 2-3L O2 at home prn  Anxiety  TIA (transient ischemic attack): many years ago  PAF (paroxysmal atrial fibrillation)  HLD (hyperlipidemia)  HTN (hypertension)  H/O total hysterectomy: 2014  History of cataract surgery: b/l 2015  History of repair of hip fracture: luisa placed in RLE 2015  H/O spinal fusion: c2-6 in 2017      FAMILY HISTORY:  No pertinent family history in first degree relatives      SOCIAL HISTORY:  Smoking: [ ] Never Smoked [ ] Former Smoker (__ packs x ___ years) [ ] Current Smoker  (__ packs x ___ years)  Substance Use: [ ] Never Used [ ] Used ____  EtOH Use:  Marital Status: [ ] Single [ ]  [ ]  [ ]   Sexual History:   Occupation:  Recent Travel:  Country of Birth:  Advance Directives:    Allergies    No Known Allergies    Intolerances        HOME MEDICATIONS: As per EMR    REVIEW OF SYSTEMS:  Constitutional: [ ] negative [ ] fevers [ ] chills [ ] weight loss [ ] weight gain  HEENT: [ ] negative [ ] dry eyes [ ] eye irritation [ ] postnasal drip [ ] nasal congestion  CV: [ ] negative  [ ] chest pain [ ] orthopnea [ ] palpitations [ ] murmur  Resp: [ ] negative [ ] cough [X] shortness of breath [ ] dyspnea [ ] wheezing [ ] sputum [ ] hemoptysis  GI: [ ] negative [ ] nausea [ ] vomiting [ ] diarrhea [ ] constipation [ ] abd pain [ ] dysphagia   : [ ] negative [ ] dysuria [ ] nocturia [ ] hematuria [ ] increased urinary frequency  Musculoskeletal: [ ] negative [ ] back pain [ ] myalgias [X] arthralgias [ ] fracture  Skin: [ ] negative [ ] rash [ ] itch  Neurological: [ ] negative [ ] headache [ ] dizziness [ ] syncope [ ] weakness [ ] numbness  Psychiatric: [ ] negative [ ] anxiety [ ] depression  Endocrine: [ ] negative [ ] diabetes [ ] thyroid problem  Hematologic/Lymphatic: [ ] negative [ ] anemia [ ] bleeding problem  Allergic/Immunologic: [ ] negative [ ] itchy eyes [ ] nasal discharge [ ] hives [ ] angioedema  [X] All other systems negative  [ ] Unable to assess ROS because ________    OBJECTIVE:  ICU Vital Signs Last 24 Hrs  T(C): 36.6 (10 Apr 2019 06:17), Max: 37.1 (09 Apr 2019 15:30)  T(F): 97.8 (10 Apr 2019 06:17), Max: 98.8 (09 Apr 2019 15:30)  HR: 80 (10 Apr 2019 06:17) (67 - 115)  BP: 82/55 (10 Apr 2019 06:17) (80/58 - 133/77)  BP(mean): --  ABP: --  ABP(mean): --  RR: 16 (10 Apr 2019 06:17) (16 - 22)  SpO2: 95% (10 Apr 2019 06:17) (94% - 97%)        CAPILLARY BLOOD GLUCOSE    PHYSICAL EXAM:  General: Resting in bed comfortably, on nasal cannula, NAD  HEENT: PERRL  Lymph Nodes: No lymphadenopathy  Neck: Supple  Respiratory: Crackles in b/l lower bases  Cardiovascular: Irregular rate and rhythm, +systolic murmur, no rubs or gallops  Abdomen: Soft, distended, no tenderness, bowel sounds present  Extremities: RLE wrapped in Kerlix, no bleeding or drainage noted on visual inspection, able to wiggle toes and dorsi/plantarflex  Skin: No edema of LLE  Neurological: AAOx3, follows commands, no focal deficits  Psychiatry: Calm    LINES: Peripheral IVs    HOSPITAL MEDICATIONS:  heparin  Infusion.  Unit(s)/Hr IV Continuous <Continuous>  amiodarone    Tablet   Oral   amiodarone    Tablet 400 milliGRAM(s) Oral every 8 hours  metoprolol tartrate 50 milliGRAM(s) Oral two times a day  midodrine 10 milliGRAM(s) Oral every 8 hours  atorvastatin 40 milliGRAM(s) Oral at bedtime  buDESOnide 160 MICROgram(s)/formoterol 4.5 MICROgram(s) Inhaler 2 Puff(s) Inhalation two times a day  levalbuterol Inhalation 0.63 milliGRAM(s) Inhalation every 6 hours PRN  oxyCODONE    5 mG/acetaminophen 325 mG 1 Tablet(s) Oral every 6 hours PRN  docusate sodium 100 milliGRAM(s) Oral three times a day  polyethylene glycol 3350 17 Gram(s) Oral daily  senna 2 Tablet(s) Oral at bedtime  albumin human 25% IVPB 50 milliLiter(s) IV Intermittent every 6 hours  thiamine 100 milliGRAM(s) Oral daily  collagenase Ointment 1 Application(s) Topical daily  mupirocin 2% Ointment 1 Application(s) Topical <User Schedule>        LABS:                        10.2   14.65 )-----------( 173      ( 10 Apr 2019 06:00 )             35.0     Hgb Trend: 10.2<--, 10.1<--, 10.8<--, 10.8<--, 10.8<--  04-10    138  |  100  |  61<H>  ----------------------------<  77  4.0   |  21<L>  |  2.49<H>    Ca    8.5      10 Apr 2019 06:00  Phos  4.5     04-10  Mg     1.9     04-10    TPro  6.7  /  Alb  x   /  TBili  x   /  DBili  x   /  AST  x   /  ALT  x   /  AlkPhos  x   04-09    Creatinine Trend: 2.49<--, 2.46<--, 2.21<--, 1.81<--, 1.57<--, 1.62<--  PT/INR - ( 09 Apr 2019 05:15 )   PT: 23.1 SEC;   INR: 2.04          PTT - ( 10 Apr 2019 06:00 )  PTT:> 200.0 SEC    Arterial Blood Gas:  04-09 @ 10:10  7.34/47/21/23/32.2/-0.1  ABG lactate: --        MICROBIOLOGY: Urine culture growing 3 or more organisms, likely contaminated    RADIOLOGY:  [X] Reviewed and interpreted by me    Tele: Afib, 60s-80s overnight

## 2019-04-15 NOTE — PROGRESS NOTE ADULT - ATTENDING COMMENTS
Patient examined and case reviewed in detail on bedside rounds  Critically ill with resp distress and multiple medical problems  Frequent bedside visits with therapy change today.   Crit Care Time Today 35 min+

## 2019-04-15 NOTE — PROGRESS NOTE ADULT - ASSESSMENT
79y Female with history of CHF, COPD presents with RLE pain. Nephrology consulted for elevated Scr.    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 given low complements versus lupus nephritis given positive serologies versus HRS? Patient initially planned for kidney biopsy today however will now postpone as currently unstable. Would however consider rheumatology evaluation and steroids if Scr continues to increase. Check LDH and haptoglobin r/o TMA given anemia and thrombocytopenia. Continue with pressor support but would change to levophed for possible HRS if no objection from cardiology and ICU. Avoid nephrotoxins.  2) CKD-3: Patient appears to have h/o age appropriate CKD-3 (baseline Scr 1.1-1.2) with mild proteinuria. Monitor electrolytes.  3) Orthostatic hypotension: BP low. On midodrine and dashawn gtt as per ICU. Rate control as per cardiology on cardizem gtt. Monitor BP.  4) LE edema: In setting of afib with RVR. Can diurese as BP tolerates as patient currently on high flow with agonal breathing. Monitor UO.  5) Pyuria/Microscopic hematuria: Urine culture negative s/p CTX. Work up as above.  6) Metabolic acidosis: In setting of renal insufficiency and diarrhea now improving. Continue with sodium bicarbonate 650 mg TID. Monitor serum CO2.

## 2019-04-15 NOTE — PROGRESS NOTE ADULT - SUBJECTIVE AND OBJECTIVE BOX
CARDIOLOGY FOLLOW UP - Dr. Vieyra    CC events noted, HR improved   transferred to micu for tachycardiac, hypotension       PHYSICAL EXAM:  T(C): 36.1 (04-15-19 @ 12:00), Max: 36.8 (04-15-19 @ 04:00)  HR: 84 (04-15-19 @ 13:00) (70 - 160)  BP: 93/42 (04-15-19 @ 13:00) (63/50 - 112/49)  RR: 22 (04-15-19 @ 13:00) (16 - 48)  SpO2: 100% (04-15-19 @ 13:00) (87% - 100%)  Wt(kg): --  I&O's Summary    14 Apr 2019 07:01  -  15 Apr 2019 07:00  --------------------------------------------------------  IN: 2268 mL / OUT: 0 mL / NET: 2268 mL    15 Apr 2019 07:01  -  15 Apr 2019 14:04  --------------------------------------------------------  IN: 553 mL / OUT: 100 mL / NET: 453 mL        Appearance: Normal	  Cardiovascular: Normal S1 S2,irreg, No JVD, No murmurs  Respiratory: Lungs clear to auscultation	  Gastrointestinal:  Soft, Non-tender, + BS	  Extremities: No edema        MEDICATIONS  (STANDING):  chlorhexidine 4% Liquid 1 Application(s) Topical <User Schedule>  collagenase Ointment 1 Application(s) Topical daily  diltiazem Infusion 5 mG/Hr (5 mL/Hr) IV Continuous <Continuous>  metroNIDAZOLE  IVPB 500 milliGRAM(s) IV Intermittent every 8 hours  metroNIDAZOLE  IVPB      midodrine 20 milliGRAM(s) Oral three times a day  mupirocin 2% Ointment 1 Application(s) Topical <User Schedule>  pantoprazole Infusion 8 mG/Hr (10 mL/Hr) IV Continuous <Continuous>  phenylephrine    Infusion 6.2 MICROgram(s)/kG/Min (65.333 mL/Hr) IV Continuous <Continuous>  piperacillin/tazobactam IVPB. 3.375 Gram(s) IV Intermittent every 12 hours  sodium bicarbonate 650 milliGRAM(s) Oral three times a day  thiamine 100 milliGRAM(s) Oral daily  vancomycin    Solution 500 milliGRAM(s) Oral every 6 hours      TELEMETRY:  AFIB 80s 	    ECG:  	  RADIOLOGY:   DIAGNOSTIC TESTING:  [ ] Echocardiogram:  [ ]  Catheterization:  [ ] Stress Test:    OTHER: 	    LABS:	 	                                9.0    22.74 )-----------( 95       ( 15 Apr 2019 05:45 )             28.4     04-15    138  |  102  |  120<H>  ----------------------------<  127<H>  4.3   |  20<L>  |  3.26<H>    Ca    8.7      15 Apr 2019 05:45  Phos  3.3     04-15  Mg     1.8     04-15    TPro  5.1<L>  /  Alb  3.0<L>  /  TBili  1.7<H>  /  DBili  x   /  AST  22  /  ALT  11  /  AlkPhos  68  04-15    PT/INR - ( 14 Apr 2019 21:21 )   PT: 32.7 SEC;   INR: 2.85          PTT - ( 14 Apr 2019 21:21 )  PTT:65.7 SEC

## 2019-04-15 NOTE — PROGRESS NOTE ADULT - ATTENDING COMMENTS
Agree with above NP note.  events noted  s/p prbc for poss gi bleed  off a/c, off asa/plavix   rate control with ccb  diuretics as needed  pressor support, midodrine as needed  car per micu

## 2019-04-15 NOTE — PROGRESS NOTE ADULT - ASSESSMENT
78 y/o female, with a PMHx of COPD (currently on 3L O2 24 hours), paroxsymal a-fib (on pradaxa), h/o CAD (s/p stent 11/2018), CHF (although recent TTE with normal biventricular function, with no mention of diastolic dysfunction; + severely dilated left atrium), HTN, HLD, and anxiety, presenting to the [facility] ED with chronic right ankle pressure ulcer pain for 1 week. Admitted for further workup of severe PAD diagnosed as outpatient with patient pending RLE angiogram. Hospital course c/b worsening IGOR and Afib with RVR.    #Hypotension  - Unlikely to be 2/2 sepsis as wound does not appear to be infected per consultant's notes and patient has remained afebrile. Leukocytosis today may be reactive 2/2 stress from Afib with RVR yesterday. May also be 2/2 hypovolemia, as patient appears dry on exam.  - Would increase midodrine to 20mg TID  - If patient spikes fever, send cultures  - Give 1L NS bolus and recheck BP  - On telemetry overnight appears to be in Afib with good rate control, please discuss with cardiology if it is possible to further decrease dose of metoprolol  - Monitor vitals q4h  - Check AM cortisol    #Afib with RVR  - Continue with heparin drip  - Completing amio oral load, remains on metoprolol for rate control  - Follow up with cardiology    #IGOR  - Creatinine worsening today and over the past few days  - Nephrology following, planning for eventual renal biopsy  - Continue to trend daily, consider giving small amount of IVF if patient not eating/drinking well  - Continue with albumin    #PAD  - Management as per vascular surgery, currently waiting to undergo RLE angiogram    Patient is not a candidate for MICU at this time. Please call [phone] with questions or changes in clinical status.    Discussed with primary CHENCHO So. 79F with hx of COPD (currently on 3L O2 24 hours), CAD (s/p stent), CHF, Paroxysmal A-fib (on Pradaxa, Metoprolol), CVA/TIA, HTN, HLD, IGOR, C. diff, non-healing ulcer RLE presenting initially on 4/2 with worsening RLE pain x few days. Admitted for management of RLE ulcer, pseudomonas UTI. Course c/b poor PO intake, IGOR, Afib w/ RVR. S/p PO amiodarone load. Remained hypotensive and MICU consulted on 4/10. Recommended to give fluids, stop metoprolol and midodrine. Patient found to have C. diff and started on PO Vanc. MICU called to reassess patient on 4/14 as hypotensive and in rapid Afib. S/p PO digoxin but remained tachycardic to 150-180, BP 80s/50s. Given 500 cc bolus and 150 mg amiodarone with improvement in HR to 100-130s, /50. However, patient with tachypnea, inc WOB, and encephelopathic. Transferred to MICU for pressor support. GIB 4/14, coffee-ground emesis. S/p 2 units of PRBCs, a unit of FFP and vitamin K for elevated INR. Likely aspirated.    #NEURO: no active issues  - Per son, not behaving at baseline; usually very responsive, but more reserved now  - No need for sedation at this time    #CV: hypotension, afib with rvr  - Hypotension may be 2/2 septic shock 2/2 active C. diff colitis vs cardiogenic shock 2/2 Afib with rvr (less likely in light of persistent hypotension with the now rate controlled Afib)  - C/w digoxin, diltiazem drip for Afib  - C/w dashawn drip, midodrine for hypotension  - Consider adding levo for refractory hypotension    #PULM: aspiration pneumonia in setting of recent hematemesis  - C/w Zosyn  - C/w HFNC; advance to BiPAP if needed  - Symbicort and albuterol discontinued    #GI: hematemesis, C. diff colitis  - No active emesis  - Will hold off endoscopy as patient is currently too unstable  - Continue NPO status  - Continue PPI infusion  - C/w po vanco and iv flagyl     #RENAL: worsening IGOR  - Monitor Is and Os  - Administered Bumex 4 mg to match Is and Os  - Hydrate judiciously  - Hold off on renal biopsy as patient is currently too unstable    #HEME  - Hold off on anticoagulation given recent UGIB  - SCDs for DVT ppx    #SKIN  - Defer vascular angiogram as patient is currently too unstable    #GOC  - DNR/DNI

## 2019-04-15 NOTE — PROGRESS NOTE ADULT - SUBJECTIVE AND OBJECTIVE BOX
C Team - Vascular Surgery Consult - Daily Progress Note    SUBJECTIVE:  Patient decompensated overnight.  Afib with RVR c/b hypotension.  Also with worsening encephalopathy.  Patient now in the MICU.  Patient with coffee ground emesis and 1 L of output following NGT placement.  Given high supplemental O2 requirement, concern for aspiration pneumonia.  MICU team had a long conversation with the patient's son overnight regarding all of these changes.  Patient made DNR/DNI    OBJECTIVE:     ** VITAL SIGNS / I&O's **    T(C): 36.8 (04-15-19 @ 04:00), Max: 36.8 (04-15-19 @ 04:00)  T(F): 98.3 (04-15-19 @ 04:00), Max: 98.3 (04-15-19 @ 04:00)  HR: 104 (04-15-19 @ 06:00) (87 - 160)  BP: 112/49 (04-15-19 @ 06:00) (63/50 - 112/61)  RR: 43 (04-15-19 @ 06:00) (16 - 48)  SpO2: 93% (04-15-19 @ 06:00) (87% - 100%)      14 Apr 2019 07:01  -  15 Apr 2019 07:00  --------------------------------------------------------  IN:    diltiazem Infusion: 70 mL    IV PiggyBack: 200 mL    Packed Red Blood Cells: 620 mL    pantoprazole Infusion: 90 mL    phenylephrine   Infusion: 813 mL    phenylephrine   Infusion: 175 mL    Plasma: 300 mL  Total IN: 2268 mL    OUT:  Total OUT: 0 mL    Total NET: 2268 mL    ** PHYSICAL EXAM **    -- HEENT: high-flow NC  -- CARDIOVASCULAR: normotensive, tachycardic   -- RESPIRATORY: tachypneic   -- EXTREMITIES: RLE posterior ankle ulcer down to tendon with serous drainage, no purulence, palpable femoral and pop, DP signal, All pulses palp on LLE    ** LABS **                 9.0    22.74  )----------(  95        ( 15 Apr 2019 05:45 )               28.4      138    |  102    |  120    ----------------------------<  127        ( 15 Apr 2019 05:45 )  4.3     |  20     |  3.26     Ca    8.7        ( 15 Apr 2019 05:45 )  Phos  3.3       ( 15 Apr 2019 05:45 )  Mg     1.8       ( 15 Apr 2019 05:45 )    TPro  5.1    /  Alb  3.0    /  TBili  1.7    /  DBili  x      /  AST  22     /  ALT  11     /  AlkPhos  68     ( 15 Apr 2019 05:45 )    PT/INR -  32.7 SEC / 2.85    ( 14 Apr 2019 21:21 )       PTT -  65.7 SEC   ( 14 Apr 2019 21:21 )  CAPILLARY BLOOD GLUCOSE          **RADS**

## 2019-04-15 NOTE — PROGRESS NOTE ADULT - ATTENDING COMMENTS
[facility]  Harish Valera M.D.  Kevin Cedeno D.O.  Rachele Mckeon M.D.  Christina Westfall, MSN, ANP-C    Telephone: [phone]  Facsimile: [phone]    [address] [place] [address]

## 2019-04-15 NOTE — PROGRESS NOTE ADULT - ASSESSMENT
Echo 1/17/19:EF 60%, mild to mod MR, mild AR, nl lv sys fx, mod pulm htn     a/p  78 y/o female, with a PmHx of COPD, paroxsymal a-fib (on pradaxa), CAD (s/p stent 11/2018), DCHF, pulm htn, HTN, HLD, and anxiety presenting with nonhealing R posterior ankle ulcer and UTI, course complicated by hypotension, MYRIAM w RVR - transferred to MICU for pressor support, coffee-ground emesis. S/p 2 units of PRBCs, a unit of FFP and vitamin K for elevated INR.     1. Chronic diastolic chf   chest xray with mild interstitial edema, sml loculated right pleural effusion, sml left pleural effusion unchanged, increased in Right opacity  remains mildly fluid overloaded on exam, reports SOB improving today, ++LE edema  hold bb for hypotension, continue midodrine to 20mg TID   s/p aggressive IV resuscitation, s/p Bumex  diuretics per MICU   recent echo with nl lv fx   repeat echo for lv fxn  s/p albumin IVPB     2.  Paroxysmal Atrial Fibrillation  transferred to MICU for MYRIAM, hypotension   Rates improving  C/w diltiazem drip for Afib  C/w dashawn drip, midodrine for hypotension  CHADS 3 - off pradaxa for possible angio, hep gtt on hold     3. CAD s/p PCI (11/2018)  cv stable, no cp or sob   continue bb, statin, plavix on hold     4. UTI   s/p abx per med   + Cdiff, on vanco     5.  R posterior ankle ulcer   vascular f/u   plan for eventual RLE angio, deferred at this time     6. bri on ckd  renal f/u   plan for eventual renal biopsy   hold plavix 5 day prior to Biopsy,   c/w asa 81mg, given recent PCI, will not hold asa for biopsy.     7. Aspiration pneumonia  on zosyn, bipap  care per micu     8. GIB   + coffee ground emesis 2/2 to elevated inr  4/14 given 2 UPRBC, 1 unit FFP, vit k   AC on hold   Will hold off endoscopy as patient is currently too unstable  NPO, ppi    dvt ppx Echo 1/17/19:EF 60%, mild to mod MR, mild AR, nl lv sys fx, mod pulm htn     a/p  80 y/o female, with a PmHx of COPD, paroxsymal a-fib (on pradaxa), CAD (s/p stent 11/2018), DCHF, pulm htn, HTN, HLD, and anxiety presenting with nonhealing R posterior ankle ulcer and UTI, course complicated by hypotension, MYRIAM w RVR - transferred to MICU for pressor support, coffee-ground emesis. S/p 2 units of PRBCs, a unit of FFP and vitamin K for elevated INR.     1. Chronic diastolic chf   chest xray with mild interstitial edema, sml loculated right pleural effusion, sml left pleural effusion unchanged, increased in Right opacity  remains mildly fluid overloaded on exam, reports SOB improving today, ++LE edema  hold bb for hypotension, continue midodrine to 20mg TID   s/p IV resuscitation, s/p Bumex  diuretics per MICU   recent echo with nl lv fx   repeat echo for lv fxn  s/p albumin IVPB     2.  Paroxysmal Atrial Fibrillation  transferred to MICU for MYRIAM, hypotension   Rates improving  C/w diltiazem drip for Afib  C/w dashawn drip, midodrine for hypotension  CHADS 3 - off pradaxa for possible angio, hep gtt on hold     3. CAD s/p PCI (11/2018)  cv stable, no cp or sob   continue bb, statin, plavix on hold     4. UTI   s/p abx per med   + Cdiff, on vanco     5.  R posterior ankle ulcer   vascular f/u   plan for eventual RLE angio, deferred at this time     6. bri on ckd  renal f/u   plan for eventual renal biopsy   hold plavix 5 day prior to Biopsy,   c/w asa 81mg, given recent PCI, will not hold asa for biopsy.     7. Aspiration pneumonia  on zosyn, bipap  care per micu     8. GIB   + coffee ground emesis 2/2 to elevated inr  4/14 given 2 UPRBC, 1 unit FFP, vit k   AC on hold   Will hold off endoscopy as patient is currently too unstable  NPO, ppi    dvt ppx Echo 1/17/19:EF 60%, mild to mod MR, mild AR, nl lv sys fx, mod pulm htn     a/p  80 y/o female, with a PmHx of COPD, paroxsymal a-fib (on pradaxa), CAD (s/p stent 11/2018), DCHF, pulm htn, HTN, HLD, and anxiety presenting with nonhealing R posterior ankle ulcer and UTI, course complicated by hypotension, MYRIAM w RVR - transferred to MICU for pressor support, coffee-ground emesis. S/p 2 units of PRBCs, a unit of FFP and vitamin K for elevated INR.     1. Chronic diastolic chf   chest xray with mild interstitial edema, sml loculated right pleural effusion, sml left pleural effusion unchanged, increased in Right opacity  hold bb for hypotension, continue midodrine to 20mg TID   ++fluid overload on exam likely 2/2 to AFIB w/ RVR   s/p IV resuscitation, s/p iv Bumex  diuretics per MICU   recent echo with nl lv fx   repeat echo for lv fxn  s/p albumin IVPB     2.  Paroxysmal Atrial Fibrillation  transferred to MICU for MYRIAM, hypotension   Rates improving  C/w diltiazem drip for Afib  C/w dashawn drip, midodrine for hypotension  CHADS 3 - off pradaxa for possible angio, hep gtt on hold     3. CAD s/p PCI (11/2018)  cv stable, no cp or sob   continue bb, statin, plavix on hold     4. UTI   s/p abx per med   + Cdiff, on vanco     5.  R posterior ankle ulcer   vascular f/u   plan for eventual RLE angio, deferred at this time     6. bri on ckd  renal f/u   plan for eventual renal biopsy   hold plavix 5 day prior to Biopsy,   c/w asa 81mg, given recent PCI, will not hold asa for biopsy.     7. Aspiration pneumonia  on zosyn, bipap  care per micu     8. GIB   + coffee ground emesis 2/2 to elevated inr  4/14 given 2 UPRBC, 1 unit FFP, vit k   AC on hold   Will hold off endoscopy as patient is currently too unstable  NPO, ppi    dvt ppx Echo 1/17/19:EF 60%, mild to mod MR, mild AR, nl lv sys fx, mod pulm htn     a/p  78 y/o female, with a PmHx of COPD, paroxsymal a-fib (on pradaxa), CAD (s/p stent 11/2018), DCHF, pulm htn, HTN, HLD, and anxiety presenting with nonhealing R posterior ankle ulcer and UTI, course complicated by hypotension, MYRIAM w RVR - transferred to MICU for pressor support, coffee-ground emesis. S/p 2 units of PRBCs, a unit of FFP and vitamin K for elevated INR.     1. Chronic diastolic chf   chest xray with mild interstitial edema, sml loculated right pleural effusion, sml left pleural effusion unchanged, increased in Right opacity  hold bb for hypotension, continue midodrine to 20mg TID   ++fluid overload on exam likely 2/2 to AFIB w/ RVR   s/p IV resuscitation, s/p iv Bumex  diuretics per MICU   recent echo with nl lv fx   repeat echo for lv fxn  s/p albumin IVPB     2.  Paroxysmal Atrial Fibrillation  transferred to MICU for MYRIAM, hypotension   Rates improving  C/w diltiazem drip for Afib  C/w dashawn drip, midodrine for hypotension  CHADS 3 - off pradaxa for possible angio, hep gtt on hold     3. CAD s/p PCI (11/2018)  cv stable, no cp or sob   continue bb, statin, asa/plavix on hold     4. UTI   s/p abx per med   + Cdiff, on vanco     5.  R posterior ankle ulcer   vascular f/u   plan for eventual RLE angio, deferred at this time     6. bri on ckd  renal f/u   plan for eventual renal biopsy   off antiplat due to gi bleed      7. Aspiration pneumonia  on zosyn, bipap  care per micu     8. GIB   + coffee ground emesis 2/2 to elevated inr  4/14 given 2 UPRBC, 1 unit FFP, vit k   AC on hold   Will hold off endoscopy as patient is currently too unstable  NPO, ppi    dvt ppx

## 2019-04-16 LAB
ALBUMIN SERPL ELPH-MCNC: 2.7 G/DL — LOW (ref 3.3–5)
ALP SERPL-CCNC: 62 U/L — SIGNIFICANT CHANGE UP (ref 40–120)
ALT FLD-CCNC: 12 U/L — SIGNIFICANT CHANGE UP (ref 4–33)
ANION GAP SERPL CALC-SCNC: 13 MMO/L — SIGNIFICANT CHANGE UP (ref 7–14)
ANISOCYTOSIS BLD QL: SLIGHT — SIGNIFICANT CHANGE UP
APTT BLD: 63.5 SEC — HIGH (ref 27.5–36.3)
AST SERPL-CCNC: 28 U/L — SIGNIFICANT CHANGE UP (ref 4–32)
BASOPHILS # BLD AUTO: 0.01 K/UL — SIGNIFICANT CHANGE UP (ref 0–0.2)
BASOPHILS # BLD AUTO: 0.04 K/UL — SIGNIFICANT CHANGE UP (ref 0–0.2)
BASOPHILS NFR BLD AUTO: 0.1 % — SIGNIFICANT CHANGE UP (ref 0–2)
BASOPHILS NFR BLD AUTO: 0.2 % — SIGNIFICANT CHANGE UP (ref 0–2)
BASOPHILS NFR SPEC: 0.9 % — SIGNIFICANT CHANGE UP (ref 0–2)
BILIRUB SERPL-MCNC: 1.8 MG/DL — HIGH (ref 0.2–1.2)
BLASTS # FLD: 0 % — SIGNIFICANT CHANGE UP (ref 0–0)
BUN SERPL-MCNC: 119 MG/DL — HIGH (ref 7–23)
BURR CELLS BLD QL SMEAR: PRESENT — SIGNIFICANT CHANGE UP
CALCIUM SERPL-MCNC: 8.4 MG/DL — SIGNIFICANT CHANGE UP (ref 8.4–10.5)
CHLORIDE SERPL-SCNC: 103 MMOL/L — SIGNIFICANT CHANGE UP (ref 98–107)
CO2 SERPL-SCNC: 22 MMOL/L — SIGNIFICANT CHANGE UP (ref 22–31)
CREAT SERPL-MCNC: 3.2 MG/DL — HIGH (ref 0.5–1.3)
EOSINOPHIL # BLD AUTO: 0 K/UL — SIGNIFICANT CHANGE UP (ref 0–0.5)
EOSINOPHIL # BLD AUTO: 0 K/UL — SIGNIFICANT CHANGE UP (ref 0–0.5)
EOSINOPHIL NFR BLD AUTO: 0 % — SIGNIFICANT CHANGE UP (ref 0–6)
EOSINOPHIL NFR BLD AUTO: 0 % — SIGNIFICANT CHANGE UP (ref 0–6)
EOSINOPHIL NFR FLD: 0 % — SIGNIFICANT CHANGE UP (ref 0–6)
GIANT PLATELETS BLD QL SMEAR: PRESENT — SIGNIFICANT CHANGE UP
GLUCOSE SERPL-MCNC: 107 MG/DL — HIGH (ref 70–99)
HAPTOGLOB SERPL-MCNC: 145 MG/DL — SIGNIFICANT CHANGE UP (ref 34–200)
HCT VFR BLD CALC: 20.5 % — CRITICAL LOW (ref 34.5–45)
HCT VFR BLD CALC: 26.5 % — LOW (ref 34.5–45)
HGB BLD-MCNC: 6.2 G/DL — CRITICAL LOW (ref 11.5–15.5)
HGB BLD-MCNC: 8.3 G/DL — LOW (ref 11.5–15.5)
IMM GRANULOCYTES NFR BLD AUTO: 1.2 % — SIGNIFICANT CHANGE UP (ref 0–1.5)
IMM GRANULOCYTES NFR BLD AUTO: 1.4 % — SIGNIFICANT CHANGE UP (ref 0–1.5)
INR BLD: 2.16 — HIGH (ref 0.88–1.17)
LDH SERPL L TO P-CCNC: 198 U/L — SIGNIFICANT CHANGE UP (ref 135–225)
LYMPHOCYTES # BLD AUTO: 0.23 K/UL — LOW (ref 1–3.3)
LYMPHOCYTES # BLD AUTO: 0.29 K/UL — LOW (ref 1–3.3)
LYMPHOCYTES # BLD AUTO: 1.3 % — LOW (ref 13–44)
LYMPHOCYTES # BLD AUTO: 1.4 % — LOW (ref 13–44)
LYMPHOCYTES NFR SPEC AUTO: 3.5 % — LOW (ref 13–44)
MACROCYTES BLD QL: SLIGHT — SIGNIFICANT CHANGE UP
MAGNESIUM SERPL-MCNC: 1.9 MG/DL — SIGNIFICANT CHANGE UP (ref 1.6–2.6)
MCHC RBC-ENTMCNC: 27.2 PG — SIGNIFICANT CHANGE UP (ref 27–34)
MCHC RBC-ENTMCNC: 27.3 PG — SIGNIFICANT CHANGE UP (ref 27–34)
MCHC RBC-ENTMCNC: 30.2 % — LOW (ref 32–36)
MCHC RBC-ENTMCNC: 31.3 % — LOW (ref 32–36)
MCV RBC AUTO: 86.9 FL — SIGNIFICANT CHANGE UP (ref 80–100)
MCV RBC AUTO: 90.3 FL — SIGNIFICANT CHANGE UP (ref 80–100)
METAMYELOCYTES # FLD: 0 % — SIGNIFICANT CHANGE UP (ref 0–1)
MONOCYTES # BLD AUTO: 0.98 K/UL — HIGH (ref 0–0.9)
MONOCYTES # BLD AUTO: 1.49 K/UL — HIGH (ref 0–0.9)
MONOCYTES NFR BLD AUTO: 6.1 % — SIGNIFICANT CHANGE UP (ref 2–14)
MONOCYTES NFR BLD AUTO: 6.7 % — SIGNIFICANT CHANGE UP (ref 2–14)
MONOCYTES NFR BLD: 9.5 % — HIGH (ref 2–9)
MYELOCYTES NFR BLD: 0 % — SIGNIFICANT CHANGE UP (ref 0–0)
NEUTROPHIL AB SER-ACNC: 86.1 % — HIGH (ref 43–77)
NEUTROPHILS # BLD AUTO: 14.74 K/UL — HIGH (ref 1.8–7.4)
NEUTROPHILS # BLD AUTO: 20.18 K/UL — HIGH (ref 1.8–7.4)
NEUTROPHILS NFR BLD AUTO: 90.6 % — HIGH (ref 43–77)
NEUTROPHILS NFR BLD AUTO: 91 % — HIGH (ref 43–77)
NEUTS BAND # BLD: 0 % — SIGNIFICANT CHANGE UP (ref 0–6)
NRBC # FLD: 0 K/UL — SIGNIFICANT CHANGE UP (ref 0–0)
NRBC # FLD: 0 K/UL — SIGNIFICANT CHANGE UP (ref 0–0)
OTHER - HEMATOLOGY %: 0 — SIGNIFICANT CHANGE UP
OVALOCYTES BLD QL SMEAR: SIGNIFICANT CHANGE UP
PHOSPHATE SERPL-MCNC: 4.4 MG/DL — SIGNIFICANT CHANGE UP (ref 2.5–4.5)
PLATELET # BLD AUTO: 68 K/UL — LOW (ref 150–400)
PLATELET # BLD AUTO: 88 K/UL — LOW (ref 150–400)
PLATELET COUNT - ESTIMATE: SIGNIFICANT CHANGE UP
PMV BLD: 11.4 FL — SIGNIFICANT CHANGE UP (ref 7–13)
PMV BLD: 11.8 FL — SIGNIFICANT CHANGE UP (ref 7–13)
POIKILOCYTOSIS BLD QL AUTO: SLIGHT — SIGNIFICANT CHANGE UP
POTASSIUM SERPL-MCNC: 4 MMOL/L — SIGNIFICANT CHANGE UP (ref 3.5–5.3)
POTASSIUM SERPL-SCNC: 4 MMOL/L — SIGNIFICANT CHANGE UP (ref 3.5–5.3)
PROMYELOCYTES # FLD: 0 % — SIGNIFICANT CHANGE UP (ref 0–0)
PROT SERPL-MCNC: 5.1 G/DL — LOW (ref 6–8.3)
PROTHROM AB SERPL-ACNC: 24.6 SEC — HIGH (ref 9.8–13.1)
RBC # BLD: 2.27 M/UL — LOW (ref 3.8–5.2)
RBC # BLD: 3.05 M/UL — LOW (ref 3.8–5.2)
RBC # FLD: 18.6 % — HIGH (ref 10.3–14.5)
RBC # FLD: 18.6 % — HIGH (ref 10.3–14.5)
REVIEW TO FOLLOW: YES — SIGNIFICANT CHANGE UP
SCHISTOCYTES BLD QL AUTO: SLIGHT — SIGNIFICANT CHANGE UP
SICKLE CELLS BLD QL SMEAR: SLIGHT — SIGNIFICANT CHANGE UP
SODIUM SERPL-SCNC: 138 MMOL/L — SIGNIFICANT CHANGE UP (ref 135–145)
VARIANT LYMPHS # BLD: 0 % — SIGNIFICANT CHANGE UP
WBC # BLD: 16.18 K/UL — HIGH (ref 3.8–10.5)
WBC # BLD: 22.27 K/UL — HIGH (ref 3.8–10.5)
WBC # FLD AUTO: 16.18 K/UL — HIGH (ref 3.8–10.5)
WBC # FLD AUTO: 22.27 K/UL — HIGH (ref 3.8–10.5)

## 2019-04-16 PROCEDURE — 99291 CRITICAL CARE FIRST HOUR: CPT

## 2019-04-16 RX ORDER — MORPHINE SULFATE 50 MG/1
2 CAPSULE, EXTENDED RELEASE ORAL
Qty: 100 | Refills: 0 | Status: DISCONTINUED | OUTPATIENT
Start: 2019-04-16 | End: 2019-04-16

## 2019-04-16 RX ORDER — ONDANSETRON 8 MG/1
4 TABLET, FILM COATED ORAL THREE TIMES A DAY
Qty: 0 | Refills: 0 | Status: DISCONTINUED | OUTPATIENT
Start: 2019-04-16 | End: 2019-04-16

## 2019-04-16 RX ORDER — MORPHINE SULFATE 50 MG/1
2 CAPSULE, EXTENDED RELEASE ORAL ONCE
Qty: 0 | Refills: 0 | Status: DISCONTINUED | OUTPATIENT
Start: 2019-04-16 | End: 2019-04-16

## 2019-04-16 RX ORDER — MORPHINE SULFATE 50 MG/1
1 CAPSULE, EXTENDED RELEASE ORAL
Qty: 100 | Refills: 0 | Status: DISCONTINUED | OUTPATIENT
Start: 2019-04-16 | End: 2019-04-16

## 2019-04-16 RX ADMIN — PHENYLEPHRINE HYDROCHLORIDE 65.33 MICROGRAM(S)/KG/MIN: 10 INJECTION INTRAVENOUS at 08:46

## 2019-04-16 RX ADMIN — ONDANSETRON 4 MILLIGRAM(S): 8 TABLET, FILM COATED ORAL at 01:34

## 2019-04-16 RX ADMIN — Medication 1 APPLICATION(S): at 12:31

## 2019-04-16 RX ADMIN — Medication 500 MILLIGRAM(S): at 06:06

## 2019-04-16 RX ADMIN — MIDODRINE HYDROCHLORIDE 20 MILLIGRAM(S): 2.5 TABLET ORAL at 06:06

## 2019-04-16 RX ADMIN — Medication 100 MILLIGRAM(S): at 12:24

## 2019-04-16 RX ADMIN — MORPHINE SULFATE 1 MILLIGRAM(S): 50 CAPSULE, EXTENDED RELEASE ORAL at 06:39

## 2019-04-16 RX ADMIN — Medication 0.12 MILLIGRAM(S): at 12:24

## 2019-04-16 RX ADMIN — MORPHINE SULFATE 2 MILLIGRAM(S): 50 CAPSULE, EXTENDED RELEASE ORAL at 10:33

## 2019-04-16 RX ADMIN — Medication 100 MILLIGRAM(S): at 06:05

## 2019-04-16 RX ADMIN — MORPHINE SULFATE 1 MG/HR: 50 CAPSULE, EXTENDED RELEASE ORAL at 17:16

## 2019-04-16 RX ADMIN — PANTOPRAZOLE SODIUM 10 MG/HR: 20 TABLET, DELAYED RELEASE ORAL at 08:47

## 2019-04-16 RX ADMIN — MORPHINE SULFATE 1 MG/HR: 50 CAPSULE, EXTENDED RELEASE ORAL at 12:44

## 2019-04-16 RX ADMIN — Medication 650 MILLIGRAM(S): at 13:48

## 2019-04-16 RX ADMIN — Medication 500 MILLIGRAM(S): at 01:24

## 2019-04-16 RX ADMIN — MORPHINE SULFATE 1 MILLIGRAM(S): 50 CAPSULE, EXTENDED RELEASE ORAL at 01:34

## 2019-04-16 RX ADMIN — PIPERACILLIN AND TAZOBACTAM 25 GRAM(S): 4; .5 INJECTION, POWDER, LYOPHILIZED, FOR SOLUTION INTRAVENOUS at 06:05

## 2019-04-16 RX ADMIN — PIPERACILLIN AND TAZOBACTAM 25 GRAM(S): 4; .5 INJECTION, POWDER, LYOPHILIZED, FOR SOLUTION INTRAVENOUS at 17:17

## 2019-04-16 RX ADMIN — Medication 500 MILLIGRAM(S): at 12:23

## 2019-04-16 RX ADMIN — MIDODRINE HYDROCHLORIDE 20 MILLIGRAM(S): 2.5 TABLET ORAL at 13:48

## 2019-04-16 RX ADMIN — Medication 100 MILLIGRAM(S): at 13:48

## 2019-04-16 RX ADMIN — Medication 500 MILLIGRAM(S): at 17:17

## 2019-04-16 RX ADMIN — MORPHINE SULFATE 2 MILLIGRAM(S): 50 CAPSULE, EXTENDED RELEASE ORAL at 10:50

## 2019-04-16 RX ADMIN — Medication 650 MILLIGRAM(S): at 06:05

## 2019-04-16 RX ADMIN — MUPIROCIN 1 APPLICATION(S): 20 OINTMENT TOPICAL at 08:45

## 2019-04-16 RX ADMIN — CHLORHEXIDINE GLUCONATE 1 APPLICATION(S): 213 SOLUTION TOPICAL at 12:23

## 2019-04-16 NOTE — PROGRESS NOTE ADULT - REASON FOR ADMISSION
right leg wound/ swelling

## 2019-04-16 NOTE — PROGRESS NOTE ADULT - ASSESSMENT
Echo 1/17/19:EF 60%, mild to mod MR, mild AR, nl lv sys fx, mod pulm htn     a/p  80 y/o female, with a PmHx of COPD, paroxsymal a-fib (on pradaxa), CAD (s/p stent 11/2018), DCHF, pulm htn, HTN, HLD, and anxiety presenting with nonhealing R posterior ankle ulcer and UTI, course complicated by hypotension, MYRIAM w RVR - transferred to MICU for pressor support, coffee-ground emesis. S/p 2 units of PRBCs, a unit of FFP and vitamin K for elevated INR.     1. Chronic diastolic chf   chest xray with mild interstitial edema, sml loculated right pleural effusion, sml left pleural effusion unchanged, increased in Right opacity  hold bb for hypotension, continue midodrine to 20mg TID   +fluid overload on exam likely 2/2 to AFIB w/ RVR   s/p IV resuscitation, s/p iv Bumex  diuretics per MICU   recent echo with nl lv fx   repeat echo for lv fxn  s/p albumin IVPB     2.  Paroxysmal Atrial Fibrillation  transferred to MICU for MYRIAM, hypotension   Rates improving  C/w dig for Afib  C/w dashawn drip, midodrine for hypotension  CHADS 3 - off pradaxa, hep gtt on hold for anemia     3. CAD s/p PCI (11/2018)  cv stable, no cp or sob   continue bb, statin, asa/plavix on hold     4. UTI   s/p abx per med   + Cdiff, on vanco     5.  R posterior ankle ulcer   vascular f/u   plan for eventual RLE angio, deferred at this time     6. bri on ckd  renal f/u   plan for eventual renal biopsy   off antiplat due to gi bleed      7. Aspiration pneumonia  on zosyn, bipap  care per micu     8. GIB   + coffee ground emesis 2/2 to elevated inr  4/14 given 2 UPRBC, 1 unit FFP, vit k   AC on hold   Will hold off endoscopy as patient is currently too unstable  NPO, ppi    9. GOC  DNI,DNR  comfort care, morphine gtt     dvt ppx

## 2019-04-16 NOTE — PROGRESS NOTE ADULT - ATTENDING COMMENTS
Patient examined and case reviewed in detail on bedside rounds  Critically ill with severe respiratory distress  Frequent bedside visits with therapy change today.   Crit Care Time Today 35 min+

## 2019-04-16 NOTE — PROGRESS NOTE ADULT - ASSESSMENT
79F with hx of COPD (currently on 3L O2 24 hours), CAD (s/p stent), CHF, Paroxysmal A-fib (on Pradaxa, Metoprolol), CVA/TIA, HTN, HLD, IGOR, C. diff, non-healing ulcer RLE presenting initially on 4/2 with worsening RLE pain x few days. Admitted for management of RLE ulcer, pseudomonas UTI. Course c/b poor PO intake, IGOR, Afib w/ RVR. S/p PO amiodarone load. Remained hypotensive and MICU consulted on 4/10. Recommended to give fluids, stop metoprolol and midodrine. Patient found to have C. diff and started on PO Vanc. MICU called to reassess patient on 4/14 as hypotensive and in rapid Afib. S/p PO digoxin but remained tachycardic to 150-180, BP 80s/50s. Given 500 cc bolus and 150 mg amiodarone with improvement in HR to 100-130s, /50. However, patient with tachypnea, inc WOB, and encephelopathic. Transferred to MICU for pressor support. GIB 4/14, coffee-ground emesis. S/p 2 units of PRBCs, a unit of FFP and vitamin K for elevated INR. Likely aspirated.    #NEURO: no active issues  - Per son, not behaving at baseline; usually very responsive, but more reserved now  - No need for sedation at this time    #CV: hypotension, afib with rvr  - Hypotension may be 2/2 septic shock 2/2 active C. diff colitis vs cardiogenic shock 2/2 Afib with rvr (less likely in light of persistent hypotension with the now rate controlled Afib)  - C/w digoxin, diltiazem drip for Afib  - C/w dashawn drip, midodrine for hypotension  - Consider adding levo for refractory hypotension    #PULM: aspiration pneumonia in setting of recent hematemesis  - C/w Zosyn  - C/w HFNC; advance to BiPAP if needed  - Symbicort and albuterol discontinued    #GI: hematemesis, C. diff colitis  - No active emesis  - Will hold off endoscopy as patient is currently too unstable  - Continue NPO status  - Continue PPI infusion  - C/w po vanco and iv flagyl     #RENAL: worsening IGOR  - Monitor Is and Os  - Administered Bumex 4 mg to match Is and Os  - Hydrate judiciously  - Hold off on renal biopsy as patient is currently too unstable    #HEME  - Hold off on anticoagulation given recent UGIB  - SCDs for DVT ppx    #SKIN  - Defer vascular angiogram as patient is currently too unstable    #GOC  - DNR/DNI 79F with hx of COPD (currently on 3L O2 24 hours), CAD (s/p stent), CHF, Paroxysmal A-fib (on Pradaxa, Metoprolol), CVA/TIA, HTN, HLD, IGOR, C. diff, non-healing ulcer RLE presenting initially on 4/2 with worsening RLE pain x few days. Admitted for management of RLE ulcer, pseudomonas UTI. Course c/b poor PO intake, IGOR, Afib w/ RVR. S/p PO amiodarone load. Remained hypotensive and MICU consulted on 4/10. Recommended to give fluids, stop metoprolol and midodrine. Patient found to have C. diff and started on PO Vanc. MICU called to reassess patient on 4/14 as hypotensive and in rapid Afib. S/p PO digoxin but remained tachycardic to 150-180, BP 80s/50s. Given 500 cc bolus and 150 mg amiodarone with improvement in HR to 100-130s, /50. However, patient with tachypnea, inc WOB, and encephelopathic. Transferred to MICU for pressor support. GIB 4/14, coffee-ground emesis. S/p 2 units of PRBCs, a unit of FFP and vitamin K for elevated INR. Likely aspirated. Comfort care starting 4/16, on morphine drip.    #NEURO: no active issues  - Per son, not behaving at baseline; usually very responsive, but more reserved now  - No need for sedation at this time  - Morphine drip initiated for comfort care    #CV: hypotension, afib with rvr  - Hypotension may be 2/2 septic shock 2/2 active C. diff colitis vs cardiogenic shock 2/2 Afib with rvr (less likely in light of persistent hypotension with the now rate controlled Afib)  - C/w digoxin, diltiazem drip for Afib  - C/w dashawn drip, midodrine for hypotension  - pressors capped for comfort care    #PULM: aspiration pneumonia in setting of recent hematemesis  - C/w Zosyn  - C/w HFNC; advance to BiPAP if needed  - Symbicort and albuterol discontinued    #GI: hematemesis, C. diff colitis  - No active emesis  - Will hold off endoscopy as patient is currently too unstable  - Continue NPO status  - Continue PPI infusion  - C/w po vanco and iv flagyl     #RENAL: worsening IGOR  - Monitor Is and Os  - Hydrate judiciously  - Hold off on renal biopsy for comfort care    #HEME  - Hold off on anticoagulation given recent UGIB  - SCDs for DVT ppx    #SKIN  - Defer vascular angiogram as patient is currently too unstable    #GOC  - DNR/DNI  - Comfort care

## 2019-04-16 NOTE — PROGRESS NOTE ADULT - SUBJECTIVE AND OBJECTIVE BOX
CHIEF COMPLAINT: RLE pain    HPI: 78 y/o female, with a PmHx of COPD (currently on 3L O2 24 hours), paroxsymal a-fib (on pradaxa), h/o CAD (s/p stent 11/2018), CHF (although recent TTE with normal biventricular function, with no mention of diastolic dysfunction; + severely dilated left atrium), HTN, HLD, and anxiety, presenting to the [facility] ED with chronic right ankle pressure ulcer pain for 1 week. Patient had previously had STONEY/TVR done as an outpatient showing severe PAD 0.6 in the RLE. Patient was seen by ID and patient's RLE ulcer was thought to not be infected and due to vascular insufficiency and she was observed off antibiotics. Vascular surgery planned to do a RLE angiogram, however this has been delayed due to patient's worsening renal function- at baseline has CKD Stage III, now with creatinine increased to 2.46 today. Nephrology was consulted and plan to do a renal biopsy in the future, with possible differentials for patient's IGOR on CKD including AIN versus MPGN. Yesterday the patient developed Afib with RVR to the 170s and an RRT was called. Patient's rates improved with IV Lopressor and she was initially started on digoxin, which has now been changed to amiodarone. BP during the RRT remained in the 90s, which has been her baseline for the last 3 days. Rates overnight have been stable in the 60s-80s. Patient was planned to go for RLE angiogram today, however it has now been placed on hold awaiting cardiology clearance prior to the procedure.     MICU consult this morning was called for hypotension into the 80s. Patient currently on metoprolol 50mg BID and midodrine 10mg TID. Remains in Afib overnight, rate controlled in the 60s-80s. Patient seen and examined at bedside, endorses chronic SOB at baseline and dizziness. Also has 7/10 RLE pain. Denies chest pain or palpitations.     PAST MEDICAL & SURGICAL HISTORY:  CAD (coronary artery disease): s/p stent 2018  CHF (congestive heart failure)  COPD (chronic obstructive pulmonary disease): on 2-3L O2 at home prn  Anxiety  TIA (transient ischemic attack): many years ago  PAF (paroxysmal atrial fibrillation)  HLD (hyperlipidemia)  HTN (hypertension)  H/O total hysterectomy: 2014  History of cataract surgery: b/l 2015  History of repair of hip fracture: luisa placed in RLE 2015  H/O spinal fusion: c2-6 in 2017      FAMILY HISTORY:  No pertinent family history in first degree relatives      SOCIAL HISTORY:  Smoking: [ ] Never Smoked [ ] Former Smoker (__ packs x ___ years) [ ] Current Smoker  (__ packs x ___ years)  Substance Use: [ ] Never Used [ ] Used ____  EtOH Use:  Marital Status: [ ] Single [ ]  [ ]  [ ]   Sexual History:   Occupation:  Recent Travel:  Country of Birth:  Advance Directives:    Allergies    No Known Allergies    Intolerances        HOME MEDICATIONS: As per EMR    REVIEW OF SYSTEMS:  Constitutional: [ ] negative [ ] fevers [ ] chills [ ] weight loss [ ] weight gain  HEENT: [ ] negative [ ] dry eyes [ ] eye irritation [ ] postnasal drip [ ] nasal congestion  CV: [ ] negative  [ ] chest pain [ ] orthopnea [ ] palpitations [ ] murmur  Resp: [ ] negative [ ] cough [X] shortness of breath [ ] dyspnea [ ] wheezing [ ] sputum [ ] hemoptysis  GI: [ ] negative [ ] nausea [ ] vomiting [ ] diarrhea [ ] constipation [ ] abd pain [ ] dysphagia   : [ ] negative [ ] dysuria [ ] nocturia [ ] hematuria [ ] increased urinary frequency  Musculoskeletal: [ ] negative [ ] back pain [ ] myalgias [X] arthralgias [ ] fracture  Skin: [ ] negative [ ] rash [ ] itch  Neurological: [ ] negative [ ] headache [ ] dizziness [ ] syncope [ ] weakness [ ] numbness  Psychiatric: [ ] negative [ ] anxiety [ ] depression  Endocrine: [ ] negative [ ] diabetes [ ] thyroid problem  Hematologic/Lymphatic: [ ] negative [ ] anemia [ ] bleeding problem  Allergic/Immunologic: [ ] negative [ ] itchy eyes [ ] nasal discharge [ ] hives [ ] angioedema  [X] All other systems negative  [ ] Unable to assess ROS because ________    OBJECTIVE:  ICU Vital Signs Last 24 Hrs  T(C): 36.6 (10 Apr 2019 06:17), Max: 37.1 (09 Apr 2019 15:30)  T(F): 97.8 (10 Apr 2019 06:17), Max: 98.8 (09 Apr 2019 15:30)  HR: 80 (10 Apr 2019 06:17) (67 - 115)  BP: 82/55 (10 Apr 2019 06:17) (80/58 - 133/77)  BP(mean): --  ABP: --  ABP(mean): --  RR: 16 (10 Apr 2019 06:17) (16 - 22)  SpO2: 95% (10 Apr 2019 06:17) (94% - 97%)        CAPILLARY BLOOD GLUCOSE    PHYSICAL EXAM:  General: Resting in bed comfortably, on nasal cannula, NAD  HEENT: PERRL  Lymph Nodes: No lymphadenopathy  Neck: Supple  Respiratory: Crackles in b/l lower bases  Cardiovascular: Irregular rate and rhythm, +systolic murmur, no rubs or gallops  Abdomen: Soft, distended, no tenderness, bowel sounds present  Extremities: RLE wrapped in Kerlix, no bleeding or drainage noted on visual inspection, able to wiggle toes and dorsi/plantarflex  Skin: No edema of LLE  Neurological: AAOx3, follows commands, no focal deficits  Psychiatry: Calm    LINES: Peripheral IVs    HOSPITAL MEDICATIONS:  heparin  Infusion.  Unit(s)/Hr IV Continuous <Continuous>  amiodarone    Tablet   Oral   amiodarone    Tablet 400 milliGRAM(s) Oral every 8 hours  metoprolol tartrate 50 milliGRAM(s) Oral two times a day  midodrine 10 milliGRAM(s) Oral every 8 hours  atorvastatin 40 milliGRAM(s) Oral at bedtime  buDESOnide 160 MICROgram(s)/formoterol 4.5 MICROgram(s) Inhaler 2 Puff(s) Inhalation two times a day  levalbuterol Inhalation 0.63 milliGRAM(s) Inhalation every 6 hours PRN  oxyCODONE    5 mG/acetaminophen 325 mG 1 Tablet(s) Oral every 6 hours PRN  docusate sodium 100 milliGRAM(s) Oral three times a day  polyethylene glycol 3350 17 Gram(s) Oral daily  senna 2 Tablet(s) Oral at bedtime  albumin human 25% IVPB 50 milliLiter(s) IV Intermittent every 6 hours  thiamine 100 milliGRAM(s) Oral daily  collagenase Ointment 1 Application(s) Topical daily  mupirocin 2% Ointment 1 Application(s) Topical <User Schedule>        LABS:                        10.2   14.65 )-----------( 173      ( 10 Apr 2019 06:00 )             35.0     Hgb Trend: 10.2<--, 10.1<--, 10.8<--, 10.8<--, 10.8<--  04-10    138  |  100  |  61<H>  ----------------------------<  77  4.0   |  21<L>  |  2.49<H>    Ca    8.5      10 Apr 2019 06:00  Phos  4.5     04-10  Mg     1.9     04-10    TPro  6.7  /  Alb  x   /  TBili  x   /  DBili  x   /  AST  x   /  ALT  x   /  AlkPhos  x   04-09    Creatinine Trend: 2.49<--, 2.46<--, 2.21<--, 1.81<--, 1.57<--, 1.62<--  PT/INR - ( 09 Apr 2019 05:15 )   PT: 23.1 SEC;   INR: 2.04          PTT - ( 10 Apr 2019 06:00 )  PTT:> 200.0 SEC    Arterial Blood Gas:  04-09 @ 10:10  7.34/47/21/23/32.2/-0.1  ABG lactate: --        MICROBIOLOGY: Urine culture growing 3 or more organisms, likely contaminated    RADIOLOGY:  [X] Reviewed and interpreted by me    Tele: Afib, 60s-80s overnight CHIEF COMPLAINT: RLE pain    Interval Events: continuing to have hematemesis; pressor requirements increasing overnight. Transition to comfort care starting this AM. Morphine drip started and all labs discontinued.    REVIEW OF SYSTEMS:  Constitutional: [ ] negative [ ] fevers [ ] chills [ ] weight loss [ ] weight gain  HEENT: [ ] negative [ ] dry eyes [ ] eye irritation [ ] postnasal drip [ ] nasal congestion  CV: [ ] negative  [ ] chest pain [ ] orthopnea [ ] palpitations [ ] murmur  Resp: [ ] negative [ ] cough [ ] shortness of breath [ ] dyspnea [ ] wheezing [ ] sputum [ ] hemoptysis  GI: [ ] negative [ ] nausea [ ] vomiting [ ] diarrhea [ ] constipation [ ] abd pain [ ] dysphagia   : [ ] negative [ ] dysuria [ ] nocturia [ ] hematuria [ ] increased urinary frequency  Musculoskeletal: [ ] negative [ ] back pain [ ] myalgias [ ] arthralgias [ ] fracture  Skin: [ ] negative [ ] rash [ ] itch  Neurological: [ ] negative [ ] headache [ ] dizziness [ ] syncope [ ] weakness [ ] numbness  Psychiatric: [ ] negative [ ] anxiety [ ] depression  Endocrine: [ ] negative [ ] diabetes [ ] thyroid problem  Hematologic/Lymphatic: [ ] negative [ ] anemia [ ] bleeding problem  Allergic/Immunologic: [ ] negative [ ] itchy eyes [ ] nasal discharge [ ] hives [ ] angioedema  [ ] All other systems negative  [x ] Unable to assess ROS because ________    OBJECTIVE:  ICU Vital Signs Last 24 Hrs  T(C): 35.7 (16 Apr 2019 08:00), Max: 36.2 (15 Apr 2019 16:00)  T(F): 96.2 (16 Apr 2019 08:00), Max: 97.1 (15 Apr 2019 16:00)  HR: 95 (16 Apr 2019 10:00) (64 - 106)  BP: 82/41 (16 Apr 2019 10:00) (58/43 - 98/39)  BP(mean): 53 (16 Apr 2019 10:00) (48 - 68)  ABP: --  ABP(mean): --  RR: 24 (16 Apr 2019 10:00) (15 - 35)  SpO2: 94% (16 Apr 2019 10:00) (87% - 100%)        04-15 @ 07:01  -  04-16 @ 07:00  --------------------------------------------------------  IN: 2690 mL / OUT: 2085 mL / NET: 605 mL      CAPILLARY BLOOD GLUCOSE          PHYSICAL EXAM:  GENERAL: ill-appearing  HEAD: NCAT  HEENT: neck supple  CARDIAC: RRR, hypotensive  PULM: lung sounds b/l  GI: Abdomen nondistended, soft, nontender, no guarding or rebound tenderness  NEURO: awake  MSK: no visible deformities  SKIN: pale    LINES: PIV    HOSPITAL MEDICATIONS:    metroNIDAZOLE  IVPB 500 milliGRAM(s) IV Intermittent every 8 hours  metroNIDAZOLE  IVPB      piperacillin/tazobactam IVPB. 3.375 Gram(s) IV Intermittent every 12 hours  vancomycin    Solution 500 milliGRAM(s) Oral every 6 hours    digoxin     Tablet 0.125 milliGRAM(s) Oral every other day  midodrine 20 milliGRAM(s) Oral three times a day  phenylephrine    Infusion 6.2 MICROgram(s)/kG/Min IV Continuous <Continuous>        morphine  - Injectable 1 milliGRAM(s) IV Push every 4 hours PRN  morphine  Infusion 1 mG/Hr IV Continuous <Continuous>  ondansetron Injectable 4 milliGRAM(s) IV Push three times a day PRN    pantoprazole Infusion 8 mG/Hr IV Continuous <Continuous>        sodium bicarbonate 650 milliGRAM(s) Oral three times a day  thiamine 100 milliGRAM(s) Oral daily      chlorhexidine 4% Liquid 1 Application(s) Topical <User Schedule>  collagenase Ointment 1 Application(s) Topical daily  mupirocin 2% Ointment 1 Application(s) Topical <User Schedule>        LABS:                        8.3    22.27 )-----------( 88       ( 16 Apr 2019 05:25 )             26.5     Hgb Trend: 8.3<--, 6.2<--, 9.0<--, 6.9<--, 9.6<--  04-16    138  |  103  |  119<H>  ----------------------------<  107<H>  4.0   |  22  |  3.20<H>    Ca    8.4      16 Apr 2019 05:25  Phos  4.4     04-16  Mg     1.9     04-16    TPro  5.1<L>  /  Alb  2.7<L>  /  TBili  1.8<H>  /  DBili  x   /  AST  28  /  ALT  12  /  AlkPhos  62  04-16    Creatinine Trend: 3.20<--, 3.26<--, 3.36<--, 3.01<--, 3.03<--, 2.84<--  PT/INR - ( 16 Apr 2019 04:00 )   PT: 24.6 SEC;   INR: 2.16          PTT - ( 16 Apr 2019 04:00 )  PTT:63.5 SEC          MICROBIOLOGY:     RADIOLOGY:  [ ] Reviewed and interpreted by me    EKG:

## 2019-04-16 NOTE — DISCHARGE NOTE FOR THE EXPIRED PATIENT - SECONDARY DIAGNOSIS.
IGOR (acute kidney injury) GIB (gastrointestinal bleeding) CHF (congestive heart failure) COPD (chronic obstructive pulmonary disease)

## 2019-04-16 NOTE — PROGRESS NOTE ADULT - SUBJECTIVE AND OBJECTIVE BOX
CARDIOLOGY FOLLOW UP - Dr. Vieyra    CC events noted  Pt. now comfort care on morphine gtt     PHYSICAL EXAM:  T(C): 35.6 (04-16-19 @ 12:00), Max: 36.2 (04-15-19 @ 16:00)  HR: 91 (04-16-19 @ 13:00) (64 - 106)  BP: 81/45 (04-16-19 @ 13:00) (58/43 - 98/39)  RR: 28 (04-16-19 @ 13:00) (15 - 35)  SpO2: 84% (04-16-19 @ 13:00) (84% - 100%)  Wt(kg): --  I&O's Summary    15 Apr 2019 07:01  -  16 Apr 2019 07:00  --------------------------------------------------------  IN: 2690 mL / OUT: 2085 mL / NET: 605 mL        Appearance: ill-appearing   Cardiovascular: Normal S1 S2,RRR, No JVD, No murmurs  Respiratory: Lungs clear to auscultation	  Gastrointestinal:  Soft, Non-tender, + BS	  Extremities: Normal range of motion, No clubbing, cyanosis or edema        MEDICATIONS  (STANDING):  chlorhexidine 4% Liquid 1 Application(s) Topical <User Schedule>  collagenase Ointment 1 Application(s) Topical daily  digoxin     Tablet 0.125 milliGRAM(s) Oral every other day  metroNIDAZOLE  IVPB 500 milliGRAM(s) IV Intermittent every 8 hours  metroNIDAZOLE  IVPB      midodrine 20 milliGRAM(s) Oral three times a day  morphine  Infusion 1 mG/Hr (1 mL/Hr) IV Continuous <Continuous>  mupirocin 2% Ointment 1 Application(s) Topical <User Schedule>  pantoprazole Infusion 8 mG/Hr (10 mL/Hr) IV Continuous <Continuous>  phenylephrine    Infusion 6.2 MICROgram(s)/kG/Min (65.333 mL/Hr) IV Continuous <Continuous>  piperacillin/tazobactam IVPB. 3.375 Gram(s) IV Intermittent every 12 hours  sodium bicarbonate 650 milliGRAM(s) Oral three times a day  thiamine 100 milliGRAM(s) Oral daily  vancomycin    Solution 500 milliGRAM(s) Oral every 6 hours      TELEMETRY: 	    ECG:  	  RADIOLOGY:   DIAGNOSTIC TESTING:  [ ] Echocardiogram:  [ ]  Catheterization:  [ ] Stress Test:    OTHER: 	    LABS:	 	                                8.3    22.27 )-----------( 88       ( 16 Apr 2019 05:25 )             26.5     04-16    138  |  103  |  119<H>  ----------------------------<  107<H>  4.0   |  22  |  3.20<H>    Ca    8.4      16 Apr 2019 05:25  Phos  4.4     04-16  Mg     1.9     04-16    TPro  5.1<L>  /  Alb  2.7<L>  /  TBili  1.8<H>  /  DBili  x   /  AST  28  /  ALT  12  /  AlkPhos  62  04-16    PT/INR - ( 16 Apr 2019 04:00 )   PT: 24.6 SEC;   INR: 2.16          PTT - ( 16 Apr 2019 04:00 )  PTT:63.5 SEC

## 2019-04-16 NOTE — PROGRESS NOTE ADULT - SUBJECTIVE AND OBJECTIVE BOX
[facility] NEPHROLOGY- PROGRESS NOTE    79y Female with history of CHF, COPD presents with RLE pain. Nephrology consulted for elevated Scr.    Patient made comfort care and started on morphine gtt.    REVIEW OF SYSTEMS: Unable to obtain as patient lethargic.    No Known Allergies      Hospital Medications: Medications reviewed      VITALS:  T(F): 96.2 (04-16-19 @ 08:00), Max: 97.1 (04-15-19 @ 16:00)  HR: 95 (04-16-19 @ 10:00)  BP: 82/41 (04-16-19 @ 10:00)  RR: 24 (04-16-19 @ 10:00)  SpO2: 94% (04-16-19 @ 10:00)  Wt(kg): --    04-15 @ 07:01  -  04-16 @ 07:00  --------------------------------------------------------  IN: 2690 mL / OUT: 2085 mL / NET: 605 mL      PHYSICAL EXAM:    Gen: Ill appearing on high flow, + agonal breathing  Cards: Irregularly irregular, +S1/S2, no M/G/R  Resp: Bibasilar rales  GI: soft, NT, + ab distention, NABS  Vascular: 2+ RLE edema, 1+ LLE edema      LABS:  04-16    138  |  103  |  119<H>  ----------------------------<  107<H>  4.0   |  22  |  3.20<H>    Ca    8.4      16 Apr 2019 05:25  Phos  4.4     04-16  Mg     1.9     04-16    TPro  5.1<L>  /  Alb  2.7<L>  /  TBili  1.8<H>  /  DBili      /  AST  28  /  ALT  12  /  AlkPhos  62  04-16    Creatinine Trend: 3.20 <--, 3.26 <--, 3.36 <--, 3.01 <--, 3.03 <--, 2.84 <--, 2.66 <--, 2.49 <--                        8.3    22.27 )-----------( 88       ( 16 Apr 2019 05:25 )             26.5     Urine Studies:    Protein, Random Urine: 43.3 mg/dL (04-09 @ 12:48)  Creatinine, Random Urine: 119.50 mg/dL (04-09 @ 12:48)

## 2019-04-16 NOTE — PROGRESS NOTE ADULT - PROVIDER SPECIALTY LIST ADULT
Cardiology
Gastroenterology
Hospitalist
Infectious Disease
MICU
MICU
Nephrology
Neurology
Neurology
Podiatry
Vascular Surgery
Nephrology
Cardiology
Nephrology
Gastroenterology

## 2019-04-16 NOTE — PROGRESS NOTE ADULT - ASSESSMENT
79y Female with history of CHF, COPD presents with RLE pain. Nephrology consulted for elevated Scr.    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 given low complements versus lupus nephritis given positive serologies versus HRS? Patient initially planned for kidney biopsy however now comfort care. Defer further work up at this time.   2) CKD-3: Patient appears to have h/o age appropriate CKD-3 (baseline Scr 1.1-1.2) with mild proteinuria.  3) Orthostatic hypotension: BP low. On midodrine and dashawn gtt as per ICU. Rate control as per cardiology. Monitor BP.  4) LE edema: In setting of afib with RVR. Diuretics as per MICU.  5) Pyuria/Microscopic hematuria: Urine culture negative s/p CTX. 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  6) Metabolic acidosis: In setting of renal insufficiency and diarrhea now improving on sodium bicarbonate. Monitor serum CO2.    Patient now comfort care on morphine gtt. Will sign off. Please call with any questions.

## 2019-04-16 NOTE — DISCHARGE NOTE FOR THE EXPIRED PATIENT - HOSPITAL COURSE
79F with hx of COPD (currently on 3L O2 24 hours), CAD (s/p stent), CHF, Paroxysmal A-fib (on Pradaxa, Metoprolol), CVA/TIA, HTN, HLD, IGOR, C. diff, non-healing ulcer RLE presenting initially on 4/2 with worsening RLE pain x few days. Admitted for management of RLE ulcer, pseudomonas UTI. Course c/b poor PO intake, IGOR, Afib w/ RVR. S/p PO amiodarone load. Remained hypotensive and MICU consulted on 4/10. Recommended to give fluids, stop metoprolol and midodrine. Patient found to have C. diff and started on PO Vanc. MICU called to reassess patient on 4/14 as hypotensive and in rapid Afib. S/p PO digoxin but remained tachycardic to 150-180, BP 80s/50s. Given 500 cc bolus and 150 mg amiodarone with improvement in HR to 100-130s, /50. However, patient with tachypnea, inc WOB, and encephelopathic. Transferred to MICU for pressor support. GIB 4/14, coffee-ground emesis. S/p 2 units of PRBCs, a unit of FFP and vitamin K for elevated INR. Pt aspirated, but decision was made by family to make her DNR/DNI and focus on Comfort care starting. Pt was started on Morphine drip for comfort and pressors were caped . At 1820 Pt was noted asystolic and pronounced dead at 1821. Family at bedside

## 2019-04-17 ENCOUNTER — APPOINTMENT (OUTPATIENT)
Dept: VASCULAR SURGERY | Facility: CLINIC | Age: 79
End: 2019-04-17

## 2019-04-19 ENCOUNTER — APPOINTMENT (OUTPATIENT)
Dept: WOUND CARE | Facility: CLINIC | Age: 79
End: 2019-04-19

## 2019-05-28 NOTE — PROVIDER CONTACT NOTE (OTHER) - DATE AND TIME:
06-Apr-2019 09:25 Post-Care Instructions: I reviewed with the patient in detail post-care instructions. Patient is to wear sunprotection, and avoid picking at any of the treated lesions. Pt may apply Vaseline to crusted or scabbing areas. Detail Level: Detailed Duration Of Freeze Thaw-Cycle (Seconds): 6 Consent: The patient's consent was obtained including but not limited to risks of crusting, scabbing, blistering, scarring, darker or lighter pigmentary change, recurrence, incomplete removal and infection. Render Note In Bullet Format When Appropriate: No Number Of Freeze-Thaw Cycles: 1 freeze-thaw cycle
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
## 2020-10-09 NOTE — ED PROVIDER NOTE - NS_BEDUNITTYPES_ED_ALL_ED
Ok.  Also, check quantitative immunoglobulin and repeat QuantiFERON TB and hepatitis serology at next blood draw.   MEDICINE

## 2020-12-15 PROBLEM — Z87.440 HISTORY OF URINARY TRACT INFECTION: Status: RESOLVED | Noted: 2017-05-31 | Resolved: 2020-12-15

## 2021-01-26 NOTE — ED ADULT NURSE NOTE - NS ED NOTE ABUSE RESPONSE YN
Interventional Radiology  History and Physical 1/26/2021     Clarissa Garcia   1958   [id number]    Assessment/Plan:  62F with recurrent left renal cyst presents for drain placement for the performance of sclerosis  I discussed anatomical approaches at risks including peritoneal spillage, damage to other organs  I reviewed the imaging of the cyst with her    Length of drainage duration is indeterminate at this point as we hope to sclerosis the cavity over time this may require multiple sessions    She will maintain drainage at home in the interval    Will sedate  NPO  MP2 ASA3      Problem List Items Addressed This Visit        Genitourinary    Renal cyst    Relevant Orders    IR drainage tube placement with sclerosis             Subjective:     Patient ID: Clarissa Garcia is a 65 y o  female      History of Present Illness  as above    Review of Systems      Past Medical History:   Diagnosis Date    Crohn's disease ([address] )     Disease of thyroid gland     Gallstones     History of transfusion     Hypertension     Kidney stones     Osteoporosis     PONV (postoperative nausea and vomiting)         Past Surgical History:   Procedure Laterality Date    COLECTOMY      ILEOSTOMY      IR IMAGE GUIDED ASPIRATION / DRAINAGE W TUBE  6/27/2019    LITHOTRIPSY      UT CYSTOURETHROSCOPY,URETER CATHETER Bilateral 5/24/2018    Procedure: CYSTOSCOPY WITH RETROGRADE PYELOGRAM;  Surgeon: Justin Boucher MD;  Location: [facility];  Service: Urology    TOTAL HIP ARTHROPLASTY Left         Social History     Tobacco Use   Smoking Status Former Smoker   Smokeless Tobacco Never Used   Tobacco Comment    quit  20 years ago         Social History     Substance and Sexual Activity   Alcohol Use No        Social History     Substance and Sexual Activity   Drug Use No        No Known Allergies    Current Outpatient Medications   Medication Sig Dispense Refill    cyanocobalamin (VITAMIN B-12) 500 mcg tablet Take 1,000 mcg by mouth      ferrous sulfate 325 (65 Fe) mg tablet Take by mouth      levothyroxine 25 mcg tablet Take 25 mcg by mouth daily  1    metoprolol succinate (TOPROL-XL) 25 mg 24 hr tablet Take 1/2 Tab  Daily  0    Multiple Vitamins-Minerals (MULTIVITAMIN WITH MINERALS) tablet Take 1 tablet by mouth daily      amoxicillin (AMOXIL) 500 mg capsule TAKE 4 CAPSULES 1 HOUR PRIOR TO DENTAL APPOINTMENT   0    Cholecalciferol (VITAMIN D3) 21815 units TABS Take by mouth      nitrofurantoin (MACRODANTIN) 50 mg capsule One tablet after intercourse 30 capsule 3     Current Facility-Administered Medications   Medication Dose Route Frequency Provider Last Rate Last Admin    ondansetron (ZOFRAN) injection   Intravenous Code/Trauma/Sedation Richie Phan MD   4 mg at 01/26/21 1414          Objective:    Vitals:    01/26/21 1215   BP: 142/70   Pulse: 79   Resp: 20   Temp: 97 9 °F (36 6 °C)   TempSrc: Temporal   SpO2: 96%   Weight: 57 2 kg (126 lb)   Height: 5' 2" (1 575 m)        Physical Exam      No results found for: BNP   Lab Results   Component Value Date    WBC 7 98 01/26/2021    HGB 13 4 01/26/2021    HCT 42 2 01/26/2021    MCV 92 01/26/2021     01/26/2021     Lab Results   Component Value Date    INR 0 89 01/26/2021    INR 1 09 02/09/2014    PROTIME 12 1 01/26/2021    PROTIME 13 6 02/09/2014     Lab Results   Component Value Date    PTT 26 02/09/2014         I have personally reviewed pertinent imaging and laboratory results  Code Status: No Order  Advance Directive and Living Will:      Power of :    POLST:      This text is generated with voice recognition software  There may be translation, syntax,  or grammatical errors  If you have any questions, please contact the dictating provider  Yes

## 2021-02-04 NOTE — PATIENT PROFILE ADULT - NSTRANSFEREYEGLASSESPAIRS_GEN_A_NUR
Diagnosis: GIST, Gastrointestinal Stromal Tumor    Regimen: Keytruda  Cycle/Day:     Dr. CHRIS Yepez is supervising clinician today.    ECOG:   ECOG [21 0847]   ECOG Performance Status 0       Verified if patient has state DNR bracelet on: No; Full Code    Patient confirms that he has received a copy of Chemotherapy Consent and verbalizes understanding of treatment plan: Yes.     Pre-Treatment: Treatment consent signed  Patient has valid pre-authorization  VS completed  Height and weight verified  Premed orders, including hydration, are verified prior to administration  Treatment parameters verified in patient protocol  Lab results checked - MD notified; reviewed  Chemotherapy doses independently doubled checked & verified by two practitioners  Chemotherapy infusion pump settings are double checked & verified by two practitioners  Patient is identified by first & last name, Date of birth that has been verified with the patient chairside.  Patient is identified by first & last name, Date of birth that has been verified by two practitioners with the patient chairside.  I have reviewed the following with the patient:  Name of chemo drug, duration and route of infusion, and reportable infusion-related symptoms.  Drugs were administered in proper sequencing.    Treatment: Refer to [facility] and MAR for line assessment and medication administration  Chemotherapy has not ; double checked & verified by two practitioners  Appearance and physical integrity of drugs meets standard of drug monograph; double checked & verified by two practitioners  Rate set on infusion pump is in alignment with ordered rate; double checked & verified by two practitioners  Blood return confirmed before, during and after treatment administered  Infusion pump used for non-vesicant drugs    Post Treatment: Treatment tolerated well; no adverse reaction    Does the Patient have a central line?  no    Transfusion: Not needed    Integrative  Medicine: No    Oral Chemotherapy: No, on HOLD until Wednesday.    Education: 3. Reinforced previous education regarding keytruda and 5. Patient verbalized understanding    Next appointment scheduled: 2/10/21  Patient instructed to call the office with any questions or concerns.    Patient Discharged: ambulatory, with family member, to home.    Dr. CHRIS MCNAMARA, 2/4/21  Patient labs reviewed Yes, ok to proceed with pembrolizumab treatment.hold sunitnib till seen by MD on Wednesday.2/4/2021  2) Prescription(s) refilled: No  3) Same day orders/add ons: no    Please schedule paracentesis with albumin infusion on Monday 2/8/2021 and cancel the appointment on friday2/5/2021-2/5 appt was cancelled, rescheduled for 2/9/21 (no appointments available for 2/8-Dr. CHRIS Yepez notified.  IV Therapy unavailable for albumin infusion on Tuesday, Lisa RN, approved patient to come to [facility]    Midcycle Check: No   RTC in 2/10/2021 Labs: Yes cbc,cmp,mag phos ldh    MD/APC/RN Visit: YES MD   Treatment: No  Imaging prior to next visit: No     1 pair

## 2021-02-11 NOTE — ED ADULT TRIAGE NOTE - NS ED NURSE DIRECT TO ROOM YN
No Doxepin Counseling:  Patient advised that the medication is sedating and not to drive a car after taking this medication. Patient informed of potential adverse effects including but not limited to dry mouth, urinary retention, and blurry vision.  The patient verbalized understanding of the proper use and possible adverse effects of doxepin.  All of the patient's questions and concerns were addressed.

## 2021-11-29 NOTE — PROGRESS NOTE ADULT - PROBLEM SELECTOR PLAN 9
Attempted to contact patient with Dr. Calero recommendations    Detailed message left on patient's voicemail     Call clinic back if any further questions   repleted.

## 2022-08-08 NOTE — PROGRESS NOTE ADULT - MUSCULOSKELETAL
Please approve or deny     Last Visit Date:  4/21/2022   SARA     Next Visit Date:    Visit date not found negative

## 2022-09-23 NOTE — H&P ADULT - WEIGHT IN KG
09-23    135  |  101  |  18  ----------------------------<  173<H>  4.0   |  19<L>  |  0.89    Ca    8.3<L>      23 Sep 2022 05:30  Phos  2.3     09-23  Mg     2.10     09-23    POCT Blood Glucose.: 182 mg/dL (09-23-22 @ 05:21)  A1C with Estimated Average Glucose Result: 6.5 % (09-20-22 @ 07:11)   47.6

## 2022-10-28 NOTE — H&P ADULT - PROBLEM SELECTOR PROBLEM 2
1 Principal Discharge DX:	Fall   Principal Discharge DX:	Fall  Secondary Diagnosis:	Evaluation by medical service required   Essential hypertension

## 2023-02-13 NOTE — PROGRESS NOTE ADULT - PROBLEM SELECTOR PROBLEM 3
Hyponatremia Ivermectin Pregnancy And Lactation Text: This medication is Pregnancy Category C and it isn't known if it is safe during pregnancy. It is also excreted in breast milk.

## 2023-05-17 NOTE — ED ADULT NURSE NOTE - PRO INTERPRETER NEED 2
Outreach attempt was made to schedule a Medicare Wellness Visit. This was the first attempt. Contact was made, MWV appointment scheduled.  Appointment scheduled for 05/18/2023.   English

## 2024-01-03 NOTE — PHYSICAL THERAPY INITIAL EVALUATION ADULT - PHYSICAL ASSIST/NONPHYSICAL ASSIST, REHAB EVAL
Physical Therapy Visit    Visit Type: Daily Treatment Note  Visit: 5  Referring Provider: Trey Bell MD  Medical Diagnosis (from order): R10.2 - Pelvic pain in female     SUBJECTIVE                                                                                                               Patient reports overall feeling good. Stated she had a bad day on Friday, but besides that not bad this past week. Still using cupping techniques and heat with e-stim which seems to help alleviate symptoms as they arise. States she is willing to attempting progressing exercises this session.         OBJECTIVE                                                                                                                                    Treatment     Manual Therapy   -Abdominal massage clockwise  -Taught self mob as well, recommend 1-2 min in morning before getting out of bed to promote mobility around bladder/bowel and ease voiding and defecation in the morning   -Myofascial release via silicone suction cup on paraspinals, QL  -Abdominal wall along rib cage boarder, and abdominal wall medial to iliac crests--all bilaterally     Therapeutic Activity  Verbal and tactile cuing required throughout all below exercises for proper sequencing and coordination:   +Supine pelvic tilt   +Supine bridge  +Piriformis stretch   +Lumbar rotations   +Butterfly stretch   +Happy baby  +Core activation in supine with hip adduction with ball    +Cat cow-pain free  +Quadruped rocking   +Anupam pose  -Modified bird dog with alternating arms  -L Stretch   +Supported Child's Pose with Diaphragmatic Breathing with Pelvic Floor Relaxation    Neuromuscular Re-Education  -Prone diaphragmatic breathing, hand on back for expansion  -Hooklying rib cage breathing self feedback, cues for 360/umbrella breath  -Manual feedback internally for pelvic floor elongation to try with voiding/defecating/intercourse  -Sidelying thoracic rotation      Skilled input:  verbal instruction/cues and tactile instruction/cues    Writer verbally educated and received verbal consent for hand placement, positioning of patient, and techniques to be performed today from patient for clothing adjustments for techniques, hand placement and palpation for techniques and therapist position for techniques as described above and how they are pertinent to the patient's plan of care.  Verbal consent received today for internal pelvic floor muscle assessment and treatment.   Patient provided continued consent during evaluation and treatment.  Patient was given alternative options.  Benefits and drawbacks were explained.  Home Exercise Program  Access Code: [email or web address]  URL: [email or web address]  Date: 01/03/2024  Prepared by: Nichole Gutiérrez    Exercises  - Prone Diaphragmatic Breathing  - 1 x daily - 7 x weekly - 1 sets - 10 reps  - Hooklying Rib Cage Breathing  - 1 x daily - 7 x weekly - 1 sets - 10 reps  - Sidelying Thoracic Lumbar Rotation  - 1 x daily - 7 x weekly - 1 sets - 10 reps  - Supine Abdominal Wall Massage  - 1 x daily - 7 x weekly - 1 sets - 1 reps - 1-2 minute hold  - Cat Cow  - 1 x daily - 7 x weekly - 1 sets - 10 reps  - Supine Pelvic Tilt  - 1 x daily - 7 x weekly - 1 sets - 10 reps  - Happy Baby with Pelvic Floor Lengthening  - 1 x daily - 7 x weekly - 1 sets - 10 reps  - Supine Butterfly Groin Stretch  - 1 x daily - 7 x weekly - 1 sets - 10 reps  - Supported Child's Pose with Diaphragmatic Breathing with Pelvic Floor Relaxation  - 1 x daily - 7 x weekly - 1 sets - up to 5 minutes hold  - Pelvic Floor Contractions in Hooklying with Adduction  - 1 x daily - 7 x weekly - 1 sets - 10 reps  - Supine Bridge with Pelvic Floor Contraction  - 1 x daily - 7 x weekly - 3 sets - 10 reps      ASSESSMENT                                                                                                            Patient able to tolerate stretching and strengthening exercises  today. Educated on relief positions with diaphragmatic breathing and relaxation techniques. Patient tolerated all activities well today.   Education:   - Results of above outlined education: Verbalizes understanding and Demonstrates understanding    PLAN                                                                                                                           Suggestions for next session as indicated: Progress per plan of care      Goals    Decrease pain/symptoms to 2/10  Improve involved strength to 4/5  Improve involved range of motion to painfree hip/lumbar mobility  Demonstrate independent use of relaxation and stress management strategies associated with pelvic function    The above improvements in impairments to assist in obtaining goals listed below  Long Term Goals: to be met by end of plan of care  1. Be able to bend/squat without pain/difficulty to improve activities of independent daily living, activity tolerance, age appropriate activities  2. Be able to lift 30# without pain/difficulty to improve instrumental activities of daily living, activity tolerance, age appropriate activities  3. Demonstrate ability to negotiate level and unlevel surfaces at variable velocities, including change of direction without increased pain or instability to return to age appropriate and community activities at prior level of function  4. Pelvic Floor Distress Inventory - 20: Patient will complete form to reflect an improved score from initial score of 66 to less than or equal to 21 to indicate patient reported improvement in function/disability/impairment.  5. Patient independent with modified and progressed home exercise program.      Therapy procedure time and total treatment time can be found documented on the Time Entry flowsheet     verbal cues

## 2024-01-15 NOTE — PATIENT PROFILE ADULT - NSPROPASSIVESMOKEEXPOSURE_GEN_A_NUR
Patient left voicemail on [employer] on 01/12/2024. States she had an appointment on 01/09 that she missed and would like to reschedule. Please call patient to further assist.   
No

## 2024-05-30 NOTE — H&P ADULT - PROBLEM SELECTOR PROBLEM 6
74-year-old with recurrent, platinum resistant stage IIIc ovarian cancer with disease progression after 3 cycles of Alimta therapy.  There is new disease in the liver and progressive mesenteric lymphadenopathy with stable retroperitoneal lymphadenopathy.  She has bilateral pleural effusions and is status postthoracentesis 5/14/2024.  I reviewed CT chest abdomen pelvis images, CBC, CMP, .  The tumor is HER2 1+.  Her performance status is 1.  1.  I reviewed additional palliative treatment options including palliative Taxotere with inherent risk of worsening peripheral neuropathy, palliative Enhertu therapy also with inherent risk of peripheral neuropathy.  She understands that response rates to additional cytotoxic chemotherapy after multiple prior treatments are in the 10% range.  She also understands that response rates to Enhertu will be diminished given lower expression of HER2.  2.  We discussed nontreatment options including best supportive care, hospice care.  3.  After considering the risks and benefits of additional treatment versus focusing entirely on quality of life, she is decided to focus on quality of life and will engage with hospice care.  She understands that while on hospice, she can continue to have palliative thoracentesis for symptom management.  4.  Return in 3 months for evaluation.  I have spent a total time of 50 minutes on 05/30/24 in caring for this patient including Diagnostic results, Prognosis, Risks and benefits of tx options, Instructions for management, Patient and family education, Risk factor reductions, Impressions, Counseling / Coordination of care, Documenting in the medical record, Reviewing / ordering tests, medicine, procedures  , Obtaining or reviewing history  , and Communicating with other healthcare professionals .     Leg edema

## 2024-06-18 NOTE — ED ADULT NURSE NOTE - NSSUHOSCREENINGYN_ED_ALL_ED
Pt refused to wear CPAP. Pt states he does not have one at home. RT made pt aware that a CPAP is available to him if he changes his mind.     Blanco Colmenares RT on 6/18/2024 at 1:01 AM     Yes - the patient is able to be screened

## 2024-07-15 NOTE — PATIENT PROFILE ADULT - IS THERE A SUSPICION OF ABUSE/NEGLIGENCE?
1600cal a day  Change up exercise    Prednisone  Doxycycline  Fluids  Rest  Mucinex no letters  Mucinex dm night  Zyrtec or claritin  Use Albuterol for sob, wheezing or tight chest  Call if sx worsen or change or not starting to get better in 2-3d    Keep aug appt      I will communicate with you via [employer] regarding messages and results. If you need help with this, you can call the support line at [phone].    IT WAS A PLEASURE TO SEE YOU TODAY. THANK YOU FOR CHOOSING US FOR YOUR HEALTHCARE NEEDS.    
no

## 2024-10-25 NOTE — H&P ADULT - MS EXT PE MLT D E PC
----- Message from Janis Rider MD sent at 10/25/2024 11:01 AM CDT -----  Please inform Michaela Palacios   Inr high / please check did she get instruction about her coumadin ? .  Thanks,  Dr Rider     no clubbing/no cyanosis/pedal edema

## 2024-11-20 NOTE — ED PROVIDER NOTE - CPE EDP RESP NORM
normal...
PAST SURGICAL HISTORY:  History of surgery blle vascular surgery, abscess excision with skin graft right groin

## 2024-11-21 NOTE — H&P ADULT - PROBLEM SELECTOR PROBLEM 3
checked.  No suspicious activity noted.  Refill done.   Urinary tract infection with hematuria, site unspecified

## 2025-02-21 NOTE — PROGRESS NOTE ADULT - SUBJECTIVE AND OBJECTIVE BOX
Patient is a 78y old  Female who presents with a chief complaint of     SUBJECTIVE / OVERNIGHT EVENTS: Pt reports SOB resolved, notes urinary frequency however blames it on lasix.    MEDICATIONS  (STANDING):  amLODIPine   Tablet 5 milliGRAM(s) Oral daily  apixaban 5 milliGRAM(s) Oral every 12 hours  aspirin enteric coated 81 milliGRAM(s) Oral daily  atorvastatin 20 milliGRAM(s) Oral at bedtime  cefTRIAXone Injectable. 1000 milliGRAM(s) IV Push every 24 hours  influenza   Vaccine 0.5 milliLiter(s) IntraMuscular once  LORazepam     Tablet 1 milliGRAM(s) Oral two times a day  metoprolol succinate ER 25 milliGRAM(s) Oral daily  valsartan 320 milliGRAM(s) Oral daily    MEDICATIONS  (PRN):  acetaminophen   Tablet. 650 milliGRAM(s) Oral every 6 hours PRN Mild Pain (1 - 3)  oxyCODONE    IR 2.5 milliGRAM(s) Oral every 4 hours PRN Severe Pain (7 - 10)      Vital Signs Last 24 Hrs  T(C): 37.1 (01-31-18 @ 05:04), Max: 37.1 (01-31-18 @ 05:04)  T(F): 98.7 (01-31-18 @ 05:04), Max: 98.7 (01-31-18 @ 05:04)  HR: 81 (01-31-18 @ 05:04) (72 - 96)  BP: 108/57 (01-31-18 @ 05:04) (99/60 - 124/86)  BP(mean): --  RR: 18 (01-31-18 @ 05:04) (16 - 18)  SpO2: 99% (01-31-18 @ 05:04) (98% - 99%)  CAPILLARY BLOOD GLUCOSE        I&O's Summary    30 Jan 2018 07:01  -  31 Jan 2018 07:00  --------------------------------------------------------  IN: 0 mL / OUT: 250 mL / NET: -250 mL        PHYSICAL EXAM:  GENERAL: NAD, well-nourished, no orthopnea  HEENT: mmm, no JVD  CHEST/LUNG: mild crackles at bl bases  HEART: RRR, no m/r/g  ABDOMEN: soft, nt, nd  EXTREMITIES:  wwp, no edema  PSYCH: AAOx3  NEUROLOGY: non-focal  SKIN: No rashes or lesions    LABS:                        11.6   3.21  )-----------( 171      ( 31 Jan 2018 07:15 )             34.5     01-31    134<L>  |  101  |  22  ----------------------------<  90  4.3   |  23  |  0.98    Ca    8.1<L>      31 Jan 2018 07:15  Phos  3.4     01-30  Mg     1.8     01-30    TPro  5.4<L>  /  Alb  2.4<L>  /  TBili  0.3  /  DBili  x   /  AST  10  /  ALT  < 4<L>  /  AlkPhos  46  01-30      CARDIAC MARKERS ( 29 Jan 2018 20:30 )  x     / < 0.06 ng/mL / 29 u/L / x     / x              RADIOLOGY & ADDITIONAL TESTS:    Imaging Personally Reviewed:    Consultant(s) Notes Reviewed:      Care Discussed with Consultants/Other Providers: hair removal not indicated

## 2025-06-21 NOTE — PATIENT PROFILE ADULT - DO YOU WANT TO COMPLETE THE HCP AND NAME A HEALTH CARE AGENT
Vitals: I have reviewed the patients vital signs  General: nontoxic appearing  HEENT: Atraumatic, normocephalic, airway patent  Eyes: EOMI, tracking appropriately  Neck: no tracheal deviation  Chest/Lungs: no trauma, symmetric chest rise, speaking in complete sentences,  no resp distress  Heart: skin and extremities well perfused, regular rate and rhythm  Neuro: A+Ox3, appears non focal  MSK: no deformities tender to palpation at the DIP of his left fifth digit.  He holds it in flexion but is able to straighten it actively.  There is good distal 2-point discrimination and capillary refill  Skin: no cyanosis, no jaundice   Psych:  Normal mood and affect no